# Patient Record
Sex: MALE | Race: WHITE | Employment: OTHER | URBAN - METROPOLITAN AREA
[De-identification: names, ages, dates, MRNs, and addresses within clinical notes are randomized per-mention and may not be internally consistent; named-entity substitution may affect disease eponyms.]

---

## 2017-11-06 ENCOUNTER — APPOINTMENT (OUTPATIENT)
Dept: GENERAL RADIOLOGY | Facility: CLINIC | Age: 62
DRG: 896 | End: 2017-11-06
Attending: EMERGENCY MEDICINE
Payer: COMMERCIAL

## 2017-11-06 ENCOUNTER — HOSPITAL ENCOUNTER (INPATIENT)
Facility: CLINIC | Age: 62
LOS: 5 days | Discharge: HOME HEALTH | DRG: 896 | End: 2017-11-13
Attending: EMERGENCY MEDICINE | Admitting: INTERNAL MEDICINE
Payer: COMMERCIAL

## 2017-11-06 ENCOUNTER — APPOINTMENT (OUTPATIENT)
Dept: CT IMAGING | Facility: CLINIC | Age: 62
DRG: 896 | End: 2017-11-06
Attending: EMERGENCY MEDICINE
Payer: COMMERCIAL

## 2017-11-06 DIAGNOSIS — F10.10 ALCOHOL ABUSE: ICD-10-CM

## 2017-11-06 DIAGNOSIS — F13.20: ICD-10-CM

## 2017-11-06 DIAGNOSIS — F41.9 ANXIETY DISORDER, UNSPECIFIED TYPE: ICD-10-CM

## 2017-11-06 DIAGNOSIS — F17.210 CIGARETTE SMOKER: ICD-10-CM

## 2017-11-06 DIAGNOSIS — F13.20 SEDATIVE DEPENDENCE (H): ICD-10-CM

## 2017-11-06 DIAGNOSIS — J96.01 ACUTE RESPIRATORY FAILURE WITH HYPOXIA (H): Primary | ICD-10-CM

## 2017-11-06 DIAGNOSIS — F10.10 ALCOHOL ABUSE, CONTINUOUS: ICD-10-CM

## 2017-11-06 LAB
ALBUMIN SERPL-MCNC: 3.2 G/DL (ref 3.4–5)
ALCOHOL BREATH TEST: 0.04 (ref 0–0.01)
ALP SERPL-CCNC: 87 U/L (ref 40–150)
ALT SERPL W P-5'-P-CCNC: 47 U/L (ref 0–70)
AMPHETAMINES UR QL SCN: NEGATIVE
ANION GAP SERPL CALCULATED.3IONS-SCNC: 7 MMOL/L (ref 3–14)
AST SERPL W P-5'-P-CCNC: 53 U/L (ref 0–45)
BARBITURATES UR QL: NEGATIVE
BASOPHILS # BLD AUTO: 0 10E9/L (ref 0–0.2)
BASOPHILS NFR BLD AUTO: 0.4 %
BENZODIAZ UR QL: POSITIVE
BILIRUB SERPL-MCNC: 0.4 MG/DL (ref 0.2–1.3)
BUN SERPL-MCNC: 18 MG/DL (ref 7–30)
CALCIUM SERPL-MCNC: 8.5 MG/DL (ref 8.5–10.1)
CANNABINOIDS UR QL SCN: NEGATIVE
CHLORIDE SERPL-SCNC: 107 MMOL/L (ref 94–109)
CO2 BLDCOV-SCNC: 30 MMOL/L (ref 21–28)
CO2 SERPL-SCNC: 31 MMOL/L (ref 20–32)
COCAINE UR QL: NEGATIVE
CREAT SERPL-MCNC: 0.71 MG/DL (ref 0.66–1.25)
D DIMER PPP FEU-MCNC: 2 UG/ML FEU (ref 0–0.5)
DIFFERENTIAL METHOD BLD: ABNORMAL
EOSINOPHIL # BLD AUTO: 0 10E9/L (ref 0–0.7)
EOSINOPHIL NFR BLD AUTO: 0.3 %
ERYTHROCYTE [DISTWIDTH] IN BLOOD BY AUTOMATED COUNT: 16.2 % (ref 10–15)
ETHANOL UR QL SCN: NEGATIVE
GFR SERPL CREATININE-BSD FRML MDRD: >90 ML/MIN/1.7M2
GLUCOSE SERPL-MCNC: 134 MG/DL (ref 70–99)
HCT VFR BLD AUTO: 45 % (ref 40–53)
HGB BLD-MCNC: 14.7 G/DL (ref 13.3–17.7)
IMM GRANULOCYTES # BLD: 0.1 10E9/L (ref 0–0.4)
IMM GRANULOCYTES NFR BLD: 0.5 %
LACTATE BLD-SCNC: 1.9 MMOL/L (ref 0.7–2.1)
LYMPHOCYTES # BLD AUTO: 3 10E9/L (ref 0.8–5.3)
LYMPHOCYTES NFR BLD AUTO: 31 %
MCH RBC QN AUTO: 30.5 PG (ref 26.5–33)
MCHC RBC AUTO-ENTMCNC: 32.7 G/DL (ref 31.5–36.5)
MCV RBC AUTO: 93 FL (ref 78–100)
MONOCYTES # BLD AUTO: 1.1 10E9/L (ref 0–1.3)
MONOCYTES NFR BLD AUTO: 11.2 %
NEUTROPHILS # BLD AUTO: 5.4 10E9/L (ref 1.6–8.3)
NEUTROPHILS NFR BLD AUTO: 56.6 %
NRBC # BLD AUTO: 0 10*3/UL
NRBC BLD AUTO-RTO: 0 /100
OPIATES UR QL SCN: NEGATIVE
PCO2 BLDV: 52 MM HG (ref 40–50)
PH BLDV: 7.36 PH (ref 7.32–7.43)
PLATELET # BLD AUTO: 206 10E9/L (ref 150–450)
PO2 BLDV: 62 MM HG (ref 25–47)
POTASSIUM SERPL-SCNC: 4.2 MMOL/L (ref 3.4–5.3)
PROT SERPL-MCNC: 7 G/DL (ref 6.8–8.8)
RBC # BLD AUTO: 4.82 10E12/L (ref 4.4–5.9)
SAO2 % BLDV FROM PO2: 90 %
SODIUM SERPL-SCNC: 145 MMOL/L (ref 133–144)
TROPONIN I SERPL-MCNC: <0.015 UG/L (ref 0–0.04)
WBC # BLD AUTO: 9.6 10E9/L (ref 4–11)

## 2017-11-06 PROCEDURE — 93010 ELECTROCARDIOGRAM REPORT: CPT | Mod: Z6 | Performed by: EMERGENCY MEDICINE

## 2017-11-06 PROCEDURE — 80053 COMPREHEN METABOLIC PANEL: CPT | Performed by: EMERGENCY MEDICINE

## 2017-11-06 PROCEDURE — 25000128 H RX IP 250 OP 636: Performed by: EMERGENCY MEDICINE

## 2017-11-06 PROCEDURE — 25000132 ZZH RX MED GY IP 250 OP 250 PS 637: Performed by: EMERGENCY MEDICINE

## 2017-11-06 PROCEDURE — 82803 BLOOD GASES ANY COMBINATION: CPT

## 2017-11-06 PROCEDURE — 73030 X-RAY EXAM OF SHOULDER: CPT | Mod: LT

## 2017-11-06 PROCEDURE — 25000125 ZZHC RX 250: Performed by: EMERGENCY MEDICINE

## 2017-11-06 PROCEDURE — 99285 EMERGENCY DEPT VISIT HI MDM: CPT | Mod: 25 | Performed by: EMERGENCY MEDICINE

## 2017-11-06 PROCEDURE — 83605 ASSAY OF LACTIC ACID: CPT

## 2017-11-06 PROCEDURE — 96361 HYDRATE IV INFUSION ADD-ON: CPT | Performed by: EMERGENCY MEDICINE

## 2017-11-06 PROCEDURE — 71020 XR CHEST 2 VW: CPT

## 2017-11-06 PROCEDURE — 80307 DRUG TEST PRSMV CHEM ANLYZR: CPT | Performed by: FAMILY MEDICINE

## 2017-11-06 PROCEDURE — 94640 AIRWAY INHALATION TREATMENT: CPT | Performed by: EMERGENCY MEDICINE

## 2017-11-06 PROCEDURE — 85025 COMPLETE CBC W/AUTO DIFF WBC: CPT | Performed by: EMERGENCY MEDICINE

## 2017-11-06 PROCEDURE — 84484 ASSAY OF TROPONIN QUANT: CPT | Performed by: EMERGENCY MEDICINE

## 2017-11-06 PROCEDURE — 82075 ASSAY OF BREATH ETHANOL: CPT | Performed by: EMERGENCY MEDICINE

## 2017-11-06 PROCEDURE — 80320 DRUG SCREEN QUANTALCOHOLS: CPT | Performed by: FAMILY MEDICINE

## 2017-11-06 PROCEDURE — 71260 CT THORAX DX C+: CPT

## 2017-11-06 PROCEDURE — 85379 FIBRIN DEGRADATION QUANT: CPT | Performed by: EMERGENCY MEDICINE

## 2017-11-06 PROCEDURE — 96360 HYDRATION IV INFUSION INIT: CPT | Performed by: EMERGENCY MEDICINE

## 2017-11-06 PROCEDURE — 73502 X-RAY EXAM HIP UNI 2-3 VIEWS: CPT

## 2017-11-06 PROCEDURE — 93005 ELECTROCARDIOGRAM TRACING: CPT | Performed by: EMERGENCY MEDICINE

## 2017-11-06 PROCEDURE — HZ2ZZZZ DETOXIFICATION SERVICES FOR SUBSTANCE ABUSE TREATMENT: ICD-10-PCS | Performed by: INTERNAL MEDICINE

## 2017-11-06 RX ORDER — PREDNISONE 20 MG/1
40 TABLET ORAL ONCE
Status: COMPLETED | OUTPATIENT
Start: 2017-11-06 | End: 2017-11-06

## 2017-11-06 RX ORDER — HYDROXYZINE PAMOATE 25 MG/1
25 CAPSULE ORAL ONCE
Status: DISCONTINUED | OUTPATIENT
Start: 2017-11-06 | End: 2017-11-06 | Stop reason: CLARIF

## 2017-11-06 RX ORDER — METHOCARBAMOL 750 MG/1
750 TABLET, FILM COATED ORAL
Status: ON HOLD | COMMUNITY
End: 2017-12-22

## 2017-11-06 RX ORDER — BISMUTH SUBSALICYLATE 262 MG/1
524 TABLET, CHEWABLE ORAL 2 TIMES DAILY PRN
Status: ON HOLD | COMMUNITY
End: 2018-04-09

## 2017-11-06 RX ORDER — LIDOCAINE 40 MG/G
1 CREAM TOPICAL 4 TIMES DAILY PRN
Status: ON HOLD | COMMUNITY
End: 2017-12-22

## 2017-11-06 RX ORDER — IOPAMIDOL 755 MG/ML
100 INJECTION, SOLUTION INTRAVASCULAR ONCE
Status: COMPLETED | OUTPATIENT
Start: 2017-11-06 | End: 2017-11-06

## 2017-11-06 RX ORDER — BUPRENORPHINE AND NALOXONE 8; 2 MG/1; MG/1
1 FILM, SOLUBLE BUCCAL; SUBLINGUAL 2 TIMES DAILY
COMMUNITY
End: 2018-03-17

## 2017-11-06 RX ORDER — RAMELTEON 8 MG/1
8 TABLET ORAL AT BEDTIME
Status: ON HOLD | COMMUNITY
End: 2017-12-22

## 2017-11-06 RX ORDER — ALPRAZOLAM 2 MG
2 TABLET ORAL 3 TIMES DAILY PRN
Status: ON HOLD | COMMUNITY
End: 2017-11-13

## 2017-11-06 RX ORDER — LORAZEPAM 1 MG/1
1 TABLET ORAL ONCE
Status: DISCONTINUED | OUTPATIENT
Start: 2017-11-06 | End: 2017-11-06

## 2017-11-06 RX ORDER — VENLAFAXINE HYDROCHLORIDE 150 MG/1
150 CAPSULE, EXTENDED RELEASE ORAL DAILY
Status: ON HOLD | COMMUNITY
End: 2017-11-13

## 2017-11-06 RX ORDER — SODIUM CHLORIDE 9 MG/ML
1000 INJECTION, SOLUTION INTRAVENOUS CONTINUOUS
Status: DISCONTINUED | OUTPATIENT
Start: 2017-11-06 | End: 2017-11-07

## 2017-11-06 RX ORDER — HYDROXYZINE HYDROCHLORIDE 25 MG/1
25 TABLET, FILM COATED ORAL ONCE
Status: COMPLETED | OUTPATIENT
Start: 2017-11-06 | End: 2017-11-06

## 2017-11-06 RX ORDER — LORAZEPAM 0.5 MG/1
0.5 TABLET ORAL ONCE
Status: COMPLETED | OUTPATIENT
Start: 2017-11-06 | End: 2017-11-06

## 2017-11-06 RX ORDER — GABAPENTIN 300 MG/1
900 CAPSULE ORAL 3 TIMES DAILY
Status: ON HOLD | COMMUNITY
End: 2017-11-13

## 2017-11-06 RX ORDER — DIAZEPAM 5 MG
5 TABLET ORAL ONCE
Status: COMPLETED | OUTPATIENT
Start: 2017-11-06 | End: 2017-11-06

## 2017-11-06 RX ORDER — IPRATROPIUM BROMIDE AND ALBUTEROL SULFATE 2.5; .5 MG/3ML; MG/3ML
3 SOLUTION RESPIRATORY (INHALATION) ONCE
Status: COMPLETED | OUTPATIENT
Start: 2017-11-06 | End: 2017-11-06

## 2017-11-06 RX ADMIN — NICOTINE POLACRILEX 4 MG: 4 GUM, CHEWING ORAL at 23:45

## 2017-11-06 RX ADMIN — IPRATROPIUM BROMIDE AND ALBUTEROL SULFATE 3 ML: .5; 3 SOLUTION RESPIRATORY (INHALATION) at 23:07

## 2017-11-06 RX ADMIN — LORAZEPAM 0.5 MG: 0.5 TABLET ORAL at 16:19

## 2017-11-06 RX ADMIN — PREDNISONE 40 MG: 20 TABLET ORAL at 22:43

## 2017-11-06 RX ADMIN — DIAZEPAM 5 MG: 5 TABLET ORAL at 22:43

## 2017-11-06 RX ADMIN — IOPAMIDOL 72 ML: 755 INJECTION, SOLUTION INTRAVENOUS at 20:32

## 2017-11-06 RX ADMIN — HYDROXYZINE HYDROCHLORIDE 25 MG: 25 TABLET ORAL at 15:54

## 2017-11-06 RX ADMIN — SODIUM CHLORIDE 1000 ML: 9 INJECTION, SOLUTION INTRAVENOUS at 16:19

## 2017-11-06 RX ADMIN — SODIUM CHLORIDE 1000 ML: 9 INJECTION, SOLUTION INTRAVENOUS at 14:59

## 2017-11-06 RX ADMIN — SODIUM CHLORIDE 90 ML: 9 INJECTION, SOLUTION INTRAVENOUS at 20:33

## 2017-11-06 ASSESSMENT — ENCOUNTER SYMPTOMS
TREMORS: 1
SEIZURES: 0
HALLUCINATIONS: 1
VOMITING: 0
COUGH: 1
NAUSEA: 0

## 2017-11-06 NOTE — ED NOTES
Patient has been drinking alcohol and mixing it with xanax.  Friends brought him here.    Oxygen sats are low and around 84% on room air.    Placed pt on 3LPM nc oxygen

## 2017-11-06 NOTE — ED NOTES
18g AC IV started by charge RN. Pt sent to CT. CT attempted to flush IV and IV blew and extravagated trung. 5ml saline per CT tech. Pt to return to ED. [facility] called anesthesia, they are unable to come start an IV at this time. [facility] paged Peds Vascular, waiting for call back.

## 2017-11-06 NOTE — ED NOTES
IV try x2 by float nurse while this RN on on break. Then this RN attempted IV start x2 with US. Able to obtain flash, unable to advance catheter. Charge RN to try before calling anesthesia/peds vascular.

## 2017-11-06 NOTE — IP AVS SNAPSHOT
8A: [phone]                                              INTERAGENCY TRANSFER FORM - PHYSICIAN ORDERS   2017                    Hospital Admission Date: 2017  MARYBETH HUERTAS   : 1955  Sex: Male        Attending Provider: (none)    Allergies:  No Known Allergies    Infection:  None   Service:  HOSPITALIST    Ht:  --   Wt:  89.8 kg (198 lb)   Admission Wt:  95.3 kg (210 lb 3.2 oz)    BMI:  --   BSA:  --            Patient PCP Information     Provider PCP Type    [facility]      ED Clinical Impression     Diagnosis Description Comment Added By Time Added    Acute respiratory failure with hypoxia (H) [J96.01] Acute respiratory failure with hypoxia (H) [J96.01]  Stephanie Julien MD 2017  4:01 PM    Xanax use disorder, severe, dependence (H) [F13.20] Xanax use disorder, severe, dependence (H) [F13.20]  Stephanie Julien MD 2017  4:02 PM    Alcohol abuse [F10.10] Alcohol abuse [F10.10]  Stephanie Julien MD 2017  4:02 PM      Hospital Problems as of 2017              Priority Class Noted POA    Withdrawal from benzodiazepine (H)   2017 Yes    Hypoxia   2017 Yes      Non-Hospital Problems as of 2017              Priority Class Noted    Chronic back pain   Unknown    Panic attacks   Unknown    Rotator cuff injury   Unknown    CTS (carpal tunnel syndrome)   Unknown    Obesity   Unknown    Advanced directives, counseling/discussion   2011    PTSD (post-traumatic stress disorder)   2011    CARDIOVASCULAR SCREENING; LDL GOAL LESS THAN 160   2011      Code Status History     Date Active Date Inactive Code Status Order ID Comments User Context    2017 12:29 AM 2017  2:27 PM Full Code [id number]  Tato Orozco MD ED         Medication Review      START taking        Dose / Directions Comments    clonazePAM 2 MG tablet   Commonly known as:  klonoPIN   Used for:  Anxiety disorder, unspecified type         Dose:  4 mg   Take 2 tablets (4 mg) by mouth 2 times daily   Quantity:  60 tablet   Refills:  0        venlafaxine 75 MG Tb24 24 hr tablet   Commonly known as:  EFFEXOR-ER   Indication:  Depression and Anxiety   Replaces:  venlafaxine 150 MG 24 hr capsule        Dose:  75 mg   Take 1 tablet (75 mg) by mouth daily   Quantity:  30 each   Refills:  0          CONTINUE these medications which may have CHANGED, or have new prescriptions. If we are uncertain of the size of tablets/capsules you have at home, strength may be listed as something that might have changed.        Dose / Directions Comments    gabapentin 300 MG capsule   Commonly known as:  NEURONTIN   This may have changed:  how much to take        Dose:  300 mg   Take 1 capsule (300 mg) by mouth 3 times daily   Quantity:  90 capsule   Refills:  0          CONTINUE these medications which have NOT CHANGED        Dose / Directions Comments    aspirin 81 MG tablet        Dose:  1 tablet   Take 1 tablet by mouth daily.   Refills:  3        bismuth subsalicylate 262 MG chewable tablet   Commonly known as:  PEPTO BISMOL        Dose:  524 mg   Take 524 mg by mouth 2 times daily as needed for diarrhea   Refills:  0        buprenorphine HCl-naloxone HCl 8-2 MG per film   Commonly known as:  SUBOXONE        Dose:  1 Film   Place 1 Film under the tongue 2 times daily   Refills:  0        cyanocobalamin 1000 MCG tablet   Commonly known as:  vitamin  B-12        Dose:  1000 mcg   Take 1 tablet by mouth daily.   Quantity:  100 tablet   Refills:  12        fish oil-omega-3 fatty acids 1000 MG capsule        Dose:  2 g   Take 2 capsules by mouth daily.   Quantity:  180 capsule   Refills:  12        lidocaine 4 % Crea cream   Commonly known as:  LMX4        Dose:  1 Application   Apply 1 Application topically 4 times daily as needed for mild pain (Apply liberal amount to shoulder)   Refills:  0        methocarbamol 750 MG tablet   Commonly known as:  ROBAXIN         Dose:  750 mg   Take 750 mg by mouth nightly as needed for muscle spasms   Refills:  0        multivitamin per tablet        Dose:  1 tablet   Take 1 tablet by mouth daily.   Quantity:  100 tablet   Refills:  12        ramelteon 8 MG tablet   Commonly known as:  ROZEREM        Dose:  8 mg   Take 8 mg by mouth At Bedtime   Refills:  0          STOP taking     ALPRAZolam 2 MG tablet   Commonly known as:  XANAX           venlafaxine 150 MG 24 hr capsule   Commonly known as:  EFFEXOR-XR   Replaced by:  venlafaxine 75 MG Tb24 24 hr tablet                     Further instructions from your care team       Use RelTelating PEP Device at least twice daily for 5-10 min/QID.   Use Incentive Spirometry.  Continue Home PT  Monitor blood pressure, weights 2-3 times weekly and as needed.       LABS     CMP  Recent Labs  Lab 11/10/17  0602 11/08/17 0612 11/07/17  0501 11/06/17  1444    141  --  145*   POTASSIUM 4.2 4.1  --  4.2   CHLORIDE 103 104  --  107   CO2 31 31  --  31   ANIONGAP 4 6  --  7   GLC 97 101*  --  134*   BUN 22 18  --  18   CR 0.69 0.58*  --  0.71   GFRESTIMATED >90 >90  --  >90   GFRESTBLACK >90 >90  --  >90   RUSTY 8.5 8.5  --  8.5   MAG  --   --  1.9  --    PROTTOTAL  --   --   --  7.0   ALBUMIN  --   --   --  3.2*   BILITOTAL  --   --   --  0.4   ALKPHOS  --   --   --  87   AST  --   --   --  53*   ALT  --   --   --  47     CBC  Recent Labs  Lab 11/12/17  0636 11/11/17  0624 11/10/17  0602 11/09/17  0632 11/08/17  0612   WBC  --  8.8 9.3 12.5* 13.1*   RBC  --  5.01 4.88 4.91 4.66   HGB 15.0 15.3 14.9 15.0 14.1   HCT  --  46.8 44.6 45.8 43.7   MCV  --  93 91 93 94   MCH  --  30.5 30.5 30.5 30.3   MCHC  --  32.7 33.4 32.8 32.3   RDW  --  15.9* 15.9* 16.0* 16.2*   PLT  --  202 185 183 197     Results for orders placed or performed during the hospital encounter of 11/06/17   XR Chest 2 Views    Narrative    XR CHEST 2 VW  11/6/2017 3:36 PM    HISTORY:  cough, hypoxic    COMPARISON:  None.       Impression    IMPRESSION:  Rotated frontal view. Mild opacities at the left lung  base could represent scarring, atelectasis or mild infiltrate.  Otherwise negative.     VAIBHAV GOLD MD   Shoulder XR, G/E 3 views, left    Narrative    LEFT SHOULDER THREE OR MORE VIEWS  11/6/2017 3:38 PM    HISTORY:  Left shoulder pain status post fall.    COMPARISON:  None.      Impression    IMPRESSION:  No acute process identified. Degenerative changes of the  humeral head and acromioclavicular joint. Osteopenia.     VAIBHAV GOLD MD   XR Pelvis w Hip Left 1 View    Narrative    PELVIS AND HIP LEFT ONE VIEW  11/6/2017 3:37 PM    HISTORY:  Left hip pain status post fall.    COMPARISON:  None.      Impression    IMPRESSION:  Negative.     VAIBHAV GOLD MD   CT Chest Pulmonary Embolism w Contrast    Narrative    CT CHEST PULMONARY EMBOLISM WITH CONTRAST  11/6/2017  8:35 PM     HISTORY: Hypoxia, elevated D-dimer, previous deep vein thrombosis.      TECHNIQUE: Thin section axial images are performed from the thoracic  inlet to the lung bases utilizing 50 mL of Isovue 370 IV contrast  without adverse event. Coronal reformatted images are also generated.  Radiation dose for this scan was reduced using automated exposure  control, adjustment of the mA and/or kV according to patient size, or  iterative reconstruction technique.    FINDINGS:     Chest: Emphysema is present. A few peripheral mucoid impactions are  noted in the posterior right costophrenic angle with surrounding  inflammation. No infiltrate or consolidation. Mild right lower lobe  bronchiectatic changes. Mild volume loss right hemithorax is also  noted. No pleural or pericardial fluid. Heart is normal in size.  Esophagus is unremarkable. Probable reactive mediastinal lymph nodes.  The largest measures 1.4 x 1.0 cm in the left paratracheal region on  series 4, image 53. Small right hilar lymph nodes are also noted.  These are not enlarged by CT criteria. No evidence of  pulmonary  embolism. Thoracic aorta is unremarkable with scattered calcified  atherosclerotic plaque. Limited images upper abdomen demonstrate  probable fatty liver infiltration. Bone window examination  demonstrates degenerative spine changes.      Impression    IMPRESSION:  1. No evidence of pulmonary embolism. Thoracic aorta is unremarkable  with only scattered calcified atherosclerotic plaque.   2. Emphysema with mild bronchiectatic changes posterior right lower  lobe and a few peripheral mucoid impactions in the posterior right  costophrenic angle. No evidence of pneumonia. No pleural fluid.  Reactive mediastinal and right hilar lymph nodes.  3. Fatty infiltration of the liver.    AYESHA FLORENTINO MD   XR Chest Port 1 View    Narrative    XR CHEST PORT 1 VW 11/10/2017 2:14 AM    COMPARISON: 11/6/2017    HISTORY: Respiratory distress.      Impression    IMPRESSION: Cardiac silhouette and pulmonary vasculature are within  normal limits. No focal airspace disease, pleural effusion or  pneumothorax.    ARABELLA MADSEN MD         Summary of Visit     Reason for your hospital stay       Alcohol abuse, withdrawal - resolved.   Hypoxic respiratory failure from COPD exacerbation- resolved.   Hypotension likely deconditioning, dehydration - resolved.             After Care     Activity       Your activity upon discharge: activity as tolerated       Diet       Follow this diet upon discharge:       Advance Diet as Tolerated: Regular Diet Adult             Referrals     Home care nursing referral       [facility].  Phone  [phone]  Fax  [phone]    RN skilled nursing visit. RN to assess vital signs and weight, respiratory and cardiac status, pain level and activity tolerance, hydration, nutrition and bowel status and home safety.  RN to teach medication management.  Home health aide to assist with homemaking and cares at home as in place prior to admission             Follow-Up Appointment Instructions     Future  Labs/Procedures    Adult [facility] Follow-up and recommended labs and tests     Comments:    Follow up with primary care provider, [facility], within 7 days for hospital follow- up.      Follow up with your Orthopedic provider as scheduled or instructed.     Follow up with your outpatient psychiatrist as scheduled or instructed.     Follow up with Pulmonologist in 2-3 weeks for COPD. Outpatient PFT.     [facility].    Outpatient Sleep study.     Appointments on [place] and/or [facility] (with [facility] or [facility] provider or service). Call [phone] if you haven't heard regarding these appointments within 7 days of discharge.      Follow-Up Appointment Instructions     Adult [facility] Follow-up and recommended labs and tests       Follow up with primary care provider, [facility], within 7 days for hospital follow- up.      Follow up with your Orthopedic provider as scheduled or instructed.     Follow up with your outpatient psychiatrist as scheduled or instructed.     Follow up with Pulmonologist in 2-3 weeks for COPD. Outpatient PFT.     [facility].    Outpatient Sleep study.     Appointments on [place] and/or [facility] (with [facility] or [facility] provider or service). Call [phone] if you haven't heard regarding these appointments within 7 days of discharge.             Statement of Approval     Ordered          11/13/17 1033  I have reviewed and agree with all the recommendations and orders detailed in this document.  EFFECTIVE NOW     Approved and electronically signed by:  Neftali Mcknight MD

## 2017-11-06 NOTE — ED PROVIDER NOTES
History     Chief Complaint   Patient presents with     Addiction Problem     mixing alcohol with xanax     HPI  Jorge Rosales is a 62 year old male with history of anxiety, chronic back pain, and PTSD who presents with concerns for increased alcohol use and Xanax use.  He reports that he has had chronic back pain with previous back surgery and has had worsening pain due to this.  He has been buprenorphine for his pain which was increased to 32 mg a few months ago and decreased down to 16 mg which he has been on for the last month.  He is also prescribed Xanax for anxiety.  He is prescribed 90 tablets of Xanax which should last him at least 30 days. However, he has taken the full supply over 9 day period.  He reports that he ran out of Xanax in the last 1-2 days.  He reports that as his anxiety has been increasing, he has been drinking more alcohol and is drinking approximately two 1/5 bottles of frances a day for the last few months.  He did try to decrease his use one week ago and experienced visual hallucinations and tremulousness.  He denies any seizure activity.  He reports that over the last week, he has had a increasing cough that has been productive.  He denies hemoptysis.  He denies any fevers.  He denies any recent nausea or vomiting.  He denies any associated chest pain.  He does have a history of lower extremities DVT, following surgery which was treated with Coumadin.  He reports this was discontinued a few months ago.  He is not currently on any anticoagulants.  He does smoke approximately one pack a day.  He denies any history of asthma or COPD.  He did have 2 falls over the last week that he reports here due to a trip and fall.  He reports landing onto his left hip and left shoulder.  He denies hitting his head or having any loss of consciousness.  He has been able to ambulate without difficulty. I do note in his home medication list that he was previously on methadone.  He denies any recent  use.  He denies any illicit drug use including opioids or heroin.      This part of the medical record was transcribed by Miah Beltran Medical Scribe, from a dictation done by Stephanie Julien MD.     PAST MEDICAL HISTORY  Past Medical History:   Diagnosis Date     Chronic back pain     Methadone program     CTS (carpal tunnel syndrome)      Low testosterone     managed by endocrinology     Obesity      Panic attacks      PTSD (post-traumatic stress disorder) 7/8/2011     Rotator cuff injury      PAST SURGICAL HISTORY  Past Surgical History:   Procedure Laterality Date     BACK SURGERY       FAMILY HISTORY  Family History   Problem Relation Age of Onset     DIABETES Father      SOCIAL HISTORY  Social History   Substance Use Topics     Smoking status: Current Every Day Smoker     Packs/day: 0.50     Years: 30.00     Types: Cigarettes     Smokeless tobacco: Not on file     Alcohol use No     MEDICATIONS  Current Facility-Administered Medications   Medication     0.9% sodium chloride BOLUS    Followed by     0.9% sodium chloride infusion     hydrOXYzine (ATARAX) tablet 25 mg     Current Outpatient Prescriptions   Medication     alprazolam (XANAX) 0.5 MG tablet     mirtazapine (REMERON) 30 MG tablet     DULoxetine (CYMBALTA) 20 MG capsule     quetiapine (SEROQUEL) 50 MG tablet     aspirin 81 MG tablet     cyanocolbalamin (VITAMIN B-12) 1000 MCG tablet     fish oil-omega-3 fatty acids 1000 MG capsule     Multiple Vitamin (MULTIVITAMIN) per tablet     methadone (DOLOPHINE) 10 MG tablet     ALLERGIES  No Known Allergies      I have reviewed the Medications, Allergies, Past Medical and Surgical History, and Social History in the Epic system.    Review of Systems   Respiratory: Positive for cough (productive).    Cardiovascular: Negative for chest pain.   Gastrointestinal: Negative for nausea and vomiting.   Neurological: Positive for tremors. Negative for seizures and syncope. Weakness: tremulousness.   Psychiatric/Behavioral:  Positive for hallucinations.   All other systems reviewed and are negative.      Physical Exam   BP: 112/66  Pulse: 61  Temp: 97.7  F (36.5  C)  Resp: 18  SpO2: (!) 85 %      Physical Exam  General: patient is drowsy and in no acute distress   Head: atraumatic and normocephalic   EENT: dry mucus membranes without tonsillar erythema or exudates, pupils 2 mm equal round and reactive   Neck: supple   Cardiovascular: regular rate and rhythm, extremities warm and well perfused, no lower extremity edema  Pulmonary: diminished breath sounds in the right base, no wheezing, no rhonchi  Abdomen: soft, non-tender, non-distended  Musculoskeletal: normal range of motion of extremities, TTP of the left hip and left shoulder  Neurological: alert and oriented, moving all extremities symmetrically, CN II-XII intact, strength 5/5 and symmetric in ,and ankle plantar/dorsiflexion, sensation to light touch in distal upper and lower extremities intact, normal gait  Skin: warm, dry     ED Course     ED Course     Procedures             EKG Interpretation:      Interpreted by Stephanie Julien  Time reviewed: 1500  Symptoms at time of EKG: shortness of breath   Rhythm: sinus bradycardia  Rate: Bradycardia  Axis: Normal  Ectopy: none  Conduction: normal  ST Segments/ T Waves: No acute ischemic changes  Q Waves: none  Comparison to prior: No old EKG available    Clinical Impression: sinus bradycardia                  Critical Care time:  none           Labs Ordered and Resulted from Time of ED Arrival Up to the Time of Departure from the ED   ALCOHOL BREATH TEST POCT - Abnormal; Notable for the following:        Result Value    Alcohol Breath Test 0.039 (*)     All other components within normal limits   ISTAT  GASES LACTATE JORI POCT - Abnormal; Notable for the following:     PCO2 Venous 52 (*)     PO2 Venous 62 (*)     Bicarbonate Venous 30 (*)     All other components within normal limits   DRUG ABUSE SCREEN 6 CHEM DEP URINE ([facility])    CBC WITH PLATELETS DIFFERENTIAL   COMPREHENSIVE METABOLIC PANEL   TROPONIN I   D DIMER QUANTITATIVE   ISTAT CG4 GASES LACTATE JORI NURSING POCT            Assessments & Plan (with Medical Decision Making)     Mr. Rosales is a 62 year old male with a history of anxiety, chronic back pain, and PTSD who presents with concerns of increased benzodiazepine and alcohol use.  Upon arrival to the Emergency Department, he is noted to be hypoxic to 84% on room air.  He is not typically on any supplemental oxygen.  He has no respiratory distress at this time and is not tachycardic.  He does appear mildly lethargic but is able to answer questions without additional prompting.  Differential diagnosis for his hypoxia includes drug overdose, pneumonia, aspiration pneumonitis, pleural effusion, PE.  ECG was obtained and shows normal sinus rhythm.  He does not have acute ischemic changes and troponin is negative.  Chest x-rays was obtained which shows possible infiltrate versus atelectasis.  Labs including CBC, CMP, troponin, and d-dimer show normal Hgb and troponin.  D-dimer is elevated to 2.0.  He reports that he is feeling very anxious.  Urine drug screen is ordered and pending.  It is unclear exactly the time of his last dose of Xanax. However, he has been taking significantly increased amounts compared to his prescription.  He certainly is at risk for possible seizures secondary to withdrawal from both alcohol and Xanax. Breath alcohol is 0.039.  CT was ordered to rule out PE and pending on sign out.  He does not have significant trauma on physical exam and xrays of the shoulder and hip are negative.  He will likely require admission for acute hypoxic respiratory failure, as well as, polysubstance abuse and Xanax abuse for respiratory support and detox treatment.    This part of the medical record was transcribed by Miah Beltran Medical Scribe, from a dictation done by Stephanie Julien MD.       I have reviewed the nursing  notes.    I have reviewed the findings, diagnosis, plan and need for follow up with the patient.    New Prescriptions    No medications on file       Final diagnoses:   Acute respiratory failure with hypoxia (H)   Xanax use disorder, severe, dependence (H)   Alcohol abuse       11/6/2017   [facility], [place], EMERGENCY DEPARTMENT     Stephanie Julien MD  11/06/17 5583

## 2017-11-06 NOTE — IP AVS SNAPSHOT
[facility]A    [address]    [facility] [address]    Phone:  [phone]                                       After Visit Summary   11/6/2017    Jorge Rosales    MRN: [id number]           After Visit Summary Signature Page     I have received my discharge instructions, and my questions have been answered. I have discussed any challenges I see with this plan with the nurse or doctor.    ..........................................................................................................................................  Patient/Patient Representative Signature      ..........................................................................................................................................  Patient Representative Print Name and Relationship to Patient    ..................................................               ................................................  Date                                            Time    ..........................................................................................................................................  Reviewed by Signature/Title    ...................................................              ..............................................  Date                                                            Time

## 2017-11-06 NOTE — IP AVS SNAPSHOT
MRN:[id number]                      After Visit Summary   11/6/2017    Jorge Rosales    MRN: [id number]           Thank you!     Thank you for choosing [facility] for your care. Our goal is always to provide you with excellent care. Hearing back from our patients is one way we can continue to improve our services. Please take a few minutes to complete the written survey that you may receive in the mail after you visit with us. Thank you!        Patient Information     Date Of Birth          1955        Designated Caregiver       Most Recent Value    Caregiver    Will someone help with your care after discharge? no      About your hospital stay     You were admitted on:  November 7, 2017 You last received care in the:  [facility] 8[facility]    You were discharged on:  November 13, 2017        Reason for your hospital stay       Alcohol abuse, withdrawal - resolved.   Hypoxic respiratory failure from COPD exacerbation- resolved.   Hypotension likely deconditioning, dehydration - resolved.                  Who to Call     For medical emergencies, please call 911.  For non-urgent questions about your medical care, please call your primary care provider or clinic, None          Attending Provider     Provider Specialty    Stephanie Julien MD Emergency Medicine    [facility], Angel Sullivan MD Internal Medicine    [facility], MD Neftali Internal Medicine       Primary Care Provider Fax #    [facility] [phone]       When to contact your care team       Call your primary doctor if you have any of the following:  increased shortness of breath, fever temp >101, increased cough, Light headedness or fall etc.                  After Care Instructions     Activity       Your activity upon discharge: activity as tolerated            Diet       Follow this diet upon discharge:       Advance Diet as Tolerated: Regular Diet Adult                  Follow-up Appointments     Adult [facility] Follow-up and recommended  labs and tests       Follow up with primary care provider, [facility], within 7 days for hospital follow- up.      Follow up with your Orthopedic provider as scheduled or instructed.     Follow up with your outpatient psychiatrist as scheduled or instructed.     Follow up with Pulmonologist in 2-3 weeks for COPD. Outpatient PFT.     [facility].    Outpatient Sleep study.     Appointments on [facility] and/or [facility] (with [facility] or [facility] provider or service). Call [phone] if you haven't heard regarding these appointments within 7 days of discharge.                  Additional Services     Home care nursing referral       [facility].  Phone  [phone]  Fax  [phone]    RN skilled nursing visit. RN to assess vital signs and weight, respiratory and cardiac status, pain level and activity tolerance, hydration, nutrition and bowel status and home safety.  RN to teach medication management.  Home health aide to assist with homemaking and cares at home as in place prior to admission                  Further instructions from your care team       Use [employer] Oscillating PEP Device at least twice daily for 5-10 min/QID.   Use Incentive Spirometry.  Continue Home PT  Monitor blood pressure, weights 2-3 times weekly and as needed.       LABS     CMP  Recent Labs  Lab 11/10/17  0602 11/08/17  0612 11/07/17  0501 11/06/17  1444    141  --  145*   POTASSIUM 4.2 4.1  --  4.2   CHLORIDE 103 104  --  107   CO2 31 31  --  31   ANIONGAP 4 6  --  7   GLC 97 101*  --  134*   BUN 22 18  --  18   CR 0.69 0.58*  --  0.71   GFRESTIMATED >90 >90  --  >90   GFRESTBLACK >90 >90  --  >90   RUSTY 8.5 8.5  --  8.5   MAG  --   --  1.9  --    PROTTOTAL  --   --   --  7.0   ALBUMIN  --   --   --  3.2*   BILITOTAL  --   --   --  0.4   ALKPHOS  --   --   --  87   AST  --   --   --  53*   ALT  --   --   --  47     CBC  Recent Labs  Lab 11/12/17  0636  11/11/17  0624 11/10/17  0602 11/09/17  0632 11/08/17  0612   WBC  --  8.8 9.3 12.5* 13.1*   RBC  --  5.01 4.88 4.91 4.66   HGB 15.0 15.3 14.9 15.0 14.1   HCT  --  46.8 44.6 45.8 43.7   MCV  --  93 91 93 94   MCH  --  30.5 30.5 30.5 30.3   MCHC  --  32.7 33.4 32.8 32.3   RDW  --  15.9* 15.9* 16.0* 16.2*   PLT  --  202 185 183 197     Results for orders placed or performed during the hospital encounter of 11/06/17   XR Chest 2 Views    Narrative    XR CHEST 2 VW  11/6/2017 3:36 PM    HISTORY:  cough, hypoxic    COMPARISON:  None.      Impression    IMPRESSION:  Rotated frontal view. Mild opacities at the left lung  base could represent scarring, atelectasis or mild infiltrate.  Otherwise negative.     VAIBHAV GOLD MD   Shoulder XR, G/E 3 views, left    Narrative    LEFT SHOULDER THREE OR MORE VIEWS  11/6/2017 3:38 PM    HISTORY:  Left shoulder pain status post fall.    COMPARISON:  None.      Impression    IMPRESSION:  No acute process identified. Degenerative changes of the  humeral head and acromioclavicular joint. Osteopenia.     VAIBHAV GOLD MD   XR Pelvis w Hip Left 1 View    Narrative    PELVIS AND HIP LEFT ONE VIEW  11/6/2017 3:37 PM    HISTORY:  Left hip pain status post fall.    COMPARISON:  None.      Impression    IMPRESSION:  Negative.     VAIBHAV GOLD MD   CT Chest Pulmonary Embolism w Contrast    Narrative    CT CHEST PULMONARY EMBOLISM WITH CONTRAST  11/6/2017  8:35 PM     HISTORY: Hypoxia, elevated D-dimer, previous deep vein thrombosis.      TECHNIQUE: Thin section axial images are performed from the thoracic  inlet to the lung bases utilizing 50 mL of Isovue 370 IV contrast  without adverse event. Coronal reformatted images are also generated.  Radiation dose for this scan was reduced using automated exposure  control, adjustment of the mA and/or kV according to patient size, or  iterative reconstruction technique.    FINDINGS:     Chest: Emphysema is present. A few peripheral mucoid  impactions are  noted in the posterior right costophrenic angle with surrounding  inflammation. No infiltrate or consolidation. Mild right lower lobe  bronchiectatic changes. Mild volume loss right hemithorax is also  noted. No pleural or pericardial fluid. Heart is normal in size.  Esophagus is unremarkable. Probable reactive mediastinal lymph nodes.  The largest measures 1.4 x 1.0 cm in the left paratracheal region on  series 4, image 53. Small right hilar lymph nodes are also noted.  These are not enlarged by CT criteria. No evidence of pulmonary  embolism. Thoracic aorta is unremarkable with scattered calcified  atherosclerotic plaque. Limited images upper abdomen demonstrate  probable fatty liver infiltration. Bone window examination  demonstrates degenerative spine changes.      Impression    IMPRESSION:  1. No evidence of pulmonary embolism. Thoracic aorta is unremarkable  with only scattered calcified atherosclerotic plaque.   2. Emphysema with mild bronchiectatic changes posterior right lower  lobe and a few peripheral mucoid impactions in the posterior right  costophrenic angle. No evidence of pneumonia. No pleural fluid.  Reactive mediastinal and right hilar lymph nodes.  3. Fatty infiltration of the liver.    AYESHA FLORENTINO MD   XR Chest Port 1 View    Narrative    XR CHEST PORT 1 VW 11/10/2017 2:14 AM    COMPARISON: 11/6/2017    HISTORY: Respiratory distress.      Impression    IMPRESSION: Cardiac silhouette and pulmonary vasculature are within  normal limits. No focal airspace disease, pleural effusion or  pneumothorax.    ARABELLA MADSEN MD         Pending Results     No orders found from 11/4/2017 to 11/7/2017.            Statement of Approval     Ordered          11/13/17 1033  I have reviewed and agree with all the recommendations and orders detailed in this document.  EFFECTIVE NOW     Approved and electronically signed by:  Neftali Mcknight MD             Admission Information     Date & Time Provider  "Department Dept. Phone    2017 Neftali Mcknight MD  8A [phone]      Your Vitals Were     Blood Pressure Pulse Temperature Respirations Weight Pulse Oximetry    102/72 (BP Location: Right arm) 64 96.6  F (35.9  C) (Oral) 16 89.8 kg (198 lb) 92%    BMI (Body Mass Index)                   27.62 kg/m2           [employer] Information     [employer] lets you send messages to your doctor, view your test results, renew your prescriptions, schedule appointments and more. To sign up, go to [email or web address] . Click on \"Log in\" on the left side of the screen, which will take you to the Welcome page. Then click on \"Sign up Now\" on the right side of the page.     You will be asked to enter the access code listed below, as well as some personal information. Please follow the directions to create your username and password.     Your access code is: [email or web address]  Expires: 2018 12:40 AM     Your access code will  in 90 days. If you need help or a new code, please call your [place] clinic or [phone].        Care EveryWhere ID     This is your Care EveryWhere ID. This could be used by other organizations to access your [place] medical records  [id number]        Equal Access to Services     GEOVANI VILLA AH: Tracy westfallo Soann marie, waaxda luqadaha, qaybta kaalmada adegarret, tacos munoz. So [facility] [phone].    ATENCIÓN: Si habla español, tiene a nunez disposición servicios gratuitos de asistencia lingüística. Bradley al [phone].    We comply with applicable federal civil rights laws and [place] laws. We do not discriminate on the basis of race, color, national origin, age, disability, sex, sexual orientation, or gender identity.               Review of your medicines      START taking        Dose / Directions    clonazePAM 2 MG tablet   Commonly known as:  klonoPIN   Used for:  Anxiety disorder, unspecified type        Dose:  4 mg   Take 2 tablets (4 mg) by mouth 2 " times daily   Quantity:  60 tablet   Refills:  0       venlafaxine 75 MG Tb24 24 hr tablet   Commonly known as:  EFFEXOR-ER   Indication:  Depression and Anxiety   Replaces:  venlafaxine 150 MG 24 hr capsule        Dose:  75 mg   Take 1 tablet (75 mg) by mouth daily   Quantity:  30 each   Refills:  0         CONTINUE these medicines which may have CHANGED, or have new prescriptions. If we are uncertain of the size of tablets/capsules you have at home, strength may be listed as something that might have changed.        Dose / Directions    gabapentin 300 MG capsule   Commonly known as:  NEURONTIN   This may have changed:  how much to take        Dose:  300 mg   Take 1 capsule (300 mg) by mouth 3 times daily   Quantity:  90 capsule   Refills:  0         CONTINUE these medicines which have NOT CHANGED        Dose / Directions    aspirin 81 MG tablet        Dose:  1 tablet   Take 1 tablet by mouth daily.   Refills:  3       bismuth subsalicylate 262 MG chewable tablet   Commonly known as:  PEPTO BISMOL        Dose:  524 mg   Take 524 mg by mouth 2 times daily as needed for diarrhea   Refills:  0       buprenorphine HCl-naloxone HCl 8-2 MG per film   Commonly known as:  SUBOXONE        Dose:  1 Film   Place 1 Film under the tongue 2 times daily   Refills:  0       cyanocobalamin 1000 MCG tablet   Commonly known as:  vitamin  B-12        Dose:  1000 mcg   Take 1 tablet by mouth daily.   Quantity:  100 tablet   Refills:  12       fish oil-omega-3 fatty acids 1000 MG capsule        Dose:  2 g   Take 2 capsules by mouth daily.   Quantity:  180 capsule   Refills:  12       lidocaine 4 % Crea cream   Commonly known as:  LMX4        Dose:  1 Application   Apply 1 Application topically 4 times daily as needed for mild pain (Apply liberal amount to shoulder)   Refills:  0       methocarbamol 750 MG tablet   Commonly known as:  ROBAXIN        Dose:  750 mg   Take 750 mg by mouth nightly as needed for muscle spasms   Refills:  0        multivitamin per tablet        Dose:  1 tablet   Take 1 tablet by mouth daily.   Quantity:  100 tablet   Refills:  12       ramelteon 8 MG tablet   Commonly known as:  ROZEREM        Dose:  8 mg   Take 8 mg by mouth At Bedtime   Refills:  0         STOP taking     ALPRAZolam 2 MG tablet   Commonly known as:  XANAX           venlafaxine 150 MG 24 hr capsule   Commonly known as:  EFFEXOR-XR   Replaced by:  venlafaxine 75 MG Tb24 24 hr tablet                Where to get your medicines      Some of these will need a paper prescription and others can be bought over the counter. Ask your nurse if you have questions.     Bring a paper prescription for each of these medications     clonazePAM 2 MG tablet                Protect others around you: Learn how to safely use, store and throw away your medicines at www.disposemymeds.org.             Medication List: This is a list of all your medications and when to take them. Check marks below indicate your daily home schedule. Keep this list as a reference.      Medications           Morning Afternoon Evening Bedtime As Needed    aspirin 81 MG tablet   Take 1 tablet by mouth daily.                                bismuth subsalicylate 262 MG chewable tablet   Commonly known as:  PEPTO BISMOL   Take 524 mg by mouth 2 times daily as needed for diarrhea                                buprenorphine HCl-naloxone HCl 8-2 MG per film   Commonly known as:  SUBOXONE   Place 1 Film under the tongue 2 times daily   Last time this was given:  1 Film on 11/13/2017  7:55 AM                                clonazePAM 2 MG tablet   Commonly known as:  klonoPIN   Take 2 tablets (4 mg) by mouth 2 times daily   Last time this was given:  4 mg on 11/13/2017  7:54 AM                                cyanocobalamin 1000 MCG tablet   Commonly known as:  vitamin  B-12   Take 1 tablet by mouth daily.   Last time this was given:  1,000 mcg on 11/13/2017  7:49 AM                                fish  oil-omega-3 fatty acids 1000 MG capsule   Take 2 capsules by mouth daily.                                gabapentin 300 MG capsule   Commonly known as:  NEURONTIN   Take 1 capsule (300 mg) by mouth 3 times daily   Last time this was given:  300 mg on 11/12/2017  7:55 PM                                lidocaine 4 % Crea cream   Commonly known as:  LMX4   Apply 1 Application topically 4 times daily as needed for mild pain (Apply liberal amount to shoulder)                                methocarbamol 750 MG tablet   Commonly known as:  ROBAXIN   Take 750 mg by mouth nightly as needed for muscle spasms                                multivitamin per tablet   Take 1 tablet by mouth daily.   Last time this was given:  1 tablet on 11/13/2017  7:50 AM                                ramelteon 8 MG tablet   Commonly known as:  ROZEREM   Take 8 mg by mouth At Bedtime   Last time this was given:  8 mg on 11/12/2017  9:57 PM                                venlafaxine 75 MG Tb24 24 hr tablet   Commonly known as:  EFFEXOR-ER   Take 1 tablet (75 mg) by mouth daily   Last time this was given:  75 mg on 11/13/2017  7:49 AM                                          More Information        Discharge Instructions: COPD  You have been diagnosed with chronic obstructive pulmonary disease (COPD). This is a name given to a group of diseases that limit the flow of air in and out of your lungs. This makes it harder to breathe. With COPD, you are also more likely to get lung infections. COPD includes chronic bronchitis and emphysema. COPD is most often caused by heavy, long-term cigarette smoking.  Home care  Quit smoking    If you smoke, quit. It is the best thing you can do for your COPD and your overall health.    Join a stop-smoking program. There are even telephone, text message, and Internet programs to help you quit.    Ask your healthcare provider about medicines or other methods to help you quit.    Ask family members to quit smoking  as well.    Don't allow people to smoke in your home, in your car, or when they are around you.  Protect yourself from infection    Wash your hands often. Do your best to keep your hands away from your face. Most germs are spread from your hands to your mouth.    Get a flu shot every year. Also ask your provider about pneumonia vaccines.    Avoid crowds. It's especially important to do this in the winter when more people have colds and flu.    To stay healthy, get enough sleep, exercise regularly, and eat a balanced diet. You should:    Get about 8 hours of sleep every night.    Try to exercise for at least 30 minutes on most days.    Have healthy foods including fruits and vegetables, 100% whole grains, lean meats and fish, and low-fat dairy products. Try to stay away from foods high in fats and sugar.  Take your medicines  Take your medicines exactly as directed. Don't skip doses.  Manage your stress  Stress can make COPD worse. Use this stress management technique:    Find a quiet place and sit or lie in a comfortable position.    Close your eyes and perform breathing exercises for several minutes. Ask your provider about the best way to breathe.  Pulmonary rehabilitation    Pulmonary rehab can help you feel better. These programs include exercise, breathing techniques, information about COPD, counseling, and help for smokers.    Ask your provider or your local hospital about programs in your area.  When to call your healthcare provider  Call your provider immediately if you have any of the following:    Shortness of breath, wheezing, or coughing    Increased mucus    Yellow, green, bloody, or smelly mucus    Fever or chills    Tightness in your chest that does not go away with rest or medicine    An irregular heartbeat or a feeling that your heart is beating very fast    Swollen ankles   Date Last Reviewed: 5/1/2016 2000-2017 [employer]. [address] [place] [address], [place] [address]. All rights  reserved. This information is not intended as a substitute for professional medical care. Always follow your healthcare professional's instructions.                Discharge Instructions for Low Blood Pressure (Hypotension)  You have been diagnosed with hypotension. When you have hypotension, your blood pressure is lower than normal. Low blood pressure can make you feel dizzy or faint. This condition is sometimes a side effect of taking certain medicines, including medicines for high blood pressure (hypertension). It can also result from medical conditions such as dehydration.  Home care  These home care steps can help manage your condition:    Follow your doctor s instructions.    Rest in bed and ask for help with daily activities until you feel better. You may need to slowly increase the amount of time you spend sitting or doing light activity.    Don t drive while your blood pressure is not controlled.    Be careful when you get up from sitting or lying down.    Take your time. Sudden movements can cause dizziness or fainting.    When you first sit up after lying down, be sure to sit in bed for 30 seconds or so before getting up to walk.    Tell your doctor about the medicines you are taking. Many kinds of medicines trigger low blood pressure.    Limit your alcohol intake to no more than 2 drinks a day for men and 1 drink a day for women. Alcohol can dehydrate you even further. It can also interfere with the effectiveness of medicines.    Prevent dehydration by drinking plenty of fluids, unless otherwise instructed by your doctor    Learn to take your own blood pressure. Keep a record of your results. Ask your doctor which readings mean that you need medical attention.    Tell your family members to call an ambulance if you become unconscious. Request that they learn CPR.  Follow-up care  Make a follow-up appointment as directed by our staff.     When to seek medical care  Call your doctor immediately if you have  any of the following:    Chest pain or shortness of breath (call 911)    Dizziness or fainting spells    Black, maroon, or tarry stools    Irregular heartbeat    Stiff neck    Severe upper back pain    Diarrhea or vomiting that doesn t go away    Inability to eat or drink    Burning sensation when you urinate    Urine with a strong, unpleasant odor    Fainting with exercise   Date Last Reviewed: 4/27/2016 2000-2017 [employer]. [address] [place] [address], [place] [address]. All rights reserved. This information is not intended as a substitute for professional medical care. Always follow your healthcare professional's instructions.

## 2017-11-07 PROBLEM — F13.939 WITHDRAWAL FROM BENZODIAZEPINE (H): Status: ACTIVE | Noted: 2017-11-07

## 2017-11-07 LAB
INTERPRETATION ECG - MUSE: NORMAL
MAGNESIUM SERPL-MCNC: 1.9 MG/DL (ref 1.6–2.3)

## 2017-11-07 PROCEDURE — 94640 AIRWAY INHALATION TREATMENT: CPT

## 2017-11-07 PROCEDURE — 25000128 H RX IP 250 OP 636: Performed by: STUDENT IN AN ORGANIZED HEALTH CARE EDUCATION/TRAINING PROGRAM

## 2017-11-07 PROCEDURE — 25000132 ZZH RX MED GY IP 250 OP 250 PS 637: Performed by: STUDENT IN AN ORGANIZED HEALTH CARE EDUCATION/TRAINING PROGRAM

## 2017-11-07 PROCEDURE — 99220 ZZC INITIAL OBSERVATION CARE,LEVL III: CPT | Performed by: INTERNAL MEDICINE

## 2017-11-07 PROCEDURE — 25000132 ZZH RX MED GY IP 250 OP 250 PS 637: Performed by: PSYCHIATRY & NEUROLOGY

## 2017-11-07 PROCEDURE — 94640 AIRWAY INHALATION TREATMENT: CPT | Mod: 76

## 2017-11-07 PROCEDURE — 25000132 ZZH RX MED GY IP 250 OP 250 PS 637: Performed by: INTERNAL MEDICINE

## 2017-11-07 PROCEDURE — 36415 COLL VENOUS BLD VENIPUNCTURE: CPT | Performed by: STUDENT IN AN ORGANIZED HEALTH CARE EDUCATION/TRAINING PROGRAM

## 2017-11-07 PROCEDURE — 25000125 ZZHC RX 250: Performed by: STUDENT IN AN ORGANIZED HEALTH CARE EDUCATION/TRAINING PROGRAM

## 2017-11-07 PROCEDURE — 25000125 ZZHC RX 250: Performed by: INTERNAL MEDICINE

## 2017-11-07 PROCEDURE — 25000128 H RX IP 250 OP 636: Performed by: INTERNAL MEDICINE

## 2017-11-07 PROCEDURE — S5010 5% DEXTROSE AND 0.45% SALINE: HCPCS | Performed by: STUDENT IN AN ORGANIZED HEALTH CARE EDUCATION/TRAINING PROGRAM

## 2017-11-07 PROCEDURE — G0378 HOSPITAL OBSERVATION PER HR: HCPCS

## 2017-11-07 PROCEDURE — 83735 ASSAY OF MAGNESIUM: CPT | Performed by: STUDENT IN AN ORGANIZED HEALTH CARE EDUCATION/TRAINING PROGRAM

## 2017-11-07 PROCEDURE — 25800025 ZZH RX 258: Performed by: STUDENT IN AN ORGANIZED HEALTH CARE EDUCATION/TRAINING PROGRAM

## 2017-11-07 PROCEDURE — 25000128 H RX IP 250 OP 636: Performed by: EMERGENCY MEDICINE

## 2017-11-07 PROCEDURE — 99223 1ST HOSP IP/OBS HIGH 75: CPT | Performed by: PSYCHIATRY & NEUROLOGY

## 2017-11-07 RX ORDER — IBUPROFEN 400 MG/1
400 TABLET, FILM COATED ORAL EVERY 6 HOURS PRN
Status: DISCONTINUED | OUTPATIENT
Start: 2017-11-07 | End: 2017-11-13 | Stop reason: HOSPADM

## 2017-11-07 RX ORDER — LIDOCAINE 40 MG/G
CREAM TOPICAL 4 TIMES DAILY PRN
Status: DISCONTINUED | OUTPATIENT
Start: 2017-11-07 | End: 2017-11-13 | Stop reason: HOSPADM

## 2017-11-07 RX ORDER — ACETAMINOPHEN 325 MG/1
650 TABLET ORAL EVERY 4 HOURS PRN
Status: DISCONTINUED | OUTPATIENT
Start: 2017-11-07 | End: 2017-11-13 | Stop reason: HOSPADM

## 2017-11-07 RX ORDER — LANOLIN ALCOHOL/MO/W.PET/CERES
100 CREAM (GRAM) TOPICAL DAILY
Status: DISCONTINUED | OUTPATIENT
Start: 2017-11-07 | End: 2017-11-13 | Stop reason: HOSPADM

## 2017-11-07 RX ORDER — ASPIRIN 81 MG/1
81 TABLET, CHEWABLE ORAL DAILY
Status: DISCONTINUED | OUTPATIENT
Start: 2017-11-07 | End: 2017-11-13 | Stop reason: HOSPADM

## 2017-11-07 RX ORDER — FOLIC ACID 1 MG/1
1 TABLET ORAL DAILY
Status: DISCONTINUED | OUTPATIENT
Start: 2017-11-07 | End: 2017-11-13 | Stop reason: HOSPADM

## 2017-11-07 RX ORDER — ALPRAZOLAM 0.5 MG
2 TABLET ORAL 3 TIMES DAILY
Status: DISCONTINUED | OUTPATIENT
Start: 2017-11-07 | End: 2017-11-07

## 2017-11-07 RX ORDER — IPRATROPIUM BROMIDE AND ALBUTEROL SULFATE 2.5; .5 MG/3ML; MG/3ML
3 SOLUTION RESPIRATORY (INHALATION) 3 TIMES DAILY
Status: DISCONTINUED | OUTPATIENT
Start: 2017-11-07 | End: 2017-11-13 | Stop reason: HOSPADM

## 2017-11-07 RX ORDER — METHOCARBAMOL 750 MG/1
750 TABLET, FILM COATED ORAL
Status: DISCONTINUED | OUTPATIENT
Start: 2017-11-07 | End: 2017-11-12

## 2017-11-07 RX ORDER — IPRATROPIUM BROMIDE AND ALBUTEROL SULFATE 2.5; .5 MG/3ML; MG/3ML
3 SOLUTION RESPIRATORY (INHALATION) 4 TIMES DAILY
Status: DISCONTINUED | OUTPATIENT
Start: 2017-11-07 | End: 2017-11-07

## 2017-11-07 RX ORDER — RAMELTEON 8 MG/1
8 TABLET ORAL AT BEDTIME
Status: DISCONTINUED | OUTPATIENT
Start: 2017-11-07 | End: 2017-11-11

## 2017-11-07 RX ORDER — PREDNISONE 20 MG/1
40 TABLET ORAL DAILY
Status: DISCONTINUED | OUTPATIENT
Start: 2017-11-07 | End: 2017-11-07

## 2017-11-07 RX ORDER — NALOXONE HYDROCHLORIDE 0.4 MG/ML
.1-.4 INJECTION, SOLUTION INTRAMUSCULAR; INTRAVENOUS; SUBCUTANEOUS
Status: DISCONTINUED | OUTPATIENT
Start: 2017-11-07 | End: 2017-11-13 | Stop reason: HOSPADM

## 2017-11-07 RX ORDER — CLONAZEPAM 2 MG/1
4 TABLET ORAL 2 TIMES DAILY
Status: DISCONTINUED | OUTPATIENT
Start: 2017-11-07 | End: 2017-11-13 | Stop reason: HOSPADM

## 2017-11-07 RX ORDER — ALPRAZOLAM 0.5 MG
2 TABLET ORAL EVERY 6 HOURS
Status: DISCONTINUED | OUTPATIENT
Start: 2017-11-07 | End: 2017-11-07

## 2017-11-07 RX ORDER — LANOLIN ALCOHOL/MO/W.PET/CERES
1000 CREAM (GRAM) TOPICAL DAILY
Status: DISCONTINUED | OUTPATIENT
Start: 2017-11-07 | End: 2017-11-13 | Stop reason: HOSPADM

## 2017-11-07 RX ORDER — VENLAFAXINE HYDROCHLORIDE 150 MG/1
150 TABLET, EXTENDED RELEASE ORAL DAILY
Status: DISCONTINUED | OUTPATIENT
Start: 2017-11-07 | End: 2017-11-12

## 2017-11-07 RX ORDER — ALBUTEROL SULFATE 0.83 MG/ML
2.5 SOLUTION RESPIRATORY (INHALATION)
Status: DISCONTINUED | OUTPATIENT
Start: 2017-11-07 | End: 2017-11-13 | Stop reason: HOSPADM

## 2017-11-07 RX ORDER — MULTIVITAMIN,THERAPEUTIC
1 TABLET ORAL DAILY
Status: DISCONTINUED | OUTPATIENT
Start: 2017-11-07 | End: 2017-11-13 | Stop reason: HOSPADM

## 2017-11-07 RX ORDER — LORAZEPAM 1 MG/1
1-4 TABLET ORAL EVERY 30 MIN PRN
Status: DISCONTINUED | OUTPATIENT
Start: 2017-11-07 | End: 2017-11-07

## 2017-11-07 RX ORDER — GABAPENTIN 300 MG/1
900 CAPSULE ORAL 3 TIMES DAILY
Status: DISCONTINUED | OUTPATIENT
Start: 2017-11-07 | End: 2017-11-10

## 2017-11-07 RX ORDER — BUPRENORPHINE AND NALOXONE 8; 2 MG/1; MG/1
1 FILM, SOLUBLE BUCCAL; SUBLINGUAL 2 TIMES DAILY
Status: DISCONTINUED | OUTPATIENT
Start: 2017-11-07 | End: 2017-11-13 | Stop reason: HOSPADM

## 2017-11-07 RX ORDER — BISMUTH SUBSALICYLATE 262 MG/1
524 TABLET, CHEWABLE ORAL 2 TIMES DAILY PRN
Status: DISCONTINUED | OUTPATIENT
Start: 2017-11-07 | End: 2017-11-13 | Stop reason: HOSPADM

## 2017-11-07 RX ADMIN — VENLAFAXINE HYDROCHLORIDE 150 MG: 150 TABLET, EXTENDED RELEASE ORAL at 09:19

## 2017-11-07 RX ADMIN — IPRATROPIUM BROMIDE AND ALBUTEROL SULFATE 3 ML: .5; 3 SOLUTION RESPIRATORY (INHALATION) at 13:08

## 2017-11-07 RX ADMIN — THERA TABS 1 TABLET: TAB at 19:06

## 2017-11-07 RX ADMIN — FOLIC ACID: 5 INJECTION, SOLUTION INTRAMUSCULAR; INTRAVENOUS; SUBCUTANEOUS at 02:03

## 2017-11-07 RX ADMIN — GABAPENTIN 900 MG: 300 CAPSULE ORAL at 20:34

## 2017-11-07 RX ADMIN — ALPRAZOLAM 2 MG: 0.5 TABLET ORAL at 02:03

## 2017-11-07 RX ADMIN — RAMELTEON 8 MG: 8 TABLET, FILM COATED ORAL at 20:34

## 2017-11-07 RX ADMIN — ALPRAZOLAM 2 MG: 0.5 TABLET ORAL at 08:03

## 2017-11-07 RX ADMIN — RAMELTEON 8 MG: 8 TABLET, FILM COATED ORAL at 01:17

## 2017-11-07 RX ADMIN — BUPRENORPHINE HYDROCHLORIDE, NALOXONE HYDROCHLORIDE 1 FILM: 8; 2 FILM, SOLUBLE BUCCAL; SUBLINGUAL at 20:33

## 2017-11-07 RX ADMIN — ENOXAPARIN SODIUM 40 MG: 40 INJECTION SUBCUTANEOUS at 13:03

## 2017-11-07 RX ADMIN — Medication 100 MG: at 19:07

## 2017-11-07 RX ADMIN — CYANOCOBALAMIN TAB 1000 MCG 1000 MCG: 1000 TAB at 07:54

## 2017-11-07 RX ADMIN — CLONAZEPAM 4 MG: 1 TABLET ORAL at 20:39

## 2017-11-07 RX ADMIN — GABAPENTIN 900 MG: 300 CAPSULE ORAL at 14:17

## 2017-11-07 RX ADMIN — BUPRENORPHINE HYDROCHLORIDE, NALOXONE HYDROCHLORIDE 1 FILM: 8; 2 FILM, SOLUBLE BUCCAL; SUBLINGUAL at 08:03

## 2017-11-07 RX ADMIN — SODIUM CHLORIDE 1000 ML: 9 INJECTION, SOLUTION INTRAVENOUS at 00:58

## 2017-11-07 RX ADMIN — FOLIC ACID 1 MG: 1 TABLET ORAL at 19:07

## 2017-11-07 RX ADMIN — ASPIRIN 81 MG CHEWABLE TABLET 81 MG: 81 TABLET CHEWABLE at 07:53

## 2017-11-07 RX ADMIN — IPRATROPIUM BROMIDE AND ALBUTEROL SULFATE 3 ML: .5; 3 SOLUTION RESPIRATORY (INHALATION) at 20:18

## 2017-11-07 RX ADMIN — PREDNISONE 40 MG: 20 TABLET ORAL at 07:54

## 2017-11-07 RX ADMIN — ACETAMINOPHEN 650 MG: 325 TABLET, FILM COATED ORAL at 14:19

## 2017-11-07 RX ADMIN — GABAPENTIN 900 MG: 300 CAPSULE ORAL at 07:54

## 2017-11-07 ASSESSMENT — ACTIVITIES OF DAILY LIVING (ADL)
RETIRED_COMMUNICATION: 0-->UNDERSTANDS/COMMUNICATES WITHOUT DIFFICULTY
RETIRED_EATING: 0-->INDEPENDENT
AMBULATION: 1-->ASSISTIVE EQUIPMENT
WHICH_OF_THE_ABOVE_FUNCTIONAL_RISKS_HAD_A_RECENT_ONSET_OR_CHANGE?: FALL HISTORY
DRESS: 0-->INDEPENDENT
ADLS_ACUITY_SCORE: 12
FALL_HISTORY_WITHIN_LAST_SIX_MONTHS: YES
ADLS_ACUITY_SCORE: 12
TOILETING: 0-->INDEPENDENT
ADLS_ACUITY_SCORE: 12
SWALLOWING: 0-->SWALLOWS FOODS/LIQUIDS WITHOUT DIFFICULTY
BATHING: 0-->INDEPENDENT
TRANSFERRING: 1-->ASSISTIVE EQUIPMENT
COGNITION: 0 - NO COGNITION ISSUES REPORTED

## 2017-11-07 NOTE — H&P
[facility] Admission H&P    CC: Benzo/EtOH Withdrawal     HPI: 62 yoM with Hx of chronic pain, anxiety, PTSD, smoking (30 pack yr) - presents with EtOH withdrawal. Took a full supply of xanax (intended for 90 days) and finished them 4 days ago but has been using friends' supplies. Then started drinking a quart-pint of frances daily, last drink 11/6 AM. EtOH problems seemed to start after a back surgery that caused persisting pain. Upon arrival, was found to hypoxemic to 85%. He says he hadn't noted any dsypnea but has a cough that's persisting for 1 month, dry mostly.     ROS negative for fevers, rigors, CP, palpitations, hemoptysis, orthopnea, n/v/c/d, abdominal pain, LE swelling, rashes. ROS positive for tremors and seeing vivid images when eyes are closed although no visual hallucinations when eyes are open.      In the ED, given 4L bolus, 5mg diazepam, 25mg atarax, 72 ml iopamidol, a duoneb, 0.5 ativan, 40 prednisone. CT PE negative.     Past Medical History:   Diagnosis Date     Chronic back pain     Methadone program     CTS (carpal tunnel syndrome)      Low testosterone     managed by endocrinology     Obesity      Panic attacks      PTSD (post-traumatic stress disorder) 7/8/2011     Rotator cuff injury       Past Surgical History:   Procedure Laterality Date     BACK SURGERY      No Known Allergies   Social History     Social History     Marital status:      Spouse name: N/A     Number of children: 3     Years of education: N/A     Occupational History           Social History Main Topics     Smoking status: Current Every Day Smoker     Packs/day: 0.50     Years: 30.00     Types: Cigarettes     Smokeless tobacco: Not on file     Alcohol use No     Drug use: No     Sexual activity: Yes     Partners: Female     Other Topics Concern     Not on file     Social History Narrative     No narrative on file      Current Facility-Administered Medications   Medication     naloxone  (NARCAN) injection 0.1-0.4 mg     predniSONE (DELTASONE) tablet 40 mg     ipratropium - albuterol 0.5 mg/2.5 mg/3 mL (DUONEB) neb solution 3 mL     ALPRAZolam (XANAX) tablet 2 mg     aspirin chewable tablet 81 mg     bismuth subsalicylate (PEPTO BISMOL) chewable tablet 524 mg     buprenorphine HCl-naloxone HCl (SUBOXONE) 8-2 MG per film 1 Film     cyanocobalamin (vitamin  B-12) tablet 1,000 mcg     gabapentin (NEURONTIN) capsule 900 mg     lidocaine (LMX4) kit     methocarbamol (ROBAXIN) tablet 750 mg     ramelteon (ROZEREM) tablet 8 mg     venlafaxine (EFFEXOR-XR) 24 hr capsule 150 mg     0.9% sodium chloride infusion     ===========================    Objective  /77  Pulse 61  Temp 98.1  F (36.7  C) (Oral)  Resp 16  SpO2 93%  gen- elderly, appears calm  neuro- aox3, answers appropriately, only faint tremor bilaterally  cv- rrr, not tachy, no m/r/g  pulm- diminished breath sounds throughout though no wheezing, no crackles  abdmn- soft, nt, nd, obese  extrm- warm, no edema, no calf or thigh tenderness    Lab reviewed. Notable for:  Na 145 Cr normal  Liver panel normal  Lactate 1.9  VBG 7.36/52  CBC normal  Trop negative Ddimer 2  UA with Benzo's    Imaging reviewed    XR hip, pelvis and shoulder negative     CT PE  Chest: Emphysema is present. A few peripheral mucoid impactions are  noted in the posterior right costophrenic angle with surrounding  inflammation. No infiltrate or consolidation. Mild right lower lobe  bronchiectatic changes. Mild volume loss right hemithorax is also  noted. No pleural or pericardial fluid. Heart is normal in size.  Esophagus is unremarkable. Probable reactive mediastinal lymph nodes.  The largest measures 1.4 x 1.0 cm in the left paratracheal region on  series 4, image 53. Small right hilar lymph nodes are also noted.  These are not enlarged by CT criteria. No evidence of pulmonary  embolism. Thoracic aorta is unremarkable with scattered calcified  atherosclerotic plaque.  Limited images upper abdomen demonstrate  probable fatty liver infiltration. Bone window examination  demonstrates degenerative spine changes.  IMPRESSION:  1. No evidence of pulmonary embolism. Thoracic aorta is unremarkable  with only scattered calcified atherosclerotic plaque.   2. Emphysema with mild bronchiectatic changes posterior right lower  lobe and a few peripheral mucoid impactions in the posterior right  costophrenic angle. No evidence of pneumonia. No pleural fluid.  Reactive mediastinal and right hilar lymph nodes.  3. Fatty infiltration of the liver.    ===========================    Assessment/Plan: 62 yoM with Hx of chronic pain, anxiety, PTSD, smoking (30 pack yr)- presents for EtOH/Benzo withdrawal and hypoxemic respiratory failure.     #Benzo/EtOH Abuse and Withdrawl- excessive intake recently  -home prn xanax 2 mg tid prn -> 2mg Q6 hrs for overnight ; withdrawal protocol if not sufficient ; decrease to home dose if does well overnight   -MSSA scoring  -banana bag slow 1L, got 4L NS already in ED  -psych consult    #Hypoxemic Resp Failure- CT PE with only emphysematous changes. Labs show chronic elevated CO2 with HCO3 compensation, likely due to COPD from smoking. This along with diminished breath sounds on exam argues for COPD exacerbation. No markers of infection. Trop negative. No clinical features of heart failure.  -prednisone 40 x5 days  -duonebs  -will need PFTs once improved     #Chronic Pain- cont home gabapentin, suboxone, and prn methocarbamol   #Anxiety/Insomnia- cont home ramelteon    Diet- ADAT  DVT PPx- SCDs  Code- presumed Full, will need to reassess when not under influence of substances   Dispo- inpatient, trung psych input RE appropriate dispo     Tato Orozco, Ju[place]cio  [phone]

## 2017-11-07 NOTE — PLAN OF CARE
Problem: Alcohol Withdrawal Acute, Risk/Actual (Adult)  Goal: Signs and Symptoms of Listed Potential Problems Will be Absent, Minimized or Managed (Alcohol Withdrawal Acute, Risk/Actual)  Signs and symptoms of listed potential problems will be absent, minimized or managed by discharge/transition of care (reference Alcohol Withdrawal Acute, Risk/Actual (Adult) CPG).   Outcome: No Change  Patient is A&Ox4, able to make needs known, using call light appropriately.  VSS, tele NSR, maintained O2 saturations on RA, dipped into high 80's while resting, while walking maintained saturations in low 90's, applied 1L 02 per NC, LS clear. CMS impaired per baseline, c/o left leg numbness and left arm weakness d/t prior surgery, Neuros are intact. C/O pain in left shoulder, given tylenol PRN . BS present, Patient is passing gas, no bm this shift per pt report, Voiding spontaneously in the urinal. Tolerating regular diet. Denies dizziness, SOB & CP. IV SL. Using IS independently at bedside. Continue POC.      Given what is outpatient observation goal sheet.

## 2017-11-07 NOTE — CONSULTS
[facility], [place]   Psychiatric Consult  Admission date: 11/6/2017  Jorge Rosales  [id number]  11/07/17  Angel Treviño    Time: 84 minutes on encounter, >50% of which was spent in counseling and/or coordination of care consisting of: communication and education with the patient, communication with family and or friends, lab/image/study evaluation, support staff communication, and other sources pertinent to excellent patient care.      Consulted for: Alcohol and benzodiazepine use         Assessment & Plan:     Diagnosis:  Alcohol use disorder moderate or severe  Benzodiazepine use disorder moderate to severe  History of posttraumatic stress disorder  History of panic disorder    Assessment:  Jorge is likely minimizing his use of benzodiazepines he describes that he routinely takes his medication but does have trouble with the daily medications. His previous history of highly functioning and having lots of outdoor activities make it very difficult for him to now will not be so functional. We discussed in detail the use of alprazolam as being detrimental as a daily anxiety medication. He was concerned about the potential issues related to it especially the frequent withdrawal syndrome that he is likely having every day worsening his anxiety symptoms. He reluctantly agreed to switch to clonazepam which is longer acting benzodiazepine and this should be tapered off slowly over a year process in the outpatient setting. He at this time was reluctant to change his antidepressant though we did discuss there are longer acting antidepressants that might not require such compliance on a daily basis such as fluoxetine which has a 72 hour half life. Future possibilities could also increase gabapentin which has been shown to also assist with withdrawal syndrome from barbiturates and benzodiazepines. He does not wish us to communicate with his outpatient psychiatry team for fear that he  would lose his Suboxone and his pain management. We did discuss that for the time being we will monitor him for his withdrawal and transition him over to clonazepam. The future plan would be to eventually get off of this medication and have an antidepressant to prevent anxiety. Antipsychotics could also be potentially used as p.r.n. medications for anxiety in the future.    If he repetitively comes in to the emergency department or other facilities he may be holdable if he is found to be a danger to himself with his substance use.    Plan:  Continue voluntary hospitalization  No one-to-one needed  Switching alprazolam 2 mg 4 times per day to clonazepam 4 mg b.i.d.  Follow-up with outpatient psychiatry  Not currently holdable          HPI:   Jorge A Rosales with a past medical history of benzodiazepine use, alcohol use, anxiety, push reticulocyte stress disorder, chronic back pain was admitted 2017 for respiratory failure and alcohol and benzodiazepine withdrawal.    Jorge according to reports has been excessively using has benzodiazepines and also using other people's benzodiazepines and presented to the hospital after his friends were concerned about him. [facility] and a fall off of the latter about 1-1/2 years ago that led to his chronic back pain issues. He also has some financial struggles as he has not been working and is now  wife had significant gambling problems and he lost much of his assets. Because of his drinking he lost his license and has not had it for the past 4 months he attributes this to a loss of independence and sadness. He does not have any thoughts of harming himself or others and does not feel hopeless and helpless. He does have chronic sleep issues for many years and is looking at possibly getting a sleep study in the future. He is no appetite issues and does have a generalized guilty feeling about not being there for his children. His energy  has been low lately and he has had some panic symptoms and does have a history of panic disorder symptoms. Does not have any generalized anxiety disorder symptoms or social anxiety disorder symptoms. He does have previous posttraumatic stress disorder symptoms mostly related to his time in the  when he would see dead bodies. He is not currently suffering from significant nightmares or intrusive thoughts. He denies any psychotic symptoms or paranoia or any communication from electronic devices. No previous obsessive-compulsive disorder symptoms or previous episodes of laure pornography gambling or sexual addiction. He does not have a history of eating disorder symptoms either.    Physically he is generally healthy.        Past Psychiatric History:   Psychiatric concerns started in the 1980s mostly with substance use and alcoholism he has never been psychiatrically hospitalized and is never received electroconvulsive therapy, which chronic brain injury, and new seizure disorder. He has never attempted suicide and started receiving medications at the age of 52 for panic disorder of venlafaxine and alprazolam. [facility] but he continues to see if the past 5 years and has a therapist by the name of Karla. Previous medications include sertraline, bupropion, mirtazapine, duloxetine, citalopram and clonazepam. He is never been in trouble for violence and does not have a history of going to retirement. He has had poor compliance with medications.        Substance Use and History:   Substance use started at the ear of the 1980s he has 3 previous DUIs and is currently on probation until 2019. He last drank about 2 days ago and he is also [facility] and his other medications. He apparently excessively uses his alprazolam. He started smoking cigarettes as a teenager and currently smokes 1 pack per day he is back [facility] most recently [facility] years ago  and he currently has pending charges related to driving without a license. He did have previous years of sobriety.        Past Medical History:   PAST MEDICAL HISTORY:   Past Medical History:   Diagnosis Date     Chronic back pain     Methadone program     CTS (carpal tunnel syndrome)      Low testosterone     managed by endocrinology     Obesity      Panic attacks      PTSD (post-traumatic stress disorder) 2011     Rotator cuff injury        PAST SURGICAL HISTORY:   Past Surgical History:   Procedure Laterality Date     BACK SURGERY               Family History:   FAMILY HISTORY:   Family History   Problem Relation Age of Onset     DIABETES Father            Social History:   SOCIAL HISTORY:   Social History     Social History     Marital status:      Spouse name: N/A     Number of children: 3     Years of education: N/A     Occupational History           Social History Main Topics     Smoking status: Current Every Day Smoker     Packs/day: 0.50     Years: 30.00     Types: Cigarettes     Smokeless tobacco: Not on file     Alcohol use No     Drug use: No     Sexual activity: Yes     Partners: Female     Other Topics Concern     Not on file     Social History Narrative    Born in [place] raised by mother and father emotional abuse from his father and has one brother who is . 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 He has been  3 times and has a daughter with quadriplegia and a son with Down syndrome. He has communication with them and his third wife passed away from cancer last year. He was injured approximately 1-1/2 years ago following a from a ladder sustaining a back injury. He describes himself as being bored and no more fishing or hunting and he has been losing friendships. He does have a pension plan and disability or he supports himself and he does not have  any access to guns.            Physical ROS:   The patient endorsed the above issues. The remainder of 10-point review of systems was negative except as noted in HPI.         PTA Medications:     Prescriptions Prior to Admission   Medication Sig Dispense Refill Last Dose     ALPRAZolam (XANAX) 2 MG tablet Take 2 mg by mouth 3 times daily as needed for anxiety   Past Week at Unknown time     methocarbamol (ROBAXIN) 750 MG tablet Take 750 mg by mouth nightly as needed for muscle spasms    11/5/2017 at Unknown time     ramelteon (ROZEREM) 8 MG tablet Take 8 mg by mouth At Bedtime   Past Week at Unknown time     venlafaxine (EFFEXOR-XR) 150 MG 24 hr capsule Take 150 mg by mouth daily   11/5/2017 at Unknown time     bismuth subsalicylate (PEPTO BISMOL) 262 MG chewable tablet Take 524 mg by mouth 2 times daily as needed for diarrhea   11/6/2017 at Unknown time     buprenorphine HCl-naloxone HCl (SUBOXONE) 8-2 MG per film Place 1 Film under the tongue 2 times daily   11/6/2017 at 0800     gabapentin (NEURONTIN) 300 MG capsule Take 900 mg by mouth 3 times daily   11/5/2017 at Unknown time     lidocaine (LMX4) 4 % CREA cream Apply 1 Application topically 4 times daily as needed for mild pain (Apply liberal amount to shoulder)   11/5/2017 at Unknown time     cyanocolbalamin (VITAMIN B-12) 1000 MCG tablet Take 1 tablet by mouth daily. 100 tablet 12 11/5/2017 at Unknown time     Multiple Vitamin (MULTIVITAMIN) per tablet Take 1 tablet by mouth daily. 100 tablet 12 Past Week at Unknown time     aspirin 81 MG tablet Take 1 tablet by mouth daily.  3 More than a month at Unknown time     fish oil-omega-3 fatty acids 1000 MG capsule Take 2 capsules by mouth daily. 180 capsule 12 More than a month at Unknown time          Allergies:   No Known Allergies       Labs:     Recent Results (from the past 48 hour(s))   Alcohol breath test POCT    Collection Time: 11/06/17  1:27 PM   Result Value Ref Range    Alcohol Breath Test 0.039 (A)  0.00 - 0.01   CBC with platelets differential    Collection Time: 11/06/17  2:44 PM   Result Value Ref Range    WBC 9.6 4.0 - 11.0 10e9/L    RBC Count 4.82 4.4 - 5.9 10e12/L    Hemoglobin 14.7 13.3 - 17.7 g/dL    Hematocrit 45.0 40.0 - 53.0 %    MCV 93 78 - 100 fl    MCH 30.5 26.5 - 33.0 pg    MCHC 32.7 31.5 - 36.5 g/dL    RDW 16.2 (H) 10.0 - 15.0 %    Platelet Count 206 150 - 450 10e9/L    Diff Method Automated Method     % Neutrophils 56.6 %    % Lymphocytes 31.0 %    % Monocytes 11.2 %    % Eosinophils 0.3 %    % Basophils 0.4 %    % Immature Granulocytes 0.5 %    Nucleated RBCs 0 0 /100    Absolute Neutrophil 5.4 1.6 - 8.3 10e9/L    Absolute Lymphocytes 3.0 0.8 - 5.3 10e9/L    Absolute Monocytes 1.1 0.0 - 1.3 10e9/L    Absolute Eosinophils 0.0 0.0 - 0.7 10e9/L    Absolute Basophils 0.0 0.0 - 0.2 10e9/L    Abs Immature Granulocytes 0.1 0 - 0.4 10e9/L    Absolute Nucleated RBC 0.0    Comprehensive metabolic panel    Collection Time: 11/06/17  2:44 PM   Result Value Ref Range    Sodium 145 (H) 133 - 144 mmol/L    Potassium 4.2 3.4 - 5.3 mmol/L    Chloride 107 94 - 109 mmol/L    Carbon Dioxide 31 20 - 32 mmol/L    Anion Gap 7 3 - 14 mmol/L    Glucose 134 (H) 70 - 99 mg/dL    Urea Nitrogen 18 7 - 30 mg/dL    Creatinine 0.71 0.66 - 1.25 mg/dL    GFR Estimate >90 >60 mL/min/1.7m2    GFR Estimate If Black >90 >60 mL/min/1.7m2    Calcium 8.5 8.5 - 10.1 mg/dL    Bilirubin Total 0.4 0.2 - 1.3 mg/dL    Albumin 3.2 (L) 3.4 - 5.0 g/dL    Protein Total 7.0 6.8 - 8.8 g/dL    Alkaline Phosphatase 87 40 - 150 U/L    ALT 47 0 - 70 U/L    AST 53 (H) 0 - 45 U/L   Troponin I    Collection Time: 11/06/17  2:44 PM   Result Value Ref Range    Troponin I ES <0.015 0.000 - 0.045 ug/L   D dimer quantitative    Collection Time: 11/06/17  2:44 PM   Result Value Ref Range    D Dimer 2.0 (H) 0.0 - 0.50 ug/ml FEU   ISTAT gases lactate kaylee POCT    Collection Time: 11/06/17  2:46 PM   Result Value Ref Range    Ph Venous 7.36 7.32 - 7.43 pH     PCO2 Venous 52 (H) 40 - 50 mm Hg    PO2 Venous 62 (H) 25 - 47 mm Hg    Bicarbonate Venous 30 (H) 21 - 28 mmol/L    O2 Sat Venous 90 %    Lactic Acid 1.9 0.7 - 2.1 mmol/L   EKG 12-lead, tracing only    Collection Time: 11/06/17  2:55 PM   Result Value Ref Range    Interpretation ECG Click View Image link to view waveform and result    Drug abuse screen 6 urine (chem dep)    Collection Time: 11/06/17 10:19 PM   Result Value Ref Range    Amphetamine Qual Urine Negative NEG^Negative    Barbiturates Qual Urine Negative NEG^Negative    Benzodiazepine Qual Urine Positive (A) NEG^Negative    Cannabinoids Qual Urine Negative NEG^Negative    Cocaine Qual Urine Negative NEG^Negative    Ethanol Qual Urine Negative NEG^Negative    Opiates Qualitative Urine Negative NEG^Negative   Magnesium    Collection Time: 11/07/17  5:01 AM   Result Value Ref Range    Magnesium 1.9 1.6 - 2.3 mg/dL          Physical and Psychiatric Examination:     /65 (BP Location: Right arm)  Pulse 61  Temp 96.5  F (35.8  C) (Oral)  Resp 16  Wt 95.3 kg (210 lb 3.2 oz)  SpO2 94%  BMI 29.32 kg/m2  Weight is 210 lbs 3.2 oz  Body mass index is 29.32 kg/(m^2).                                             Weight over time:  Vitals:    11/07/17 0045   Weight: 95.3 kg (210 lb 3.2 oz)       Mental Status Exam:  Jorge is a 62-year-old male wearing hospital gown. His speech is of an appropriate rate and tone in his language is intact. His behavior is appropriate and does not have any abnormal movements. His affect is neutral and slightly subdued. His mood he describes as better. His thought content consists of the above struggles with help thoughts of harming himself or others or current delusions. His thought process is logical without looseness of association. He does not have any abnormal perceptions. He is alert and aware of her current circumstance is attention concentration is adequate. His cognition and fund of knowledge appears intact. His  long-term/short-term/remote memory appears intact. His insight and judgment are both limited.    Clive Angela  [facility]      The following document has been created with voice recognition software and may contain unintentional word substitutions.

## 2017-11-07 NOTE — ED NOTES
Walked pt in [place]. Pt uses sousa at baseline, seems unsteady on his feet. Pt states this is more than normal. Walked about 30ft, then requested to get back to bed. Room air O2 85% when back in bed, came back up to 90% after resting in bed for about 3 minutes.

## 2017-11-07 NOTE — PHARMACY-ADMISSION MEDICATION HISTORY
Admission Medication History status for the 11/6/2017 admission is complete.  See EPIC admission navigator for Prior to Admission medications.    Medication history sources:  Patient, [address] [address] [facility]     Medication history source reliability: Good. [facility] sent over medication list    Medication adherence:  Moderate    Changes made to PTA medication list (reason)  Added: Bismuth, suboxone film, gabapentin, lidocaine cream, methocarbamol, ramelteon, venlafaxine ([facility], confirmed by patient  Deleted: Duloxetine (Pt reported not taking), Methadone (Pt reported not taking), Mirtazapine (Pt reported not taking), Quetiapine (Pt reported not taking)  Changed:   -Alprazolam 0.5mg tablet take 1 tablet by mouth daily as needed for anxiety-> Alprazolam 2mg tablet take 1 tablet by mouth 3 times daily as needed for anxiety    Additional medication history information (including reliability of information, actions taken by pharmacist): None    Time spent in this activity: 50min    Medication history completed by: Heri Argueta, Pharmacy Intern     Prior to Admission medications    Medication Sig Last Dose Taking? Auth Provider   ALPRAZolam (XANAX) 2 MG tablet Take 2 mg by mouth 3 times daily as needed for anxiety Past Week at Unknown time Yes Unknown, Entered By History   methocarbamol (ROBAXIN) 750 MG tablet Take 750 mg by mouth nightly as needed for muscle spasms  11/5/2017 at Unknown time Yes Unknown, Entered By History   ramelteon (ROZEREM) 8 MG tablet Take 8 mg by mouth At Bedtime Past Week at Unknown time Yes Unknown, Entered By History   venlafaxine (EFFEXOR-XR) 150 MG 24 hr capsule Take 150 mg by mouth daily 11/5/2017 at Unknown time Yes Unknown, Entered By History   bismuth subsalicylate (PEPTO BISMOL) 262 MG chewable tablet Take 524 mg by mouth 2 times daily as needed for diarrhea 11/6/2017 at Unknown time Yes Unknown, Entered By History   buprenorphine HCl-naloxone HCl  (SUBOXONE) 8-2 MG per film Place 1 Film under the tongue 2 times daily 11/6/2017 at 0800 Yes Unknown, Entered By History   gabapentin (NEURONTIN) 300 MG capsule Take 900 mg by mouth 3 times daily 11/5/2017 at Unknown time Yes Unknown, Entered By History   lidocaine (LMX4) 4 % CREA cream Apply 1 Application topically 4 times daily as needed for mild pain (Apply liberal amount to shoulder) 11/5/2017 at Unknown time Yes Unknown, Entered By History   cyanocolbalamin (VITAMIN B-12) 1000 MCG tablet Take 1 tablet by mouth daily. 11/5/2017 at Unknown time Yes Alvin Yo MD   Multiple Vitamin (MULTIVITAMIN) per tablet Take 1 tablet by mouth daily. Past Week at Unknown time Yes Alvin Yo MD   aspirin 81 MG tablet Take 1 tablet by mouth daily. More than a month at Unknown time  Alvin Yo MD   fish oil-omega-3 fatty acids 1000 MG capsule Take 2 capsules by mouth daily. More than a month at Unknown time  Alvin Yo MD

## 2017-11-07 NOTE — ED NOTES
ED to Floor Handoff      S:  Jorge Rosales is a 62 year old male who speaks English and lives unknown,  in a home  They arrived in the ED by car from home with a complaint of Addiction Problem (mixing alcohol with xanax)    Initial vitals were:   BP: 112/66  Pulse: 61  Heart Rate: 58  Temp: 97.7  F (36.5  C)  Resp: 18  SpO2: (!) 85 %  Allergies: No Known Allergies.  The meds given in the ED and their home medications are:   Current Facility-Administered Medications   Medication     0.9% sodium chloride infusion     Current Outpatient Prescriptions   Medication     ALPRAZolam (XANAX) 2 MG tablet     methocarbamol (ROBAXIN) 750 MG tablet     ramelteon (ROZEREM) 8 MG tablet     venlafaxine (EFFEXOR-XR) 150 MG 24 hr capsule     bismuth subsalicylate (PEPTO BISMOL) 262 MG chewable tablet     buprenorphine HCl-naloxone HCl (SUBOXONE) 8-2 MG per film     gabapentin (NEURONTIN) 300 MG capsule     lidocaine (LMX4) 4 % CREA cream     cyanocolbalamin (VITAMIN B-12) 1000 MCG tablet     Multiple Vitamin (MULTIVITAMIN) per tablet     aspirin 81 MG tablet     fish oil-omega-3 fatty acids 1000 MG capsule     Social demographics are   Social History     Social History     Marital status:      Spouse name: N/A     Number of children: 3     Years of education: N/A     Occupational History           Social History Main Topics     Smoking status: Current Every Day Smoker     Packs/day: 0.50     Years: 30.00     Types: Cigarettes     Smokeless tobacco: Not on file     Alcohol use No     Drug use: No     Sexual activity: Yes     Partners: Female     Other Topics Concern     Not on file     Social History Narrative     No narrative on file       B:   The patient has been ill for 1 day(s) and during this time the symptoms have increased.  In the ED was diagnosed with   Final diagnoses:   Acute respiratory failure with hypoxia (H)   Xanax use disorder, severe, dependence (H)   Alcohol abuse     Infection/sepsis suspected:No Isolation type; No active isolations   A:   In the ED these meds were given:   Medications   0.9% sodium chloride BOLUS (0 mLs Intravenous Stopped 11/6/17 1619)     Followed by   0.9% sodium chloride infusion (1,000 mLs Intravenous Rate/Dose Verify 11/6/17 2345)   hydrOXYzine (ATARAX) tablet 25 mg (25 mg Oral Given 11/6/17 1554)   iopamidol (ISOVUE-370) solution 100 mL (72 mLs Intravenous Given 11/6/17 2032)   sodium chloride 0.9 % bag 500mL for CT scan flush use (90 mLs As instructed Given 11/6/17 2033)   LORazepam (ATIVAN) tablet 0.5 mg (0.5 mg Oral Given 11/6/17 1619)   ipratropium - albuterol 0.5 mg/2.5 mg/3 mL (DUONEB) neb solution 3 mL (3 mLs Nebulization Given 11/6/17 2307)   diazepam (VALIUM) tablet 5 mg (5 mg Oral Given 11/6/17 2243)   predniSONE (DELTASONE) tablet 40 mg (40 mg Oral Given 11/6/17 2243)   nicotine polacrilex (NICORETTE) gum 4 mg (4 mg Buccal Given 11/6/17 2345)     Drips running?  Yes  Labs results   Labs Ordered and Resulted from Time of ED Arrival Up to the Time of Departure from the ED   DRUG ABUSE SCREEN 6 CHEM DEP URINE ([facility]) - Abnormal; Notable for the following:        Result Value    Benzodiazepine Qual Urine Positive (*)     All other components within normal limits   CBC WITH PLATELETS DIFFERENTIAL - Abnormal; Notable for the following:     RDW 16.2 (*)     All other components within normal limits   COMPREHENSIVE METABOLIC PANEL - Abnormal; Notable for the following:     Sodium 145 (*)     Glucose 134 (*)     Albumin 3.2 (*)     AST 53 (*)     All other components within normal limits   D DIMER QUANTITATIVE - Abnormal; Notable for the following:     D Dimer 2.0 (*)     All other components within normal limits   ALCOHOL BREATH TEST POCT - Abnormal; Notable for the following:     Alcohol Breath Test 0.039 (*)     All other components within normal limits   ISTAT  GASES LACTATE JORI POCT - Abnormal; Notable for the following:     PCO2 Venous 52 (*)      PO2 Venous 62 (*)     Bicarbonate Venous 30 (*)     All other components within normal limits   TROPONIN I   ISTAT CG4 GASES LACTATE JORI NURSING POCT     Imaging Studies:   Recent Results (from the past 24 hour(s))   XR Chest 2 Views    Narrative    XR CHEST 2 VW  11/6/2017 3:36 PM    HISTORY:  cough, hypoxic    COMPARISON:  None.      Impression    IMPRESSION:  Rotated frontal view. Mild opacities at the left lung  base could represent scarring, atelectasis or mild infiltrate.  Otherwise negative.     VAIBHAV GOLD MD   XR Pelvis w Hip Left 1 View    Narrative    PELVIS AND HIP LEFT ONE VIEW  11/6/2017 3:37 PM    HISTORY:  Left hip pain status post fall.    COMPARISON:  None.      Impression    IMPRESSION:  Negative.     VAIBHAV GOLD MD   Shoulder XR, G/E 3 views, left    Narrative    LEFT SHOULDER THREE OR MORE VIEWS  11/6/2017 3:38 PM    HISTORY:  Left shoulder pain status post fall.    COMPARISON:  None.      Impression    IMPRESSION:  No acute process identified. Degenerative changes of the  humeral head and acromioclavicular joint. Osteopenia.     VAIBHAV GOLD MD   CT Chest Pulmonary Embolism w Contrast    Narrative    CT CHEST PULMONARY EMBOLISM WITH CONTRAST  11/6/2017  8:35 PM     HISTORY: Hypoxia, elevated D-dimer, previous deep vein thrombosis.      TECHNIQUE: Thin section axial images are performed from the thoracic  inlet to the lung bases utilizing 50 mL of Isovue 370 IV contrast  without adverse event. Coronal reformatted images are also generated.  Radiation dose for this scan was reduced using automated exposure  control, adjustment of the mA and/or kV according to patient size, or  iterative reconstruction technique.    FINDINGS:     Chest: Emphysema is present. A few peripheral mucoid impactions are  noted in the posterior right costophrenic angle with surrounding  inflammation. No infiltrate or consolidation. Mild right lower lobe  bronchiectatic changes. Mild volume loss right hemithorax is  also  noted. No pleural or pericardial fluid. Heart is normal in size.  Esophagus is unremarkable. Probable reactive mediastinal lymph nodes.  The largest measures 1.4 x 1.0 cm in the left paratracheal region on  series 4, image 53. Small right hilar lymph nodes are also noted.  These are not enlarged by CT criteria. No evidence of pulmonary  embolism. Thoracic aorta is unremarkable with scattered calcified  atherosclerotic plaque. Limited images upper abdomen demonstrate  probable fatty liver infiltration. Bone window examination  demonstrates degenerative spine changes.      Impression    IMPRESSION:  1. No evidence of pulmonary embolism. Thoracic aorta is unremarkable  with only scattered calcified atherosclerotic plaque.   2. Emphysema with mild bronchiectatic changes posterior right lower  lobe and a few peripheral mucoid impactions in the posterior right  costophrenic angle. No evidence of pneumonia. No pleural fluid.  Reactive mediastinal and right hilar lymph nodes.  3. Fatty infiltration of the liver.    AYESHA FLORENTINO MD     Recent vital signs /73  Pulse 61  Temp 97.7  F (36.5  C) (Oral)  Resp 13  SpO2 96%  Cardiac Rhythm: ,      Abnormal labs/tests/findings requiring intervention:---  Pain control: pt had none  Nausea control: pt had none  R:   Transfer assistance needed: Stand with Assist  Family present during ED course? No   Family currently present? Yes  Pt needs tele? No  Code Status: Full Code  Tasks needing to be completed:---    Cristian Avila  asc-- [phone] [place] ED  [phone] [facility] ED

## 2017-11-07 NOTE — UTILIZATION REVIEW
"[facility]    Admission Status; Secondary Review Determination     Admission Date: 11/6/2017  1:42 PM      Under the authority of the Utilization Management Committee, the utilization review process indicated a secondary review on the above patient.  The review outcome is based on review of the medical records, discussions with staff, and applying clinical experience noted on the date of the review.          (x) Observation Status Appropriate - This patient does not meet hospital inpatient criteria and is placed in observation status. If this patient's primary payer is Medicare and was admitted as an inpatient, Condition Code 44 should be used and patient status changed to \"observation\".     RATIONALE FOR DETERMINATION   62 year old with COPD and heavy xanax and alcohol use presnted with cough and concern for substance withdrawal. He was admitted and found to be mildly hypoxic with suspected COPD exacerbation and started on prednisone, currently requiring 1L O2. He was seen bu psychiatry and xanax changed to klonopin. He is not requiring additional PRN benzos. At the time of this review, the patient does not meet medical necessity for inpatient hospitalization. Discharge anticipated as soon as tomorrow.    The severity of illness, intensity of service provided, expected LOS and risk for adverse outcome make the care appropriate for further observation; however, doesn't meet criteria for hospital inpatient admission. This was discussed with the attending physician (Dr Mcknight) who concurred with this determination.        The information on this document is developed by the utilization review team in order for the business office to ensure compliance.  This only denotes the appropriateness of proper admission status and does not reflect the quality of care rendered.         The definitions of Inpatient Status and Observation Status used in making the determination above are those provided in the CMS Coverage Manual, Chapter " 1 and Chapter 6, section 70.4.      Sincerely,     Owen Peñaloza DO MPH   Physician Advisor  Utilization Review  [facility]
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
## 2017-11-07 NOTE — PROGRESS NOTES
VS:     Pt A&Ox 4. VSS. Telemetry normal sinus with inverted T-wave.  MSSA scores of 8, 7, 7. Patient reporting visual hallucinations and has visible tremors with arms extended.    Output:     Voids spontaneously without difficulty in the bathroom. Pt LBM 11/5, passing gas and BS active in all quadrants.    Activity:     Pt up with SBA and uses cane and brace in left shoe.    Skin: Intact.    Pain:     Denies.    CMS:     CMS and Neuro's are intact. Patient has baseline numbness and tingling in left leg from knee to toes.     Dressing:     PIV dressings x2 are CDI.    Diet:     Is on regular diet, no meal this shift. Denies nausea.    LDA:     PIV is patent in left hand and infusing at 125 mL/hr. PIV is patent in left forearm and SL.   Sleep: Patient appears to be sleeping upon frequent rounding.    Plan: Continue to monitor pt.

## 2017-11-07 NOTE — PROGRESS NOTES
Pt. transferred from ED at 0045. VS stable on 2L NC; orientated patient to room and call light system. Patient belongings kept with patient at bedside. Continue to monitor.

## 2017-11-08 LAB
ANION GAP SERPL CALCULATED.3IONS-SCNC: 6 MMOL/L (ref 3–14)
BUN SERPL-MCNC: 18 MG/DL (ref 7–30)
CALCIUM SERPL-MCNC: 8.5 MG/DL (ref 8.5–10.1)
CHLORIDE SERPL-SCNC: 104 MMOL/L (ref 94–109)
CO2 SERPL-SCNC: 31 MMOL/L (ref 20–32)
CREAT SERPL-MCNC: 0.58 MG/DL (ref 0.66–1.25)
ERYTHROCYTE [DISTWIDTH] IN BLOOD BY AUTOMATED COUNT: 16.2 % (ref 10–15)
GFR SERPL CREATININE-BSD FRML MDRD: >90 ML/MIN/1.7M2
GLUCOSE SERPL-MCNC: 101 MG/DL (ref 70–99)
HCT VFR BLD AUTO: 43.7 % (ref 40–53)
HGB BLD-MCNC: 14.1 G/DL (ref 13.3–17.7)
MCH RBC QN AUTO: 30.3 PG (ref 26.5–33)
MCHC RBC AUTO-ENTMCNC: 32.3 G/DL (ref 31.5–36.5)
MCV RBC AUTO: 94 FL (ref 78–100)
NT-PROBNP SERPL-MCNC: 324 PG/ML (ref 0–900)
PLATELET # BLD AUTO: 197 10E9/L (ref 150–450)
POTASSIUM SERPL-SCNC: 4.1 MMOL/L (ref 3.4–5.3)
RBC # BLD AUTO: 4.66 10E12/L (ref 4.4–5.9)
SODIUM SERPL-SCNC: 141 MMOL/L (ref 133–144)
WBC # BLD AUTO: 13.1 10E9/L (ref 4–11)

## 2017-11-08 PROCEDURE — 12000001 ZZH R&B MED SURG/OB UMMC

## 2017-11-08 PROCEDURE — G0378 HOSPITAL OBSERVATION PER HR: HCPCS

## 2017-11-08 PROCEDURE — 90732 PPSV23 VACC 2 YRS+ SUBQ/IM: CPT | Performed by: INTERNAL MEDICINE

## 2017-11-08 PROCEDURE — 25000132 ZZH RX MED GY IP 250 OP 250 PS 637: Performed by: STUDENT IN AN ORGANIZED HEALTH CARE EDUCATION/TRAINING PROGRAM

## 2017-11-08 PROCEDURE — 94640 AIRWAY INHALATION TREATMENT: CPT

## 2017-11-08 PROCEDURE — 94640 AIRWAY INHALATION TREATMENT: CPT | Mod: 76

## 2017-11-08 PROCEDURE — 96372 THER/PROPH/DIAG INJ SC/IM: CPT

## 2017-11-08 PROCEDURE — 99232 SBSQ HOSP IP/OBS MODERATE 35: CPT | Performed by: INTERNAL MEDICINE

## 2017-11-08 PROCEDURE — 25000128 H RX IP 250 OP 636: Performed by: INTERNAL MEDICINE

## 2017-11-08 PROCEDURE — 40000275 ZZH STATISTIC RCP TIME EA 10 MIN

## 2017-11-08 PROCEDURE — 83880 ASSAY OF NATRIURETIC PEPTIDE: CPT | Performed by: INTERNAL MEDICINE

## 2017-11-08 PROCEDURE — 80048 BASIC METABOLIC PNL TOTAL CA: CPT | Performed by: INTERNAL MEDICINE

## 2017-11-08 PROCEDURE — 25000125 ZZHC RX 250: Performed by: INTERNAL MEDICINE

## 2017-11-08 PROCEDURE — 25000132 ZZH RX MED GY IP 250 OP 250 PS 637: Performed by: PSYCHIATRY & NEUROLOGY

## 2017-11-08 PROCEDURE — 85027 COMPLETE CBC AUTOMATED: CPT | Performed by: INTERNAL MEDICINE

## 2017-11-08 PROCEDURE — G0009 ADMIN PNEUMOCOCCAL VACCINE: HCPCS

## 2017-11-08 PROCEDURE — 25000132 ZZH RX MED GY IP 250 OP 250 PS 637: Performed by: INTERNAL MEDICINE

## 2017-11-08 PROCEDURE — 36415 COLL VENOUS BLD VENIPUNCTURE: CPT | Performed by: INTERNAL MEDICINE

## 2017-11-08 RX ADMIN — THERA TABS 1 TABLET: TAB at 07:26

## 2017-11-08 RX ADMIN — NICOTINE POLACRILEX 2 MG: 2 GUM, CHEWING ORAL at 20:12

## 2017-11-08 RX ADMIN — CLONAZEPAM 4 MG: 1 TABLET ORAL at 19:46

## 2017-11-08 RX ADMIN — IPRATROPIUM BROMIDE AND ALBUTEROL SULFATE 3 ML: .5; 3 SOLUTION RESPIRATORY (INHALATION) at 08:36

## 2017-11-08 RX ADMIN — NICOTINE POLACRILEX 2 MG: 2 GUM, CHEWING ORAL at 16:45

## 2017-11-08 RX ADMIN — CLONAZEPAM 4 MG: 1 TABLET ORAL at 07:30

## 2017-11-08 RX ADMIN — Medication 100 MG: at 07:26

## 2017-11-08 RX ADMIN — GABAPENTIN 900 MG: 300 CAPSULE ORAL at 07:26

## 2017-11-08 RX ADMIN — GABAPENTIN 900 MG: 300 CAPSULE ORAL at 19:46

## 2017-11-08 RX ADMIN — GABAPENTIN 900 MG: 300 CAPSULE ORAL at 14:51

## 2017-11-08 RX ADMIN — IPRATROPIUM BROMIDE AND ALBUTEROL SULFATE 3 ML: .5; 3 SOLUTION RESPIRATORY (INHALATION) at 13:36

## 2017-11-08 RX ADMIN — RAMELTEON 8 MG: 8 TABLET, FILM COATED ORAL at 20:15

## 2017-11-08 RX ADMIN — CYANOCOBALAMIN TAB 1000 MCG 1000 MCG: 1000 TAB at 07:26

## 2017-11-08 RX ADMIN — BUPRENORPHINE HYDROCHLORIDE, NALOXONE HYDROCHLORIDE 1 FILM: 8; 2 FILM, SOLUBLE BUCCAL; SUBLINGUAL at 07:30

## 2017-11-08 RX ADMIN — ACETAMINOPHEN 650 MG: 325 TABLET, FILM COATED ORAL at 18:19

## 2017-11-08 RX ADMIN — ASPIRIN 81 MG CHEWABLE TABLET 81 MG: 81 TABLET CHEWABLE at 07:26

## 2017-11-08 RX ADMIN — BUPRENORPHINE HYDROCHLORIDE, NALOXONE HYDROCHLORIDE 1 FILM: 8; 2 FILM, SOLUBLE BUCCAL; SUBLINGUAL at 19:46

## 2017-11-08 RX ADMIN — VENLAFAXINE HYDROCHLORIDE 150 MG: 150 TABLET, EXTENDED RELEASE ORAL at 07:26

## 2017-11-08 RX ADMIN — PNEUMOCOCCAL VACCINE POLYVALENT 0.5 ML
25; 25; 25; 25; 25; 25; 25; 25; 25; 25; 25; 25; 25; 25; 25; 25; 25; 25; 25; 25; 25; 25; 25 INJECTION, SOLUTION INTRAMUSCULAR; SUBCUTANEOUS at 09:38

## 2017-11-08 RX ADMIN — FOLIC ACID 1 MG: 1 TABLET ORAL at 07:26

## 2017-11-08 RX ADMIN — ENOXAPARIN SODIUM 40 MG: 40 INJECTION SUBCUTANEOUS at 11:43

## 2017-11-08 ASSESSMENT — ACTIVITIES OF DAILY LIVING (ADL)
ADLS_ACUITY_SCORE: 12
ADLS_ACUITY_SCORE: 12

## 2017-11-08 NOTE — PLAN OF CARE
Problem: Patient Care Overview  Goal: Plan of Care/Patient Progress Review  Attempted PT evaluation this AM, but pt declined a need for PT evaluation.  Pt has be up and ambulating with SEC and SBA x 1.  Pt uses a cane and AFO at baseline.  Pt reported that he is getting and does not need a PT evaluation, MD in room during conversation.  PT evaluation cancelled and orders completed.

## 2017-11-08 NOTE — PLAN OF CARE
Problem: Patient Care Overview  Goal: Plan of Care/Patient Progress Review  VS:     Pt A&Ox 4. VSS. Telemetry normal sinus. MSSA score of 5.    Output:     Voids spontaneously without difficulty in the bathroom. Pt LBM 11/7, passing gas and BS active in all quadrants.    Activity:     Pt up with SBA and uses cane and brace in left shoe.    Skin: Intact.    Pain:     Denies.    CMS:     CMS and Neuro's are intact. Patient has baseline numbness and tingling in left leg from knee to toes.     Dressing:     PIV dressings x2 are CDI.    Diet:     Is on regular diet, no meal this shift. Denies nausea.    LDA:     PIV is patent in left hand and SL. PIV is patent in left forearm and SL.   Sleep: Patient appears to be sleeping upon frequent rounding.    Plan: Continue to monitor pt.

## 2017-11-08 NOTE — PLAN OF CARE
Problem: Patient Care Overview  Goal: Plan of Care/Patient Progress Review  Outcome: No Change  Pt compliant and calm, no increase sign of ETOH withdrawal.

## 2017-11-08 NOTE — PROGRESS NOTES
[facility], [place]   Hospitalist Daily Progress Note                                                 Date of Admission:2017  ___________________________________  INTERVAL HISTORY (24 Hrs)/SUBJECTIVE:   Last 24 hr events, care team notes reviewed.     Says feels better  Breathing and oxygenating fine on RA  Hallucinations (visual with eyes closed) still present but better than before  Mild tremors both hands, slightly better, still present.   No cough or cp or sob  No fever or chills  yaw po well. No NV or pain abdomen  Voiding well  Walking with walker, cane  Declined physical therapy  Ongoing pain L shoulder.   Chronic L LE weakness (not new)      ROS: 4 point ROS neg other than the symptoms noted above in the interval history.    OBJECTIVE :   VITALS:    Temp:  [96  F (35.6  C)-97.1  F (36.2  C)] 96.6  F (35.9  C)  Pulse:  [58-59] 58  Heart Rate:  [58] 58  Resp:  [14-18] 14  BP: (108-139)/(63-70) 108/64  SpO2:  [90 %-98 %] 90 %  Temp (24hrs), Av.5  F (35.8  C), Min:96  F (35.6  C), Max:97.1  F (36.2  C)    Wt Readings from Last 5 Encounters:   17 95.3 kg (210 lb 3.2 oz)   11 99.9 kg (220 lb 3.2 oz)   11 100.2 kg (221 lb)   11 102.2 kg (225 lb 4.8 oz)      No intake or output data in the 24 hours ending 17 1147    PHYSICAL EXAM:  General: alert, interactive, NAD  HEENT: AT/NC, PERRLA, Moist MM  Respi/Chest: Non labored. Clear BL  CVS/Heart: S1S2 regular, no m/r/g,   Gi/Abd: Soft, non tender, non distended,   MSK/Ext: Distal pulses 2+.     Neuro: AO x 4,  CNs grossly intact. Mild tremors both hands.      Medications:   I have reviewed this patient's current medications.      Data:   All laboratory and imaging data in the past 24 hours reviewed:    LABS:  CMP  Recent Labs  Lab 17  0612 17  0501 17  1444     --  145*   POTASSIUM 4.1  --  4.2   CHLORIDE 104  --  107   CO2 31  --  31   ANIONGAP 6  --  7   *  --  134*    BUN 18  --  18   CR 0.58*  --  0.71   GFRESTIMATED >90  --  >90   GFRESTBLACK >90  --  >90   RUSTY 8.5  --  8.5   MAG  --  1.9  --    PROTTOTAL  --   --  7.0   ALBUMIN  --   --  3.2*   BILITOTAL  --   --  0.4   ALKPHOS  --   --  87   AST  --   --  53*   ALT  --   --  47     CBC  Recent Labs  Lab 17  0612 17  1444   WBC 13.1* 9.6   RBC 4.66 4.82   HGB 14.1 14.7   HCT 43.7 45.0   MCV 94 93   MCH 30.3 30.5   MCHC 32.3 32.7   RDW 16.2* 16.2*    206         ASSESSMENT & PLAN :    62 yoM with Hx of chronic pain, anxiety, PTSD, smoking (30 pack yr)- presents for EtOH/Benzo withdrawal and hypoxemic respiratory failure.      #Benzo/EtOH Abuse and Withdrawl- excessive intake recently  S/p banana bag on adm     -home prn xanax changed to Clonazepam 4 mg bid  by Psychiatry.   -MSSA scorin. Patient w hallucinations, hand tremors.   - mvi, thiamine, folic acid daily  - psychiatry consultation appreciated. : D/w psychiatry, recs reviewed. Switched alprazolam to clonazepam. Not holdable. No 1:1. Fu with outpatient psychiatry.   - sw consult.   - fall, seizure precautions      #Hypoxemic Resp Failure- CT PE with only emphysematous changes. Labs show chronic elevated CO2 with HCO3 compensation, likely due to COPD from smoking. This along with diminished breath sounds on exam argues for COPD exacerbation. No markers of infection. Trop negative. No clinical features of heart failure.  S/p Prednisone 40 mg x 2 doses. DC  as patient feels better, no wheezing or spasm appreciated.pasha given significant h/o anxiety.     - Continue duonebs tid, albuterol nebs prn.   - outpatient PFT.   - denies ALVINO    * check ambulatory pulse Ox: dropped in sats in 78% on RA, Per RN, patient remained asymptomatic. 2017    Encourage IS  acapella  Pulse ox- contd.   Oxygen prn to keep sat >90%  Encourage activity  Check Pro bnp: 324.   On Tele.      #Chronic Pain- cont home gabapentin, suboxone, and prn methocarbamol    #Anxiety/Insomnia- cont home ramelteon    # Fall, L LE weakness (chronic):   - PT consult requested.   However patient declined.   Says he is fine, he has outpatient PT. Pt uses a cane and AFO at baseline.      Diet- ADAT to regular.   DVT PPx- Lovenox sQ (h/o dvt per patient, not very ambulatory at current)  Full code.      Dispo:given ongoing hypoxia, more with activity (not on home oxygen) and tremors, hallucinations increasing the fall risk, hold discharge.   Continue to monitor.   Patient does not feel ready for discharge. Prefer tomorrow early am if feels better.   May need oxygen going home. Will reassess tomorrow.     Changed to inpatient status     Likely home 1-2 days.          Plan discussed with patient, bedside RN and [facility]BIN CC during Care Team Rounds.  D/w PT    Neftali Mcknight MD   Hospitalist (Internal Medicine)  Pager: [phone].

## 2017-11-08 NOTE — PLAN OF CARE
Problem: Patient Care Overview  Goal: Interdisciplinary Rounds/Family Conf  Outcome: No Change  Patient A/Ox4. VSS. Denies CP, SOB, dizziness/LH. LSCTA. +fl/BS. Voiding in the urinal.  CMS intact. . Tolerating a regular diet without NV. IS encouraged. Activity is up with assist and the use of his cane. Patient has demonstrated ability to call appropriately. Patient is resting with call light within reach. Will continue to monitor.

## 2017-11-09 LAB
ERYTHROCYTE [DISTWIDTH] IN BLOOD BY AUTOMATED COUNT: 16 % (ref 10–15)
HCT VFR BLD AUTO: 45.8 % (ref 40–53)
HGB BLD-MCNC: 15 G/DL (ref 13.3–17.7)
MCH RBC QN AUTO: 30.5 PG (ref 26.5–33)
MCHC RBC AUTO-ENTMCNC: 32.8 G/DL (ref 31.5–36.5)
MCV RBC AUTO: 93 FL (ref 78–100)
PLATELET # BLD AUTO: 183 10E9/L (ref 150–450)
PROCALCITONIN SERPL-MCNC: 0.09 NG/ML
RBC # BLD AUTO: 4.91 10E12/L (ref 4.4–5.9)
WBC # BLD AUTO: 12.5 10E9/L (ref 4–11)

## 2017-11-09 PROCEDURE — 25000132 ZZH RX MED GY IP 250 OP 250 PS 637: Performed by: PSYCHIATRY & NEUROLOGY

## 2017-11-09 PROCEDURE — 90686 IIV4 VACC NO PRSV 0.5 ML IM: CPT | Performed by: INTERNAL MEDICINE

## 2017-11-09 PROCEDURE — 40000962 ZZH STATISTIC CHRONIC DISEASE SPECIALIST RT CONSULT

## 2017-11-09 PROCEDURE — 85027 COMPLETE CBC AUTOMATED: CPT | Performed by: STUDENT IN AN ORGANIZED HEALTH CARE EDUCATION/TRAINING PROGRAM

## 2017-11-09 PROCEDURE — 25000125 ZZHC RX 250: Performed by: INTERNAL MEDICINE

## 2017-11-09 PROCEDURE — 12000001 ZZH R&B MED SURG/OB UMMC

## 2017-11-09 PROCEDURE — 25000132 ZZH RX MED GY IP 250 OP 250 PS 637: Performed by: STUDENT IN AN ORGANIZED HEALTH CARE EDUCATION/TRAINING PROGRAM

## 2017-11-09 PROCEDURE — 25000128 H RX IP 250 OP 636: Performed by: INTERNAL MEDICINE

## 2017-11-09 PROCEDURE — 36415 COLL VENOUS BLD VENIPUNCTURE: CPT | Performed by: STUDENT IN AN ORGANIZED HEALTH CARE EDUCATION/TRAINING PROGRAM

## 2017-11-09 PROCEDURE — 25000132 ZZH RX MED GY IP 250 OP 250 PS 637: Performed by: INTERNAL MEDICINE

## 2017-11-09 PROCEDURE — 94640 AIRWAY INHALATION TREATMENT: CPT | Mod: 76

## 2017-11-09 PROCEDURE — 84145 PROCALCITONIN (PCT): CPT | Performed by: INTERNAL MEDICINE

## 2017-11-09 PROCEDURE — 40000275 ZZH STATISTIC RCP TIME EA 10 MIN

## 2017-11-09 PROCEDURE — 94640 AIRWAY INHALATION TREATMENT: CPT

## 2017-11-09 PROCEDURE — 99232 SBSQ HOSP IP/OBS MODERATE 35: CPT | Performed by: INTERNAL MEDICINE

## 2017-11-09 PROCEDURE — 99213 OFFICE O/P EST LOW 20 MIN: CPT

## 2017-11-09 RX ADMIN — IPRATROPIUM BROMIDE AND ALBUTEROL SULFATE 3 ML: .5; 3 SOLUTION RESPIRATORY (INHALATION) at 13:23

## 2017-11-09 RX ADMIN — RAMELTEON 8 MG: 8 TABLET, FILM COATED ORAL at 22:20

## 2017-11-09 RX ADMIN — IPRATROPIUM BROMIDE AND ALBUTEROL SULFATE 3 ML: .5; 3 SOLUTION RESPIRATORY (INHALATION) at 21:12

## 2017-11-09 RX ADMIN — IPRATROPIUM BROMIDE AND ALBUTEROL SULFATE 3 ML: .5; 3 SOLUTION RESPIRATORY (INHALATION) at 08:32

## 2017-11-09 RX ADMIN — GABAPENTIN 900 MG: 300 CAPSULE ORAL at 08:52

## 2017-11-09 RX ADMIN — CLONAZEPAM 4 MG: 1 TABLET ORAL at 20:14

## 2017-11-09 RX ADMIN — FOLIC ACID 1 MG: 1 TABLET ORAL at 08:53

## 2017-11-09 RX ADMIN — Medication 100 MG: at 08:53

## 2017-11-09 RX ADMIN — INFLUENZA A VIRUS A/MICHIGAN/45/2015 X-275 (H1N1) ANTIGEN (FORMALDEHYDE INACTIVATED), INFLUENZA A VIRUS A/HONG KONG/4801/2014 X-263B (H3N2) ANTIGEN (FORMALDEHYDE INACTIVATED), INFLUENZA B VIRUS B/PHUKET/3073/2013 ANTIGEN (FORMALDEHYDE INACTIVATED), AND INFLUENZA B VIRUS B/BRISBANE/60/2008 ANTIGEN (FORMALDEHYDE INACTIVATED) 0.5 ML: 15; 15; 15; 15 INJECTION, SUSPENSION INTRAMUSCULAR at 10:34

## 2017-11-09 RX ADMIN — ASPIRIN 81 MG CHEWABLE TABLET 81 MG: 81 TABLET CHEWABLE at 08:52

## 2017-11-09 RX ADMIN — CLONAZEPAM 4 MG: 1 TABLET ORAL at 08:50

## 2017-11-09 RX ADMIN — GABAPENTIN 900 MG: 300 CAPSULE ORAL at 20:14

## 2017-11-09 RX ADMIN — THERA TABS 1 TABLET: TAB at 08:53

## 2017-11-09 RX ADMIN — VENLAFAXINE HYDROCHLORIDE 150 MG: 150 TABLET, EXTENDED RELEASE ORAL at 08:53

## 2017-11-09 RX ADMIN — CYANOCOBALAMIN TAB 1000 MCG 1000 MCG: 1000 TAB at 08:54

## 2017-11-09 RX ADMIN — GABAPENTIN 900 MG: 300 CAPSULE ORAL at 15:06

## 2017-11-09 RX ADMIN — ENOXAPARIN SODIUM 40 MG: 40 INJECTION SUBCUTANEOUS at 12:18

## 2017-11-09 RX ADMIN — BUPRENORPHINE HYDROCHLORIDE, NALOXONE HYDROCHLORIDE 1 FILM: 8; 2 FILM, SOLUBLE BUCCAL; SUBLINGUAL at 20:14

## 2017-11-09 RX ADMIN — BUPRENORPHINE HYDROCHLORIDE, NALOXONE HYDROCHLORIDE 1 FILM: 8; 2 FILM, SOLUBLE BUCCAL; SUBLINGUAL at 08:50

## 2017-11-09 NOTE — PLAN OF CARE
Problem: Patient Care Overview  Goal: Plan of Care/Patient Progress Review  Patient A/Ox4. VSS. Denies CP, SOB, dizziness/LH. MSSA score was 5. Pt up independently. Wanted to sleep most of the sleep.  Patient has demonstrated ability to call appropriately. Patient is resting with call light within reach. Will continue to monitor

## 2017-11-09 NOTE — PLAN OF CARE
Problem: Patient Care Overview  Goal: Interdisciplinary Rounds/Family Conf  Outcome: No Change  Patient A/Ox4. VSS. Denies CP, SOB, dizziness/LH. LSCTA. +fl/BS. Voiding independently in the bathroom CMS intact.  Tolerating a regular  diet without NV. IS encouraged. Activity is up at  [place]. Report was given to Kory, he was then transferred to [facility] via wheelchair in stable  Condition.  Patient has demonstrated ability to call appropriately. Patient is resting with call light within reach. Will continue to monitor.

## 2017-11-09 NOTE — PROGRESS NOTES
[facility], [place], [place]   Hospitalist Daily Progress Note                                                 Date of Admission:2017  ___________________________________  INTERVAL HISTORY (24 Hrs)/SUBJECTIVE:   Last 24 hr events, care team notes reviewed.     Overall feels better  However still feels bit shaky on ambulation.   Mild hand tremors.   Hallucinations: resolved.   No cp or sob. Intermittent cough  No fever or chills  yaw po well. No NV or pain abdomen  Voiding well  Walking with walker, cane    ROS: 4 point ROS neg other than the symptoms noted above in the interval history.    OBJECTIVE :   VITALS:    Temp:  [97.5  F (36.4  C)-97.6  F (36.4  C)] 97.5  F (36.4  C)  Pulse:  [59] 59  Heart Rate:  [1-76] 1  Resp:  [18-19] 19  BP: (117-143)/(73-86) 117/73  SpO2:  [89 %-94 %] 89 %    Temp (24hrs), Av.5  F (36.4  C), Min:97.4  F (36.3  C), Max:97.6  F (36.4  C)      Wt Readings from Last 5 Encounters:   17 95.3 kg (210 lb 3.2 oz)   11 99.9 kg (220 lb 3.2 oz)   11 100.2 kg (221 lb)   11 102.2 kg (225 lb 4.8 oz)      No intake or output data in the 24 hours ending 17 1147    PHYSICAL EXAM:  General: alert, interactive, NAD  HEENT: AT/NC, PERRLA, Moist MM  Respi/Chest: Non labored. Clear BL  CVS/Heart: S1S2 regular,  Gi/Abd: Soft, non tender, non distended,   MSK/Ext: Distal pulses 2+.     Neuro: AO x 4,  CNs grossly intact. Mild tremors both hands.      Medications:   I have reviewed this patient's current medications.      Data:   All laboratory and imaging data in the past 24 hours reviewed:    LABS:  CMP    Recent Labs  Lab 17  0612 17  0501 17  1444     --  145*   POTASSIUM 4.1  --  4.2   CHLORIDE 104  --  107   CO2 31  --  31   ANIONGAP 6  --  7   *  --  134*   BUN 18  --  18   CR 0.58*  --  0.71   GFRESTIMATED >90  --  >90   GFRESTBLACK >90  --  >90   RUSTY 8.5  --  8.5   MAG  --  1.9  --    PROTTOTAL  --   --  7.0    ALBUMIN  --   --  3.2*   BILITOTAL  --   --  0.4   ALKPHOS  --   --  87   AST  --   --  53*   ALT  --   --  47     CBC    Recent Labs  Lab 17  0632 17  0612 17  1444   WBC 12.5* 13.1* 9.6   RBC 4.91 4.66 4.82   HGB 15.0 14.1 14.7   HCT 45.8 43.7 45.0   MCV 93 94 93   MCH 30.5 30.3 30.5   MCHC 32.8 32.3 32.7   RDW 16.0* 16.2* 16.2*    197 206         ASSESSMENT & PLAN :    62 yoM with Hx of chronic pain, anxiety, PTSD, smoking (30 pack yr)- presents for EtOH/Benzo withdrawal and hypoxemic respiratory failure.      #Benzo/EtOH Abuse and Withdrawl- excessive intake recently  S/p banana bag on adm     -home prn xanax changed to Clonazepam 4 mg bid  by Psychiatry.   -MSSA scorin. Patient w hallucinations- resolved, hand tremors.   - mvi, thiamine, folic acid daily  - psychiatry consultation appreciated. : D/w psychiatry, recs reviewed. Switched alprazolam to clonazepam. Not holdable. No 1:1. Fu with outpatient psychiatry.   - sw consult.   - fall, seizure precautions      #Hypoxemic Resp Failure- CT PE with only emphysematous changes. Labs show chronic elevated CO2 with HCO3 compensation, likely due to COPD from smoking. This along with diminished breath sounds on exam argues for COPD exacerbation. No markers of infection. Trop negative. No clinical features of heart failure.  S/p Prednisone 40 mg x 2 doses. DC  as patient feels better, no wheezing or spasm appreciated.pasha given significant h/o anxiety.     - Continue duonebs tid, albuterol nebs prn.   - outpatient PFT.   - denies ALVINO    : * check ambulatory pulse Ox: dropped in sats in 78% on RA, Per RN, patient remained asymptomatic.  2017: on walk test, oxygen still dropping but better mid 80s today. Remains asymptomatic. Bit shaky      Encourage IS  acapella  Pulse ox- contd.   Oxygen prn to keep sat >90%  Encourage activity  Check Pro bnp: 324.     COPD RT consult requested       #Chronic Pain- cont home  gabapentin, suboxone, and prn methocarbamol   #Anxiety/Insomnia- cont home ramelteon    # Fall, L LE weakness (chronic):   - PT consult requested.   However patient declined. Says he is fine, he has outpatient PT. Pt uses a cane and AFO at baseline.      Diet- ADAT to regular.   DVT PPx- Lovenox sQ (h/o dvt per patient, not very ambulatory at current)  Full code.      Dispo: home 1-2 days, pending improvement of hypoxia and shakes, tremors for safe dispo.      CC consult for possible home oxygen  Outpatient PT/OT           Plan discussed with patient, bedside RN and ROBBIN CC during Care Team Rounds.      Neftali Mcknight MD   Hospitalist (Internal Medicine)  Pager: [phone].

## 2017-11-09 NOTE — PROGRESS NOTES
D: Patient has been awake and agreeable throughout the day, he reports that he is sleepy and frequently nods off to sleep between cares or during breaks in conversation. O2 stats have held steady at 89% but are reduced with activity. MSSA of 5, patient has visible tremors but no other clear signs of withdrawal. Patient has not had much of an appetite this morning, he did not wish to order breakfast after several promptings and finally ordered lunch at 12:15.     A: Ambulated down the andersen for five minutes resulting in a drop in O2 from 89% to 86%. It was decided today that the patient will not go home today as previously anticipated, he will stay overnight again to see if O2 stats improve. He has been encouraged to walk in the and[place]en several times a day and to continue using his incentive spirometer. Flu shot administered 1000.     R: Patient is disappointed by the news that he will have to stay another day as he would prefer to discharge. He accepts the decision however and shows readiness to improve his O2 Stats by participating in walks and breathing exercises.

## 2017-11-09 NOTE — PROGRESS NOTES
Pt arrived at 1805 from 10a and was settled for oncoming shift. Given Tylenol for mild pain. Pt ordered a meal and tolerated it well. Pt resting at time of handoff. Report given to RAKAN Morrison.

## 2017-11-09 NOTE — PROGRESS NOTES
COPD Initial Interview and Consult    2017    Patient: Jorge Rosales      :  1955                    MRN:[id number]      Reason for Consult:  Patient with suspected COPD being followed by COPD Readmission Reduction Program    History of Present Illness: 62 yoM with Hx of chronic pain, anxiety, PTSD, smoking (30 pack yr)- presents for EtOH/Benzo withdrawal and hypoxemic respiratory failure. Patient smokes 10 cigarette per day, and he is not interested in quitting due to family issues and lack of support. He states that he is not ready there are so much things that is going on in his life now to think about quitting. I provided patient with educational materials and gave him the quit plan. Patient is not interested in pharmacotherapy while in patient. He will like to follow up with his PCP about smoking once he is ready to quit. Patient is being followed at [facility] for all his care but came to [place] [facility].      Assessment: Patient is Room air, BS are crackles and diminished non productive cough.     No PFTs    Home respiratory medications include:  -Albuterol Inhaler PRN    Recommendations:  -Continue with current respiratory medication regimen  -Establish care with a Pulmonologist and get complete PFT's  -Patient has Nicotine gum to help reduce symptoms of withdrawal and cravings   -[facility]  -2mg/4mg Nicotine Lozenges/Gum for cravings and urges  -Use Aerobika Oscillating PEP Device at least twice daily for 5-10 min/QID with Nebs to open smaller airways, improve mucus clearance, to decrease cough frequency, and to improve exercise tolerance  -Use Aerochamber with MDI's for better drug deposition.  -Patient needs continued reinforcement and continued education on inhaler use and breath recovery techniques  -PT/OT consult to assess safety with returning home, functional abilities and limitations, home care needs and cognitive  evaluation  -[facility]   -Consider sleep consult to assess for sleep disordered breathing, ALVINO, nocturnal hypoxia, and hypoventilation  -Walk test to determine O2 needs at rest and with exertion/activity if the patient is on oxygen.   -At discharge continue with patient's home regimen of respiratory medications      Will continue to follow and support patient as needed. Will follow up with phone call 48 hours after discharge.     I spent 35 minutes with the patient.

## 2017-11-10 ENCOUNTER — APPOINTMENT (OUTPATIENT)
Dept: GENERAL RADIOLOGY | Facility: CLINIC | Age: 62
DRG: 896 | End: 2017-11-10
Attending: INTERNAL MEDICINE
Payer: COMMERCIAL

## 2017-11-10 ENCOUNTER — APPOINTMENT (OUTPATIENT)
Dept: OCCUPATIONAL THERAPY | Facility: CLINIC | Age: 62
DRG: 896 | End: 2017-11-10
Attending: INTERNAL MEDICINE
Payer: COMMERCIAL

## 2017-11-10 LAB
ANION GAP SERPL CALCULATED.3IONS-SCNC: 4 MMOL/L (ref 3–14)
BUN SERPL-MCNC: 22 MG/DL (ref 7–30)
CALCIUM SERPL-MCNC: 8.5 MG/DL (ref 8.5–10.1)
CHLORIDE SERPL-SCNC: 103 MMOL/L (ref 94–109)
CO2 SERPL-SCNC: 31 MMOL/L (ref 20–32)
CREAT SERPL-MCNC: 0.69 MG/DL (ref 0.66–1.25)
ERYTHROCYTE [DISTWIDTH] IN BLOOD BY AUTOMATED COUNT: 15.9 % (ref 10–15)
GFR SERPL CREATININE-BSD FRML MDRD: >90 ML/MIN/1.7M2
GLUCOSE SERPL-MCNC: 97 MG/DL (ref 70–99)
HCT VFR BLD AUTO: 44.6 % (ref 40–53)
HGB BLD-MCNC: 14.9 G/DL (ref 13.3–17.7)
LACTATE BLD-SCNC: 0.6 MMOL/L (ref 0.7–2)
MCH RBC QN AUTO: 30.5 PG (ref 26.5–33)
MCHC RBC AUTO-ENTMCNC: 33.4 G/DL (ref 31.5–36.5)
MCV RBC AUTO: 91 FL (ref 78–100)
PLATELET # BLD AUTO: 185 10E9/L (ref 150–450)
POTASSIUM SERPL-SCNC: 4.2 MMOL/L (ref 3.4–5.3)
RBC # BLD AUTO: 4.88 10E12/L (ref 4.4–5.9)
SODIUM SERPL-SCNC: 138 MMOL/L (ref 133–144)
WBC # BLD AUTO: 9.3 10E9/L (ref 4–11)

## 2017-11-10 PROCEDURE — 40000133 ZZH STATISTIC OT WARD VISIT: Performed by: OCCUPATIONAL THERAPIST

## 2017-11-10 PROCEDURE — 36415 COLL VENOUS BLD VENIPUNCTURE: CPT | Performed by: INTERNAL MEDICINE

## 2017-11-10 PROCEDURE — 85027 COMPLETE CBC AUTOMATED: CPT | Performed by: STUDENT IN AN ORGANIZED HEALTH CARE EDUCATION/TRAINING PROGRAM

## 2017-11-10 PROCEDURE — 25000132 ZZH RX MED GY IP 250 OP 250 PS 637: Performed by: PSYCHIATRY & NEUROLOGY

## 2017-11-10 PROCEDURE — 94640 AIRWAY INHALATION TREATMENT: CPT

## 2017-11-10 PROCEDURE — 83605 ASSAY OF LACTIC ACID: CPT | Performed by: INTERNAL MEDICINE

## 2017-11-10 PROCEDURE — 12000001 ZZH R&B MED SURG/OB UMMC

## 2017-11-10 PROCEDURE — 97530 THERAPEUTIC ACTIVITIES: CPT | Mod: GO | Performed by: OCCUPATIONAL THERAPIST

## 2017-11-10 PROCEDURE — 36415 COLL VENOUS BLD VENIPUNCTURE: CPT | Performed by: STUDENT IN AN ORGANIZED HEALTH CARE EDUCATION/TRAINING PROGRAM

## 2017-11-10 PROCEDURE — 97535 SELF CARE MNGMENT TRAINING: CPT | Mod: GO | Performed by: OCCUPATIONAL THERAPIST

## 2017-11-10 PROCEDURE — 25000132 ZZH RX MED GY IP 250 OP 250 PS 637: Performed by: INTERNAL MEDICINE

## 2017-11-10 PROCEDURE — 40000275 ZZH STATISTIC RCP TIME EA 10 MIN

## 2017-11-10 PROCEDURE — 99232 SBSQ HOSP IP/OBS MODERATE 35: CPT | Performed by: INTERNAL MEDICINE

## 2017-11-10 PROCEDURE — 25000125 ZZHC RX 250: Performed by: INTERNAL MEDICINE

## 2017-11-10 PROCEDURE — 94640 AIRWAY INHALATION TREATMENT: CPT | Mod: 76

## 2017-11-10 PROCEDURE — 25000132 ZZH RX MED GY IP 250 OP 250 PS 637: Performed by: STUDENT IN AN ORGANIZED HEALTH CARE EDUCATION/TRAINING PROGRAM

## 2017-11-10 PROCEDURE — 80048 BASIC METABOLIC PNL TOTAL CA: CPT | Performed by: STUDENT IN AN ORGANIZED HEALTH CARE EDUCATION/TRAINING PROGRAM

## 2017-11-10 PROCEDURE — 25000128 H RX IP 250 OP 636: Performed by: INTERNAL MEDICINE

## 2017-11-10 PROCEDURE — 71010 XR CHEST PORT 1 VW: CPT

## 2017-11-10 PROCEDURE — 97165 OT EVAL LOW COMPLEX 30 MIN: CPT | Mod: GO | Performed by: OCCUPATIONAL THERAPIST

## 2017-11-10 RX ORDER — SODIUM CHLORIDE 9 MG/ML
INJECTION, SOLUTION INTRAVENOUS
Status: DISCONTINUED
Start: 2017-11-10 | End: 2017-11-10 | Stop reason: HOSPADM

## 2017-11-10 RX ORDER — GABAPENTIN 300 MG/1
600 CAPSULE ORAL 3 TIMES DAILY
Status: DISCONTINUED | OUTPATIENT
Start: 2017-11-10 | End: 2017-11-11

## 2017-11-10 RX ADMIN — ASPIRIN 81 MG CHEWABLE TABLET 81 MG: 81 TABLET CHEWABLE at 08:49

## 2017-11-10 RX ADMIN — Medication 100 MG: at 08:50

## 2017-11-10 RX ADMIN — BUPRENORPHINE HYDROCHLORIDE, NALOXONE HYDROCHLORIDE 1 FILM: 8; 2 FILM, SOLUBLE BUCCAL; SUBLINGUAL at 20:04

## 2017-11-10 RX ADMIN — IPRATROPIUM BROMIDE AND ALBUTEROL SULFATE 3 ML: .5; 3 SOLUTION RESPIRATORY (INHALATION) at 20:38

## 2017-11-10 RX ADMIN — GABAPENTIN 600 MG: 300 CAPSULE ORAL at 20:04

## 2017-11-10 RX ADMIN — CLONAZEPAM 4 MG: 1 TABLET ORAL at 20:04

## 2017-11-10 RX ADMIN — SODIUM CHLORIDE: 9 INJECTION, SOLUTION INTRAVENOUS at 00:41

## 2017-11-10 RX ADMIN — GABAPENTIN 900 MG: 300 CAPSULE ORAL at 08:49

## 2017-11-10 RX ADMIN — ALBUTEROL SULFATE 2.5 MG: 2.5 SOLUTION RESPIRATORY (INHALATION) at 01:20

## 2017-11-10 RX ADMIN — CLONAZEPAM 4 MG: 1 TABLET ORAL at 08:49

## 2017-11-10 RX ADMIN — IPRATROPIUM BROMIDE AND ALBUTEROL SULFATE 3 ML: .5; 3 SOLUTION RESPIRATORY (INHALATION) at 08:28

## 2017-11-10 RX ADMIN — ENOXAPARIN SODIUM 40 MG: 40 INJECTION SUBCUTANEOUS at 13:40

## 2017-11-10 RX ADMIN — FOLIC ACID 1 MG: 1 TABLET ORAL at 08:49

## 2017-11-10 RX ADMIN — IPRATROPIUM BROMIDE AND ALBUTEROL SULFATE 3 ML: .5; 3 SOLUTION RESPIRATORY (INHALATION) at 13:58

## 2017-11-10 RX ADMIN — CYANOCOBALAMIN TAB 1000 MCG 1000 MCG: 1000 TAB at 08:49

## 2017-11-10 RX ADMIN — THERA TABS 1 TABLET: TAB at 08:50

## 2017-11-10 RX ADMIN — BUPRENORPHINE HYDROCHLORIDE, NALOXONE HYDROCHLORIDE 1 FILM: 8; 2 FILM, SOLUBLE BUCCAL; SUBLINGUAL at 08:49

## 2017-11-10 RX ADMIN — GABAPENTIN 600 MG: 300 CAPSULE ORAL at 13:40

## 2017-11-10 RX ADMIN — VENLAFAXINE HYDROCHLORIDE 150 MG: 150 TABLET, EXTENDED RELEASE ORAL at 08:49

## 2017-11-10 RX ADMIN — RAMELTEON 8 MG: 8 TABLET, FILM COATED ORAL at 21:26

## 2017-11-10 NOTE — PROGRESS NOTES
Patient has been assessed for Home Oxygen needs.  Oxygen readings:   *   RA - at rest  Pulse oximetry SPO2  88 %  *   RA - during activity/with exercise SPO2 85 %  *   O2 at  3 liters/minute (at rest) ...SPO2 90 %  *   O2 at  3 liters/minute (during activity/with exercise) ...SPO2 89 %

## 2017-11-10 NOTE — PLAN OF CARE
Problem: Patient Care Overview  Goal: Plan of Care/Patient Progress Review  Outcome: No Change  A/Ox's 4 at the start of the shift. At the night went on pt was able to answer most of the orientation questions but was slightly off. Pt thought it was October and nice outside. Bed alarm on for safety. Pt set off the bed alarm off x1. Pt appeared unsteady on his feet. Pt denied pain. New PIV placed. BP improved after bolus. Pt had to be placed on 2L via NC. Unable to wean off even after a PRN neb treatment.  Chest X ray results pending. Moonlighter was up to assess patient. Tolerated regular diet. Denied any nausea, CP, SOB, lightheadedness or dizziness. Voiding without pain or difficulty. Bilateral heels elevated off bed. Resting in bed at this time with call light in reach. Continue to monitor.

## 2017-11-10 NOTE — PROGRESS NOTES
[facility], [place]   Hospitalist Daily Progress Note                                                 Date of Admission:2017  ___________________________________  INTERVAL HISTORY (24 Hrs)/SUBJECTIVE:   Last 24 hr events, care team notes reviewed.     Issues with low bp, poor po intake, resolved now.    Feels gen weakness.   No focal weakness.   No hallucination or tremor.   Concerned about discharge as feels more weak, unsteady.      No cp or sob. Intermittent cough  No fever or chills  No NV or pain abdomen  Voiding well.     ROS: 4 point ROS neg other than the symptoms noted above in the interval history.    OBJECTIVE :   VITALS:    Temp:  [96.4  F (35.8  C)-98.5  F (36.9  C)] 96.9  F (36.1  C)  Pulse:  [62] 62  Heart Rate:  [64-70] 68  Resp:  [16-19] 16  BP: ()/(41-82) 100/59  SpO2:  [85 %-95 %] 88 %    Temp (24hrs), Av.4  F (36.3  C), Min:96.4  F (35.8  C), Max:98.5  F (36.9  C)      Wt Readings from Last 5 Encounters:   17 95.3 kg (210 lb 3.2 oz)   11 99.9 kg (220 lb 3.2 oz)   11 100.2 kg (221 lb)   11 102.2 kg (225 lb 4.8 oz)       PHYSICAL EXAM:  General: alert, interactive, NAD  HEENT: AT/NC, PERRLA, Moist MM  Respi/Chest: Non labored. Clear BL  CVS/Heart: S1S2 regular,   Gi/Abd: Soft, non tender, non distended,   MSK/Ext: Distal pulses 2+.     Neuro: AO x 4,  CNs grossly intact.      Medications:   I have reviewed this patient's current medications.      Data:   All laboratory and imaging data in the past 24 hours reviewed:    LABS:  CMP    Recent Labs  Lab 11/10/17  0602 17  0612 17  0501 17  1444    141  --  145*   POTASSIUM 4.2 4.1  --  4.2   CHLORIDE 103 104  --  107   CO2 31 31  --  31   ANIONGAP 4 6  --  7   GLC 97 101*  --  134*   BUN 22 18  --  18   CR 0.69 0.58*  --  0.71   GFRESTIMATED >90 >90  --  >90   GFRESTBLACK >90 >90  --  >90   RUSTY 8.5 8.5  --  8.5   MAG  --   --  1.9  --    PROTTOTAL  --   --    --  7.0   ALBUMIN  --   --   --  3.2*   BILITOTAL  --   --   --  0.4   ALKPHOS  --   --   --  87   AST  --   --   --  53*   ALT  --   --   --  47     CBC    Recent Labs  Lab 11/10/17  0602 17  0632 17  0612 17  1444   WBC 9.3 12.5* 13.1* 9.6   RBC 4.88 4.91 4.66 4.82   HGB 14.9 15.0 14.1 14.7   HCT 44.6 45.8 43.7 45.0   MCV 91 93 94 93   MCH 30.5 30.5 30.3 30.5   MCHC 33.4 32.8 32.3 32.7   RDW 15.9* 16.0* 16.2* 16.2*    183 197 206         ASSESSMENT & PLAN :    62 yoM with Hx of chronic pain, anxiety, PTSD, smoking (30 pack yr)- presents for EtOH/Benzo withdrawal and hypoxemic respiratory failure.      # Hemodynamics:   Low bp resolved. 500 ml nss bolus  Suspect poor po intake.   Not septic  Encourage oral fluids.     #Benzo/EtOH Abuse and Withdrawl- excessive intake recently  S/p banana bag on adm     -home prn xanax changed to Clonazepam 4 mg bid  by Psychiatry.   -MSSA scorin. Patient w hallucinations- resolved, hand tremors- better.   - mvi, thiamine, folic acid daily  - psychiatry consultation appreciated. : D/w psychiatry, recs reviewed. Switched alprazolam to clonazepam. Not holdable. No 1:1. Fu with outpatient psychiatry.   - sw consult.   - fall, seizure precautions      #Hypoxemic Resp Failure- CT PE with only emphysematous changes. Labs show chronic elevated CO2 with HCO3 compensation, likely due to COPD from smoking. This along with diminished breath sounds on exam argues for COPD exacerbation. No markers of infection. Trop negative. No clinical features of heart failure.  S/p Prednisone 40 mg x 2 doses. DC  as patient feels better, no wheezing or spasm appreciated.pasha given significant h/o anxiety.     - Continue duonebs tid, albuterol nebs prn.   - outpatient PFT.   - denies ALVINO  - daily ambulatory pulse Ox    : * check ambulatory pulse Ox: dropped in sats in 78% on RA, Per RN, patient remained asymptomatic.  2017: on walk test, oxygen still  dropping but better mid 80s today. Remains asymptomatic.       Encourage IS  acapella  Pulse ox- contd.   Oxygen prn to keep sat >90%  Encourage activity  Check Pro bnp: 324.     COPD RT consult appreciated.       #Chronic Pain- cont home gabapentin, suboxone, and prn methocarbamol   #Anxiety/Insomnia- cont home ramelteon    # Fall, L LE weakness (chronic): Pt uses a cane and AFO at baseline.    - Earlier patient declined PT consult.   - agreeable now. PT/OT consult  - OT for cognition for safe dc planning.        Diet- regular diet. Encourage oral fluids.   DVT PPx- Lovenox sQ (h/o dvt per patient, not very ambulatory at current)  Full code.      Dispo: home 1-2 days, pending improvement of hypoxia and shakes, tremors for safe dispo.      CC consult for possible home oxygen        Plan discussed with patient, bedside RN and ROBBIN CC during Care Team Rounds.    Disposition Plan   Expected discharge in 1-2 days to pending therapy eval. Medical stabilization.     Entered: Neftali Mcknight 11/10/2017, 4:11 PM       Neftali Mcknight MD   Hospitalist (Internal Medicine)  Pager: [phone].

## 2017-11-10 NOTE — PLAN OF CARE
Problem: Patient Care Overview  Goal: Plan of Care/Patient Progress Review  Discharge Planner OT   Patient plan for discharge: Did not verbalize  Current status: Patient unsteady with walker, was able to complete bed mobility with SBA.  Patient alert and oriented, dressed LB including orthotic with SBA.  Barriers to return to prior living situation: Possible cognitive deficits, weakness, balance  Recommendations for discharge: TCU vs. Home  Rationale for recommendations: Patient would benefit from daily OT for maximum independence in ADLs/mobility       Entered by: Melia Peng 11/10/2017 2:47 PM

## 2017-11-10 NOTE — PROGRESS NOTES
Care Coordinator- Discharge Planning     Admission Date/Time:  11/6/2017  Attending MD:  Neftali Mcknight MD     Data  Date of initial CC assessment:  11/8  Chart reviewed, discussed with interdisciplinary team.   Patient was admitted for:   1. Acute respiratory failure with hypoxia (H)    2. Xanax use disorder, severe, dependence (H)    3. Alcohol abuse    4. Cigarette smoker    5. Sedative dependence (H)    6. Alcohol abuse, continuous         Assessment  Full assessment completed in previous note    Coordination of Care and Referrals: Met with patient and spoke at length regarding prior home services and his goals surrounding plan of care and d/c planning. Patient currently receives home health aide visits 3x/week for 3 hours (for a total of 9 hours/wk) for cleaning, homemaking, and assist with IADLs. He has 1x/month RN visits for supervision of home health aide. Spoke with annel Rose RN at [facility] (main office: [phone]; direct ph: [phone]) and confirmed services. Patient states his goal is to return home but simultaneously states he is below baseline for mobility and would consider TCU if recommended by PT. Also of note, during our entire conversation he was noted to be on room air. Requested documentation of O2 sats by SERENA Del Cid RN. Based on current sats, patient would qualify for home oxygen but he would STRONGLY prefer to d/c without home O2.       Resumption of home care services entered. MD to please discontinue order if patient discharges to TCU. MD to page On-Call weekend RN Care Coordinator if patient becomes stable for discharge over weekend AND requires home oxygen.      Plan  Anticipated Discharge Date: TBD- Dr Mcknight has re-ordered PT eval to assess dispo as patient is below baseline for mobility, patient slow to wean on supplemental oxygen and goal is to have O2 sats stable on RA prior to d/c.  Anticipated Discharge Plan: [facility]- will re-assess after PT/OT evals scheduled on  Saturday    Nereida Carbone, RN  Care Coordinator covering 8A on 11/10

## 2017-11-10 NOTE — PROGRESS NOTES
"Social Work Services Progress Note    Hospital Day: 5  Date of Initial Social Work Evaluation:  11/7/17  Collaborated with:  Pt, pt's bedside RN, 8A Unit ROBBIN Askew    Data:  DC planning    Intervention:  Writer met w/pt and reintroduced role/reason for visit. Pt states he \"betsy of remembers\" writer's visit from 3 days ago.    Writer reviewed PT/OT recommendations for possible TCU stay and explored DC to TCU w/pt. Pt verbalizes desire to DC home w/resumption of his services. However, he would be willing to \"let you do what you gotta do. If you need to make a back up plan for me to go to a nursing home, you go ahead and do it. I want to go home and I think I will be able to do , I don't think I will need rehab.\"    Writer explored possible options w/pt. He is an active smoker and the thought of going to a non-smoking facility is \"not good\" to pt. He does know where [facility] is located and is willing to have referral there. Writer called [facility] and spoke Ollie. They are currently full. The first available opening is Monday.     Pt is not willing to provide other referrals \"without researching on my computer first\".        Assessment:  pt again shared w/writer \"I have too many things going on. Heavy stuff -you would not believe it if I told you.\" Pt declined to elaborate despite writer''s prompting and questions. Pt has expressed preference for DC home w/resumption of services.    Plan:    Anticipated Disposition:  home vs TCU    Barriers to d/c plan:  Medical stability    Follow Up:  ROBBIN con't to follow w/RNCC for DC planning    "

## 2017-11-10 NOTE — PROGRESS NOTES
Paged moonlighter about low BP's. Moonlighter updated that lactic acid already ordered STAT per protocol. Orders received to give a 500ml bolus.

## 2017-11-10 NOTE — PROGRESS NOTES
GENERAL MEDICINE CROSSCOVER NOTE    Received page from nurse at 0135 about Jorge Rosales regarding hypoxic episode and altered mental status. Patient had had low BP earlier and was received bolus of fluids. Noted to have normal mentation when BP low.  Assessed patient and patient endorses feeling off.     Vitals  /70 (BP Location: Left arm)  Pulse 58  Temp 98.5  F (36.9  C) (Oral)  Resp 16  Wt 95.3 kg (210 lb 3.2 oz)  SpO2 94%  BMI 29.32 kg/m2    Physical Exam  General: patient is sitting up in bed, awake, alert, NAD  HEENT: NCAT, anicteric sclera, PERRL, EOMI with slight horizontal nystagmus, OP clear and MMM, neck is supple and no LAD  CV: RRR, nl S1/S2, no S3/S4 appreciated, no m/r/g; intact & symmetric 2+ pulses (radial, DP)  Lungs: equal b/l air entry, rhonchi present, no wheezing/crackles, no cough  Neuro: AOx3 (states on 10A [place] at first, then corrects, struggles to say in [place], thinks it is October at first, then corrects to November), CN 2-12 intact b/l, strength is 5/5 in UE and LE b/l (limited by history of shoulder injury), normal sensation throughout.     Assessment/Plan:   62 yoM with Hx of chronic pain, anxiety, PTSD, smoking (30 pack yr)- presents for EtOH/Benzo withdrawal and hypoxemic respiratory failure. Was hypotensive earlier, but normal lactate and BP improved. Suspect false recording, but concern for fluid overload and hypoxia vs drug causing altered mental status. No focal neuro findings. Electrolytes normal on 11/8. No obvious explanation, but did get his ramelteon later than usual and known side effect is drowsiness/somnolence.   -- discontinue NS bolus  -- order BMP in am to verify no metabolic derangements or renal failure with toxic build-up of several mind altering medications.   -- limit sedating medications and pain meds  -- if remains hypoxic, check ABG    Jarred Deleon MD  PGY-3 Internal Medicine/[facility]  P: [phone]

## 2017-11-10 NOTE — PROGRESS NOTES
VS:   BP, HR, Temp WDL. De saturates on room air into mid 80s. Currently on 3L O2 and maintaining O2 SATS in upper 80/low 90s. Care coordinator aware and planning for home oxygen therapy. Alert and oriented x 4 this am. Confusion overnight, appears to be improved today.    Output:   Voiding spontaneously without difficult. No BM reported today, passing gas.    Activity:   Up to walk the halls with NST and therapy. At times pt exhibited wobbly/unsteady gait. Up with staff assist x1.    Skin: Intact-    Pain:   Denies    CMS:   Baseline numbness in left leg and foot-    Dressing:   NA    Diet:   Regular diet, good appetite.    LDA:   PIV patent and saline locked.

## 2017-11-11 ENCOUNTER — APPOINTMENT (OUTPATIENT)
Dept: PHYSICAL THERAPY | Facility: CLINIC | Age: 62
DRG: 896 | End: 2017-11-11
Attending: INTERNAL MEDICINE
Payer: COMMERCIAL

## 2017-11-11 ENCOUNTER — APPOINTMENT (OUTPATIENT)
Dept: OCCUPATIONAL THERAPY | Facility: CLINIC | Age: 62
DRG: 896 | End: 2017-11-11
Payer: COMMERCIAL

## 2017-11-11 LAB
ERYTHROCYTE [DISTWIDTH] IN BLOOD BY AUTOMATED COUNT: 15.9 % (ref 10–15)
HCT VFR BLD AUTO: 46.8 % (ref 40–53)
HGB BLD-MCNC: 15.3 G/DL (ref 13.3–17.7)
MCH RBC QN AUTO: 30.5 PG (ref 26.5–33)
MCHC RBC AUTO-ENTMCNC: 32.7 G/DL (ref 31.5–36.5)
MCV RBC AUTO: 93 FL (ref 78–100)
PLATELET # BLD AUTO: 202 10E9/L (ref 150–450)
RBC # BLD AUTO: 5.01 10E12/L (ref 4.4–5.9)
WBC # BLD AUTO: 8.8 10E9/L (ref 4–11)

## 2017-11-11 PROCEDURE — 12000001 ZZH R&B MED SURG/OB UMMC

## 2017-11-11 PROCEDURE — 40000133 ZZH STATISTIC OT WARD VISIT: Performed by: OCCUPATIONAL THERAPIST

## 2017-11-11 PROCEDURE — 25000125 ZZHC RX 250: Performed by: INTERNAL MEDICINE

## 2017-11-11 PROCEDURE — 40000275 ZZH STATISTIC RCP TIME EA 10 MIN

## 2017-11-11 PROCEDURE — 25000128 H RX IP 250 OP 636: Performed by: INTERNAL MEDICINE

## 2017-11-11 PROCEDURE — 25000132 ZZH RX MED GY IP 250 OP 250 PS 637: Performed by: STUDENT IN AN ORGANIZED HEALTH CARE EDUCATION/TRAINING PROGRAM

## 2017-11-11 PROCEDURE — 25000132 ZZH RX MED GY IP 250 OP 250 PS 637: Performed by: INTERNAL MEDICINE

## 2017-11-11 PROCEDURE — 94640 AIRWAY INHALATION TREATMENT: CPT | Mod: 76

## 2017-11-11 PROCEDURE — 25000132 ZZH RX MED GY IP 250 OP 250 PS 637: Performed by: PSYCHIATRY & NEUROLOGY

## 2017-11-11 PROCEDURE — 94640 AIRWAY INHALATION TREATMENT: CPT

## 2017-11-11 PROCEDURE — 40000193 ZZH STATISTIC PT WARD VISIT: Performed by: PHYSICAL THERAPIST

## 2017-11-11 PROCEDURE — 85027 COMPLETE CBC AUTOMATED: CPT | Performed by: STUDENT IN AN ORGANIZED HEALTH CARE EDUCATION/TRAINING PROGRAM

## 2017-11-11 PROCEDURE — 97161 PT EVAL LOW COMPLEX 20 MIN: CPT | Mod: GP | Performed by: PHYSICAL THERAPIST

## 2017-11-11 PROCEDURE — 99232 SBSQ HOSP IP/OBS MODERATE 35: CPT | Performed by: INTERNAL MEDICINE

## 2017-11-11 PROCEDURE — 97116 GAIT TRAINING THERAPY: CPT | Mod: GP | Performed by: PHYSICAL THERAPIST

## 2017-11-11 PROCEDURE — 97535 SELF CARE MNGMENT TRAINING: CPT | Mod: GO | Performed by: OCCUPATIONAL THERAPIST

## 2017-11-11 PROCEDURE — 36415 COLL VENOUS BLD VENIPUNCTURE: CPT | Performed by: STUDENT IN AN ORGANIZED HEALTH CARE EDUCATION/TRAINING PROGRAM

## 2017-11-11 PROCEDURE — 97530 THERAPEUTIC ACTIVITIES: CPT | Mod: GO | Performed by: OCCUPATIONAL THERAPIST

## 2017-11-11 RX ORDER — GABAPENTIN 300 MG/1
600 CAPSULE ORAL
Status: DISCONTINUED | OUTPATIENT
Start: 2017-11-11 | End: 2017-11-11

## 2017-11-11 RX ORDER — RAMELTEON 8 MG/1
8 TABLET ORAL AT BEDTIME
Status: DISCONTINUED | OUTPATIENT
Start: 2017-11-11 | End: 2017-11-13 | Stop reason: HOSPADM

## 2017-11-11 RX ORDER — GABAPENTIN 300 MG/1
600 CAPSULE ORAL 3 TIMES DAILY
Status: DISCONTINUED | OUTPATIENT
Start: 2017-11-11 | End: 2017-11-12

## 2017-11-11 RX ADMIN — ENOXAPARIN SODIUM 40 MG: 40 INJECTION SUBCUTANEOUS at 13:36

## 2017-11-11 RX ADMIN — FOLIC ACID 1 MG: 1 TABLET ORAL at 09:24

## 2017-11-11 RX ADMIN — IPRATROPIUM BROMIDE AND ALBUTEROL SULFATE 3 ML: .5; 3 SOLUTION RESPIRATORY (INHALATION) at 14:03

## 2017-11-11 RX ADMIN — RAMELTEON 8 MG: 8 TABLET, FILM COATED ORAL at 18:08

## 2017-11-11 RX ADMIN — BUPRENORPHINE HYDROCHLORIDE, NALOXONE HYDROCHLORIDE 1 FILM: 8; 2 FILM, SOLUBLE BUCCAL; SUBLINGUAL at 19:37

## 2017-11-11 RX ADMIN — THERA TABS 1 TABLET: TAB at 09:24

## 2017-11-11 RX ADMIN — ASPIRIN 81 MG CHEWABLE TABLET 81 MG: 81 TABLET CHEWABLE at 09:24

## 2017-11-11 RX ADMIN — BUPRENORPHINE HYDROCHLORIDE, NALOXONE HYDROCHLORIDE 1 FILM: 8; 2 FILM, SOLUBLE BUCCAL; SUBLINGUAL at 10:07

## 2017-11-11 RX ADMIN — SODIUM CHLORIDE 500 ML: 9 INJECTION, SOLUTION INTRAVENOUS at 10:08

## 2017-11-11 RX ADMIN — IPRATROPIUM BROMIDE AND ALBUTEROL SULFATE 3 ML: .5; 3 SOLUTION RESPIRATORY (INHALATION) at 07:50

## 2017-11-11 RX ADMIN — GABAPENTIN 600 MG: 300 CAPSULE ORAL at 16:40

## 2017-11-11 RX ADMIN — CLONAZEPAM 4 MG: 1 TABLET ORAL at 10:07

## 2017-11-11 RX ADMIN — SODIUM CHLORIDE 500 ML: 9 INJECTION, SOLUTION INTRAVENOUS at 13:10

## 2017-11-11 RX ADMIN — IPRATROPIUM BROMIDE AND ALBUTEROL SULFATE 3 ML: .5; 3 SOLUTION RESPIRATORY (INHALATION) at 19:46

## 2017-11-11 RX ADMIN — CLONAZEPAM 4 MG: 1 TABLET ORAL at 19:37

## 2017-11-11 RX ADMIN — CYANOCOBALAMIN TAB 1000 MCG 1000 MCG: 1000 TAB at 09:24

## 2017-11-11 RX ADMIN — VENLAFAXINE HYDROCHLORIDE 150 MG: 150 TABLET, EXTENDED RELEASE ORAL at 09:24

## 2017-11-11 RX ADMIN — Medication 100 MG: at 09:23

## 2017-11-11 RX ADMIN — RAMELTEON 8 MG: 8 TABLET, FILM COATED ORAL at 19:38

## 2017-11-11 RX ADMIN — GABAPENTIN 600 MG: 300 CAPSULE ORAL at 19:37

## 2017-11-11 NOTE — PLAN OF CARE
Problem: Patient Care Overview  Goal: Plan of Care/Patient Progress Review  Discharge Planner OT   Patient plan for discharge: Home alone  Current status: Patient scored 23/30 on MOCA (score of 26 or above indicates normal cognition).  Patient with improved balance today, ambulated 400' with walker with SBA/CGA, O2 sats 90% on RA but BP fell to 84/60 with patient reporting mild dizziness. Patient able to manage AFO and LB dressing with Mod I.  Patient demonstrated some impulsivity with activity, ambulating without walker in room, trying to bend over to  items.    Barriers to return to prior living situation: Lives alone, has not demonstrated consistent safety with mobility/ADLs  Recommendations for discharge: TCU  Rationale for recommendations: Continued daily OT for maximum independence in ADLs/mobility       Entered by: Melia Peng 11/11/2017 12:41 PM

## 2017-11-11 NOTE — PLAN OF CARE
Problem: Patient Care Overview  Goal: Plan of Care/Patient Progress Review  Outcome: Improving  Focus: Activity and Pain.  D: Patient sleepy but alert when awaken and oriented, VS with low BP; LS clear and BS+; c/o pain to left hip and left shoulder.  I: Patient denies need for pain med; repositioned for comfort; up ambulated in [place]ersen with SBA and cane; bed alarm maintained and patient encouraged to call for assist with transfer and ambulating; room changed to position patient near nurse station because patient OOB and attempted individual transfer without calling staff multiple times.  A: Unsteady gait; bed alarm maintained; patient verbalized numbness to left leg and numbness and tingling to left arm and fingers; oxygen maintained at 2 liters NC; saline lock patent and intact.  P: Continue with patient care.

## 2017-11-11 NOTE — PLAN OF CARE
Problem: Patient Care Overview  Goal: Plan of Care/Patient Progress Review  Discharge Planner PT   Patient plan for discharge: Home  Current status: PT evaluation completed.  Supine to/from sitting with HOB elevated and mod I.  Sit to/from standing from EOB with FWW and CGA x 1.  Pt ambulated 250' with FWW and CGA x 1.  Pt unsteady during ambulation, but no significant LOB.  Pt had no c/o dizziness when ambulating, /66 after activity and O2 sats 89%, but recovered quickly.   Pt educated not to be getting up in his own due impaired balance and risk of falling.  Pt lives alone and would benefit from a short rehab stay prior to discharge home, but pt declining and wanting to go home.  If pt continues to decline TCU, he would benefit from home PT.  Barriers to return to prior living situation: Lives alone, impaired balance and fall risk.   Recommendations for discharge: TCU, if pt refuses home with home PT  Rationale for recommendations: Pt would benefit from further skilled PT for functional mobility and neuromuscular re-education.         Entered by: Randa Pathak 11/11/2017 2:08 PM

## 2017-11-11 NOTE — PROGRESS NOTES
Focus: Patients low blood pressure and 02 saturations  DB: Patient blood pressure was running lower this am. Patient was requiring 02 throughout the night.   I: Weaned patient off 02 and assessed 02 saturation while walking on RA. Also gave patient a fluid bolus per Drs orders.   E: Patient 02 saturations went down to 89% on RA with walking. After patient rested in bed for period of time 02 SATs on RA above 90%. Patient blood pressure stable after bolus. Patient denies dizziness. Will continue to encourage I S, and increase in po intake. Status of patient will continue to be monitored.

## 2017-11-11 NOTE — PLAN OF CARE
Problem: Patient Care Overview  Goal: Individualization & Mutuality            VS:    With O2 at 2L reads above 92%. Initial BP is low but BP reading improve after re check.   Output:    Voids spontaneously without difficulty. BM on 11/10/17. Passing gas as claimed.   Activity:    Ambulates occasionally. Patient claimed he has unsteady gait.   Skin: Skin is dry and intact.   Pain:    Patient complained of left shoulder pain but declined pain med when offered.   CMS:    Reported slight numbness and tingling on left upper extremities and numbness on LLE as baseline.   Dressing:    N/A   Diet:    Oral fluids promoted.Patient is in regular diet.    LDA:    PIV on right hand in saline lock   Equipment:    N/A   Plan:    Continue plan of care.    Additional Info:    Patient is alert and oriented. Able to make needs known. Bed alarm maintained and was monitored closely during shift.

## 2017-11-11 NOTE — PROGRESS NOTES
[facility], [place]   Hospitalist Daily Progress Note                                                 Date of Admission:2017  ___________________________________  INTERVAL HISTORY (24 Hrs)/SUBJECTIVE:   Last 24 hr events, care team notes reviewed.     Ongoing Issues with low bp, poor po intake,   Patient does not want to make changes to his suboxone or clonazepam (new med)   Feels gen weakness.   No focal weakness.   No hallucination or tremor.   No cp or sob. Intermittent cough  No fever or chills  No NV or pain abdomen  Voiding well.     Hypoxia: improving.     ROS: 4 point ROS neg other than the symptoms noted above in the interval history.    OBJECTIVE :   VITALS:    Temp:  [97  F (36.1  C)-97.7  F (36.5  C)] 97.4  F (36.3  C)  Heart Rate:  [47-68] 68  Resp:  [16-18] 18  BP: ()/(46-77) 102/66  SpO2:  [86 %-96 %] 89 %    Temp (24hrs), Av.4  F (36.3  C), Min:97  F (36.1  C), Max:97.7  F (36.5  C)        Wt Readings from Last 5 Encounters:   17 95.3 kg (210 lb 3.2 oz)   11 99.9 kg (220 lb 3.2 oz)   11 100.2 kg (221 lb)   11 102.2 kg (225 lb 4.8 oz)       PHYSICAL EXAM:  General: alert, interactive, NAD  HEENT: AT/NC, PERRLA, Moist MM  Respi/Chest: Non labored. Clear BL  CVS/Heart: S1S2 regular,   Gi/Abd: Soft, non tender, non distended,   MSK/Ext: Distal pulses 2+.     Neuro: AO x 4,  CNs grossly intact. No new focal deficit.       Medications:   I have reviewed this patient's current medications.      Data:   All laboratory and imaging data in the past 24 hours reviewed:    LABS:  CMP    Recent Labs  Lab 11/10/17  0602 17  0612 17  0501 17  1444    141  --  145*   POTASSIUM 4.2 4.1  --  4.2   CHLORIDE 103 104  --  107   CO2 31 31  --  31   ANIONGAP 4 6  --  7   GLC 97 101*  --  134*   BUN 22 18  --  18   CR 0.69 0.58*  --  0.71   GFRESTIMATED >90 >90  --  >90   GFRESTBLACK >90 >90  --  >90   RUSTY 8.5 8.5  --  8.5   MAG  --    --  1.9  --    PROTTOTAL  --   --   --  7.0   ALBUMIN  --   --   --  3.2*   BILITOTAL  --   --   --  0.4   ALKPHOS  --   --   --  87   AST  --   --   --  53*   ALT  --   --   --  47     CBC    Recent Labs  Lab 17  0624 11/10/17  0602 17  0632 17  0612   WBC 8.8 9.3 12.5* 13.1*   RBC 5.01 4.88 4.91 4.66   HGB 15.3 14.9 15.0 14.1   HCT 46.8 44.6 45.8 43.7   MCV 93 91 93 94   MCH 30.5 30.5 30.5 30.3   MCHC 32.7 33.4 32.8 32.3   RDW 15.9* 15.9* 16.0* 16.2*    185 183 197         ASSESSMENT & PLAN :    62 yoM with Hx of chronic pain, anxiety, PTSD, smoking (30 pack yr)- presents for EtOH/Benzo withdrawal and hypoxemic respiratory failure.      # Hemodynamics:   11/10: Low bp resolved. 500 ml nss bolus  : again low bp this am, asymptomatic ext gen weakness,   Improved after fluid bolus NSS 1L.   Suspect poor po intake. Plus medications (suboxone most likely) plus deconditioning.   Afebrile. Not septic. No infectious focus.   Check UA.   ct ivf NSS until adequate po  Encourage oral fluids.       #Benzo/EtOH Abuse and Withdrawl- excessive intake recently  S/p banana bag on adm     -home prn xanax changed to Clonazepam 4 mg bid  by Psychiatry.   -MSSA scorin. Patient w hallucinations- resolved, hand tremors- better. discontinue  - mvi, thiamine, folic acid daily  - psychiatry consultation appreciated. : D/w psychiatry, recs reviewed. Switched alprazolam to clonazepam. Not holdable. No 1:1. Fu with outpatient psychiatry.   -  consult.   - fall, seizure precautions      #Hypoxemic Resp Failure- CT PE with only emphysematous changes. Labs show chronic elevated CO2 with HCO3 compensation, likely due to COPD from smoking. This along with diminished breath sounds on exam argues for COPD exacerbation. No markers of infection. Trop negative. No clinical features of heart failure.  S/p Prednisone 40 mg x 2 doses. DC  as patient feels better, no wheezing or spasm appreciated.pasha  given significant h/o anxiety.     - Continue duonebs tid, albuterol nebs prn.   - outpatient PFT.   - denies ALVINO  - daily ambulatory pulse Ox    11/08: * check ambulatory pulse Ox: dropped in sats in 78% on RA, Per RN, patient remained asymptomatic.  11/9/2017: on walk test, oxygen still dropping but better mid 80s today. Remains asymptomatic.    11/11/2017: ambulatory pulse ox: 89-91. Remains asymptomatic.     Encourage IS  acapella  Pulse ox- contd.   Oxygen prn to keep sat >90%  Encourage activity   Pro bnp: 324.     COPD RT consult appreciated.       #Chronic Pain- cont home gabapentin (dose decreased to 600 tid for sleepiness), suboxone, and prn methocarbamol   #Anxiety/Insomnia- cont home ramelteon    # Fall, physical deconditioning,  L LE weakness (chronic): Pt uses a cane and AFO at baseline.    - Earlier patient declined PT consult.   - 11/10: agreeable now. PT/OT consult  - OT for cognition for safe dc planning.   11/11/2017  Therapy recommending TCU, however patient refusing for now.        Diet- regular diet. Encourage oral fluids. Ivf.   DVT PPx- Lovenox sQ (h/o dvt per patient, not very ambulatory at current)  Full code.          Disposition Plan   Expected discharge in 1-2 days to TCU vs home, pending improvement in hypoxia, low bp     Entered: Neftali Mcknight 11/11/2017, 2:15 PM     Plan discussed with patient, bedside RN, PT/OT    Neftali Mcknight MD   Hospitalist (Internal Medicine)  Pager: [phone].

## 2017-11-12 ENCOUNTER — APPOINTMENT (OUTPATIENT)
Dept: OCCUPATIONAL THERAPY | Facility: CLINIC | Age: 62
DRG: 896 | End: 2017-11-12
Payer: COMMERCIAL

## 2017-11-12 ENCOUNTER — APPOINTMENT (OUTPATIENT)
Dept: PHYSICAL THERAPY | Facility: CLINIC | Age: 62
DRG: 896 | End: 2017-11-12
Payer: COMMERCIAL

## 2017-11-12 LAB
ALBUMIN UR-MCNC: NEGATIVE MG/DL
APPEARANCE UR: CLEAR
BILIRUB UR QL STRIP: NEGATIVE
COLOR UR AUTO: YELLOW
GLUCOSE UR STRIP-MCNC: NEGATIVE MG/DL
HGB BLD-MCNC: 15 G/DL (ref 13.3–17.7)
HGB UR QL STRIP: NEGATIVE
KETONES UR STRIP-MCNC: NEGATIVE MG/DL
LEUKOCYTE ESTERASE UR QL STRIP: NEGATIVE
MUCOUS THREADS #/AREA URNS LPF: PRESENT /LPF
NITRATE UR QL: NEGATIVE
PH UR STRIP: 5.5 PH (ref 5–7)
RBC #/AREA URNS AUTO: <1 /HPF (ref 0–2)
SOURCE: ABNORMAL
SP GR UR STRIP: 1.01 (ref 1–1.03)
UROBILINOGEN UR STRIP-MCNC: NORMAL MG/DL (ref 0–2)
WBC #/AREA URNS AUTO: 1 /HPF (ref 0–2)

## 2017-11-12 PROCEDURE — 25000132 ZZH RX MED GY IP 250 OP 250 PS 637: Performed by: INTERNAL MEDICINE

## 2017-11-12 PROCEDURE — 97110 THERAPEUTIC EXERCISES: CPT | Mod: GP | Performed by: PHYSICAL THERAPIST

## 2017-11-12 PROCEDURE — 25000128 H RX IP 250 OP 636: Performed by: INTERNAL MEDICINE

## 2017-11-12 PROCEDURE — 36415 COLL VENOUS BLD VENIPUNCTURE: CPT | Performed by: HOSPITALIST

## 2017-11-12 PROCEDURE — 97535 SELF CARE MNGMENT TRAINING: CPT | Mod: GO

## 2017-11-12 PROCEDURE — 81001 URINALYSIS AUTO W/SCOPE: CPT | Performed by: INTERNAL MEDICINE

## 2017-11-12 PROCEDURE — 25000132 ZZH RX MED GY IP 250 OP 250 PS 637: Performed by: PSYCHIATRY & NEUROLOGY

## 2017-11-12 PROCEDURE — 40000133 ZZH STATISTIC OT WARD VISIT

## 2017-11-12 PROCEDURE — 25000128 H RX IP 250 OP 636: Performed by: HOSPITALIST

## 2017-11-12 PROCEDURE — 25000132 ZZH RX MED GY IP 250 OP 250 PS 637: Performed by: STUDENT IN AN ORGANIZED HEALTH CARE EDUCATION/TRAINING PROGRAM

## 2017-11-12 PROCEDURE — 40000193 ZZH STATISTIC PT WARD VISIT: Performed by: PHYSICAL THERAPIST

## 2017-11-12 PROCEDURE — 25000125 ZZHC RX 250: Performed by: INTERNAL MEDICINE

## 2017-11-12 PROCEDURE — 40000275 ZZH STATISTIC RCP TIME EA 10 MIN

## 2017-11-12 PROCEDURE — 12000001 ZZH R&B MED SURG/OB UMMC

## 2017-11-12 PROCEDURE — 97116 GAIT TRAINING THERAPY: CPT | Mod: GP | Performed by: PHYSICAL THERAPIST

## 2017-11-12 PROCEDURE — 94640 AIRWAY INHALATION TREATMENT: CPT

## 2017-11-12 PROCEDURE — 97530 THERAPEUTIC ACTIVITIES: CPT | Mod: GO

## 2017-11-12 PROCEDURE — 94640 AIRWAY INHALATION TREATMENT: CPT | Mod: 76

## 2017-11-12 PROCEDURE — 99232 SBSQ HOSP IP/OBS MODERATE 35: CPT | Performed by: INTERNAL MEDICINE

## 2017-11-12 PROCEDURE — 85018 HEMOGLOBIN: CPT | Performed by: HOSPITALIST

## 2017-11-12 RX ORDER — GABAPENTIN 300 MG/1
300 CAPSULE ORAL 3 TIMES DAILY
Status: DISCONTINUED | OUTPATIENT
Start: 2017-11-12 | End: 2017-11-13 | Stop reason: HOSPADM

## 2017-11-12 RX ORDER — VENLAFAXINE HYDROCHLORIDE 75 MG/1
75 TABLET, EXTENDED RELEASE ORAL DAILY
Status: DISCONTINUED | OUTPATIENT
Start: 2017-11-13 | End: 2017-11-13 | Stop reason: HOSPADM

## 2017-11-12 RX ADMIN — IPRATROPIUM BROMIDE AND ALBUTEROL SULFATE 3 ML: .5; 3 SOLUTION RESPIRATORY (INHALATION) at 21:03

## 2017-11-12 RX ADMIN — BUPRENORPHINE HYDROCHLORIDE, NALOXONE HYDROCHLORIDE 1 FILM: 8; 2 FILM, SOLUBLE BUCCAL; SUBLINGUAL at 19:55

## 2017-11-12 RX ADMIN — THERA TABS 1 TABLET: TAB at 07:47

## 2017-11-12 RX ADMIN — BUPRENORPHINE HYDROCHLORIDE, NALOXONE HYDROCHLORIDE 1 FILM: 8; 2 FILM, SOLUBLE BUCCAL; SUBLINGUAL at 07:46

## 2017-11-12 RX ADMIN — IPRATROPIUM BROMIDE AND ALBUTEROL SULFATE 3 ML: .5; 3 SOLUTION RESPIRATORY (INHALATION) at 08:07

## 2017-11-12 RX ADMIN — CYANOCOBALAMIN TAB 1000 MCG 1000 MCG: 1000 TAB at 07:45

## 2017-11-12 RX ADMIN — GABAPENTIN 600 MG: 300 CAPSULE ORAL at 07:47

## 2017-11-12 RX ADMIN — FOLIC ACID 1 MG: 1 TABLET ORAL at 07:46

## 2017-11-12 RX ADMIN — VENLAFAXINE HYDROCHLORIDE 150 MG: 150 TABLET, EXTENDED RELEASE ORAL at 07:47

## 2017-11-12 RX ADMIN — CLONAZEPAM 4 MG: 1 TABLET ORAL at 19:55

## 2017-11-12 RX ADMIN — ASPIRIN 81 MG CHEWABLE TABLET 81 MG: 81 TABLET CHEWABLE at 07:45

## 2017-11-12 RX ADMIN — RAMELTEON 8 MG: 8 TABLET, FILM COATED ORAL at 21:57

## 2017-11-12 RX ADMIN — ENOXAPARIN SODIUM 40 MG: 40 INJECTION SUBCUTANEOUS at 12:59

## 2017-11-12 RX ADMIN — CLONAZEPAM 4 MG: 1 TABLET ORAL at 07:46

## 2017-11-12 RX ADMIN — SODIUM CHLORIDE 500 ML: 9 INJECTION, SOLUTION INTRAVENOUS at 05:54

## 2017-11-12 RX ADMIN — GABAPENTIN 300 MG: 300 CAPSULE ORAL at 19:55

## 2017-11-12 RX ADMIN — IPRATROPIUM BROMIDE AND ALBUTEROL SULFATE 3 ML: .5; 3 SOLUTION RESPIRATORY (INHALATION) at 13:50

## 2017-11-12 RX ADMIN — Medication 100 MG: at 07:55

## 2017-11-12 RX ADMIN — GABAPENTIN 300 MG: 300 CAPSULE ORAL at 13:28

## 2017-11-12 NOTE — PROGRESS NOTES
[facility], [place]   Hospitalist Daily Progress Note                                                 Date of Admission:2017  ___________________________________  INTERVAL HISTORY (24 Hrs)/SUBJECTIVE:   Last 24 hr events, care team notes reviewed.     Doing better  BP better this am  Asymptomatic  gen weakness  Declining going to TCU  Does not want me to change his suboxone for low bp  Claiming drinking better     No focal weakness.   No cp or sob.   No fever or chills  No NV or pain abdomen  Voiding well.         ROS: 4 point ROS neg other than the symptoms noted above in the interval history.    OBJECTIVE :   VITALS:    Temp:  [96.1  F (35.6  C)-97.4  F (36.3  C)] 96.1  F (35.6  C)  Heart Rate:  [54-66] 60  Resp:  [16-18] 18  BP: ()/(49-66) 109/66  SpO2:  [85 %-96 %] 94 %    Temp (24hrs), Av.5  F (35.8  C), Min:96.1  F (35.6  C), Max:97.4  F (36.3  C)    Wt Readings from Last 5 Encounters:   17 95.3 kg (210 lb 3.2 oz)   11 99.9 kg (220 lb 3.2 oz)   11 100.2 kg (221 lb)   11 102.2 kg (225 lb 4.8 oz)       PHYSICAL EXAM:  General: alert, interactive, NAD  HEENT: AT/NC, PERRLA, Moist MM  Respi/Chest: Non labored. Clear BL  CVS/Heart: S1S2 regular,   Gi/Abd: Soft, non tender, non distended,   MSK/Ext: Distal pulses 2+.     Neuro: AO x 4,  CNs grossly intact. No new focal deficit.       Medications:   I have reviewed this patient's current medications.      Data:   All laboratory and imaging data in the past 24 hours reviewed:    LABS:  CMP    Recent Labs  Lab 11/10/17  0602 17  0612 17  0501 17  1444    141  --  145*   POTASSIUM 4.2 4.1  --  4.2   CHLORIDE 103 104  --  107   CO2 31 31  --  31   ANIONGAP 4 6  --  7   GLC 97 101*  --  134*   BUN 22 18  --  18   CR 0.69 0.58*  --  0.71   GFRESTIMATED >90 >90  --  >90   GFRESTBLACK >90 >90  --  >90   RUSTY 8.5 8.5  --  8.5   MAG  --   --  1.9  --    PROTTOTAL  --   --   --  7.0    ALBUMIN  --   --   --  3.2*   BILITOTAL  --   --   --  0.4   ALKPHOS  --   --   --  87   AST  --   --   --  53*   ALT  --   --   --  47     CBC    Recent Labs  Lab 17  0636 17  0624 11/10/17  0602 17  0632 17  0612   WBC  --  8.8 9.3 12.5* 13.1*   RBC  --  5.01 4.88 4.91 4.66   HGB 15.0 15.3 14.9 15.0 14.1   HCT  --  46.8 44.6 45.8 43.7   MCV  --  93 91 93 94   MCH  --  30.5 30.5 30.5 30.3   MCHC  --  32.7 33.4 32.8 32.3   RDW  --  15.9* 15.9* 16.0* 16.2*   PLT  --  202 185 183 197     UA: unremarkable.     ASSESSMENT & PLAN :    62 yoM with Hx of chronic pain, anxiety, PTSD, smoking (30 pack yr)- presents for EtOH/Benzo withdrawal and hypoxemic respiratory failure.      # Hemodynamics:   11/10: Low bp resolved. 500 ml nss bolus  : again low bp this am, asymptomatic ext gen weakness,   Improved after fluid bolus NSS 1L.     2017  BP better with different cuff    Suspect poor po intake. Plus medications (suboxone most likely) plus deconditioning. Large cuff.   Afebrile. Not septic. No infectious focus.   Encourage oral fluids.       #Benzo/EtOH Abuse and Withdrawl- excessive intake recently  S/p banana bag on adm     -home prn xanax changed to Clonazepam 4 mg bid  by Psychiatry.   -MSSA scorin. Patient w hallucinations- resolved, hand tremors- better. discontinue  - mvi, thiamine, folic acid daily  - Psychiatry consultation appreciated. : D/w psychiatry, recs reviewed. Switched alprazolam to clonazepam. Not holdable. No 1:1. Fu with outpatient psychiatry.   - SW consult.   - fall, seizure precautions      #Hypoxemic Resp Failure- CT PE with only emphysematous changes. Labs show chronic elevated CO2 with HCO3 compensation, likely due to COPD from smoking. This along with diminished breath sounds on exam argues for COPD exacerbation. No markers of infection. Trop negative. No clinical features of heart failure.  S/p Prednisone 40 mg x 2 doses. DC  as patient feels  better, no wheezing or spasm appreciated.pasha given significant h/o anxiety.     - Continue duonebs tid, albuterol nebs prn.   - Outpatient PFT.   - denies ALVINO  - daily ambulatory pulse Ox    11/08: * check ambulatory pulse Ox: dropped in sats in 78% on RA, Per RN, patient remained asymptomatic.  11/9/2017: on walk test, oxygen still dropping but better mid 80s today. Remains asymptomatic.    11/11/2017: ambulatory pulse ox: 89-91. Remains asymptomatic.     Encourage IS  acapella  Pulse ox- contd.   Oxygen prn to keep sat >90%  Encourage activity   Pro bnp: 324.     COPD RT consult appreciated.       #Chronic Pain- cont home gabapentin (dose decreased to 600 tid for sleepiness), suboxone, and prn methocarbamol   #Anxiety/Insomnia- cont home ramelteon    # Fall, physical deconditioning,  L LE weakness (chronic): Pt uses a cane and AFO at baseline.    - Earlier patient declined PT consult.   - 11/10: agreeable now. PT/OT consult  - OT for cognition for safe dc planning.   11/11/2017  Therapy recommending TCU, however patient refusing for now.        Diet- regular diet. Encourage oral fluids. Ivf.   DVT PPx- Lovenox sQ (h/o dvt per patient, not very ambulatory at current)  Full code.          Disposition Plan   Expected discharge in 1-2 days to TCU vs home, pending improvement in hypoxia and low bp     Entered: Neftali Mcknight 11/12/2017, 9:22 AM     Plan discussed with patient, bedside RN,     Neftali Mcknight MD   Hospitalist (Internal Medicine)  Pager: [phone].

## 2017-11-12 NOTE — PROGRESS NOTES
11/11/17 1331   Quick Adds   Type of Visit Initial PT Evaluation       Present no   Language English   Living Environment   Lives With alone   Living Arrangements apartment   Home Accessibility no concerns   Number of Stairs to Enter Home 0   Number of Stairs Within Home 0   Stair Railings at Home none   Transportation Available other (see comments)  ([employer], friends or transportation)   Living Environment Comment   Walk-in shower, built in shower, lives in handicap building, built for Veterans    Self-Care   Dominant Hand right   Usual Activity Tolerance good   Current Activity Tolerance moderate   Equipment Currently Used at Home cane, straight  (AFO)   Activity/Exercise/Self-Care Comment Patient independent in self-cares/mobility with use of SEC   Functional Level Prior   Ambulation 1-->assistive equipment   Transferring 0-->independent   Toileting 0-->independent   Bathing 0-->independent   Dressing 0-->independent   Eating 0-->independent   Communication 0-->understands/communicates without difficulty   Swallowing 0-->swallows foods/liquids without difficulty   Cognition 0 - no cognition issues reported   Fall history within last six months yes   Number of times patient has fallen within last six months (>10 per chart)   Which of the above functional risks had a recent onset or change? fall history;ambulation;transferring   Prior Functional Level Comment Pt reported being independent with all functional mobility at baseline.   Pt reported using SEC for shorter distances and a scooter for longer distances.    General Information   Onset of Illness/Injury or Date of Surgery - Date 11/06/17   Referring Physician Juan Luis Lindsay MD   Patient/Family Goals Statement Pt would like to work on his balance and get his shoulder ROM back.    Pertinent History of Current Problem (include personal factors and/or comorbidities that impact the POC) 61 y/o male admitted to ED with Hx of chronic pain,  anxiety, PTSD, smoking (30 pack yr)- presents for EtOH/Benzo withdrawal and hypoxemic respiratory failure.  Had spine surgery 18 months ago and L shoulder surgery 10 weeks ago.   Precautions/Limitations fall precautions   Weight-Bearing Status - LUE weight-bearing as tolerated  (per pt he is WBAT on left UE)   Weight-Bearing Status - RUE full weight-bearing   Weight-Bearing Status - LLE full weight-bearing   Weight-Bearing Status - RLE full weight-bearing   Heart Disease Risk Factors Smoking;Lack of physical activity;Overweight   General Observations Pt supine in bed at start of PT evaluation.  Pt currently getting IV fluids and has AFO on,    General Info Comments Pt wanting to go outside to smoke but agreeable to PT evaluation    Cognitive Status Examination   Orientation other (see comments)  (Not tested)   Level of Consciousness alert   Follows Commands and Answers Questions 100% of the time;25% of the time   Personal Safety and Judgment impaired   Memory impaired  (Not formally tested)   Pain Assessment   Patient Currently in Pain Yes, see Vital Sign flowsheet   Integumentary/Edema   Integumentary/Edema no deficits were identifed   Posture    Posture Forward head position;Protracted shoulders   Range of Motion (ROM)   ROM Comment LE ROM within functional limits bilateally except left DF.    Strength   Strength Comments LE strength not formally tested.  Pt demonstrated functional LE strength during bed mobility, transfers, and gait.    Bed Mobility   Bed Mobility Comments Supine to/from sitting with mod I.     Transfer Skills   Transfer Comments Sit to/from standing with FWW and CGA x 1.    Gait   Gait Comments Pt ambulated 350' with FWW and CGA x 1   Balance   Balance Comments Pt demonstrated good sitting balance at EOB.  Pt demonsrated fair standing balance at EOB with FWW.  Pt gait unsteady especially when pt turning around quickly.     Sensory Examination   Sensory Perception Comments Pt has decreased  "sensation along left LE at baseline.    General Therapy Interventions   Planned Therapy Interventions bed mobility training;balance training;gait training;strengthening;transfer training   Intervention Comments Bed mobility, transfers, and gait initiated.    Clinical Impression   Criteria for Skilled Therapeutic Intervention yes, treatment indicated   PT Diagnosis Decreased activity tolerance, impaired balance, and impaired functional mobility.    Influenced by the following impairments Weakness, decreased activity tolerance, foot drop    Functional limitations due to impairments Impaired bed mobility, transfers, and gait.    Clinical Presentation Stable/Uncomplicated   Clinical Presentation Rationale Pt is a 63 y/o male admitted for ETOH/benzo withdrawal.  Pt PMH does not significantly impact PT POC.  Personal factors include: apartment with no stairs, but lives alone.    Clinical Decision Making (Complexity) Low complexity   Therapy Frequency` daily   Predicted Duration of Therapy Intervention (days/wks) 7 days   Anticipated Equipment Needs at Discharge front wheeled walker   Anticipated Discharge Disposition Home with Home Therapy;Transitional Care Facility  (Recommend TCU but pt not in agreement)   Risk & Benefits of therapy have been explained Yes   Patient, Family & other staff in agreement with plan of care Yes   [facility] [employer] TM \"6 Clicks\"   2016, Trustees of [facility], under license to [employer].  All rights reserved.   6 Clicks Short Forms Basic Mobility Inpatient Short Form   [facility] AM-PAC  \"6 Clicks\" V.2 Basic Mobility Inpatient Short Form   1. Turning from your back to your side while in a flat bed without using bedrails? 4 - None   2. Moving from lying on your back to sitting on the side of a flat bed without using bedrails? 4 - None   3. Moving to and from a bed to a chair (including a wheelchair)? 3 - A Little   4. Standing up from a chair using your arms (e.g., " wheelchair, or bedside chair)? 3 - A Little   5. To walk in hospital room? 3 - A Little   6. Climbing 3-5 steps with a railing? 2 - A Lot   Basic Mobility Raw Score (Score out of 24.Lower scores equate to lower levels of function) 19   Total Evaluation Time   Total Evaluation Time (Minutes) 10

## 2017-11-12 NOTE — PLAN OF CARE
Problem: Patient Care Overview  Goal: Plan of Care/Patient Progress Review  Attempted PT session this AM, but pt was not in his room, will check back on pt as schedule allows.

## 2017-11-12 NOTE — PROGRESS NOTES
BP is soft. He is asymptomatic. Plan to get IV line and give him one 500 ml fluid (NS) bolus. Encourage to drink. Check Hb. Does not look like bleeding. No sings of infection. No chest pain. Either poor oral intake. He was on Suboxone PTA, but BP was good. Monitor I&O and Daily Weight. Do IS. DD Autonomic dysfunction.

## 2017-11-12 NOTE — PROGRESS NOTES
IV line removed cause it came off. Patient unable to answer why it came since he was sleeping. Sent urine sample to lab. BP: is low. Moonlighter aware with new orders.New IV line in placed. IV bolus infusing.

## 2017-11-12 NOTE — PLAN OF CARE
Problem: Patient Care Overview  Goal: Plan of Care/Patient Progress Review  Discharge Planner PT   Patient plan for discharge: Home  Current status: Pt demonstrated sit to/from standing with SBA x 1 and SEC. Pt ambulated 350' with SEC and CGA to SBA x 1, pt had a couple LOB when ambulating, but did not need assist from PT.  Pt balance improved towards the end of ambulation.  Pt tolerated standing exercises well, no LOB observed.   Barriers to return to prior living situation: Impaired balance  Recommendations for discharge: Pt declining TCU, would benefit from home PT   Rationale for recommendations: Pt would benefit from home PT for a home safety evaluation and balance training.        Entered by: Randa Pathak 11/12/2017 5:05 PM

## 2017-11-12 NOTE — PLAN OF CARE
Problem: Alcohol Withdrawal Acute, Risk/Actual (Adult)  Goal: Signs and Symptoms of Listed Potential Problems Will be Absent, Minimized or Managed (Alcohol Withdrawal Acute, Risk/Actual)  Signs and symptoms of listed potential problems will be absent, minimized or managed by discharge/transition of care (reference Alcohol Withdrawal Acute, Risk/Actual (Adult) CPG).   Outcome: Improving            VS:    Stable. BP has been soft. Pt de-sating to 82-87% on room air when fall a sleep. On 2 lpm and stats maintained at 95-98%.   Output:    Voiding adequate amount to the bathroom. Pt forgot to save urine sample. Will collect when voids next time.   Activity:    Up SBA to the bathroom. Pt still unsteady on his feet but was getting up with out using call light for help. Educated the risk of fall and pt agreed.   Skin: Intact.   Pain:    Denies pain.   CMS:    Has baseline numbness in left leg and foot per pt report   Dressing:    None    Diet:    On regular diet and tolerating well.   LDA:    PIV SL into right hand.   Equipment:    FWW.   Plan:    Anticipated d/c TBD. Will continue to monitor patient per order of care.   Additional Info:

## 2017-11-12 NOTE — PLAN OF CARE
Problem: Patient Care Overview  Goal: Individualization & Mutuality            VS:    BP was low during shift. Wean O2 to 1.5lpm during shift.De sat O2 below 85%. Increase O2 to 2lpm and O2 sat reads above 93%.   Output:    Patient urinate sponatenously without difficulty at urinal with output of 400ml.   Activity:     Up with standby assist   Skin: Skin is dry and intact.   Pain:    Denies pain when asked.    CMS:    Patient complained of slight numbness on left UE and LE.    Dressing:    n/a   Diet:    On regular diet   LDA:    New PIV santhosh eat Left top hand.   Equipment:    IV pole   Plan:     Continue plan of care.   Additional Info:    BP was running low. Moonlighter ordered IV bulos. Monitor I and O and. Weigh daily.

## 2017-11-12 NOTE — PLAN OF CARE
Problem: Alcohol Withdrawal Acute, Risk/Actual (Adult)  Goal: Signs and Symptoms of Listed Potential Problems Will be Absent, Minimized or Managed (Alcohol Withdrawal Acute, Risk/Actual)  Signs and symptoms of listed potential problems will be absent, minimized or managed by discharge/transition of care (reference Alcohol Withdrawal Acute, Risk/Actual (Adult) CPG).   Outcome: Improving            VS:    Soft, bolus given overnight stabilized. Orthostatic BP taken. Weaned off O2   Output    Voids without issue in bathroom, adequate amounts. Last bowel movement 11/11/17 per pt   Activity:    Largely independent - leaves unit, uses chair over walker. Working with therapies   Skin: Intact    Pain:    Denies pain   CMS:    Endorses numbness in shoulder. Some numbness and absent sensation in LLE   Dressing:    No dressing cares   Diet:    Regular, appetite good tolerates well   LDA:    Peripheral IV - saline locked   Equipment:    Walker, wheel chair, Neb treatments   Plan:    Continue plan of care. Monitor and assess   Additional Info:    Leaves unit frequently to smoke, this activity is discouraged by RN. Encourage walking halls with staff for strength. Compression stocking. Please use appropriate sized BP cuff per MD

## 2017-11-12 NOTE — PLAN OF CARE
Problem: Patient Care Overview  Goal: Plan of Care/Patient Progress Review  Discharge Planner OT   Patient plan for discharge: home with home care  Current status: Patient mod I for LE dressing/AFO. Patient ambulated in room and in hallway using FWW with CGA-SBA. Patient stood at sink to complete oral cares, closes eyes at times and had slight LOB, educated patient on keeping eyes open and using UE for support.    Barriers to return to prior living situation: lives alone, below baseline   Recommendations for discharge: patient would benefit from short term rehab,, however he is refusing rehab, plans to go home alone with home health care.   Rationale for recommendations: Would benefit from TCU to maximize safety and independence with functional mobility and ADLs, however patient is refusing, so recommend home OT/PT evals for safety.        Entered by: NORI RAMIREZ HELD 11/12/2017 8:41 AM

## 2017-11-13 ENCOUNTER — APPOINTMENT (OUTPATIENT)
Dept: PHYSICAL THERAPY | Facility: CLINIC | Age: 62
DRG: 896 | End: 2017-11-13
Payer: COMMERCIAL

## 2017-11-13 VITALS
TEMPERATURE: 96.6 F | DIASTOLIC BLOOD PRESSURE: 72 MMHG | RESPIRATION RATE: 16 BRPM | WEIGHT: 198 LBS | BODY MASS INDEX: 27.62 KG/M2 | OXYGEN SATURATION: 92 % | HEART RATE: 64 BPM | SYSTOLIC BLOOD PRESSURE: 102 MMHG

## 2017-11-13 PROCEDURE — 99239 HOSP IP/OBS DSCHRG MGMT >30: CPT | Performed by: INTERNAL MEDICINE

## 2017-11-13 PROCEDURE — 40000193 ZZH STATISTIC PT WARD VISIT

## 2017-11-13 PROCEDURE — 94640 AIRWAY INHALATION TREATMENT: CPT

## 2017-11-13 PROCEDURE — 25000125 ZZHC RX 250: Performed by: INTERNAL MEDICINE

## 2017-11-13 PROCEDURE — 40000275 ZZH STATISTIC RCP TIME EA 10 MIN

## 2017-11-13 PROCEDURE — 25000132 ZZH RX MED GY IP 250 OP 250 PS 637: Performed by: INTERNAL MEDICINE

## 2017-11-13 PROCEDURE — 97116 GAIT TRAINING THERAPY: CPT | Mod: GP

## 2017-11-13 PROCEDURE — 25000132 ZZH RX MED GY IP 250 OP 250 PS 637: Performed by: STUDENT IN AN ORGANIZED HEALTH CARE EDUCATION/TRAINING PROGRAM

## 2017-11-13 PROCEDURE — 97110 THERAPEUTIC EXERCISES: CPT | Mod: GP

## 2017-11-13 PROCEDURE — 25000132 ZZH RX MED GY IP 250 OP 250 PS 637: Performed by: PSYCHIATRY & NEUROLOGY

## 2017-11-13 RX ORDER — GABAPENTIN 300 MG/1
300 CAPSULE ORAL 3 TIMES DAILY
Qty: 90 CAPSULE | Status: ON HOLD | COMMUNITY
Start: 2017-11-13 | End: 2017-12-14

## 2017-11-13 RX ORDER — CLONAZEPAM 2 MG/1
4 TABLET ORAL 2 TIMES DAILY
Qty: 60 TABLET | Refills: 0 | Status: ON HOLD | OUTPATIENT
Start: 2017-11-13 | End: 2017-12-14 | Stop reason: DRUGHIGH

## 2017-11-13 RX ORDER — VENLAFAXINE HYDROCHLORIDE 75 MG/1
75 TABLET, EXTENDED RELEASE ORAL DAILY
Qty: 30 EACH | Status: ON HOLD | COMMUNITY
Start: 2017-11-13 | End: 2017-12-22

## 2017-11-13 RX ADMIN — CYANOCOBALAMIN TAB 1000 MCG 1000 MCG: 1000 TAB at 07:49

## 2017-11-13 RX ADMIN — VENLAFAXINE HYDROCHLORIDE 75 MG: 75 TABLET, EXTENDED RELEASE ORAL at 07:49

## 2017-11-13 RX ADMIN — IPRATROPIUM BROMIDE AND ALBUTEROL SULFATE 3 ML: .5; 3 SOLUTION RESPIRATORY (INHALATION) at 08:47

## 2017-11-13 RX ADMIN — THERA TABS 1 TABLET: TAB at 07:50

## 2017-11-13 RX ADMIN — Medication 100 MG: at 07:50

## 2017-11-13 RX ADMIN — ASPIRIN 81 MG CHEWABLE TABLET 81 MG: 81 TABLET CHEWABLE at 07:49

## 2017-11-13 RX ADMIN — CLONAZEPAM 4 MG: 1 TABLET ORAL at 07:54

## 2017-11-13 RX ADMIN — FOLIC ACID 1 MG: 1 TABLET ORAL at 07:50

## 2017-11-13 RX ADMIN — BUPRENORPHINE HYDROCHLORIDE, NALOXONE HYDROCHLORIDE 1 FILM: 8; 2 FILM, SOLUBLE BUCCAL; SUBLINGUAL at 07:55

## 2017-11-13 NOTE — PROGRESS NOTES
Focus: patients discharge to home  DB: patient to be discharged to home.   I: Patients 02 saturations were 92 % after activity. Patient use I S and tolerating activity well. Patient was to discharge to home per  Orders. Gave patient written and oral instructions about diet, activity, follow up cares, and med's, and numbers to call for questions or concerns.   E: Patient seemed to have a full understanding of plan of cares for discharge. Patient received hard copy for medications to be filled on way home at patient own pharmacy. Patient also received all own personal belongings prior to discharge. patient was escorted to front door in wheelchair to get ride home with friend to his own home.

## 2017-11-13 NOTE — DISCHARGE SUMMARY
Discharge Summary    Jorge Rosales MRN# [id number]   YOB: 1955 Age: 62 year old     Date of Admission:  2017  Date of Discharge:  2017 12:22 PM  Admitting Physician:  Neftali Mcknight MD  Discharge Physician:  Neftali Mcknight MD   Discharging Service:  Internal Medicine     Primary Provider: [facility], 's           Discharge Diagnosis:      Acute respiratory failure with hypoxia (H)  Xanax use disorder, severe, dependence   Alcohol abuse, withdrawal.   Cigarette smoker  Anxiety disorder, unspecified type  Physical deconditioning.   Fluid responsive hypotensive.                Procedures:   No procedures performed during this admission             Consultations:   Consultation during this admission received from Psychiatry. PT/OT               Brief History of Illness:   For details please refer to H and P dated 2017  Briefly,     62 yoM with Hx of chronic pain, anxiety, PTSD, smoking (30 pack yr)- presents for EtOH/Benzo withdrawal and hypoxemic respiratory failure.             Hospital Course:   Issues:     # Hemodynamics:   11/10: Low bp resolved. 500 ml nss bolus  : again low bp, asymptomatic ext gen weakness,   Improved after fluid bolus NSS 1L.      2017  BP better with different cuff  Increased oral hydration.      Suspect poor po intake. Plus medications (suboxone most likely) plus deconditioning. Large cuff.   Afebrile. Not septic. No infectious focus.   Encourage oral fluids.  Stable at the time of discharge.      #Benzo/EtOH Abuse and Withdrawl- excessive intake recently  S/p banana bag on adm      -Home prn xanax changed to Clonazepam 4 mg bid  by Psychiatry.   -MSSA scorin. Patient w hallucinations- resolved, hand tremors- better. discontinue  - mvi, thiamine, folic acid daily  - Psychiatry consultation appreciated. : D/w psychiatry, recs reviewed. Switched alprazolam to clonazepam. Not holdable. No 1:1. Fu with outpatient psychiatry.    - SW consult.   - fall, seizure precautions      Declined help for alcohol dependency treatment.     #Hypoxemic Resp Failure- CT PE with only emphysematous changes. Labs show chronic elevated CO2 with HCO3 compensation, likely due to COPD from smoking. This along with diminished breath sounds on exam argues for COPD exacerbation. No markers of infection. Trop negative. No clinical features of heart failure.  S/p Prednisone 40 mg x 2 doses. DC 11/07 as patient feels better, no wheezing or spasm appreciated.pasha given significant h/o anxiety.      - Continue duonebs tid, albuterol nebs prn.   - Outpatient PFT.   - denies ALVINO  - daily ambulatory pulse Ox     11/08/17 :  Check ambulatory pulse Ox: dropped in sats in 78% on RA, Per RN, patient remained asymptomatic.  11/9/2017: on walk test, oxygen still dropping but better mid 80s today. Remains asymptomatic.   11/11/2017: ambulatory pulse ox: 89-91. Remains asymptomatic.   On the day of discharge, ambulating and sating fine on RA.      Encourage IS  acapella  Encourage activity  Pro bnp: 324.      COPD RT consult appreciated.         #Chronic Pain- cont home gabapentin (dose decreased to 600 tid for sleepiness), suboxone, and prn methocarbamol   #Anxiety/Insomnia- cont home ramelteon  # Fall, physical deconditioning,  L LE weakness (chronic): Pt uses a cane and AFO at baseline.    - Earlier patient declined PT consult.   - 11/10: agreeable now. PT/OT consult  - OT for cognition for safe dc planning.   11/11/2017  Therapy recommending TCU, however patient refusing for now.         Diet- regular diet. Encourage oral fluids. Ivf.   DVT PPx- Lovenox sQ (h/o dvt per patient, not very ambulatory at current)  Full code.            Discharge Day Exam:    Interval/Subjective:   Doing well  No new concern.   Feels stronger  Tolerating oral fine  Ambulating fine.       Blood pressure 102/72, pulse 64, temperature 96.6  F (35.9  C), temperature source Oral, resp. rate 16, weight  89.8 kg (198 lb), SpO2 92 %.    Wt Readings from Last 5 Encounters:   17 89.8 kg (198 lb)   11 99.9 kg (220 lb 3.2 oz)   11 100.2 kg (221 lb)   11 102.2 kg (225 lb 4.8 oz)       Temp (24hrs), Av.1  F (35.6  C), Min:95.9  F (35.5  C), Max:96.6  F (35.9  C)    General: Alert and interactive, NAD  HEENT: AT/NC, Moist MM  Neck: Supple, no JVD or Lymphadenopathy  Chest: Clear BL, non labored.   CVS: S1S2 regular, no m/r/g,   Abd: Soft, non tender, non distended,   MSK: Distal pulses 2+.     Neuro: AO x 4,   Skin: no new rash on exposed areas.               Significant Results Obtained During Hospitalization:   All relevant data  Reviewed.    Lab Results   Component Value Date    WBC 8.8 2017    HGB 15.0 2017    HCT 46.8 2017     2017     11/10/2017    POTASSIUM 4.2 11/10/2017    CHLORIDE 103 11/10/2017    CO2 31 11/10/2017    BUN 22 11/10/2017    CR 0.69 11/10/2017    GLC 97 11/10/2017    DD 2.0 (H) 2017    NTBNPI 324 2017    TROPI <0.015 2017    AST 53 (H) 2017    ALT 47 2017    ALKPHOS 87 2017    BILITOTAL 0.4 2017      No results found for this or any previous visit (from the past 48 hour(s)).                  Pending Results:     Unresulted Labs Ordered in the Past 30 Days of this Admission     No orders found from 2017 to 2017.               Condition on Discharge:   Discharge condition: Stable   Discharge vitals: Blood pressure 102/72, pulse 64, temperature 96.6  F (35.9  C), temperature source Oral, resp. rate 16, weight 89.8 kg (198 lb), SpO2 92 %.     Code status on discharge: Full Code            Discharge Medications:     Current Discharge Medication List      START taking these medications    Details   clonazePAM (KLONOPIN) 2 MG tablet Take 2 tablets (4 mg) by mouth 2 times daily  Qty: 60 tablet, Refills: 0    Associated Diagnoses: Anxiety disorder, unspecified type      venlafaxine (EFFEXOR-ER)  75 MG TB24 24 hr tablet Take 1 tablet (75 mg) by mouth daily  Qty: 30 each         CONTINUE these medications which have CHANGED    Details   gabapentin (NEURONTIN) 300 MG capsule Take 1 capsule (300 mg) by mouth 3 times daily  Qty: 90 capsule         CONTINUE these medications which have NOT CHANGED    Details   methocarbamol (ROBAXIN) 750 MG tablet Take 750 mg by mouth nightly as needed for muscle spasms       ramelteon (ROZEREM) 8 MG tablet Take 8 mg by mouth At Bedtime      bismuth subsalicylate (PEPTO BISMOL) 262 MG chewable tablet Take 524 mg by mouth 2 times daily as needed for diarrhea      buprenorphine HCl-naloxone HCl (SUBOXONE) 8-2 MG per film Place 1 Film under the tongue 2 times daily      lidocaine (LMX4) 4 % CREA cream Apply 1 Application topically 4 times daily as needed for mild pain (Apply liberal amount to shoulder)      cyanocolbalamin (VITAMIN B-12) 1000 MCG tablet Take 1 tablet by mouth daily.  Qty: 100 tablet, Refills: 12      Multiple Vitamin (MULTIVITAMIN) per tablet Take 1 tablet by mouth daily.  Qty: 100 tablet, Refills: 12      aspirin 81 MG tablet Take 1 tablet by mouth daily.  Refills: 3      fish oil-omega-3 fatty acids 1000 MG capsule Take 2 capsules by mouth daily.  Qty: 180 capsule, Refills: 12         STOP taking these medications       ALPRAZolam (XANAX) 2 MG tablet Comments:   Reason for Stopping:         venlafaxine (EFFEXOR-XR) 150 MG 24 hr capsule Comments:   Reason for Stopping:                      Discharge Disposition:   Discharged to home             Discharge Instructions:     Discharge Procedure Orders  Home care nursing referral   Referral Type: Home Health Therapies & Aides     Reason for your hospital stay   Order Comments: Alcohol abuse, withdrawal - resolved.   Hypoxic respiratory failure from COPD exacerbation- resolved.   Hypotension likely deconditioning, dehydration - resolved.     Adult [facility] Follow-up and recommended labs and tests   Order Comments:  Follow up with primary care provider, [place], within 7 days for hospital follow- up.      Follow up with your Orthopedic provider as scheduled or instructed.     Follow up with your outpatient psychiatrist as scheduled or instructed.     Follow up with Pulmonologist in 2-3 weeks for COPD. Outpatient PFT.     [facility].    Outpatient Sleep study.     Appointments on [place] and/or [facility] (with [facility] or [facility] provider or service). Call [phone] if you haven't heard regarding these appointments within 7 days of discharge.     Activity   Order Comments: Your activity upon discharge: activity as tolerated   Order Specific Question Answer Comments   Is discharge order? Yes      When to contact your care team   Order Comments: Call your primary doctor if you have any of the following:  increased shortness of breath, fever temp >101, increased cough, Light headedness or fall etc.     Diet   Order Comments: Follow this diet upon discharge:      Advance Diet as Tolerated: Regular Diet Adult   Order Specific Question Answer Comments   Is discharge order? Yes             Thank you for the opportunity to participate in the care of your patient.  Please do not hesitate to contact our service with questions or concerns.    Total time spent was greater than 30 minutes; Greater than 50% of the time was in counseling and care coordination. Care coordination included discussion of medication changes, lifestyle changes and reviewing instructions to the patient .     D/W RNROBBIN MD   Hospitalist (Internal Medicine)  Pager: [phone].     DOS : 11/13/2017

## 2017-11-13 NOTE — DISCHARGE INSTRUCTIONS
Use Aerobika Oscillating PEP Device at least twice daily for 5-10 min/QID.   Use Incentive Spirometry.  Continue Home PT  Monitor blood pressure, weights 2-3 times weekly and as needed.       LABS     CMP  Recent Labs  Lab 11/10/17  0602 11/08/17  0612 11/07/17  0501 11/06/17  1444    141  --  145*   POTASSIUM 4.2 4.1  --  4.2   CHLORIDE 103 104  --  107   CO2 31 31  --  31   ANIONGAP 4 6  --  7   GLC 97 101*  --  134*   BUN 22 18  --  18   CR 0.69 0.58*  --  0.71   GFRESTIMATED >90 >90  --  >90   GFRESTBLACK >90 >90  --  >90   RUSTY 8.5 8.5  --  8.5   MAG  --   --  1.9  --    PROTTOTAL  --   --   --  7.0   ALBUMIN  --   --   --  3.2*   BILITOTAL  --   --   --  0.4   ALKPHOS  --   --   --  87   AST  --   --   --  53*   ALT  --   --   --  47     CBC  Recent Labs  Lab 11/12/17  0636 11/11/17  0624 11/10/17  0602 11/09/17  0632 11/08/17  0612   WBC  --  8.8 9.3 12.5* 13.1*   RBC  --  5.01 4.88 4.91 4.66   HGB 15.0 15.3 14.9 15.0 14.1   HCT  --  46.8 44.6 45.8 43.7   MCV  --  93 91 93 94   MCH  --  30.5 30.5 30.5 30.3   MCHC  --  32.7 33.4 32.8 32.3   RDW  --  15.9* 15.9* 16.0* 16.2*   PLT  --  202 185 183 197     Results for orders placed or performed during the hospital encounter of 11/06/17   XR Chest 2 Views    Narrative    XR CHEST 2 VW  11/6/2017 3:36 PM    HISTORY:  cough, hypoxic    COMPARISON:  None.      Impression    IMPRESSION:  Rotated frontal view. Mild opacities at the left lung  base could represent scarring, atelectasis or mild infiltrate.  Otherwise negative.     VAIBHAV GOLD MD   Shoulder XR, G/E 3 views, left    Narrative    LEFT SHOULDER THREE OR MORE VIEWS  11/6/2017 3:38 PM    HISTORY:  Left shoulder pain status post fall.    COMPARISON:  None.      Impression    IMPRESSION:  No acute process identified. Degenerative changes of the  humeral head and acromioclavicular joint. Osteopenia.     VAIBHAV GOLD MD   XR Pelvis w Hip Left 1 View    Narrative    PELVIS AND HIP LEFT ONE VIEW  11/6/2017  3:37 PM    HISTORY:  Left hip pain status post fall.    COMPARISON:  None.      Impression    IMPRESSION:  Negative.     VAIBHAV GOLD MD   CT Chest Pulmonary Embolism w Contrast    Narrative    CT CHEST PULMONARY EMBOLISM WITH CONTRAST  11/6/2017  8:35 PM     HISTORY: Hypoxia, elevated D-dimer, previous deep vein thrombosis.      TECHNIQUE: Thin section axial images are performed from the thoracic  inlet to the lung bases utilizing 50 mL of Isovue 370 IV contrast  without adverse event. Coronal reformatted images are also generated.  Radiation dose for this scan was reduced using automated exposure  control, adjustment of the mA and/or kV according to patient size, or  iterative reconstruction technique.    FINDINGS:     Chest: Emphysema is present. A few peripheral mucoid impactions are  noted in the posterior right costophrenic angle with surrounding  inflammation. No infiltrate or consolidation. Mild right lower lobe  bronchiectatic changes. Mild volume loss right hemithorax is also  noted. No pleural or pericardial fluid. Heart is normal in size.  Esophagus is unremarkable. Probable reactive mediastinal lymph nodes.  The largest measures 1.4 x 1.0 cm in the left paratracheal region on  series 4, image 53. Small right hilar lymph nodes are also noted.  These are not enlarged by CT criteria. No evidence of pulmonary  embolism. Thoracic aorta is unremarkable with scattered calcified  atherosclerotic plaque. Limited images upper abdomen demonstrate  probable fatty liver infiltration. Bone window examination  demonstrates degenerative spine changes.      Impression    IMPRESSION:  1. No evidence of pulmonary embolism. Thoracic aorta is unremarkable  with only scattered calcified atherosclerotic plaque.   2. Emphysema with mild bronchiectatic changes posterior right lower  lobe and a few peripheral mucoid impactions in the posterior right  costophrenic angle. No evidence of pneumonia. No pleural fluid.  Reactive  mediastinal and right hilar lymph nodes.  3. Fatty infiltration of the liver.    AYESHA FLORENTINO MD   XR Chest Port 1 View    Narrative    XR CHEST PORT 1 VW 11/10/2017 2:14 AM    COMPARISON: 11/6/2017    HISTORY: Respiratory distress.      Impression    IMPRESSION: Cardiac silhouette and pulmonary vasculature are within  normal limits. No focal airspace disease, pleural effusion or  pneumothorax.    ARABELLA MADSEN MD

## 2017-11-13 NOTE — PLAN OF CARE
Problem: Patient Care Overview  Goal: Plan of Care/Patient Progress Review  Discharge Planner PT   Patient plan for discharge: Home  Current status: Overall doing well. Independent with bed mobility and Juliana for transfers and ambulation with use of SEC. Reviewed standing HEP, good understanding noted. Did not endorse any further PT questions at this time.  Barriers to return to prior living situation: No PT barriers identified  Recommendations for discharge: Home with home PT  Rationale for recommendations: For safety evaluation and progression.     Physical Therapy Discharge Summary     Reason for therapy discharge:    All goals and outcomes met, no further needs identified.     Progress towards therapy goal(s). See goals on Care Plan in [facility] electronic health record for goal details.  Goals met     Therapy recommendation(s):    Continued therapy is recommended.  Rationale/Recommendations:  home PT for safety evaluation and progression.             Entered by: Dede Carpio 11/13/2017 11:54 AM

## 2017-11-13 NOTE — PLAN OF CARE
Problem: Patient Care Overview  Goal: Plan of Care/Patient Progress Review  Outcome: Improving  A/O x 4. VSS, afebrile. BPs remaining stable. Denies CP or SOB. LS diminished, sched neb administered by RT. Up with walker with SBA. Out to smoke outside independently in w/c. Good appetite- ate late dinner. Has slept comfortably through night. Calls appropriately and makes needs known.

## 2017-11-13 NOTE — PROGRESS NOTES
Care Coordinator- Discharge Planning     Admission Date/Time:  11/6/2017  Attending MD:  Neftali Mcknight MD     Data  Date of initial CC assessment:  11/8/2017  Chart reviewed, discussed with interdisciplinary team.   Patient was admitted for:   1. Acute respiratory failure with hypoxia (H)    2. Xanax use disorder, severe, dependence (H)    3. Alcohol abuse    4. Cigarette smoker    5. Sedative dependence (H)    6. Alcohol abuse, continuous         Assessment  Full assessment completed in previous note    Coordination of Care and Referrals: Per chart review patient's home care has been resumed. Patient to dc home today with resumption of home care with [employer]. Phone [phone], fax [phone]. Intake RN, Rose, phone [phone]. AVS faxed. Transportation home arranged through [facility], phone [phone]. No further discharge concerns at this time. CC to follow as needed.    Plan  Anticipated Discharge Date:  11/13/2017  Anticipated Discharge Plan:  Home with Home Care    CTS Handoff completed:  YES    Josi Matute RN, BSN  Care Coordinator, 8A  Phone [phone]  Pager [phone]

## 2017-11-13 NOTE — PLAN OF CARE
Problem: Patient Care Overview  Goal: Plan of Care/Patient Progress Review  Occupational Therapy Discharge Summary     Reason for therapy discharge:    Discharged to home with home therapy.     Progress towards therapy goal(s). See goals on Care Plan in [facility] electronic health record for goal details.  Goals partially met.  Barriers to achieving goals:   discharge from facility.     Therapy recommendation(s):    Continued therapy is recommended.  Rationale/Recommendations:  home OT/PT to increase pt's (I) and safety with ADL/IADLs.

## 2017-12-03 ENCOUNTER — APPOINTMENT (OUTPATIENT)
Dept: GENERAL RADIOLOGY | Facility: CLINIC | Age: 62
DRG: 177 | End: 2017-12-03
Attending: EMERGENCY MEDICINE
Payer: COMMERCIAL

## 2017-12-03 ENCOUNTER — HOSPITAL ENCOUNTER (INPATIENT)
Facility: CLINIC | Age: 62
LOS: 2 days | Discharge: HOME HEALTH | DRG: 177 | End: 2017-12-05
Attending: EMERGENCY MEDICINE | Admitting: INTERNAL MEDICINE
Payer: COMMERCIAL

## 2017-12-03 DIAGNOSIS — F10.920 ALCOHOLIC INTOXICATION WITHOUT COMPLICATION (H): ICD-10-CM

## 2017-12-03 DIAGNOSIS — J69.0 ASPIRATION PNEUMONITIS (H): ICD-10-CM

## 2017-12-03 DIAGNOSIS — R09.02 HYPOXEMIA: ICD-10-CM

## 2017-12-03 DIAGNOSIS — F10.120 ALCOHOL ABUSE WITH UNCOMPLICATED INTOXICATION (H): ICD-10-CM

## 2017-12-03 DIAGNOSIS — J96.01 ACUTE HYPOXEMIC RESPIRATORY FAILURE (H): ICD-10-CM

## 2017-12-03 DIAGNOSIS — R41.82 ALTERED MENTAL STATUS, UNSPECIFIED ALTERED MENTAL STATUS TYPE: ICD-10-CM

## 2017-12-03 LAB
ALBUMIN SERPL-MCNC: 3.2 G/DL (ref 3.4–5)
ALCOHOL BREATH TEST: ABNORMAL (ref 0–0.01)
ALP SERPL-CCNC: 95 U/L (ref 40–150)
ALT SERPL W P-5'-P-CCNC: 45 U/L (ref 0–70)
ANION GAP SERPL CALCULATED.3IONS-SCNC: 18 MMOL/L (ref 3–14)
AST SERPL W P-5'-P-CCNC: 75 U/L (ref 0–45)
BASOPHILS # BLD AUTO: 0 10E9/L (ref 0–0.2)
BASOPHILS NFR BLD AUTO: 0.2 %
BILIRUB SERPL-MCNC: 0.7 MG/DL (ref 0.2–1.3)
BUN SERPL-MCNC: 29 MG/DL (ref 7–30)
CALCIUM SERPL-MCNC: 7.8 MG/DL (ref 8.5–10.1)
CHLORIDE SERPL-SCNC: 96 MMOL/L (ref 94–109)
CO2 BLDCOV-SCNC: 26 MMOL/L (ref 21–28)
CO2 SERPL-SCNC: 24 MMOL/L (ref 20–32)
CREAT SERPL-MCNC: 0.65 MG/DL (ref 0.66–1.25)
DIFFERENTIAL METHOD BLD: ABNORMAL
EOSINOPHIL # BLD AUTO: 0 10E9/L (ref 0–0.7)
EOSINOPHIL NFR BLD AUTO: 0 %
ERYTHROCYTE [DISTWIDTH] IN BLOOD BY AUTOMATED COUNT: 16 % (ref 10–15)
ETHANOL SERPL-MCNC: 0.41 G/DL
GFR SERPL CREATININE-BSD FRML MDRD: >90 ML/MIN/1.7M2
GLUCOSE BLDC GLUCOMTR-MCNC: 126 MG/DL (ref 70–99)
GLUCOSE SERPL-MCNC: 118 MG/DL (ref 70–99)
HCT VFR BLD AUTO: 50.4 % (ref 40–53)
HGB BLD-MCNC: 16.9 G/DL (ref 13.3–17.7)
IMM GRANULOCYTES # BLD: 0 10E9/L (ref 0–0.4)
IMM GRANULOCYTES NFR BLD: 0.2 %
INTERPRETATION ECG - MUSE: NORMAL
LACTATE BLD-SCNC: 2.9 MMOL/L (ref 0.7–2.1)
LYMPHOCYTES # BLD AUTO: 1.5 10E9/L (ref 0.8–5.3)
LYMPHOCYTES NFR BLD AUTO: 9.9 %
MCH RBC QN AUTO: 31.2 PG (ref 26.5–33)
MCHC RBC AUTO-ENTMCNC: 33.5 G/DL (ref 31.5–36.5)
MCV RBC AUTO: 93 FL (ref 78–100)
MONOCYTES # BLD AUTO: 1 10E9/L (ref 0–1.3)
MONOCYTES NFR BLD AUTO: 6.3 %
NEUTROPHILS # BLD AUTO: 12.8 10E9/L (ref 1.6–8.3)
NEUTROPHILS NFR BLD AUTO: 83.4 %
NRBC # BLD AUTO: 0 10*3/UL
NRBC BLD AUTO-RTO: 0 /100
PCO2 BLDV: 50 MM HG (ref 40–50)
PH BLDV: 7.33 PH (ref 7.32–7.43)
PLATELET # BLD AUTO: 106 10E9/L (ref 150–450)
PO2 BLDV: 58 MM HG (ref 25–47)
POTASSIUM SERPL-SCNC: 3.4 MMOL/L (ref 3.4–5.3)
PROCALCITONIN SERPL-MCNC: 0.11 NG/ML
PROT SERPL-MCNC: 7.5 G/DL (ref 6.8–8.8)
RBC # BLD AUTO: 5.41 10E12/L (ref 4.4–5.9)
SAO2 % BLDV FROM PO2: 88 %
SODIUM SERPL-SCNC: 137 MMOL/L (ref 133–144)
WBC # BLD AUTO: 15.4 10E9/L (ref 4–11)

## 2017-12-03 PROCEDURE — 82803 BLOOD GASES ANY COMBINATION: CPT

## 2017-12-03 PROCEDURE — 00000146 ZZHCL STATISTIC GLUCOSE BY METER IP

## 2017-12-03 PROCEDURE — 36415 COLL VENOUS BLD VENIPUNCTURE: CPT | Performed by: PHYSICIAN ASSISTANT

## 2017-12-03 PROCEDURE — 83605 ASSAY OF LACTIC ACID: CPT

## 2017-12-03 PROCEDURE — 80053 COMPREHEN METABOLIC PANEL: CPT | Performed by: EMERGENCY MEDICINE

## 2017-12-03 PROCEDURE — 80320 DRUG SCREEN QUANTALCOHOLS: CPT | Performed by: EMERGENCY MEDICINE

## 2017-12-03 PROCEDURE — 99285 EMERGENCY DEPT VISIT HI MDM: CPT | Mod: 25 | Performed by: EMERGENCY MEDICINE

## 2017-12-03 PROCEDURE — 96366 THER/PROPH/DIAG IV INF ADDON: CPT | Performed by: EMERGENCY MEDICINE

## 2017-12-03 PROCEDURE — S5010 5% DEXTROSE AND 0.45% SALINE: HCPCS | Performed by: EMERGENCY MEDICINE

## 2017-12-03 PROCEDURE — 12000006 ZZH R&B IMCU INTERMEDIATE UMMC

## 2017-12-03 PROCEDURE — 99222 1ST HOSP IP/OBS MODERATE 55: CPT | Mod: AI | Performed by: INTERNAL MEDICINE

## 2017-12-03 PROCEDURE — 96367 TX/PROPH/DG ADDL SEQ IV INF: CPT | Performed by: EMERGENCY MEDICINE

## 2017-12-03 PROCEDURE — 85025 COMPLETE CBC W/AUTO DIFF WBC: CPT | Performed by: EMERGENCY MEDICINE

## 2017-12-03 PROCEDURE — 96375 TX/PRO/DX INJ NEW DRUG ADDON: CPT | Performed by: EMERGENCY MEDICINE

## 2017-12-03 PROCEDURE — 25000132 ZZH RX MED GY IP 250 OP 250 PS 637: Performed by: EMERGENCY MEDICINE

## 2017-12-03 PROCEDURE — 84145 PROCALCITONIN (PCT): CPT | Performed by: EMERGENCY MEDICINE

## 2017-12-03 PROCEDURE — 25000125 ZZHC RX 250: Performed by: EMERGENCY MEDICINE

## 2017-12-03 PROCEDURE — 25000128 H RX IP 250 OP 636: Performed by: EMERGENCY MEDICINE

## 2017-12-03 PROCEDURE — 83605 ASSAY OF LACTIC ACID: CPT | Performed by: PHYSICIAN ASSISTANT

## 2017-12-03 PROCEDURE — 93010 ELECTROCARDIOGRAM REPORT: CPT | Mod: Z6 | Performed by: EMERGENCY MEDICINE

## 2017-12-03 PROCEDURE — HZ2ZZZZ DETOXIFICATION SERVICES FOR SUBSTANCE ABUSE TREATMENT: ICD-10-PCS | Performed by: EMERGENCY MEDICINE

## 2017-12-03 PROCEDURE — 99207 ZZC APP CREDIT; MD BILLING SHARED VISIT: CPT | Mod: AI | Performed by: PHYSICIAN ASSISTANT

## 2017-12-03 PROCEDURE — 96365 THER/PROPH/DIAG IV INF INIT: CPT | Performed by: EMERGENCY MEDICINE

## 2017-12-03 PROCEDURE — 71010 XR CHEST PORT 1 VW: CPT

## 2017-12-03 PROCEDURE — 25800025 ZZH RX 258: Performed by: EMERGENCY MEDICINE

## 2017-12-03 RX ORDER — ASPIRIN 325 MG
TABLET ORAL
Status: DISCONTINUED
Start: 2017-12-03 | End: 2017-12-03 | Stop reason: WASHOUT

## 2017-12-03 RX ORDER — SODIUM CHLORIDE, SODIUM LACTATE, POTASSIUM CHLORIDE, CALCIUM CHLORIDE 600; 310; 30; 20 MG/100ML; MG/100ML; MG/100ML; MG/100ML
INJECTION, SOLUTION INTRAVENOUS CONTINUOUS
Status: ACTIVE | OUTPATIENT
Start: 2017-12-03 | End: 2017-12-04

## 2017-12-03 RX ORDER — MULTIPLE VITAMINS W/ MINERALS TAB 9MG-400MCG
1 TAB ORAL DAILY
Status: DISCONTINUED | OUTPATIENT
Start: 2017-12-04 | End: 2017-12-05 | Stop reason: HOSPADM

## 2017-12-03 RX ORDER — GABAPENTIN 300 MG/1
300 CAPSULE ORAL 3 TIMES DAILY
Status: DISCONTINUED | OUTPATIENT
Start: 2017-12-04 | End: 2017-12-05 | Stop reason: HOSPADM

## 2017-12-03 RX ORDER — VENLAFAXINE HYDROCHLORIDE 75 MG/1
75 TABLET, EXTENDED RELEASE ORAL DAILY
Status: DISCONTINUED | OUTPATIENT
Start: 2017-12-04 | End: 2017-12-04 | Stop reason: CLARIF

## 2017-12-03 RX ORDER — LORAZEPAM 1 MG/1
1-4 TABLET ORAL EVERY 30 MIN PRN
Status: DISCONTINUED | OUTPATIENT
Start: 2017-12-03 | End: 2017-12-05 | Stop reason: HOSPADM

## 2017-12-03 RX ORDER — CLONAZEPAM 2 MG/1
4 TABLET ORAL 2 TIMES DAILY
Status: DISCONTINUED | OUTPATIENT
Start: 2017-12-03 | End: 2017-12-05 | Stop reason: HOSPADM

## 2017-12-03 RX ORDER — ALBUTEROL SULFATE 0.83 MG/ML
2.5 SOLUTION RESPIRATORY (INHALATION)
Status: DISCONTINUED | OUTPATIENT
Start: 2017-12-03 | End: 2017-12-05 | Stop reason: HOSPADM

## 2017-12-03 RX ORDER — BUPRENORPHINE AND NALOXONE 8; 2 MG/1; MG/1
1 FILM, SOLUBLE BUCCAL; SUBLINGUAL 2 TIMES DAILY
Status: DISCONTINUED | OUTPATIENT
Start: 2017-12-03 | End: 2017-12-05 | Stop reason: HOSPADM

## 2017-12-03 RX ORDER — ASPIRIN 81 MG/1
81 TABLET ORAL DAILY
Status: DISCONTINUED | OUTPATIENT
Start: 2017-12-04 | End: 2017-12-05 | Stop reason: HOSPADM

## 2017-12-03 RX ORDER — ONDANSETRON 4 MG/1
4 TABLET, ORALLY DISINTEGRATING ORAL EVERY 6 HOURS PRN
Status: DISCONTINUED | OUTPATIENT
Start: 2017-12-03 | End: 2017-12-05 | Stop reason: HOSPADM

## 2017-12-03 RX ORDER — ONDANSETRON 2 MG/ML
4 INJECTION INTRAMUSCULAR; INTRAVENOUS EVERY 6 HOURS PRN
Status: DISCONTINUED | OUTPATIENT
Start: 2017-12-03 | End: 2017-12-05 | Stop reason: HOSPADM

## 2017-12-03 RX ORDER — AMPICILLIN AND SULBACTAM 2; 1 G/1; G/1
3 INJECTION, POWDER, FOR SOLUTION INTRAMUSCULAR; INTRAVENOUS ONCE
Status: COMPLETED | OUTPATIENT
Start: 2017-12-03 | End: 2017-12-03

## 2017-12-03 RX ORDER — IPRATROPIUM BROMIDE AND ALBUTEROL SULFATE 2.5; .5 MG/3ML; MG/3ML
3 SOLUTION RESPIRATORY (INHALATION)
Status: DISCONTINUED | OUTPATIENT
Start: 2017-12-04 | End: 2017-12-05 | Stop reason: HOSPADM

## 2017-12-03 RX ORDER — LANOLIN ALCOHOL/MO/W.PET/CERES
1000 CREAM (GRAM) TOPICAL DAILY
Status: DISCONTINUED | OUTPATIENT
Start: 2017-12-04 | End: 2017-12-05 | Stop reason: HOSPADM

## 2017-12-03 RX ORDER — LORAZEPAM 0.5 MG/1
0.5 TABLET ORAL ONCE
Status: COMPLETED | OUTPATIENT
Start: 2017-12-03 | End: 2017-12-03

## 2017-12-03 RX ORDER — DEXAMETHASONE SODIUM PHOSPHATE 10 MG/ML
10 INJECTION, SOLUTION INTRAMUSCULAR; INTRAVENOUS ONCE
Status: COMPLETED | OUTPATIENT
Start: 2017-12-03 | End: 2017-12-03

## 2017-12-03 RX ORDER — LANOLIN ALCOHOL/MO/W.PET/CERES
100 CREAM (GRAM) TOPICAL DAILY
Status: DISCONTINUED | OUTPATIENT
Start: 2017-12-04 | End: 2017-12-05 | Stop reason: HOSPADM

## 2017-12-03 RX ORDER — AMPICILLIN AND SULBACTAM 2; 1 G/1; G/1
3 INJECTION, POWDER, FOR SOLUTION INTRAMUSCULAR; INTRAVENOUS EVERY 6 HOURS
Status: DISCONTINUED | OUTPATIENT
Start: 2017-12-04 | End: 2017-12-04

## 2017-12-03 RX ORDER — LORAZEPAM 2 MG/ML
1 INJECTION INTRAMUSCULAR ONCE
Status: COMPLETED | OUTPATIENT
Start: 2017-12-03 | End: 2017-12-03

## 2017-12-03 RX ORDER — METOPROLOL TARTRATE 1 MG/ML
INJECTION, SOLUTION INTRAVENOUS
Status: DISCONTINUED
Start: 2017-12-03 | End: 2017-12-03 | Stop reason: WASHOUT

## 2017-12-03 RX ORDER — RAMELTEON 8 MG/1
8 TABLET ORAL AT BEDTIME
Status: DISCONTINUED | OUTPATIENT
Start: 2017-12-03 | End: 2017-12-05 | Stop reason: HOSPADM

## 2017-12-03 RX ORDER — METHOCARBAMOL 750 MG/1
750 TABLET, FILM COATED ORAL
Status: DISCONTINUED | OUTPATIENT
Start: 2017-12-03 | End: 2017-12-05 | Stop reason: HOSPADM

## 2017-12-03 RX ORDER — ASPIRIN 81 MG/1
TABLET, CHEWABLE ORAL
Status: DISCONTINUED
Start: 2017-12-03 | End: 2017-12-03 | Stop reason: HOSPADM

## 2017-12-03 RX ORDER — ACETAMINOPHEN 325 MG/1
650 TABLET ORAL EVERY 4 HOURS PRN
Status: DISCONTINUED | OUTPATIENT
Start: 2017-12-03 | End: 2017-12-05 | Stop reason: HOSPADM

## 2017-12-03 RX ORDER — NALOXONE HYDROCHLORIDE 0.4 MG/ML
.1-.4 INJECTION, SOLUTION INTRAMUSCULAR; INTRAVENOUS; SUBCUTANEOUS
Status: DISCONTINUED | OUTPATIENT
Start: 2017-12-03 | End: 2017-12-05 | Stop reason: HOSPADM

## 2017-12-03 RX ORDER — LIDOCAINE 40 MG/G
1 CREAM TOPICAL 4 TIMES DAILY PRN
Status: DISCONTINUED | OUTPATIENT
Start: 2017-12-03 | End: 2017-12-05 | Stop reason: HOSPADM

## 2017-12-03 RX ORDER — FOLIC ACID 1 MG/1
1 TABLET ORAL DAILY
Status: DISCONTINUED | OUTPATIENT
Start: 2017-12-04 | End: 2017-12-05 | Stop reason: HOSPADM

## 2017-12-03 RX ADMIN — DEXAMETHASONE SODIUM PHOSPHATE 10 MG: 10 INJECTION, SOLUTION INTRAMUSCULAR; INTRAVENOUS at 19:54

## 2017-12-03 RX ADMIN — AMPICILLIN SODIUM AND SULBACTAM SODIUM 3 G: 2; 1 INJECTION, POWDER, FOR SOLUTION INTRAMUSCULAR; INTRAVENOUS at 19:54

## 2017-12-03 RX ADMIN — FOLIC ACID: 5 INJECTION, SOLUTION INTRAMUSCULAR; INTRAVENOUS; SUBCUTANEOUS at 13:54

## 2017-12-03 RX ADMIN — LORAZEPAM 1 MG: 2 INJECTION INTRAMUSCULAR at 21:37

## 2017-12-03 RX ADMIN — LORAZEPAM 0.5 MG: 0.5 TABLET ORAL at 19:54

## 2017-12-03 ASSESSMENT — PAIN DESCRIPTION - DESCRIPTORS
DESCRIPTORS: ACHING
DESCRIPTORS: ACHING

## 2017-12-03 NOTE — ED PROVIDER NOTES
History     Chief Complaint   Patient presents with     Alcohol Intoxication     HPI  Jorge Rosales is a 62 year old male with a history of chronic back pain (methadone program) and PTSD who presents to the Emergency Department via EMS for evaluation of alcohol intoxication. Patient unable to provide history due to altered mental status. History obtained from EMS. Patient lives in a patient was in an apartment and when a cleaning lady came in this morning she saw him intoxicated in bed covered in feces. She called EMS who came and picked him up and brought him here. No further history available.     This part of the document was transcribed by Eneida Lima Medical Scribe.      Past Medical History:   Diagnosis Date     Chronic back pain     Methadone program     CTS (carpal tunnel syndrome)      Low testosterone     managed by endocrinology     Obesity      Panic attacks      PTSD (post-traumatic stress disorder) 7/8/2011     Rotator cuff injury        Past Surgical History:   Procedure Laterality Date     BACK SURGERY         Family History   Problem Relation Age of Onset     DIABETES Father        Social History   Substance Use Topics     Smoking status: Current Every Day Smoker     Packs/day: 0.50     Years: 30.00     Types: Cigarettes     Smokeless tobacco: Not on file     Alcohol use No       No current facility-administered medications for this encounter.      Current Outpatient Prescriptions   Medication     clonazePAM (KLONOPIN) 2 MG tablet     gabapentin (NEURONTIN) 300 MG capsule     venlafaxine (EFFEXOR-ER) 75 MG TB24 24 hr tablet     methocarbamol (ROBAXIN) 750 MG tablet     ramelteon (ROZEREM) 8 MG tablet     bismuth subsalicylate (PEPTO BISMOL) 262 MG chewable tablet     buprenorphine HCl-naloxone HCl (SUBOXONE) 8-2 MG per film     lidocaine (LMX4) 4 % CREA cream     aspirin 81 MG tablet     cyanocolbalamin (VITAMIN B-12) 1000 MCG tablet     fish oil-omega-3 fatty acids 1000 MG capsule      Multiple Vitamin (MULTIVITAMIN) per tablet      No Known Allergies      I have reviewed the Medications, Allergies, Past Medical and Surgical History, and Social History in the Epic system.    Review of Systems   Unable to perform ROS: Mental status change       Physical Exam   BP: 112/65  Pulse: 88  Heart Rate: 93  Temp: 97.2  F (36.2  C)  Resp: 20  SpO2: 98 %      Physical Exam   Constitutional: He appears well-developed and well-nourished. No distress.   Sleeping in bed, smells heavily of alcohol.   HENT:   Head: Normocephalic and atraumatic.   Right Ear: External ear normal.   Left Ear: External ear normal.   Nose: Nose normal.   Mouth/Throat: Oropharynx is clear and moist. No oropharyngeal exudate.   Eyes: Conjunctivae and EOM are normal. Pupils are equal, round, and reactive to light. No scleral icterus.   Neck: Normal range of motion. Neck supple.   Cardiovascular: Normal rate, normal heart sounds and intact distal pulses.    Pulmonary/Chest: Effort normal and breath sounds normal. No respiratory distress. He has no wheezes. He has no rales.   Abdominal: Soft. Bowel sounds are normal. He exhibits no distension. There is no tenderness. There is no rebound and no guarding.   Musculoskeletal: Normal range of motion. He exhibits no edema or tenderness.   Neurological: GCS eye subscore is 3. GCS verbal subscore is 4. GCS motor subscore is 6.   Skin: Skin is warm. No rash noted. He is not diaphoretic.   Psychiatric:   Unable to obtain psychiatric exam due to altered mental status.   Nursing note and vitals reviewed.      ED Course     ED Course     Procedures             EKG Interpretation:      Interpreted by Meño Barr  Time reviewed: 12:33 PM  Symptoms at time of EKG: altered mental status   Rhythm: normal sinus   Rate: normal  Axis: normal  Ectopy: none  Conduction: QT prolongation  ST Segments/ T Waves: No ST-T wave changes  Q Waves: none  Comparison to prior: Slightly different than old EKG done on  November 6, 2017    Clinical Impression: NSR with QT prolongation without acute ischemia.            Critical Care time:  none           Labs Ordered and Resulted from Time of ED Arrival Up to the Time of Departure from the ED   CBC WITH PLATELETS DIFFERENTIAL - Abnormal; Notable for the following:        Result Value    WBC 15.4 (*)     RDW 16.0 (*)     Platelet Count 106 (*)     Absolute Neutrophil 12.8 (*)     All other components within normal limits   COMPREHENSIVE METABOLIC PANEL - Abnormal; Notable for the following:     Anion Gap 18 (*)     Glucose 118 (*)     Creatinine 0.65 (*)     Calcium 7.8 (*)     Albumin 3.2 (*)     AST 75 (*)     All other components within normal limits   ALCOHOL ETHYL - Abnormal; Notable for the following:     Ethanol g/dL 0.41 (*)     All other components within normal limits   GLUCOSE BY METER - Abnormal; Notable for the following:     Glucose 126 (*)     All other components within normal limits   ALCOHOL BREATH TEST POCT - Abnormal; Notable for the following:    GLUCOSE MONITOR NURSING POCT   PERIPHERAL IV CATHETER   CARDIAC CONTINUOUS MONITORING   PULSE OXIMETRY NURSING            Assessments & Plan (with Medical Decision Making)   62-year-old man presenting with altered mental status. Differential diagnosis alcohol intoxication, pneumonia, ACS unlikely, electrolyte abnormalities.    After thorough history and physical exam patient appear to be in no acute distress. He smells heavily of alcohol and he arouses to loud noise. He is unable to hold conversation due to altered mental status likely from intoxication. He was hypoxemic on arrival he was placed on nonrebreather mask supplemental oxygen. I will obtain EKG and laboratory studies for further diagnostic evaluation along with a portable chest x-ray.    Patient's laboratory studies returned with leukocytosis of 15,400. There is no evidence of anemia, hemoglobin is 16.9.  Electrolytes show evidence of dehydration with anion  gap of 18. He received a bolus of lactated ringers. Ethanol is elevated at 0.41. Patient will need further observation until sobriety. He will be signed out to my partner.      This part of the document was transcribed by Ramirez Suarez Scriblamonte.        I have reviewed the nursing notes.    I have reviewed the findings, diagnosis, plan and need for follow up with the patient.    New Prescriptions    No medications on file       Final diagnoses:   None       12/3/2017   [facility], [place], EMERGENCY DEPARTMENT     Meño Barr MD  12/03/17 9193

## 2017-12-03 NOTE — IP AVS SNAPSHOT
[address] [address]    Phone:  [phone]                                       After Visit Summary   12/3/2017    Jorge Rosales    MRN: [id number]           After Visit Summary Signature Page     I have received my discharge instructions, and my questions have been answered. I have discussed any challenges I see with this plan with the nurse or doctor.    ..........................................................................................................................................  Patient/Patient Representative Signature      ..........................................................................................................................................  Patient Representative Print Name and Relationship to Patient    ..................................................               ................................................  Date                                            Time    ..........................................................................................................................................  Reviewed by Signature/Title    ...................................................              ..............................................  Date                                                            Time

## 2017-12-03 NOTE — ED NOTES
Patients sats remained 87-88% on 10L oxymask. Moved to room 2. Sats now 94% with ear probe pulse ox in place, remains on 10L via oxymask.

## 2017-12-03 NOTE — ED NOTES
Jorge is BIBA for saturations in the 70's on RA. He does have good color and VSS. Jorge speaks only when he wants to. He will speak when he wants to refuse care or refute information.  He is full of stool on his lower body and EMS reports stool in the bathroom and living area of the apartment. EMS believes he is intoxicated and obtained information on-site that he was being evicted from the Stacy's home due to the ETOH and the condition of his home.

## 2017-12-03 NOTE — IP AVS SNAPSHOT
MRN:[id number]                      After Visit Summary   12/3/2017    Jorge Rosales    MRN: [id number]           Thank you!     Thank you for choosing [facility] for your care. Our goal is always to provide you with excellent care. Hearing back from our patients is one way we can continue to improve our services. Please take a few minutes to complete the written survey that you may receive in the mail after you visit with us. Thank you!        Patient Information     Date Of Birth          1955        About your hospital stay     You were admitted on:  December 3, 2017 You last received care in the:  [address] [facility]    You were discharged on:  December 5, 2017        Reason for your hospital stay       You were admitted with aspiration in setting of alcohol use; you were getting treatment for alcohol withdrawal, COPD and aspiration and since you will be leaving against medical advice I will strongly recommend you to follow with primary care in few days                  Who to Call     For medical emergencies, please call 911.  For non-urgent questions about your medical care, please call your primary care provider or clinic, None          Attending Provider     Provider Specialty    Meño Barr MD Emergency Medicine    Pablo Bowman MD Emergency Medicine    [place], Gee FREEMAN MD Internal Medicine    Carrie Green MD Pediatrics    LoganJamaal hector MD Internal Medicine       Primary Care Provider Fax #    [facility] [phone]      After Care Instructions     Activity       Your activity upon discharge: activity as tolerated            Diet       Follow this diet upon discharge: Orders Placed This Encounter      Calorie Counts      Snacks/Supplements Adult: Ensure Plus (Adult); Between Meals      Calorie Counts      Snacks/Supplements Adult: Ensure Plus (Adult); Between Meals      Regular Diet Adult                  Follow-up Appointments     Adult  "[facility] Follow-up and recommended labs and tests       Follow up with primary care provider, [facility], within 7 days for hospital follow- up.  The following labs/tests are recommended: SaO2.      Appointments on [place] and/or [place] campus (with [facility] or [facility] provider or service). Call [phone] if you haven't heard regarding these appointments within 7 days of discharge.                  Additional Services     Home care nursing referral       RN skilled nursing visit. RN to assess vital signs and weight, respiratory and cardiac status, pain level and activity tolerance, hydration, nutrition and bowel status, home safety and O2 status.  RN to teach medication management.  Home health aide to assist with ADLs and house keeping    [facility]  Phone  [phone]  Fax  [phone]     Your provider has ordered home care nursing services. If you have not been contacted within 2 days of your discharge please call the inpatient department phone number at [phone] .                  Further instructions from your care team       Please call [facility] ([phone]) if you want help and treatment for your alcohol consumption.    Pending Results     No orders found from 12/1/2017 to 12/4/2017.            Statement of Approval     Ordered          12/05/17 0824  I have reviewed and agree with all the recommendations and orders detailed in this document.  EFFECTIVE NOW     Approved and electronically signed by:  Jamaal Ford MD             Admission Information     Date & Time Provider Department Dept. Phone    12/3/2017 Jamaal Ford MD [address] [facility] [place] [phone]      Your Vitals Were     Blood Pressure Pulse Temperature Respirations Height Weight    112/52 (BP Location: Left arm) 89 97.3  F (36.3  C) (Oral) 18 1.803 m (5' 11\") 95.8 kg (211 lb 3.2 oz)    Pulse Oximetry BMI (Body Mass Index)                72% 29.46 kg/m2          MyChart Information     MyChart lets you " "send messages to your doctor, view your test results, renew your prescriptions, schedule appointments and more. To sign up, go to [email or web address] . Click on \"Log in\" on the left side of the screen, which will take you to the Welcome page. Then click on \"Sign up Now\" on the right side of the page.     You will be asked to enter the access code listed below, as well as some personal information. Please follow the directions to create your username and password.     Your access code is: [email or web address]  Expires: 2018 12:40 AM     Your access code will  in 90 days. If you need help or a new code, please call your [place] clinic or [phone].        Care EveryWhere ID     This is your Care EveryWhere ID. This could be used by other organizations to access your [facility] medical records  [id number]        Equal Access to Services     GEOVANI VILLA : Tracy Llanos, waclaus oliver, qajenaro kaalmaanup arroyo, tacos gomez . So [facility] [phone].    ATENCIÓN: Si habla español, tiene a nunez disposición servicios gratuitos de asistencia lingüística. Llame al [phone].    We comply with applicable federal civil rights laws and [place] laws. We do not discriminate on the basis of race, color, national origin, age, disability, sex, sexual orientation, or gender identity.               Review of your medicines      CONTINUE these medicines which have NOT CHANGED        Dose / Directions    aspirin 81 MG tablet        Dose:  1 tablet   Take 1 tablet by mouth daily.   Refills:  3       bismuth subsalicylate 262 MG chewable tablet   Commonly known as:  PEPTO BISMOL        Dose:  524 mg   Take 524 mg by mouth 2 times daily as needed for diarrhea   Refills:  0       buprenorphine HCl-naloxone HCl 8-2 MG per film   Commonly known as:  SUBOXONE        Dose:  1 Film   Place 1 Film under the tongue 2 times daily   Refills:  0       clonazePAM 2 MG tablet   Commonly known " as:  klonoPIN   Used for:  Anxiety disorder, unspecified type        Dose:  4 mg   Take 2 tablets (4 mg) by mouth 2 times daily   Quantity:  60 tablet   Refills:  0       cyanocobalamin 1000 MCG tablet   Commonly known as:  vitamin  B-12        Dose:  1000 mcg   Take 1 tablet by mouth daily.   Quantity:  100 tablet   Refills:  12       fish oil-omega-3 fatty acids 1000 MG capsule        Dose:  2 g   Take 2 capsules by mouth daily.   Quantity:  180 capsule   Refills:  12       gabapentin 300 MG capsule   Commonly known as:  NEURONTIN        Dose:  300 mg   Take 1 capsule (300 mg) by mouth 3 times daily   Quantity:  90 capsule   Refills:  0       lidocaine 4 % Crea cream   Commonly known as:  LMX4        Dose:  1 Application   Apply 1 Application topically 4 times daily as needed for mild pain (Apply liberal amount to shoulder)   Refills:  0       methocarbamol 750 MG tablet   Commonly known as:  ROBAXIN        Dose:  750 mg   Take 750 mg by mouth nightly as needed for muscle spasms   Refills:  0       multivitamin per tablet        Dose:  1 tablet   Take 1 tablet by mouth daily.   Quantity:  100 tablet   Refills:  12       ramelteon 8 MG tablet   Commonly known as:  ROZEREM        Dose:  8 mg   Take 8 mg by mouth At Bedtime   Refills:  0       venlafaxine 75 MG Tb24 24 hr tablet   Commonly known as:  EFFEXOR-ER   Indication:  Depression and Anxiety        Dose:  75 mg   Take 1 tablet (75 mg) by mouth daily   Quantity:  30 each   Refills:  0                Protect others around you: Learn how to safely use, store and throw away your medicines at www.disposemymeds.org.             Medication List: This is a list of all your medications and when to take them. Check marks below indicate your daily home schedule. Keep this list as a reference.      Medications           Morning Afternoon Evening Bedtime As Needed    aspirin 81 MG tablet   Take 1 tablet by mouth daily.                                bismuth subsalicylate  262 MG chewable tablet   Commonly known as:  PEPTO BISMOL   Take 524 mg by mouth 2 times daily as needed for diarrhea                                buprenorphine HCl-naloxone HCl 8-2 MG per film   Commonly known as:  SUBOXONE   Place 1 Film under the tongue 2 times daily   Last time this was given:  1 Film on 12/4/2017  9:12 PM                                clonazePAM 2 MG tablet   Commonly known as:  klonoPIN   Take 2 tablets (4 mg) by mouth 2 times daily   Last time this was given:  4 mg on 12/4/2017  9:16 AM                                cyanocobalamin 1000 MCG tablet   Commonly known as:  vitamin  B-12   Take 1 tablet by mouth daily.   Last time this was given:  1,000 mcg on 12/4/2017  9:17 AM                                fish oil-omega-3 fatty acids 1000 MG capsule   Take 2 capsules by mouth daily.                                gabapentin 300 MG capsule   Commonly known as:  NEURONTIN   Take 1 capsule (300 mg) by mouth 3 times daily   Last time this was given:  300 mg on 12/4/2017  7:07 PM                                lidocaine 4 % Crea cream   Commonly known as:  LMX4   Apply 1 Application topically 4 times daily as needed for mild pain (Apply liberal amount to shoulder)                                methocarbamol 750 MG tablet   Commonly known as:  ROBAXIN   Take 750 mg by mouth nightly as needed for muscle spasms                                multivitamin per tablet   Take 1 tablet by mouth daily.                                ramelteon 8 MG tablet   Commonly known as:  ROZEREM   Take 8 mg by mouth At Bedtime   Last time this was given:  8 mg on 12/4/2017  9:12 PM                                venlafaxine 75 MG Tb24 24 hr tablet   Commonly known as:  EFFEXOR-ER   Take 1 tablet (75 mg) by mouth daily

## 2017-12-04 ENCOUNTER — APPOINTMENT (OUTPATIENT)
Dept: GENERAL RADIOLOGY | Facility: CLINIC | Age: 62
DRG: 177 | End: 2017-12-04
Attending: STUDENT IN AN ORGANIZED HEALTH CARE EDUCATION/TRAINING PROGRAM
Payer: COMMERCIAL

## 2017-12-04 LAB
ANION GAP SERPL CALCULATED.3IONS-SCNC: 11 MMOL/L (ref 3–14)
BASE EXCESS BLDA CALC-SCNC: 4.9 MMOL/L
BASE EXCESS BLDA CALC-SCNC: 8.7 MMOL/L
BUN SERPL-MCNC: 17 MG/DL (ref 7–30)
CALCIUM SERPL-MCNC: 7.5 MG/DL (ref 8.5–10.1)
CHLORIDE SERPL-SCNC: 92 MMOL/L (ref 94–109)
CO2 SERPL-SCNC: 30 MMOL/L (ref 20–32)
CREAT SERPL-MCNC: 0.42 MG/DL (ref 0.66–1.25)
ERYTHROCYTE [DISTWIDTH] IN BLOOD BY AUTOMATED COUNT: 15.8 % (ref 10–15)
GFR SERPL CREATININE-BSD FRML MDRD: >90 ML/MIN/1.7M2
GLUCOSE SERPL-MCNC: 234 MG/DL (ref 70–99)
HCO3 BLD-SCNC: 31 MMOL/L (ref 21–28)
HCO3 BLD-SCNC: 35 MMOL/L (ref 21–28)
HCT VFR BLD AUTO: 43.2 % (ref 40–53)
HGB BLD-MCNC: 14.3 G/DL (ref 13.3–17.7)
LACTATE BLD-SCNC: 1.3 MMOL/L (ref 0.7–2)
LACTATE BLD-SCNC: 2.6 MMOL/L (ref 0.7–2)
MCH RBC QN AUTO: 30.8 PG (ref 26.5–33)
MCHC RBC AUTO-ENTMCNC: 33.1 G/DL (ref 31.5–36.5)
MCV RBC AUTO: 93 FL (ref 78–100)
O2/TOTAL GAS SETTING VFR VENT: 12 %
O2/TOTAL GAS SETTING VFR VENT: 50 %
OXYHGB MFR BLD: 94 % (ref 92–100)
PCO2 BLD: 50 MM HG (ref 35–45)
PCO2 BLD: 51 MM HG (ref 35–45)
PH BLD: 7.4 PH (ref 7.35–7.45)
PH BLD: 7.44 PH (ref 7.35–7.45)
PLATELET # BLD AUTO: 79 10E9/L (ref 150–450)
PO2 BLD: 72 MM HG (ref 80–105)
PO2 BLD: 82 MM HG (ref 80–105)
POTASSIUM SERPL-SCNC: 3.8 MMOL/L (ref 3.4–5.3)
RBC # BLD AUTO: 4.65 10E12/L (ref 4.4–5.9)
SODIUM SERPL-SCNC: 132 MMOL/L (ref 133–144)
WBC # BLD AUTO: 16.6 10E9/L (ref 4–11)

## 2017-12-04 PROCEDURE — 12000001 ZZH R&B MED SURG/OB UMMC

## 2017-12-04 PROCEDURE — 25000128 H RX IP 250 OP 636: Performed by: PHYSICIAN ASSISTANT

## 2017-12-04 PROCEDURE — 40000809 ZZH STATISTIC NO DOCUMENTATION TO SUPPORT CHARGE

## 2017-12-04 PROCEDURE — 83605 ASSAY OF LACTIC ACID: CPT | Performed by: PEDIATRICS

## 2017-12-04 PROCEDURE — 36415 COLL VENOUS BLD VENIPUNCTURE: CPT | Performed by: PHYSICIAN ASSISTANT

## 2017-12-04 PROCEDURE — 40000275 ZZH STATISTIC RCP TIME EA 10 MIN

## 2017-12-04 PROCEDURE — 25000125 ZZHC RX 250: Performed by: PHYSICIAN ASSISTANT

## 2017-12-04 PROCEDURE — 94640 AIRWAY INHALATION TREATMENT: CPT

## 2017-12-04 PROCEDURE — 94660 CPAP INITIATION&MGMT: CPT

## 2017-12-04 PROCEDURE — 25000128 H RX IP 250 OP 636: Performed by: STUDENT IN AN ORGANIZED HEALTH CARE EDUCATION/TRAINING PROGRAM

## 2017-12-04 PROCEDURE — 99211 OFF/OP EST MAY X REQ PHY/QHP: CPT

## 2017-12-04 PROCEDURE — 80048 BASIC METABOLIC PNL TOTAL CA: CPT | Performed by: PHYSICIAN ASSISTANT

## 2017-12-04 PROCEDURE — 36600 WITHDRAWAL OF ARTERIAL BLOOD: CPT

## 2017-12-04 PROCEDURE — 82805 BLOOD GASES W/O2 SATURATION: CPT | Performed by: STUDENT IN AN ORGANIZED HEALTH CARE EDUCATION/TRAINING PROGRAM

## 2017-12-04 PROCEDURE — 25000132 ZZH RX MED GY IP 250 OP 250 PS 637: Performed by: PHYSICIAN ASSISTANT

## 2017-12-04 PROCEDURE — 71010 XR CHEST PORT 1 VW: CPT

## 2017-12-04 PROCEDURE — 94640 AIRWAY INHALATION TREATMENT: CPT | Mod: 76

## 2017-12-04 PROCEDURE — 36415 COLL VENOUS BLD VENIPUNCTURE: CPT | Performed by: PEDIATRICS

## 2017-12-04 PROCEDURE — 85027 COMPLETE CBC AUTOMATED: CPT | Performed by: PHYSICIAN ASSISTANT

## 2017-12-04 PROCEDURE — 82803 BLOOD GASES ANY COMBINATION: CPT | Performed by: STUDENT IN AN ORGANIZED HEALTH CARE EDUCATION/TRAINING PROGRAM

## 2017-12-04 PROCEDURE — 99233 SBSQ HOSP IP/OBS HIGH 50: CPT | Performed by: PEDIATRICS

## 2017-12-04 RX ORDER — VENLAFAXINE HYDROCHLORIDE 75 MG/1
75 CAPSULE, EXTENDED RELEASE ORAL DAILY
Status: DISCONTINUED | OUTPATIENT
Start: 2017-12-04 | End: 2017-12-05 | Stop reason: HOSPADM

## 2017-12-04 RX ADMIN — LORAZEPAM 2 MG: 1 TABLET ORAL at 05:44

## 2017-12-04 RX ADMIN — Medication 100 MG: at 09:17

## 2017-12-04 RX ADMIN — ONDANSETRON 4 MG: 2 INJECTION INTRAMUSCULAR; INTRAVENOUS at 00:32

## 2017-12-04 RX ADMIN — IPRATROPIUM BROMIDE AND ALBUTEROL SULFATE 3 ML: .5; 3 SOLUTION RESPIRATORY (INHALATION) at 19:29

## 2017-12-04 RX ADMIN — MULTIPLE VITAMINS W/ MINERALS TAB 1 TABLET: TAB at 09:17

## 2017-12-04 RX ADMIN — LORAZEPAM 2 MG: 1 TABLET ORAL at 12:44

## 2017-12-04 RX ADMIN — ASPIRIN 81 MG: 81 TABLET, COATED ORAL at 09:30

## 2017-12-04 RX ADMIN — BUPRENORPHINE HYDROCHLORIDE, NALOXONE HYDROCHLORIDE 1 FILM: 8; 2 FILM, SOLUBLE BUCCAL; SUBLINGUAL at 21:12

## 2017-12-04 RX ADMIN — LORAZEPAM 2 MG: 1 TABLET ORAL at 00:29

## 2017-12-04 RX ADMIN — LORAZEPAM 1 MG: 1 TABLET ORAL at 06:50

## 2017-12-04 RX ADMIN — IPRATROPIUM BROMIDE AND ALBUTEROL SULFATE 3 ML: .5; 3 SOLUTION RESPIRATORY (INHALATION) at 12:56

## 2017-12-04 RX ADMIN — FOLIC ACID 1 MG: 1 TABLET ORAL at 09:17

## 2017-12-04 RX ADMIN — LORAZEPAM 2 MG: 1 TABLET ORAL at 21:12

## 2017-12-04 RX ADMIN — SODIUM CHLORIDE 1000 ML: 9 INJECTION, SOLUTION INTRAVENOUS at 00:38

## 2017-12-04 RX ADMIN — AMPICILLIN SODIUM AND SULBACTAM SODIUM 3 G: 2; 1 INJECTION, POWDER, FOR SOLUTION INTRAMUSCULAR; INTRAVENOUS at 09:17

## 2017-12-04 RX ADMIN — AMPICILLIN SODIUM AND SULBACTAM SODIUM 3 G: 2; 1 INJECTION, POWDER, FOR SOLUTION INTRAMUSCULAR; INTRAVENOUS at 14:16

## 2017-12-04 RX ADMIN — RAMELTEON 8 MG: 8 TABLET, FILM COATED ORAL at 21:12

## 2017-12-04 RX ADMIN — BUPRENORPHINE HYDROCHLORIDE, NALOXONE HYDROCHLORIDE 1 FILM: 8; 2 FILM, SOLUBLE BUCCAL; SUBLINGUAL at 09:16

## 2017-12-04 RX ADMIN — VENLAFAXINE HYDROCHLORIDE 75 MG: 75 CAPSULE, EXTENDED RELEASE ORAL at 09:17

## 2017-12-04 RX ADMIN — LORAZEPAM 2 MG: 1 TABLET ORAL at 17:06

## 2017-12-04 RX ADMIN — IPRATROPIUM BROMIDE AND ALBUTEROL SULFATE 3 ML: .5; 3 SOLUTION RESPIRATORY (INHALATION) at 08:58

## 2017-12-04 RX ADMIN — BUPRENORPHINE HYDROCHLORIDE, NALOXONE HYDROCHLORIDE 1 FILM: 8; 2 FILM, SOLUBLE BUCCAL; SUBLINGUAL at 00:32

## 2017-12-04 RX ADMIN — SODIUM CHLORIDE, POTASSIUM CHLORIDE, SODIUM LACTATE AND CALCIUM CHLORIDE: 600; 310; 30; 20 INJECTION, SOLUTION INTRAVENOUS at 00:21

## 2017-12-04 RX ADMIN — GABAPENTIN 300 MG: 300 CAPSULE ORAL at 09:17

## 2017-12-04 RX ADMIN — AMPICILLIN SODIUM AND SULBACTAM SODIUM 3 G: 2; 1 INJECTION, POWDER, FOR SOLUTION INTRAMUSCULAR; INTRAVENOUS at 02:12

## 2017-12-04 RX ADMIN — CLONAZEPAM 4 MG: 2 TABLET ORAL at 09:16

## 2017-12-04 RX ADMIN — LORAZEPAM 1 MG: 1 TABLET ORAL at 04:00

## 2017-12-04 RX ADMIN — Medication 1000 MCG: at 09:17

## 2017-12-04 RX ADMIN — GABAPENTIN 300 MG: 300 CAPSULE ORAL at 19:07

## 2017-12-04 RX ADMIN — GABAPENTIN 300 MG: 300 CAPSULE ORAL at 14:16

## 2017-12-04 RX ADMIN — LORAZEPAM 2 MG: 1 TABLET ORAL at 15:02

## 2017-12-04 RX ADMIN — ONDANSETRON 4 MG: 2 INJECTION INTRAMUSCULAR; INTRAVENOUS at 10:32

## 2017-12-04 RX ADMIN — LORAZEPAM 2 MG: 1 TABLET ORAL at 19:07

## 2017-12-04 RX ADMIN — LORAZEPAM 1 MG: 1 TABLET ORAL at 10:33

## 2017-12-04 RX ADMIN — ALBUTEROL SULFATE 2.5 MG: 2.5 SOLUTION RESPIRATORY (INHALATION) at 01:54

## 2017-12-04 ASSESSMENT — ACTIVITIES OF DAILY LIVING (ADL)
WHICH_OF_THE_ABOVE_FUNCTIONAL_RISKS_HAD_A_RECENT_ONSET_OR_CHANGE?: FALL HISTORY
ADLS_ACUITY_SCORE: 12
TRANSFERRING: 1-->ASSISTIVE EQUIPMENT
COGNITION: 0 - NO COGNITION ISSUES REPORTED
ADLS_ACUITY_SCORE: 18
RETIRED_EATING: 0-->INDEPENDENT
BATHING: 0-->INDEPENDENT
DRESS: 0-->INDEPENDENT
ADLS_ACUITY_SCORE: 18
TOILETING: 0-->INDEPENDENT
RETIRED_COMMUNICATION: 0-->UNDERSTANDS/COMMUNICATES WITHOUT DIFFICULTY
SWALLOWING: 0-->SWALLOWS FOODS/LIQUIDS WITHOUT DIFFICULTY
AMBULATION: 1-->ASSISTIVE EQUIPMENT
FALL_HISTORY_WITHIN_LAST_SIX_MONTHS: YES
ADLS_ACUITY_SCORE: 20
ADLS_ACUITY_SCORE: 20

## 2017-12-04 ASSESSMENT — PAIN DESCRIPTION - DESCRIPTORS
DESCRIPTORS: ACHING
DESCRIPTORS: ACHING

## 2017-12-04 NOTE — PLAN OF CARE
Problem: Patient Care Overview  Goal: Plan of Care/Patient Progress Review  Outcome: Declining  Temp: 99.1  F (37.3  C) Temp src: Axillary BP: 153/89 Pulse: 88 Heart Rate: 91 Resp: 16 SpO2: 95 % O2 Device: Oxi Plus Oxygen Delivery: 15 LPM     Neuro: A&Ox4. Lethargic. Reporting hallucinations at times. MSSA protocol in place. Tremulous.  Cardiac: Sinus rhythm- sinus tach. Rates 90s-100s. Denies chest pain. BP elevated.  Respiratory: No increase in work of breathing. On 15L Oxiplus now, ABGs, repeat CXR completed. BiPap order placed. Sats on 15L 94-96%. Lungs coarse.  GI/: Adequate urine output. LBM 12/3  Diet/appetite: Regular diet overnight, now NPO for BiPap.  Activity:  Independently positions in bed. Reports using a walker or cane at home d/t left leg weakness.  Pain: Generalized.  Skin: Suspected DTI to Left ischial tuberosity- WOC consult ordered. Foam dressing in place.  LDA's: Right PIV w/ LR @ 100/hr.     Plan: Continue with POC. Notify primary team with changes.       no

## 2017-12-04 NOTE — DISCHARGE INSTRUCTIONS
Please call [facility] ([phone]) if you want help and treatment for your alcohol consumption.

## 2017-12-04 NOTE — PLAN OF CARE
Problem: Patient Care Overview  Goal: Plan of Care/Patient Progress Review  Outcome: Improving  End Of Shift Progress Note:    Temp: 98.3  F (36.8  C) Oral  BP: (!) 166/94   Heart Rate: 89  Resp: 18  SpO2: 92 % 5LPM via NC    Neuro: alert, oriented x3 with confusion, forgetful, follows commands, does not use call light, numbness to LLE per patient report  Cardiac: remains on tele, sinus tach throughout shift, BP elevated at noon, will monitor  Respiratory: able to wean off BiPAP and Oxymask, sating well on 5LPM via NC, lung sounds coarse throughout, RLL has some crackles  GI/: bowel sounds active throughout, no BM this shift, voiding adequately  Diet/appetite: tolerating regular diet, PRN zofran given for nausea  Activity: A2 for transfers and bed mobility  Pain: denied  Skin: L IT has SDTI, covered with mepilex, WOCN to come evaluate  LDA's: R PIV-saline locked, no concerns    Plan: Continue with plan of care. Notify primary team with changes.  To do: continue to monitor respiratory status, attempt to wean off O2      Eleonora Morales, RN  12/04/17  2:03 PM

## 2017-12-04 NOTE — ED NOTES
Pt had home medication in his wallet, suboxone SL, that pt was trying to take but dropped and called staff to help find it. MD was notified. Pt was educated that home medication will be discussed by the admitting MD and will be ordered as per admitting MD. Pt verbalized understanding the protocol and willing to talk to the admitting MD when available.

## 2017-12-04 NOTE — PROVIDER NOTIFICATION
Notified [employer] MD of pt's increased O2 needs since arriving to unit. Pt had been on 5L oxiplus until around 0030 it was noted his sats were between 89-91%, pt increased to 10L with improvement of O2 sats up to 93%. Duoneb PRN given with minimal improvement.    RN will continue to monitor, MD stated he would come see pt soon.

## 2017-12-04 NOTE — H&P
[facility], [place]    Internal Medicine History and Physical - Gold Service       Date of Admission:  12/3/2017    Assessment & Plan   Jorge Rosales is a 62 year old male admitted on 12/3/2017. He has a history of chronic pain syndrome, alcohol abuse, PTSD, and panic attacks and is admitted for acute hypoxic respiratory failure with concern for aspiration pneumonia as well as alcohol intoxication.    Acute Hypoxic Respiratory Failure, Suspect Aspiration Pneumonia. Presented to ED acutely intoxicated with SpO2 down to 85% on RA, initially requiring up to 15 L of supplemental O2 to maintain sats. WBC elevated at 15.4, VBG with elevated lactic acid, no CO2 retention. CXR with mild bibasilar atelectasis, possible RLL consolidation. Recently hospitalized 11/6-11/13 with hypoxemic respiratory failure felt 2/2 COPD exacerbation (no formal diagnosis of COPD although CT chest 11/6 with emphysematous changes), improved with 2 days of prednisone. Recommended patient obtain OP PFT's although has not yet done so. Started on IV unasyn in ED due to concern for aspiration pneumonia with alcohol intoxication; also received 10 mg decadron for possible COPD exacerbation. Currently O2 needs improved at 4 L via oxymask, SpO2 96%, patient afebrile.  - Continue IV unasyn  - Hold off on further steroids for now  - Added procal, pending  - Duonebs QID  - Aggressive IS  - COPD RT consulted, appreciate assistance  - Continuous pulse ox  - Supplemental O2 PRN for SpO2 <88%  - Trend CBC, BMP  - Will need PFT's - IP vs OP    Acute Alcohol Intoxication. Long-standing history of alcohol abuse, has been to CD treatment three times in the past. Reports social stressor of his wife passing away in the past year which has lead to increased alcohol use. Endorses history of withdrawal seizures and hallucinations. Recently hospitalized 11/6-11/13 with alcohol withdrawal. EtOH elevated at 0.41 in ED. Mild AST elevation  of 75, ALT 45. Received 1 banana bag, currently much more alert. Unsure about pursuing CD treatment at this time.  - MSSA protocol  - Seizure precautions  - Folate, thiamine, MVI supplement  -  consult to assist in CD discussion  - Consider psychiatry consult in AM    Lactic Acid Elevation. LA elevated at 2.9 in ED. Received 1 L IVF and started on IV unasyn for possible aspiration pneumonia. Likely elevation in setting of alcohol use, hypoxia, and ?infection.  - Repeat LA ordered    PTSD, Panic Attacks. Psychiatry evaluated during last admission, discontinued PTA alprazolam and started on clonazepam with goal of tapering off over next 12 months. Also maintained on venlafaxine and ramelteon PTA.  - Continue venlafaxine 75 mg QD, clonazepam 4 mg BID, ramelteon 8 mg QHS    Chronic Pain Syndrome. 2/2 joint pain. History of back surgery ~1 year ago, shoulder surgery several months ago. Maintained on suboxone PTA, [facility]. Also managed with gabapentin and robaxin. No acute concerns.  - Continue gabapentin 300 mg TID  - Continue suboxone 2mg BID  - Continue methocarbamol 750 mg QHS PRN    Diet:  Regular diet  Fluids: IV LR @ 100 cc/hour x 1 liter  DVT Prophylaxis: Pneumatic Compression Devices  Code Status: Prior    Disposition Plan   Expected discharge: 2 - 3 days; recommended to prior living arrangement once antibiotic plan established and alcohol withdrawal complete.     Entered: Allyssa Schmidt 12/03/2017, 10:04 PM   Information in the above section will display in the discharge planner report.    The patient's care was discussed with the Attending Physician, Dr. Quinn.    Allyssa Schmidt  Internal Medicine Staff Hospitalist Service  [facility]  Pager: [phone]  Please see sticky note for cross cover information  ______________________________________________________________________    Chief Complaint   Acute Alcohol Intoxication,  "Hypoxia    History is obtained from the patient and chart review.    History of Present Illness   Jorge Rosales is a 62 year old male admitted on 12/3/2017. He has a history of chronic pain syndrome, alcohol abuse, PTSD, and panic attacks and is admitted for acute hypoxic respiratory failure with concern for aspiration pneumonia as well as alcohol intoxication.    Per ED notes, patient was found down and very altered in his apartment covered in vomit and feces. He was subsequently brought in to the ED for evaluation, found to have EtOH intoxication and SpO2 down to 85% on room air. Initially required 15 liters of supplemental oxygen via oxymask, although was eventually titrated down to 4 liters. Chest XR was obtained which revealed a possible subtle RLL consolidation concerning for pneumonia. Given acute alcohol intoxication and episodes of emesis, concern for aspiration pneumonia. Patient was started on IV unasyn and received a dose of steroids for possible COPD exacerbation.     Currently, patient has sobered up significantly. Reports drinking \"heavily\" over the past several weeks, up to a quart of hard liquor daily. Does not recall the events leading up to this admission. Has undergone CD treatment 3 times in the past, most recently >5 years ago. Is unsure if he would be interested in pursuing treatment at this time, wanting to re-discuss in the AM. Endorses a history of withdrawal seizures and hallucinations, although is uncertain when his last seizure was. Reports history of chronic back pain for which he had a discectomy ~1 year ago. Subsequently developed \"paralysis\" of his left leg, which makes it difficult to ambulate. Notes he lives alone on the 2nd floor of an apartment complex and often has difficulty climbing the stairs due to limitations from his leg. Endorses diarrhea over the past several days, averaging ~2 loose stools daily. Has had very minimal PO intake other than alcohol in this time. " Complains of persistent dry cough over the past several weeks. Otherwise denies fevers, chills, chest pain, palpitations, SOB, abdominal pain, nausea, vomiting, dysuria, or peripheral edema.    Review of Systems   The 10 point Review of Systems is negative other than noted in the HPI.    Past Medical History    I have reviewed this patient's medical history and updated it with pertinent information if needed.   Past Medical History:   Diagnosis Date     Chronic back pain     Methadone program     CTS (carpal tunnel syndrome)      Low testosterone     managed by endocrinology     Obesity      Panic attacks      PTSD (post-traumatic stress disorder) 7/8/2011     Rotator cuff injury       Past Surgical History   I have reviewed this patient's surgical history and updated it with pertinent information if needed.  Past Surgical History:   Procedure Laterality Date     BACK SURGERY       SHOULDER SURGERY        Social History   Social History   Substance Use Topics     Smoking status: Current Every Day Smoker     Packs/day: 0.50     Years: 30.00     Types: Cigarettes     Smokeless tobacco: Not on file     Alcohol use No     Family History   I have reviewed this patient's family history and updated it with pertinent information if needed.   Family History   Problem Relation Age of Onset     DIABETES Father      Prior to Admission Medications   Prior to Admission Medications   Prescriptions Last Dose Informant Patient Reported? Taking?   Multiple Vitamin (MULTIVITAMIN) per tablet   Yes No   Sig: Take 1 tablet by mouth daily.   aspirin 81 MG tablet   Yes No   Sig: Take 1 tablet by mouth daily.   bismuth subsalicylate (PEPTO BISMOL) 262 MG chewable tablet   Yes No   Sig: Take 524 mg by mouth 2 times daily as needed for diarrhea   buprenorphine HCl-naloxone HCl (SUBOXONE) 8-2 MG per film   Yes No   Sig: Place 1 Film under the tongue 2 times daily   clonazePAM (KLONOPIN) 2 MG tablet   No No   Sig: Take 2 tablets (4 mg) by mouth  2 times daily   cyanocolbalamin (VITAMIN B-12) 1000 MCG tablet   Yes No   Sig: Take 1 tablet by mouth daily.   fish oil-omega-3 fatty acids 1000 MG capsule   Yes No   Sig: Take 2 capsules by mouth daily.   gabapentin (NEURONTIN) 300 MG capsule   Yes No   Sig: Take 1 capsule (300 mg) by mouth 3 times daily   lidocaine (LMX4) 4 % CREA cream   Yes No   Sig: Apply 1 Application topically 4 times daily as needed for mild pain (Apply liberal amount to shoulder)   methocarbamol (ROBAXIN) 750 MG tablet   Yes No   Sig: Take 750 mg by mouth nightly as needed for muscle spasms    ramelteon (ROZEREM) 8 MG tablet   Yes No   Sig: Take 8 mg by mouth At Bedtime   venlafaxine (EFFEXOR-ER) 75 MG TB24 24 hr tablet   Yes No   Sig: Take 1 tablet (75 mg) by mouth daily      Facility-Administered Medications: None     Allergies   No Known Allergies    Physical Exam   Vital Signs: Temp: 97.2  F (36.2  C) Temp src: Axillary BP: (!) 168/102 Pulse: 88 Heart Rate: 95 Resp: 21 SpO2: 92 % O2 Device: None (Room air)    Weight: 0 lbs 0 oz    General Appearance: Diaphoretic  male laying in bed, appears uncomfortable.  Eyes: Anicteric sclera.  HEENT: NC/AT. Somewhat dry mucous membranes.  Respiratory: Respiratory effort normal on 4 liters via oxymask. Intermittent rhonchi throughout lung fields, no crackles or wheezing.  Cardiovascular: RRR, S1/S2. No murmurs, rubs, or gallops.  GI: Abdomen soft, mildly distended, non-tender. Bowel sounds normoactive.  Skin: No jaundice, rashes, or lesions evident on exposed skin.  Musculoskeletal/Extremities: No peripheral edema.  Neurologic: A&O x 3. Sensation intact in bilateral LE. Moderate tremor of upper extremities. Otherwise no focal deficits.  Psychiatric: Flat affect. Responds appropriately to all questions.    Data   Data reviewed today: I reviewed all medications, new labs and imaging results over the last 24 hours. I personally reviewed the chest x-ray image(s) showing mild bibasilar  atelectasis.    Data     Recent Labs  Lab 12/03/17  1250   WBC 15.4*   HGB 16.9   MCV 93   *      POTASSIUM 3.4   CHLORIDE 96   CO2 24   BUN 29   CR 0.65*   ANIONGAP 18*   RUSTY 7.8*   *   ALBUMIN 3.2*   PROTTOTAL 7.5   BILITOTAL 0.7   ALKPHOS 95   ALT 45   AST 75*     Physician Attestation    I, Gee Quinn, saw patient  as part of a shared visit.  I have reviewed and discussed with the advanced practice provider their history, physical and plan.    I personally reviewed the vital signs, medications, labs and imaging .    Above mentioned PMH now admitted was hypoxic respiratory failure likely due to Aspiration Pneumonitis/PNA and alcohol withdrawal.   -will treated for aspiration pneumonia,   -Will monitor for alcohol withdrawal.  patient is currently intoxicated. Advised  on CD treatment.  Seizure precaution continue folate and thiamine treatment.    VS: B/P: 166/103, T: 97.2, P: 88, R: 20  Exam: CTAB but decreased airflow cathie, minimally increased work of breathing, abd soft/NT/ND, RRR, no m/r/g,     Gee Quinn  Date of Service (when I saw the patient): 12/03/17

## 2017-12-04 NOTE — PROGRESS NOTES
D. Per MD note: 62 year old male admitted on 12/3/2017. He has a history of chronic pain syndrome, alcohol abuse, PTSD, and panic attacks and is admitted for acute hypoxic respiratory failure with concern for aspiration pneumonia as well as alcohol intoxication.  WOC consult to assess left IT for possible pressure injury. Patient reports falling at home and that left hip is tender. There is a Mepilex dressing at left IT, there is a faint tan 2 x 3 cm pigmented area, no erythema or induration.  I. 10 minutes face to face with patient assessing left IT.   A. No evidence of pressure injury at left IT at this time. Suspect site may have been bruised and this is resolving  P. Nursing to continue pressure ulcer prevention interventions. No further WOC f/u planned.

## 2017-12-04 NOTE — PROGRESS NOTES
Admission          12/3/2017 12:03 PM  -----------------------------------------------------------  Reason for admission: Increased O2 needs d/t suspected aspiration, ETOH intoxication  Primary team notified of pt arrival.  Admitted from: ED  Via: stretcher  Accompanied by: alone  Belongings: Wallet, placed in bedside table.   Admission Profile: complete  Teaching: orientation to unit and call light- call light within reach, call don't fall, use of console, meal times, when to call for the RN, and enforced importance of safety   Access: 2 PIV's  Telemetry:Placed on pt  Ht./Wt.: complete  2 RN Skin Assessment Completed By: Mark Fitch and Rosario Yepez. Quarter-sized nonblanchable area noted to left ischial tuberosity. WOC consult placed.   Pt status: Resting in bed comfortably. Call light in reach. Will continue to monitor per POC.     Temp:  [97.2  F (36.2  C)-99.3  F (37.4  C)] 99.3  F (37.4  C)  Pulse:  [88] 88  Heart Rate:  [] 90  Resp:  [14-40] 16  BP: (112-170)/() 161/89  SpO2:  [85 %-99 %] 93 %

## 2017-12-04 NOTE — PROGRESS NOTES
CLINICAL NUTRITION SERVICES - ASSESSMENT NOTE     Nutrition Prescription    RECOMMENDATIONS FOR MDs/PROVIDERS TO ORDER:  Encourage PO intakes    Malnutrition Status:    Severe malnutrition in the context of chronic illness    Recommendations already ordered by Registered Dietitian (RD):  Supplements BID (any flavor Ensure)  Order calorie counts 12/5-12/7    Future/Additional Recommendations:  If pt is unable to meet at least 75% of lower end estimated needs (~1500 kcals, 75 g pro), recommend EN as follows: Isosource 1.5 @ goal 65 ml/hr (1560 ml/day) to provide 2340 kcals (28 kcal/kg/day), 106 g PRO (1.3 g/kg/day), 1186 ml free H2O, 275 g CHO and 23 g Fiber daily.       REASON FOR ASSESSMENT  Jorge Rosales is a/an 62 year old male assessed by the dietitian for Admission Nutrition Risk Screen for reduced oral intake over the last month    NUTRITION HISTORY  Per Chart: He has a history of chronic pain syndrome, alcohol abuse, PTSD, and panic attacks and is admitted for acute hypoxic respiratory failure with concern for aspiration pneumonia as well as alcohol intoxication.     Per pt: Reports not eating anything in the past 3 days PTA, and before this, he was only eating about 1 meal every other day for the past month or more. Pt reports having back and shoulder surgery which has affected his ability to cook and clean dishes, therefore he does not do it often. He reports that him and another tenant will go to the grocery store together. On RD visit, pt had consumed about 1/3 of a cheese burger and half of a bowl of soup. Pt reports that is all he could do that this time. Agreed to supplements.    CURRENT NUTRITION ORDERS  Diet: Regular  Intake/Tolerance: Endorses diarrhea over the past several days, averaging ~2 loose stools daily. Has had very minimal PO intake other than alcohol in this time.     LABS  Na: 132 (L), Cr: .42 (L), Glucose: 234 (H), EtOH elevated at 0.41 in ED, Labs reviewed    MEDICATIONS  Folate,  "thiamine, MVI supplement, Medications reviewed    ANTHROPOMETRICS  Height: 180.3 cm (5' 11\")  Most Recent Weight: 95.8 kg (211 lb 3.2 oz)    IBW: 78.2 kg  BMI: Overweight BMI 25-29.9  Weight History: pt reports a 70# wt loss in one year d/t his back surgery that \"ruined his life\". Per pt report, this would be ~25% wt loss in the past 1 year.   Wt Readings from Last 10 Encounters:   12/04/17 95.8 kg (211 lb 3.2 oz)   11/13/17 89.8 kg (198 lb)   09/27/11 99.9 kg (220 lb 3.2 oz)   07/08/11 100.2 kg (221 lb)   03/07/11 102.2 kg (225 lb 4.8 oz)     Dosing Weight: 83 kg (adjusted)    ASSESSED NUTRITION NEEDS  Estimated Energy Needs: 6844-2410 kcals/day (25 - 30 kcals/kg)  Justification: Maintenance  Estimated Protein Needs: 100-125 grams protein/day (1.2 - 1.5 grams of pro/kg)  Justification: Maintenance  Estimated Fluid Needs: 1 mL/kcal/day  Justification: Maintenance    PHYSICAL FINDINGS  See malnutrition section below.  Suspected DTI to Left ischial tuberosity- WOC consult ordered.    MALNUTRITION  % Intake: </= 50% for >/= 5 days (severe)  % Weight Loss: Unable to assess, per pt ~25% wt loss in one year (severe)  Subcutaneous Fat Loss: None observed  Muscle Loss: Temporal, Facial & jaw region, Upper arm (bicep, tricep) and Upper leg (quadricep, hamstring):  Mild-moderate  Fluid Accumulation/Edema: Mild  Malnutrition Diagnosis: Severe malnutrition in the context of chronic illness    NUTRITION DIAGNOSIS  Inadequate protein-energy intake related to ETOH abuse, limited mobility and decreased appetite as evidenced by <50% of meals over the past 1 month+ and pt report.      INTERVENTIONS  Implementation  Nutrition Education: Provided education on role of RD and nutrition POC. Educated pt on higher protein foods and supplements to help regain muscle mass.   Medical food supplement therapy   Calorie Counts    Goals  Patient to consume % of nutritionally adequate meal trays TID, or the equivalent with " supplements/snacks.     Monitoring/Evaluation  Progress toward goals will be monitored and evaluated per protocol.      Georgie Mcnamara RD, MS, LD  6B- Pager: [phone]

## 2017-12-04 NOTE — ED NOTES
62 yr old male with history of COPD, tobacco dependence, and alcohol misuse signed over to my by my colleague at 1830.  The patient arrived to the ED via EMS after being found at home with altered mental status, decreased responsiveness; covered in vomit and feces.  EMS noted the patient to be hypoxic.  On my evaluation the patient has clinically sobered after a 8 hr ED stay but continues to require 15 L via oxyplus mask.  He has course breath sounds and CXR which appears to reveal a subtle RLL consolidation.   With hospitalization last month for hypoxemic respiratory failure I would be concerned about recurrence with possible addition of aspiration pneumonitis.  He has a new leukocytosis and O2 requirements.  Plan will be for coverage with Unasyn and addition of Dexamethasone based on COPD history.  He has received a banana bag and is tolerating PO but is not medically cleared for detox.  He will require hospitalization and would likely benefit from CD and Psychiatric consultation.       Pablo Bowman MD  12/03/17 1959

## 2017-12-04 NOTE — PROGRESS NOTES
Care Coordinator Progress Note     Admission Date/Time:  12/3/2017  Attending MD:  Carrie Green MD     Data  Chart reviewed, discussed with interdisciplinary team.   Patient was admitted for:    Alcoholic intoxication without complication (H)  Aspiration pneumonitis (H)  Acute hypoxemic respiratory failure (H)  Altered mental status, unspecified altered mental status type  Alcohol abuse with uncomplicated intoxication (H)  Hypoxemia.    Concerns with insurance coverage for discharge needs: None.  Current Living Situation: Patient lives alone.  Services Involved: Home Care RN and HHA  Transportation: Family or Friend will provide  Barriers to Discharge: pending medical clearance, respiratory improvement    Coordination of Care and Referrals: Provided patient/family with options for resumption of Home Care.        Assessment  Per discussion in interdisciplinary rounds, the primary team states that the patient continues to require hospitalization for the following reasons: continues to be under ETOH withdrawal protocol, possible PNA vs pneumonitis, continue to follow for medical improvement.  Team states that the patient will require hospitalization for 1-2 days then dc home to previous living arrangement and services.  No O2 at home per chart review-on 5L O2 at this time via NC.  Pt weaned off Bipap since admission.  RNCC received a call from [facility] stating they are aware of admission, pt has RN and HHA that sees him at home, resumption orders placed in chart..   [employer]  Phone  [phone]  Fax  [phone]      RNCC will continue to follow for additional dc needs.  Will need a walk test if not off O2 prior to DC.     Plan  Pending medical work up   ? Home O2 at time of dc?      Luisa Jansen RN

## 2017-12-04 NOTE — PROGRESS NOTES
[facility], [place]    Hospitalist Progress Note      Assessment & Plan   Jorge Rosales is a 62 year old male admitted on 12/3/2017. He has a history of chronic pain syndrome, alcohol abuse, PTSD, and panic attacks and is admitted for acute hypoxic respiratory failure with concern for aspiration pneumonia as well as alcohol intoxication. With inability to maintain good sats despite increasing O2 overnight. Now on BIPAP.      Acute Hypoxic Respiratory Failure, Suspect Aspiration Pneumonia. Presented to ED acutely intoxicated with SpO2 down to 85% on RA, initially requiring up to 15 L of supplemental O2 to maintain sats. WBC elevated at 15.4, VBG with elevated lactic acid, no CO2 retention. CXR with mild bibasilar atelectasis, possible RLL consolidation. Recently hospitalized 11/6-11/13 with hypoxemic respiratory failure felt 2/2 COPD exacerbation (no formal diagnosis of COPD although CT chest 11/6 with emphysematous changes), improved with 2 days of prednisone. Recommended patient obtain OP PFT's although has not yet done so. Started on IV unasyn in ED due to concern for aspiration pneumonia with alcohol intoxication; also received 10 mg decadron for possible COPD exacerbation. Currently O2 needs improved at 4 L via oxymask, SpO2 96%, patient afebrile. Procal without evidence of systemic infection. Overnight with difficulty maintaining O2 despite increasing O2 delivery.  But appeared comfortable and in no acute distress throughout. ?Chemical pneumonitis? ?disconnected oxygen tubing?  Shunting? Currently appears to be aspiration pneumonitis and not pna, but still high wbc, and consistent RLL crackles on exam  - Continue IV unasyn for now for possible pna if improves clinically as waking, will d/c  - Hold off on further steroids for now  - Stan QID  - Aggressive IS  - COPD RT consulted, appreciate assistance  - Continuous pulse ox  - Supplemental O2 PRN for SpO2 <88%  - Trend CBC,  BMP  - Will need PFT's - IP vs OP  - unclear if O2 was connected overnight. Once more awake  - if doesn't clear with this, will consider Echo or eval for shunting.     Acute Alcohol Intoxication. Long-standing history of alcohol abuse, has been to CD treatment three times in the past. Reports social stressor of his wife passing away in the past year which has lead to increased alcohol use. Endorses history of withdrawal seizures and hallucinations. Recently hospitalized 11/6-11/13 with alcohol withdrawal. EtOH elevated at 0.41 in ED. Mild AST elevation of 75, ALT 45. Received 1 banana bag, currently much more alert. Unsure about pursuing CD treatment at this time.  - [facility] protocol  - Seizure precautions  - Folate, thiamine, MVI supplement  - SW consult to assist in CD discussion  - Consider psychiatry consult in AM     Lactic Acid Elevation. LA elevated at 2.9 in ED. Received 1 L IVF and started on IV unasyn for possible aspiration pneumonia. Likely elevation in setting of alcohol use, hypoxia, and ?infection.  - Repeat LA ordered     PTSD, Panic Attacks. Psychiatry evaluated during last admission, discontinued PTA alprazolam and started on clonazepam with goal of tapering off over next 12 months. Also maintained on venlafaxine and ramelteon PTA.  - Continue venlafaxine 75 mg QD, clonazepam 4 mg BID, ramelteon 8 mg QHS     Chronic Pain Syndrome. 2/2 joint pain. History of back surgery ~1 year ago, shoulder surgery several months ago. Maintained on suboxone PTA, [facility]. Also managed with gabapentin and robaxin. No acute concerns.  - Continue gabapentin 300 mg TID  - Continue suboxone 2mg BID  - Continue methocarbamol 750 mg QHS PRN     Diet:  Regular diet  Fluids: IV LR @ 100 cc/hour x 1 liter  DVT Prophylaxis: Pneumatic Compression Devices  Code Status: Prior        Disposition Plan     Expected discharge: 2 - 3 days; recommended to prior living arrangement once antibiotic plan  "established and alcohol withdrawal complete.  Carrie Green    Interval History   Overnight had sats 89-90% despite increasing O2 delivery. Switched to BiPAP. Still nauseated this AM, but feels like his vomiting and diarrhea are \"all out\".  Feels \"horrible\" and achy all over.      -Data reviewed today: I reviewed all new labs and imaging results over the last 24 hours. I personally reviewed no images or EKG's today.    Physical Exam   Temp: 99  F (37.2  C) Temp src: Oral BP: 153/89 Pulse: 88 Heart Rate: 86 Resp: 17 SpO2: 96 % O2 Device: BiPAP/CPAP Oxygen Delivery: 15 LPM  Vitals:    12/03/17 2258 12/04/17 0400   Weight: 95.7 kg (211 lb) 95.8 kg (211 lb 3.2 oz)     Vital Signs with Ranges  Temp:  [97.2  F (36.2  C)-99.3  F (37.4  C)] 99  F (37.2  C)  Pulse:  [88] 88  Heart Rate:  [] 86  Resp:  [14-40] 17  BP: (112-170)/() 153/89  FiO2 (%):  [50 %-60 %] 60 %  SpO2:  [85 %-99 %] 96 %  I/O last 3 completed shifts:  In: 480 [P.O.:480]  Out: 950 [Urine:950]    Constitutional: Wakes easily, on Bipap  Respiratory: Coughs quickly with deep breathing, in no distress on BiPAP, RLL crackles  Cardiovascular: rrr s1, s2, no m/r/g  GI: soft, nontender, BS normoactive  Skin/Integumen: no rashes  Other:      Medications     - MEDICATION INSTRUCTIONS -       - MEDICATION INSTRUCTIONS -       lactated ringers 100 mL/hr at 12/04/17 0400       venlafaxine  75 mg Oral Daily     thiamine  100 mg Oral Daily     folic acid  1 mg Oral Daily     multivitamin, therapeutic with minerals  1 tablet Oral Daily     aspirin EC  81 mg Oral Daily     buprenorphine HCl-naloxone HCl  1 Film Sublingual BID     clonazePAM  4 mg Oral BID     cyanocobalamin  1,000 mcg Oral Daily     gabapentin  300 mg Oral TID     ramelteon  8 mg Oral At Bedtime     ipratropium - albuterol 0.5 mg/2.5 mg/3 mL  3 mL Nebulization 4x daily     ampicillin-sulbactam (UNASYN) IV  3 g Intravenous Q6H       Data     Recent Labs  Lab 12/04/17  0614 12/03/17  1250 "   WBC 16.6* 15.4*   HGB 14.3 16.9   MCV 93 93   PLT 79* 106*   * 137   POTASSIUM 3.8 3.4   CHLORIDE 92* 96   CO2 30 24   BUN 17 29   CR 0.42* 0.65*   ANIONGAP 11 18*   RUSTY 7.5* 7.8*   * 118*   ALBUMIN  --  3.2*   PROTTOTAL  --  7.5   BILITOTAL  --  0.7   ALKPHOS  --  95   ALT  --  45   AST  --  75*       Recent Results (from the past 24 hour(s))   XR Chest Port 1 View    Narrative    XR CHEST PORT 1 VW 12/3/2017 12:57 PM    History: hypoxemia;     Comparison: 11/10/2017    Findings: Cardiac silhouette is not enlarged. Mild bibasilar  atelectasis. The lungs are otherwise clear. Bones, subcutaneous soft  tissues, visualized upper abdomen are unremarkable. No pneumothorax.      Impression    Impression: Mild bibasilar atelectasis. Lungs are otherwise clear.    PIPE ELOISA   XR Chest Port 1 View    Narrative    XR CHEST PORT 1 VW, 12/4/2017 3:51 AM.    Comparison: 12/3/2017.    History: hypoxia, worsening since admission; .    Findings:   Cardiomediastinal silhouette and pulmonary vasculature within normal  limits. Right basilar opacities. No pleural effusion or pneumothorax.  No soft tissue abnormalities. No concerning bony lesions.       Impression    Impression:   Increased right basilar consolidation.    I have personally reviewed the examination and initial interpretation  and I agree with the findings.    CESAR CASTILLO MD

## 2017-12-05 ENCOUNTER — CARE COORDINATION (OUTPATIENT)
Dept: CARE COORDINATION | Facility: CLINIC | Age: 62
End: 2017-12-05

## 2017-12-05 VITALS
TEMPERATURE: 97.3 F | DIASTOLIC BLOOD PRESSURE: 52 MMHG | HEART RATE: 89 BPM | HEIGHT: 71 IN | RESPIRATION RATE: 18 BRPM | BODY MASS INDEX: 29.57 KG/M2 | SYSTOLIC BLOOD PRESSURE: 112 MMHG | WEIGHT: 211.2 LBS | OXYGEN SATURATION: 72 %

## 2017-12-05 LAB
ANION GAP SERPL CALCULATED.3IONS-SCNC: 6 MMOL/L (ref 3–14)
BUN SERPL-MCNC: 14 MG/DL (ref 7–30)
CALCIUM SERPL-MCNC: 8.4 MG/DL (ref 8.5–10.1)
CHLORIDE SERPL-SCNC: 97 MMOL/L (ref 94–109)
CO2 SERPL-SCNC: 32 MMOL/L (ref 20–32)
CREAT SERPL-MCNC: 0.56 MG/DL (ref 0.66–1.25)
ERYTHROCYTE [DISTWIDTH] IN BLOOD BY AUTOMATED COUNT: 15.5 % (ref 10–15)
GFR SERPL CREATININE-BSD FRML MDRD: >90 ML/MIN/1.7M2
GLUCOSE SERPL-MCNC: 120 MG/DL (ref 70–99)
HCT VFR BLD AUTO: 43.5 % (ref 40–53)
HGB BLD-MCNC: 14.1 G/DL (ref 13.3–17.7)
MCH RBC QN AUTO: 30.7 PG (ref 26.5–33)
MCHC RBC AUTO-ENTMCNC: 32.4 G/DL (ref 31.5–36.5)
MCV RBC AUTO: 95 FL (ref 78–100)
PLATELET # BLD AUTO: 74 10E9/L (ref 150–450)
POTASSIUM SERPL-SCNC: 3.4 MMOL/L (ref 3.4–5.3)
RBC # BLD AUTO: 4.6 10E12/L (ref 4.4–5.9)
SODIUM SERPL-SCNC: 135 MMOL/L (ref 133–144)
WBC # BLD AUTO: 13.4 10E9/L (ref 4–11)

## 2017-12-05 PROCEDURE — 36415 COLL VENOUS BLD VENIPUNCTURE: CPT | Performed by: PEDIATRICS

## 2017-12-05 PROCEDURE — 80048 BASIC METABOLIC PNL TOTAL CA: CPT | Performed by: PEDIATRICS

## 2017-12-05 PROCEDURE — 25000125 ZZHC RX 250: Performed by: PHYSICIAN ASSISTANT

## 2017-12-05 PROCEDURE — 25000132 ZZH RX MED GY IP 250 OP 250 PS 637: Performed by: PHYSICIAN ASSISTANT

## 2017-12-05 PROCEDURE — 99239 HOSP IP/OBS DSCHRG MGMT >30: CPT | Performed by: INTERNAL MEDICINE

## 2017-12-05 PROCEDURE — 85027 COMPLETE CBC AUTOMATED: CPT | Performed by: PEDIATRICS

## 2017-12-05 RX ADMIN — LORAZEPAM 2 MG: 1 TABLET ORAL at 03:08

## 2017-12-05 RX ADMIN — IPRATROPIUM BROMIDE AND ALBUTEROL SULFATE 3 ML: .5; 3 SOLUTION RESPIRATORY (INHALATION) at 08:03

## 2017-12-05 NOTE — PROGRESS NOTES
Patient decided to leave AMA after consultation with MD. Pt left with cigarettes that he came in with, the few personal possessions he had, and a pair of [place] shorts that were given to him by staff. PIV removed. AMA form signed and placed in chart.

## 2017-12-05 NOTE — PLAN OF CARE
Problem: Patient Care Overview  Goal: Plan of Care/Patient Progress Review  Outcome: No Change  Pt transferred to  from  this evening at 1800. Pt had his glasses, wallet, and cell phone. Placed items in  the bedside table. A&Ox4, but forgetful. Pt on the MSSA protocol and scored 10 and 12 for hallucinations, heart rate, and tremors. Ativan given per MSSA protocol. Pt on 4-5L NC. Pt was incontinent of urine and stool. Pt ambulated to the bathroom with the assist of one. Reg diet, and amairani counts start tomorrow. Slight bruise on his left ischial tuberosity. Pt complained of chronic pain in his right shoulder. Pt has been using call light appropriately, but bed alarm on for safety. Side rails padded for seizures precautions. Continue with plan of care.

## 2017-12-05 NOTE — PROGRESS NOTES
Transfer  Transferred to: 5A  Via: bed  Reason for transfer:Pt no longer appropriate for 6B- improved patient condition  Family: Pt declined  Belongings: Packed and sent with pt  Chart: Delivered with pt to next unit  Medications: Delivered with pt to next unit  Report given to: Magdalena BLEDSOE  Pt status: VSS on 5L NC. Verbalizes understanding of reason for transfer.

## 2017-12-05 NOTE — PROGRESS NOTES
Was called by RN at 8 am patient adamant to leave AMA    Went to talk further with the patient and mentioned that he is really worried bout his car and was able to verbalized why he was in the hospital and what was the risks involved with leaving currently.   Did mention to him that he is currently needing medications for alcohol withdrawal and also treatment for his COPD and hypoxia and not safe to go home  Patient still wanted to leave AMA; also offered if we could do anything to help him stay in the hospital and was still willing to leave    Patient is able to understand the circumstance why he is in the hospital and understand the risk involved with leaving and despite that wants to leave AMA    -will be discharging patient under AMA; also recommended [facility]  -agreeable to sign paperwork

## 2017-12-05 NOTE — PLAN OF CARE
Problem: Patient Care Overview  Goal: Plan of Care/Patient Progress Review  VSS, 4L NC, A/Ox4 but forgetful. Has chronic shoulder pain but denied interventions. Scored 7 and 9 on MSSA protocol. Ativan given. No hallucinations this shift. Incont of urine and stool x1 overnight. Urinal @ bedside. Regular diet with amairani counts. Slight bruise on L ischial tuberosity. PIV SL.  RN smelled cigarette smoke when caring for pt next door and was found to have smoked a cigarette in his room, security was called and found more cigarettes in pt's bed. Lighter and cigarettes confiscated and put in pt med bin. Pt was educated about not smoking in his room and was offered nicotine patch, but refused. Will continue to monitor and follow POC.

## 2017-12-06 NOTE — PROGRESS NOTES
The Patient left AMA so there is no post Discharge Follow Up required at this time              Before we could  monitor him and further evaluate patient left AMA and did not wait for further oxygen to be arranged despite knowing the risk

## 2017-12-06 NOTE — DISCHARGE SUMMARY
[facility], [place]    Internal Medicine Discharge Summary- Gold Service    Date of Admission:  12/3/2017  Date of Discharge:  12/5/2017 10:00 AM  Discharging Provider: Jamaal Ford  Discharge Team: Gold 1    Discharge Diagnoses   Aspiration pneumonitis  Acute hypoxic respiratory failure   Acute alcohol intoxication  Lactic acidosis   PTSD, panic attack  Chronic pain syndrome    Follow-ups Needed After Discharge   Please follow up with your primary care provider [facility] in few days  Hospital Course   Jorge Rosales is a 62 year old male admitted on 12/3/2017. He has a history of chronic pain syndrome, alcohol abuse, PTSD, and panic attacks and is admitted for acute hypoxic respiratory failure with concern for aspiration pneumonitis as well as alcohol intoxication.   Initially requiring up to 15 l oxygen downtrending  Needing up to 4l of oxygen     Suspect this is related to aspiration pneumonitis. Before we could  monitor him and further evaluate patient left AMA and did not wait for further oxygen to be arranged despite knowing the risk      Acute Hypoxic Respiratory Failure, Suspect Aspiration Pneumonia. Presented to ED acutely intoxicated with SpO2 down to 85% on RA, initially requiring up to 15 L of supplemental O2 to maintain sats. WBC elevated at 15.4, VBG with elevated lactic acid, no CO2 retention. CXR with mild bibasilar atelectasis, possible RLL consolidation. Recently hospitalized 11/6-11/13 with hypoxemic respiratory failure felt 2/2 COPD exacerbation (no formal diagnosis of COPD although CT chest 11/6 with emphysematous changes), improved with 2 days of prednisone. Recommended patient obtain OP PFT's although has not yet done so. Started on IV unasyn in ED due to concern for aspiration pneumonia with alcohol intoxication; also received 10 mg decadron for possible COPD exacerbation.   Needed bipap on the night of admission but quickly recovered needing 4l oxygen on the  day of discharge    - this was likely related to COPD exacerbation vs aspiration pneumonitis  -unfortunately patient left AMA despite knowing the risk and further evaluation could not be pursued and oxygen could not be prescribed as well  -patient left AMA despite understanding risk         Acute Alcohol Intoxication. Long-standing history of alcohol abuse, has been to CD treatment three times in the past. Reports social stressor of his wife passing away in the past year which has lead to increased alcohol use. Endorses history of withdrawal seizures and hallucinations. Recently hospitalized 11/6-11/13 with alcohol withdrawal. EtOH elevated at 0.41 in ED. Mild AST elevation of 75, ALT 45.   Had refused chem dep in past  Had been requiring lorazepam  For MSSA score of  7 to 9  Again this was another concern given he had h/o seizures in the past; this was verbalized to the patient  Patient able to understand the risk but despite that wanted to leave AMA and so left under AMA discharge      PTSD, Panic Attacks. Psychiatry evaluated during last admission, discontinued PTA alprazolam and started on clonazepam with goal of tapering off over next 12 months. Also maintained on venlafaxine and ramelteon PTA.  - Continue venlafaxine 75 mg QD, clonazepam 4 mg BID, ramelteon 8 mg QHS  -during my conversation with the patient he was oriented to time, place and person and capable of understanding circumstances  Of his admission and my concerns and reasoning behind recommendations of needing continued hospitalization  -despite this decided to leave against medical advice      Chronic Pain Syndrome. 2/2 joint pain. History of back surgery ~1 year ago, shoulder surgery several months ago. Maintained on suboxone PTA, [facility]. Also managed with gabapentin and robaxin. No acute concerns.  - Continue gabapentin 300 mg TID  - Continue suboxone 2mg BID  - Continue methocarbamol 750 mg QHS  PRN        Consultations This Hospital Stay   SOCIAL WORK IP CONSULT  IP RESPIRATORY CARE CHRONIC PULMONARY DISEASE SPECIALIST  WOUND OSTOMY CONTINENCE NURSE  IP CONSULT  PHYSICAL THERAPY ADULT IP CONSULT  OCCUPATIONAL THERAPY ADULT IP CONSULT  IP RESPIRATORY CARE CHRONIC PULMONARY DISEASE SPECIALIST     Code Status   Full Code    Time Spent on this Encounter   I, Jamaal Ford, personally saw the patient today and spent greater than 30 minutes discharging this patient.       Jaamal Ford  Internal Medicine Staff Hospitalist Service  [facility]  Pager: [phone]  ______________________________________________________________________    Physical Exam   Vital Signs: Temp: 97.3  F (36.3  C) Temp src: Oral BP: 112/52   Heart Rate: 86 Resp: 18 SpO2: (!) 72 % O2 Device: None (Room air) (pt had removed O2) Oxygen Delivery: 4 LPM  Weight: 211 lbs 3.21 oz    General Appearance: Patient was anxious and tremulous   Respiratory: B/l decreased breath sounds   Cardiovascular: s1s2 with no murmur   GI: soft, non tender, bowel sounds   Skin: no rash  Neuro: AAOx3, b/l UE and LE-5/5    Significant Results and Procedures   Most Recent 3 CBC's:  Recent Labs   Lab Test  12/05/17   0846  12/04/17   0614  12/03/17   1250   WBC  13.4*  16.6*  15.4*   HGB  14.1  14.3  16.9   MCV  95  93  93   PLT  74*  79*  106*       Pending Results     Unresulted Labs Ordered in the Past 30 Days of this Admission     No orders found from 10/4/2017 to 12/4/2017.             Primary Care Physician   [facility]    Discharge Disposition   Discharged to home; AMA discharge  Condition at discharge: Stable    Discharge Orders     Home care nursing referral     Reason for your hospital stay   You were admitted with aspiration in setting of alcohol use; you were getting treatment for alcohol withdrawal, COPD and aspiration and since you will be leaving against medical advice I will strongly recommend you to follow with primary care in  few days     Adult [facility] Follow-up and recommended labs and tests   Follow up with primary care provider, [facility], within 7 days for hospital follow- up.  The following labs/tests are recommended: SaO2.      Appointments on [place] and/or [facility] (with [facility] or [facility] provider or service). Call [phone] if you haven't heard regarding these appointments within 7 days of discharge.     Activity   Your activity upon discharge: activity as tolerated     Full Code     Diet   Follow this diet upon discharge: Orders Placed This Encounter     Calorie Counts     Snacks/Supplements Adult: Ensure Plus (Adult); Between Meals     Calorie Counts     Snacks/Supplements Adult: Ensure Plus (Adult); Between Meals     Regular Diet Adult       Discharge Medications   Discharge Medication List as of 12/5/2017  8:46 AM      CONTINUE these medications which have NOT CHANGED    Details   clonazePAM (KLONOPIN) 2 MG tablet Take 2 tablets (4 mg) by mouth 2 times daily, Disp-60 tablet, R-0, Local Print      gabapentin (NEURONTIN) 300 MG capsule Take 1 capsule (300 mg) by mouth 3 times daily, Disp-90 capsule, Historical      venlafaxine (EFFEXOR-ER) 75 MG TB24 24 hr tablet Take 1 tablet (75 mg) by mouth daily, Disp-30 each, Historical      methocarbamol (ROBAXIN) 750 MG tablet Take 750 mg by mouth nightly as needed for muscle spasms , Historical      ramelteon (ROZEREM) 8 MG tablet Take 8 mg by mouth At Bedtime, Historical      bismuth subsalicylate (PEPTO BISMOL) 262 MG chewable tablet Take 524 mg by mouth 2 times daily as needed for diarrhea, Historical      buprenorphine HCl-naloxone HCl (SUBOXONE) 8-2 MG per film Place 1 Film under the tongue 2 times daily, Historical      lidocaine (LMX4) 4 % CREA cream Apply 1 Application topically 4 times daily as needed for mild pain (Apply liberal amount to shoulder)Historical      aspirin 81 MG tablet Take 1 tablet by mouth daily., R-3, Historical      cyanocolbalamin  (VITAMIN B-12) 1000 MCG tablet Take 1 tablet by mouth daily., Disp-100 tablet, R-12, Historical      fish oil-omega-3 fatty acids 1000 MG capsule Take 2 capsules by mouth daily., Disp-180 capsule, R-12, Historical      Multiple Vitamin (MULTIVITAMIN) per tablet Take 1 tablet by mouth daily., Disp-100 tablet, R-12, Historical           Allergies   No Known Allergies

## 2017-12-06 NOTE — PROGRESS NOTES
Care Coordinator Progress Note     Admission Date/Time:  12/3/2017  Attending MD:  Carrie Green MD     Data  Chart reviewed, discussed with interdisciplinary team.   Patient was admitted for:    Alcoholic intoxication without complication (H)  Aspiration pneumonitis (H)  Acute hypoxemic respiratory failure (H)  Altered mental status, unspecified altered mental status type  Alcohol abuse with uncomplicated intoxication (H)  Hypoxemia.     Pt left ama yesterday, No O2 ordered for pt, no walk test completed.  [facility] lvm asking if pt has been dc'd.  RNCC called and left VM for [facility] and explained pt left ama and work up was not complete.  O2 for home was not ordered or set up.    [facility]  Phone  [phone]  Fax  [phone]      ABDIEL Diane, BSN    [facility]    Medicine Group  [address] [place] [id number]    [email or web address]  [email or web address]    Office: [phone] Pager: [phone]

## 2017-12-14 ENCOUNTER — APPOINTMENT (OUTPATIENT)
Dept: GENERAL RADIOLOGY | Facility: CLINIC | Age: 62
DRG: 897 | End: 2017-12-14
Attending: EMERGENCY MEDICINE
Payer: COMMERCIAL

## 2017-12-14 ENCOUNTER — TELEPHONE (OUTPATIENT)
Dept: BEHAVIORAL HEALTH | Facility: CLINIC | Age: 62
End: 2017-12-14

## 2017-12-14 ENCOUNTER — HOSPITAL ENCOUNTER (INPATIENT)
Facility: CLINIC | Age: 62
LOS: 8 days | Discharge: HOME HEALTH | DRG: 897 | End: 2017-12-22
Attending: EMERGENCY MEDICINE | Admitting: INTERNAL MEDICINE
Payer: COMMERCIAL

## 2017-12-14 DIAGNOSIS — F33.9 RECURRENT MAJOR DEPRESSIVE DISORDER, REMISSION STATUS UNSPECIFIED (H): ICD-10-CM

## 2017-12-14 DIAGNOSIS — J45.41 MODERATE PERSISTENT REACTIVE AIRWAY DISEASE WITH ACUTE EXACERBATION: ICD-10-CM

## 2017-12-14 DIAGNOSIS — R09.02 HYPOXEMIA: ICD-10-CM

## 2017-12-14 DIAGNOSIS — F10.229 ACUTE ALCOHOLIC INTOXICATION IN ALCOHOLISM WITH COMPLICATION (H): ICD-10-CM

## 2017-12-14 DIAGNOSIS — F10.230 ALCOHOL DEPENDENCE WITH UNCOMPLICATED WITHDRAWAL (H): ICD-10-CM

## 2017-12-14 DIAGNOSIS — M54.5 CHRONIC LOW BACK PAIN, UNSPECIFIED BACK PAIN LATERALITY, WITH SCIATICA PRESENCE UNSPECIFIED: ICD-10-CM

## 2017-12-14 DIAGNOSIS — F13.930 BENZODIAZEPINE WITHDRAWAL WITHOUT COMPLICATION (H): ICD-10-CM

## 2017-12-14 DIAGNOSIS — J44.1 OBSTRUCTIVE CHRONIC BRONCHITIS WITH EXACERBATION (H): ICD-10-CM

## 2017-12-14 DIAGNOSIS — R09.02 HYPOXIA: ICD-10-CM

## 2017-12-14 DIAGNOSIS — J44.1 COPD EXACERBATION (H): ICD-10-CM

## 2017-12-14 DIAGNOSIS — Z13.6 CARDIOVASCULAR SCREENING; LDL GOAL LESS THAN 160: Primary | ICD-10-CM

## 2017-12-14 DIAGNOSIS — K21.9 GASTROESOPHAGEAL REFLUX DISEASE, ESOPHAGITIS PRESENCE NOT SPECIFIED: ICD-10-CM

## 2017-12-14 DIAGNOSIS — G89.29 CHRONIC LOW BACK PAIN, UNSPECIFIED BACK PAIN LATERALITY, WITH SCIATICA PRESENCE UNSPECIFIED: ICD-10-CM

## 2017-12-14 DIAGNOSIS — F17.210 CIGARETTE SMOKER: ICD-10-CM

## 2017-12-14 PROBLEM — F10.239 ALCOHOL DEPENDENCE WITH WITHDRAWAL (H): Status: ACTIVE | Noted: 2017-12-14

## 2017-12-14 LAB
ALBUMIN SERPL-MCNC: 2.9 G/DL (ref 3.4–5)
ALCOHOL BREATH TEST: 0.17 (ref 0–0.01)
ALP SERPL-CCNC: 97 U/L (ref 40–150)
ALT SERPL W P-5'-P-CCNC: 85 U/L (ref 0–70)
AMPHETAMINES UR QL SCN: NEGATIVE
ANION GAP SERPL CALCULATED.3IONS-SCNC: 16 MMOL/L (ref 3–14)
AST SERPL W P-5'-P-CCNC: 93 U/L (ref 0–45)
BARBITURATES UR QL: NEGATIVE
BASOPHILS # BLD AUTO: 0 10E9/L (ref 0–0.2)
BASOPHILS NFR BLD AUTO: 0.1 %
BENZODIAZ UR QL: POSITIVE
BILIRUB SERPL-MCNC: 0.9 MG/DL (ref 0.2–1.3)
BUN SERPL-MCNC: 18 MG/DL (ref 7–30)
CALCIUM SERPL-MCNC: 7.7 MG/DL (ref 8.5–10.1)
CANNABINOIDS UR QL SCN: NEGATIVE
CHLORIDE SERPL-SCNC: 96 MMOL/L (ref 94–109)
CO2 SERPL-SCNC: 26 MMOL/L (ref 20–32)
COCAINE UR QL: NEGATIVE
CREAT SERPL-MCNC: 0.61 MG/DL (ref 0.66–1.25)
DIFFERENTIAL METHOD BLD: ABNORMAL
EOSINOPHIL # BLD AUTO: 0 10E9/L (ref 0–0.7)
EOSINOPHIL NFR BLD AUTO: 0 %
ERYTHROCYTE [DISTWIDTH] IN BLOOD BY AUTOMATED COUNT: 15.6 % (ref 10–15)
ETHANOL UR QL SCN: POSITIVE
GFR SERPL CREATININE-BSD FRML MDRD: >90 ML/MIN/1.7M2
GLUCOSE SERPL-MCNC: 155 MG/DL (ref 70–99)
HCT VFR BLD AUTO: 43.1 % (ref 40–53)
HGB BLD-MCNC: 14.5 G/DL (ref 13.3–17.7)
IMM GRANULOCYTES # BLD: 0.1 10E9/L (ref 0–0.4)
IMM GRANULOCYTES NFR BLD: 0.3 %
LACTATE BLD-SCNC: 2.5 MMOL/L (ref 0.7–2)
LIPASE SERPL-CCNC: 93 U/L (ref 73–393)
LYMPHOCYTES # BLD AUTO: 0.9 10E9/L (ref 0.8–5.3)
LYMPHOCYTES NFR BLD AUTO: 5.3 %
MAGNESIUM SERPL-MCNC: 1.4 MG/DL (ref 1.6–2.3)
MCH RBC QN AUTO: 30.5 PG (ref 26.5–33)
MCHC RBC AUTO-ENTMCNC: 33.6 G/DL (ref 31.5–36.5)
MCV RBC AUTO: 91 FL (ref 78–100)
MONOCYTES # BLD AUTO: 1.2 10E9/L (ref 0–1.3)
MONOCYTES NFR BLD AUTO: 7 %
NEUTROPHILS # BLD AUTO: 15 10E9/L (ref 1.6–8.3)
NEUTROPHILS NFR BLD AUTO: 87.3 %
NRBC # BLD AUTO: 0 10*3/UL
NRBC BLD AUTO-RTO: 0 /100
OPIATES UR QL SCN: NEGATIVE
PHOSPHATE SERPL-MCNC: 1.9 MG/DL (ref 2.5–4.5)
PLATELET # BLD AUTO: 126 10E9/L (ref 150–450)
POTASSIUM SERPL-SCNC: 3.5 MMOL/L (ref 3.4–5.3)
PROCALCITONIN SERPL-MCNC: 0.17 NG/ML
PROT SERPL-MCNC: 6.8 G/DL (ref 6.8–8.8)
RBC # BLD AUTO: 4.75 10E12/L (ref 4.4–5.9)
SODIUM SERPL-SCNC: 138 MMOL/L (ref 133–144)
WBC # BLD AUTO: 17.1 10E9/L (ref 4–11)

## 2017-12-14 PROCEDURE — 25000125 ZZHC RX 250: Performed by: INTERNAL MEDICINE

## 2017-12-14 PROCEDURE — 25000128 H RX IP 250 OP 636: Performed by: INTERNAL MEDICINE

## 2017-12-14 PROCEDURE — 25000125 ZZHC RX 250: Performed by: EMERGENCY MEDICINE

## 2017-12-14 PROCEDURE — 80053 COMPREHEN METABOLIC PANEL: CPT | Performed by: EMERGENCY MEDICINE

## 2017-12-14 PROCEDURE — HZ2ZZZZ DETOXIFICATION SERVICES FOR SUBSTANCE ABUSE TREATMENT: ICD-10-PCS | Performed by: INTERNAL MEDICINE

## 2017-12-14 PROCEDURE — 25000128 H RX IP 250 OP 636

## 2017-12-14 PROCEDURE — 83735 ASSAY OF MAGNESIUM: CPT | Performed by: NURSE PRACTITIONER

## 2017-12-14 PROCEDURE — 94640 AIRWAY INHALATION TREATMENT: CPT | Performed by: EMERGENCY MEDICINE

## 2017-12-14 PROCEDURE — 25000132 ZZH RX MED GY IP 250 OP 250 PS 637: Performed by: NURSE PRACTITIONER

## 2017-12-14 PROCEDURE — 25000132 ZZH RX MED GY IP 250 OP 250 PS 637: Performed by: EMERGENCY MEDICINE

## 2017-12-14 PROCEDURE — 25000125 ZZHC RX 250

## 2017-12-14 PROCEDURE — 93005 ELECTROCARDIOGRAM TRACING: CPT | Performed by: INTERNAL MEDICINE

## 2017-12-14 PROCEDURE — 36415 COLL VENOUS BLD VENIPUNCTURE: CPT | Performed by: OBSTETRICS & GYNECOLOGY

## 2017-12-14 PROCEDURE — 99223 1ST HOSP IP/OBS HIGH 75: CPT | Mod: AI | Performed by: INTERNAL MEDICINE

## 2017-12-14 PROCEDURE — 25800025 ZZH RX 258: Performed by: EMERGENCY MEDICINE

## 2017-12-14 PROCEDURE — 36415 COLL VENOUS BLD VENIPUNCTURE: CPT | Performed by: NURSE PRACTITIONER

## 2017-12-14 PROCEDURE — 93010 ELECTROCARDIOGRAM REPORT: CPT | Performed by: INTERNAL MEDICINE

## 2017-12-14 PROCEDURE — 25800025 ZZH RX 258: Performed by: INTERNAL MEDICINE

## 2017-12-14 PROCEDURE — 25000125 ZZHC RX 250: Performed by: NURSE PRACTITIONER

## 2017-12-14 PROCEDURE — 85025 COMPLETE CBC W/AUTO DIFF WBC: CPT | Performed by: EMERGENCY MEDICINE

## 2017-12-14 PROCEDURE — 99285 EMERGENCY DEPT VISIT HI MDM: CPT | Mod: Z6 | Performed by: EMERGENCY MEDICINE

## 2017-12-14 PROCEDURE — 25000132 ZZH RX MED GY IP 250 OP 250 PS 637: Performed by: INTERNAL MEDICINE

## 2017-12-14 PROCEDURE — 84145 PROCALCITONIN (PCT): CPT | Performed by: NURSE PRACTITIONER

## 2017-12-14 PROCEDURE — 96375 TX/PRO/DX INJ NEW DRUG ADDON: CPT | Performed by: EMERGENCY MEDICINE

## 2017-12-14 PROCEDURE — 12000001 ZZH R&B MED SURG/OB UMMC

## 2017-12-14 PROCEDURE — 82075 ASSAY OF BREATH ETHANOL: CPT | Performed by: EMERGENCY MEDICINE

## 2017-12-14 PROCEDURE — 96361 HYDRATE IV INFUSION ADD-ON: CPT | Performed by: EMERGENCY MEDICINE

## 2017-12-14 PROCEDURE — 83605 ASSAY OF LACTIC ACID: CPT | Performed by: OBSTETRICS & GYNECOLOGY

## 2017-12-14 PROCEDURE — 96365 THER/PROPH/DIAG IV INF INIT: CPT | Performed by: EMERGENCY MEDICINE

## 2017-12-14 PROCEDURE — 25000128 H RX IP 250 OP 636: Performed by: EMERGENCY MEDICINE

## 2017-12-14 PROCEDURE — 99285 EMERGENCY DEPT VISIT HI MDM: CPT | Mod: 25 | Performed by: EMERGENCY MEDICINE

## 2017-12-14 PROCEDURE — 84100 ASSAY OF PHOSPHORUS: CPT | Performed by: NURSE PRACTITIONER

## 2017-12-14 PROCEDURE — 80307 DRUG TEST PRSMV CHEM ANLYZR: CPT | Performed by: EMERGENCY MEDICINE

## 2017-12-14 PROCEDURE — 99207 ZZC APP CREDIT; MD BILLING SHARED VISIT: CPT | Mod: AI | Performed by: NURSE PRACTITIONER

## 2017-12-14 PROCEDURE — 83690 ASSAY OF LIPASE: CPT | Performed by: EMERGENCY MEDICINE

## 2017-12-14 PROCEDURE — 71020 XR CHEST 2 VW: CPT

## 2017-12-14 PROCEDURE — 80320 DRUG SCREEN QUANTALCOHOLS: CPT | Performed by: EMERGENCY MEDICINE

## 2017-12-14 RX ORDER — DOCUSATE SODIUM 100 MG/1
100 CAPSULE, LIQUID FILLED ORAL 2 TIMES DAILY PRN
Status: DISCONTINUED | OUTPATIENT
Start: 2017-12-14 | End: 2017-12-22 | Stop reason: HOSPADM

## 2017-12-14 RX ORDER — POTASSIUM CHLORIDE 29.8 MG/ML
20 INJECTION INTRAVENOUS
Status: DISCONTINUED | OUTPATIENT
Start: 2017-12-14 | End: 2017-12-14 | Stop reason: RX

## 2017-12-14 RX ORDER — METHYLPREDNISOLONE SODIUM SUCCINATE 125 MG/2ML
125 INJECTION, POWDER, LYOPHILIZED, FOR SOLUTION INTRAMUSCULAR; INTRAVENOUS ONCE
Status: COMPLETED | OUTPATIENT
Start: 2017-12-14 | End: 2017-12-14

## 2017-12-14 RX ORDER — ONDANSETRON 4 MG/1
4 TABLET, ORALLY DISINTEGRATING ORAL EVERY 6 HOURS PRN
Status: DISCONTINUED | OUTPATIENT
Start: 2017-12-14 | End: 2017-12-22 | Stop reason: HOSPADM

## 2017-12-14 RX ORDER — IPRATROPIUM BROMIDE AND ALBUTEROL SULFATE 2.5; .5 MG/3ML; MG/3ML
3 SOLUTION RESPIRATORY (INHALATION) ONCE
Status: COMPLETED | OUTPATIENT
Start: 2017-12-14 | End: 2017-12-14

## 2017-12-14 RX ORDER — POTASSIUM CHLORIDE 1.5 G/1.58G
20-40 POWDER, FOR SOLUTION ORAL
Status: DISCONTINUED | OUTPATIENT
Start: 2017-12-14 | End: 2017-12-14

## 2017-12-14 RX ORDER — BISACODYL 5 MG
10 TABLET, DELAYED RELEASE (ENTERIC COATED) ORAL DAILY PRN
Status: DISCONTINUED | OUTPATIENT
Start: 2017-12-14 | End: 2017-12-14

## 2017-12-14 RX ORDER — PANTOPRAZOLE SODIUM 40 MG/1
40 TABLET, DELAYED RELEASE ORAL
Status: DISCONTINUED | OUTPATIENT
Start: 2017-12-15 | End: 2017-12-22 | Stop reason: HOSPADM

## 2017-12-14 RX ORDER — NICOTINE 21 MG/24HR
1 PATCH, TRANSDERMAL 24 HOURS TRANSDERMAL DAILY
Status: DISCONTINUED | OUTPATIENT
Start: 2017-12-14 | End: 2017-12-22 | Stop reason: HOSPADM

## 2017-12-14 RX ORDER — VENLAFAXINE HYDROCHLORIDE 75 MG/1
75 TABLET, EXTENDED RELEASE ORAL DAILY
Status: DISCONTINUED | OUTPATIENT
Start: 2017-12-15 | End: 2017-12-22 | Stop reason: HOSPADM

## 2017-12-14 RX ORDER — LEVALBUTEROL INHALATION SOLUTION 0.63 MG/3ML
0.63 SOLUTION RESPIRATORY (INHALATION) 4 TIMES DAILY
Status: DISCONTINUED | OUTPATIENT
Start: 2017-12-15 | End: 2017-12-16

## 2017-12-14 RX ORDER — LEVALBUTEROL INHALATION SOLUTION 0.63 MG/3ML
0.63 SOLUTION RESPIRATORY (INHALATION) EVERY 4 HOURS PRN
Status: DISCONTINUED | OUTPATIENT
Start: 2017-12-14 | End: 2017-12-22 | Stop reason: HOSPADM

## 2017-12-14 RX ORDER — BISACODYL 5 MG
5 TABLET, DELAYED RELEASE (ENTERIC COATED) ORAL DAILY PRN
Status: DISCONTINUED | OUTPATIENT
Start: 2017-12-14 | End: 2017-12-22 | Stop reason: HOSPADM

## 2017-12-14 RX ORDER — ATENOLOL 50 MG/1
50 TABLET ORAL DAILY PRN
Status: DISCONTINUED | OUTPATIENT
Start: 2017-12-14 | End: 2017-12-14

## 2017-12-14 RX ORDER — POTASSIUM CHLORIDE 750 MG/1
20-40 TABLET, EXTENDED RELEASE ORAL
Status: DISCONTINUED | OUTPATIENT
Start: 2017-12-14 | End: 2017-12-22 | Stop reason: HOSPADM

## 2017-12-14 RX ORDER — PROCHLORPERAZINE 25 MG
25 SUPPOSITORY, RECTAL RECTAL EVERY 12 HOURS PRN
Status: DISCONTINUED | OUTPATIENT
Start: 2017-12-14 | End: 2017-12-22 | Stop reason: HOSPADM

## 2017-12-14 RX ORDER — DOCUSATE SODIUM 100 MG/1
100 CAPSULE, LIQUID FILLED ORAL 2 TIMES DAILY
Status: DISCONTINUED | OUTPATIENT
Start: 2017-12-14 | End: 2017-12-14

## 2017-12-14 RX ORDER — POTASSIUM CHLORIDE 1.5 G/1.58G
20-40 POWDER, FOR SOLUTION ORAL
Status: DISCONTINUED | OUTPATIENT
Start: 2017-12-14 | End: 2017-12-22 | Stop reason: HOSPADM

## 2017-12-14 RX ORDER — ONDANSETRON 2 MG/ML
4 INJECTION INTRAMUSCULAR; INTRAVENOUS EVERY 6 HOURS PRN
Status: DISCONTINUED | OUTPATIENT
Start: 2017-12-14 | End: 2017-12-22 | Stop reason: HOSPADM

## 2017-12-14 RX ORDER — BISACODYL 5 MG
15 TABLET, DELAYED RELEASE (ENTERIC COATED) ORAL DAILY PRN
Status: DISCONTINUED | OUTPATIENT
Start: 2017-12-14 | End: 2017-12-14

## 2017-12-14 RX ORDER — ACETAMINOPHEN 325 MG/1
650 TABLET ORAL EVERY 4 HOURS PRN
Status: DISCONTINUED | OUTPATIENT
Start: 2017-12-14 | End: 2017-12-22 | Stop reason: HOSPADM

## 2017-12-14 RX ORDER — ASPIRIN 81 MG/1
81 TABLET ORAL DAILY
Status: DISCONTINUED | OUTPATIENT
Start: 2017-12-15 | End: 2017-12-22 | Stop reason: HOSPADM

## 2017-12-14 RX ORDER — MULTIPLE VITAMINS W/ MINERALS TAB 9MG-400MCG
1 TAB ORAL DAILY
Status: DISCONTINUED | OUTPATIENT
Start: 2017-12-15 | End: 2017-12-22 | Stop reason: HOSPADM

## 2017-12-14 RX ORDER — DEXTROSE MONOHYDRATE, SODIUM CHLORIDE, AND POTASSIUM CHLORIDE 50; 1.49; 9 G/1000ML; G/1000ML; G/1000ML
INJECTION, SOLUTION INTRAVENOUS CONTINUOUS
Status: DISCONTINUED | OUTPATIENT
Start: 2017-12-14 | End: 2017-12-17

## 2017-12-14 RX ORDER — LANOLIN ALCOHOL/MO/W.PET/CERES
100 CREAM (GRAM) TOPICAL DAILY
Status: COMPLETED | OUTPATIENT
Start: 2017-12-15 | End: 2017-12-17

## 2017-12-14 RX ORDER — SODIUM CHLORIDE 9 MG/ML
INJECTION, SOLUTION INTRAVENOUS
Status: COMPLETED
Start: 2017-12-14 | End: 2017-12-14

## 2017-12-14 RX ORDER — GUAIFENESIN 600 MG/1
600 TABLET, EXTENDED RELEASE ORAL 2 TIMES DAILY
Status: DISCONTINUED | OUTPATIENT
Start: 2017-12-14 | End: 2017-12-22 | Stop reason: HOSPADM

## 2017-12-14 RX ORDER — POTASSIUM CHLORIDE 750 MG/1
20-40 TABLET, EXTENDED RELEASE ORAL
Status: DISCONTINUED | OUTPATIENT
Start: 2017-12-14 | End: 2017-12-14

## 2017-12-14 RX ORDER — LIDOCAINE 50 MG/G
OINTMENT TOPICAL 4 TIMES DAILY PRN
Status: DISCONTINUED | OUTPATIENT
Start: 2017-12-14 | End: 2017-12-22 | Stop reason: HOSPADM

## 2017-12-14 RX ORDER — PROCHLORPERAZINE MALEATE 5 MG
10 TABLET ORAL EVERY 6 HOURS PRN
Status: DISCONTINUED | OUTPATIENT
Start: 2017-12-14 | End: 2017-12-22 | Stop reason: HOSPADM

## 2017-12-14 RX ORDER — DIAZEPAM 5 MG
5 TABLET ORAL ONCE
Status: DISCONTINUED | OUTPATIENT
Start: 2017-12-14 | End: 2017-12-14

## 2017-12-14 RX ORDER — POTASSIUM CHLORIDE 7.45 MG/ML
10 INJECTION INTRAVENOUS
Status: DISCONTINUED | OUTPATIENT
Start: 2017-12-14 | End: 2017-12-14

## 2017-12-14 RX ORDER — DIAZEPAM 5 MG
5-20 TABLET ORAL EVERY 30 MIN PRN
Status: DISCONTINUED | OUTPATIENT
Start: 2017-12-14 | End: 2017-12-19

## 2017-12-14 RX ORDER — IPRATROPIUM BROMIDE AND ALBUTEROL SULFATE 2.5; .5 MG/3ML; MG/3ML
SOLUTION RESPIRATORY (INHALATION)
Status: COMPLETED
Start: 2017-12-14 | End: 2017-12-14

## 2017-12-14 RX ORDER — POTASSIUM CHLORIDE 7.45 MG/ML
10 INJECTION INTRAVENOUS
Status: DISCONTINUED | OUTPATIENT
Start: 2017-12-14 | End: 2017-12-22 | Stop reason: HOSPADM

## 2017-12-14 RX ORDER — MAGNESIUM SULFATE HEPTAHYDRATE 40 MG/ML
4 INJECTION, SOLUTION INTRAVENOUS EVERY 4 HOURS PRN
Status: DISCONTINUED | OUTPATIENT
Start: 2017-12-14 | End: 2017-12-14

## 2017-12-14 RX ORDER — POTASSIUM CL/LIDO/0.9 % NACL 10MEQ/0.1L
10 INTRAVENOUS SOLUTION, PIGGYBACK (ML) INTRAVENOUS
Status: DISCONTINUED | OUTPATIENT
Start: 2017-12-14 | End: 2017-12-22 | Stop reason: HOSPADM

## 2017-12-14 RX ORDER — IPRATROPIUM BROMIDE AND ALBUTEROL SULFATE 2.5; .5 MG/3ML; MG/3ML
3 SOLUTION RESPIRATORY (INHALATION)
Status: DISCONTINUED | OUTPATIENT
Start: 2017-12-14 | End: 2017-12-14

## 2017-12-14 RX ORDER — POLYETHYLENE GLYCOL 3350 17 G/17G
17 POWDER, FOR SOLUTION ORAL DAILY PRN
Status: DISCONTINUED | OUTPATIENT
Start: 2017-12-14 | End: 2017-12-14

## 2017-12-14 RX ORDER — DILTIAZEM HYDROCHLORIDE 5 MG/ML
20 INJECTION INTRAVENOUS ONCE
Status: COMPLETED | OUTPATIENT
Start: 2017-12-14 | End: 2017-12-14

## 2017-12-14 RX ORDER — CLONAZEPAM 2 MG/1
4 TABLET ORAL 2 TIMES DAILY
Status: ON HOLD | COMMUNITY
End: 2017-12-21

## 2017-12-14 RX ORDER — LANOLIN ALCOHOL/MO/W.PET/CERES
1000 CREAM (GRAM) TOPICAL DAILY
Status: DISCONTINUED | OUTPATIENT
Start: 2017-12-15 | End: 2017-12-22 | Stop reason: HOSPADM

## 2017-12-14 RX ORDER — ALBUTEROL SULFATE 0.83 MG/ML
2.5 SOLUTION RESPIRATORY (INHALATION) ONCE
Status: COMPLETED | OUTPATIENT
Start: 2017-12-14 | End: 2017-12-14

## 2017-12-14 RX ORDER — NALOXONE HYDROCHLORIDE 0.4 MG/ML
.1-.4 INJECTION, SOLUTION INTRAMUSCULAR; INTRAVENOUS; SUBCUTANEOUS
Status: DISCONTINUED | OUTPATIENT
Start: 2017-12-14 | End: 2017-12-22 | Stop reason: HOSPADM

## 2017-12-14 RX ORDER — DIAZEPAM 10 MG
10 TABLET ORAL ONCE
Status: COMPLETED | OUTPATIENT
Start: 2017-12-14 | End: 2017-12-14

## 2017-12-14 RX ORDER — GABAPENTIN 300 MG/1
300 CAPSULE ORAL 3 TIMES DAILY
Status: DISCONTINUED | OUTPATIENT
Start: 2017-12-14 | End: 2017-12-22 | Stop reason: HOSPADM

## 2017-12-14 RX ORDER — FOLIC ACID 1 MG/1
1 TABLET ORAL DAILY
Status: DISCONTINUED | OUTPATIENT
Start: 2017-12-15 | End: 2017-12-22 | Stop reason: HOSPADM

## 2017-12-14 RX ORDER — GABAPENTIN 300 MG/1
300 CAPSULE ORAL 3 TIMES DAILY
Status: ON HOLD | COMMUNITY
End: 2017-12-22

## 2017-12-14 RX ORDER — POTASSIUM CL/LIDO/0.9 % NACL 10MEQ/0.1L
10 INTRAVENOUS SOLUTION, PIGGYBACK (ML) INTRAVENOUS
Status: DISCONTINUED | OUTPATIENT
Start: 2017-12-14 | End: 2017-12-14

## 2017-12-14 RX ORDER — DIAZEPAM 5 MG
5 TABLET ORAL ONCE
Status: COMPLETED | OUTPATIENT
Start: 2017-12-14 | End: 2017-12-14

## 2017-12-14 RX ORDER — SUCRALFATE 1 G/1
1 TABLET ORAL
Status: DISCONTINUED | OUTPATIENT
Start: 2017-12-14 | End: 2017-12-18

## 2017-12-14 RX ORDER — PREDNISONE 10 MG/1
10 TABLET ORAL DAILY
Status: CANCELLED | OUTPATIENT
Start: 2017-12-21 | End: 2017-12-24

## 2017-12-14 RX ORDER — MAGNESIUM SULFATE HEPTAHYDRATE 40 MG/ML
4 INJECTION, SOLUTION INTRAVENOUS EVERY 4 HOURS PRN
Status: DISCONTINUED | OUTPATIENT
Start: 2017-12-14 | End: 2017-12-22 | Stop reason: HOSPADM

## 2017-12-14 RX ORDER — MAGNESIUM CARB/ALUMINUM HYDROX 105-160MG
148 TABLET,CHEWABLE ORAL
Status: DISCONTINUED | OUTPATIENT
Start: 2017-12-14 | End: 2017-12-14

## 2017-12-14 RX ORDER — PREDNISONE 20 MG/1
40 TABLET ORAL DAILY
Status: CANCELLED | OUTPATIENT
Start: 2017-12-14 | End: 2017-12-18

## 2017-12-14 RX ORDER — PREDNISONE 20 MG/1
40 TABLET ORAL DAILY
Status: COMPLETED | OUTPATIENT
Start: 2017-12-14 | End: 2017-12-18

## 2017-12-14 RX ORDER — METOPROLOL TARTRATE 1 MG/ML
5 INJECTION, SOLUTION INTRAVENOUS EVERY 6 HOURS
Status: DISCONTINUED | OUTPATIENT
Start: 2017-12-14 | End: 2017-12-14

## 2017-12-14 RX ORDER — SODIUM CHLORIDE, SODIUM LACTATE, POTASSIUM CHLORIDE, CALCIUM CHLORIDE 600; 310; 30; 20 MG/100ML; MG/100ML; MG/100ML; MG/100ML
INJECTION, SOLUTION INTRAVENOUS CONTINUOUS
Status: DISCONTINUED | OUTPATIENT
Start: 2017-12-14 | End: 2017-12-14

## 2017-12-14 RX ORDER — PREDNISONE 20 MG/1
20 TABLET ORAL DAILY
Status: CANCELLED | OUTPATIENT
Start: 2017-12-18 | End: 2017-12-21

## 2017-12-14 RX ADMIN — METHYLPREDNISOLONE SODIUM SUCCINATE 125 MG: 125 INJECTION, POWDER, FOR SOLUTION INTRAMUSCULAR; INTRAVENOUS at 15:57

## 2017-12-14 RX ADMIN — POTASSIUM CHLORIDE, DEXTROSE MONOHYDRATE AND SODIUM CHLORIDE: 150; 5; 900 INJECTION, SOLUTION INTRAVENOUS at 20:54

## 2017-12-14 RX ADMIN — ALBUTEROL SULFATE 2.5 MG: 2.5 SOLUTION RESPIRATORY (INHALATION) at 13:45

## 2017-12-14 RX ADMIN — IPRATROPIUM BROMIDE AND ALBUTEROL SULFATE 3 ML: .5; 3 SOLUTION RESPIRATORY (INHALATION) at 11:37

## 2017-12-14 RX ADMIN — POTASSIUM CHLORIDE 20 MEQ: 750 TABLET, FILM COATED, EXTENDED RELEASE ORAL at 22:22

## 2017-12-14 RX ADMIN — SODIUM CHLORIDE 1000 ML: 9 INJECTION, SOLUTION INTRAVENOUS at 15:57

## 2017-12-14 RX ADMIN — DIAZEPAM 10 MG: 5 TABLET ORAL at 19:51

## 2017-12-14 RX ADMIN — SUCRALFATE 1 G: 1 TABLET ORAL at 21:41

## 2017-12-14 RX ADMIN — FOLIC ACID: 5 INJECTION, SOLUTION INTRAMUSCULAR; INTRAVENOUS; SUBCUTANEOUS at 12:02

## 2017-12-14 RX ADMIN — DIAZEPAM 10 MG: 10 TABLET ORAL at 18:07

## 2017-12-14 RX ADMIN — DILTIAZEM HYDROCHLORIDE 20 MG: 5 INJECTION INTRAVENOUS at 23:51

## 2017-12-14 RX ADMIN — MAGNESIUM SULFATE IN WATER 4 G: 40 INJECTION, SOLUTION INTRAVENOUS at 23:32

## 2017-12-14 RX ADMIN — IPRATROPIUM BROMIDE AND ALBUTEROL SULFATE 3 ML: .5; 3 SOLUTION RESPIRATORY (INHALATION) at 12:05

## 2017-12-14 RX ADMIN — PREDNISONE 40 MG: 20 TABLET ORAL at 19:51

## 2017-12-14 RX ADMIN — SODIUM CHLORIDE 500 ML: 9 INJECTION, SOLUTION INTRAVENOUS at 22:41

## 2017-12-14 RX ADMIN — GUAIFENESIN 600 MG: 600 TABLET, EXTENDED RELEASE ORAL at 19:51

## 2017-12-14 RX ADMIN — NICOTINE 1 PATCH: 14 PATCH, EXTENDED RELEASE TRANSDERMAL at 21:38

## 2017-12-14 RX ADMIN — GABAPENTIN 300 MG: 300 CAPSULE ORAL at 20:54

## 2017-12-14 RX ADMIN — DIAZEPAM 10 MG: 5 TABLET ORAL at 22:51

## 2017-12-14 RX ADMIN — DIAZEPAM 10 MG: 5 TABLET ORAL at 23:43

## 2017-12-14 RX ADMIN — DIAZEPAM 5 MG: 5 TABLET ORAL at 16:17

## 2017-12-14 RX ADMIN — DIAZEPAM 10 MG: 5 TABLET ORAL at 21:41

## 2017-12-14 RX ADMIN — DOCUSATE SODIUM 100 MG: 100 CAPSULE, LIQUID FILLED ORAL at 19:51

## 2017-12-14 RX ADMIN — IPRATROPIUM BROMIDE AND ALBUTEROL SULFATE 3 ML: 2.5; .5 SOLUTION RESPIRATORY (INHALATION) at 12:05

## 2017-12-14 RX ADMIN — DILTIAZEM HYDROCHLORIDE 20 MG: 5 INJECTION INTRAVENOUS at 22:51

## 2017-12-14 ASSESSMENT — ENCOUNTER SYMPTOMS
VOMITING: 1
APPETITE CHANGE: 1

## 2017-12-14 ASSESSMENT — ACTIVITIES OF DAILY LIVING (ADL): ADLS_ACUITY_SCORE: 19

## 2017-12-14 NOTE — TELEPHONE ENCOUNTER
S - Dr. Telles called with clinical for 63 yo male seeking detox from [facility].     B - pt seeking detox from [facility].  Pt has been drinking quart whiskey daily for 3 months, GRISEL a few hours PTA.  Pt has hx of anxiety, PTSD and depression.  Pt has been on Neurontin, Effexor. Has hx of being on suboxone, but was stopped a week ago when benzos were in his urine that were prescribed by PCP.  Pt reports no hx of seizures or DT's.  Pt has no acute mental health concerns.  No acute medical concerns, medically cleared. Pt has chronic back pain - [facility].     Further review of chart shows patient was admitted to medical unit in Nov and early Dec for acute respiratory failure with hypoxia and subsequent aspiration pneumonia.  Pt did leave AMA after 2 days from last medical admission, where he was also detoxing from [facility].  Pt currently having low O2 sats despite being on 2L.  Pt also has some lab abnormalities - elevated WBC 17.1, low platelets 126, low creatinine 0.61, low calcium 7.7, elevated liver functions.     A - vol     R - Presented to Ary CARMONA  who had concerns about medical stability of patient.  Presented clinical to Dr. Varma who is declining for admission to , recommending patient be admitted to medical for monitoring at this time. Notified Dr. Telles at 2:48pm of declination.

## 2017-12-14 NOTE — IP AVS SNAPSHOT
[facility]    [address]    [place] [address]    Phone:  [phone]                                       After Visit Summary   12/14/2017    Jorge Rosales    MRN: [id number]           After Visit Summary Signature Page     I have received my discharge instructions, and my questions have been answered. I have discussed any challenges I see with this plan with the nurse or doctor.    ..........................................................................................................................................  Patient/Patient Representative Signature      ..........................................................................................................................................  Patient Representative Print Name and Relationship to Patient    ..................................................               ................................................  Date                                            Time    ..........................................................................................................................................  Reviewed by Signature/Title    ...................................................              ..............................................  Date                                                            Time

## 2017-12-14 NOTE — ED NOTES
Pt is having intermittent tremors, sometimes the tremors are much more noticeable. Pt states he is experiencing withdrawal symptoms.  MD aware.

## 2017-12-14 NOTE — H&P
Internal Medicine History and Physical    Patient Name: Jorge Rosales MRN# [id number]   Age: 62 year old YOB: 1955     Date of Admission: 12/14/2017    Primary care provider: No Ref-Primary, Physician         Assessment and Plan:   Jorge Rosales is a 62 year old man with a history of COPD, chronic pain syndrome, left leg paralysis, PTSD, and alcohol abuse admitted for acute alcohol intoxication seeking detox and worsening dyspnea.     # Acute alcohol intoxication, alcohol withdrawal - ABT 0.166 on admission.  Currently scoring MSSA 11.  Hx significant for withdrawal seizures and DTs. Quite tremulous.     - Continue MSSA w/ Valium  - Bedside sitter   - Fall precautions   - Seizure precautions   - Withdrawal precautions   - Daily MVI, thiamine, folvite   - Chemical dependency, SW consults placed      # Suspected COPD exacerbation - Stable.  CXR this admission with R platelike atelectasis, otherwise no acute cardiopulmonary findings.  Reports using albuterol inhaler PTA, though unable to confirm in charting.  No PFTs on file. Currently afebrile.     - No indication for antibiotics at this time   - DuoNebs QID then QID PRN   - Mucinex 600mg BID   - Prednisone 40mg daily for 5 days then stop   - Supplemental O2 for sats < 88%   - Continuous pulse ox    - Procal added   - COPD RT consult placed     # Nausea, vomiting, diarrhea - Ongoing, likely 2/2 alcohol intake, withdrawals, possible gastritis.    - CMP in am   - Zofran PRN   - Protonix BID   - Carafate QID     # Leukocytosis - WBC to 17.1. Question if 2/2 acute alcohol intake vs recent steroid therapy during admission 12/3-12/5.    - Follow CBC in am       # Mild AG acidosis - AG 16 this admission.  Likely 2/2 acute alcohol intake.    - Repeat CMP in am   - Med tele     # Opioid dependence   # Hx of chronic pain syndrome -   - Opioid withdrawal scale   - Withdrawal precautions     # Left leg paralysis - Reports that leg has been paralyzed  "following surgery ~ 2 years ago.  Walks with cane and electric power chair.  - PT/OT consult        # PTSD w/ hx of panic attacks - Stable.  Pt reports having been recently prescribed Klonopin but states that he \"never got it filled\". Utox positive for benzos.     - Continue PTA Venlafaxine   - Valium as above for withdrawal symptoms   - Psych consult in am     # Tobacco dependence   - Nicotine patch    # Malnutrition - 2/2 alcohol use -  Reports little to no oral intake last ~ 4 days.  Albumin 2.9, protein 6.8.    - Nutrition consult   - Added on Mag, Phos      CODE: Full   FEN: LR at 125cc/hr (overnight)   DVT: Mechanical   Admission Status: Admit to inpatient medicine     Plan of care was discussed with Dr. Loomis.     Estefania Jarvis CNP  Hospitalist Service   Pager: [phone]           Chief Complaint:   \"I thought I could do it on my own.  I came in for detox.\"         HPI:   History is obtained from the patient and medical record.     Jorge Rosales is a 62 year old male with past medical history significant for COPD, chronic pain syndrome, left leg paralysis, PTSD, and alcohol abuse admitted for acute alcohol intoxication seeking detox and worsening dyspnea.  He reports coming into the hospital for detoxification from alcohol. Last drink was hard liquor this morning around 0930.  He has been drinking about 1 quart of hard liquor daily for the last 3 months.  He reports having a history of withdrawal seizures and DTs, but does not recall when these last occurred.  Since 2014 he has been prescribed Suboxone, but tells me that \"they took [him] off of it\" and that his last dose was yesterday 12/13.  He reports having nausea, vomiting, and diarrhea over the last couple of days.      He also reports having increased dyspnea.  Jorge tells me that he was diagnosed with COPD \"a few months ago\" and that he is supposed to use supplemental oxygen but he \"hasn't gotten around to getting any yet\".  He reports " "using an albuterol inhaler daily.  He endorses a dry cough, but no sputum production or hemoptysis.  Has smoked \"off and on\", less than 1 pack per day for more than 10 years.  I asked about his recent hospitalization for aspiration pneumonia and inquired about why he left AMA on .  He stated that \"[he] didn't feel like he was getting the care [he] needed\".  However, he does note that he feels \"much worse\" than when he was admitted on 12/3, feels that his work of breathing has increased.  Otherwise, he denies headaches, dizziness, chest pain, abdominal pain, hemoptysis, fevers, and chills.             Review of Systems:   A 10 point ROS was performed and negative unless otherwise noted in HPI.          Past Medical History:   Reviewed and updated in Epic.  Past Medical History:   Diagnosis Date     Chronic back pain     Methadone program     CTS (carpal tunnel syndrome)      Low testosterone     managed by endocrinology     Obesity      Panic attacks      PTSD (post-traumatic stress disorder) 2011     Rotator cuff injury      Substance abuse     alcohol abuse            Past Surgical History:   Reviewed and updated in Epic.  Past Surgical History:   Procedure Laterality Date     BACK SURGERY       SHOULDER SURGERY              Social History:   Reviewed and updated in [employer].  Social History     Social History     Marital status:      Spouse name: N/A     Number of children: 3     Years of education: N/A     Occupational History           Social History Main Topics     Smoking status: Current Every Day Smoker     Packs/day: 0.50     Years: 30.00     Types: Cigarettes     Smokeless tobacco: Not on file     Alcohol use No     Drug use: No     Sexual activity: Yes     Partners: Female     Other Topics Concern     Not on file     Social History Narrative    Born in [place] raised by mother and father emotional abuse from his father and has one brother who is . 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 He has been  3 times and has a daughter with quadriplegia and a son with Down syndrome. He has communication with them and his third wife passed away from cancer last year. He was injured approximately 1-1/2 years ago following a from a ladder sustaining a back injury. He describes himself as being bored and no more fishing or hunting and he has been losing friendships. He does have a pension plan and disability or he supports himself and he does not have any access to guns.            Family History:   Reviewed and updated in Epic.  Family History   Problem Relation Age of Onset     DIABETES Father             Allergies:    No Known Allergies         Medications:     (Not in a hospital admission)          Physical Exam:   Blood pressure 116/57, pulse 100, temperature 98.1  F (36.7  C), temperature source Oral, resp. rate 16, SpO2 (!) 88 %.    GENERAL: Alert and oriented x 3. Well nourished, well developed.  No acute distress.    HEENT: Normocephalic, atraumatic. Anicteric sclera. Mucous membranes dry.   CV: RRR. S1, S2. No murmurs appreciated.   RESPIRATORY: Effort normal on 2L. Lungs with bilateral rhonchi and expiratory wheezes.    GI: Abdomen soft and non distended, bowel sounds present x all 4 quadrants. No tenderness, rebound, or guarding.   NEUROLOGICAL: L leg paralysis. Follows commands.  Strength 3/5 in upper and lower extremities. Pronounced resting tremor.     MUSCULOSKELETAL: No joint swelling or tenderness.   EXTREMITIES: No gross deformities. No peripheral edema. Intact bilateral pedal pulses.   SKIN: Grossly warm, dry, and intact. No jaundice. No rashes.        Lines/Tubes/Drains:   Peripheral IV 12/14/17 Right Hand (Active)   Number of days:0            Data:   I personally reviewed the following studies:    ROUTINE IP LABS (Last four results)  CMP   Recent Labs  Lab  12/14/17  1154      POTASSIUM 3.5   CHLORIDE 96   CO2 26   ANIONGAP 16*   *   BUN 18   CR 0.61*   RUSTY 7.7*   PROTTOTAL 6.8   ALBUMIN 2.9*   BILITOTAL 0.9   ALKPHOS 97   AST 93*   ALT 85*     CBC   Recent Labs  Lab 12/14/17  1154   WBC 17.1*   RBC 4.75   HGB 14.5   HCT 43.1   MCV 91   MCH 30.5   MCHC 33.6   RDW 15.6*   *     INR No lab results found in last 7 days.          Unresulted Labs Ordered in the Past 30 Days of this Admission     No orders found from 10/15/2017 to 12/15/2017.

## 2017-12-14 NOTE — ED PROVIDER NOTES
"  History     Chief Complaint   Patient presents with     Alcohol Problem     Normally not a drinker. Has been drinking for 3 mos, since having left shoulder surgery. Drinking a quart of whiskey a day. Last drink about 0930. History of COPD. Takes normal valium for depression and anxiety. Also takes suboxone after opiate abuse 2 years ago from back pain after surgery. Has brace on left leg since that surgery. Left arm also weaker.      AKANKSHA Rosales is a 62 year old male with a history of chronic pain syndrome, alcohol abuse, PTSD, and panic attacks who presents to the Emergency Department for evaluation of an alcohol problem. Patient reports that he is having alcohol and Suboxone withdrawal symptoms. Patient has been drinking about 1 quart of whiskey per day from the past 3 months. His last drink was a couple hours ago, just prior to leaving his home for the ED. He has not been eating for the past 2-3 days and has been vomiting. [facility] but was taken off [facility] this for taking Lorazepam prescribed by another clinic. He has not taken Suboxone in about 10 days. [facility] for withdrawal symptoms, but \"they told him to leave\". Patient is requesting detox from alcohol and Suboxone. Patient is a smoker and has albuterol inhalers at home. Per Chart Review, he has a history of withdrawal seizures and hallucinations.  Of note, patient was recently admitted here from 12/3/17-12/5/17 for concern of aspiration pneumonitis as well as alcohol intoxication; the patient left AMA. Prior to that hospitalization, he was hospitalized 11/6/17-11/13/17 with alcohol withdrawal.     Past Medical History:   Diagnosis Date     Chronic back pain     Methadone program     CTS (carpal tunnel syndrome)      Low testosterone     managed by endocrinology     Obesity      Panic attacks      PTSD (post-traumatic stress disorder) 7/8/2011     Rotator " cuff injury      Substance abuse     alcohol abuse       Past Surgical History:   Procedure Laterality Date     BACK SURGERY       SHOULDER SURGERY         Family History   Problem Relation Age of Onset     DIABETES Father        Social History   Substance Use Topics     Smoking status: Current Every Day Smoker     Packs/day: 0.50     Years: 30.00     Types: Cigarettes     Smokeless tobacco: Not on file     Alcohol use No       Current Facility-Administered Medications   Medication     diazepam (VALIUM) tablet 5 mg     Current Outpatient Prescriptions   Medication     ALPRAZolam (XANAX PO)     clonazePAM (KLONOPIN) 2 MG tablet     gabapentin (NEURONTIN) 300 MG capsule     venlafaxine (EFFEXOR-ER) 75 MG TB24 24 hr tablet     methocarbamol (ROBAXIN) 750 MG tablet     ramelteon (ROZEREM) 8 MG tablet     buprenorphine HCl-naloxone HCl (SUBOXONE) 8-2 MG per film     aspirin 81 MG tablet     cyanocolbalamin (VITAMIN B-12) 1000 MCG tablet     fish oil-omega-3 fatty acids 1000 MG capsule     bismuth subsalicylate (PEPTO BISMOL) 262 MG chewable tablet     lidocaine (LMX4) 4 % CREA cream     Multiple Vitamin (MULTIVITAMIN) per tablet      No Known Allergies      I have reviewed the Medications, Allergies, Past Medical and Surgical History, and Social History in the Epic system.    Review of Systems   Constitutional: Positive for appetite change (decrease).   Gastrointestinal: Positive for vomiting.   All other systems reviewed and are negative.      Physical Exam   BP: 136/76  Pulse: 100  Heart Rate: 97  Temp: 98.5  F (36.9  C)  Resp: 20  SpO2: (!) 84 %      Physical Exam   Constitutional: He has a sickly appearance.   HENT:   Mouth/Throat: Oropharynx is clear and moist.   Eyes: Right conjunctiva is injected. Left conjunctiva is injected.   Neck: Neck supple.   Cardiovascular: Normal rate, regular rhythm and normal heart sounds.    Pulmonary/Chest: No respiratory distress. He has wheezes.   Abdominal: There is no tenderness.    Musculoskeletal:   Post op pain left shoulder   Neurological: No cranial nerve deficit.   Intoxicated   Skin: Skin is warm.   Nursing note and vitals reviewed.      ED Course   11:37 AM  The patient was seen and examined by Chilo Telles MD in Room 9.     ED Course     Procedures        Results for orders placed or performed during the hospital encounter of 12/14/17   XR Chest 2 Views    Narrative    XR CHEST 2 VW 12/14/2017 3:20 PM    COMPARISON: 12/4/2017.    HISTORY: Cough.      Impression    IMPRESSION: Platelike atelectasis at the right base. Lungs otherwise  clear. No pleural effusion or pneumothorax. Cardiac silhouette within  normal limits.    ARABELLA MADSEN MD       Medications   diazepam (VALIUM) tablet 5 mg (not administered)   dextrose 5% and 0.45% NaCl 1,000 mL with INFUVITE 10 mL, thiamine 100 mg, folic acid 1 mg infusion ( Intravenous Stopped 12/14/17 1320)   ipratropium - albuterol 0.5 mg/2.5 mg/3 mL (DUONEB) neb solution 3 mL (3 mLs Nebulization Given 12/14/17 1205)   ipratropium - albuterol 0.5 mg/2.5 mg/3 mL (DUONEB) neb solution 3 mL (3 mLs Nebulization Given 12/14/17 1137)   albuterol neb solution 2.5 mg (2.5 mg Nebulization Given 12/14/17 1345)   methylPREDNISolone sodium succinate (solu-MEDROL) injection 125 mg (125 mg Intravenous Given 12/14/17 1557)   NaCl 0.9 % infusion (1,000 mLs  New Bag 12/14/17 1557)            Labs Ordered and Resulted from Time of ED Arrival Up to the Time of Departure from the ED   CBC WITH PLATELETS DIFFERENTIAL - Abnormal; Notable for the following:        Result Value    WBC 17.1 (*)     RDW 15.6 (*)     Platelet Count 126 (*)     Absolute Neutrophil 15.0 (*)     All other components within normal limits   COMPREHENSIVE METABOLIC PANEL - Abnormal; Notable for the following:     Anion Gap 16 (*)     Glucose 155 (*)     Creatinine 0.61 (*)     Calcium 7.7 (*)     Albumin 2.9 (*)     ALT 85 (*)     AST 93 (*)     All other components within normal limits   DRUG  ABUSE SCREEN 6 CHEM DEP URINE ([facility]) - Abnormal; Notable for the following:     Benzodiazepine Qual Urine Positive (*)     Ethanol Qual Urine Positive (*)     All other components within normal limits   ALCOHOL BREATH TEST POCT - Abnormal; Notable for the following:     Alcohol Breath Test 0.166 (*)     All other components within normal limits   LIPASE            Assessments & Plan (with Medical Decision Making)   62-year-old male is a chronic alcoholic also with COPD, hypoxia at baseline, [facility] likely also opiate and perhaps benzodiazepine dependence presents acutely intoxicated seeking detox from alcohol. He is a heavy drinker and undoubtedly will go through withdrawal. His room air saturations here are 88%, this is likely chronic. His chest x-ray doesn t show an acute process, no evidence for infection. He was given nebs and a dose of Solu-Medrol for COPD exacerbation as he complained of some shortness of breath. He is agreeing to follow through and commit to the alcohol detox. Currently, he [facility] because he had benzos in his [facility]. I informed him it was unlikely that this hospitalization would result in restarting Suboxone, so he understands that. Because of his hypoxia he was declined to the psychiatric service and will go to the medicine service.     This part of the document was transcribed by Eneida Lima Medical Scribe.      I have reviewed the nursing notes.    I have reviewed the findings, diagnosis, plan and need for follow up with the patient.    New Prescriptions    No medications on file       Final diagnoses:   Acute alcoholic intoxication in alcoholism with complication (H)   Hypoxia   COPD exacerbation (H)   IEneida, am serving as a trained medical scribe to document services personally performed by Chilo Telles MD, based on the provider's statements to me.      IChilo MD, was physically present  and have reviewed and verified the accuracy of this note documented by Eneida Lima.       12/14/2017   [facility], [place], EMERGENCY DEPARTMENT     Juwan Telles MD  12/14/17 1600

## 2017-12-14 NOTE — IP AVS SNAPSHOT
MRN:[id number]                      After Visit Summary   12/14/2017    Jorge Rosales    MRN: [id number]           Thank you!     Thank you for choosing [facility] for your care. Our goal is always to provide you with excellent care. Hearing back from our patients is one way we can continue to improve our services. Please take a few minutes to complete the written survey that you may receive in the mail after you visit with us. Thank you!        Patient Information     Date Of Birth          1955        About your hospital stay     You were admitted on:  December 14, 2017 You last received care in the:   8A    You were discharged on:  December 22, 2017        Reason for your hospital stay       You were admitted for alcohol abuse, detoxification, benzo abuse and detoxification, opiate use. You were detoxed from these substances. You will continue suboxone on discharge. You also likely have COPD (Chronic obstructive pulmonary disease) and needed nebs and will need formal testing.                  Who to Call     For medical emergencies, please call 911.  For non-urgent questions about your medical care, please call your primary care provider or clinic, [phone]          Attending Provider     Provider Specialty    Juwan Telles MD Emergency Medicine    RochesterShane MD Internal Medicine       Primary Care Provider Office Phone # Fax #    Carlos Padron [phone]       When to contact your care team       Call your primary doctor if you have any of the following: temperature greater than 100.4 or less than 96,  increased shortness of breath, increased drainage, increased swelling or increased pain.                  After Care Instructions     Activity       Your activity upon discharge: activity as tolerated, no driving with opiates or benzos.            Diet       Follow this diet upon discharge: Orders Placed This Encounter      Snacks/Supplements  Adult: Ensure Plus (Adult); Between Meals      Snacks/Supplements Adult: Between Meals      Calorie Counts      Advance Diet as Tolerated: Regular Diet Adult            Oxygen Adult       [facility] Order 2 liter(s) by nasal cannula while sleeping. Expected treatment length is indefinite. Test on conserving device as applicable.    Patients who qualify for home O2 coverage under the CMS guidelines require ABG tests or O2 sat readings obtained closest to, but no earlier than 2 days prior to the discharge, as evidence of the need for home oxygen therapy. Testing must be performed while patient is in the chronic stable state. See notes for O2 sats.    I certify that this patient, Jorge Rosales has been under my care and that I, or a nurse practitioner or physician's assistant working with me, had a face-to-face encounter that meets the face-to-face encounter requirements with this patient on December 21, 2017. The patient, Jorge Rosales was evaluated or treated in whole, or in part, for the following medical condition, which necessitates the use of the ordered oxygen. Treatment Diagnosis: COPD, obstructive chronic bronchitis.    Attending Provider: Angel Treviño MD  Physician signature: See electronic signature associated with these discharge orders  Date of Order: December 21, 2017                  Follow-up Appointments     Follow Up and recommended labs and tests       Scheduled Follow up  Clinic: [facility] ([facility])  Provider: Dr. Carlos Padron  Reason for Follow up: suboxone therapy    Appointment: Friday December 22, 2017 at 11:00am     Address:   [address] [place] [address]    Phone: [phone]  Please call clinic directly if you need to reschedule appointment.  ______________________________________________            Follow Up and recommended labs and tests       Follow up with primary care provider, [facility] primary care clinic, within 7  days to evaluate medication change, to evaluate treatment change and for hospital follow- up. You need formal PFTs.   Follow up with Dr. Padron, at (location with clinic name or city) [facility], as scheduled at 11 AM for suboxone management.  Follow up with Arnoldo Richardson of [facility] for treatment.  Follow up with home physical therapy for continued strengthening.                  Additional Services     Home Care PT Referral for Hospital Discharge       [facility]  Phone  [phone]  Fax  [phone]    Physical Therapy- Evaluate and Treat.            Home care nursing referral       [facility]  Phone  [phone]  Fax  [phone]    RN skilled nursing visit. RN to assess vital signs and weight, respiratory and cardiac status, pain level and activity tolerance, hydration, nutrition and bowel status and home safety.  RN to teach medication management.            INTEGRATED PRIMARY CARE REFERRAL       Your provider has referred you to: Integrated [facility]  [facility] - Integrated Primary Care serves adults 18 years and older that have complex medical issues with a mental health overlay that are difficult to manage well in a traditional primary care setting. Patients referred to IPC meet at least one of the following criteria:    - Complex medical issues with a mental health overlay  - Patients who are difficult to manage in the traditional primary care setting due to mental health issues    Referrals to Integrated [facility] (IPC) assume that the referring provider has discussed with the patient that the initial visit will be an establish care visit and Integrated [facility] will take over all primary care services at that visit. The current primary care provider will need to provide care for the patient until the patient has had their first visit at [facility].      Please complete the following questions to help us determine if your patient qualifies for our program:    Is your patient  dealing with chronic pain? Yes. If yes have they been involved with a pain clinic?  No    Is your patient on chronic narcotics? No    Does your patient have any chemical health issues? Yes.  If yes, have they been in a treatment program UNSURE    Is your patient currently receiving psychiatric care? No    I have discussed this referral with my patient and they are agreeable to transfer their primary care to Integrated [facility]. YES.    Please be aware that coverage of these services is subject to the terms and limitations of your health insurance plan.  Call member services at your health plan with any benefit or coverage questions.      Please bring the following to your appointment:  >>   Any x-rays, CTs or MRIs which have been performed.  Contact the facility where they were done to arrange for  prior to your scheduled appointment.  Any new CT, MRI or other procedures ordered by your specialist must be performed at a [facility] facility or coordinated by your clinic's referral office.    >>   List of current medications   >>   This referral request   >>   Any documents/labs given to you for this referral            PULMONARY MEDICINE REFERRAL       Your provider has referred you to: [facility] [place] [facility] [phone]   [email or web address]    Suspected COPD, full PFTs needed, active smoker    Please be aware that coverage of these services is subject to the terms and limitations of your health insurance plan.  Call member services at your health plan with any benefit or coverage questions.      Please bring the following with you to your appointment:    (1) Any X-Rays, CTs or MRIs which have been performed.  Contact the facility where they were done to arrange for  prior to your scheduled appointment.    (2) List of current medications   (3) This referral request   (4) Any documents/labs given to you for this  referral            PULMONARY REHAB REFERRAL                 Future tests that were ordered for you     OP OXIMETRY (CONTINUOUS)       [facility] to go to patients home to perform overnight oximetry to assess home nocturnal hypoxia and O2 needs, on RA, concern for COPD                  Further instructions from your care team       __________________________________________________________  D/C'ing nurse: Please fax to following agencies at time of patient d/c.    [facility].   Fax  [phone]  ___________________________________________________________      Ride arrangements for discharge:    Taxi rides have been coordinated through [facility] ([phone]) to go from [facility] to [facility] for clinic appointment, and then after appointment from [facility] to home.  Patient (or hospital/clinic staff) must call following [employer] company for BOTH rides when he is ready to be picked up and they will dispatch the cab at that time.    [facility] [employer]  Phone: [phone]  Reference number: [id number]  ___________________________________________________________      Pending Results     No orders found from 12/12/2017 to 12/15/2017.            Statement of Approval     Ordered          12/22/17 1019  I have reviewed and agree with all the recommendations and orders detailed in this document.  EFFECTIVE NOW     Approved and electronically signed by:  Lorelei Reddy PA-C             Admission Information     Date & Time Provider Department Dept. Phone    12/14/2017 Shane Loomis MD UR 8A [phone]      Your Vitals Were     Blood Pressure Pulse Temperature Respirations Weight Pulse Oximetry    126/79 (BP Location: Left arm) 62 97.9  F (36.6  C) (Oral) 18 94 kg (207 lb 4.8 oz) 92%    BMI (Body Mass Index)                   28.91 kg/m2           [facility] Information     [employer] lets you send messages to your doctor, view your test results, renew your prescriptions, schedule appointments and more. To  "sign up, go to [email or web address] . Click on \"Log in\" on the left side of the screen, which will take you to the Welcome page. Then click on \"Sign up Now\" on the right side of the page.     You will be asked to enter the access code listed below, as well as some personal information. Please follow the directions to create your username and password.     Your access code is: [email or web address]  Expires: 2018 12:40 AM     Your access code will  in 90 days. If you need help or a new code, please call your [facility] clinic or [phone].        Care EveryWhere ID     This is your Care EveryWhere ID. This could be used by other organizations to access your [facility] medical records  [id number]        Equal Access to Services     GEOVANI VILLA : Tracy Llanos, anitha oliver, marii arroyo, tacos gomez . So [facility] [phone].    ATENCIÓN: Si habla español, tiene a nunez disposición servicios gratuitos de asistencia lingüística. Bradley al [phone].    We comply with applicable federal civil rights laws and [place] laws. We do not discriminate on the basis of race, color, national origin, age, disability, sex, sexual orientation, or gender identity.               Review of your medicines      START taking        Dose / Directions    albuterol 108 (90 BASE) MCG/ACT Inhaler   Commonly known as:  PROAIR HFA/PROVENTIL HFA/VENTOLIN HFA   Used for:  Moderate persistent reactive airway disease with acute exacerbation        Dose:  2 puff   Inhale 2 puffs into the lungs every 6 hours as needed for shortness of breath / dyspnea or wheezing   Quantity:  1 Inhaler   Refills:  1       pantoprazole 40 MG EC tablet   Commonly known as:  PROTONIX   Used for:  Gastroesophageal reflux disease, esophagitis presence not specified        Dose:  40 mg   Take 1 tablet (40 mg) by mouth daily   Quantity:  30 tablet   Refills:  0       PHENobarbital 32.4 MG Tabs tablet   Commonly " known as:  LUMINAL   Used for:  Benzodiazepine withdrawal without complication (H)        Dose:  32.4 mg   Take 1 tablet (32.4 mg) by mouth At Bedtime for 1 dose   Quantity:  1 tablet   Refills:  0         CONTINUE these medicines which have NOT CHANGED        Dose / Directions    aspirin 81 MG tablet   Used for:  CARDIOVASCULAR SCREENING; LDL GOAL LESS THAN 160        Dose:  81 mg   Take 1 tablet (81 mg) by mouth daily   Quantity:  30 tablet   Refills:  0       bismuth subsalicylate 262 MG chewable tablet   Commonly known as:  PEPTO BISMOL        Dose:  524 mg   Take 524 mg by mouth 2 times daily as needed for diarrhea   Refills:  0       buprenorphine HCl-naloxone HCl 8-2 MG per film   Commonly known as:  SUBOXONE        Dose:  1 Film   Place 1 Film under the tongue 2 times daily   Refills:  0       cyanocobalamin 1000 MCG tablet   Commonly known as:  vitamin  B-12        Dose:  1000 mcg   Take 1 tablet (1,000 mcg) by mouth daily   Quantity:  100 tablet   Refills:  0       fish oil-omega-3 fatty acids 1000 MG capsule        Dose:  2 g   Take 2 capsules by mouth daily.   Quantity:  180 capsule   Refills:  12       gabapentin 300 MG capsule   Commonly known as:  NEURONTIN   Used for:  Chronic low back pain, unspecified back pain laterality, with sciatica presence unspecified        Dose:  300 mg   Take 1 capsule (300 mg) by mouth 3 times daily   Quantity:  90 capsule   Refills:  0       lidocaine 4 % Crea cream   Commonly known as:  LMX4   Used for:  Chronic low back pain, unspecified back pain laterality, with sciatica presence unspecified        Dose:  1 Application   Apply 1 Application topically 4 times daily as needed for mild pain (Apply liberal amount to shoulder)   Quantity:  45 g   Refills:  0       methocarbamol 750 MG tablet   Commonly known as:  ROBAXIN   Used for:  Chronic low back pain, unspecified back pain laterality, with sciatica presence unspecified        Dose:  750 mg   Take 1 tablet (750 mg) by  mouth nightly as needed for muscle spasms   Quantity:  30 tablet   Refills:  0       multivitamin per tablet        Dose:  1 tablet   Take 1 tablet by mouth daily.   Quantity:  100 tablet   Refills:  12       venlafaxine 75 MG Tb24 24 hr tablet   Commonly known as:  EFFEXOR-ER   Indication:  Depression and Anxiety   Used for:  Recurrent major depressive disorder, remission status unspecified (H)        Dose:  75 mg   Take 1 tablet (75 mg) by mouth daily   Quantity:  30 each   Refills:  1         STOP taking     clonazePAM 2 MG tablet   Commonly known as:  klonoPIN           ramelteon 8 MG tablet   Commonly known as:  ROZEREM                Where to get your medicines      These medications were sent to [facility] [address] [place] [address] AT [address] [address]     Phone:  [phone]     albuterol 108 (90 BASE) MCG/ACT Inhaler    aspirin 81 MG tablet    cyanocobalamin 1000 MCG tablet    gabapentin 300 MG capsule    lidocaine 4 % Crea cream    methocarbamol 750 MG tablet    pantoprazole 40 MG EC tablet    venlafaxine 75 MG Tb24 24 hr tablet         Some of these will need a paper prescription and others can be bought over the counter. Ask your nurse if you have questions.     Bring a paper prescription for each of these medications     PHENobarbital 32.4 MG Tabs tablet                Protect others around you: Learn how to safely use, store and throw away your medicines at www.disposemymeds.org.             Medication List: This is a list of all your medications and when to take them. Check marks below indicate your daily home schedule. Keep this list as a reference.      Medications           Morning Afternoon Evening Bedtime As Needed    albuterol 108 (90 BASE) MCG/ACT Inhaler   Commonly known as:  PROAIR HFA/PROVENTIL HFA/VENTOLIN HFA   Inhale 2 puffs into the lungs every 6 hours as needed for shortness of breath / dyspnea or  wheezing                                aspirin 81 MG tablet   Take 1 tablet (81 mg) by mouth daily                                bismuth subsalicylate 262 MG chewable tablet   Commonly known as:  PEPTO BISMOL   Take 524 mg by mouth 2 times daily as needed for diarrhea                                buprenorphine HCl-naloxone HCl 8-2 MG per film   Commonly known as:  SUBOXONE   Place 1 Film under the tongue 2 times daily                                cyanocobalamin 1000 MCG tablet   Commonly known as:  vitamin  B-12   Take 1 tablet (1,000 mcg) by mouth daily   Last time this was given:  1,000 mcg on 12/22/2017  8:18 AM                                fish oil-omega-3 fatty acids 1000 MG capsule   Take 2 capsules by mouth daily.                                gabapentin 300 MG capsule   Commonly known as:  NEURONTIN   Take 1 capsule (300 mg) by mouth 3 times daily   Last time this was given:  300 mg on 12/22/2017  8:18 AM                                lidocaine 4 % Crea cream   Commonly known as:  LMX4   Apply 1 Application topically 4 times daily as needed for mild pain (Apply liberal amount to shoulder)                                methocarbamol 750 MG tablet   Commonly known as:  ROBAXIN   Take 1 tablet (750 mg) by mouth nightly as needed for muscle spasms                                multivitamin per tablet   Take 1 tablet by mouth daily.                                pantoprazole 40 MG EC tablet   Commonly known as:  PROTONIX   Take 1 tablet (40 mg) by mouth daily   Last time this was given:  40 mg on 12/22/2017  8:18 AM                                PHENobarbital 32.4 MG Tabs tablet   Commonly known as:  LUMINAL   Take 1 tablet (32.4 mg) by mouth At Bedtime for 1 dose   Last time this was given:  32.4 mg on 12/21/2017  8:23 PM                                venlafaxine 75 MG Tb24 24 hr tablet   Commonly known as:  EFFEXOR-ER   Take 1 tablet (75 mg) by mouth daily   Last time this was given:  75 mg on  12/22/2017  8:25 AM

## 2017-12-15 ENCOUNTER — APPOINTMENT (OUTPATIENT)
Dept: CARDIOLOGY | Facility: CLINIC | Age: 62
DRG: 897 | End: 2017-12-15
Attending: NURSE PRACTITIONER
Payer: COMMERCIAL

## 2017-12-15 ENCOUNTER — TELEPHONE (OUTPATIENT)
Dept: BEHAVIORAL HEALTH | Facility: CLINIC | Age: 62
End: 2017-12-15

## 2017-12-15 LAB
ALBUMIN SERPL-MCNC: 2.8 G/DL (ref 3.4–5)
ALP SERPL-CCNC: 87 U/L (ref 40–150)
ALT SERPL W P-5'-P-CCNC: 68 U/L (ref 0–70)
ANION GAP SERPL CALCULATED.3IONS-SCNC: 8 MMOL/L (ref 3–14)
AST SERPL W P-5'-P-CCNC: 62 U/L (ref 0–45)
BILIRUB SERPL-MCNC: 1 MG/DL (ref 0.2–1.3)
BUN SERPL-MCNC: 13 MG/DL (ref 7–30)
CALCIUM SERPL-MCNC: 7.9 MG/DL (ref 8.5–10.1)
CHLORIDE SERPL-SCNC: 98 MMOL/L (ref 94–109)
CO2 SERPL-SCNC: 30 MMOL/L (ref 20–32)
CREAT SERPL-MCNC: 0.47 MG/DL (ref 0.66–1.25)
ERYTHROCYTE [DISTWIDTH] IN BLOOD BY AUTOMATED COUNT: 15.5 % (ref 10–15)
GFR SERPL CREATININE-BSD FRML MDRD: >90 ML/MIN/1.7M2
GLUCOSE SERPL-MCNC: 163 MG/DL (ref 70–99)
HCT VFR BLD AUTO: 40.7 % (ref 40–53)
HGB BLD-MCNC: 13.8 G/DL (ref 13.3–17.7)
LACTATE BLD-SCNC: 0.8 MMOL/L (ref 0.7–2)
MAGNESIUM SERPL-MCNC: 2.5 MG/DL (ref 1.6–2.3)
MCH RBC QN AUTO: 30.5 PG (ref 26.5–33)
MCHC RBC AUTO-ENTMCNC: 33.9 G/DL (ref 31.5–36.5)
MCV RBC AUTO: 90 FL (ref 78–100)
PHOSPHATE SERPL-MCNC: 3.1 MG/DL (ref 2.5–4.5)
PLATELET # BLD AUTO: 94 10E9/L (ref 150–450)
POTASSIUM SERPL-SCNC: 4.7 MMOL/L (ref 3.4–5.3)
PROT SERPL-MCNC: 6.7 G/DL (ref 6.8–8.8)
RBC # BLD AUTO: 4.52 10E12/L (ref 4.4–5.9)
SODIUM SERPL-SCNC: 136 MMOL/L (ref 133–144)
WBC # BLD AUTO: 9.4 10E9/L (ref 4–11)

## 2017-12-15 PROCEDURE — 25000132 ZZH RX MED GY IP 250 OP 250 PS 637: Performed by: NURSE PRACTITIONER

## 2017-12-15 PROCEDURE — 84132 ASSAY OF SERUM POTASSIUM: CPT | Performed by: NURSE PRACTITIONER

## 2017-12-15 PROCEDURE — 99221 1ST HOSP IP/OBS SF/LOW 40: CPT | Performed by: PSYCHIATRY & NEUROLOGY

## 2017-12-15 PROCEDURE — 85027 COMPLETE CBC AUTOMATED: CPT | Performed by: NURSE PRACTITIONER

## 2017-12-15 PROCEDURE — 25000125 ZZHC RX 250: Performed by: INTERNAL MEDICINE

## 2017-12-15 PROCEDURE — 93306 TTE W/DOPPLER COMPLETE: CPT | Mod: 26 | Performed by: INTERNAL MEDICINE

## 2017-12-15 PROCEDURE — 84100 ASSAY OF PHOSPHORUS: CPT | Performed by: NURSE PRACTITIONER

## 2017-12-15 PROCEDURE — 40000275 ZZH STATISTIC RCP TIME EA 10 MIN

## 2017-12-15 PROCEDURE — 25000125 ZZHC RX 250: Performed by: NURSE PRACTITIONER

## 2017-12-15 PROCEDURE — 94640 AIRWAY INHALATION TREATMENT: CPT | Mod: 76

## 2017-12-15 PROCEDURE — 25000132 ZZH RX MED GY IP 250 OP 250 PS 637: Performed by: INTERNAL MEDICINE

## 2017-12-15 PROCEDURE — 25000128 H RX IP 250 OP 636: Performed by: NURSE PRACTITIONER

## 2017-12-15 PROCEDURE — 99207 ZZC CDG-MDM COMPONENT: MEETS MODERATE - UP CODED: CPT | Performed by: NURSE PRACTITIONER

## 2017-12-15 PROCEDURE — 80053 COMPREHEN METABOLIC PANEL: CPT | Performed by: NURSE PRACTITIONER

## 2017-12-15 PROCEDURE — 12000001 ZZH R&B MED SURG/OB UMMC

## 2017-12-15 PROCEDURE — 94640 AIRWAY INHALATION TREATMENT: CPT

## 2017-12-15 PROCEDURE — 25500064 ZZH RX 255 OP 636: Performed by: INTERNAL MEDICINE

## 2017-12-15 PROCEDURE — 25000128 H RX IP 250 OP 636: Performed by: INTERNAL MEDICINE

## 2017-12-15 PROCEDURE — 40000893 ZZH STATISTIC PT IP EVAL DEFER: Performed by: PHYSICAL THERAPIST

## 2017-12-15 PROCEDURE — 83605 ASSAY OF LACTIC ACID: CPT | Performed by: INTERNAL MEDICINE

## 2017-12-15 PROCEDURE — 40000264 ECHO COMPLETE WITH OPTISON

## 2017-12-15 PROCEDURE — 84100 ASSAY OF PHOSPHORUS: CPT | Performed by: INTERNAL MEDICINE

## 2017-12-15 PROCEDURE — 36415 COLL VENOUS BLD VENIPUNCTURE: CPT | Performed by: INTERNAL MEDICINE

## 2017-12-15 PROCEDURE — 83735 ASSAY OF MAGNESIUM: CPT | Performed by: INTERNAL MEDICINE

## 2017-12-15 PROCEDURE — 25800025 ZZH RX 258: Performed by: INTERNAL MEDICINE

## 2017-12-15 PROCEDURE — 25000132 ZZH RX MED GY IP 250 OP 250 PS 637: Performed by: PSYCHIATRY & NEUROLOGY

## 2017-12-15 PROCEDURE — 99233 SBSQ HOSP IP/OBS HIGH 50: CPT | Performed by: NURSE PRACTITIONER

## 2017-12-15 PROCEDURE — 36415 COLL VENOUS BLD VENIPUNCTURE: CPT | Performed by: NURSE PRACTITIONER

## 2017-12-15 RX ORDER — PHENOBARBITAL 32.4 MG/1
32.4 TABLET ORAL AT BEDTIME
Status: DISCONTINUED | OUTPATIENT
Start: 2017-12-22 | End: 2017-12-22 | Stop reason: HOSPADM

## 2017-12-15 RX ORDER — PHENOBARBITAL 32.4 MG/1
32.4 TABLET ORAL 2 TIMES DAILY
Status: COMPLETED | OUTPATIENT
Start: 2017-12-21 | End: 2017-12-21

## 2017-12-15 RX ORDER — PHENOBARBITAL 32.4 MG/1
32.4 TABLET ORAL 3 TIMES DAILY
Status: DISCONTINUED | OUTPATIENT
Start: 2017-12-20 | End: 2017-12-15

## 2017-12-15 RX ORDER — PHENOBARBITAL 32.4 MG/1
32.4 TABLET ORAL 3 TIMES DAILY
Status: COMPLETED | OUTPATIENT
Start: 2017-12-20 | End: 2017-12-20

## 2017-12-15 RX ORDER — PHENOBARBITAL 30 MG/1
30 TABLET ORAL 4 TIMES DAILY
Status: DISCONTINUED | OUTPATIENT
Start: 2017-12-15 | End: 2017-12-15

## 2017-12-15 RX ORDER — PHENOBARBITAL 32.4 MG/1
32.4 TABLET ORAL 4 TIMES DAILY
Status: DISCONTINUED | OUTPATIENT
Start: 2017-12-15 | End: 2017-12-20

## 2017-12-15 RX ORDER — BUPRENORPHINE AND NALOXONE 2; .5 MG/1; MG/1
1 FILM, SOLUBLE BUCCAL; SUBLINGUAL 4 TIMES DAILY
Status: DISCONTINUED | OUTPATIENT
Start: 2017-12-15 | End: 2017-12-22 | Stop reason: HOSPADM

## 2017-12-15 RX ADMIN — BUPRENORPHINE HYDROCHLORIDE, NALOXONE HYDROCHLORIDE 1 FILM: 2; .5 FILM, SOLUBLE BUCCAL; SUBLINGUAL at 16:37

## 2017-12-15 RX ADMIN — CYANOCOBALAMIN TAB 1000 MCG 1000 MCG: 1000 TAB at 08:25

## 2017-12-15 RX ADMIN — LEVALBUTEROL HYDROCHLORIDE 0.63 MG: 0.63 SOLUTION RESPIRATORY (INHALATION) at 20:57

## 2017-12-15 RX ADMIN — PREDNISONE 40 MG: 20 TABLET ORAL at 08:24

## 2017-12-15 RX ADMIN — DIAZEPAM 10 MG: 5 TABLET ORAL at 00:54

## 2017-12-15 RX ADMIN — GABAPENTIN 300 MG: 300 CAPSULE ORAL at 20:50

## 2017-12-15 RX ADMIN — DIAZEPAM 10 MG: 5 TABLET ORAL at 14:16

## 2017-12-15 RX ADMIN — POTASSIUM PHOSPHATE, MONOBASIC AND POTASSIUM PHOSPHATE, DIBASIC 20 MMOL: 224; 236 INJECTION, SOLUTION INTRAVENOUS at 01:20

## 2017-12-15 RX ADMIN — Medication 100 MG: at 08:25

## 2017-12-15 RX ADMIN — PHENOBARBITAL 32.4 MG: 32.4 TABLET ORAL at 14:16

## 2017-12-15 RX ADMIN — GUAIFENESIN 600 MG: 600 TABLET, EXTENDED RELEASE ORAL at 08:26

## 2017-12-15 RX ADMIN — SUCRALFATE 1 G: 1 TABLET ORAL at 08:25

## 2017-12-15 RX ADMIN — GABAPENTIN 300 MG: 300 CAPSULE ORAL at 08:24

## 2017-12-15 RX ADMIN — SUCRALFATE 1 G: 1 TABLET ORAL at 10:46

## 2017-12-15 RX ADMIN — VENLAFAXINE HYDROCHLORIDE 75 MG: 75 TABLET, EXTENDED RELEASE ORAL at 08:26

## 2017-12-15 RX ADMIN — SUCRALFATE 1 G: 1 TABLET ORAL at 20:50

## 2017-12-15 RX ADMIN — BUPRENORPHINE HYDROCHLORIDE, NALOXONE HYDROCHLORIDE 1 FILM: 2; .5 FILM, SOLUBLE BUCCAL; SUBLINGUAL at 20:49

## 2017-12-15 RX ADMIN — ONDANSETRON 4 MG: 4 TABLET, ORALLY DISINTEGRATING ORAL at 01:52

## 2017-12-15 RX ADMIN — IPRATROPIUM BROMIDE 0.5 MG: 0.5 SOLUTION RESPIRATORY (INHALATION) at 20:57

## 2017-12-15 RX ADMIN — POTASSIUM CHLORIDE, DEXTROSE MONOHYDRATE AND SODIUM CHLORIDE: 150; 5; 900 INJECTION, SOLUTION INTRAVENOUS at 21:18

## 2017-12-15 RX ADMIN — IPRATROPIUM BROMIDE 0.5 MG: 0.5 SOLUTION RESPIRATORY (INHALATION) at 16:41

## 2017-12-15 RX ADMIN — PHENOBARBITAL 32.4 MG: 32.4 TABLET ORAL at 20:50

## 2017-12-15 RX ADMIN — IPRATROPIUM BROMIDE 0.5 MG: 0.5 SOLUTION RESPIRATORY (INHALATION) at 08:18

## 2017-12-15 RX ADMIN — DIAZEPAM 10 MG: 5 TABLET ORAL at 08:38

## 2017-12-15 RX ADMIN — DIAZEPAM 10 MG: 5 TABLET ORAL at 15:32

## 2017-12-15 RX ADMIN — SUCRALFATE 1 G: 1 TABLET ORAL at 16:37

## 2017-12-15 RX ADMIN — GUAIFENESIN 600 MG: 600 TABLET, EXTENDED RELEASE ORAL at 20:50

## 2017-12-15 RX ADMIN — PANTOPRAZOLE SODIUM 40 MG: 40 TABLET, DELAYED RELEASE ORAL at 08:25

## 2017-12-15 RX ADMIN — MULTIPLE VITAMINS W/ MINERALS TAB 1 TABLET: TAB at 08:25

## 2017-12-15 RX ADMIN — DIAZEPAM 10 MG: 5 TABLET ORAL at 21:10

## 2017-12-15 RX ADMIN — LEVALBUTEROL HYDROCHLORIDE 0.63 MG: 0.63 SOLUTION RESPIRATORY (INHALATION) at 08:18

## 2017-12-15 RX ADMIN — LEVALBUTEROL HYDROCHLORIDE 0.63 MG: 0.63 SOLUTION RESPIRATORY (INHALATION) at 16:41

## 2017-12-15 RX ADMIN — DIAZEPAM 10 MG: 5 TABLET ORAL at 01:52

## 2017-12-15 RX ADMIN — NICOTINE 1 PATCH: 14 PATCH, EXTENDED RELEASE TRANSDERMAL at 08:38

## 2017-12-15 RX ADMIN — ENOXAPARIN SODIUM 40 MG: 40 INJECTION SUBCUTANEOUS at 10:46

## 2017-12-15 RX ADMIN — ASPIRIN 81 MG: 81 TABLET, COATED ORAL at 08:25

## 2017-12-15 RX ADMIN — DIAZEPAM 10 MG: 5 TABLET ORAL at 17:52

## 2017-12-15 RX ADMIN — FOLIC ACID 1 MG: 1 TABLET ORAL at 08:25

## 2017-12-15 RX ADMIN — DIAZEPAM 10 MG: 5 TABLET ORAL at 11:54

## 2017-12-15 RX ADMIN — PANTOPRAZOLE SODIUM 40 MG: 40 TABLET, DELAYED RELEASE ORAL at 16:37

## 2017-12-15 RX ADMIN — DIAZEPAM 10 MG: 5 TABLET ORAL at 10:46

## 2017-12-15 RX ADMIN — PHENOBARBITAL 32.4 MG: 32.4 TABLET ORAL at 16:37

## 2017-12-15 RX ADMIN — POTASSIUM CHLORIDE, DEXTROSE MONOHYDRATE AND SODIUM CHLORIDE: 150; 5; 900 INJECTION, SOLUTION INTRAVENOUS at 11:44

## 2017-12-15 RX ADMIN — HUMAN ALBUMIN MICROSPHERES AND PERFLUTREN 6 ML: 10; .22 INJECTION, SOLUTION INTRAVENOUS at 14:00

## 2017-12-15 RX ADMIN — GABAPENTIN 300 MG: 300 CAPSULE ORAL at 14:16

## 2017-12-15 RX ADMIN — DIAZEPAM 10 MG: 5 TABLET ORAL at 05:50

## 2017-12-15 ASSESSMENT — ACTIVITIES OF DAILY LIVING (ADL)
DRESS: 0-->INDEPENDENT
ADLS_ACUITY_SCORE: 18
AMBULATION: 1-->ASSISTIVE EQUIPMENT
BATHING: 0-->INDEPENDENT
ADLS_ACUITY_SCORE: 19
FALL_HISTORY_WITHIN_LAST_SIX_MONTHS: YES
COGNITION: 0 - NO COGNITION ISSUES REPORTED
ADLS_ACUITY_SCORE: 18
TRANSFERRING: 1-->ASSISTIVE EQUIPMENT
ADLS_ACUITY_SCORE: 18
SWALLOWING: 0-->SWALLOWS FOODS/LIQUIDS WITHOUT DIFFICULTY
RETIRED_COMMUNICATION: 0-->UNDERSTANDS/COMMUNICATES WITHOUT DIFFICULTY
ADLS_ACUITY_SCORE: 18
RETIRED_EATING: 0-->INDEPENDENT
ADLS_ACUITY_SCORE: 18
TOILETING: 0-->INDEPENDENT

## 2017-12-15 NOTE — PROGRESS NOTES
Cross Cover:     Patient with afib with RvR. HR In 140-150s.   Seen briefly. Tremulous. Actively withdrawing from alcohol  Denies any cp. Breathing, oxygen need stable.   No new symptom.  Denies prior afib  BP stable.     ekg done , reviewed.     Given concern for copd exacerbation, given iv diltiazem 20 mg iv x 2. Patient HR improved , later converted to sinus.   K Mg Phos protocol   Switched albuterol to levalbuterol nebs  Continue MSSA Diazepam.   [place]. [facility]     Neftali Mcknight MD  House Physician  Pager: [phone]    12/14/2017    Addendum:   Mild lactic acidosis   Suspect hypovolemia. Poor po intake. Alcohol use.   Non septic.     Fu after 1L NSS bolus.   markell BLEDSOE

## 2017-12-15 NOTE — PROGRESS NOTES
CLINICAL NUTRITION SERVICES - ASSESSMENT NOTE     Nutrition Prescription    RECOMMENDATIONS FOR MDs/PROVIDERS TO ORDER:  1. Advance diet to regular as tolerated per MD discretion   2. Consider vitamin D supplementation if long term steroid use is anticipated  3. At risk for refeeding syndrome, recommend monitor Mg++, K+ and Phos frequently as PO intakes improve and replace per protocol.     Malnutrition Status:    Patient does not meet two of the above criteria necessary for diagnosing malnutrition    Recommendations already ordered by Registered Dietitian (RD):  Ensure Plus BID (AM and PM snacks - any flavor) + PRN      Future/Additional Recommendations:  If PO intakes do not improve/diet does not advance, consider increasing frequency of Ensure Plus     REASON FOR ASSESSMENT  Jorge Rosales is a/an 62 year old male assessed by the dietitian for Provider Order - Nutrition Education - Poor nutrition in setting of chornic alcohol abuse    NUTRITION HISTORY  - Pt reports he has not had any solid food for the last 1 week and had minimal PO intakes prior to this. Over the last week, his only PO intakes were frances, no other fluids.  - Pt denies any food allergies or intolerances. He reports taking a vitamin B12 supplement PTA.   - Pt has N/V and diarrhea, likely 2/2 alcohol intake, w/d and possible gastritis per H&P. Pt reports he has had worsening diarrhea and is resistant to eating solid foods in fear he will have diarrhea. He feels this is in part d/t med changes.     CURRENT NUTRITION ORDERS  Diet: Full Liquid   Intake/Tolerance: pt reports only drinking fluids since admission (gingerale noted on bedside table)    LABS  Labs reviewed  - Mg++ 2.5 (H - was 1.4 (L) at admission yesterday)  - Phos 3.1 (wnl - was 1.9 (L) at admission yesterday)  - Na+ and K+ wnl    MEDICATIONS  Medications reviewed  - Vitamin B12  - Folic Acid  - Thiamine   - Multivitamin with minerals  - Prednisone  - Zofran  "PRN    ANTHROPOMETRICS  Height: 0 cm (Data Unavailable)  Ht Readings from Last 2 Encounters:   12/03/17 1.803 m (5' 11\")   09/27/11 1.803 m (5' 11\")   Most Recent Weight: 94 kg (207 lb 4.8 oz)   IBW: 78.2 kg (120 % IBW)  BMI: Overweight BMI 25-29.9  Weight History: pt has gained 9 lbs (4.3%) over the last month, however pt feels he has been losing wt over this timeframe and reports UBW at 220 lbs. Pt pt report, he has lost 13 lbs (6%) over an unknown timeframe.   Wt Readings from Last 4 Encounters:   12/15/17 94 kg (207 lb 4.8 oz)   12/04/17 95.8 kg (211 lb 3.2 oz)   11/13/17 89.8 kg (198 lb)   09/27/11 99.9 kg (220 lb 3.2 oz)     Dosing Weight: 94 kg - most recent wt    ASSESSED NUTRITION NEEDS  Estimated Energy Needs: 9189-5261 kcals/day (20 - 25 kcals/kg)  Justification: Maintenance, Overweight  Estimated Protein Needs:  grams protein/day (1 - 1.2 grams of pro/kg)  Justification: Repletion  Estimated Fluid Needs: 1 mL/kcal   Justification: Per provider pending fluid status    PHYSICAL FINDINGS  See malnutrition section below.    MALNUTRITION  % Intake: </= 50% for >/= 5 days (severe)  % Weight Loss: Weight loss does not meet criteria  Subcutaneous Fat Loss: None observed  Muscle Loss: Unable to assess  Fluid Accumulation/Edema: None noted  Malnutrition Diagnosis: Patient does not meet two of the above criteria necessary for diagnosing malnutrition    NUTRITION DIAGNOSIS  Inadequate oral intake related to restrictive diet and w/d symptoms as evidenced by full liquid diet restriction, pt reports constant nausea, worsening diarrhea and extremely minimal PO intakes over the last 1 week.      INTERVENTIONS  Implementation  Discussed nutrition history and PO since admission. Discussed menu ordering and snacks available on the unit. Encouraged pt to ask unit staff for these items or to call and request additional Ensure Plus as able. Discussed oral nutrition supplements and pt agreeable to drink BID. Encouraged " adequate fluid intake.    Goals  1. Advance diet >full liquids within 48 hours.  2. Patient to consume % of nutritionally adequate meal trays TID, or the equivalent with supplements/snacks.     Monitoring/Evaluation  Progress toward goals will be monitored and evaluated per protocol.      Eugenie Contreras RD, LD  Unit Pager: [phone]

## 2017-12-15 NOTE — PLAN OF CARE
Problem: Patient Care Overview  Goal: Plan of Care/Patient Progress Review  PT per chart review and discussion with SW and PA, will defer pt until POC determined and pt withdrawal completed.  Questionable if skilled PT appropriate. Note pt at baseline reports paralysis in in 1 LE with use of SEC and power chair at baseline.  Rec nursing amb with pt when stable. Will check on status 12/18.

## 2017-12-15 NOTE — PROVIDER NOTIFICATION
Pt was NSR when arrived to unit. HR increased to 160's and possible rapid atrial fibulation? Moonlighter notified via telephone. EKG stat ordered. Pt asymptomatic.     Vital signs:  Temp: 98.3  F (36.8  C) Temp src: Oral BP: 121/72  Heart Rate: 153 Resp: 21 SpO2: 95 % O2 Device: Nasal cannula Oxygen Delivery: 2.5 LPM

## 2017-12-15 NOTE — PHARMACY-ADMISSION MEDICATION HISTORY
"Admission medication history for the December 14, 2017 admission is complete.     Interview sources:    - Patient  - [facility] ([phone] -evening pharmacy line)  - [facility] ([place]; [phone])    Reliability of source: Moderate - patient knew the names of some medications, but not all and he did not know doses. He was not feeling well so this may have contributed to his limited responses to questions. Verified all medications and doses with the pharmacies listed above.     Medication compliance: moderate/poor - patient reports it has been \"at least a couple days\" since he took his medications    Changes made to PTA medication list (reason)  Added: none  Deleted: alprazolam 2 mg three times daily (discontinued)  Changed: none    Additional medication history information:   - Medication history was completed by PharmD on 11/6/17   - Suboxone - pt reports I was taking it \"until they took it away from me,\" he did not elaborate on what he meant by this. Suboxone was [facility] for [facility] of 13 SL films.  - clonazepam - last dispensed on 11/13/17 by [facility] (60 tablets).   - lorazepam - pt received a prescription for lorazepam 1 mg (quantity 2 tablets) on 12/11/17.   - alprazolam - [facility] ([facility]), deleted from medication list because this has been discontinued.    - gabapentin - [facility]17 (90 capsules). Unknown if he picked this up because it does not appear on the patient's .   - Rozerem - [facility] on 8[facility]14/17 (30 tablets)  - doxepin - pt received a 10 day prescription for 10 mg at bedtime on [facility]. Did not add to med list because one time prescription.  - clonidine - pt received a prescription for 0.1 mg four times daily on 12/2/17 for one week supply. Did not add to med list because one time prescription.   - Pepto bismol - pt reports he takes this daily for diarrhea, " "he says if he does not take it daily he will have diarrhea.   - multivit, fish oil, B12 - pt reports he \"tries\" to take these, but does not know how long it has been since he took them   - Influenza vaccine status: completed 11/9/17 (per [facility])     Prior to Admission medications    Medication Sig Last Dose Taking? Auth Provider   gabapentin (NEURONTIN) 300 MG capsule Take 300 mg by mouth 3 times daily Past Week Yes Unknown, Entered By History   clonazePAM (KLONOPIN) 2 MG tablet Take 4 mg by mouth 2 times daily Past Week Yes Unknown, Entered By History   venlafaxine (EFFEXOR-ER) 75 MG TB24 24 hr tablet Take 1 tablet (75 mg) by mouth daily Past Week Yes Neftali Mcknight MD   methocarbamol (ROBAXIN) 750 MG tablet Take 750 mg by mouth nightly as needed for muscle spasms  Past Week Yes Unknown, Entered By History   ramelteon (ROZEREM) 8 MG tablet Take 8 mg by mouth At Bedtime Past Week Yes Unknown, Entered By History   bismuth subsalicylate (PEPTO BISMOL) 262 MG chewable tablet Take 524 mg by mouth 2 times daily as needed for diarrhea Past Week Yes Unknown, Entered By History   buprenorphine HCl-naloxone HCl (SUBOXONE) 8-2 MG per film Place 1 Film under the tongue 2 times daily not taking >1 week Yes Unknown, Entered By History   aspirin 81 MG tablet Take 1 tablet by mouth daily. Past Week Yes Alvin Yo MD   lidocaine (LMX4) 4 % CREA cream Apply 1 Application topically 4 times daily as needed for mild pain (Apply liberal amount to shoulder) Unknown  Unknown, Entered By History   cyanocolbalamin (VITAMIN B-12) 1000 MCG tablet Take 1 tablet by mouth daily. Unknown  Alvin Yo MD   fish oil-omega-3 fatty acids 1000 MG capsule Take 2 capsules by mouth daily. Unknown  Alvin Yo MD   Multiple Vitamin (MULTIVITAMIN) per tablet Take 1 tablet by mouth daily. Unknown  Alvin Yo MD         Time spent: 30 minutes    Medication history completed by:   Katie Sheikh, PharmD      "

## 2017-12-15 NOTE — PLAN OF CARE
Problem: Patient Care Overview  Goal: Plan of Care/Patient Progress Review  Outcome: No Change  VSS, telemetry- NSR, on 2 L nasal cannula O2 sats 93-96% LS clear, pt has infrequent productive cough. BS active- passing flatus, pt denies N/V or abdominal discomfort/pain, had 2 loose black stools today, tolerating a full liquid diet- advance diet to regular. Pt has baseline numbness in L leg from a previous back surgery 2 years ago, also reports having numbness in L arm from a rotator cuff surgery. Pt is very tremulous and unsteady on his feet, A of 2 to transfer to bedside commode. MSSA scored today:14, 16, 13, 14, a total of 40 mg Valium administered this shift. (see eMAR) COWs score 15. IV in r hand infiltrated and removed. PIV in R arm infusing at 100 mL/hr. Pt has nicotine patch on L arm, bruising noticed on BLE- pt reports from a previous fall. 1:1 sitter D/Brian this morning, pt has been using call light appropriately and bed alarm is audible. Call light within reach, pt is able to make needs known, continue with POC.

## 2017-12-15 NOTE — CONSULTS
REASON FOR CONSULTATION:  Alcohol withdrawal.      IDENTIFYING INFORMATION:  Jorge Rosales is a 62-year-old  who has a complex psychiatric history.  He presented to the emergency room wanting help for drinking.      HISTORY OF PRESENT ILLNESS:  The patient presented to the emergency room saying that he has been drinking for 3 months and he is taking Suboxone and he also has been prescribed benzodiazepines.  ER notes indicate that the patient was admitted on 12/03 to 12/15 for aspiration pneumonia.  He was discharged AMA and he was also hospitalized on 11/13 for alcohol withdrawal.      The patient says he had a previous history of alcohol addiction.  This relapse occurred in September.  [facility] and he was [facility] off of it.  Records indicate that when he was on Suboxone he was abusing Xanax and getting it from other sources and he was taken off of it.  However, his last hospitalization, he was given Suboxone and he would like to be back on it.  Started drinking in September.  He has tolerance, he has blackouts, he has bringing a quart, he has progressive use, loss of control.  He has used despite having negative consequences impacting his health.      He takes Xanax for panic attacks for 10 years.  Records indicate that he has been abusing them.  However, patient minimizes.  He was tapered to Clonazepam, but he has also been taking lorazepam.  Most recently has been taking lorazepam 60 one week ago and has been taking it.      He is on Suboxone for 3 years up to 60 mg.  He was also on methadone program.  Patient minimizes abusing opiates, but records indicate that patient has a history of opiate addiction.  His diagnosis from old records is opiate use disorder.  The patient minimizes this.      The patient has panic attacks.  When he gets panic attacks, shortness of breath, feels dread, feels like he is going to die.  He has tightness in chest, nausea.  Patient also has  depression with lack of energy, lack of motivation, hopeless, helpless.  He denied any suicidal ideation, denied auditory or visual hallucinations.  He presently is complaining of hallucinations.  He denied any laure.  He complains of PTSD with hyper attacks and jumpy.      PAST PSYCHIATRIC HISTORY:  He was never psychiatrically hospitalized.  He has been in 3 chemical dependency treatments, most of them [facility].      PAST MEDICAL HISTORY:  The patient has a COPD, hepatitis C.        Current Facility-Administered Medications   Medication     ipratropium (ATROVENT) 0.02 % neb solution 0.5 mg     levalbuterol (XOPENEX) neb solution 0.63 mg     amLODIPine (NORVASC) tablet 2.5 mg     potassium phosphate 10 mmol in D5W 250 mL intermittent infusion     potassium phosphate 25 mmol in NaCl 0.9 % 500 mL intermittent infusion     hydrALAZINE (APRESOLINE) injection 10 mg     PHENobarbital (LUMINAL) tablet 32.4 mg     buprenorphine HCl-naloxone HCl (SUBOXONE) 2-0.5 MG per film 1 Film     [START ON 12/21/2017] PHENobarbital (LUMINAL) tablet 32.4 mg     [START ON 12/22/2017] PHENobarbital (LUMINAL) tablet 32.4 mg     [START ON 12/20/2017] PHENobarbital (LUMINAL) tablet 32.4 mg     aspirin EC EC tablet 81 mg     cyanocobalamin (vitamin  B-12) tablet 1,000 mcg     gabapentin (NEURONTIN) capsule 300 mg     lidocaine (XYLOCAINE) 5 % ointment     venlafaxine (EFFEXOR-ER) 24 hr tablet 75 mg     naloxone (NARCAN) injection 0.1-0.4 mg     acetaminophen (TYLENOL) tablet 650 mg     bisacodyl (DULCOLAX) EC tablet 5 mg     ondansetron (ZOFRAN-ODT) ODT tab 4 mg    Or     ondansetron (ZOFRAN) injection 4 mg     prochlorperazine (COMPAZINE) injection 10 mg    Or     prochlorperazine (COMPAZINE) tablet 10 mg    Or     prochlorperazine (COMPAZINE) Suppository 25 mg     diazepam (VALIUM) tablet 5-20 mg     folic acid (FOLVITE) tablet 1 mg     multivitamin, therapeutic with minerals (THERA-VIT-M) tablet 1 tablet      guaiFENesin (MUCINEX) 12 hr tablet 600 mg     nicotine Patch in Place     nicotine patch REMOVAL     nicotine (NICODERM CQ) 14 MG/24HR 24 hr patch 1 patch     pantoprazole (PROTONIX) EC tablet 40 mg     docusate sodium (COLACE) capsule 100 mg     potassium phosphate 15 mmol in D5W 250 mL intermittent infusion     potassium chloride SA (K-DUR/KLOR-CON M) CR tablet 20-40 mEq     potassium chloride (KLOR-CON) Packet 20-40 mEq     potassium chloride 10 mEq in 100 mL sterile water intermittent infusion (premix)     potassium chloride 10 mEq in 100 mL intermittent infusion with 10 mg lidocaine     magnesium sulfate 2 g in NS intermittent infusion ([facility]ar[facility] or [facility] Cmpd)     magnesium sulfate 4 g in 100 mL sterile water (premade)     potassium phosphate 20 mmol in NaCl 0.9 % 500 mL intermittent infusion     levalbuterol (XOPENEX) neb solution 0.63 mg     The patient's vitals are as below.   VITAL SIGNS:  Temperature of 97.8, pulse of 92, respiratory rate of 18, blood pressure 145/71.      FAMILY HISTORY:  Denied any family psychiatric or chemical dependency issues.      SOCIAL HISTORY:  He describes a good childhood, no abuse.  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         MENTAL STATUS EXAMINATION:  The patient is a 52-year-old  male who is lying in bed.  He has oxygen cannula.  He is not able to ambulate.  Gait not tested.  Mood is anxious.  Affect is congruent.  Speech is spontaneous, normal in rate, less logical in thinking, no loose association.  Insight and judgment are limited.  Alert and oriented x3.  Recent and remote memory, language, fund of knowledge are all adequate.  No suicidal ideation, plan or intent.      DIAGNOSES:   Axis I:     1.  Major depressive disorder, recurrent, without psychotic features.    2.  Post-traumatic stress disorder.    3.  Alcohol use disorder.     4.  Opiate use disorder.   5.  Sedative hypnotic use disorder.      PLAN:  The  patient will be detoxed off alcohol using MSSA protocol on Valium.  The patient wants to be on Suboxone maintenance.  He will be on 2 mg q.i.d. and he will be given a referral to Dr. Toledo to continue Suboxone maintenance.  The patient will also be detoxed off Xanax using phenobarbital.  He will be on 30 mg 4 times a day and then taper off on day 6 to 90 mg and 60 mg and then 30 mg.  The patient will continue Effexor for his mood.  The patient will be seen by case management.  At this time, patient is willing to do CD treatment.  The patient's case discussed with .         FARHEEN MATHIS MD             D: 12/15/2017 13:41   T: 12/15/2017 17:18   MT: SHERYL      Name:     MARYBETH HUERTAS   MRN:      [id number]        Account:       [id number]   :      1955           Consult Date:  12/15/2017      Document: [id number]

## 2017-12-15 NOTE — PLAN OF CARE
Problem: Patient Care Overview  Goal: Plan of Care/Patient Progress Review  OT: pt's chart reviewed.  After consult with PT and SW, determined to hold OT for now as pt is actively in withdrawal and d/c plan is uncertain.  Will check in with pt in the next few days to see if/when appropriate for evaluation.

## 2017-12-15 NOTE — PROGRESS NOTES
Internal Medicine Daily Note   Date of Service: 12/15/2017      Patient: Jorge Rosales  MRN: [id number]  Admission Date: 12/14/2017  Hospital Day # 1    Assessment & Plan: Jorge Rosales is a 62 year old man with a history of COPD, chronic pain syndrome, left leg paralysis, PTSD, and alcohol abuse admitted for acute alcohol intoxication seeking detox and worsening dyspnea.        # Acute alcohol intoxication, alcohol withdrawal - ABT 0.166 on admission.  Currently scoring MSSA 16-13 this shift.  Required 100mg of Valium last 24 hr.  Seen by Psychiatry today, willing to go to CD treatment.  Hx significant for withdrawal seizures and DTs. Continues to be quite tremulous.     - Continue MSSA w/ Valium  - Bed alarm  - Fall precautions   - Seizure precautions   - Withdrawal precautions   - Continue daily MVI, thiamine, folvite   - Chemical dependency, SW consults placed      # Atrial fib with RVR - Occurred overnight, likely 2/2 alcohol withdrawal.  No previous hx of AF.  Seen by moonlighter, converted to NSR w/ Diltiazem IV x 2 doses.  BP stable.  No c/o chest pain. No further episodes during day shift.    - Continue med tele  - Echocardiogram ordered       # COPD exacerbation, mild - Stable. Lung sounds improved overnight with steroids and nebs. Procal unremarkable.  CXR this admission with R platelike atelectasis, otherwise no acute cardiopulmonary findings.  Reports using albuterol inhaler PTA, though unable to confirm in charting.  No PFTs on file. Currently afebrile.     - No indication for antibiotics at this time   - Xopenex nebs QID x 1 day then QID PRN   - Mucinex 600mg BID   - Prednisone 40mg daily for 5 days then stop   - Supplemental O2 for sats < 88%   - Continuous pulse ox    - COPD RT consult placed      # Nausea, vomiting, diarrhea - Ongoing, likely 2/2 alcohol intake, withdrawals, possible gastritis.    - CMP in am   - Zofran PRN   - Protonix BID   - Carafate QID     # Opioid  "dependence   # Hx of chronic pain syndrome  Previously on maintenance Suboxone for ~ 3 yrs, [facility].  Pt reported that his last dose was on 12/13, however review of records suggests that his last Rx was filled on 11/17 for 13 SL.  Psychiatry following, recommend restarting Suboxone daily, with plans to coordinate follow up with Dr. Tre HADLEY.    - Starting Suboxone today 12/15  - Opioid withdrawal scale   - Withdrawal precautions     # Thrombocytopenia - Plt 94.  Likely BM suppression in setting of alcohol intake.  No s/s of acute bleed.    - Trend CBC in am      # Left leg paralysis - Reports that leg has been paralyzed following surgery ~ 2 years ago.  Walks with cane and electric power chair.  - PT/OT consult         # PTSD w/ hx of panic attacks - Stable.  Pt reports having been recently prescribed Klonopin but states that he \"never got it filled\". Utox positive for benzos.  Per Psychiatry, will taper off benzos w/ phenobarbital taper.   - Phenobarbital taper as follows:     - Phenobarbital 30 mg PO QID x 5 days    - Then day 6 12/20: 32.4mg PO TID x 1 day    - Then day 7 12/21: 32.4mg PO BID x 1 day    - Then day 8 12/22: 32.4mg PO QHS x1 day then stop  - Continue PTA Venlafaxine   - Valium as above for withdrawal symptoms   - Psych consult in am      # Tobacco dependence   - Nicotine patch     # Malnutrition - 2/2 alcohol use -  Reports little to no oral intake last ~ 4 days.  Albumin 2.9, protein 6.8.    - Nutrition consult   - Continue IVFs  - Snacks and supplements between meals       Consulting Services: Psychiatry, Chem Dep     CODE: Full   DVT: Lovenox   FEN: D5 in NS w/ KCl at 100cc/hr   Disposition: Pending resolution of withdrawal symptoms, CD evaluation (inpt vs outpt)     Resolved hospital problems:   # Mild AG acidosis - Resolved.  # Leukocytosis        Patient's care was discussed with bedside RN, patient, and care coordinator.    Today's plan of care was " reviewed with attending physician, Dr. Vladislav Jarvis, CNP  Hospitalist Service   Pager: [phone]    ___________________________________________________________________    Subjective & Interval History:      Jorge is resting in bed.  He is minimally interactive, but states that he feels slightly better today than yesterday on admission.  He reports that he has very little to no appetite.  He denies any further vomiting and diarrhea.  He denies chest pain and dyspnea.  No fevers or chills.  Feels that breathing is somewhat better.      Last 24 hour care team notes reviewed.   ROS: 4 point ROS (including Respiratory, CV, GI and ) was performed and negative unless otherwise noted in HPI.     Medications: Reviewed in EPIC.    Physical Exam:    Blood pressure (!) 146/93, pulse 92, temperature 98.3  F (36.8  C), temperature source Oral, resp. rate 17, SpO2 92 %.    GENERAL: Alert and oriented x 3. Well nourished, well developed.  No acute distress.    HEENT: Normocephalic, atraumatic. Anicteric sclera. Mucous membranes moist.   CV: RRR. S1, S2. No murmurs appreciated.   RESPIRATORY: Effort normal on 2 L. Lungs diminished at bases.    GI: Abdomen soft and non distended, bowel sounds present x all 4 quadrants. No tenderness, rebound, or guarding.   NEUROLOGICAL: L leg paralysis. Follows commands.  Pronounced tremor.    MUSCULOSKELETAL: No joint swelling or tenderness.  EXTREMITIES: No gross deformities. No peripheral edema.   SKIN: Grossly warm, dry, and intact. No jaundice. No rashes.     Lines/Tubes/Drains:   Peripheral IV 12/14/17 Right Hand (Active)   Site Assessment [facility] 12/14/2017 11:20 PM   Line Status Infusing 12/14/2017 11:20 PM   Phlebitis Scale 0-->no symptoms 12/14/2017 11:20 PM   Infiltration Scale 0 12/14/2017 11:20 PM   Number of days:1       Peripheral IV 12/14/17 Right Lower forearm (Active)   Site Assessment [facility] 12/14/2017 11:20 PM   Line Status Infusing 12/14/2017 11:20 PM   Phlebitis  Scale 0-->no symptoms 12/14/2017 11:20 PM   Infiltration Scale 0 12/14/2017 11:20 PM   Dressing Intervention New dressing  12/14/2017 11:20 PM   Number of days:1       Labs & Studies of Note: I personally reviewed the following studies:    ROUTINE IP LABS (Last four results)  CMP   Recent Labs  Lab 12/15/17  0552 12/15/17  0327 12/14/17  2033 12/14/17  1154     --   --  138   POTASSIUM 4.7  --   --  3.5   CHLORIDE 98  --   --  96   CO2 30  --   --  26   ANIONGAP 8  --   --  16*   *  --   --  155*   BUN 13  --   --  18   CR 0.47*  --   --  0.61*   RUSTY 7.9*  --   --  7.7*   MAG  --  2.5* 1.4*  --    PHOS 3.1  --  1.9*  --    PROTTOTAL 6.7*  --   --  6.8   ALBUMIN 2.8*  --   --  2.9*   BILITOTAL 1.0  --   --  0.9   ALKPHOS 87  --   --  97   AST 62*  --   --  93*   ALT 68  --   --  85*     CBC   Recent Labs  Lab 12/15/17  0552 12/14/17  1154   WBC 9.4 17.1*   RBC 4.52 4.75   HGB 13.8 14.5   HCT 40.7 43.1   MCV 90 91   MCH 30.5 30.5   MCHC 33.9 33.6   RDW 15.5* 15.6*   PLT 94* 126*     INR No lab results found in last 7 days.      Unresulted Labs Ordered in the Past 30 Days of this Admission     No orders found for last 61 day(s).

## 2017-12-15 NOTE — CONSULTS
PT SEEN FOR INITIAL PSYCH CONSULT FOR 60 MIN  DX MDD RECURRENT WITHOUT PSYCOSIS  PTSD   ALCOHOL USE DX  SEDATIVE HYPNOTIC USE DX SEVERE   PLAN  Will detox off alcohol on mssa on valium  will detox off opiates and put him on suboxone 2mg qid -pt to see DR PARKER  WILL detox off benzo  Using phenobarbital   pheobarbital 30 mg po qid x5 day   On 6 th day 12/20 take phenobarbital 32.4 po tidx1 day   On 7 th day 12/21 take phenobarbital 32.4 po bidx1 day   On 8 th day 12/22 take phenobarbital 32.4 po qhs x1 day  Then stop   PT IS WILLING TO DO CD TRT  Will not pursue commitment  Will continue effexor

## 2017-12-15 NOTE — CONSULTS
"Social Work: Assessment with Discharge Plan    Patient Name:  Jorge Rosales  :  1955  Age:  62 year old  MRN:  [id number]  Risk/Complexity Score:  Filed Complexity Screen Score: 8  Completed assessment with:  Pt, Dr Loomis, Estefania CARVAJAL NP, 10A IDT    Presenting Information   Reason for Referral:  Discharge plan  Date of Intake:  December 15, 2017  Referral Source:  Physician  Decision Maker:  pt  Alternate Decision Maker:  Pt does not identify-has disabled daughter, has friend  Health Care Directive:  Declined completing  Living Situation:  House  Previous Functional Status:  Independent  Patient and family understanding of hospitalization:  Seeking Detox (pt's 3rd Admission since Nov). ? If pt is a candidate for Civil Commitment-await Psych recommendations)  Cultural/Language/Spiritual Considerations:  ,   Adjustment to Illness:  Pt is apathetic \"I had a rough night.\" pt is reluctant to engage in conversation about getting into CD treatment.     Physical Health  Reason for Admission:    1. Acute alcoholic intoxication in alcoholism with complication (H)    2. Hypoxia    3. COPD exacerbation (H)    4. Hypoxemia    5. Obstructive chronic bronchitis with exacerbation (H)    6. Cigarette smoker      Services Needed/Recommended:  Other:  CD treatment, pt may benefit from MI/CD treatment  . Pt Has history of leaving AMA    Mental Health/Chemical Dependency  Diagnosis:  PTSD with Panic Attacks, poly substance abuse  Support/Services in Place:  [facility]. [facility] that [facility] due to him obtaining medication through outside provider  Services Needed/Recommended:  MI/CD treatment    Support System  Significant relationship at present time:  None evident  Family of origin is available for support:  Pt denies having social supports  Other support available:  Not identified  Gaps in support system:  Sober/mental health " "support  Patient is caregiver to:  None     Provider Information   Primary Care Physician:  No Ref-Primary, Physician   None   Clinic:        :  Not identified    Financial   Income Source:  Pension, social security  Financial Concerns:  None noted  Insurance:    Payor/Plan Subscriber Name Rel Member # Group #   [employer] - [employer]*  [id number]      [address]       Discharge Plan   Patient and family discharge goal:  Not well defined or expressed. Pt states \"I am thinking. I don't feel well\"  Provided education on discharge plan:  YES  Patient agreeable to discharge plan:  Pt has history of Leaving AMA when he feels better, and he immediately relapses on alcohol or other medications  A list of Medicare Certified Facilities was provided to the patient and/or family to encourage patient choice. Patient's choices for facility are:  None at this time  Will NH provide Skilled rehabilitation or complex medical:  NA  General information regarding anticipated insurance coverage and possible out of pocket cost was discussed. Patient and patient's family are aware patient may incur the cost of transportation to the facility, pending insurance payment: NA  Barriers to discharge:  Medical stability, Assessment for possible commitment    Discharge Recommendations   Anticipated Disposition:  to be determined  Transportation Needs:  Other:  to be determined  Name of Transportation Company and Phone:  To be determined    Additional comments   Pt is known to writer from previous hospitalizations. At that time, pt was more receptive to idea of CD treatment and identified having providers [facility]. Since that time, pt has had repeated hospitalizations for detox, has obtained benzo prescription from provider not associated with [facility] (and subsequently they have stopped his subaxone). Pt voices frustration with [facility] and providers, has not reconnected with his therapists or psychiatrist " "\"Because I have been drinking and have not made appointments\".     When asked about pursuing CD treatment, pt says \"I will think about it but I had a rough night and am not feeling well.\"  His motivation for change at this time is low and may not be contemplating change despite this verbalization as evidence by his behavior of leaving AMA and not following through with identified resources.    SW cont' to follow. Pt informed writer will review his options and continue discussion about CD treatment. He perseverates on not feeling up to holding discussion and repeats statements that he \"wants to think about it\".   "

## 2017-12-15 NOTE — PLAN OF CARE
Problem: Alcohol Withdrawal Acute, Risk/Actual (Adult)  Goal: Signs and Symptoms of Listed Potential Problems Will be Absent, Minimized or Managed (Alcohol Withdrawal Acute, Risk/Actual)  Signs and symptoms of listed potential problems will be absent, minimized or managed by discharge/transition of care (reference Alcohol Withdrawal Acute, Risk/Actual (Adult) CPG).  Outcome: Change based on patient need/priority Date Met: 12/15/17  Pt is alert and oriented X4. Pt became tachycardic around 9:30pm with HR in the 130-160 range. Moonlighter notified- ECG completed- rhythm was Afib with rapid ventricular response. Pt received 20mg Diltiazem X2 and converted back to Normal sinus rhythm. HR in the 80's overnight. Vitals have since been stable and within normal limits. Lung sounds are diminished in the bases- Nasal cannula at 2.5 LPM to keep sat's above 90. MSSA scores have been 14-18- Valium given per protocol- pt is very tremulous. Mg+, K+, and P+ were replaced per protocol overnight. Complained of nausea overnight -administered oral Zofran which was partially effective. Full liquid diet well tolerated. Voiding spontaneously using urinal at bedside. Pt is unsteady on his feet- assist of one needed. Sitter at bedside for safety. Will continue with plan of care.

## 2017-12-15 NOTE — PROGRESS NOTES
Care Coordinator Progress Note     Admission Date/Time:  12/14/2017  Attending MD:  Shane Loomis MD     Data  Chart reviewed, discussed with interdisciplinary team.   Patient was admitted for:    Acute alcoholic intoxication in alcoholism with complication (H)  Hypoxia  COPD exacerbation (H)  Hypoxemia  Obstructive chronic bronchitis with exacerbation (H)  Cigarette smoker.    Concerns with insurance coverage for discharge needs: None.  Current Living Situation: Patient lives alone.  Support System: Unclear, Has identified [facility] as [facility] system in past hospitalizations, patient unable to answer this morning.  Services Involved:  Fredis Garcia at [facility]., was discharged from home care services on 12/6/17 after he left hospital AMA. If home care services appropriate again at discharge, they would need new referral and would assess at that time.  [facility].  Phone  [phone]   Jose (Intake RN) direct ph: [phone] (familiar with patient)  Fax  [phone]    Transportation: Public transportation/TBD  Barriers to Discharge: chronically ill, history of non-alliance and substance abuse    Coordination of Care and Referrals: No referrals initiated at this time as disposition is TBD     Assessment  Patient with two recent hospitalizations, complex medical history and significant substance abuse, admitted for detox and hypoxia. Patient has home care services through [facility]. Per discussion with interdisciplinary team, psychiatry consult ordered with consideration of seeking commitment for EtOH abuse.    RNCC will continue to follow plan of care and assist with d/c planning as appropriate.     Plan  Anticipated Discharge Date:  In active EtOH withdrawal, No d/c expected over weekend per MD team.   Anticipated Discharge Plan:  TBD    Nereida Carbone RN  Care Coordinator  Pager [phone]

## 2017-12-16 ENCOUNTER — APPOINTMENT (OUTPATIENT)
Dept: OCCUPATIONAL THERAPY | Facility: CLINIC | Age: 62
DRG: 897 | End: 2017-12-16
Payer: COMMERCIAL

## 2017-12-16 LAB
ALBUMIN SERPL-MCNC: 2.2 G/DL (ref 3.4–5)
ALP SERPL-CCNC: 78 U/L (ref 40–150)
ALT SERPL W P-5'-P-CCNC: 56 U/L (ref 0–70)
ANION GAP SERPL CALCULATED.3IONS-SCNC: 4 MMOL/L (ref 3–14)
AST SERPL W P-5'-P-CCNC: 53 U/L (ref 0–45)
BILIRUB SERPL-MCNC: 0.6 MG/DL (ref 0.2–1.3)
BUN SERPL-MCNC: 12 MG/DL (ref 7–30)
CALCIUM SERPL-MCNC: 7.6 MG/DL (ref 8.5–10.1)
CHLORIDE SERPL-SCNC: 105 MMOL/L (ref 94–109)
CO2 SERPL-SCNC: 30 MMOL/L (ref 20–32)
CREAT SERPL-MCNC: 0.51 MG/DL (ref 0.66–1.25)
ERYTHROCYTE [DISTWIDTH] IN BLOOD BY AUTOMATED COUNT: 15.4 % (ref 10–15)
GFR SERPL CREATININE-BSD FRML MDRD: >90 ML/MIN/1.7M2
GLUCOSE SERPL-MCNC: 128 MG/DL (ref 70–99)
HCT VFR BLD AUTO: 40.7 % (ref 40–53)
HGB BLD-MCNC: 13.4 G/DL (ref 13.3–17.7)
INTERPRETATION ECG - MUSE: NORMAL
MCH RBC QN AUTO: 30.4 PG (ref 26.5–33)
MCHC RBC AUTO-ENTMCNC: 32.9 G/DL (ref 31.5–36.5)
MCV RBC AUTO: 92 FL (ref 78–100)
PLATELET # BLD AUTO: 72 10E9/L (ref 150–450)
POTASSIUM SERPL-SCNC: 4 MMOL/L (ref 3.4–5.3)
PROT SERPL-MCNC: 5.6 G/DL (ref 6.8–8.8)
RBC # BLD AUTO: 4.41 10E12/L (ref 4.4–5.9)
SODIUM SERPL-SCNC: 139 MMOL/L (ref 133–144)
WBC # BLD AUTO: 8.9 10E9/L (ref 4–11)

## 2017-12-16 PROCEDURE — 25800025 ZZH RX 258: Performed by: INTERNAL MEDICINE

## 2017-12-16 PROCEDURE — 97535 SELF CARE MNGMENT TRAINING: CPT | Mod: GO | Performed by: OCCUPATIONAL THERAPIST

## 2017-12-16 PROCEDURE — 25000132 ZZH RX MED GY IP 250 OP 250 PS 637: Performed by: INTERNAL MEDICINE

## 2017-12-16 PROCEDURE — 94640 AIRWAY INHALATION TREATMENT: CPT | Mod: 76

## 2017-12-16 PROCEDURE — 99207 ZZC CDG-CUT & PASTE-POTENTIAL IMPACT ON LEVEL: CPT | Performed by: NURSE PRACTITIONER

## 2017-12-16 PROCEDURE — 80053 COMPREHEN METABOLIC PANEL: CPT | Performed by: NURSE PRACTITIONER

## 2017-12-16 PROCEDURE — 97165 OT EVAL LOW COMPLEX 30 MIN: CPT | Mod: GO | Performed by: OCCUPATIONAL THERAPIST

## 2017-12-16 PROCEDURE — 85027 COMPLETE CBC AUTOMATED: CPT | Performed by: NURSE PRACTITIONER

## 2017-12-16 PROCEDURE — 25000132 ZZH RX MED GY IP 250 OP 250 PS 637: Performed by: PSYCHIATRY & NEUROLOGY

## 2017-12-16 PROCEDURE — 25000125 ZZHC RX 250: Performed by: INTERNAL MEDICINE

## 2017-12-16 PROCEDURE — 40000133 ZZH STATISTIC OT WARD VISIT: Performed by: OCCUPATIONAL THERAPIST

## 2017-12-16 PROCEDURE — 99233 SBSQ HOSP IP/OBS HIGH 50: CPT | Performed by: NURSE PRACTITIONER

## 2017-12-16 PROCEDURE — 25000128 H RX IP 250 OP 636: Performed by: NURSE PRACTITIONER

## 2017-12-16 PROCEDURE — 94640 AIRWAY INHALATION TREATMENT: CPT

## 2017-12-16 PROCEDURE — 12000001 ZZH R&B MED SURG/OB UMMC

## 2017-12-16 PROCEDURE — 36415 COLL VENOUS BLD VENIPUNCTURE: CPT | Performed by: NURSE PRACTITIONER

## 2017-12-16 PROCEDURE — 25000125 ZZHC RX 250: Performed by: NURSE PRACTITIONER

## 2017-12-16 PROCEDURE — 25000132 ZZH RX MED GY IP 250 OP 250 PS 637: Performed by: NURSE PRACTITIONER

## 2017-12-16 RX ORDER — HYDRALAZINE HYDROCHLORIDE 20 MG/ML
10 INJECTION INTRAMUSCULAR; INTRAVENOUS EVERY 6 HOURS PRN
Status: DISCONTINUED | OUTPATIENT
Start: 2017-12-16 | End: 2017-12-22 | Stop reason: HOSPADM

## 2017-12-16 RX ORDER — LEVALBUTEROL INHALATION SOLUTION 0.63 MG/3ML
0.63 SOLUTION RESPIRATORY (INHALATION) 3 TIMES DAILY
Status: DISCONTINUED | OUTPATIENT
Start: 2017-12-16 | End: 2017-12-18

## 2017-12-16 RX ADMIN — SUCRALFATE 1 G: 1 TABLET ORAL at 10:42

## 2017-12-16 RX ADMIN — GABAPENTIN 300 MG: 300 CAPSULE ORAL at 08:37

## 2017-12-16 RX ADMIN — FOLIC ACID 1 MG: 1 TABLET ORAL at 08:38

## 2017-12-16 RX ADMIN — DIAZEPAM 10 MG: 5 TABLET ORAL at 20:30

## 2017-12-16 RX ADMIN — IPRATROPIUM BROMIDE 0.5 MG: 0.5 SOLUTION RESPIRATORY (INHALATION) at 14:54

## 2017-12-16 RX ADMIN — IPRATROPIUM BROMIDE 0.5 MG: 0.5 SOLUTION RESPIRATORY (INHALATION) at 20:14

## 2017-12-16 RX ADMIN — DIAZEPAM 10 MG: 5 TABLET ORAL at 03:36

## 2017-12-16 RX ADMIN — DIAZEPAM 10 MG: 5 TABLET ORAL at 08:35

## 2017-12-16 RX ADMIN — DIAZEPAM 10 MG: 5 TABLET ORAL at 00:03

## 2017-12-16 RX ADMIN — BUPRENORPHINE HYDROCHLORIDE, NALOXONE HYDROCHLORIDE 1 FILM: 2; .5 FILM, SOLUBLE BUCCAL; SUBLINGUAL at 08:39

## 2017-12-16 RX ADMIN — LEVALBUTEROL HYDROCHLORIDE 0.63 MG: 0.63 SOLUTION RESPIRATORY (INHALATION) at 20:14

## 2017-12-16 RX ADMIN — GABAPENTIN 300 MG: 300 CAPSULE ORAL at 14:37

## 2017-12-16 RX ADMIN — PHENOBARBITAL 32.4 MG: 32.4 TABLET ORAL at 20:25

## 2017-12-16 RX ADMIN — VENLAFAXINE HYDROCHLORIDE 75 MG: 75 TABLET, EXTENDED RELEASE ORAL at 08:35

## 2017-12-16 RX ADMIN — SUCRALFATE 1 G: 1 TABLET ORAL at 16:13

## 2017-12-16 RX ADMIN — ASPIRIN 81 MG: 81 TABLET, COATED ORAL at 08:37

## 2017-12-16 RX ADMIN — SUCRALFATE 1 G: 1 TABLET ORAL at 07:13

## 2017-12-16 RX ADMIN — PHENOBARBITAL 32.4 MG: 32.4 TABLET ORAL at 12:37

## 2017-12-16 RX ADMIN — DIAZEPAM 10 MG: 5 TABLET ORAL at 16:19

## 2017-12-16 RX ADMIN — GABAPENTIN 300 MG: 300 CAPSULE ORAL at 20:25

## 2017-12-16 RX ADMIN — NICOTINE 1 PATCH: 14 PATCH, EXTENDED RELEASE TRANSDERMAL at 08:41

## 2017-12-16 RX ADMIN — DIAZEPAM 10 MG: 5 TABLET ORAL at 10:42

## 2017-12-16 RX ADMIN — BUPRENORPHINE HYDROCHLORIDE, NALOXONE HYDROCHLORIDE 1 FILM: 2; .5 FILM, SOLUBLE BUCCAL; SUBLINGUAL at 16:13

## 2017-12-16 RX ADMIN — SUCRALFATE 1 G: 1 TABLET ORAL at 21:35

## 2017-12-16 RX ADMIN — PREDNISONE 40 MG: 20 TABLET ORAL at 08:36

## 2017-12-16 RX ADMIN — LEVALBUTEROL HYDROCHLORIDE 0.63 MG: 0.63 SOLUTION RESPIRATORY (INHALATION) at 14:55

## 2017-12-16 RX ADMIN — Medication 100 MG: at 08:38

## 2017-12-16 RX ADMIN — PHENOBARBITAL 32.4 MG: 32.4 TABLET ORAL at 08:36

## 2017-12-16 RX ADMIN — GUAIFENESIN 600 MG: 600 TABLET, EXTENDED RELEASE ORAL at 20:24

## 2017-12-16 RX ADMIN — POTASSIUM CHLORIDE, DEXTROSE MONOHYDRATE AND SODIUM CHLORIDE: 150; 5; 900 INJECTION, SOLUTION INTRAVENOUS at 09:35

## 2017-12-16 RX ADMIN — PANTOPRAZOLE SODIUM 40 MG: 40 TABLET, DELAYED RELEASE ORAL at 16:13

## 2017-12-16 RX ADMIN — PHENOBARBITAL 32.4 MG: 32.4 TABLET ORAL at 16:13

## 2017-12-16 RX ADMIN — POTASSIUM CHLORIDE, DEXTROSE MONOHYDRATE AND SODIUM CHLORIDE: 150; 5; 900 INJECTION, SOLUTION INTRAVENOUS at 18:40

## 2017-12-16 RX ADMIN — MULTIPLE VITAMINS W/ MINERALS TAB 1 TABLET: TAB at 08:38

## 2017-12-16 RX ADMIN — BUPRENORPHINE HYDROCHLORIDE, NALOXONE HYDROCHLORIDE 1 FILM: 2; .5 FILM, SOLUBLE BUCCAL; SUBLINGUAL at 12:37

## 2017-12-16 RX ADMIN — DIAZEPAM 10 MG: 5 TABLET ORAL at 14:37

## 2017-12-16 RX ADMIN — GUAIFENESIN 600 MG: 600 TABLET, EXTENDED RELEASE ORAL at 08:38

## 2017-12-16 RX ADMIN — CYANOCOBALAMIN TAB 1000 MCG 1000 MCG: 1000 TAB at 08:38

## 2017-12-16 RX ADMIN — IPRATROPIUM BROMIDE 0.5 MG: 0.5 SOLUTION RESPIRATORY (INHALATION) at 08:20

## 2017-12-16 RX ADMIN — ENOXAPARIN SODIUM 40 MG: 40 INJECTION SUBCUTANEOUS at 08:43

## 2017-12-16 RX ADMIN — LEVALBUTEROL HYDROCHLORIDE 0.63 MG: 0.63 SOLUTION RESPIRATORY (INHALATION) at 08:20

## 2017-12-16 RX ADMIN — PANTOPRAZOLE SODIUM 40 MG: 40 TABLET, DELAYED RELEASE ORAL at 07:13

## 2017-12-16 RX ADMIN — BUPRENORPHINE HYDROCHLORIDE, NALOXONE HYDROCHLORIDE 1 FILM: 2; .5 FILM, SOLUBLE BUCCAL; SUBLINGUAL at 20:25

## 2017-12-16 ASSESSMENT — ACTIVITIES OF DAILY LIVING (ADL)
ADLS_ACUITY_SCORE: 23
ADLS_ACUITY_SCORE: 18
ADLS_ACUITY_SCORE: 18
ADLS_ACUITY_SCORE: 22
ADLS_ACUITY_SCORE: 21
ADLS_ACUITY_SCORE: 18

## 2017-12-16 NOTE — PLAN OF CARE
Problem: Patient Care Overview  Goal: Plan of Care/Patient Progress Review  Outcome: No Change  Pt up with assist of 2. Sitter removed earlier today as pt is oriented and using call light appropriately. Voiding using urinal. Tele NSR. LLE numb at baseline. Tolerating regular diet but eating only about 50% of meals. MSSA 15 & 18 - valium given. Pt tremulous at rest. Reporting visual hallucinations. C/o back and shoulder pain -baseline. PIV infusing. Able to make needs known.

## 2017-12-16 NOTE — PROGRESS NOTES
Internal Medicine Daily Note   Date of Service: 12/16/2017      Patient: Jorge Rosales  MRN: [id number]  Admission Date: 12/14/2017  Hospital Day # 2    Assessment & Plan: Jorge Rosales is a 62 year old man with a history of COPD, chronic pain syndrome, left leg paralysis, PTSD, and alcohol abuse admitted for acute alcohol intoxication seeking detox and worsening dyspnea.        # Acute alcohol intoxication, alcohol withdrawal - ABT 0.166 on admission.  Currently scoring MSSA 13 this shift. Continues to require Valium. Tremors notably improved this am.  Hx significant for withdrawal seizures and DTs.    - Continue MSSA w/ Valium  - Bed alarm  - Fall precautions   - Seizure precautions   - Withdrawal precautions   - Continue daily MVI, thiamine, folvite   - Chemical dependency, SW consults placed      # Atrial fib with RVR - Stable.  No previous hx of AF.  Echocardiogram on 12/15 with hyperkinetic global and regional LV function, EF 65-70%.  No regional WMAs.  No significant valvular regurgitation or stenosis. Lytes wnl.    - Discontinue tele   - CBC, BMP in am      # COPD exacerbation, mild - Stable. Lung w/ bilateral wheezes. Afebrile.  CXR this admission with R platelike atelectasis, otherwise no acute cardiopulmonary findings.  Reports using albuterol inhaler PTA, though unable to confirm in charting.  No PFTs on file. Currently afebrile.   - IS, aggressive pulmonary toliet  - Up OOB 3-4x daily (discussed w/ RN)   - Xopenex Q6H PRN   - Mucinex 600mg BID   - Prednisone 40mg daily for 5 days then stop   - Supplemental O2 for sats < 88%   - Continuous pulse ox    - COPD RT consult placed      # Nausea, vomiting, diarrhea - Ongoing, likely 2/2 alcohol intake, withdrawals, possible gastritis.    - CMP in am   - Zofran PRN   - Protonix BID   - Carafate QID     # Opioid dependence   # Hx of chronic pain syndrome  Previously on maintenance Suboxone for ~ 3 yrs, [facility]  Pt reported that his last dose was on 12/13, however review of records suggests that his last Rx was filled on 11/17 for 13 SL.  Psychiatry following, recommend restarting Suboxone daily, with plans to coordinate follow up with Dr. Tre HADLEY.    - Continue Suboxone 1mg SL QID   - Continue opioid withdrawal scale   - Withdrawal precautions     # Thrombocytopenia - Plt 72 (94).  Likely BM suppression in setting of alcohol intake.  No s/s of acute bleed.    - Trend CBC in am      # Left leg paralysis - Reports that leg has been paralyzed following surgery ~ 2 years ago.  Walks with cane and electric power chair.  - PT/OT consult         # PTSD w/ hx of panic attacks - Stable.  Pt reports having been recently prescribed Klonopin but states that he \"never got it filled\". Utox positive for benzos.  Per Psychiatry, will taper off benzos w/ phenobarbital taper.   - Phenobarbital taper as follows (orders placed):     - Phenobarbital 30 mg PO QID x 5 days    - Then day 6 12/20: 32.4mg PO TID x 1 day    - Then day 7 12/21: 32.4mg PO BID x 1 day    - Then day 8 12/22: 32.4mg PO QHS x1 day then stop  - Continue PTA Venlafaxine   - Valium as above for withdrawal symptoms   - Psych consult in am      # Tobacco dependence   - Nicotine patch     # Malnutrition - 2/2 alcohol use -  Reports little to no oral intake last ~ 4 days.  Albumin 2.9, protein 6.8.    - Nutrition consult   - Continue IVFs  - Snacks and supplements between meals   - Calorie counts       Consulting Services: Psychiatry, Chem Dep     CODE: Full   DVT: Lovenox   FEN: D5 in NS w/ KCl at 100cc/hr   Disposition: Pending resolution of withdrawal symptoms, CD evaluation (inpt vs outpt)     Resolved hospital problems:   # Mild AG acidosis - Resolved.  # Leukocytosis        Patient's care was discussed with bedside RN, patient, and care coordinator.    Today's plan of care was reviewed with attending physician, Dr. Vladislav Jarvis, CNP  Hospitalist " Service   Pager: [phone]    ___________________________________________________________________    Subjective & Interval History:      Jorge is resting in bed.  He is minimally interactive, but states that he feels slightly better today than yesterday on admission.  He reports that he has very little to no appetite.  He denies any further vomiting and diarrhea.  He denies chest pain and dyspnea.  No fevers or chills.  Feels that breathing is somewhat better.      Last 24 hour care team notes reviewed.   ROS: 4 point ROS (including Respiratory, CV, GI and ) was performed and negative unless otherwise noted in HPI.     Medications: Reviewed in EPIC.    Physical Exam:    Blood pressure (!) 148/103, pulse 79, temperature 97.9  F (36.6  C), temperature source Oral, resp. rate 18, weight 94 kg (207 lb 4.8 oz), SpO2 100 %.    GENERAL: Alert and oriented x 3. Well nourished, well developed.  No acute distress.    HEENT: Normocephalic, atraumatic. Anicteric sclera. Mucous membranes moist.   CV: RRR. S1, S2. No murmurs appreciated.   RESPIRATORY: Effort normal on 2 L. Lungs diminished at bases.    GI: Abdomen soft and non distended, bowel sounds present x all 4 quadrants. No tenderness, rebound, or guarding.   NEUROLOGICAL: L leg paralysis. Follows commands.  Pronounced tremor.    MUSCULOSKELETAL: No joint swelling or tenderness.  EXTREMITIES: No gross deformities. No peripheral edema.   SKIN: Grossly warm, dry, and intact. No jaundice. No rashes.     Lines/Tubes/Drains:   Peripheral IV 12/14/17 Right Hand (Active)   Site Assessment [facility] 12/14/2017 11:20 PM   Line Status Infusing 12/14/2017 11:20 PM   Phlebitis Scale 0-->no symptoms 12/14/2017 11:20 PM   Infiltration Scale 0 12/14/2017 11:20 PM   Number of days:1       Peripheral IV 12/14/17 Right Lower forearm (Active)   Site Assessment [facility] 12/14/2017 11:20 PM   Line Status Infusing 12/14/2017 11:20 PM   Phlebitis Scale 0-->no symptoms 12/14/2017 11:20 PM    Infiltration Scale 0 12/14/2017 11:20 PM   Dressing Intervention New dressing  12/14/2017 11:20 PM   Number of days:1       Labs & Studies of Note: I personally reviewed the following studies:    ROUTINE IP LABS (Last four results)  CMP     Recent Labs  Lab 12/16/17  0652 12/15/17  0552 12/15/17  0327 12/14/17  2033 12/14/17  1154    136  --   --  138   POTASSIUM 4.0 4.7  --   --  3.5   CHLORIDE 105 98  --   --  96   CO2 30 30  --   --  26   ANIONGAP 4 8  --   --  16*   * 163*  --   --  155*   BUN 12 13  --   --  18   CR 0.51* 0.47*  --   --  0.61*   RUSTY 7.6* 7.9*  --   --  7.7*   MAG  --   --  2.5* 1.4*  --    PHOS  --  3.1  --  1.9*  --    PROTTOTAL 5.6* 6.7*  --   --  6.8   ALBUMIN 2.2* 2.8*  --   --  2.9*   BILITOTAL 0.6 1.0  --   --  0.9   ALKPHOS 78 87  --   --  97   AST 53* 62*  --   --  93*   ALT 56 68  --   --  85*     CBC     Recent Labs  Lab 12/16/17  1245 12/15/17  0552 12/14/17  1154   WBC 8.9 9.4 17.1*   RBC 4.41 4.52 4.75   HGB 13.4 13.8 14.5   HCT 40.7 40.7 43.1   MCV 92 90 91   MCH 30.4 30.5 30.5   MCHC 32.9 33.9 33.6   RDW 15.4* 15.5* 15.6*   PLT 72* 94* 126*     INR No lab results found in last 7 days.      Unresulted Labs Ordered in the Past 30 Days of this Admission     No orders found from 10/15/2017 to 12/15/2017.

## 2017-12-16 NOTE — PLAN OF CARE
"Problem: Alcohol Withdrawal Acute, Risk/Actual (Adult)  Intervention: Minimize/Manage Withdrawal Symptoms    Pt alert and oriented. VSS. Afebrile. CMS/neuros at baseline; numbness on left leg.  Ocassional inspiratory & expiratory wheezing auscultated. BS present and audible in all quadrants. Abd soft, nontender. Passing flatus. Last BM = 12/15.  Pt still remarkably tremulous even when arms are not extended. Pt states \"I feel like I want to jump of my skin.\" Also endorses visual hallucination. MSSA scores = 13 & 14. Valium administered per protocol. Not convinced the Valium is working at all. Believes he should be getting a higher dose of his Suboxone.  Voids spontaneously without difficulty, urinal by bedside. Encouraged pt to urinate because he hasn't voided since shift change. States he doesn't feel like. PIV infusing D5 + NS + 20 mEq @ 100mL/hr.  TELE = Normal Sinus Rhythm. Bed alarm maintained for safety. Continue POC.        "

## 2017-12-16 NOTE — PLAN OF CARE
Problem: Patient Care Overview  Goal: Plan of Care/Patient Progress Review  Discharge Planner OT   Patient plan for discharge: Patient is unsure of DC plan  Current status: Patient requires CGA x 2 with walker to ambulate 20' with unsteadiness.  Patient was able to dress LB with SBA.  Presents with bilateral tremors in hands, assisted to set up meal.  Patient alert and oriented.  Should have L LE AFO for ambulation.  Should be ambulating at least 3x/day.  Barriers to return to prior living situation: Patient has not been caring for self at home, reports has not been dressing or bathing often for last 3 months.  Reports only eats 1x every 3 days.  Patient reports using [employer] to get out in community when needed and does not drive.  Patient reports using [employer] in handicap accessible apartment and then motorized scooter in community.  Reports having 3-4 falls over last 3 months.  Recommendations for discharge: DC to SW, will plan for DC home with independence  Rationale for recommendations: Continued daily OT for strengthening to complete ADLs independently, will assess cognition once withdrawal complete.         Entered by: Melia Peng 12/16/2017 12:03 PM

## 2017-12-16 NOTE — PLAN OF CARE
Problem: Patient Care Overview  Goal: Plan of Care/Patient Progress Review  Outcome: No Change    VS:   BP elevated 148/103 in morning, last checked 135/72.  Requiring 2 LPM O2 NC (dropping to 87% on room air).  Other VSS.     Output:   Voiding spontaneously without difficulty using urinal.  Last BM 10/15     Activity:   Ambulated x1 in room with assist x2, walker, back and foot brace.  Up in chair for meal.     Skin: Abrasions, bruises       Pain:   Denies       Neuro/CMS:   Numbness in LLE (baseline).  Tremors BUE.     Dressing(s):   N/A       Diet:   Tolerating regular diet with no N/V       LDA:   PIV infusing   Equipment:   Uses back brace, L foot device, and walker for ambulation       Plan:   Continue with POC. Continue to encourage incentive spriometer use and get pt OOB for meals.      Additional Info:   MSSA scored at 13, 11, and 10; given a total of 30 mg of valium this shift per MSSA protocol.    COWs scored at 8.

## 2017-12-16 NOTE — PROGRESS NOTES
12/16/17 1124   Quick Adds   Type of Visit Initial Occupational Therapy Evaluation   Living Environment   Lives With alone   Living Arrangements apartment   Home Accessibility no concerns   Number of Stairs to Enter Home 0   Number of Stairs Within Home 0   Transportation Available other (see comments)  ([facility])   Living Environment Comment Walk-in shower, shower chair, grab bars, handicap accessible   Self-Care   Usual Activity Tolerance moderate   Current Activity Tolerance fair   Regular Exercise no   Equipment Currently Used at Home cane, straight;shower chair   Activity/Exercise/Self-Care Comment Patient independent in self-cares/mobility with a cane in apartment, power scooter in community.  Reports eats once every 3 days.  Lives in handicap accessible apartment.   Functional Level Prior   Ambulation 1-->assistive equipment   Transferring 1-->assistive equipment   Toileting 1-->assistive equipment   Bathing 1-->assistive equipment   Dressing 1-->assistive equipment   Eating 0-->independent   Communication 0-->understands/communicates without difficulty   Swallowing 0-->swallows foods/liquids without difficulty   Cognition 0 - no cognition issues reported   Fall history within last six months yes   Number of times patient has fallen within last six months 4   Prior Functional Level Comment Patient reports not getting dressed often for last 3 months, has been drinking heavily.     General Information   Onset of Illness/Injury or Date of Surgery - Date 12/14/17   Referring Physician Dr. Loomis   Patient/Family Goals Statement is unsure of plan   Additional Occupational Profile Info/Pertinent History of Current Problem Jorge Rosales is a 62 year old male with a history of chronic pain syndrome, alcohol abuse, PTSD, and panic attacks who presents to the Emergency Department for evaluation of an alcohol problem. Patient reports that he is having alcohol and Suboxone withdrawal symptoms. Patient has been  "drinking about 1 quart of whiskey per day from the past 3 months. His last drink was a couple hours ago, just prior to leaving his home for the ED. He has not been eating for the past 2-3 days and has been vomiting. [facility] but was taken off of this for taking Lorazepam prescribed by another clinic. He has not taken Suboxone in about 10 days. [facility] a few [facility] ago for withdrawal symptoms, but \"they told him to leave\". Patient is requesting detox from alcohol and Suboxone. Patient is a smoker and has albuterol inhalers at home. Per Chart Review, he has a history of withdrawal seizures and hallucinations.  Patient reports L LE weakness since back sugery 1.5 years ago, wears AFO.  Patient reports having L shoulder surgery 2 months ago.   Precautions/Limitations fall precautions   General Observations On tele, IV, O2, nursing present and disconnecting   Cognitive Status Examination   Orientation orientation to person, place and time   Level of Consciousness alert   Able to Follow Commands success, 1-step commands   Sensory Examination   Sensory Comments No N/T noted   Pain Assessment   Patient Currently in Pain No   Range of Motion (ROM)   ROM Comment B UE AROM WFL; patient reports aches in L shoulder with movement   Mobility   Bed Mobility Bed mobility skill: Supine to sit   Bed Mobility Skill: Supine to Sit   Level of [place]: Supine/Sit contact guard   Transfer Skill: Sit to Stand   Level of [place]: Sit/Stand contact guard   Assistive Device for Transfer: Sit/Stand standard walker   Transfer Skill: Toilet Transfer   Level of [place]: Toilet contact guard   Assistive Device standard walker   Toilet Transfer Skill Comments bedside commode   Bathing   Level of [place] other (see comments)  (not assessed )   Upper Body Dressing   Level of [place]: Dress Upper Body independent   Lower Body Dressing   Level of " "[place]: Dress Lower Body stand-by assist   Toileting   Level of [place]: Toilet other (see comments)  (not assessed )   Grooming   Level of [place]: Grooming independent   Eating/Self Feeding   Level of [place]: Eating independent   Activities of Daily Living Analysis   Impairments Contributing to Impaired Activities of Daily Living balance impaired;cognition impaired;strength decreased   General Therapy Interventions   Planned Therapy Interventions ADL retraining;strengthening   Clinical Impression   Criteria for Skilled Therapeutic Interventions Met yes, treatment indicated   OT Diagnosis impaired self-cares/mobility   Influenced by the following impairments weakness   Assessment of Occupational Performance 1-3 Performance Deficits   Identified Performance Deficits dressing, bathing, toileting   Clinical Decision Making (Complexity) Low complexity   Therapy Frequency daily   Predicted Duration of Therapy Intervention (days/wks) 3-4 days   Anticipated Discharge Disposition Home;Other (see comments)  (possible inpatient CD treatment)   Risks and Benefits of Treatment have been explained. Yes   Patient, Family & other staff in agreement with plan of care Yes   [facility] TM \"6 Clicks\"   2016, Trustees of [facility], under license to [employer].  All rights reserved.   6 Clicks Short Forms Daily Activity Inpatient Short Form   [facility]  \"6 Clicks\" Daily Activity Inpatient Short Form   1. Putting on and taking off regular lower body clothing? 3 - A Little   2. Bathing (including washing, rinsing, drying)? 2 - A Lot   3. Toileting, which includes using toilet, bedpan or urinal? 3 - A Little   4. Putting on and taking off regular upper body clothing? 4 - None   5. Taking care of personal grooming such as brushing teeth? 4 - None   6. Eating meals? 4 - None   Daily Activity Raw Score (Score out of 24.Lower scores equate to lower levels of function) 20   Total " Evaluation Time   Total Evaluation Time (Minutes) 8

## 2017-12-17 ENCOUNTER — APPOINTMENT (OUTPATIENT)
Dept: ULTRASOUND IMAGING | Facility: CLINIC | Age: 62
DRG: 897 | End: 2017-12-17
Attending: NURSE PRACTITIONER
Payer: COMMERCIAL

## 2017-12-17 LAB
ALBUMIN SERPL-MCNC: 2.5 G/DL (ref 3.4–5)
ALP SERPL-CCNC: 86 U/L (ref 40–150)
ALT SERPL W P-5'-P-CCNC: 79 U/L (ref 0–70)
ANION GAP SERPL CALCULATED.3IONS-SCNC: 5 MMOL/L (ref 3–14)
AST SERPL W P-5'-P-CCNC: 78 U/L (ref 0–45)
BILIRUB SERPL-MCNC: 0.6 MG/DL (ref 0.2–1.3)
BUN SERPL-MCNC: 9 MG/DL (ref 7–30)
CALCIUM SERPL-MCNC: 8.2 MG/DL (ref 8.5–10.1)
CHLORIDE SERPL-SCNC: 103 MMOL/L (ref 94–109)
CO2 SERPL-SCNC: 28 MMOL/L (ref 20–32)
CREAT SERPL-MCNC: 0.47 MG/DL (ref 0.66–1.25)
ERYTHROCYTE [DISTWIDTH] IN BLOOD BY AUTOMATED COUNT: 15.2 % (ref 10–15)
GFR SERPL CREATININE-BSD FRML MDRD: >90 ML/MIN/1.7M2
GLUCOSE SERPL-MCNC: 134 MG/DL (ref 70–99)
HCT VFR BLD AUTO: 41.7 % (ref 40–53)
HGB BLD-MCNC: 13.9 G/DL (ref 13.3–17.7)
MAGNESIUM SERPL-MCNC: 1.8 MG/DL (ref 1.6–2.3)
MCH RBC QN AUTO: 30.8 PG (ref 26.5–33)
MCHC RBC AUTO-ENTMCNC: 33.3 G/DL (ref 31.5–36.5)
MCV RBC AUTO: 92 FL (ref 78–100)
PHOSPHATE SERPL-MCNC: 1.1 MG/DL (ref 2.5–4.5)
PHOSPHATE SERPL-MCNC: 2.3 MG/DL (ref 2.5–4.5)
PLATELET # BLD AUTO: 77 10E9/L (ref 150–450)
POTASSIUM SERPL-SCNC: 4 MMOL/L (ref 3.4–5.3)
PROT SERPL-MCNC: 6.1 G/DL (ref 6.8–8.8)
RBC # BLD AUTO: 4.52 10E12/L (ref 4.4–5.9)
SODIUM SERPL-SCNC: 136 MMOL/L (ref 133–144)
WBC # BLD AUTO: 8.8 10E9/L (ref 4–11)

## 2017-12-17 PROCEDURE — 80053 COMPREHEN METABOLIC PANEL: CPT | Performed by: NURSE PRACTITIONER

## 2017-12-17 PROCEDURE — 85027 COMPLETE CBC AUTOMATED: CPT | Performed by: NURSE PRACTITIONER

## 2017-12-17 PROCEDURE — 25000132 ZZH RX MED GY IP 250 OP 250 PS 637: Performed by: INTERNAL MEDICINE

## 2017-12-17 PROCEDURE — 94762 N-INVAS EAR/PLS OXIMTRY CONT: CPT

## 2017-12-17 PROCEDURE — 40000275 ZZH STATISTIC RCP TIME EA 10 MIN

## 2017-12-17 PROCEDURE — 25800025 ZZH RX 258: Performed by: INTERNAL MEDICINE

## 2017-12-17 PROCEDURE — 12000001 ZZH R&B MED SURG/OB UMMC

## 2017-12-17 PROCEDURE — 94640 AIRWAY INHALATION TREATMENT: CPT | Mod: 76

## 2017-12-17 PROCEDURE — 25000125 ZZHC RX 250: Performed by: INTERNAL MEDICINE

## 2017-12-17 PROCEDURE — 25000125 ZZHC RX 250: Performed by: NURSE PRACTITIONER

## 2017-12-17 PROCEDURE — 99207 ZZC CDG-CUT & PASTE-POTENTIAL IMPACT ON LEVEL: CPT | Performed by: NURSE PRACTITIONER

## 2017-12-17 PROCEDURE — 99233 SBSQ HOSP IP/OBS HIGH 50: CPT | Performed by: NURSE PRACTITIONER

## 2017-12-17 PROCEDURE — 83735 ASSAY OF MAGNESIUM: CPT | Performed by: NURSE PRACTITIONER

## 2017-12-17 PROCEDURE — 94640 AIRWAY INHALATION TREATMENT: CPT

## 2017-12-17 PROCEDURE — 25000128 H RX IP 250 OP 636: Performed by: NURSE PRACTITIONER

## 2017-12-17 PROCEDURE — 40000809 ZZH STATISTIC NO DOCUMENTATION TO SUPPORT CHARGE

## 2017-12-17 PROCEDURE — 25000132 ZZH RX MED GY IP 250 OP 250 PS 637: Performed by: NURSE PRACTITIONER

## 2017-12-17 PROCEDURE — 25000132 ZZH RX MED GY IP 250 OP 250 PS 637: Performed by: PSYCHIATRY & NEUROLOGY

## 2017-12-17 PROCEDURE — 25000128 H RX IP 250 OP 636: Performed by: INTERNAL MEDICINE

## 2017-12-17 PROCEDURE — 84100 ASSAY OF PHOSPHORUS: CPT | Performed by: NURSE PRACTITIONER

## 2017-12-17 PROCEDURE — 36415 COLL VENOUS BLD VENIPUNCTURE: CPT | Performed by: NURSE PRACTITIONER

## 2017-12-17 PROCEDURE — 93971 EXTREMITY STUDY: CPT | Mod: LT

## 2017-12-17 RX ORDER — AMLODIPINE BESYLATE 2.5 MG/1
2.5 TABLET ORAL 2 TIMES DAILY
Status: DISCONTINUED | OUTPATIENT
Start: 2017-12-17 | End: 2017-12-19

## 2017-12-17 RX ORDER — SODIUM CHLORIDE 9 MG/ML
INJECTION, SOLUTION INTRAVENOUS
Status: DISCONTINUED
Start: 2017-12-17 | End: 2017-12-21 | Stop reason: HOSPADM

## 2017-12-17 RX ADMIN — GUAIFENESIN 600 MG: 600 TABLET, EXTENDED RELEASE ORAL at 08:09

## 2017-12-17 RX ADMIN — DIAZEPAM 5 MG: 5 TABLET ORAL at 16:40

## 2017-12-17 RX ADMIN — SUCRALFATE 1 G: 1 TABLET ORAL at 21:08

## 2017-12-17 RX ADMIN — BUPRENORPHINE HYDROCHLORIDE, NALOXONE HYDROCHLORIDE 1 FILM: 2; .5 FILM, SOLUBLE BUCCAL; SUBLINGUAL at 08:09

## 2017-12-17 RX ADMIN — SUCRALFATE 1 G: 1 TABLET ORAL at 13:16

## 2017-12-17 RX ADMIN — POTASSIUM PHOSPHATE, MONOBASIC AND POTASSIUM PHOSPHATE, DIBASIC 20 MMOL: 224; 236 INJECTION, SOLUTION INTRAVENOUS at 10:01

## 2017-12-17 RX ADMIN — DIAZEPAM 5 MG: 5 TABLET ORAL at 21:08

## 2017-12-17 RX ADMIN — IPRATROPIUM BROMIDE 0.5 MG: 0.5 SOLUTION RESPIRATORY (INHALATION) at 08:00

## 2017-12-17 RX ADMIN — PHENOBARBITAL 32.4 MG: 32.4 TABLET ORAL at 13:16

## 2017-12-17 RX ADMIN — SUCRALFATE 1 G: 1 TABLET ORAL at 16:17

## 2017-12-17 RX ADMIN — HYDRALAZINE HYDROCHLORIDE 10 MG: 20 INJECTION INTRAMUSCULAR; INTRAVENOUS at 00:12

## 2017-12-17 RX ADMIN — AMLODIPINE BESYLATE 2.5 MG: 2.5 TABLET ORAL at 21:08

## 2017-12-17 RX ADMIN — GABAPENTIN 300 MG: 300 CAPSULE ORAL at 21:08

## 2017-12-17 RX ADMIN — FOLIC ACID 1 MG: 1 TABLET ORAL at 08:09

## 2017-12-17 RX ADMIN — PANTOPRAZOLE SODIUM 40 MG: 40 TABLET, DELAYED RELEASE ORAL at 16:17

## 2017-12-17 RX ADMIN — DIAZEPAM 5 MG: 5 TABLET ORAL at 00:38

## 2017-12-17 RX ADMIN — IPRATROPIUM BROMIDE 0.5 MG: 0.5 SOLUTION RESPIRATORY (INHALATION) at 13:32

## 2017-12-17 RX ADMIN — POTASSIUM CHLORIDE, DEXTROSE MONOHYDRATE AND SODIUM CHLORIDE: 150; 5; 900 INJECTION, SOLUTION INTRAVENOUS at 04:51

## 2017-12-17 RX ADMIN — DIAZEPAM 5 MG: 5 TABLET ORAL at 08:10

## 2017-12-17 RX ADMIN — LEVALBUTEROL HYDROCHLORIDE 0.63 MG: 0.63 SOLUTION RESPIRATORY (INHALATION) at 13:32

## 2017-12-17 RX ADMIN — PREDNISONE 40 MG: 20 TABLET ORAL at 08:09

## 2017-12-17 RX ADMIN — ASPIRIN 81 MG: 81 TABLET, COATED ORAL at 08:09

## 2017-12-17 RX ADMIN — CYANOCOBALAMIN TAB 1000 MCG 1000 MCG: 1000 TAB at 08:09

## 2017-12-17 RX ADMIN — MULTIPLE VITAMINS W/ MINERALS TAB 1 TABLET: TAB at 08:09

## 2017-12-17 RX ADMIN — LEVALBUTEROL HYDROCHLORIDE 0.63 MG: 0.63 SOLUTION RESPIRATORY (INHALATION) at 08:00

## 2017-12-17 RX ADMIN — BUPRENORPHINE HYDROCHLORIDE, NALOXONE HYDROCHLORIDE 1 FILM: 2; .5 FILM, SOLUBLE BUCCAL; SUBLINGUAL at 16:17

## 2017-12-17 RX ADMIN — DIAZEPAM 5 MG: 5 TABLET ORAL at 13:17

## 2017-12-17 RX ADMIN — GUAIFENESIN 600 MG: 600 TABLET, EXTENDED RELEASE ORAL at 21:08

## 2017-12-17 RX ADMIN — BUPRENORPHINE HYDROCHLORIDE, NALOXONE HYDROCHLORIDE 1 FILM: 2; .5 FILM, SOLUBLE BUCCAL; SUBLINGUAL at 13:17

## 2017-12-17 RX ADMIN — PHENOBARBITAL 32.4 MG: 32.4 TABLET ORAL at 08:09

## 2017-12-17 RX ADMIN — BUPRENORPHINE HYDROCHLORIDE, NALOXONE HYDROCHLORIDE 1 FILM: 2; .5 FILM, SOLUBLE BUCCAL; SUBLINGUAL at 21:08

## 2017-12-17 RX ADMIN — AMLODIPINE BESYLATE 2.5 MG: 2.5 TABLET ORAL at 08:32

## 2017-12-17 RX ADMIN — PHENOBARBITAL 32.4 MG: 32.4 TABLET ORAL at 21:08

## 2017-12-17 RX ADMIN — POTASSIUM PHOSPHATE, MONOBASIC AND POTASSIUM PHOSPHATE, DIBASIC 15 MMOL: 224; 236 INJECTION, SOLUTION INTRAVENOUS at 21:07

## 2017-12-17 RX ADMIN — NICOTINE 1 PATCH: 14 PATCH, EXTENDED RELEASE TRANSDERMAL at 08:10

## 2017-12-17 RX ADMIN — SUCRALFATE 1 G: 1 TABLET ORAL at 08:09

## 2017-12-17 RX ADMIN — GABAPENTIN 300 MG: 300 CAPSULE ORAL at 08:09

## 2017-12-17 RX ADMIN — PHENOBARBITAL 32.4 MG: 32.4 TABLET ORAL at 16:17

## 2017-12-17 RX ADMIN — PANTOPRAZOLE SODIUM 40 MG: 40 TABLET, DELAYED RELEASE ORAL at 08:09

## 2017-12-17 RX ADMIN — VENLAFAXINE HYDROCHLORIDE 75 MG: 75 TABLET, EXTENDED RELEASE ORAL at 08:09

## 2017-12-17 RX ADMIN — Medication 100 MG: at 08:09

## 2017-12-17 RX ADMIN — GABAPENTIN 300 MG: 300 CAPSULE ORAL at 13:20

## 2017-12-17 RX ADMIN — ACETAMINOPHEN 650 MG: 325 TABLET, FILM COATED ORAL at 00:45

## 2017-12-17 ASSESSMENT — ACTIVITIES OF DAILY LIVING (ADL)
ADLS_ACUITY_SCORE: 23

## 2017-12-17 NOTE — TELEPHONE ENCOUNTER
December 17, 2017    Referral Medical Screening:  Per RN to RN review    1) Medications?  Folic acid, multivitamin, ASA, norvasc, tylenol, gabapentin, mucinex, B12, nebs (2) types, prednisone, carafate, Effexor, potassium replacement (IV), protonix (po), hydralazine (IV - prn), lovenox sq injection q24 hours, Suboxone maintenance, valium (for ETOH withdrawal prn), Phenobarbital taper    2) Medical conditions?  COPD exacerbation (improving), left leg paralysis (since surgery 2 years ago), rotator cuff injury, chronic pain (take suboxone), obesity, carpal tunnel syndrome, A fib RVR (now stable), possible gastritis (2/2 alcohol use), acute alcohol and benzo withdrawal, thrombocytopenia,   *had a clot in L left after back surgery - repeat ultrasound done 12/17/2017 results pending    3) Independent with ADL'S?  No, still needs assistance with toileting and meal tray setup but minimal - RN believes patient will improve;     4) Assistive devices (ambulatory, orthotics, hearing, c-pap etc.)?  Cane and power chair at home per patient - per RN using cane or walker on 10A, up with assist of 1, using oxygen at night via nasal cannula currently but none at baseline    5) Fall risk?  Still SBA - no falls on the unit but falls at home under the influence of ETOH; RN states that patient has improved significantly in the last day and believes he will be up ad hugo with cane after done withdrawing. On bed alarm and fall precautions at this time for safety    6) Pain management concerns?  None - managed with Tylenol, suboxone, and gabapentin    7) Dental health concerns?  None    8) Skin integrity concerns (wounds, rash, etc)?  None    9) Infectious disease concerns (MRSA, VRE, C-Diff, TB, etc.)  None    10) Mental health concerns /suicidal ideation?  PTSD with hx panic attacks (reported as stable by patient) - was prescribed Klonopin now tapering off      Based on above assessment: Client does not meet medical criteria for admission  to [facility] due to his impaired mobility, high falls risk, and use of supplemental oxygen without a home concentrator as he is not on oxygen at baseline. LP RN to go to the unit tomorrow (12/18) to re-assess Jorge as these barriers to admission are likely temporary. He is still withdrawing from ETOH and receiving Valium, so he will have more time with PT/OT to work on strength prior to discharge from [facility] [place].

## 2017-12-17 NOTE — PLAN OF CARE
Problem: Patient Care Overview  Goal: Plan of Care/Patient Progress Review  PT: Attempted to see patient for evaluation but declined. Asked if therapist could come back later in the afternoon but patient fatigued and requesting to initiate tomorrow.

## 2017-12-17 NOTE — PHARMACY-CONSULT NOTE
Prescriber Notification Note    The pharmacist has communicated with this patient's provider regarding a concern or therapy recommendation.    Notified Person: VIVIANE Knox  Date/Time of Notification: 12/17/2017 @ 1030  Interaction: phone  Concern/Recommendation: concerns about low platelets and receiving DVT ppx with Lovenox.  Discussion with NP:  - low plts could be a dilutional component as patient has been receiving fluids d/t his poor nutritional intake (normal plt back in 11/2017)  - history of DVTs is obtained from the patient (moderate historian) so unknown if he truly has had DVTs in the past.  Per patient, this was a provoked DVT post-operatively.  - patient is ambulating well and may not necessarily need ppx  - Hgb has remained stable during hospitalization, no episodes of bleeding.     Comments/Additional Details: NP is planning to order an ultrasound to assess clot burden in lower extremities.  She will discuss with attending on his thoughts to continue vs. D/c DVT prophylaxis.  I did not feel strongly one way or the other as we've used a plt cutoff of < 50k in the past.  Ultrasound will help guide whether DVT ppx is appropriate in this patient.    Ramirez Vásquez, PharmD

## 2017-12-17 NOTE — PROGRESS NOTES
"        Internal Medicine Daily Note   Date of Service: 12/17/2017      Patient: Jorge Rosales  MRN: [id number]  Admission Date: 12/14/2017  Hospital Day # 3    Assessment & Plan: Jorge Rosales is a 62 year old man with a history of COPD, chronic pain syndrome, left leg paralysis, PTSD, and alcohol abuse admitted for acute alcohol intoxication seeking detox and worsening dyspnea.     # Acute alcohol intoxication, alcohol withdrawal - ABT 0.166 on admission.  Currently scoring MSSA 9 this shift. Much less tremulous today. Hx significant for withdrawal seizures and DTs.    - Continue MSSA w/ Valium  - Fall precautions   - Seizure precautions   - Withdrawal precautions   - Continue daily MVI, thiamine, folvite   - Chemical dependency, SW consults placed       # COPD exacerbation, mild - Stable. Lung w/ bilateral wheezes. Afebrile.  CXR this admission with R platelike atelectasis, otherwise no acute cardiopulmonary findings.  Reports using albuterol inhaler PTA, though unable to confirm in charting.  No PFTs on file. Reportedly requires supplemental O2 at home, but patient states that he never \"followed through with it\".  S/p overnight oximetry study yesterday 12/16, results not yet viewable.  Currently afebrile.   - IS, aggressive pulmonary toliet  - Up OOB 3-4x daily (discussed w/ RN)   - Xopenex Q6H PRN   - Mucinex 600mg BID   - Prednisone 40mg daily for 5 days then stop   - Supplemental O2 for sats < 88%   - Continuous pulse ox  - Consider 6 min walk test prior to d/c     - COPD RT consult placed      # Opioid dependence   # Hx of chronic pain syndrome  Previously on maintenance Suboxone for ~ 3 yrs, [facility].  Pt reported that his last dose was on 12/13, however review of records suggests that his last Rx was filled on 11/17 for 13 SL.  Psychiatry following, recommend restarting Suboxone daily, with plans to coordinate follow up with Dr. Tre HADLEY.    - Continue " "Suboxone 1mg SL QID   - Continue opioid withdrawal scale   - Withdrawal precautions     # Thrombocytopenia - Plt 77 (72).  Likely BM suppression in setting of alcohol intake vs dilutional d/t IVFs.  No s/s of acute bleed. D/w Pharmacy regarding continued use of Lovenox in setting of thrombocytopenia, as pt has h/o DVTs.  Ordered US LLE to evaluate.    - Trend CBC in am     # Hypophosphatemia - Phos 1.1 this am.  Likely 2/2 poor PO intake, chronic alcohol use.    - Replacement protocol      # Left leg paralysis - Reports that leg has been paralyzed following surgery ~ 2 years ago.  Walks with cane and electric power chair.  - PT/OT consult    # Hx of DVT - Pt reports having a LLE DVT following surgery ~ 2 years ago.  Reports having previously been on Coumadin, though unclear if he stopped taking it or if it was discontinued by his PCP (pt does not know).        - US LLE pending       # PTSD w/ hx of panic attacks - Stable.  Pt reports having been recently prescribed Klonopin but states that he \"never got it filled\". Utox positive for benzos.  Per Psychiatry, will taper off benzos w/ phenobarbital taper.   - Phenobarbital taper as follows (orders placed):     - Phenobarbital 30 mg PO QID x 5 days    - Then day 6 12/20: 32.4mg PO TID x 1 day    - Then day 7 12/21: 32.4mg PO BID x 1 day    - Then day 8 12/22: 32.4mg PO QHS x1 day then stop  - Continue PTA Venlafaxine   - Valium as above for withdrawal symptoms   - Psych consult in am      # Tobacco dependence   - Nicotine patch     # Malnutrition - 2/2 alcohol use -  Reports little to no oral intake in the four days prior to admission, and very minimal intake through 12/16.   - Regular diet   - Snacks and supplements between meals   - Calorie counts     Consulting Services: Psychiatry, Chem Dep     CODE: Full   DVT: Lovenox   FEN: Regular diet with snacks and supplements   Disposition: Pending resolution of withdrawal symptoms, CD evaluation (inpt vs outpt)     Resolved " hospital problems:   # Mild AG acidosis - Resolved.  # Leukocytosis    # AF w/ RVR   # Nausea, vomiting, diarrhea      Patient's care was discussed with bedside RN, patient, and care coordinator.    Today's plan of care was reviewed with attending physician, Dr. Vladislav Javris, CNP  Hospitalist Service   Pager: [phone]    ___________________________________________________________________    Subjective & Interval History:      Jorge is resting in bed.  Feels better today, wants to try and order breakfast.  No acute complaints. Denies fevers, chest pain, dyspnea, cough, congestion, abdominal pain, nausea, and vomiting.      Last 24 hour care team notes reviewed.   ROS: 4 point ROS (including Respiratory, CV, GI and ) was performed and negative unless otherwise noted in HPI.     Medications: Reviewed in EPIC.    Physical Exam:    Blood pressure (!) 166/113, pulse 68, temperature 98.1  F (36.7  C), temperature source Oral, resp. rate 16, weight 94 kg (207 lb 4.8 oz), SpO2 94 %.    GENERAL: Alert and oriented x 3. Well nourished, well developed.  No acute distress.    HEENT: Normocephalic, atraumatic. Anicteric sclera. Mucous membranes moist.   CV: RRR. S1, S2. No murmurs appreciated.   RESPIRATORY: Effort normal on 2 L. Lungs with scattered rhonchi and wheezes.    GI: Abdomen soft and non distended, bowel sounds present x all 4 quadrants. No tenderness, rebound, or guarding.   NEUROLOGICAL: L leg paralysis. Follows commands.  Nontremulous.    MUSCULOSKELETAL: No joint swelling or tenderness.  EXTREMITIES: No gross deformities. No peripheral edema.   SKIN: Grossly warm, dry, and intact. No jaundice. No rashes.     Lines/Tubes/Drains:   Peripheral IV 12/14/17 Right Hand (Active)   Site Assessment WDL 12/14/2017 11:20 PM   Line Status Infusing 12/14/2017 11:20 PM   Phlebitis Scale 0-->no symptoms 12/14/2017 11:20 PM   Infiltration Scale 0 12/14/2017 11:20 PM   Number of days:1       Peripheral IV  12/14/17 Right Lower forearm (Active)   Site Assessment WDL 12/14/2017 11:20 PM   Line Status Infusing 12/14/2017 11:20 PM   Phlebitis Scale 0-->no symptoms 12/14/2017 11:20 PM   Infiltration Scale 0 12/14/2017 11:20 PM   Dressing Intervention New dressing  12/14/2017 11:20 PM   Number of days:1       Labs & Studies of Note: I personally reviewed the following studies:    ROUTINE IP LABS (Last four results)  CMP     Recent Labs  Lab 12/17/17  0551 12/16/17  0652 12/15/17  0552 12/15/17  0327 12/14/17 2033 12/14/17  1154    139 136  --   --  138   POTASSIUM 4.0 4.0 4.7  --   --  3.5   CHLORIDE 103 105 98  --   --  96   CO2 28 30 30  --   --  26   ANIONGAP 5 4 8  --   --  16*   * 128* 163*  --   --  155*   BUN 9 12 13  --   --  18   CR 0.47* 0.51* 0.47*  --   --  0.61*   RUSTY 8.2* 7.6* 7.9*  --   --  7.7*   MAG 1.8  --   --  2.5* 1.4*  --    PHOS 1.1*  --  3.1  --  1.9*  --    PROTTOTAL 6.1* 5.6* 6.7*  --   --  6.8   ALBUMIN 2.5* 2.2* 2.8*  --   --  2.9*   BILITOTAL 0.6 0.6 1.0  --   --  0.9   ALKPHOS 86 78 87  --   --  97   AST 78* 53* 62*  --   --  93*   ALT 79* 56 68  --   --  85*     CBC     Recent Labs  Lab 12/17/17  0551 12/16/17  1245 12/15/17  0552 12/14/17  1154   WBC 8.8 8.9 9.4 17.1*   RBC 4.52 4.41 4.52 4.75   HGB 13.9 13.4 13.8 14.5   HCT 41.7 40.7 40.7 43.1   MCV 92 92 90 91   MCH 30.8 30.4 30.5 30.5   MCHC 33.3 32.9 33.9 33.6   RDW 15.2* 15.4* 15.5* 15.6*   PLT 77* 72* 94* 126*     INR No lab results found in last 7 days.      Unresulted Labs Ordered in the Past 30 Days of this Admission     No orders found from 10/15/2017 to 12/15/2017.

## 2017-12-17 NOTE — PLAN OF CARE
"Problem: Patient Care Overview  Goal: Plan of Care/Patient Progress Review  Outcome: Improving    Pt is alert and oriented. Afebrile. VSS ex high BP at 173/103. Administered 10mg of PRN IV hydralazine. BP recheck = 134/77. CMS/neuros at baseline; numbness present in right leg.  Inspiratory and expiratory wheezing still present anteriorly, receives scheduled nebs. Tolerates incentive spirometer, did 7 repetitions. No SOB. BS present and audible in all quadrants. Passing flatus.  MSSA score = 9. Administered 10mg of Valium.Tremors have improved although pt still reports visual hallucinations. Denies auditory hallucinations.  Voids spontaneously without difficulty. Tolerating oral intake. PIV infusing D5+ 0.9%NS + 20 mEq @100mL/hr.  Seizure precautions and bed alarm monitoring maintained. Sleep study initiated. Continue POC.    @00:30. Pt reports feeling frantic and seeing \"a head in a straw basket, stars in the trina, and some floaties.\" Tremors moderate with arms extended. MSSA = 8. Administered 5mg of PRN valium. Provided distraction, reassurance, and a warm blanket. C/o headache and 650mg of PRN tylenol administered.  "

## 2017-12-17 NOTE — PROVIDER NOTIFICATION
Paged moonlighter regarding pt's total urine output of 4,570mL since 8pm 12/16. Recommendation = Continue to monitor and notify treatment team during the day.

## 2017-12-17 NOTE — PLAN OF CARE
Problem: Patient Care Overview  Goal: Plan of Care/Patient Progress Review  Outcome: No Change  Pt up in chair for dinner. Transferred with assist of 1. Valium given for MSSA of 11. Continues to be tremulous. Tolerating regular diet. Voiding using urinal. No BM today. Pt able to make needs known.

## 2017-12-17 NOTE — PLAN OF CARE
Problem: Patient Care Overview  Goal: Plan of Care/Patient Progress Review  Outcome: Improving  VS: BPs elevated, last checked 153/96, began scheduled amlodipine today. Other VSS.  O2 sats remaining aboe 92% on room air today.   Output: Voiding spontaneously without difficulty using urinal.  Last BM 10/15    Activity: Ambulated x1 in room with assist x1.  Up in chair for meal.    Skin: Abrasions, bruises      Pain: Denies      Neuro/CMS: Numbness in LLE (baseline).  Tremors BUE.    Dressing(s): N/A      Diet: Tolerating regular diet with no N/V      LDA: PIV SL   Equipment: Uses back brace, L foot device, and walker for ambulation      Plan: Continue with POC. Continue to encourage incentive spriometer use and get pt OOB for meals.     Additional Info: MSSA scored at 9 and 10; given a total of 10 mg of valium this shift per MSSA protocol.    Phosphorous replaced per protocol, lab recheck scheduled for 1630 today.    Lovenox held today per provider and pharmacist recommencation (platelets 77).

## 2017-12-18 ENCOUNTER — APPOINTMENT (OUTPATIENT)
Dept: PHYSICAL THERAPY | Facility: CLINIC | Age: 62
DRG: 897 | End: 2017-12-18
Payer: COMMERCIAL

## 2017-12-18 LAB
ALBUMIN SERPL-MCNC: 2.5 G/DL (ref 3.4–5)
ALP SERPL-CCNC: 103 U/L (ref 40–150)
ALT SERPL W P-5'-P-CCNC: 101 U/L (ref 0–70)
ANION GAP SERPL CALCULATED.3IONS-SCNC: 7 MMOL/L (ref 3–14)
AST SERPL W P-5'-P-CCNC: 78 U/L (ref 0–45)
BILIRUB SERPL-MCNC: 0.5 MG/DL (ref 0.2–1.3)
BUN SERPL-MCNC: 17 MG/DL (ref 7–30)
CALCIUM SERPL-MCNC: 8.4 MG/DL (ref 8.5–10.1)
CHLORIDE SERPL-SCNC: 101 MMOL/L (ref 94–109)
CO2 SERPL-SCNC: 28 MMOL/L (ref 20–32)
CREAT SERPL-MCNC: 0.58 MG/DL (ref 0.66–1.25)
ERYTHROCYTE [DISTWIDTH] IN BLOOD BY AUTOMATED COUNT: 15.6 % (ref 10–15)
GFR SERPL CREATININE-BSD FRML MDRD: >90 ML/MIN/1.7M2
GLUCOSE SERPL-MCNC: 110 MG/DL (ref 70–99)
HCT VFR BLD AUTO: 42.3 % (ref 40–53)
HGB BLD-MCNC: 14.2 G/DL (ref 13.3–17.7)
MAGNESIUM SERPL-MCNC: 1.7 MG/DL (ref 1.6–2.3)
MCH RBC QN AUTO: 30.9 PG (ref 26.5–33)
MCHC RBC AUTO-ENTMCNC: 33.6 G/DL (ref 31.5–36.5)
MCV RBC AUTO: 92 FL (ref 78–100)
PHOSPHATE SERPL-MCNC: 3.6 MG/DL (ref 2.5–4.5)
PLATELET # BLD AUTO: 89 10E9/L (ref 150–450)
POTASSIUM SERPL-SCNC: 4.2 MMOL/L (ref 3.4–5.3)
PROT SERPL-MCNC: 6 G/DL (ref 6.8–8.8)
RBC # BLD AUTO: 4.6 10E12/L (ref 4.4–5.9)
SODIUM SERPL-SCNC: 136 MMOL/L (ref 133–144)
WBC # BLD AUTO: 7.6 10E9/L (ref 4–11)

## 2017-12-18 PROCEDURE — 25000125 ZZHC RX 250: Performed by: PHYSICIAN ASSISTANT

## 2017-12-18 PROCEDURE — 97112 NEUROMUSCULAR REEDUCATION: CPT | Mod: GP | Performed by: PHYSICAL THERAPIST

## 2017-12-18 PROCEDURE — 25000125 ZZHC RX 250: Performed by: INTERNAL MEDICINE

## 2017-12-18 PROCEDURE — 84100 ASSAY OF PHOSPHORUS: CPT | Performed by: NURSE PRACTITIONER

## 2017-12-18 PROCEDURE — 12000001 ZZH R&B MED SURG/OB UMMC

## 2017-12-18 PROCEDURE — 99207 ZZC APP CREDIT; MD BILLING SHARED VISIT: CPT | Performed by: PHYSICIAN ASSISTANT

## 2017-12-18 PROCEDURE — 85027 COMPLETE CBC AUTOMATED: CPT | Performed by: NURSE PRACTITIONER

## 2017-12-18 PROCEDURE — 94640 AIRWAY INHALATION TREATMENT: CPT | Mod: 76

## 2017-12-18 PROCEDURE — 25000125 ZZHC RX 250: Performed by: NURSE PRACTITIONER

## 2017-12-18 PROCEDURE — 40000275 ZZH STATISTIC RCP TIME EA 10 MIN

## 2017-12-18 PROCEDURE — 40000193 ZZH STATISTIC PT WARD VISIT: Performed by: PHYSICAL THERAPIST

## 2017-12-18 PROCEDURE — 97162 PT EVAL MOD COMPLEX 30 MIN: CPT | Mod: GP | Performed by: PHYSICAL THERAPIST

## 2017-12-18 PROCEDURE — 25000132 ZZH RX MED GY IP 250 OP 250 PS 637: Performed by: NURSE PRACTITIONER

## 2017-12-18 PROCEDURE — 25000132 ZZH RX MED GY IP 250 OP 250 PS 637: Performed by: INTERNAL MEDICINE

## 2017-12-18 PROCEDURE — 80053 COMPREHEN METABOLIC PANEL: CPT | Performed by: NURSE PRACTITIONER

## 2017-12-18 PROCEDURE — 83735 ASSAY OF MAGNESIUM: CPT | Performed by: NURSE PRACTITIONER

## 2017-12-18 PROCEDURE — 97116 GAIT TRAINING THERAPY: CPT | Mod: GP | Performed by: PHYSICAL THERAPIST

## 2017-12-18 PROCEDURE — 99232 SBSQ HOSP IP/OBS MODERATE 35: CPT | Performed by: INTERNAL MEDICINE

## 2017-12-18 PROCEDURE — 36415 COLL VENOUS BLD VENIPUNCTURE: CPT | Performed by: NURSE PRACTITIONER

## 2017-12-18 PROCEDURE — 94640 AIRWAY INHALATION TREATMENT: CPT

## 2017-12-18 PROCEDURE — 25000132 ZZH RX MED GY IP 250 OP 250 PS 637: Performed by: PSYCHIATRY & NEUROLOGY

## 2017-12-18 RX ORDER — LEVALBUTEROL INHALATION SOLUTION 0.63 MG/3ML
0.63 SOLUTION RESPIRATORY (INHALATION) 2 TIMES DAILY
Status: DISCONTINUED | OUTPATIENT
Start: 2017-12-18 | End: 2017-12-19

## 2017-12-18 RX ADMIN — Medication 2 G: at 14:06

## 2017-12-18 RX ADMIN — PHENOBARBITAL 32.4 MG: 32.4 TABLET ORAL at 08:41

## 2017-12-18 RX ADMIN — AMLODIPINE BESYLATE 2.5 MG: 2.5 TABLET ORAL at 21:00

## 2017-12-18 RX ADMIN — CYANOCOBALAMIN TAB 1000 MCG 1000 MCG: 1000 TAB at 08:20

## 2017-12-18 RX ADMIN — PHENOBARBITAL 32.4 MG: 32.4 TABLET ORAL at 11:57

## 2017-12-18 RX ADMIN — VENLAFAXINE HYDROCHLORIDE 75 MG: 75 TABLET, EXTENDED RELEASE ORAL at 08:21

## 2017-12-18 RX ADMIN — PREDNISONE 40 MG: 20 TABLET ORAL at 08:20

## 2017-12-18 RX ADMIN — AMLODIPINE BESYLATE 2.5 MG: 2.5 TABLET ORAL at 08:20

## 2017-12-18 RX ADMIN — PANTOPRAZOLE SODIUM 40 MG: 40 TABLET, DELAYED RELEASE ORAL at 16:05

## 2017-12-18 RX ADMIN — BUPRENORPHINE HYDROCHLORIDE, NALOXONE HYDROCHLORIDE 1 FILM: 2; .5 FILM, SOLUBLE BUCCAL; SUBLINGUAL at 11:57

## 2017-12-18 RX ADMIN — BUPRENORPHINE HYDROCHLORIDE, NALOXONE HYDROCHLORIDE 1 FILM: 2; .5 FILM, SOLUBLE BUCCAL; SUBLINGUAL at 08:40

## 2017-12-18 RX ADMIN — NICOTINE 1 PATCH: 14 PATCH, EXTENDED RELEASE TRANSDERMAL at 08:18

## 2017-12-18 RX ADMIN — IPRATROPIUM BROMIDE 0.5 MG: 0.5 SOLUTION RESPIRATORY (INHALATION) at 20:06

## 2017-12-18 RX ADMIN — PHENOBARBITAL 32.4 MG: 32.4 TABLET ORAL at 16:05

## 2017-12-18 RX ADMIN — PHENOBARBITAL 32.4 MG: 32.4 TABLET ORAL at 20:59

## 2017-12-18 RX ADMIN — GABAPENTIN 300 MG: 300 CAPSULE ORAL at 08:20

## 2017-12-18 RX ADMIN — ASPIRIN 81 MG: 81 TABLET, COATED ORAL at 08:20

## 2017-12-18 RX ADMIN — GUAIFENESIN 600 MG: 600 TABLET, EXTENDED RELEASE ORAL at 08:20

## 2017-12-18 RX ADMIN — SUCRALFATE 1 G: 1 TABLET ORAL at 11:57

## 2017-12-18 RX ADMIN — MULTIPLE VITAMINS W/ MINERALS TAB 1 TABLET: TAB at 08:20

## 2017-12-18 RX ADMIN — BUPRENORPHINE HYDROCHLORIDE, NALOXONE HYDROCHLORIDE 1 FILM: 2; .5 FILM, SOLUBLE BUCCAL; SUBLINGUAL at 21:00

## 2017-12-18 RX ADMIN — GABAPENTIN 300 MG: 300 CAPSULE ORAL at 21:00

## 2017-12-18 RX ADMIN — PANTOPRAZOLE SODIUM 40 MG: 40 TABLET, DELAYED RELEASE ORAL at 08:20

## 2017-12-18 RX ADMIN — IPRATROPIUM BROMIDE 0.5 MG: 0.5 SOLUTION RESPIRATORY (INHALATION) at 07:46

## 2017-12-18 RX ADMIN — SUCRALFATE 1 G: 1 TABLET ORAL at 08:20

## 2017-12-18 RX ADMIN — LEVALBUTEROL HYDROCHLORIDE 0.63 MG: 0.63 SOLUTION RESPIRATORY (INHALATION) at 20:07

## 2017-12-18 RX ADMIN — FOLIC ACID 1 MG: 1 TABLET ORAL at 08:20

## 2017-12-18 RX ADMIN — GUAIFENESIN 600 MG: 600 TABLET, EXTENDED RELEASE ORAL at 21:00

## 2017-12-18 RX ADMIN — BUPRENORPHINE HYDROCHLORIDE, NALOXONE HYDROCHLORIDE 1 FILM: 2; .5 FILM, SOLUBLE BUCCAL; SUBLINGUAL at 16:05

## 2017-12-18 RX ADMIN — LEVALBUTEROL HYDROCHLORIDE 0.63 MG: 0.63 SOLUTION RESPIRATORY (INHALATION) at 07:46

## 2017-12-18 RX ADMIN — GABAPENTIN 300 MG: 300 CAPSULE ORAL at 14:10

## 2017-12-18 ASSESSMENT — ACTIVITIES OF DAILY LIVING (ADL)
ADLS_ACUITY_SCORE: 20
ADLS_ACUITY_SCORE: 21
ADLS_ACUITY_SCORE: 22
ADLS_ACUITY_SCORE: 21

## 2017-12-18 NOTE — PLAN OF CARE
Problem: Patient Care Overview  Goal: Plan of Care/Patient Progress Review  Outcome: Improving  VSS. AOx4. Verbalizes some visual hallucinations. Slight tremor. MSSA score 8,8. LS clear, no cough. Was able to eat all of dinner. Voiding in urinal. IV Phosphorus currently running. PCDs in place. Continue POC.

## 2017-12-18 NOTE — TELEPHONE ENCOUNTER
"Repeat US results negative for a clot in L leg. Patient still requiring assistance with ambulation and still detoxing off alcohol and benzos. LP RN met with him around 1020AM and he stated he is not sure if he wants to come to LP at this time as \"I have a few legal issues pending that I need to take care of first\". Also requested that LP RN touch base with his nurse when he is feeling better (improved strength, pain, and withdrawal). He states his pain is not currently well managed with the Suboxone 8-2mg once daily as he was taking 12-4mg total daily in the past with more success.   LP RN told patient to call our office if/when he would like us to complete a medical screening for admission into our program. Also encouraged him to call with any questions.     Referral routed back to [facility] at this time. Patient still not yet medically cleared for LP.   "

## 2017-12-18 NOTE — PROGRESS NOTES
12/18/17 0800   Quick Adds   Type of Visit Initial PT Evaluation       Present no   Language English   Living Environment   Lives With alone   Living Arrangements apartment   Home Accessibility no concerns   Number of Stairs to Enter Home 0   Number of Stairs Within Home 0   Stair Railings at Home none   Transportation Available other (see comments)  (uber)   Self-Care   Dominant Hand right   Usual Activity Tolerance moderate   Current Activity Tolerance poor   Regular Exercise no   Equipment Currently Used at Home cane, straight;shower chair;wheelchair, power   Activity/Exercise/Self-Care Comment pt has a 4WW but loaned it to a woman in his building whom he thought needed it more, once he had transitioned to his cane.   Functional Level Prior   Ambulation 1-->assistive equipment   Transferring 1-->assistive equipment   Toileting 1-->assistive equipment   Bathing 1-->assistive equipment   Dressing 1-->assistive equipment   Eating 0-->independent   Communication 0-->understands/communicates without difficulty   Swallowing 0-->swallows foods/liquids without difficulty   Cognition 0 - no cognition issues reported   Fall history within last six months yes   Number of times patient has fallen within last six months 4   Which of the above functional risks had a recent onset or change? ambulation;transferring;toileting;bathing;dressing   Prior Functional Level Comment Patient reports not getting dressed often for last 3 months, has been drinking heavily, no mobility outside of the home.  He feels that his falls are related to drinking.   General Information   Onset of Illness/Injury or Date of Surgery - Date 12/14/17   Referring Physician Dr. Shane Loomis   Patient/Family Goals Statement Get better, feel steadier, return to baseline mobility    Pertinent History of Current Problem (include personal factors and/or comorbidities that impact the POC) Pt with history of chronic pain admitted for  alcohol abuse needing detox as he has been drinking heavily for 3mo since a L shoulder surgery. He has history of L LE paraparesis following back surgery.   Precautions/Limitations fall precautions  (seizure pads in place)   General Observations Pt in bed, reports that he just got back to bed from chair but agreeable to PT   General Info Comments Pt very pleasant, very appreciative of help   Cognitive Status Examination   Orientation orientation to person, place and time   Level of Consciousness alert   Follows Commands and Answers Questions 100% of the time   Personal Safety and Judgment intact   Memory intact   Pain Assessment   Patient Currently in Pain Yes, see Vital Sign flowsheet   Integumentary/Edema   Integumentary/Edema Comments R shin bruise   Posture    Posture Forward head position   Posture Comments L hip retraction and knee hyperextension in stance   Range of Motion (ROM)   ROM Comment generally WFL   Strength   Strength Comments L UE weakness from shoulder pain and surgery, L LE weakness from neuromuscular impairment.  L foot drop   Bed Mobility   Bed Mobility Comments min A from the L side to sitting.   Transfer Skills   Transfer Comments Sit to stand at SBA, stand to sitting at min A.   Gait   Gait Comments with AFO and walker, CGA. Pt deliberate in the L foot placement   Balance   Balance Comments Pt stands statically for 6s before reaching from rail in hallway, not confident   Sensory Examination   Sensory Perception Comments diminished in the L LE   Coordination   Coordination Comments L LE dysmetria with step length and placement   General Therapy Interventions   Planned Therapy Interventions balance training;bed mobility training;gait training;neuromuscular re-education;strengthening;stretching;transfer training;home program guidelines;progressive activity/exercise   Clinical Impression   Criteria for Skilled Therapeutic Intervention yes, treatment indicated   PT Diagnosis impaired mobility,  "gait instability   Influenced by the following impairments decreased strength, decreased sensation, impaired balance, decreased aerobic capacity, pain,    Functional limitations due to impairments fall risk, gait, transfers, bed mobility, activity tolerance   Clinical Presentation Evolving/Changing   Clinical Presentation Rationale Pt going through detox, 2-3 months of deconditioning with drinking, limited support at home   Clinical Decision Making (Complexity) Moderate complexity   Therapy Frequency` daily   Predicted Duration of Therapy Intervention (days/wks) 5 days   Anticipated Equipment Needs at Discharge front wheeled walker   Anticipated Discharge Disposition Transitional Care Facility;Home with Home Therapy   Risk & Benefits of therapy have been explained Yes   Patient, Family & other staff in agreement with plan of care Yes   Clinical Impression Comments At this time, pt not safe for home or independent for residential treatment.  Recommend rehab.   [facility] [employer] TM \"6 Clicks\"   2016, Trustees of [facility], under license to [employer].  All rights reserved.   6 Clicks Short Forms Basic Mobility Inpatient Short Form   [facility] AM-PAC  \"6 Clicks\" V.2 Basic Mobility Inpatient Short Form   1. Turning from your back to your side while in a flat bed without using bedrails? 4 - None   2. Moving from lying on your back to sitting on the side of a flat bed without using bedrails? 3 - A Little   3. Moving to and from a bed to a chair (including a wheelchair)? 3 - A Little   4. Standing up from a chair using your arms (e.g., wheelchair, or bedside chair)? 4 - None   5. To walk in hospital room? 3 - A Little   6. Climbing 3-5 steps with a railing? 2 - A Lot   Basic Mobility Raw Score (Score out of 24.Lower scores equate to lower levels of function) 19   Total Evaluation Time   Total Evaluation Time (Minutes) 16     "

## 2017-12-18 NOTE — PROGRESS NOTES
[facility], [place]    Internal Medicine Progress Note       Assessment & Plan   Jorge Rosales is a 62 year old man with a history of COPD, chronic pain syndrome, left leg paralysis s/p lumbar spine surgery, PTSD, and alcohol abuse, recent aspiration pneumonitis admitted 12/14 for dyspnea and acute alcohol intoxication seeking detox.     #Deconditioning: Due to alcohol abuse, chronic illness. Minimal ambulation PTA, usually uses power scooter outside of home.  -Continue PT/OT  -RN to ambulate 3-4 times/day    #Acute alcohol intoxication, alcohol withdrawal: ABT 0.166 on admission 12/14. relapsed in 09/2017. Hx significant for withdrawal seizures and DTs. Now scoring <4 in last 12 hours.  -Continue MSSA w/ Valium  -Fall precautions   -Seizure precautions   -Continue daily MVI, thiamine, folvite   -Chemical dependency, SW consults placed        #COPD exacerbation, mild: No PFTs on file. Chest CT from 11/2017 w/ emphysema, mucoid impactions. CXR this admission with R platelike atelectasis, otherwise no acute cardiopulmonary findings. Recently left AMA w/o O2, now sating well on RA. Completed prednisone course. Afebrile, WBC count normal. Breathing feels stable.   -IS, aggressive pulmonary toliet  -Up OOB 3-4x daily, ambulate TID (discussed w/ RN)   -Taper scheduled nebs to BID w/ PRN available  -Xopenex Q6H PRN    -Mucinex 600mg BID   -Supplemental O2 for sats < 88%   -Continuous pulse ox  -Walk test ordered  -COPD RT consult placed     #Abnormal LFTs:  (79), AST 78 (78), t bili normal, alk phos normal. CT chest from 11/2017 w/ fatty infiltration of liver. Likely due to alcohol abuse, fatty liver.   -Trend in AM      #Opioid dependence   #Hx of chronic pain syndrome  Previously on maintenance Suboxone for ~ 3 yrs, [facility] last script 11/17 for 13 SL films.  Psychiatry following, recommend restarting Suboxone daily, with plans [facility] and coordinate follow up with Dr. Toledo OP.   -Continue Suboxone 2-0.5 mg QID  -Continue opioid withdrawal scale   -Continue gabapentin TID     #Thrombocytopenia: Plt 89 (77). Likely BM suppression in setting of alcohol intake vs dilutional d/t IVFs. No s/s of acute bleed.     -Trend CBC in am     #PTSD w/ hx of panic attacks: Stable. Pt reports having been recently prescribed Klonopin but states that he \"never got it filled\". Utox positive for benzos.  Per Psychiatry, will taper off benzos w/ phenobarbital taper.   -Phenobarbital taper as follows (orders placed):                          -Phenobarbital 30 mg PO QID x 5 days                         -Then day 6 12/20: 32.4mg PO TID x 1 day                         -Then day 7 12/21: 32.4mg PO BID x 1 day                         -Then day 8 12/22: 32.4mg PO QHS x1 day then stop  -Continue PTA Venlafaxine   -Psych consult PRN    #Elevated BP: In setting of WD. Initiated on amlodipine 2.5 mg BID on 12/17. Normotensive-mildly HTN. Monitor for now, consider increasing pending BP trends.    Chronic medical issues:  #Left leg paralysis: Reports that leg has been paralyzed following surgery ~ 2 years ago. Walks with cane and electric power chair. PT/OT consulted, appreciate recs.   #Hx of DVT: LLE DVT s/p lumbar spine surgery (provoked) 2 years ago. Not on AC. US negative for DVT. Ambulate, SCDs.  #Tobacco dependence: Nicotine patch.  #GERD, concern for gastritis on admission: DC carafate. Continue Protonix.      Consulting Services: Psychiatry, Chem Dep      CODE: Full   DVT: Lovenox   FEN: Regular diet with snacks and supplements   Disposition: Pending resolution of withdrawal symptoms, CD evaluation (inpt vs outpt)      Resolved hospital problems:   # Mild AG acidosis - Resolved.  # Leukocytosis    # AF w/ RVR   # Nausea, vomiting, diarrhea    Diet: Snacks/Supplements Adult: Ensure Plus (Adult); Between Meals  Snacks/Supplements Adult: Between Meals  Advance " Diet as Tolerated: Regular Diet Adult  Calorie Counts  Fluids: PO  DVT Prophylaxis: Pneumatic Compression Devices and Ambulate every shift  Code Status: Full Code    Disposition Plan   Expected discharge: 2 - 3 days, recommended to treatment facility once detoxed off pain meds.     Entered: Lorelei Vazquez 12/18/2017, 8:31 AM   Information in the above section will display in the discharge planner report.      The patient's care was discussed with the Attending Physician, Dr. Treviño, Bedside Nurse, Care Coordinator/ and Patient.    VIVIANE Dumont  Internal Medicine Staff Hospitalist Service  [facility]  Pager: [phone]  Please see sticky note for cross cover information    Interval History   The patient notes that he is tired, only got 1 hour of sleep last night, none the night before. He notes he feels overall fatigued. He denies any dyspnea, cough. He notes his breathing feels okay. Denies any wheezing. Overall feels weak.       Data reviewed today: I reviewed all medications, new labs and imaging results over the last 24 hours. I personally reviewed no images or EKG's today.    Physical Exam   Vital Signs: Temp: 97.4  F (36.3  C) Temp src: Oral BP: 147/90 Pulse: 63 Heart Rate: 67 Resp: 16 SpO2: 94 % O2 Device: None (Room air)    Weight: 207 lbs 4.8 oz  GENERAL: Alert and oriented x 3.   HEENT: Anicteric sclera. Mucous membranes moist and without lesions.   CV: RRR. S1, S2. No murmurs appreciated.   RESPIRATORY: Effort normal on RA. Lungs with wheezing on expiration in bilateral bases, no rales, rhonchi.   GI: Abdomen soft and non distended, bowel sounds present. No tenderness, rebound, guarding.   MUSCULOSKELETAL: No joint swelling or tenderness. Moves all extremities.   NEUROLOGICAL: No focal deficits.   EXTREMITIES: No peripheral edema. Intact bilateral pedal pulses.   SKIN: No jaundice. No rashes.          Data   Reviewed CMP, CBC, glucose    EXAMINATION: DOPPLER  VENOUS ULTRASOUND OF THE LEFT LOWER EXTREMITY,  12/17/2017 12:58 PM   IMPRESSION:  No evidence left lower extremity deep venous thrombosis.

## 2017-12-18 NOTE — CONSULTS
12/18/2017 D) I discussed his case with his CM Brisa and our LP nurse Katerina, who met with him today. She said as of today he was not medically appropriate for LP. He told her he was ambivalent about going to LP and wanted to go home and address some legal issues before coming to tx anyway. A) not medically ok for LP today, sounds ambivalent about LP and wants to go home first. P) we will continue to monitor  His case. Arnoldo WHITAKER, BONILLA

## 2017-12-18 NOTE — PLAN OF CARE
Problem: Patient Care Overview  Goal: Plan of Care/Patient Progress Review  Pt evaluated by PT.  Prior to admission, pt using cane in the home, power scooter or grocery cart in community.  Generally sounds like pt was a very limited community ambulator. He tolerated activity including gait with walker, gait with cane, and exercise. Pt at [facility] for gait, no acute LOB but unsteady.      **Pt should use walker with staff 3-4 times daily.**  PT to work on transition back to single point cane.    Discharge Planner PT   Patient plan for discharge: unknown  Current status: min A for bed mobility, sit to stand is SBA, stand to sitting when fatigued min A, gait CGA with cane or walker  Barriers to return to prior living situation: fall risk, safety with mobility  Recommendations for discharge: at this time TCU for additional mobility training due to 2-3months of deconditioning  Rationale for recommendations: Pt is not steady, not able to care for self, fall risk       Entered by: Bev Vasquez 12/18/2017 9:36 AM

## 2017-12-18 NOTE — PLAN OF CARE
Problem: Patient Care Overview  Goal: Plan of Care/Patient Progress Review  OT: attempted to see pt this afternoon, but pt declined citing fatigue and not getting much rest.  Offered to initiate ADL at bedside, however pt continued to decline and requests to resume tomorrow.  OT appointment cancelled 12/18.

## 2017-12-18 NOTE — PLAN OF CARE
Problem: Patient Care Overview  Goal: Plan of Care/Patient Progress Review  Outcome: Improving  Nursing  S- In am pt stated feeling some anxioety and phenobarb and valium help him with that.  Explained plan to use valium only for etoh withdrawls at this time . Gave am meds.  Pt stated he felt he ws still having some withdrawls- explressed spot s and sparkles w his vision.  Pt calmly resting in bed,  Minimal to no tremor,  HR 60's   BP elevated am norvasc given.  Sat 98 during neb, 90-95% on room air until noon- sat 88-91%,  Would come up to 95% briefly w IBD and cough   Loose cough.  IBD to 2000  Enc chair for meals.  Did walk into [facility] w PT.  Will do spot chck of sat w activity this afternoon.    Pt reluctatnt to be in chair much- sat only 15 min in am, need enc to walk.    A- minimal withdrawal sypmt- no valium given.  Monitoring if am meds helping anxioety.  sats low in bed.  R- enc chair for meals  Walk tid w nursing check sat w activity.  Cont mssa assissment

## 2017-12-18 NOTE — PLAN OF CARE
"Problem: Patient Care Overview  Goal: Plan of Care/Patient Progress Review  Outcome: No Change    Pt asks if he could get some medication. Informed him that I'd have to score him first. He still endorses visual hallucinations, reports \"seeing things\" and feeling like he's about to jump out of his skin. Scored him twice, MSSA = 4 & 4. No tremors observed or felt. Informed him that he didn't meet the valium protocol but would be scored later. Pt not happy, believes he won't last through the night. Pt seemed comfortable during several safety rounds.  Writer suggested several alternatives like minimizing audio and visual stimulation, offering a warm blanket, taking a break from his PCDs for like an hour. Pt agreeable.    Wheezes still auscultated bilaterally. Voiding spontaneously without difficulties. Continue POC.      "

## 2017-12-18 NOTE — PROGRESS NOTES
Care Coordinator- Discharge Planning     Admission Date/Time:  12/14/2017  Attending MD:  Shane Loomis MD     Data  Date of initial CC assessment:  12/18/2017  Chart reviewed, discussed with interdisciplinary team.   Patient was admitted for:   1. Acute alcoholic intoxication in alcoholism with complication (H)    2. Hypoxia    3. COPD exacerbation (H)    4. Hypoxemia    5. Obstructive chronic bronchitis with exacerbation (H)    6. Cigarette smoker         Assessment  Concerns with insurance coverage for discharge needs: None.  Current Living Situation: Patient.  Support System: Unknown  Services Involved: TBD  Transportation: Family or Friend will provide  Barriers to Discharge: lack of support system/caregiver and substance abuse        Coordination of Care and Referrals: Appointment made with Dr. Carlos Padron at the [facility], phone [phone] for suboxone treatment. Dr. Varma will prescribe until patient is able to get an appointment with Dr. Padron. Appointment with Dr. Pdaron is scheduled for December 22, 2017 at 11:00am. CC to follow as needed.      Plan  Anticipated Discharge Date:  TBD  Anticipated Discharge Plan:  TBD    Josi Matute RN, BSN  Care Coordinator, 8A  Phone [phone]  Pager [phone]

## 2017-12-18 NOTE — TELEPHONE ENCOUNTER
Called to check in on patient - spoke with his nurse who states patient did not require oxygen overnight and states PT was in with him this morning and OT is scheduled for this afternoon. His nurse believes he needs another day or so of strengthening before discharge and is up ad hugo with cane. Currently transfers with SBA. Received Valium last noc around 2108 but none this AM. LP RN to go see patient around 10AM today for further assessment of strength, mobility and to educate patient on using nebs while in LP.

## 2017-12-19 ENCOUNTER — APPOINTMENT (OUTPATIENT)
Dept: OCCUPATIONAL THERAPY | Facility: CLINIC | Age: 62
DRG: 897 | End: 2017-12-19
Payer: COMMERCIAL

## 2017-12-19 ENCOUNTER — APPOINTMENT (OUTPATIENT)
Dept: PHYSICAL THERAPY | Facility: CLINIC | Age: 62
DRG: 897 | End: 2017-12-19
Payer: COMMERCIAL

## 2017-12-19 LAB
ALBUMIN SERPL-MCNC: 2.9 G/DL (ref 3.4–5)
ALP SERPL-CCNC: 108 U/L (ref 40–150)
ALT SERPL W P-5'-P-CCNC: 132 U/L (ref 0–70)
ANION GAP SERPL CALCULATED.3IONS-SCNC: 7 MMOL/L (ref 3–14)
AST SERPL W P-5'-P-CCNC: 87 U/L (ref 0–45)
BILIRUB SERPL-MCNC: 0.4 MG/DL (ref 0.2–1.3)
BUN SERPL-MCNC: 23 MG/DL (ref 7–30)
CALCIUM SERPL-MCNC: 8.5 MG/DL (ref 8.5–10.1)
CHLORIDE SERPL-SCNC: 100 MMOL/L (ref 94–109)
CO2 SERPL-SCNC: 28 MMOL/L (ref 20–32)
CREAT SERPL-MCNC: 0.58 MG/DL (ref 0.66–1.25)
ERYTHROCYTE [DISTWIDTH] IN BLOOD BY AUTOMATED COUNT: 15.6 % (ref 10–15)
GFR SERPL CREATININE-BSD FRML MDRD: >90 ML/MIN/1.7M2
GLUCOSE SERPL-MCNC: 150 MG/DL (ref 70–99)
HCT VFR BLD AUTO: 43.2 % (ref 40–53)
HGB BLD-MCNC: 14.6 G/DL (ref 13.3–17.7)
INTERPRETATION ECG - MUSE: NORMAL
MAGNESIUM SERPL-MCNC: 1.9 MG/DL (ref 1.6–2.3)
MCH RBC QN AUTO: 31.1 PG (ref 26.5–33)
MCHC RBC AUTO-ENTMCNC: 33.8 G/DL (ref 31.5–36.5)
MCV RBC AUTO: 92 FL (ref 78–100)
PHOSPHATE SERPL-MCNC: 4 MG/DL (ref 2.5–4.5)
PLATELET # BLD AUTO: 98 10E9/L (ref 150–450)
POTASSIUM SERPL-SCNC: 3.9 MMOL/L (ref 3.4–5.3)
PROT SERPL-MCNC: 6.3 G/DL (ref 6.8–8.8)
RBC # BLD AUTO: 4.7 10E12/L (ref 4.4–5.9)
SODIUM SERPL-SCNC: 135 MMOL/L (ref 133–144)
WBC # BLD AUTO: 8.2 10E9/L (ref 4–11)

## 2017-12-19 PROCEDURE — 25000132 ZZH RX MED GY IP 250 OP 250 PS 637: Performed by: INTERNAL MEDICINE

## 2017-12-19 PROCEDURE — 97535 SELF CARE MNGMENT TRAINING: CPT | Mod: GO

## 2017-12-19 PROCEDURE — 99207 ZZC CDG-MDM COMPONENT: MEETS MODERATE - UP CODED: CPT | Performed by: PHYSICIAN ASSISTANT

## 2017-12-19 PROCEDURE — 12000001 ZZH R&B MED SURG/OB UMMC

## 2017-12-19 PROCEDURE — 80053 COMPREHEN METABOLIC PANEL: CPT | Performed by: NURSE PRACTITIONER

## 2017-12-19 PROCEDURE — 40000193 ZZH STATISTIC PT WARD VISIT

## 2017-12-19 PROCEDURE — 25000125 ZZHC RX 250: Performed by: PHYSICIAN ASSISTANT

## 2017-12-19 PROCEDURE — 97116 GAIT TRAINING THERAPY: CPT | Mod: GP

## 2017-12-19 PROCEDURE — 83735 ASSAY OF MAGNESIUM: CPT | Performed by: NURSE PRACTITIONER

## 2017-12-19 PROCEDURE — 84100 ASSAY OF PHOSPHORUS: CPT | Performed by: NURSE PRACTITIONER

## 2017-12-19 PROCEDURE — 99233 SBSQ HOSP IP/OBS HIGH 50: CPT | Performed by: PHYSICIAN ASSISTANT

## 2017-12-19 PROCEDURE — 25000132 ZZH RX MED GY IP 250 OP 250 PS 637: Performed by: PSYCHIATRY & NEUROLOGY

## 2017-12-19 PROCEDURE — 93010 ELECTROCARDIOGRAM REPORT: CPT | Performed by: INTERNAL MEDICINE

## 2017-12-19 PROCEDURE — 40000133 ZZH STATISTIC OT WARD VISIT

## 2017-12-19 PROCEDURE — 36415 COLL VENOUS BLD VENIPUNCTURE: CPT | Performed by: NURSE PRACTITIONER

## 2017-12-19 PROCEDURE — 25000132 ZZH RX MED GY IP 250 OP 250 PS 637: Performed by: NURSE PRACTITIONER

## 2017-12-19 PROCEDURE — 94640 AIRWAY INHALATION TREATMENT: CPT

## 2017-12-19 PROCEDURE — 85027 COMPLETE CBC AUTOMATED: CPT | Performed by: NURSE PRACTITIONER

## 2017-12-19 PROCEDURE — 93005 ELECTROCARDIOGRAM TRACING: CPT

## 2017-12-19 PROCEDURE — 97530 THERAPEUTIC ACTIVITIES: CPT | Mod: GP

## 2017-12-19 RX ORDER — HYDROXYZINE HYDROCHLORIDE 10 MG/1
10 TABLET, FILM COATED ORAL 3 TIMES DAILY PRN
Status: DISCONTINUED | OUTPATIENT
Start: 2017-12-19 | End: 2017-12-22 | Stop reason: HOSPADM

## 2017-12-19 RX ORDER — LEVALBUTEROL INHALATION SOLUTION 0.63 MG/3ML
0.63 SOLUTION RESPIRATORY (INHALATION) DAILY
Status: COMPLETED | OUTPATIENT
Start: 2017-12-20 | End: 2017-12-20

## 2017-12-19 RX ADMIN — PANTOPRAZOLE SODIUM 40 MG: 40 TABLET, DELAYED RELEASE ORAL at 16:23

## 2017-12-19 RX ADMIN — FOLIC ACID 1 MG: 1 TABLET ORAL at 08:31

## 2017-12-19 RX ADMIN — PHENOBARBITAL 32.4 MG: 32.4 TABLET ORAL at 08:31

## 2017-12-19 RX ADMIN — GUAIFENESIN 600 MG: 600 TABLET, EXTENDED RELEASE ORAL at 08:31

## 2017-12-19 RX ADMIN — BUPRENORPHINE HYDROCHLORIDE, NALOXONE HYDROCHLORIDE 1 FILM: 2; .5 FILM, SOLUBLE BUCCAL; SUBLINGUAL at 08:31

## 2017-12-19 RX ADMIN — AMLODIPINE BESYLATE 2.5 MG: 2.5 TABLET ORAL at 08:31

## 2017-12-19 RX ADMIN — MULTIPLE VITAMINS W/ MINERALS TAB 1 TABLET: TAB at 08:31

## 2017-12-19 RX ADMIN — NICOTINE 1 PATCH: 14 PATCH, EXTENDED RELEASE TRANSDERMAL at 08:31

## 2017-12-19 RX ADMIN — BUPRENORPHINE HYDROCHLORIDE, NALOXONE HYDROCHLORIDE 1 FILM: 2; .5 FILM, SOLUBLE BUCCAL; SUBLINGUAL at 20:58

## 2017-12-19 RX ADMIN — IPRATROPIUM BROMIDE 0.5 MG: 0.5 SOLUTION RESPIRATORY (INHALATION) at 07:36

## 2017-12-19 RX ADMIN — GABAPENTIN 300 MG: 300 CAPSULE ORAL at 08:31

## 2017-12-19 RX ADMIN — PANTOPRAZOLE SODIUM 40 MG: 40 TABLET, DELAYED RELEASE ORAL at 08:31

## 2017-12-19 RX ADMIN — PHENOBARBITAL 32.4 MG: 32.4 TABLET ORAL at 13:35

## 2017-12-19 RX ADMIN — GABAPENTIN 300 MG: 300 CAPSULE ORAL at 20:58

## 2017-12-19 RX ADMIN — GUAIFENESIN 600 MG: 600 TABLET, EXTENDED RELEASE ORAL at 20:58

## 2017-12-19 RX ADMIN — HYDROXYZINE HYDROCHLORIDE 10 MG: 10 TABLET ORAL at 06:29

## 2017-12-19 RX ADMIN — VENLAFAXINE HYDROCHLORIDE 75 MG: 75 TABLET, EXTENDED RELEASE ORAL at 08:31

## 2017-12-19 RX ADMIN — BUPRENORPHINE HYDROCHLORIDE, NALOXONE HYDROCHLORIDE 1 FILM: 2; .5 FILM, SOLUBLE BUCCAL; SUBLINGUAL at 16:23

## 2017-12-19 RX ADMIN — CYANOCOBALAMIN TAB 1000 MCG 1000 MCG: 1000 TAB at 08:31

## 2017-12-19 RX ADMIN — PHENOBARBITAL 32.4 MG: 32.4 TABLET ORAL at 20:58

## 2017-12-19 RX ADMIN — ASPIRIN 81 MG: 81 TABLET, COATED ORAL at 08:31

## 2017-12-19 RX ADMIN — BUPRENORPHINE HYDROCHLORIDE, NALOXONE HYDROCHLORIDE 1 FILM: 2; .5 FILM, SOLUBLE BUCCAL; SUBLINGUAL at 13:35

## 2017-12-19 RX ADMIN — GABAPENTIN 300 MG: 300 CAPSULE ORAL at 13:35

## 2017-12-19 RX ADMIN — LEVALBUTEROL HYDROCHLORIDE 0.63 MG: 0.63 SOLUTION RESPIRATORY (INHALATION) at 07:37

## 2017-12-19 RX ADMIN — PHENOBARBITAL 32.4 MG: 32.4 TABLET ORAL at 16:23

## 2017-12-19 ASSESSMENT — ACTIVITIES OF DAILY LIVING (ADL)
ADLS_ACUITY_SCORE: 21
ADLS_ACUITY_SCORE: 20
ADLS_ACUITY_SCORE: 21

## 2017-12-19 NOTE — PROGRESS NOTES
[facility], [place]    Internal Medicine Progress Note       Assessment & Plan   Jorge Rosales is a 62 year old man with a history of COPD, chronic pain syndrome, left leg paralysis s/p lumbar spine surgery, PTSD, and alcohol abuse, recent aspiration pneumonitis admitted 12/14 for dyspnea and acute alcohol intoxication seeking detox.      #Deconditioning: Due to alcohol abuse, chronic illness. Minimal ambulation PTA, usually uses power scooter outside of home.  -Continue PT/OT  -RN to ambulate 3-4 times/day     #Acute alcohol intoxication, alcohol withdrawal: ABT 0.166 on admission 12/14. relapsed in 09/2017. Hx significant for withdrawal seizures and DTs. Now scoring <4 in last 36 hours.  -DC valium and MSSA  -Fall precautions   -Seizure precautions   -Continue daily MVI, thiamine, folvite   -Chemical dependency, SW consults placed        #COPD exacerbation, mild: No PFTs on file. Chest CT from 11/2017 w/ emphysema, mucoid impactions. CXR this admission with R platelike atelectasis, otherwise no acute cardiopulmonary findings. Recently left AMA w/o O2, now sating well on RA. Completed prednisone course. Afebrile, WBC count normal. Breathing feels stable, currently off oxygen.  -IS, aggressive pulmonary toliet  -Up OOB 3-4x daily, ambulate TID (discussed w/ RN)   -Taper scheduled nebs to qday w/ PRN available  -Xopenex Q6H PRN    -Mucinex 600mg BID   -Supplemental O2 for sats < 88%   -Continuous pulse ox  -COPD RT consult placed      #Abnormal LFTs:  (101), AST 87 (78), t bili normal, alk phos normal. CT chest from 11/2017 w/ fatty infiltration of liver. Likely due to alcohol abuse, fatty liver.   -Trend in AM      #Opioid dependence   #Hx of chronic pain syndrome  Previously on maintenance Suboxone for ~ 3 yrs, [facility], last script 11/17 for 13 SL films.  Psychiatry following, recommend restarting Suboxone daily, with plans to [facility] and coordinate follow up with Dr. Padron as OP.  -Continue Suboxone 2-0.5 mg QID  -Continue opioid withdrawal scale   -Continue gabapentin TID      #Thrombocytopenia: Plt 98 (89). Likely BM suppression in setting of alcohol intake vs dilutional d/t IVFs. No s/s of acute bleed.     -Trend CBC in am      #PTSD w/ hx of panic attacks: Stable. Pt reports having been recently prescribed Klonopin but states that he \"never got it filled\". Utox positive for benzos.  Per Psychiatry, will taper off benzos w/ phenobarbital taper.   -Phenobarbital taper as follows (orders placed):                          -Phenobarbital 30 mg PO QID x 5 days                         -Then day 6 12/20: 32.4mg PO TID x 1 day                         -Then day 7 12/21: 32.4mg PO BID x 1 day                         -Then day 8 12/22: 32.4mg PO QHS x1 day then stop  -Continue PTA Venlafaxine   -Psych consult PRN     #Elevated BP: In setting of WD. Initiated on amlodipine 2.5 mg BID on 12/17. Normotensive-mildly HTN.   -Will DC amlodipine, Monitor for now, consider restarting pending BP trends     Chronic medical issues:  #Left leg paralysis: Reports that leg has been paralyzed following surgery ~ 2 years ago. Walks with cane and electric power chair. PT/OT consulted, appreciate recs.   #Hx of DVT: LLE DVT s/p lumbar spine surgery (provoked) 2 years ago. Not on AC. US negative for DVT. Ambulate, SCDs.  #Tobacco dependence: Nicotine patch.  #GERD, concern for gastritis on admission: DC carafate. Continue Protonix.       Consulting Services: Psychiatry, Chem Dep     Diet: Snacks/Supplements Adult: Ensure Plus (Adult); Between Meals  Snacks/Supplements Adult: Between Meals  Advance Diet as Tolerated: Regular Diet Adult  Calorie Counts  Fluids: PO  DVT Prophylaxis: Pneumatic Compression Devices and Ambulate every shift  Code Status: Full Code    Disposition Plan   Expected discharge: 2 - 3 days, recommended to inpatient rehab once weakness " "improved, plan for .     Entered: Lorelei A. Eleni Vazquez 12/19/2017, 8:40 AM   Information in the above section will display in the discharge planner report.      The patient's care was discussed with the Attending Physician, Dr. Treviño, Bedside Nurse, Care Coordinator/ and Patient.    VIVIANE Dumont  Internal Medicine [facility] Service  [facility]  Pager: [phone]  Please see sticky note for cross cover information    Interval History   The patient notes that overnight he felt like he might be in a fib which caused him some anxiety. He was reassured this wasn't a fib on his EKG. He notes he overall feels crappy from \"ruining my life\" but notes he is starting to feel more like himself. He notes it is taxing to go to the bathroom and wash up, but he was able to shower and shave on his own which makes him feel better.       Data reviewed today: I reviewed all medications, new labs and imaging results over the last 24 hours. I personally reviewed the EKG tracing showing NSR, t wave abnormality in I, not present in other lateral leads, non specific.    Physical Exam   Vital Signs: Temp: 98.3  F (36.8  C) Temp src: Oral BP: 124/80 Pulse: 77 Heart Rate: 66 Resp: 18 SpO2: 93 % O2 Device: None (Room air)    Weight: 207 lbs 4.8 oz  GENERAL: Alert and oriented x 3. Sitting up in bed.   HEENT: Anicteric sclera. Mucous membranes moist and without lesions.   CV: RRR. S1, S2. No murmurs appreciated.   RESPIRATORY: Effort normal on RA. Lungs CTAB with no wheezing, rales, rhonchi.   GI: Abdomen soft and non distended, bowel sounds present. No tenderness, rebound, guarding.   MUSCULOSKELETAL: No joint swelling or tenderness. Moves all extremities.   NEUROLOGICAL: No focal deficits.   EXTREMITIES: No peripheral edema. Intact bilateral pedal pulses.   SKIN: No jaundice. No rashes.       Data   Reviewed CMP, glucose, CBC      "

## 2017-12-19 NOTE — PLAN OF CARE
Problem: Patient Care Overview  Goal: Plan of Care/Patient Progress Review  Outcome: No Change  Pt A&O, VSS, afebrile. O2 remains sats above 89%. Ambulates with walker + SBA. Bed alarm on, reminded Pt to call for assistance. Uses bedside urinal. Voids without diffculty using bedside urinal as needed. Regular diet tolerated well. IV removed. Denies any pain/ SOB/CP. Pt is able to make needs known and will continue POC.

## 2017-12-19 NOTE — PLAN OF CARE
Problem: Patient Care Overview  Goal: Plan of Care/Patient Progress Review  Outcome: Improving  VSS, A&O in all spheres.  O2 sats have maintained from high 80s to mid 90s.  MSSA=2.  Patient had no complaints this shift.  Patient walked in andersen to shower and O2 sats remained at 92%.  Patient ate about 50% of his dinner tray.  Will continue to monitor.

## 2017-12-19 NOTE — PLAN OF CARE
Problem: Patient Care Overview  Goal: Plan of Care/Patient Progress Review  Discharge Planner OT   Patient plan for discharge: CD  Current status: Pt seems to be feeling better, more willing to participate.  Ambulates with CGA to SBA for household distances.  Pt declines to complete any ADL.  Initiated SLUMS, however pt has difficulty participating and results likely not valid at present.  Will attempt again when more appropriate.  Barriers to return to prior living situation: chemical dependency, mobility issues, ADL deficits, fatigue, pain  Recommendations for discharge: CD then home  Rationale for recommendations: To maximize safety and independence with ADL/IADL

## 2017-12-19 NOTE — PROVIDER NOTIFICATION
"Pt reported feeling palpations. Stated, \"it feels like I have the afib again\". Pt also reported feeling very anxious. Assessed pt and reported findings to the moonlighter via telephone. ECG stat ordered and completed. ECG is normal sinus rhythm. Also obtained an order for atarax which was given for anxiety around 630am.  "

## 2017-12-19 NOTE — PLAN OF CARE
Problem: PT General Care Plan  Goal: PT target date for goal attainment  PT: patient progressing with gait training and transfers today. Advised patient to use walker with nursing and PT will continue to progress with cane.     Discharge Planner PT   Patient plan for discharge: unknown   Current status: patient ambulated 250 feet with minimal assist using cane. Minimal assist with transfers and modified independent with bed mobility.    Barriers to return to prior living situation: patient continues to have difficulty with transfers and gait for d/c to home as not independent.   Recommendations for discharge: TCU pending progression in therapy. It able to meet PT goals may be able to d/c home.   Rationale for recommendations: improve endurance, balance and strength to progress independence with functional mobility for safe return home.        Entered by: Marzena Pacheco 12/19/2017 1:35 PM

## 2017-12-19 NOTE — PLAN OF CARE
Problem: Alcohol Withdrawal Acute, Risk/Actual (Adult)  Goal: Signs and Symptoms of Listed Potential Problems Will be Absent, Minimized or Managed (Alcohol Withdrawal Acute, Risk/Actual)  Signs and symptoms of listed potential problems will be absent, minimized or managed by discharge/transition of care (reference Alcohol Withdrawal Acute, Risk/Actual (Adult) CPG).    Outcome: No Change  Pt is alert and oriented X4. Vital signs are stable and within normal limits. Pt appears to be resting comfortably between cares. Lung sounds are clear. Bowel sounds are normoactive- passing flatus. MSSA 4- no intervention required. Some baseline numbness in left lower extremity- CMS/neuros are otherwise intact. PIV in right arm is saline locked. Stand-by assist and walker for ambulation. Denies pain. Able to make needs known. Will continue with plan of care.

## 2017-12-20 ENCOUNTER — APPOINTMENT (OUTPATIENT)
Dept: PHYSICAL THERAPY | Facility: CLINIC | Age: 62
DRG: 897 | End: 2017-12-20
Payer: COMMERCIAL

## 2017-12-20 LAB
ALBUMIN SERPL-MCNC: 2.8 G/DL (ref 3.4–5)
ALP SERPL-CCNC: 93 U/L (ref 40–150)
ALT SERPL W P-5'-P-CCNC: 155 U/L (ref 0–70)
ANION GAP SERPL CALCULATED.3IONS-SCNC: 8 MMOL/L (ref 3–14)
AST SERPL W P-5'-P-CCNC: 88 U/L (ref 0–45)
BILIRUB SERPL-MCNC: 0.4 MG/DL (ref 0.2–1.3)
BUN SERPL-MCNC: 24 MG/DL (ref 7–30)
CALCIUM SERPL-MCNC: 8.6 MG/DL (ref 8.5–10.1)
CHLORIDE SERPL-SCNC: 100 MMOL/L (ref 94–109)
CO2 SERPL-SCNC: 29 MMOL/L (ref 20–32)
CREAT SERPL-MCNC: 0.65 MG/DL (ref 0.66–1.25)
ERYTHROCYTE [DISTWIDTH] IN BLOOD BY AUTOMATED COUNT: 15.9 % (ref 10–15)
GFR SERPL CREATININE-BSD FRML MDRD: >90 ML/MIN/1.7M2
GLUCOSE SERPL-MCNC: 102 MG/DL (ref 70–99)
HCT VFR BLD AUTO: 43.1 % (ref 40–53)
HGB BLD-MCNC: 14.3 G/DL (ref 13.3–17.7)
MCH RBC QN AUTO: 30.6 PG (ref 26.5–33)
MCHC RBC AUTO-ENTMCNC: 33.2 G/DL (ref 31.5–36.5)
MCV RBC AUTO: 92 FL (ref 78–100)
PHOSPHATE SERPL-MCNC: 4.5 MG/DL (ref 2.5–4.5)
PLATELET # BLD AUTO: 107 10E9/L (ref 150–450)
POTASSIUM SERPL-SCNC: 4.3 MMOL/L (ref 3.4–5.3)
PROT SERPL-MCNC: 6.3 G/DL (ref 6.8–8.8)
RBC # BLD AUTO: 4.67 10E12/L (ref 4.4–5.9)
SODIUM SERPL-SCNC: 137 MMOL/L (ref 133–144)
WBC # BLD AUTO: 7.8 10E9/L (ref 4–11)

## 2017-12-20 PROCEDURE — 97161 PT EVAL LOW COMPLEX 20 MIN: CPT | Mod: GP | Performed by: PHYSICAL THERAPIST

## 2017-12-20 PROCEDURE — 97530 THERAPEUTIC ACTIVITIES: CPT | Mod: GP | Performed by: PHYSICAL THERAPIST

## 2017-12-20 PROCEDURE — 85027 COMPLETE CBC AUTOMATED: CPT | Performed by: NURSE PRACTITIONER

## 2017-12-20 PROCEDURE — 40000193 ZZH STATISTIC PT WARD VISIT: Performed by: PHYSICAL THERAPIST

## 2017-12-20 PROCEDURE — 25000125 ZZHC RX 250: Performed by: PHYSICIAN ASSISTANT

## 2017-12-20 PROCEDURE — 36415 COLL VENOUS BLD VENIPUNCTURE: CPT | Performed by: NURSE PRACTITIONER

## 2017-12-20 PROCEDURE — 25000132 ZZH RX MED GY IP 250 OP 250 PS 637: Performed by: NURSE PRACTITIONER

## 2017-12-20 PROCEDURE — 97116 GAIT TRAINING THERAPY: CPT | Mod: GP | Performed by: PHYSICAL THERAPIST

## 2017-12-20 PROCEDURE — 12000001 ZZH R&B MED SURG/OB UMMC

## 2017-12-20 PROCEDURE — 25000132 ZZH RX MED GY IP 250 OP 250 PS 637: Performed by: PSYCHIATRY & NEUROLOGY

## 2017-12-20 PROCEDURE — 99233 SBSQ HOSP IP/OBS HIGH 50: CPT | Performed by: PHYSICIAN ASSISTANT

## 2017-12-20 PROCEDURE — 94640 AIRWAY INHALATION TREATMENT: CPT

## 2017-12-20 PROCEDURE — 84100 ASSAY OF PHOSPHORUS: CPT | Performed by: NURSE PRACTITIONER

## 2017-12-20 PROCEDURE — 99207 ZZC CDG-MDM COMPONENT: MEETS MODERATE - UP CODED: CPT | Performed by: PHYSICIAN ASSISTANT

## 2017-12-20 PROCEDURE — 80053 COMPREHEN METABOLIC PANEL: CPT | Performed by: NURSE PRACTITIONER

## 2017-12-20 RX ADMIN — BUPRENORPHINE HYDROCHLORIDE, NALOXONE HYDROCHLORIDE 1 FILM: 2; .5 FILM, SOLUBLE BUCCAL; SUBLINGUAL at 08:34

## 2017-12-20 RX ADMIN — FOLIC ACID 1 MG: 1 TABLET ORAL at 08:34

## 2017-12-20 RX ADMIN — GUAIFENESIN 600 MG: 600 TABLET, EXTENDED RELEASE ORAL at 20:15

## 2017-12-20 RX ADMIN — PHENOBARBITAL 32.4 MG: 32.4 TABLET ORAL at 20:15

## 2017-12-20 RX ADMIN — IPRATROPIUM BROMIDE 0.5 MG: 0.5 SOLUTION RESPIRATORY (INHALATION) at 07:58

## 2017-12-20 RX ADMIN — BUPRENORPHINE HYDROCHLORIDE, NALOXONE HYDROCHLORIDE 1 FILM: 2; .5 FILM, SOLUBLE BUCCAL; SUBLINGUAL at 20:15

## 2017-12-20 RX ADMIN — CYANOCOBALAMIN TAB 1000 MCG 1000 MCG: 1000 TAB at 08:35

## 2017-12-20 RX ADMIN — GUAIFENESIN 600 MG: 600 TABLET, EXTENDED RELEASE ORAL at 08:34

## 2017-12-20 RX ADMIN — VENLAFAXINE HYDROCHLORIDE 75 MG: 75 TABLET, EXTENDED RELEASE ORAL at 08:35

## 2017-12-20 RX ADMIN — LEVALBUTEROL HYDROCHLORIDE 0.63 MG: 0.63 SOLUTION RESPIRATORY (INHALATION) at 07:58

## 2017-12-20 RX ADMIN — NICOTINE 1 PATCH: 14 PATCH, EXTENDED RELEASE TRANSDERMAL at 08:34

## 2017-12-20 RX ADMIN — PANTOPRAZOLE SODIUM 40 MG: 40 TABLET, DELAYED RELEASE ORAL at 06:34

## 2017-12-20 RX ADMIN — BUPRENORPHINE HYDROCHLORIDE, NALOXONE HYDROCHLORIDE 1 FILM: 2; .5 FILM, SOLUBLE BUCCAL; SUBLINGUAL at 18:23

## 2017-12-20 RX ADMIN — PHENOBARBITAL 32.4 MG: 32.4 TABLET ORAL at 14:06

## 2017-12-20 RX ADMIN — GABAPENTIN 300 MG: 300 CAPSULE ORAL at 14:06

## 2017-12-20 RX ADMIN — BUPRENORPHINE HYDROCHLORIDE, NALOXONE HYDROCHLORIDE 1 FILM: 2; .5 FILM, SOLUBLE BUCCAL; SUBLINGUAL at 14:06

## 2017-12-20 RX ADMIN — GABAPENTIN 300 MG: 300 CAPSULE ORAL at 08:34

## 2017-12-20 RX ADMIN — MULTIPLE VITAMINS W/ MINERALS TAB 1 TABLET: TAB at 08:34

## 2017-12-20 RX ADMIN — GABAPENTIN 300 MG: 300 CAPSULE ORAL at 20:15

## 2017-12-20 RX ADMIN — PANTOPRAZOLE SODIUM 40 MG: 40 TABLET, DELAYED RELEASE ORAL at 16:59

## 2017-12-20 RX ADMIN — ASPIRIN 81 MG: 81 TABLET, COATED ORAL at 08:35

## 2017-12-20 RX ADMIN — PHENOBARBITAL 32.4 MG: 32.4 TABLET ORAL at 08:35

## 2017-12-20 ASSESSMENT — ACTIVITIES OF DAILY LIVING (ADL)
ADLS_ACUITY_SCORE: 20
ADLS_ACUITY_SCORE: 21
ADLS_ACUITY_SCORE: 20
ADLS_ACUITY_SCORE: 21

## 2017-12-20 NOTE — PLAN OF CARE
Problem: Patient Care Overview  Goal: Plan of Care/Patient Progress Review  Outcome: Improving  VSS with O2 sats in high 80s to mid 90s.  Patient has not required supplemental O2.  Patient was able to ambulate with walker to bathroom to void.  Patient Patient denied pain.  Patient had no complaints.  Continue to monitor.

## 2017-12-20 NOTE — PROGRESS NOTES
[facility], [place]    Internal Medicine Progress Note       Assessment & Plan   Jorge Rosales is a 62 year old man with a history of COPD, chronic pain syndrome, left leg paralysis s/p lumbar spine surgery, PTSD, and alcohol abuse, recent aspiration pneumonitis admitted 12/14 for dyspnea and acute alcohol intoxication seeking detox.      #Deconditioning: Due to alcohol abuse, chronic illness. Minimal ambulation PTA, usually uses power scooter outside of home.  -Continue PT/OT  -RN to ambulate 3-4 times/day      #Acute alcohol intoxication, alcohol withdrawal: ABT 0.166 on admission 12/14. relapsed in 09/2017. Hx significant for withdrawal seizures and DTs. Now scoring <4 in last 36 hours, valium DCed 12/19.   -Fall precautions   -Seizure precautions   -Continue daily MVI, thiamine, folvite   -Chemical dependency, SW consults placed        #COPD exacerbation, mild: No PFTs on file. Chest CT from 11/2017 w/ emphysema, mucoid impactions. CXR this admission with R platelike atelectasis, otherwise no acute cardiopulmonary findings. Recently left AMA w/o O2, now sating well on RA. Completed prednisone course. Afebrile, WBC count normal. Breathing feels stable, currently on O2 at 2LPM due to desats to low 80s while sleeping.  -IS, aggressive pulmonary toliet  -Up OOB 3-4x daily, ambulate TID (discussed w/ RN)   -Taper scheduled nebs to qday w/ PRN available  -Xopenex Q6H PRN    -Mucinex 600mg BID   -Supplemental O2 for sats < 88%   -Continuous pulse ox  -COPD RT consult placed       #Abnormal LFTs:  (132), AST 88 (87), t bili normal, alk phos normal. CT chest from 11/2017 w/ fatty infiltration of liver. Denies abdominal pain. Likely due to alcohol abuse, fatty liver.   -Trend in AM      #Opioid dependence   #Hx of chronic pain syndrome  Previously on maintenance Suboxone for ~ 3 yrs, [facility], last script 11/17 for 13 SL films. Psychiatry  "following, recommend restarting Suboxone daily, with plans to go to [facility] and coordinate follow up with Dr. Padron as OP.  -Continue Suboxone 2-0.5 mg QID  -Continue opioid withdrawal scale   -Continue gabapentin TID      #Thrombocytopenia: Plt 107 (98). Likely BM suppression in setting of alcohol intake vs dilutional d/t IVFs. No s/s of acute bleed.     -Trend CBC in am       #PTSD w/ hx of panic attacks: Stable. Pt reports having been recently prescribed Klonopin but states that he \"never got it filled\". Utox positive for benzos.  Per Psychiatry, will taper off benzos w/ phenobarbital taper.   -Phenobarbital taper as follows (orders placed):                          -Phenobarbital 30 mg PO QID x 5 days                         -Then day 6 12/20: 32.4mg PO TID x 1 day                         -Then day 7 12/21: 32.4mg PO BID x 1 day                         -Then day 8 12/22: 32.4mg PO QHS x1 day then stop  -Continue PTA Venlafaxine   -Psych consulted to evaluate placement needs      #Elevated BP: In setting of WD. Initiated on amlodipine 2.5 mg BID on 12/17, DCed 12/19. Normotensive-mildly HTN.   -Monitor for now, consider restarting pending BP trends      Chronic medical issues:  #Left leg paralysis: Reports that leg has been paralyzed following surgery ~ 2 years ago. Walks with cane and electric power chair. PT/OT consulted, appreciate recs.   #Hx of DVT: LLE DVT s/p lumbar spine surgery (provoked) 2 years ago. Not on AC. US negative for DVT. Ambulate, SCDs.  #Tobacco dependence: Nicotine patch.  #GERD, concern for gastritis on admission: DC carafate. Continue Protonix.       Consulting Services: Psychiatry, Chem Dep     Diet: Snacks/Supplements Adult: Ensure Plus (Adult); Between Meals  Snacks/Supplements Adult: Between Meals  Advance Diet as Tolerated: Regular Diet Adult  Fluids: PO  DVT Prophylaxis: Pneumatic Compression Devices and Ambulate every shift  Code Status: Full Code    Disposition Plan "   Expected discharge: 2 - 3 days, recommended to inpatient treatment once benzo taper is complete.     Entered: Lorelei GISELA Vazquez 12/20/2017, 8:36 AM   Information in the above section will display in the discharge planner report.      The patient's care was discussed with the Attending Physician, Dr. Treviño, Bedside Nurse, Care Coordinator/ and Patient.    VIVIANE Dumont  Internal Medicine Staff Hospitalist Service  [facility]  Pager: [phone]  Please see sticky note for cross cover information    Interval History   The patient notes he is feeling fine, unchanged from yesterday. Continues to feel weak. Notes he is unsure about discharging to treatment. He notes he has many things he needs to do prior to going to inpatient treatment.       Data reviewed today: I reviewed all medications, new labs and imaging results over the last 24 hours. I personally reviewed no images or EKG's today.    Physical Exam   Vital Signs: Temp: 98.8  F (37.1  C) Temp src: Oral BP: 108/76 Pulse: 79   Resp: 18 SpO2: 92 % O2 Device: None (Room air)    Weight: 207 lbs 4.8 oz  GENERAL: Alert and oriented x 3. Lying comfortably in bed.   HEENT: Anicteric sclera. PERRL. Mucous membranes moist and without lesions.   CV: RRR. S1, S2. No murmurs appreciated.   RESPIRATORY: Effort normal on 2LPM via NC. Lungs with crackles in right lung base, with no wheezing.   GI: Abdomen soft and non distended, bowel sounds present. No tenderness, rebound, guarding.   MUSCULOSKELETAL: No joint swelling or tenderness. Moves all extremities.   NEUROLOGICAL: No focal deficits.   EXTREMITIES: No peripheral edema. Intact bilateral pedal pulses.   SKIN: No jaundice. No rashes.         Data   Reviewed CMP, glucose, CBC

## 2017-12-20 NOTE — PLAN OF CARE
Problem: Patient Care Overview  Goal: Plan of Care/Patient Progress Review  Pt A/O X 4. VSS. MSSA score of 2. COWS score of 0. Patient slept through the night. Voiding in urinal and is able to ambulate to bathroom with walker. Continue to monitor.     Temp: 98.4  F (36.9  C) Temp src: Oral BP: 98/69 Pulse: 78 Heart Rate: 66 Resp: 18 SpO2: 91 % O2 Device: None (Room air)

## 2017-12-20 NOTE — PLAN OF CARE
Problem: Patient Care Overview  Goal: Plan of Care/Patient Progress Review  Outcome: Improving  Pt A&Ox4, VSS, afebrile, LS clear w/ expiratory wheezing. O2 sats decreased (low 80's while sleeping) NC 2L placed. O2 sats above 88% while awake. Denies CP/SOB/nausea. Nicotine patch place @0800 on R arm. No IV access. Regular diet tolerated well. Voids without difficulty using bedside urinal or ambulates to BR. MSSA 1. Pt had visitor today @1300, requested for wheelchair to go down to [facility]. Pt is able to make needs known and will continue POC.

## 2017-12-20 NOTE — PROGRESS NOTES
Social Work Services Progress Note    Hospital Day: 7    Collaborated with:  Pt, VIVIANE Thayer, [facility] IDT    Data:  CD treatment, DC plans    Intervention:  Writer re-introduced role and reason for visit. Writer explored pt's willingness to consider CD Treatment as recommended by Dr Varma and medical team.    Pt focuses on issues in his life, such as having limited support due to having a son with Down's Syndrome, a daughter who is paraplegic, and his wife is .  He states he is supposed to have court date 18 over him driving w/o a license but denies he was intoxicated. He did not provide an explanation for why he was pulled over.    He declined writer's offer of contacting his PO to discuss pt's inability to meet on  due to being hospitalized.    When asked about resources and plan for DC from hospital if he does not go into CD Treatment, [facility] When asked about [facility], pt states it was in May, 2017. He spent very little time talking about plans to enter into CD Treatment and spent much of the discussion talking about things that prevent him from going into CD treatment.     Assessment:  pt not displaying willingness to go into CD treatment and is rationalizng why he cannot go into CD treatment. He is dicussing need/wanting to DC home w/o concrete plan for CD treatment or engaging in resources to support sobriety    Plan:    Anticipated Disposition:  to be determined by medical team    Barriers to d/c plan:  Medical stability, psych assessment    Follow Up:  ROBBIN con't to follow

## 2017-12-21 ENCOUNTER — APPOINTMENT (OUTPATIENT)
Dept: PHYSICAL THERAPY | Facility: CLINIC | Age: 62
DRG: 897 | End: 2017-12-21
Payer: COMMERCIAL

## 2017-12-21 LAB
ALBUMIN SERPL-MCNC: 2.8 G/DL (ref 3.4–5)
ALP SERPL-CCNC: 89 U/L (ref 40–150)
ALT SERPL W P-5'-P-CCNC: 130 U/L (ref 0–70)
ANION GAP SERPL CALCULATED.3IONS-SCNC: 9 MMOL/L (ref 3–14)
AST SERPL W P-5'-P-CCNC: 52 U/L (ref 0–45)
BILIRUB SERPL-MCNC: 0.5 MG/DL (ref 0.2–1.3)
BUN SERPL-MCNC: 23 MG/DL (ref 7–30)
CALCIUM SERPL-MCNC: 8.4 MG/DL (ref 8.5–10.1)
CHLORIDE SERPL-SCNC: 99 MMOL/L (ref 94–109)
CO2 SERPL-SCNC: 28 MMOL/L (ref 20–32)
CREAT SERPL-MCNC: 0.62 MG/DL (ref 0.66–1.25)
ERYTHROCYTE [DISTWIDTH] IN BLOOD BY AUTOMATED COUNT: 15.7 % (ref 10–15)
GFR SERPL CREATININE-BSD FRML MDRD: >90 ML/MIN/1.7M2
GLUCOSE SERPL-MCNC: 110 MG/DL (ref 70–99)
HCT VFR BLD AUTO: 42.6 % (ref 40–53)
HGB BLD-MCNC: 14 G/DL (ref 13.3–17.7)
MCH RBC QN AUTO: 30.8 PG (ref 26.5–33)
MCHC RBC AUTO-ENTMCNC: 32.9 G/DL (ref 31.5–36.5)
MCV RBC AUTO: 94 FL (ref 78–100)
PLATELET # BLD AUTO: 129 10E9/L (ref 150–450)
POTASSIUM SERPL-SCNC: 4 MMOL/L (ref 3.4–5.3)
PROT SERPL-MCNC: 6.3 G/DL (ref 6.8–8.8)
RBC # BLD AUTO: 4.55 10E12/L (ref 4.4–5.9)
SODIUM SERPL-SCNC: 136 MMOL/L (ref 133–144)
WBC # BLD AUTO: 9.1 10E9/L (ref 4–11)

## 2017-12-21 PROCEDURE — 12000001 ZZH R&B MED SURG/OB UMMC

## 2017-12-21 PROCEDURE — 99232 SBSQ HOSP IP/OBS MODERATE 35: CPT | Performed by: PSYCHIATRY & NEUROLOGY

## 2017-12-21 PROCEDURE — 80053 COMPREHEN METABOLIC PANEL: CPT | Performed by: PHYSICIAN ASSISTANT

## 2017-12-21 PROCEDURE — 99232 SBSQ HOSP IP/OBS MODERATE 35: CPT | Performed by: PHYSICIAN ASSISTANT

## 2017-12-21 PROCEDURE — 85027 COMPLETE CBC AUTOMATED: CPT | Performed by: PHYSICIAN ASSISTANT

## 2017-12-21 PROCEDURE — 25000132 ZZH RX MED GY IP 250 OP 250 PS 637: Performed by: NURSE PRACTITIONER

## 2017-12-21 PROCEDURE — 36415 COLL VENOUS BLD VENIPUNCTURE: CPT | Performed by: PHYSICIAN ASSISTANT

## 2017-12-21 PROCEDURE — 40000193 ZZH STATISTIC PT WARD VISIT

## 2017-12-21 PROCEDURE — 97530 THERAPEUTIC ACTIVITIES: CPT | Mod: GP

## 2017-12-21 PROCEDURE — 97116 GAIT TRAINING THERAPY: CPT | Mod: GP

## 2017-12-21 PROCEDURE — 25000132 ZZH RX MED GY IP 250 OP 250 PS 637: Performed by: PSYCHIATRY & NEUROLOGY

## 2017-12-21 RX ADMIN — BUPRENORPHINE HYDROCHLORIDE, NALOXONE HYDROCHLORIDE 1 FILM: 2; .5 FILM, SOLUBLE BUCCAL; SUBLINGUAL at 09:10

## 2017-12-21 RX ADMIN — PHENOBARBITAL 32.4 MG: 32.4 TABLET ORAL at 09:06

## 2017-12-21 RX ADMIN — PANTOPRAZOLE SODIUM 40 MG: 40 TABLET, DELAYED RELEASE ORAL at 09:05

## 2017-12-21 RX ADMIN — GUAIFENESIN 600 MG: 600 TABLET, EXTENDED RELEASE ORAL at 20:21

## 2017-12-21 RX ADMIN — MULTIPLE VITAMINS W/ MINERALS TAB 1 TABLET: TAB at 09:05

## 2017-12-21 RX ADMIN — ASPIRIN 81 MG: 81 TABLET, COATED ORAL at 09:05

## 2017-12-21 RX ADMIN — FOLIC ACID 1 MG: 1 TABLET ORAL at 09:06

## 2017-12-21 RX ADMIN — BUPRENORPHINE HYDROCHLORIDE, NALOXONE HYDROCHLORIDE 1 FILM: 2; .5 FILM, SOLUBLE BUCCAL; SUBLINGUAL at 18:03

## 2017-12-21 RX ADMIN — NICOTINE 1 PATCH: 14 PATCH, EXTENDED RELEASE TRANSDERMAL at 09:08

## 2017-12-21 RX ADMIN — PHENOBARBITAL 32.4 MG: 32.4 TABLET ORAL at 20:23

## 2017-12-21 RX ADMIN — GABAPENTIN 300 MG: 300 CAPSULE ORAL at 09:05

## 2017-12-21 RX ADMIN — VENLAFAXINE HYDROCHLORIDE 75 MG: 75 TABLET, EXTENDED RELEASE ORAL at 09:06

## 2017-12-21 RX ADMIN — BUPRENORPHINE HYDROCHLORIDE, NALOXONE HYDROCHLORIDE 1 FILM: 2; .5 FILM, SOLUBLE BUCCAL; SUBLINGUAL at 20:23

## 2017-12-21 RX ADMIN — GABAPENTIN 300 MG: 300 CAPSULE ORAL at 20:21

## 2017-12-21 RX ADMIN — BUPRENORPHINE HYDROCHLORIDE, NALOXONE HYDROCHLORIDE 1 FILM: 2; .5 FILM, SOLUBLE BUCCAL; SUBLINGUAL at 13:03

## 2017-12-21 RX ADMIN — PANTOPRAZOLE SODIUM 40 MG: 40 TABLET, DELAYED RELEASE ORAL at 17:58

## 2017-12-21 RX ADMIN — GABAPENTIN 300 MG: 300 CAPSULE ORAL at 13:03

## 2017-12-21 RX ADMIN — CYANOCOBALAMIN TAB 1000 MCG 1000 MCG: 1000 TAB at 09:06

## 2017-12-21 RX ADMIN — GUAIFENESIN 600 MG: 600 TABLET, EXTENDED RELEASE ORAL at 09:06

## 2017-12-21 ASSESSMENT — ACTIVITIES OF DAILY LIVING (ADL)
ADLS_ACUITY_SCORE: 21
ADLS_ACUITY_SCORE: 22
ADLS_ACUITY_SCORE: 21
ADLS_ACUITY_SCORE: 21
ADLS_ACUITY_SCORE: 22

## 2017-12-21 NOTE — PLAN OF CARE
Problem: Patient Care Overview  Goal: Plan of Care/Patient Progress Review  Outcome: No Change  MSSA scored low at 2.Nicotine patch R deltoid area,intact.LS with expiratory wheezing,IS using well.On cont pulse oximetry,satting well at 2lpm via NC.Denies problems voiding.Passing gas,last BM yesterday.Sleeping without problem.Calls appropriately.

## 2017-12-21 NOTE — PLAN OF CARE
Problem: Alcohol Withdrawal Acute, Risk/Actual (Adult)  Goal: Signs and Symptoms of Listed Potential Problems Will be Absent, Minimized or Managed (Alcohol Withdrawal Acute, Risk/Actual)  Signs and symptoms of listed potential problems will be absent, minimized or managed by discharge/transition of care (reference Alcohol Withdrawal Acute, Risk/Actual (Adult) CPG).    Outcome: No Change  Patient is A&Ox4, able to make needs known, using call light appropriately.  VSS, LS diminished throughout bases. CMS intact denies any numbness or tingling, Neuros are intact. No c/o pain. BS present, Patient is passing gas, no bm this shift, Voiding spontaneously in the toilet. Tolerating regular diet. Denies dizziness, SOB & CP. Using IS independently at bedside. Continue POC.

## 2017-12-21 NOTE — PROGRESS NOTES
Care Coordinator- Discharge Planning     Admission Date/Time:  12/14/2017  Attending MD:  Shane Loomis MD     Data  Date of initial CC assessment:  12/15/17    Chart reviewed, discussed with interdisciplinary team.   Patient was admitted for:   1. Acute alcoholic intoxication in alcoholism with complication (H)    2. Hypoxia    3. COPD exacerbation (H)    4. Hypoxemia    5. Obstructive chronic bronchitis with exacerbation (H)    6. Cigarette smoker         Assessment  Full assessment completed in previous note.  Met with patient at bedside with Dr. Treviño present. We reviewed plan of care and discharge planning. Patient is remains agreeable to appointment with Dr. Carlos Padron at [facility] for suboxone therapy but he states he was told that he wouldn't discharge until after the weekend. Dr Treviño reviewed that the plan throughout the week has been to arrange CD follow up and also discussed his progression with PT (currently PT is reporting that patient is mobilizing well enough to safely d/c home).     Coordination of Care and Referrals: Provided patient/family with options for DME- home oxygen. Patient agreeable to referral and is aware of his need for supplemental oxygen when sleeping. Referral called to [facility] (Phone: [phone]; Fax: [phone]) with request for home delivery tomorrow since patient does not require O2 while awake or with ambulation. Per Joel at [facility], patient will need overnight oximetry to qualify for home oxygen since he's only requiring it while sleeping. VIVIANE Delgado updated with request to order overnight oximetry.  Of note, patient did have overnight oximetry on 12/16/17 however he did not desat below 88% for greater than 5 minutes (in total duration) therefore it has to be conducted again. Patient will need to have documented O2 sats of 88% for 5 minutes or greater in total duration to qualify for home oxygen.    Provided patient with options for home  care- RN and PT. Patient would like referral to [facility] as he was on service in recent past. Call placed to Tan Garcia RN at [facility] (direct ph: [phone] & familiar with patient) and referral faxed. Received call back stating they can accept referral and anticipate seeing him on Saturday 12/23.    Taxi ride was arranged through [employer] ([phone]) to bring patient directly from [facility] to [facility] for his appointment with Dr. Padron on Friday 12/22. It is a WILL CALL ride meaning [facility] staff should call the following taxi company to dispatch the cab once patient is discharged and ready to be escorted to lobby.    [employer]  Phone: [phone]  Ride reference number: [id number]    Patient will have will call [email or web address] from clinic as well to get home after appointment.    Plan  Anticipated Discharge Date:  Friday 12/22 directly to [facility] appt  Anticipated Discharge Plan:  Home with home care and outpatient CD     Nereida Carbone RN  Care Coordinator  Pager [phone]

## 2017-12-21 NOTE — PLAN OF CARE
"Problem: Patient Care Overview  Goal: Plan of Care/Patient Progress Review  OT: Pt expresses some hesitation about going home; he recently was told he was going home tomorrow.  Pt then declines any OOB activity, citing his neighbor was going to stop by and \"try to get some money out of me\".  Pt went on to state that if he got out of bed, this person would try to get money from him.  Therapist assured pt that if there was someone he didn't want to visit him, he should let staff know.  Pt states \"that would make the situation worse\".  Encouraged pt again to get OOB in light of upcoming d/c, but pt again declined. OT session cancelled 12/21.      "

## 2017-12-21 NOTE — PROGRESS NOTES
12/21/2017 D) I began to do a rule 25 eval with patient. He stopped me after about 5 minutes and said he didn't want to continue with the interview. He said he didn't feel well. He said he felt very scattered right now, he thought he could stay the week end but now is being moved to 8 A and told he will be dc'ed tomorrow. He said he is not ready to commit to anything right now and felt vulnerable getting all this CD info in his record. He did say he would be interested in LP but did not want to commit to it now. I explained that the purpose of me doing the CD eval now was so that we could get him on the waiting list and expedite his admission. He said he'd rather wait. I gave him my card and the tel number for intake and explained he would need to call them to set up and appointment for an eval after which time he could be put on the LP waiting list if he was appropriate. A) pt unwilling to do the CD eval at this time. Perhaps patient is ambivalent about enter LP tx. P) pt to follow [facility] staff recommendations. If patient wants to pursue LP he will need to contact intake at [phone] and set up and appointment for an evaluation. Arnoldo WHITAKER, [facility]

## 2017-12-21 NOTE — PROGRESS NOTES
Social Work Services Progress Note    Hospital Day: 8    Collaborated with:  Lorelei PAN, Dr Varma, pt, [facility] IDT    Data:  DC plan, CD treatment    Intervention:  Writer met w/pt and Dr. Varma. Pt has appointment on Friday, 12/22 to establish care with Dr Padron at [facility] for subaxone.     Pt is now verbalizing desire to start treatment at [facility] but not until after his court date.      Pt, Dr Glass and writer discussed changes to his responsibilities for caregiving for his daughter (she is Quad not Para) as his ex-wife is terminally ill with cancer. He has also been deconditioned and verbalizes importance of abstaining from alcohol to keep his mobility and balance improved. There was discussion that after pt demonstrates ability to comply and manage his mental and behavioral health issues, [facility] providers will be more receptive and willing to have pt return to their programs (pt has recently had some services through [facility] stop due to his having obtained benzos from another provider).  Pt verbalized understanding and acceptance of this plan.    Writer left VM with BONILLA Granado/SHERMAN with request for pt to be seen and have CD Eval prior to DC from hospital.    Assessment:  pt has mulitple MI/CD issues that are exacerbated by additional psycho social stressors with caregiving role changes and legal issues    Plan:    Anticipated Disposition:  home w/outpt resources. Ultimately to enter into CD Treatment  ([facility])  in January, 2018    Barriers to d/c plan:  Anticipate DC Friday, 12/22    Follow Up:  SW to remain available per request/referral

## 2017-12-21 NOTE — CONSULTS
"REASON FOR CONSULT:  Significant chemical dependency issues with plans for treatment.      IDENTIFYING INFORMATION:  Jorge Rosales is a 62-year-old  male who is a .  He was initially seen by me on 12/15.      CHIEF COMPLAINT:  \"I have a court date on 01/04.\"      HISTORY OF PRESENT ILLNESS:  The patient has a history of alcohol dependence.  He is abusing sedative hypnotics and he is on Suboxone maintenance.  He was seen on 12/15/2017BY ME FOR n initial psych consult .  At that time the plan was made for him to taper off of the sedative hypnotics, detox off alcohol using [facility] protocol.  He wants to be on Suboxone maintenance.He was started on suboxone 2mg ls qid   He was continued on Effexor.        A followup consultation was requested for follow up plans .  The patient initially said that he wanted to go to [facility] and then was supposed to meet with Arnoldo Richardson     .  At this time, the patient is doing better.  His energy, motivation is better.  Sleep is improving.  He does not have any suicidal or homicidal ideation, denied auditory or visual hallucinations.  He has future plans.  He wants to take care of his children.  He states that he has to go home because he has to take care of his daughter.  He has some legal problems.  He is willing to talk with the [facility] staff and do treatment.  He is getting more medically stable.  He has a Suboxone appointment with Dr. Padron to see for Suboxone maintenance.      He has panic attacks.  His panic attacks are better now.  He does not have any suicidal or homicidal ideation, denied auditory or visual hallucinations.  With his eyes closed, he sees visions.      PAST PSYCHIATRIC HISTORY:  Never psychiatrically hospitalized.  He was in 3 chemical dependency treatments.  Please review the detailed note on 12/15/2017 for family history and social history.      PAST MEDICAL HISTORY:  The patient's vitals are as below.   VITAL SIGNS:  Temperature " of 98.3, pulse of 71, respiratory rate of 19, blood pressure of 127/86.      Current Facility-Administered Medications   Medication     hydrOXYzine (ATARAX) tablet 10 mg     potassium phosphate 10 mmol in D5W 250 mL intermittent infusion     potassium phosphate 25 mmol in NaCl 0.9 % 500 mL intermittent infusion     hydrALAZINE (APRESOLINE) injection 10 mg     buprenorphine HCl-naloxone HCl (SUBOXONE) 2-0.5 MG per film 1 Film     PHENobarbital (LUMINAL) tablet 32.4 mg     [START ON 12/22/2017] PHENobarbital (LUMINAL) tablet 32.4 mg     aspirin EC EC tablet 81 mg     cyanocobalamin (vitamin  B-12) tablet 1,000 mcg     gabapentin (NEURONTIN) capsule 300 mg     lidocaine (XYLOCAINE) 5 % ointment     venlafaxine (EFFEXOR-ER) 24 hr tablet 75 mg     naloxone (NARCAN) injection 0.1-0.4 mg     acetaminophen (TYLENOL) tablet 650 mg     bisacodyl (DULCOLAX) EC tablet 5 mg     ondansetron (ZOFRAN-ODT) ODT tab 4 mg    Or     ondansetron (ZOFRAN) injection 4 mg     prochlorperazine (COMPAZINE) injection 10 mg    Or     prochlorperazine (COMPAZINE) tablet 10 mg    Or     prochlorperazine (COMPAZINE) Suppository 25 mg     folic acid (FOLVITE) tablet 1 mg     multivitamin, therapeutic with minerals (THERA-VIT-M) tablet 1 tablet     guaiFENesin (MUCINEX) 12 hr tablet 600 mg     nicotine Patch in Place     nicotine patch REMOVAL     nicotine (NICODERM CQ) 14 MG/24HR 24 hr patch 1 patch     pantoprazole (PROTONIX) EC tablet 40 mg     docusate sodium (COLACE) capsule 100 mg     potassium phosphate 15 mmol in D5W 250 mL intermittent infusion     potassium chloride SA (K-DUR/KLOR-CON M) CR tablet 20-40 mEq     potassium chloride (KLOR-CON) Packet 20-40 mEq     potassium chloride 10 mEq in 100 mL sterile water intermittent infusion (premix)     potassium chloride 10 mEq in 100 mL intermittent infusion with 10 mg lidocaine     magnesium sulfate 2 g in NS intermittent infusion ([facility]ar[facility] or [facility] Cmpd)     magnesium sulfate 4 g in 100 mL  sterile water (premade)     potassium phosphate 20 mmol in NaCl 0.9 % 500 mL intermittent infusion     levalbuterol (XOPENEX) neb solution 0.63 mg     MENTAL STATUS EXAMINATION:  The patient is a 62-year-old  male who is lying in bed.  He has adequate grooming, adequate hygiene, maintains good eye contact, cooperative, no psychomotor abnormality; gait not tested.  Mood is euthymic.  Affect is congruent.  Linear tp no  Loose asso Speech is spontaneous, normal rate.  Does not have any suicidal ideation.  Alert and oriented x3.  Recent and remote memory, language, fund of knowledge are all adequate.      DIAGNOSES:   Axis I:  Major depressive disorder, recurrent with psychotic features and PTSD.   Alcohol use disorder.   Opiate disorder.   Sedative use disorder.      RECOMMENDATIONS:   The patient is out of detox from alcohol.    He is at the tail end of the taper from sedative hypnotics.    He will continue Suboxone.8mg sl qd  He has an appointment tomorrow with Dr. Padron for Suboxone maintenance.    He will continue Effexor.      The patient at this time is still willing to go to CD treatment.  [facility].  He also wants to do treatment.  He is willing to meet with Arnoldo Richardson.  This case was discussed with  and he was seen with  and case discussed with Internal Medicine.      Thank you for this interesting consult.  For his anxiety and depression, he will continue Effexor.         FARHEEN MATHIS MD             D: 2017 10:06   T: 2017 10:30   MT:       Name:     MARYBETH HUERTAS   MRN:      [id number]        Account:       [id number]   :      1955           Consult Date:  2017      Document: [id number]

## 2017-12-21 NOTE — PROGRESS NOTES
Patient has been assessed for Home Oxygen needs.  Oxygen readings:   *   RA- at rest when sleeping Pulse oximetry SPO2 82 %  *   RA - at rest when awake Pulse oximetry SPO2 94 %  *   RA - during activity/with exercise SPO2 93 %  *   O2 at  2  liters/minute (at rest when sleeping) ...SPO2 95 %  *   O2 at 0  liters/minute (at rest when awake)... N/A as patient is maintaining O2 sats on RA  *   O2 at 0  liters/minute (during activity/with exercise)... N/A as patient is maintaining O2 sats on RA

## 2017-12-21 NOTE — DISCHARGE SUMMARY
[facility], [place]    Internal Medicine Discharge Summary    Date of Admission:  12/14/2017  Date of Discharge:  12/22/2017  Discharging Provider: VIVIANE Dumont/Dr. Treviño  Discharge Team: [facility]    Discharge Diagnoses   #Deconditioning  #COPD exacerbation  #Acute alcohol intoxication, alcohol WD   #Abnormal LFTs  #Opioid dependence, h/o chronic pain syndrome  #Thrombocytopenia  #PTSD w/ h/o panic attacks  #Left leg paralysis  #H/O DVT  #Tobacco dependence  #GERD    Follow-ups Needed After Discharge   Follow up with Dr. Padron for suboxone today  Follow up with PCP at [facility] primary care clinic for repeat labs (CMP & platelet count) w/ in 1 week  Follow up with [facility] (Arnoldo Richardson) for intake for inpatient treatment  Follow up with home physical therapy for continued therapy     Hospital Course   Jorge Rosales is a 62 year old man with a history of COPD, chronic pain syndrome, left leg paralysis s/p lumbar spine surgery, PTSD, and alcohol abuse, recent aspiration pneumonitis admitted 12/14 for dyspnea and acute alcohol intoxication seeking detox.      #Deconditioning: Due to alcohol abuse, chronic illness. Minimal ambulation PTA, usually uses power scooter outside of home. On discharge improved strength, ambulated 200 feet using cane. Will continue home PT on discharge.     #COPD exacerbation, mild: No PFTs on file. Chest CT from 11/2017 w/ emphysema, mucoid impactions. CXR this admission with R platelike atelectasis, otherwise no acute cardiopulmonary findings. Completed prednisone course, required PRN nebs. Overnight oximetry noted multiple desaturation episodes, required 2 LPM via NC overnight. On discharge will continue home O2, referral for home oximetry study placed. Will use albuterol PRN. Pulmonary and pulmonary rehab referrals placed for formal PFTs, smoking cessation and continued management of likely COPD.      #Acute  alcohol intoxication, alcohol withdrawal: ABT 0.166 on admission 12/14. relapsed in 09/2017. Hx significant for withdrawal seizures and DTs. Valium DCed 12/19. Will f/u with [facility] on discharge.        #Abnormal LFTs, improving:  (155), AST 52 (88), t bili normal, alk phos normal. CT chest from 11/2017 w/ fatty infiltration of liver. Denies abdominal pain. Likely due to alcohol abuse, fatty liver. Should continue OP f/u for further w/u.       #Opioid dependence   #Hx of chronic pain syndrome  Previously on maintenance Suboxone for ~ 3 yrs, [facility], last script 11/17 for 13 SL films. Psychiatry followed, recommend restarting Suboxone daily, with plans to [facility]. Will f/u with Dr. Padron today at 11 AM for continued suboxone therapy.       #Thrombocytopenia, improving: Plt 129 (107). Likely BM suppression in setting of alcohol intake vs dilutional d/t IVFs. No s/s of acute bleed. Continue OP f/u.       #PTSD w/ hx of panic attacks: Concern for benzo abuse as OP. Utox positive for benzos. Psychiatry followed, 1 dose of phenobarb tonight to complete taper. Continue PTA Venlafaxine and outpatient psychiatry f/u.       Chronic medical issues:  #Left leg paralysis: Reports that leg has been paralyzed following surgery ~ 2 years ago. Walks with cane and electric power chair. PT/OT consulted, will continue OP PT.   #Hx of DVT: LLE DVT s/p lumbar spine surgery (provoked) 2 years ago. Not on AC. US negative for DVT. Ambulate, SCDs.  #Tobacco dependence: Nicotine patch.  #GERD, concern for gastritis on admission: Continue Protonix.     Consultations This Hospital Stay   NUTRITION SERVICES ADULT IP CONSULT  PHYSICAL THERAPY ADULT IP CONSULT  OCCUPATIONAL THERAPY ADULT IP CONSULT  CHEMICAL DEPENDENCY IP CONSULT  IP RESPIRATORY CARE CHRONIC PULMONARY DISEASE SPECIALIST  PSYCHIATRY IP CONSULT  MEDICATION HISTORY IP PHARMACY CONSULT  PSYCHIATRY IP CONSULT  IP RESPIRATORY  CARE CHRONIC PULMONARY DISEASE SPECIALIST     Code Status   Full Code    Time Spent on this Encounter   I, Lorelei Vazquez, personally saw the patient today and spent greater than 30 minutes discharging this patient.       VIVIANE Dumont  Internal Medicine Staff Hospitalist Service  [facility]  Pager: [phone]  ______________________________________________________________________    Physical Exam   Vital Signs: Temp: 97.9  F (36.6  C) Temp src: Oral BP: 126/79 Pulse: 62 Heart Rate: 79 Resp: 18 SpO2: 92 % O2 Device: None (Room air)    Weight: 207 lbs 4.8 oz  Blood pressure 126/79, pulse 62, temperature 97.9  F (36.6  C), temperature source Oral, resp. rate 18, weight 94 kg (207 lb 4.8 oz), SpO2 92 %.  GENERAL: Alert and oriented x 3. Sitting comfortably in bed.   HEENT: Anicteric sclera. Mucous membranes moist and without lesions.   CV: RRR. S1, S2. No murmurs appreciated.   RESPIRATORY: Effort normal on RA. Lungs CTAB with no wheezing, rales, rhonchi.   GI: Abdomen soft and non distended, bowel sounds present. No tenderness, rebound, guarding.   MUSCULOSKELETAL: No joint swelling or tenderness. Moves all extremities.   NEUROLOGICAL: No focal deficits.  EXTREMITIES: No peripheral edema. Intact bilateral pedal pulses.   SKIN: No jaundice. No rashes.       Significant Results and Procedures   Most Recent 3 CBC's:  Recent Labs   Lab Test  12/21/17   0629  12/20/17   0535  12/19/17   0642   WBC  9.1  7.8  8.2   HGB  14.0  14.3  14.6   MCV  94  92  92   PLT  129*  107*  98*   Most Recent 3 BMP's:  Recent Labs   Lab Test  12/21/17   0629  12/20/17   0535  12/19/17   0642   NA  136  137  135   POTASSIUM  4.0  4.3  3.9   CHLORIDE  99  100  100   CO2  28  29  28   BUN  23  24  23   CR  0.62*  0.65*  0.58*   ANIONGAP  9  8  7   RUSTY  8.4*  8.6  8.5   GLC  110*  102*  150*     Most Recent 2 LFT's:  Recent Labs   Lab Test  12/21/17   0629  12/20/17   0535   AST  52*  88*   ALT  130*  155*    ALKPHOS  89  93   BILITOTAL  0.5  0.4     Most Recent 3 INR's:No lab results found.    ,   Results for orders placed or performed during the hospital encounter of 12/14/17   XR Chest 2 Views    Narrative    XR CHEST 2 VW 12/14/2017 3:20 PM    COMPARISON: 12/4/2017.    HISTORY: Cough.      Impression    IMPRESSION: Platelike atelectasis at the right base. Lungs otherwise  clear. No pleural effusion or pneumothorax. Cardiac silhouette within  normal limits.    ARABELLA MADSEN MD   US Lower Extremity Venous Duplex Left    Narrative    EXAMINATION: DOPPLER VENOUS ULTRASOUND OF THE LEFT LOWER EXTREMITY,  12/17/2017 12:58 PM     COMPARISON: None available    HISTORY: Concern for DVT    TECHNIQUE:  Gray-scale evaluation with compression, spectral flow, and  color Doppler assessment of the deep venous system of the left leg  from groin to knee, and then at the ankle.    FINDINGS:  In the left lower extremity, the common femoral, femoral, popliteal  and posterior tibial veins demonstrate normal compressibility and  blood flow. The right common femoral vein is fully compressible with  normal color flow.        Impression    IMPRESSION:  No evidence left lower extremity deep venous thrombosis.    I have personally reviewed the examination and initial interpretation  and I agree with the findings.    TAYLOR MILLER MD       Pending Results   These results will be followed up by NA  Unresulted Labs Ordered in the Past 30 Days of this Admission     No orders found from 10/15/2017 to 12/15/2017.             Primary Care Physician   Carlos Padron    Discharge Disposition   Discharged to home w/ close OP f/u, Dr. Padron [facility] for suboxone, CD assessment prior to discharge  Condition at discharge: Stable    Discharge Orders     OP OXIMETRY (CONTINUOUS)   [facility] to go to patients home to perform overnight oximetry to assess home nocturnal hypoxia and O2 needs, on RA, concern for COPD     Home Care PT Referral for Hospital  Discharge     Home care nursing referral     PULMONARY MEDICINE REFERRAL     [facility] PRIMARY CARE REFERRAL     PULMONARY REHAB REFERRAL     Follow Up and recommended labs and tests   Scheduled Follow up  Clinic: [facility]  Provider: Dr. Carlos Padron  Reason for Follow up: suboxone therapy    Appointment: Friday December 22, 2017 at 11:00am    Address:   [address] [place] [address] [place] [address]    Phone: [phone]  Please call clinic directly if you need to reschedule appointment.  ______________________________________________     MD face to face encounter   Documentation of Face to Face and Certification for Home Health Services    I certify that patient: Jorge Rosales is under my care and that I, or a nurse practitioner or physician's assistant working with me, had a face-to-face encounter that meets the physician face-to-face encounter requirements with this patient on: December 21, 2017.    This encounter with the patient was in whole, or in part, for the following medical condition, which is the primary reason for home health care: COPD exacerbation, .    I certify that, based on my findings, the following services are medically necessary home health services: Nursing and Physical Therapy.    My clinical findings support the need for the above services because: Nurse is needed: To provide assessment and oversight required in the home to assure adherence to the medical plan due to: chronic illnesses including COPD, home oxygen therapy, medication review  and Physical Therapy Services are needed to assess and treat the following functional impairments: gait assessment, weakness.    Further, I certify that my clinical findings support that this patient is homebound (i.e. absences from home require considerable and taxing effort and are for medical reasons or [employer] services or infrequently or of short duration when for other reasons) because: Requires  assistance of another person or specialized equipment to access medical services because patient: Range of motion limitations prevents ability to exit home safely. and Requires supervision of another for safe transfer...    Based on the above findings. I certify that this patient is confined to the home and needs intermittent skilled nursing care, physical therapy and/or speech therapy.  The patient is under my care, and I have initiated the establishment of the plan of care.  This patient will be followed by a physician who will periodically review the plan of care.  Physician/Provider to provide follow up care: Nereida Gonzalez MD    Attending hospital physician (the Medicare certified [place] provider): Angel Treviño MD  Physician Signature: See electronic signature associated with these discharge orders.  Date: 12/21/2017     Activity   Your activity upon discharge: activity as tolerated, no driving with opiates or benzos.     When to contact your care team   Call your primary doctor if you have any of the following: temperature greater than 100.4 or less than 96,  increased shortness of breath, increased drainage, increased swelling or increased pain.     Follow Up and recommended labs and tests   Follow up with primary care provider, [facility], within 7 days to evaluate medication change, to evaluate treatment change and for hospital follow- up. You need formal PFTs.   Follow up with Dr. Padron, at (location with clinic name or city) [facility], as scheduled at 11 AM for suboxone management.  Follow up with Arnoldo Richardson of [facility] for treatment.  Follow up with home physical therapy for continued strengthening.     Reason for your hospital stay   You were admitted for alcohol abuse, detoxification, benzo abuse and detoxification, opiate use. You were detoxed from these substances. You will continue suboxone on discharge. You also likely have COPD (Chronic obstructive pulmonary disease)  and needed nebs and will need formal testing.     Oxygen Adult   [facility] Order 2 liter(s) by nasal cannula while sleeping. Expected treatment length is indefinite. Test on conserving device as applicable.    Patients who qualify for home O2 coverage under the CMS guidelines require ABG tests or O2 sat readings obtained closest to, but no earlier than 2 days prior to the discharge, as evidence of the need for home oxygen therapy. Testing must be performed while patient is in the chronic stable state. See notes for O2 sats.    I certify that this patient, Jorge Rosales has been under my care and that I, or a nurse practitioner or physician's assistant working with me, had a face-to-face encounter that meets the face-to-face encounter requirements with this patient on December 21, 2017. The patient, Jorge Rosales was evaluated or treated in whole, or in part, for the following medical condition, which necessitates the use of the ordered oxygen. Treatment Diagnosis: COPD, obstructive chronic bronchitis.    Attending Provider: Angel Treviño MD  Physician signature: See electronic signature associated with these discharge orders  Date of Order: December 21, 2017     Diet   Follow this diet upon discharge: Orders Placed This Encounter     Snacks/Supplements Adult: Ensure Plus (Adult); Between Meals     Snacks/Supplements Adult: Between Meals     Calorie Counts     Advance Diet as Tolerated: Regular Diet Adult       Discharge Medications   Current Discharge Medication List      START taking these medications    Details   PHENobarbital (LUMINAL) 32.4 MG TABS tablet Take 1 tablet (32.4 mg) by mouth At Bedtime for 1 dose  Qty: 1 tablet, Refills: 0    Associated Diagnoses: Benzodiazepine withdrawal without complication (H)      pantoprazole (PROTONIX) 40 MG EC tablet Take 1 tablet (40 mg) by mouth daily  Qty: 30 tablet, Refills: 0    Associated Diagnoses: Gastroesophageal reflux disease, esophagitis  presence not specified      albuterol (PROAIR HFA/PROVENTIL HFA/VENTOLIN HFA) 108 (90 BASE) MCG/ACT Inhaler Inhale 2 puffs into the lungs every 6 hours as needed for shortness of breath / dyspnea or wheezing  Qty: 1 Inhaler, Refills: 1    Associated Diagnoses: Moderate persistent reactive airway disease with acute exacerbation         CONTINUE these medications which have CHANGED    Details   aspirin 81 MG tablet Take 1 tablet (81 mg) by mouth daily  Qty: 30 tablet, Refills: 0    Associated Diagnoses: CARDIOVASCULAR SCREENING; LDL GOAL LESS THAN 160      cyanocobalamin (VITAMIN  B-12) 1000 MCG tablet Take 1 tablet (1,000 mcg) by mouth daily  Qty: 100 tablet, Refills: 0    Associated Diagnoses: Alcohol dependence with uncomplicated withdrawal (H)      gabapentin (NEURONTIN) 300 MG capsule Take 1 capsule (300 mg) by mouth 3 times daily  Qty: 90 capsule, Refills: 0    Associated Diagnoses: Chronic low back pain, unspecified back pain laterality, with sciatica presence unspecified      lidocaine (LMX4) 4 % CREA cream Apply 1 Application topically 4 times daily as needed for mild pain (Apply liberal amount to shoulder)  Qty: 45 g, Refills: 0    Associated Diagnoses: Chronic low back pain, unspecified back pain laterality, with sciatica presence unspecified      methocarbamol (ROBAXIN) 750 MG tablet Take 1 tablet (750 mg) by mouth nightly as needed for muscle spasms  Qty: 30 tablet, Refills: 0    Associated Diagnoses: Chronic low back pain, unspecified back pain laterality, with sciatica presence unspecified      venlafaxine (EFFEXOR-ER) 75 MG TB24 24 hr tablet Take 1 tablet (75 mg) by mouth daily  Qty: 30 each, Refills: 1    Associated Diagnoses: Recurrent major depressive disorder, remission status unspecified (H)         CONTINUE these medications which have NOT CHANGED    Details   bismuth subsalicylate (PEPTO BISMOL) 262 MG chewable tablet Take 524 mg by mouth 2 times daily as needed for diarrhea      buprenorphine  HCl-naloxone HCl (SUBOXONE) 2-0.5 MG per film Place 1 Film under the tongue 4 times daily      fish oil-omega-3 fatty acids 1000 MG capsule Take 2 capsules by mouth daily.  Qty: 180 capsule, Refills: 12      Multiple Vitamin (MULTIVITAMIN) per tablet Take 1 tablet by mouth daily.  Qty: 100 tablet, Refills: 12         STOP taking these medications       clonazePAM (KLONOPIN) 2 MG tablet Comments:   Reason for Stopping:         ramelteon (ROZEREM) 8 MG tablet Comments:   Reason for Stopping:             Allergies   No Known Allergies

## 2017-12-21 NOTE — PLAN OF CARE
Problem: Patient Care Overview  Goal: Plan of Care/Patient Progress Review  Outcome: Improving    VS:   A&O.   VSS on room air during the day.   Output:   Voiding spontaneously without difficulty   Activity:   Sitting up for meals.   Up with assist x1 and walker.   Skin: Intact   Pain:   C/o discomfort in shoulders (baseline pt states), declined interventions   Neuro/CMS:   Baseline numbness L leg   Dressing(s):   N/A   Diet:   Tolerating regular diet with no N/V   LDA:   N/A   Equipment:   Walker   Plan:   Pt needs overnight oximetry study tonight. Possible d/c tomorrow.    Additional Info:   Pt transferring to , report given to receiving nurse.

## 2017-12-21 NOTE — PLAN OF CARE
Problem: Patient Care Overview  Goal: Plan of Care/Patient Progress Review  Discharge Planner PT   Patient plan for discharge:CD program  Current status: walking longer distances in hallway with cane.  One loss of balance.  02 sats 92-94% on RA with activity  Barriers to return to prior living situation: functionally doing well,  CD concerns   Recommendations for discharge: none  Rationale for recommendations: functionally making good gains.          Entered by: Ciera Oliva 12/20/2017 6:06 PM

## 2017-12-21 NOTE — PROGRESS NOTES
[facility], [place]    Internal Medicine Progress Note       Assessment & Plan   Jorge Rosales is a 62 year old man with a history of COPD, chronic pain syndrome, left leg paralysis s/p lumbar spine surgery, PTSD, and alcohol abuse, recent aspiration pneumonitis admitted 12/14 for dyspnea and acute alcohol intoxication seeking detox.      #Deconditioning: Due to alcohol abuse, chronic illness. Minimal ambulation PTA, usually uses power scooter outside of home.  -Continue PT/OT  -RN to ambulate 3-4 times/day    #COPD exacerbation, mild: No PFTs on file. Chest CT from 11/2017 w/ emphysema, mucoid impactions. CXR this admission with R platelike atelectasis, otherwise no acute cardiopulmonary findings. Recently left AMA w/o O2, now sating well on RA. Completed prednisone course. Afebrile, WBC count normal. Breathing feels stable, currently on O2 at 2LPM due to desats to low 80s while sleeping.  -IS, aggressive pulmonary toliet  -Up OOB 3-4x daily, ambulate TID (discussed w/ RN)   -PRN nebs  -Xopenex Q6H PRN    -Mucinex 600mg BID   -Supplemental O2 for sats < 88%, requiring 2 L of O2 overnight, patient amenable to continue on discharge  -Continuous pulse ox  -COPD RT consult placed       #Acute alcohol intoxication, alcohol withdrawal: ABT 0.166 on admission 12/14. relapsed in 09/2017. Hx significant for withdrawal seizures and DTs. Valium DCed 12/19.   -Fall precautions   -Seizure precautions   -Continue daily MVI, thiamine, folvite   -Chemical dependency, SW consults placed        #Abnormal LFTs:  (155), AST 52 (88), t bili normal, alk phos normal. CT chest from 11/2017 w/ fatty infiltration of liver. Denies abdominal pain. Likely due to alcohol abuse, fatty liver.   -Trend in AM      #Opioid dependence   #Hx of chronic pain syndrome  Previously on maintenance Suboxone for ~ 3 yrs, [facility], last script 11/17 for 13 SL films. Psychiatry  "following, recommend restarting Suboxone daily, with plans to go to [facility] and coordinate follow up with Dr. Padron as OP.  -Continue Suboxone 2-0.5 mg QID  -Continue opioid withdrawal scale   -Continue gabapentin TID      #Thrombocytopenia: Plt 129 (107). Likely BM suppression in setting of alcohol intake vs dilutional d/t IVFs. No s/s of acute bleed.     -Trend CBC in am       #PTSD w/ hx of panic attacks: Stable. Pt reports having been recently prescribed Klonopin but states that he \"never got it filled\". Utox positive for benzos.  Per Psychiatry, will taper off benzos w/ phenobarbital taper.   -Phenobarbital taper as follows (orders placed):                          -Phenobarbital 30 mg PO QID x 5 days                         -Then day 6 12/20: 32.4mg PO TID x 1 day                         -Then day 7 12/21: 32.4mg PO BID x 1 day                         -Then day 8 12/22: 32.4mg PO QHS x1 day then stop  -Continue PTA Venlafaxine   -Psych consulted to evaluate placement needs      #Elevated BP: In setting of WD. Initiated on amlodipine 2.5 mg BID on 12/17, DCed 12/19. Normotensive-mildly HTN.   -Monitor for now, consider restarting pending BP trends      Chronic medical issues:  #Left leg paralysis: Reports that leg has been paralyzed following surgery ~ 2 years ago. Walks with cane and electric power chair. PT/OT consulted, appreciate recs.   #Hx of DVT: LLE DVT s/p lumbar spine surgery (provoked) 2 years ago. Not on AC. US negative for DVT. Ambulate, SCDs.  #Tobacco dependence: Nicotine patch.  #GERD, concern for gastritis on admission: DC carafate. Continue Protonix.       Diet: Snacks/Supplements Adult: Ensure Plus (Adult); Between Meals  Snacks/Supplements Adult: Between Meals  Advance Diet as Tolerated: Regular Diet Adult  Fluids: PO  DVT Prophylaxis: Pneumatic Compression Devices and Ambulate every shift  Code Status: Full Code    Disposition Plan   Expected discharge: tomorrow, recommended to home w/ " OP treatment resources, home O2, suboxone f/u with Dr. Padron once phenobarbitol taper completed, respiratory status stable.     Entered: Lorelei Vazquez 12/21/2017, 9:18 AM   Information in the above section will display in the discharge planner report.      The patient's care was discussed with the Attending Physician, Dr. Treviño, Bedside Nurse, Care Coordinator/ and Patient.    VIVIANE Dumont  Internal Medicine Staff Hospitalist Service  [facility]  Pager: [phone]  Please see sticky note for cross cover information    Interval History   The patient notes he wore O2 overnight. He feels a bit peppier today. Denies any worsening dyspnea. Has chronic wet cough, not worse.       Data reviewed today: I reviewed all medications, new labs and imaging results over the last 24 hours. I personally reviewed no images or EKG's today.    Physical Exam   Vital Signs: Temp: 98.3  F (36.8  C) Temp src: Oral BP: 127/86 Pulse: 71 Heart Rate: 84 Resp: 19 SpO2: 95 % O2 Device: None (Room air) Oxygen Delivery: 2 LPM  Weight: 207 lbs 4.8 oz  GENERAL: Alert and oriented x 3.   HEENT: Anicteric sclera. Mucous membranes moist and without lesions.   CV: RRR. S1, S2. No murmurs appreciated.   RESPIRATORY: Effort normal on RA. Lungs with crackles in right lung base, with no wheezing, rales, rhonchi.   GI: Abdomen soft and non distended, bowel sounds present. No tenderness, rebound, guarding.   MUSCULOSKELETAL: No joint swelling or tenderness. Moves all extremities.   NEUROLOGICAL: No focal deficits.   EXTREMITIES: No peripheral edema. Intact bilateral pedal pulses.   SKIN: No jaundice. No rashes.         Data   Reviewed CMP, CBC, glucose

## 2017-12-21 NOTE — DISCHARGE INSTRUCTIONS
__________________________________________________________  D/C'ing nurse: Please fax to following agencies at time of patient d/c.    [facility]   Fax  [phone]  ___________________________________________________________      Ride arrangements for discharge:    Taxi rides have been coordinated through [facility] ([phone]) to go from [facility] to [facility] for clinic appointment, and then after appointment from [facility] to home.  Patient (or hospital/clinic staff) must call following [employer] company for BOTH rides when he is ready to be picked up and they will dispatch the cab at that time.    [employer]  Phone: [phone]  Reference number: [id number]  ___________________________________________________________

## 2017-12-21 NOTE — PLAN OF CARE
Problem: PT General Care Plan  Goal: PT target date for goal attainment  PT: patient displayed improvement with gait training, transfers and bed mobility.     Discharge Planner PT   Patient plan for discharge: CD program  Current status: ambulated 450 feet using SEC with SBA as patient declined wearing AFO. Patient modified independent with transfers and bed mobility.   Barriers to return to prior living situation: none from a PT standpoint.   Recommendations for discharge: discharge to CD program or home.   Rationale for recommendations: anticipate patient will progress with functional mobility to independent within 1-2 days and discharge home or to CD program.        Entered by: Marzena Pacheco 12/21/2017 5:04 PM

## 2017-12-21 NOTE — PLAN OF CARE
Problem: Patient Care Overview  Goal: Plan of Care/Patient Progress Review  Outcome: No Change  Pt slept through the night. No report of pain or anxiety. No acute issues. On 2L O2 and sats have been above 92%. Cont to assess.

## 2017-12-22 ENCOUNTER — APPOINTMENT (OUTPATIENT)
Dept: PHYSICAL THERAPY | Facility: CLINIC | Age: 62
DRG: 897 | End: 2017-12-22
Payer: COMMERCIAL

## 2017-12-22 ENCOUNTER — APPOINTMENT (OUTPATIENT)
Dept: OCCUPATIONAL THERAPY | Facility: CLINIC | Age: 62
DRG: 897 | End: 2017-12-22
Payer: COMMERCIAL

## 2017-12-22 VITALS
OXYGEN SATURATION: 92 % | BODY MASS INDEX: 28.91 KG/M2 | WEIGHT: 207.3 LBS | RESPIRATION RATE: 18 BRPM | TEMPERATURE: 97.9 F | SYSTOLIC BLOOD PRESSURE: 126 MMHG | HEART RATE: 62 BPM | DIASTOLIC BLOOD PRESSURE: 79 MMHG

## 2017-12-22 PROCEDURE — 40000275 ZZH STATISTIC RCP TIME EA 10 MIN

## 2017-12-22 PROCEDURE — 25000132 ZZH RX MED GY IP 250 OP 250 PS 637: Performed by: PSYCHIATRY & NEUROLOGY

## 2017-12-22 PROCEDURE — 25000132 ZZH RX MED GY IP 250 OP 250 PS 637: Performed by: NURSE PRACTITIONER

## 2017-12-22 PROCEDURE — 40000962 ZZH STATISTIC CHRONIC DISEASE SPECIALIST RT CONSULT

## 2017-12-22 PROCEDURE — 94762 N-INVAS EAR/PLS OXIMTRY CONT: CPT

## 2017-12-22 PROCEDURE — 97535 SELF CARE MNGMENT TRAINING: CPT | Mod: GO

## 2017-12-22 PROCEDURE — 40000193 ZZH STATISTIC PT WARD VISIT

## 2017-12-22 PROCEDURE — 99213 OFFICE O/P EST LOW 20 MIN: CPT

## 2017-12-22 PROCEDURE — 40000133 ZZH STATISTIC OT WARD VISIT

## 2017-12-22 PROCEDURE — 99239 HOSP IP/OBS DSCHRG MGMT >30: CPT | Performed by: PHYSICIAN ASSISTANT

## 2017-12-22 PROCEDURE — 97116 GAIT TRAINING THERAPY: CPT | Mod: GP

## 2017-12-22 PROCEDURE — 40000989 ZZH STATISTIC CHRONIC PULMONARY DISEASE SPECIALIST

## 2017-12-22 RX ORDER — LIDOCAINE 40 MG/G
1 CREAM TOPICAL 4 TIMES DAILY PRN
Qty: 45 G | Refills: 0 | Status: SHIPPED | OUTPATIENT
Start: 2017-12-22 | End: 2018-03-17

## 2017-12-22 RX ORDER — GABAPENTIN 300 MG/1
300 CAPSULE ORAL 3 TIMES DAILY
Qty: 90 CAPSULE | Refills: 0 | Status: SHIPPED | OUTPATIENT
Start: 2017-12-22 | End: 2018-03-17

## 2017-12-22 RX ORDER — VENLAFAXINE HYDROCHLORIDE 75 MG/1
75 TABLET, EXTENDED RELEASE ORAL DAILY
Qty: 30 EACH | Refills: 1 | Status: SHIPPED | OUTPATIENT
Start: 2017-12-22 | End: 2018-11-06

## 2017-12-22 RX ORDER — PANTOPRAZOLE SODIUM 40 MG/1
40 TABLET, DELAYED RELEASE ORAL DAILY
Qty: 30 TABLET | Refills: 0 | Status: SHIPPED | OUTPATIENT
Start: 2017-12-22 | End: 2018-03-17

## 2017-12-22 RX ORDER — PHENOBARBITAL 32.4 MG/1
32.4 TABLET ORAL AT BEDTIME
Qty: 1 TABLET | Refills: 0 | Status: SHIPPED | OUTPATIENT
Start: 2017-12-22 | End: 2017-12-23

## 2017-12-22 RX ORDER — METHOCARBAMOL 750 MG/1
750 TABLET, FILM COATED ORAL
Qty: 30 TABLET | Refills: 0 | Status: SHIPPED | OUTPATIENT
Start: 2017-12-22 | End: 2017-12-22

## 2017-12-22 RX ORDER — ALBUTEROL SULFATE 90 UG/1
2 AEROSOL, METERED RESPIRATORY (INHALATION) EVERY 6 HOURS PRN
Qty: 1 INHALER | Refills: 1 | Status: SHIPPED | OUTPATIENT
Start: 2017-12-22

## 2017-12-22 RX ORDER — LANOLIN ALCOHOL/MO/W.PET/CERES
1000 CREAM (GRAM) TOPICAL DAILY
Qty: 100 TABLET | Refills: 0 | Status: SHIPPED | OUTPATIENT
Start: 2017-12-22

## 2017-12-22 RX ADMIN — FOLIC ACID 1 MG: 1 TABLET ORAL at 08:18

## 2017-12-22 RX ADMIN — PANTOPRAZOLE SODIUM 40 MG: 40 TABLET, DELAYED RELEASE ORAL at 08:18

## 2017-12-22 RX ADMIN — NICOTINE 1 PATCH: 14 PATCH, EXTENDED RELEASE TRANSDERMAL at 08:17

## 2017-12-22 RX ADMIN — GABAPENTIN 300 MG: 300 CAPSULE ORAL at 08:18

## 2017-12-22 RX ADMIN — MULTIPLE VITAMINS W/ MINERALS TAB 1 TABLET: TAB at 08:18

## 2017-12-22 RX ADMIN — GUAIFENESIN 600 MG: 600 TABLET, EXTENDED RELEASE ORAL at 08:18

## 2017-12-22 RX ADMIN — CYANOCOBALAMIN TAB 1000 MCG 1000 MCG: 1000 TAB at 08:18

## 2017-12-22 RX ADMIN — BUPRENORPHINE HYDROCHLORIDE, NALOXONE HYDROCHLORIDE 1 FILM: 2; .5 FILM, SOLUBLE BUCCAL; SUBLINGUAL at 08:15

## 2017-12-22 RX ADMIN — VENLAFAXINE HYDROCHLORIDE 75 MG: 75 TABLET, EXTENDED RELEASE ORAL at 08:25

## 2017-12-22 RX ADMIN — ASPIRIN 81 MG: 81 TABLET, COATED ORAL at 08:18

## 2017-12-22 NOTE — PLAN OF CARE
Problem: Patient Care Overview  Goal: Plan of Care/Patient Progress Review  Pt A/O X 4. Afebrile. VSS. Anterior Lungs- clear bilateraally. Bowels: active in all four quadrants.Reported numbness on LLE.Denies nausea, shortness of breath, and chest pain. Voids spontaneously without difficulty in the urinal.Last BM was 12/21/17 and passing gas per patient report. Patient is in sleep study all throughout shift.Given 1L of O2 at around 0500 due to O2 sats decreased to 88. O2 level flactuates above not higher than 94% and low 87% with 1L of O2. Patient refused to continue the monitor due to its continous alarm and asked writer to removed the monitor. Respiratory nurse came and is aware. the Pt is able to make needs known and the call light is within the pt's reach. Continue to monitor.

## 2017-12-22 NOTE — PROGRESS NOTES
Care Coordinator- Discharge Planning     Admission Date/Time:  12/14/2017  Attending MD:  Shane Loomis MD     Data  Date of initial CC assessment:  12/18/2017  Chart reviewed, discussed with interdisciplinary team.   Patient was admitted for:   1. CARDIOVASCULAR SCREENING; LDL GOAL LESS THAN 160    2. Acute alcoholic intoxication in alcoholism with complication (H)    3. Hypoxia    4. COPD exacerbation (H)    5. Hypoxemia    6. Obstructive chronic bronchitis with exacerbation (H)    7. Cigarette smoker    8. Alcohol dependence with uncomplicated withdrawal (H)    9. Chronic low back pain, unspecified back pain laterality, with sciatica presence unspecified    10. Recurrent major depressive disorder, remission status unspecified (H)    11. Benzodiazepine withdrawal without complication (H)    12. Gastroesophageal reflux disease, esophagitis presence not specified    13. Moderate persistent reactive airway disease with acute exacerbation         Assessment  Full assessment completed in previous note    Coordination of Care and Referrals: Patient transportation to [facility] at 10:45am with Dr. Padron at 11:00am. Patient will need PCP that will be set up with the [facility] Care Coordinator. Home O2 through [facility] to be delivered to patient's home. No further discharge concerns at this time. CC to follow as needed.      Plan  Anticipated Discharge Date:  12/22/2017  Anticipated Discharge Plan:  Home     CTS Handoff completed:  YES    Josi Matute RN, BSN  Care Coordinator, 8A  Phone [phone]  Pager [phone]

## 2017-12-22 NOTE — PROGRESS NOTES
COPD Initial Interview and Consult    2017    Patient: Jorge Rosales      :  1955                    MRN:[id number]      Reason for Consult:  Patient with Suspected COPD being followed by COPD Readmission Reduction Program    History of Present Illness: Jorge Rosales is a 62 year old man with a history of chronic pain syndrome, suspected COPD no PFTs, Patient smokes 10 cigarette per day and not interested in quitting. Patient is not interested in Pharmacotherapy. [facility] and [facility] all his care to [facility]. From last admission 17 patient did not follow-up with all the recommendations for transitional care. Patient has left leg paralysis s/p lumbar spine surgery, PTSD, and alcohol abuse, recent aspiration pneumonitis admitted  for dyspnea and acute alcohol intoxication seeking detox.      Assessment:    No PFT in record    Home respiratory medications include:  -Albuterol inhaler Q4 PRN      Recommendations:  -Establish care with a Pulmonologist and get complete PFT's  -Will need follow up appointment with Pulmonologist and complete PFT's.   -Smoking Cessation Counseling and Relapse Prevention  -Use Aerobika Oscillating PEP Device at least twice daily for 5-10 min/QID with Nebs to open smaller airways, improve mucus clearance, to decrease cough frequency, and to improve exercise tolerance  -Use Aerochamber with MDI's for better drug deposition.  -Patient needs continued reinforcement and continued education on inhaler use and breath recovery techniques  -Medication Therapy Management Referral   -Referral for outpatient pulmonary Rehab  -Consider sleep consult to assess for sleep disordered breathing, ALVINO, nocturnal hypoxia, and hypoventilation  -Walk test to determine O2 needs at rest and with exertion/activity  -At discharge continue with patient's home regimen of respiratory  medications      Will continue to follow and support patient as needed. Will follow up with phone call 48 hours after discharge.     I spent 45 minutes with the patient.    Imani Morrison,RRT  [facility]  Office [phone]  Pager [phone]

## 2017-12-22 NOTE — PROGRESS NOTES
RT arrived to  overnight sleep study.  RN stated she turned off the machine early due to continuous alarm, despite being on oxygen.  Study results pending.      Kaci Boyd, RRT-NPS

## 2017-12-22 NOTE — PLAN OF CARE
Problem: Patient Care Overview  Goal: Individualization & Mutuality  Outcome: Improving  Patient A&O x4, lungs sound clear,bowel sound active- had a bowel movement today, denied CP, lightheadedness, dizziness, tinlging and SOB, no iv access, numbness in left leg as a baseline line, drinking well and voiding spontaneously without difficulties, pain tolerable an, ambulating in andersen using cane, , demonstrates the ability to use call light appropriately, discharge instruction read and explained to patient by charge nurse (Jenifer) discharged patient home as patient had appointment with MD for  Suboxone.

## 2017-12-22 NOTE — PLAN OF CARE
Problem: Patient Care Overview  Goal: Individualization & Mutuality  Patient admited from 10A around 4:30 pm, A&O x4, lungs sound , Bowel sound active, Denied CP, lightheadedness, dizziness, numbness, tingling and SOB,  drinking well and voiding spontaneously without difficulties, CMS intact, incentive spirometer encouraged and done several times, repositioned and turned in bed independently, heels elevated off bed, demonstrates the ability to use call light appropriately, will continue to monitor patient.

## 2017-12-22 NOTE — PLAN OF CARE
Problem: Patient Care Overview  Goal: Plan of Care/Patient Progress Review  Discharge Planner OT   Patient plan for discharge: home  Current status: Pt has shown limited participation in OT. Pt is IND with dressing, bathing, toileting, shower transfers, and grooming/hygiene.  Pt denies any anticipated difficulty upon returning home.  Initiated cognitive screen however unable to complete as pt not appropriate for testing.  Recommend neuropsych eval for more accurate and appropriate assessment of cognition.  At this time, no further OT needs identified.  Barriers to return to prior living situation: chemical dependency, mental health, mobility, deconditioning  Recommendations for discharge: CD, home  Rationale for recommendations: to maximize safety and IND with activities           Problem: OT General Care Plan  Goal: Hygiene/Grooming (OT)  Hygiene/Grooming (OT)   Outcome: Completed Date Met: 12/22/17  Per pt report  Goal: Lower Body Dressing (OT)  Lower Body Dressing (OT)   Outcome: Completed Date Met: 12/22/17  Per pt report, no AE  Goal: Cognitive (OT)  Cognitive (OT)   Outcome: Adequate for Discharge Date Met: 12/22/17  Initiated but unable to complete as pt not appropriate at this time.  Recommend outpatient neuropsych eval for more appropriate picture of cognition.

## 2017-12-22 NOTE — PLAN OF CARE
Problem: PT General Care Plan  Goal: PT target date for goal attainment  PT: patient on track for d/c to home from a PT standpoint. Patient has met 3 out of 3 goals. He states very anxious about his discharge today and his appointment. He is requesting to speak with the care coordinator for instructions on when his ride will arrive and what the process is for discharge.     Discharge Planner PT   Patient plan for discharge: home  Current status: ambulated 200 feet using cane with modified independence. Modified independent with bed mobility and transfers.SpO2 monitor: SpO2 with activity today 92% to 96% on room air  Barriers to return to prior living situation: none. Possible CD program  Recommendations for discharge: home   Rationale for recommendations: patient independent with functional mobility and met all PT goals for d/c to home from a mobility standpoint.        Entered by: Marzena Pacheco 12/22/2017 8:58 AM   Patient has been assessed for Home Oxygen needs.  Oxygen readings:   *   RA - at rest  Pulse oximetry SPO2 96 % on room air  *   RA - during activity/with exercise SPO2 92 % on room air  *   O2 at  0 liters/minute (at rest)   *   O2 at  0 liters/minute (during activity/with exercise)

## 2017-12-29 DIAGNOSIS — J44.9 COPD (CHRONIC OBSTRUCTIVE PULMONARY DISEASE) (H): Primary | ICD-10-CM

## 2018-01-08 NOTE — TELEPHONE ENCOUNTER
APPT INFO    Date /Time: 1/17/18, 12:55pm   Reason for Appt: COPD   Ref Provider/Clinic: Self    Are there internal records? Yes/No?  IF YES, list clinic names: [facility]   Are there outside records? Yes/No? no   Patient Contact (Y/N) & Call Details: No, referred    Action:

## 2018-01-14 ENCOUNTER — HOSPITAL ENCOUNTER (INPATIENT)
Facility: CLINIC | Age: 63
LOS: 5 days | Discharge: HOME HEALTH | DRG: 189 | End: 2018-01-19
Attending: EMERGENCY MEDICINE | Admitting: INTERNAL MEDICINE
Payer: COMMERCIAL

## 2018-01-14 ENCOUNTER — APPOINTMENT (OUTPATIENT)
Dept: GENERAL RADIOLOGY | Facility: CLINIC | Age: 63
DRG: 189 | End: 2018-01-14
Attending: EMERGENCY MEDICINE
Payer: COMMERCIAL

## 2018-01-14 DIAGNOSIS — J96.21 ACUTE ON CHRONIC RESPIRATORY FAILURE WITH HYPOXIA (H): ICD-10-CM

## 2018-01-14 DIAGNOSIS — F17.210 CIGARETTE SMOKER: ICD-10-CM

## 2018-01-14 DIAGNOSIS — J96.21 ACUTE AND CHRONIC RESPIRATORY FAILURE WITH HYPOXIA (H): ICD-10-CM

## 2018-01-14 DIAGNOSIS — Z51.89 ENCOUNTER FOR WOUND CARE: Primary | ICD-10-CM

## 2018-01-14 DIAGNOSIS — F10.220 ALCOHOL DEPENDENCE WITH UNCOMPLICATED INTOXICATION (H): ICD-10-CM

## 2018-01-14 DIAGNOSIS — F10.220 ACUTE ALCOHOLIC INTOXICATION IN ALCOHOLISM WITHOUT COMPLICATION (H): ICD-10-CM

## 2018-01-14 DIAGNOSIS — J44.1 COPD EXACERBATION (H): ICD-10-CM

## 2018-01-14 PROBLEM — R09.02 HYPOXIA: Status: ACTIVE | Noted: 2017-11-07

## 2018-01-14 LAB
ALBUMIN SERPL-MCNC: 3.1 G/DL (ref 3.4–5)
ALCOHOL BREATH TEST: 0.28 (ref 0–0.01)
ALP SERPL-CCNC: 99 U/L (ref 40–150)
ALT SERPL W P-5'-P-CCNC: 31 U/L (ref 0–70)
ANION GAP SERPL CALCULATED.3IONS-SCNC: 6 MMOL/L (ref 3–14)
AST SERPL W P-5'-P-CCNC: 32 U/L (ref 0–45)
BASOPHILS # BLD AUTO: 0 10E9/L (ref 0–0.2)
BASOPHILS NFR BLD AUTO: 0.3 %
BILIRUB SERPL-MCNC: 0.3 MG/DL (ref 0.2–1.3)
BUN SERPL-MCNC: 12 MG/DL (ref 7–30)
CALCIUM SERPL-MCNC: 7.9 MG/DL (ref 8.5–10.1)
CHLORIDE SERPL-SCNC: 109 MMOL/L (ref 94–109)
CO2 SERPL-SCNC: 32 MMOL/L (ref 20–32)
CREAT SERPL-MCNC: 0.52 MG/DL (ref 0.66–1.25)
DIFFERENTIAL METHOD BLD: ABNORMAL
EOSINOPHIL # BLD AUTO: 0.1 10E9/L (ref 0–0.7)
EOSINOPHIL NFR BLD AUTO: 0.9 %
ERYTHROCYTE [DISTWIDTH] IN BLOOD BY AUTOMATED COUNT: 15.7 % (ref 10–15)
GFR SERPL CREATININE-BSD FRML MDRD: >90 ML/MIN/1.7M2
GLUCOSE SERPL-MCNC: 97 MG/DL (ref 70–99)
HCT VFR BLD AUTO: 42.3 % (ref 40–53)
HGB BLD-MCNC: 14 G/DL (ref 13.3–17.7)
IMM GRANULOCYTES # BLD: 0 10E9/L (ref 0–0.4)
IMM GRANULOCYTES NFR BLD: 0.4 %
LYMPHOCYTES # BLD AUTO: 3.4 10E9/L (ref 0.8–5.3)
LYMPHOCYTES NFR BLD AUTO: 44.1 %
MCH RBC QN AUTO: 30.8 PG (ref 26.5–33)
MCHC RBC AUTO-ENTMCNC: 33.1 G/DL (ref 31.5–36.5)
MCV RBC AUTO: 93 FL (ref 78–100)
MONOCYTES # BLD AUTO: 0.6 10E9/L (ref 0–1.3)
MONOCYTES NFR BLD AUTO: 8 %
NEUTROPHILS # BLD AUTO: 3.6 10E9/L (ref 1.6–8.3)
NEUTROPHILS NFR BLD AUTO: 46.3 %
NRBC # BLD AUTO: 0 10*3/UL
NRBC BLD AUTO-RTO: 0 /100
NT-PROBNP SERPL-MCNC: 143 PG/ML (ref 0–900)
PLATELET # BLD AUTO: 146 10E9/L (ref 150–450)
POTASSIUM SERPL-SCNC: 3.9 MMOL/L (ref 3.4–5.3)
PROT SERPL-MCNC: 6.8 G/DL (ref 6.8–8.8)
RBC # BLD AUTO: 4.55 10E12/L (ref 4.4–5.9)
SODIUM SERPL-SCNC: 147 MMOL/L (ref 133–144)
WBC # BLD AUTO: 7.7 10E9/L (ref 4–11)

## 2018-01-14 PROCEDURE — 71045 X-RAY EXAM CHEST 1 VIEW: CPT

## 2018-01-14 PROCEDURE — 25000125 ZZHC RX 250: Performed by: EMERGENCY MEDICINE

## 2018-01-14 PROCEDURE — 99285 EMERGENCY DEPT VISIT HI MDM: CPT | Mod: Z6 | Performed by: EMERGENCY MEDICINE

## 2018-01-14 PROCEDURE — 83880 ASSAY OF NATRIURETIC PEPTIDE: CPT | Performed by: EMERGENCY MEDICINE

## 2018-01-14 PROCEDURE — 80053 COMPREHEN METABOLIC PANEL: CPT | Performed by: EMERGENCY MEDICINE

## 2018-01-14 PROCEDURE — 94640 AIRWAY INHALATION TREATMENT: CPT | Mod: 76 | Performed by: EMERGENCY MEDICINE

## 2018-01-14 PROCEDURE — 94640 AIRWAY INHALATION TREATMENT: CPT

## 2018-01-14 PROCEDURE — 25000132 ZZH RX MED GY IP 250 OP 250 PS 637: Performed by: INTERNAL MEDICINE

## 2018-01-14 PROCEDURE — 25000128 H RX IP 250 OP 636: Performed by: EMERGENCY MEDICINE

## 2018-01-14 PROCEDURE — 96374 THER/PROPH/DIAG INJ IV PUSH: CPT | Performed by: EMERGENCY MEDICINE

## 2018-01-14 PROCEDURE — 40000275 ZZH STATISTIC RCP TIME EA 10 MIN

## 2018-01-14 PROCEDURE — 85025 COMPLETE CBC W/AUTO DIFF WBC: CPT | Performed by: EMERGENCY MEDICINE

## 2018-01-14 PROCEDURE — 99285 EMERGENCY DEPT VISIT HI MDM: CPT | Mod: 25 | Performed by: EMERGENCY MEDICINE

## 2018-01-14 PROCEDURE — 25000125 ZZHC RX 250: Performed by: INTERNAL MEDICINE

## 2018-01-14 PROCEDURE — 12000008 ZZH R&B INTERMEDIATE UMMC

## 2018-01-14 PROCEDURE — 94640 AIRWAY INHALATION TREATMENT: CPT | Mod: 76

## 2018-01-14 PROCEDURE — 25000128 H RX IP 250 OP 636: Performed by: INTERNAL MEDICINE

## 2018-01-14 PROCEDURE — 25800025 ZZH RX 258: Performed by: INTERNAL MEDICINE

## 2018-01-14 PROCEDURE — 99207 ZZC MOONLIGHTING INDICATOR: CPT | Performed by: INTERNAL MEDICINE

## 2018-01-14 PROCEDURE — HZ2ZZZZ DETOXIFICATION SERVICES FOR SUBSTANCE ABUSE TREATMENT: ICD-10-PCS | Performed by: INTERNAL MEDICINE

## 2018-01-14 PROCEDURE — 82075 ASSAY OF BREATH ETHANOL: CPT | Performed by: EMERGENCY MEDICINE

## 2018-01-14 RX ORDER — NALOXONE HYDROCHLORIDE 0.4 MG/ML
.1-.4 INJECTION, SOLUTION INTRAMUSCULAR; INTRAVENOUS; SUBCUTANEOUS
Status: DISCONTINUED | OUTPATIENT
Start: 2018-01-14 | End: 2018-01-19 | Stop reason: HOSPADM

## 2018-01-14 RX ORDER — METOPROLOL TARTRATE 100 MG
100 TABLET ORAL DAILY
COMMUNITY
End: 2018-03-17

## 2018-01-14 RX ORDER — ONDANSETRON 4 MG/1
4 TABLET, ORALLY DISINTEGRATING ORAL EVERY 6 HOURS PRN
Status: DISCONTINUED | OUTPATIENT
Start: 2018-01-14 | End: 2018-01-19 | Stop reason: HOSPADM

## 2018-01-14 RX ORDER — ASPIRIN 81 MG/1
81 TABLET ORAL DAILY
Status: DISCONTINUED | OUTPATIENT
Start: 2018-01-14 | End: 2018-01-19 | Stop reason: HOSPADM

## 2018-01-14 RX ORDER — GABAPENTIN 300 MG/1
300 CAPSULE ORAL 3 TIMES DAILY
Status: DISCONTINUED | OUTPATIENT
Start: 2018-01-14 | End: 2018-01-19 | Stop reason: HOSPADM

## 2018-01-14 RX ORDER — ALBUTEROL SULFATE 90 UG/1
2 AEROSOL, METERED RESPIRATORY (INHALATION) EVERY 6 HOURS PRN
Status: DISCONTINUED | OUTPATIENT
Start: 2018-01-14 | End: 2018-01-14

## 2018-01-14 RX ORDER — LANOLIN ALCOHOL/MO/W.PET/CERES
100 CREAM (GRAM) TOPICAL DAILY
Status: COMPLETED | OUTPATIENT
Start: 2018-01-15 | End: 2018-01-17

## 2018-01-14 RX ORDER — DIAZEPAM 5 MG
5-20 TABLET ORAL EVERY 30 MIN PRN
Status: DISCONTINUED | OUTPATIENT
Start: 2018-01-14 | End: 2018-01-19

## 2018-01-14 RX ORDER — PANTOPRAZOLE SODIUM 40 MG/1
40 TABLET, DELAYED RELEASE ORAL DAILY
Status: DISCONTINUED | OUTPATIENT
Start: 2018-01-14 | End: 2018-01-19 | Stop reason: HOSPADM

## 2018-01-14 RX ORDER — BISMUTH SUBSALICYLATE 262 MG/1
524 TABLET, CHEWABLE ORAL 2 TIMES DAILY PRN
Status: DISCONTINUED | OUTPATIENT
Start: 2018-01-14 | End: 2018-01-19 | Stop reason: HOSPADM

## 2018-01-14 RX ORDER — ONDANSETRON 2 MG/ML
8 INJECTION INTRAMUSCULAR; INTRAVENOUS ONCE
Status: COMPLETED | OUTPATIENT
Start: 2018-01-14 | End: 2018-01-14

## 2018-01-14 RX ORDER — VENLAFAXINE HYDROCHLORIDE 75 MG/1
75 TABLET, EXTENDED RELEASE ORAL DAILY
Status: DISCONTINUED | OUTPATIENT
Start: 2018-01-14 | End: 2018-01-19 | Stop reason: HOSPADM

## 2018-01-14 RX ORDER — LEVALBUTEROL INHALATION SOLUTION 0.63 MG/3ML
0.63 SOLUTION RESPIRATORY (INHALATION) EVERY 6 HOURS PRN
Status: DISCONTINUED | OUTPATIENT
Start: 2018-01-14 | End: 2018-01-14

## 2018-01-14 RX ORDER — ACETAMINOPHEN 325 MG/1
325 TABLET ORAL EVERY 4 HOURS PRN
Status: DISCONTINUED | OUTPATIENT
Start: 2018-01-14 | End: 2018-01-19 | Stop reason: HOSPADM

## 2018-01-14 RX ORDER — LEVALBUTEROL INHALATION SOLUTION 0.63 MG/3ML
0.63 SOLUTION RESPIRATORY (INHALATION)
Status: DISCONTINUED | OUTPATIENT
Start: 2018-01-14 | End: 2018-01-17

## 2018-01-14 RX ORDER — CALCIUM CARBONATE 500 MG/1
1000 TABLET, CHEWABLE ORAL 4 TIMES DAILY PRN
Status: DISCONTINUED | OUTPATIENT
Start: 2018-01-14 | End: 2018-01-19 | Stop reason: HOSPADM

## 2018-01-14 RX ORDER — LEVALBUTEROL TARTRATE 45 UG/1
2 AEROSOL, METERED ORAL EVERY 6 HOURS PRN
Status: DISCONTINUED | OUTPATIENT
Start: 2018-01-14 | End: 2018-01-19 | Stop reason: HOSPADM

## 2018-01-14 RX ORDER — ALBUTEROL SULFATE 5 MG/ML
2.5 SOLUTION RESPIRATORY (INHALATION)
Status: DISCONTINUED | OUTPATIENT
Start: 2018-01-14 | End: 2018-01-14

## 2018-01-14 RX ORDER — MULTIPLE VITAMINS W/ MINERALS TAB 9MG-400MCG
1 TAB ORAL DAILY
Status: DISCONTINUED | OUTPATIENT
Start: 2018-01-15 | End: 2018-01-19 | Stop reason: HOSPADM

## 2018-01-14 RX ORDER — NICOTINE 21 MG/24HR
1 PATCH, TRANSDERMAL 24 HOURS TRANSDERMAL DAILY
Status: DISCONTINUED | OUTPATIENT
Start: 2018-01-15 | End: 2018-01-15

## 2018-01-14 RX ORDER — METHOCARBAMOL 750 MG/1
750 TABLET, FILM COATED ORAL
Status: ON HOLD | COMMUNITY
End: 2018-04-09

## 2018-01-14 RX ORDER — ONDANSETRON 2 MG/ML
4 INJECTION INTRAMUSCULAR; INTRAVENOUS EVERY 6 HOURS PRN
Status: DISCONTINUED | OUTPATIENT
Start: 2018-01-14 | End: 2018-01-19 | Stop reason: HOSPADM

## 2018-01-14 RX ORDER — FOLIC ACID 1 MG/1
1 TABLET ORAL DAILY
Status: DISCONTINUED | OUTPATIENT
Start: 2018-01-15 | End: 2018-01-19 | Stop reason: HOSPADM

## 2018-01-14 RX ORDER — IPRATROPIUM BROMIDE AND ALBUTEROL SULFATE 2.5; .5 MG/3ML; MG/3ML
3 SOLUTION RESPIRATORY (INHALATION) ONCE
Status: COMPLETED | OUTPATIENT
Start: 2018-01-14 | End: 2018-01-14

## 2018-01-14 RX ORDER — DEXTROSE MONOHYDRATE, SODIUM CHLORIDE, AND POTASSIUM CHLORIDE 50; 1.49; 4.5 G/1000ML; G/1000ML; G/1000ML
INJECTION, SOLUTION INTRAVENOUS CONTINUOUS
Status: DISCONTINUED | OUTPATIENT
Start: 2018-01-14 | End: 2018-01-16

## 2018-01-14 RX ORDER — LANOLIN ALCOHOL/MO/W.PET/CERES
1000 CREAM (GRAM) TOPICAL DAILY
Status: DISCONTINUED | OUTPATIENT
Start: 2018-01-14 | End: 2018-01-19 | Stop reason: HOSPADM

## 2018-01-14 RX ORDER — BUPRENORPHINE AND NALOXONE 8; 2 MG/1; MG/1
1 FILM, SOLUBLE BUCCAL; SUBLINGUAL 2 TIMES DAILY
Status: DISCONTINUED | OUTPATIENT
Start: 2018-01-14 | End: 2018-01-19 | Stop reason: HOSPADM

## 2018-01-14 RX ADMIN — DIAZEPAM 10 MG: 5 TABLET ORAL at 22:40

## 2018-01-14 RX ADMIN — VENLAFAXINE HYDROCHLORIDE 75 MG: 75 TABLET, EXTENDED RELEASE ORAL at 20:23

## 2018-01-14 RX ADMIN — POTASSIUM CHLORIDE, DEXTROSE MONOHYDRATE AND SODIUM CHLORIDE: 150; 5; 450 INJECTION, SOLUTION INTRAVENOUS at 21:50

## 2018-01-14 RX ADMIN — LEVALBUTEROL HYDROCHLORIDE 0.63 MG: 0.63 SOLUTION RESPIRATORY (INHALATION) at 22:41

## 2018-01-14 RX ADMIN — CYANOCOBALAMIN TAB 1000 MCG 1000 MCG: 1000 TAB at 20:23

## 2018-01-14 RX ADMIN — PANTOPRAZOLE SODIUM 40 MG: 40 TABLET, DELAYED RELEASE ORAL at 20:23

## 2018-01-14 RX ADMIN — ONDANSETRON 8 MG: 2 INJECTION INTRAMUSCULAR; INTRAVENOUS at 15:33

## 2018-01-14 RX ADMIN — ACETAMINOPHEN 325 MG: 325 TABLET, FILM COATED ORAL at 20:32

## 2018-01-14 RX ADMIN — GABAPENTIN 300 MG: 300 CAPSULE ORAL at 20:23

## 2018-01-14 RX ADMIN — ASPIRIN 81 MG: 81 TABLET, COATED ORAL at 20:23

## 2018-01-14 RX ADMIN — BISMUTH SUBSALICYLATE 524 MG: 262 TABLET, CHEWABLE ORAL at 22:40

## 2018-01-14 RX ADMIN — DIAZEPAM 10 MG: 5 TABLET ORAL at 20:32

## 2018-01-14 RX ADMIN — ENOXAPARIN SODIUM 40 MG: 40 INJECTION SUBCUTANEOUS at 20:32

## 2018-01-14 RX ADMIN — BUPRENORPHINE HYDROCHLORIDE, NALOXONE HYDROCHLORIDE 1 FILM: 8; 2 FILM, SOLUBLE BUCCAL; SUBLINGUAL at 21:50

## 2018-01-14 RX ADMIN — IPRATROPIUM BROMIDE AND ALBUTEROL SULFATE 3 ML: .5; 3 SOLUTION RESPIRATORY (INHALATION) at 15:22

## 2018-01-14 ASSESSMENT — ENCOUNTER SYMPTOMS
ABDOMINAL PAIN: 0
SHORTNESS OF BREATH: 1
VOMITING: 0
FEVER: 0
WOUND: 1
NAUSEA: 1
COUGH: 0

## 2018-01-14 ASSESSMENT — ACTIVITIES OF DAILY LIVING (ADL): ADLS_ACUITY_SCORE: 21

## 2018-01-14 NOTE — ED NOTES
"Pt found to have a suboxone strip in labelled package in his hand under his pillow, pt reluctant to give to writer (had to pull it out of his hand). Pt became upset, states, \"that's my prescribed med, I knew I should have taken it before I got here.\" Pt reassured that he would get the medicine he needs while he is here. Suboxone locked in boarder bin in [facility].  "

## 2018-01-14 NOTE — IP AVS SNAPSHOT
[facility] [address]    [address]    [place] [address]    Phone:  [phone]                                       After Visit Summary   1/14/2018    Jorge Rosales    MRN: [id number]           After Visit Summary Signature Page     I have received my discharge instructions, and my questions have been answered. I have discussed any challenges I see with this plan with the nurse or doctor.    ..........................................................................................................................................  Patient/Patient Representative Signature      ..........................................................................................................................................  Patient Representative Print Name and Relationship to Patient    ..................................................               ................................................  Date                                            Time    ..........................................................................................................................................  Reviewed by Signature/Title    ...................................................              ..............................................  Date                                                            Time

## 2018-01-14 NOTE — IP AVS SNAPSHOT
MRN:[id number]                      After Visit Summary   1/14/2018    Jorge Rosales    MRN: [id number]           Thank you!     Thank you for choosing [facility] for your care. Our goal is always to provide you with excellent care. Hearing back from our patients is one way we can continue to improve our services. Please take a few minutes to complete the written survey that you may receive in the mail after you visit with us. Thank you!        Patient Information     Date Of Birth          1955        About your hospital stay     You were admitted on:  January 14, 2018 You last received care in the:  [facility] [address]    You were discharged on:  January 19, 2018        Reason for your hospital stay       Alcohol intoxication/ Detox                  Who to Call     For medical emergencies, please call 911.  For non-urgent questions about your medical care, please call your primary care provider or clinic, [phone]          Attending Provider     Provider Specialty    Carole Chua MD Emergency Medicine    MyrnaJosh MD Internal Medicine       Primary Care Provider Office Phone # Fax #    Carlos Padron [phone]      After Care Instructions     Activity       Your activity upon discharge: activity as tolerated            Diet       Follow this diet upon discharge: Orders Placed This Encounter      Advance Diet as Tolerated: Regular Diet Adult                  Follow-up Appointments     Adult [facility] Follow-up and recommended labs and tests       Please follow up with your PCP, Dr Padron as organized.  Please follow up with the information for resources for treatment for alcohol addiction  Appointments on [place] and/or [facility] (with [facility] or [facility] provider or service). Call [phone] if you haven't heard regarding these appointments within 7 days of discharge.                  Additional Services     Home Care PT Referral for Hospital Discharge   "     [facility].    Physical Therapy- Evaluate and Treat.            Home care nursing referral       [facility].  Phone  [phone]  Fax  [phone]    Resumption of RN skilled nursing visits. RN to assess vital signs and weight, respiratory and cardiac status, hydration, nutrition and bowel status and home safety.  RN to teach medication management.    Home health aide to assist with bathing and basic ADLs                  Further instructions from your care team       Please call [facility] (Phone: [phone])  to schedule  of home overnight oximetry study.      Pending Results     No orders found from 2018 to 1/15/2018.            Statement of Approval     Ordered          18 1321  I have reviewed and agree with all the recommendations and orders detailed in this document.  EFFECTIVE NOW     Approved and electronically signed by:  Florence Lyon MD             Admission Information     Date & Time Provider Department Dept. Phone    2018 Josh Norris MD [facility] [phone]      Your Vitals Were     Blood Pressure Pulse Temperature Respirations Weight Pulse Oximetry    112/72 (BP Location: Right arm) 106 98.4  F (36.9  C) (Oral) 18 96.9 kg (213 lb 11.2 oz) 94%    BMI (Body Mass Index)                   29.81 kg/m2           [employer] Information     [employer] lets you send messages to your doctor, view your test results, renew your prescriptions, schedule appointments and more. To sign up, go to [email or web address] . Click on \"Log in\" on the left side of the screen, which will take you to the Welcome page. Then click on \"Sign up Now\" on the right side of the page.     You will be asked to enter the access code listed below, as well as some personal information. Please follow the directions to create your username and password.     Your access code is: [email or web address]  Expires: 2018 12:40 AM     Your access code will  in 90 days. If " you need help or a new code, please call your [facility] clinic or [phone].        Care EveryWhere ID     This is your Care EveryWhere ID. This could be used by other organizations to access your [facility] medical records  [id number]        Equal Access to Services     CRISSYCHA DAISY : Hadii aad ku hadrenettadavid Soholaali, waaxda luqadaha, qaybta kaalmada aderomelda, waxdenny hayden maksimluis miguel menjivar stevenherb munoz. So [facility] [phone].    ATENCIÓN: Si habla español, tiene a nunez disposición servicios gratuitos de asistencia lingüística. Llame al [phone].    We comply with applicable federal civil rights laws and [place] laws. We do not discriminate on the basis of race, color, national origin, age, disability, sex, sexual orientation, or gender identity.               Review of your medicines      START taking        Dose / Directions    mupirocin 2 % ointment   Commonly known as:  BACTROBAN   Used for:  Encounter for wound care        Apply topically 2 times daily   Quantity:  22 g   Refills:  0         CONTINUE these medicines which have NOT CHANGED        Dose / Directions    albuterol 108 (90 BASE) MCG/ACT Inhaler   Commonly known as:  PROAIR HFA/PROVENTIL HFA/VENTOLIN HFA   Used for:  Moderate persistent reactive airway disease with acute exacerbation        Dose:  2 puff   Inhale 2 puffs into the lungs every 6 hours as needed for shortness of breath / dyspnea or wheezing   Quantity:  1 Inhaler   Refills:  1       aspirin 81 MG tablet   Used for:  CARDIOVASCULAR SCREENING; LDL GOAL LESS THAN 160        Dose:  81 mg   Take 1 tablet (81 mg) by mouth daily   Quantity:  30 tablet   Refills:  0       bismuth subsalicylate 262 MG chewable tablet   Commonly known as:  PEPTO BISMOL        Dose:  524 mg   Take 524 mg by mouth 2 times daily as needed for diarrhea   Refills:  0       buprenorphine HCl-naloxone HCl 8-2 MG per film   Commonly known as:  SUBOXONE        Dose:  1 Film   Place 1 Film under the tongue 2 times daily    Refills:  0       cyanocobalamin 1000 MCG tablet   Commonly known as:  vitamin  B-12   Used for:  Alcohol dependence with uncomplicated withdrawal (H)        Dose:  1000 mcg   Take 1 tablet (1,000 mcg) by mouth daily   Quantity:  100 tablet   Refills:  0       fish oil-omega-3 fatty acids 1000 MG capsule        Dose:  2 g   Take 2 capsules by mouth daily.   Quantity:  180 capsule   Refills:  12       gabapentin 300 MG capsule   Commonly known as:  NEURONTIN   Used for:  Chronic low back pain, unspecified back pain laterality, with sciatica presence unspecified        Dose:  300 mg   Take 1 capsule (300 mg) by mouth 3 times daily   Quantity:  90 capsule   Refills:  0       lidocaine 4 % Crea cream   Commonly known as:  LMX4   Used for:  Chronic low back pain, unspecified back pain laterality, with sciatica presence unspecified        Dose:  1 Application   Apply 1 Application topically 4 times daily as needed for mild pain (Apply liberal amount to shoulder)   Quantity:  45 g   Refills:  0       LOPRESSOR 100 MG tablet   Generic drug:  metoprolol tartrate        Dose:  100 mg   Take 100 mg by mouth daily   Refills:  0       methocarbamol 750 MG tablet   Commonly known as:  ROBAXIN        Dose:  750 mg   Take 750 mg by mouth nightly as needed for muscle spasms   Refills:  0       multivitamin per tablet        Dose:  1 tablet   Take 1 tablet by mouth daily.   Quantity:  100 tablet   Refills:  12       pantoprazole 40 MG EC tablet   Commonly known as:  PROTONIX   Used for:  Gastroesophageal reflux disease, esophagitis presence not specified        Dose:  40 mg   Take 1 tablet (40 mg) by mouth daily   Quantity:  30 tablet   Refills:  0       venlafaxine 75 MG Tb24 24 hr tablet   Commonly known as:  EFFEXOR-ER   Indication:  Depression and Anxiety   Used for:  Recurrent major depressive disorder, remission status unspecified (H)        Dose:  75 mg   Take 1 tablet (75 mg) by mouth daily   Quantity:  30 each   Refills:   1            Where to get your medicines      These medications were sent to [facility] [place], [place] - [address]  [address] [facility] [[email or web address] [address], [facility] [address]     Phone:  [phone]     mupirocin 2 % ointment                Protect others around you: Learn how to safely use, store and throw away your medicines at www.disposemymeds.org.             Medication List: This is a list of all your medications and when to take them. Check marks below indicate your daily home schedule. Keep this list as a reference.      Medications           Morning Afternoon Evening Bedtime As Needed    albuterol 108 (90 BASE) MCG/ACT Inhaler   Commonly known as:  PROAIR HFA/PROVENTIL HFA/VENTOLIN HFA   Inhale 2 puffs into the lungs every 6 hours as needed for shortness of breath / dyspnea or wheezing                                aspirin 81 MG tablet   Take 1 tablet (81 mg) by mouth daily                                bismuth subsalicylate 262 MG chewable tablet   Commonly known as:  PEPTO BISMOL   Take 524 mg by mouth 2 times daily as needed for diarrhea   Last time this was given:  524 mg on 1/14/2018 10:40 PM                                buprenorphine HCl-naloxone HCl 8-2 MG per film   Commonly known as:  SUBOXONE   Place 1 Film under the tongue 2 times daily   Last time this was given:  1 Film on 1/19/2018  8:15 AM                                cyanocobalamin 1000 MCG tablet   Commonly known as:  vitamin  B-12   Take 1 tablet (1,000 mcg) by mouth daily   Last time this was given:  1,000 mcg on 1/19/2018  8:04 AM                                fish oil-omega-3 fatty acids 1000 MG capsule   Take 2 capsules by mouth daily.                                gabapentin 300 MG capsule   Commonly known as:  NEURONTIN   Take 1 capsule (300 mg) by mouth 3 times daily   Last time this was given:  300 mg on 1/19/2018  8:04 AM                                lidocaine 4 % Crea cream   Commonly known as:  LMX4   Apply  1 Application topically 4 times daily as needed for mild pain (Apply liberal amount to shoulder)                                LOPRESSOR 100 MG tablet   Take 100 mg by mouth daily   Last time this was given:  25 mg on 1/18/2018  8:40 PM   Generic drug:  metoprolol tartrate                                methocarbamol 750 MG tablet   Commonly known as:  ROBAXIN   Take 750 mg by mouth nightly as needed for muscle spasms                                multivitamin per tablet   Take 1 tablet by mouth daily.                                mupirocin 2 % ointment   Commonly known as:  BACTROBAN   Apply topically 2 times daily   Last time this was given:  1/19/2018  8:16 AM                                pantoprazole 40 MG EC tablet   Commonly known as:  PROTONIX   Take 1 tablet (40 mg) by mouth daily   Last time this was given:  40 mg on 1/19/2018  8:04 AM                                venlafaxine 75 MG Tb24 24 hr tablet   Commonly known as:  EFFEXOR-ER   Take 1 tablet (75 mg) by mouth daily   Last time this was given:  75 mg on 1/19/2018  8:04 AM

## 2018-01-14 NOTE — ED PROVIDER NOTES
History     Chief Complaint   Patient presents with     Shortness of Breath     COPD hx: 87% RA. Is supposed to be on home 02 but has not been delivered yet.  :  12-Lead NSR: out of meds     Alcohol Problem     drinks 1 quart of frances/day, last drink PTA. Has been on binge for past week.     HPI  Jorge Rosales is a 62 year old male with a history of COPD, alcohol abuse, chronic pain, and left leg paralysis status post lumbar spine surgery who presents with shortness of breath and alcohol intoxication.  Patient reports he has been short of breath for the past few days, worsening on exertion.  He was admitted to the hospital in December for dyspnea and acute alcohol intoxication.  He was supposed to start home O2 at night upon discharge from the hospital, but he states today that the oxygen was never delivered. He states that he has not been able to use his inhaler or nebulizer for the past few days as he lost his inhaler.  In addition, the patient states he has been drinking daily to help with his chronic pain in his left shoulder and left leg.  He states he has been drinking a quart of frances per day for the past 6-7 days.  His current symptoms include nausea.  He typically has diaphoresis and shaking when he does not drink alcohol and states that he also has a history of withdrawal seizures.  He also has right dorsal middle and index finger blistering that he says occurred from falling asleep with a cigarette in his hand.  He denies any cough, chest pain, fever, abdominal pain, or vomiting.  He denies any drug abuse.    Past Medical History:   Diagnosis Date     Chronic back pain     Methadone program     COPD (chronic obstructive pulmonary disease) (H)      CTS (carpal tunnel syndrome)      Low testosterone     managed by endocrinology     Obesity      Panic attacks      PTSD (post-traumatic stress disorder) 7/8/2011     Rotator cuff injury      Substance abuse     alcohol abuse       Past Surgical  History:   Procedure Laterality Date     BACK SURGERY       SHOULDER SURGERY         Family History   Problem Relation Age of Onset     DIABETES Father      HEART DISEASE No family hx of        Social History   Substance Use Topics     Smoking status: Current Every Day Smoker     Packs/day: 0.50     Years: 30.00     Types: Cigarettes     Smokeless tobacco: Never Used     Alcohol use No       No current facility-administered medications for this encounter.      Current Outpatient Prescriptions   Medication     metoprolol tartrate (LOPRESSOR) 100 MG tablet     methocarbamol (ROBAXIN) 750 MG tablet     aspirin 81 MG tablet     cyanocobalamin (VITAMIN  B-12) 1000 MCG tablet     gabapentin (NEURONTIN) 300 MG capsule     lidocaine (LMX4) 4 % CREA cream     venlafaxine (EFFEXOR-ER) 75 MG TB24 24 hr tablet     pantoprazole (PROTONIX) 40 MG EC tablet     albuterol (PROAIR HFA/PROVENTIL HFA/VENTOLIN HFA) 108 (90 BASE) MCG/ACT Inhaler     bismuth subsalicylate (PEPTO BISMOL) 262 MG chewable tablet     buprenorphine HCl-naloxone HCl (SUBOXONE) 8-2 MG per film     fish oil-omega-3 fatty acids 1000 MG capsule     Multiple Vitamin (MULTIVITAMIN) per tablet      No Known Allergies      I have reviewed the Medications, Allergies, Past Medical and Surgical History, and Social History in the Epic system.    Review of Systems   Constitutional: Negative for fever.   Respiratory: Positive for shortness of breath. Negative for cough.    Cardiovascular: Negative for chest pain.   Gastrointestinal: Positive for nausea. Negative for abdominal pain and vomiting.   Skin: Positive for wound (right middle and index finger burns).   All other systems reviewed and are negative.      Physical Exam   BP: 103/66  Pulse: 95  Heart Rate: 87  Temp: 97.1  F (36.2  C)  Resp: 16  SpO2: 93 %      Physical Exam    GEN:  Alert, well developed, no acute distress, is clinically intoxicated, minimally cooperative with exam.   HEENT:  PERRL, EOMI, Mucous  membranes are moist.   Cardio:  RRR, no murmur, radial pulses equal bilaterally  PULM:  Lungs have good air movement, no wheezes, rales, rhonchi are present on exhalation   Abd:  Soft, normal bowel sounds, no focal tenderness  Back exam:  No CVA tenderness  Musculoskeletal:  normal range of motion, no lower extremity swelling or calf tenderness  Neuro:  Alert and oriented X3, Follows commands, moving all extremities spontaneously   Skin:  Warm, dry   ED Course     ED Course     Procedures         On arrival to the emergency department, patient's oxygen saturation was 87% on room air.  He was placed on nasal cannula oxygen and he remained at only 88% with nasal cannula oxygen.  He was then placed on an Oxy plus mask with 6 L and his oxygen increased to the 90s.       Critical Care time:  none  Chest x-ray was reviewed by me and results are shown here:  Results for orders placed or performed during the hospital encounter of 01/14/18   XR Chest Port 1 View    Narrative    CHEST PORTABLE ONE VIEW   1/14/2018 3:23 PM     HISTORY: Shortness of breath.     COMPARISON: 12/14/2017      Impression    IMPRESSION: No acute cardiopulmonary disease.    ZENON SOUZA MD     Labs are normal except as shown.   He was given a DuoNeb for his COPD.    Labs Ordered and Resulted from Time of ED Arrival Up to the Time of Departure from the ED   CBC WITH PLATELETS DIFFERENTIAL - Abnormal; Notable for the following:        Result Value    RDW 15.7 (*)     Platelet Count 146 (*)     All other components within normal limits   COMPREHENSIVE METABOLIC PANEL - Abnormal; Notable for the following:     Sodium 147 (*)     Creatinine 0.52 (*)     Calcium 7.9 (*)     Albumin 3.1 (*)     All other components within normal limits   ALCOHOL BREATH TEST POCT - Abnormal; Notable for the following:     Alcohol Breath Test 0.281 (*)     All other components within normal limits   NT PROBNP INPATIENT            Assessments & Plan (with Medical Decision  Making)   Patient presents with increased shortness of breath for the last couple of days and hypoxia.  He is requiring oxygen plus mask to keep his oxygen saturations above 90%.  He has not been compliant with is his nebulizer/inhaler at home.  Chest x-ray is negative for signs of infection.  I suspect this is an acute exacerbation of his COPD in part due to noncompliance.  Given his hypoxia, he will be admitted to the hospital for this.  Patient is also intoxicated and has a long history of alcohol dependence.  He is likely to have alcohol withdrawal while in the hospital and will need treatment for this as well.    I have reviewed the nursing notes.    I have reviewed the findings, diagnosis, plan and need for follow up with the patient.    New Prescriptions    No medications on file       Final diagnoses:   COPD exacerbation (H)   Acute on chronic respiratory failure with hypoxia (H)   Acute alcoholic intoxication in alcoholism without complication (H)   I, Emiliano Velazquez, am serving as a trained medical scribe to document services personally performed by Suma Chua MD, based on the provider's statements to me.      I, Suma Chua MD, was physically present and have reviewed and verified the accuracy of this note documented by Emiliano Velazquez.       1/14/2018   [facility], [place], EMERGENCY DEPARTMENT     Carole Chua MD  01/14/18 3336

## 2018-01-14 NOTE — ED NOTES
[facility], [place]   ED Nurse to Floor Handoff     Jorge Rosales is a 62 year old male who speaks English and lives alone,  in a home  They arrived in the ED by ambulance from emergency room    ED Chief Complaint: Shortness of Breath (COPD hx: 87% RA. Is supposed to be on home 02 but has not been delivered yet.  :  12-Lead NSR: out of meds) and Alcohol Problem (drinks 1 quart of frances/day, last drink PTA. Has been on binge for past week.)    ED Dx;   Final diagnoses:   COPD exacerbation (H)   Acute on chronic respiratory failure with hypoxia (H)   Acute alcoholic intoxication in alcoholism without complication (H)         Needed?: No    Allergies: No Known Allergies.  Past Medical Hx:   Past Medical History:   Diagnosis Date     Chronic back pain     Methadone program     COPD (chronic obstructive pulmonary disease) (H)      CTS (carpal tunnel syndrome)      Low testosterone     managed by endocrinology     Obesity      Panic attacks      PTSD (post-traumatic stress disorder) 7/8/2011     Rotator cuff injury      Substance abuse     alcohol abuse      Baseline Mental status: WDL  Current Mental Status changes: other intoxicated, cooperative    Infection: No  Sepsis suspected: No  Isolation type: No active isolations     Activity level - Baseline/Home:  Independent with cane abd leg brace   Activity Level - Current:   Stand with Assist    Bariatric equipment needed?: No    In the ED these meds were given:   Medications   ondansetron (ZOFRAN) injection 8 mg (8 mg Intravenous Given 1/14/18 1533)   ipratropium - albuterol 0.5 mg/2.5 mg/3 mL (DUONEB) neb solution 3 mL (3 mLs Nebulization Given 1/14/18 1522)       Drips running?  No    Home pump or pre-existing LDA's present? No    Labs results:   Labs Ordered and Resulted from Time of ED Arrival Up to the Time of Departure from the ED   CBC WITH PLATELETS DIFFERENTIAL - Abnormal; Notable for the following:         Result Value    RDW 15.7 (*)     Platelet Count 146 (*)     All other components within normal limits   COMPREHENSIVE METABOLIC PANEL - Abnormal; Notable for the following:     Sodium 147 (*)     Creatinine 0.52 (*)     Calcium 7.9 (*)     Albumin 3.1 (*)     All other components within normal limits   ALCOHOL BREATH TEST POCT - Abnormal; Notable for the following:     Alcohol Breath Test 0.281 (*)     All other components within normal limits   NT PROBNP INPATIENT       Imaging Studies:   Recent Results (from the past 24 hour(s))   XR Chest Port 1 View    Narrative    CHEST PORTABLE ONE VIEW   1/14/2018 3:23 PM     HISTORY: Shortness of breath.     COMPARISON: 12/14/2017      Impression    IMPRESSION: No acute cardiopulmonary disease.    ZENON SOUZA MD       Recent vital signs:   BP (!) 133/98  Pulse 95  Temp 97.1  F (36.2  C) (Oral)  Resp 16  SpO2 93%    Cardiac Rhythm: Normal Sinus  Pt needs tele? Yes  Skin/wound Issues: wound right hand, finger    Code Status: Full Code    Pain control: good    Nausea control: good    Abnormal labs/tests/findings requiring intervention: refer to results review    Family present during ED course? No   Family Comments/Social Situation comments: recently lost spouse, binge drinking since. Grieving stage, unable to cope. Cooperative and pleasant. JODIE .281 at 1430. Denies hx of seizures. Currently on 4lpm oximask to keep O2 sats > 91%, RA sats at approx. 83%, is on home O2 as baseline.    Tasks needing completion: None    Ritika Tanner RN  ascom-- [phone] West ED  [phone] East ED

## 2018-01-14 NOTE — PHARMACY-ADMISSION MEDICATION HISTORY
Admission Medication History status for the 1/14/2018 admission is complete.  See EPIC admission navigator for Prior to Admission medications.    Medication history sources:  Patient, Care Everywhere Deep, Chart Review, [facility] ([phone])    Medication history source reliability: Good. Patient recognized all medications but had some confusion on doses. Doses were confirmed with Chart Review from past discharge (12/22/2017) and Care Everywhere.     Medication adherence:  Good. Patient reports taking all medications yesterday and reports no problem taking medications daily.     Changes made to PTA medication list (reason)  Added:   -methocarbamol per patient & discharge summary from 12/22/17 and   -metoprolol per Care Everywhere.  Deleted:   -none  Changed:   -Pepto bismol 262mg chew tab: take 524mg by mouth 2 times daily as needed for diarrhea --> pepto bismol 262mg chew tab: take 262mg by mouth 2 times daily as needed for diarrhea per patient.    Additional medication history information (including reliability of information, actions taken by pharmacist):   -Patient did not report this but appears to have restarted metoprolol on [facility]/9/[facility]. Per [employer] triage nurse, patient previously on metoprolol for hypertension and stopped refilling it so metoprolol was re-prescribed.   -ramelteon and clonazepam were discontinued on 12/22/2017 per discharge summary.  -Patient could not remember if he took 75mg or 150mg of venlafaxine. Per Chart Review, patient was maintained on 75mg at discharge.   -Patient last picked up Suboxone on 1/13/18 for #18 films and [facility]  -Patient unsure of gabapentin capsule dose but could confirm he was taking 1 capsule three times daily. Per Juanita, last picked up on 12/22/2017 for 1 month supply.    Time spent in this activity: 30 minutes    Medication history completed by: Terrie Penaloza, PD3 Pharmacy Intern    Prior to  Admission medications    Medication Sig Last Dose Taking? Auth Provider   metoprolol tartrate (LOPRESSOR) 100 MG tablet Take 100 mg by mouth daily Past Week at Unknown time Yes Unknown, Entered By History   methocarbamol (ROBAXIN) 750 MG tablet Take 750 mg by mouth nightly as needed for muscle spasms 1/13/2018 at Bedtime Yes Unknown, Entered By History   aspirin 81 MG tablet Take 1 tablet (81 mg) by mouth daily 1/13/2018 at Unknown time Yes Lorelei Reddy PA-C   cyanocobalamin (VITAMIN  B-12) 1000 MCG tablet Take 1 tablet (1,000 mcg) by mouth daily 1/13/2018 at Unknown time Yes Lorelei Reddy PA-C   gabapentin (NEURONTIN) 300 MG capsule Take 1 capsule (300 mg) by mouth 3 times daily 1/13/2018 at Unknown time Yes Lorelei Reddy PA-C   lidocaine (LMX4) 4 % CREA cream Apply 1 Application topically 4 times daily as needed for mild pain (Apply liberal amount to shoulder) Past Week at Unknown time Yes Lorelei Reddy PA-C   venlafaxine (EFFEXOR-ER) 75 MG TB24 24 hr tablet Take 1 tablet (75 mg) by mouth daily 1/13/2018 at Unknown time Yes Lorelei Reddy PA-C   pantoprazole (PROTONIX) 40 MG EC tablet Take 1 tablet (40 mg) by mouth daily 1/13/2018 at Unknown time Yes Lorelei Reddy PA-C   albuterol (PROAIR HFA/PROVENTIL HFA/VENTOLIN HFA) 108 (90 BASE) MCG/ACT Inhaler Inhale 2 puffs into the lungs every 6 hours as needed for shortness of breath / dyspnea or wheezing 1/13/2018 at Unknown time Yes Lorelei Reddy PA-C   bismuth subsalicylate (PEPTO BISMOL) 262 MG chewable tablet Take 524 mg by mouth 2 times daily as needed for diarrhea 1/13/2018 at Unknown time Yes Unknown, Entered By History   buprenorphine HCl-naloxone HCl (SUBOXONE) 8-2 MG per film Place 1 Film under the tongue 2 times daily 1/13/2018 at PM Yes Unknown, Entered By History   fish oil-omega-3 fatty acids 1000 MG capsule Take 2 capsules by mouth daily. 1/12/2018 at Unknown time Yes Srinath  Alvin Pastrana MD   Multiple Vitamin (MULTIVITAMIN) per tablet Take 1 tablet by mouth daily. Past Month at Unknown time Yes Alvin Yo MD

## 2018-01-14 NOTE — LETTER
Transition Communication Hand-off for Care Transitions to Next Level of Care Provider    Name: Jorge Rosales  MRN #: [id number]  Primary Care Provider: Carlos Padron     Primary Clinic: [facility] [address] [address]     Reason for Hospitalization:  COPD exacerbation (H) [J44.1]  Acute alcoholic intoxication in alcoholism without complication (H) [F10.220]  Acute on chronic respiratory failure with hypoxia (H) [J96.21]  Admit Date/Time: 1/14/2018  2:21 PM  Expected Discharge Date: 1/19/18  Payor Source: Payor: [facility] / Plan: RYLIEARE MA / Product Type: HMO /          Reason for Communication Hand-off Referral: Post-hospital communication update    Discharge Plan: home    Any outstanding tests or procedures:        Referrals     Future Labs/Procedures    Home care nursing referral     Comments:    [facility]  Phone  [phone]  Fax  [phone]    Resumption of RN skilled nursing visits. RN to assess vital signs and weight, respiratory and cardiac status, hydration, nutrition and bowel status and home safety.  RN to teach medication management.    Home health aide to assist with bathing and basic ADLs    Home Care PT Referral for Hospital Discharge     Comments:    [employer]    Physical Therapy- Evaluate and Treat.          AVS/Discharge Summary included

## 2018-01-14 NOTE — ED NOTES
Bed: ED10  Expected date: 1/14/18  Expected time: 2:08 PM  Means of arrival:   Comments:  Hen 432  14 male  Intoxicated  SOB

## 2018-01-15 ENCOUNTER — APPOINTMENT (OUTPATIENT)
Dept: CARDIOLOGY | Facility: CLINIC | Age: 63
DRG: 189 | End: 2018-01-15
Attending: INTERNAL MEDICINE
Payer: COMMERCIAL

## 2018-01-15 LAB
ALBUMIN SERPL-MCNC: 3 G/DL (ref 3.4–5)
ALBUMIN UR-MCNC: 10 MG/DL
ALP SERPL-CCNC: 88 U/L (ref 40–150)
ALT SERPL W P-5'-P-CCNC: 30 U/L (ref 0–70)
AMPHETAMINES UR QL SCN: NEGATIVE
ANION GAP SERPL CALCULATED.3IONS-SCNC: 7 MMOL/L (ref 3–14)
APPEARANCE UR: CLEAR
AST SERPL W P-5'-P-CCNC: 29 U/L (ref 0–45)
BACTERIA #/AREA URNS HPF: ABNORMAL /HPF
BARBITURATES UR QL: NEGATIVE
BENZODIAZ UR QL: POSITIVE
BILIRUB SERPL-MCNC: 0.5 MG/DL (ref 0.2–1.3)
BILIRUB UR QL STRIP: NEGATIVE
BUN SERPL-MCNC: 11 MG/DL (ref 7–30)
CALCIUM SERPL-MCNC: 8 MG/DL (ref 8.5–10.1)
CANNABINOIDS UR QL SCN: NEGATIVE
CHLORIDE SERPL-SCNC: 103 MMOL/L (ref 94–109)
CO2 SERPL-SCNC: 32 MMOL/L (ref 20–32)
COCAINE UR QL: NEGATIVE
COLOR UR AUTO: YELLOW
CREAT SERPL-MCNC: 0.46 MG/DL (ref 0.66–1.25)
ERYTHROCYTE [DISTWIDTH] IN BLOOD BY AUTOMATED COUNT: 15.4 % (ref 10–15)
ETHANOL UR QL SCN: POSITIVE
GFR SERPL CREATININE-BSD FRML MDRD: >90 ML/MIN/1.7M2
GLUCOSE SERPL-MCNC: 119 MG/DL (ref 70–99)
GLUCOSE UR STRIP-MCNC: NEGATIVE MG/DL
HCT VFR BLD AUTO: 41.9 % (ref 40–53)
HGB BLD-MCNC: 14.1 G/DL (ref 13.3–17.7)
HGB UR QL STRIP: NEGATIVE
INTERPRETATION ECG - MUSE: NORMAL
KETONES UR STRIP-MCNC: NEGATIVE MG/DL
LACTATE BLD-SCNC: 1.5 MMOL/L (ref 0.7–2)
LEUKOCYTE ESTERASE UR QL STRIP: NEGATIVE
MAGNESIUM SERPL-MCNC: 1.7 MG/DL (ref 1.6–2.3)
MCH RBC QN AUTO: 31.1 PG (ref 26.5–33)
MCHC RBC AUTO-ENTMCNC: 33.7 G/DL (ref 31.5–36.5)
MCV RBC AUTO: 93 FL (ref 78–100)
MUCOUS THREADS #/AREA URNS LPF: PRESENT /LPF
NITRATE UR QL: NEGATIVE
OPIATES UR QL SCN: NEGATIVE
PCP UR QL SCN: NEGATIVE
PH UR STRIP: 5.5 PH (ref 5–7)
PLATELET # BLD AUTO: 128 10E9/L (ref 150–450)
POTASSIUM SERPL-SCNC: 3.8 MMOL/L (ref 3.4–5.3)
PROT SERPL-MCNC: 6.8 G/DL (ref 6.8–8.8)
RBC # BLD AUTO: 4.53 10E12/L (ref 4.4–5.9)
RBC #/AREA URNS AUTO: 1 /HPF (ref 0–2)
SODIUM SERPL-SCNC: 142 MMOL/L (ref 133–144)
SOURCE: ABNORMAL
SP GR UR STRIP: 1.01 (ref 1–1.03)
TROPONIN I SERPL-MCNC: <0.015 UG/L (ref 0–0.04)
UROBILINOGEN UR STRIP-MCNC: NORMAL MG/DL (ref 0–2)
WBC # BLD AUTO: 8.3 10E9/L (ref 4–11)
WBC #/AREA URNS AUTO: 1 /HPF (ref 0–2)

## 2018-01-15 PROCEDURE — 80053 COMPREHEN METABOLIC PANEL: CPT | Performed by: INTERNAL MEDICINE

## 2018-01-15 PROCEDURE — 93325 DOPPLER ECHO COLOR FLOW MAPG: CPT | Mod: 26 | Performed by: INTERNAL MEDICINE

## 2018-01-15 PROCEDURE — 25000128 H RX IP 250 OP 636: Performed by: INTERNAL MEDICINE

## 2018-01-15 PROCEDURE — 84484 ASSAY OF TROPONIN QUANT: CPT | Performed by: INTERNAL MEDICINE

## 2018-01-15 PROCEDURE — 25000125 ZZHC RX 250: Performed by: INTERNAL MEDICINE

## 2018-01-15 PROCEDURE — 87633 RESP VIRUS 12-25 TARGETS: CPT | Performed by: INTERNAL MEDICINE

## 2018-01-15 PROCEDURE — 25800025 ZZH RX 258: Performed by: INTERNAL MEDICINE

## 2018-01-15 PROCEDURE — 12000001 ZZH R&B MED SURG/OB UMMC

## 2018-01-15 PROCEDURE — 81001 URINALYSIS AUTO W/SCOPE: CPT | Performed by: INTERNAL MEDICINE

## 2018-01-15 PROCEDURE — 99233 SBSQ HOSP IP/OBS HIGH 50: CPT | Performed by: INTERNAL MEDICINE

## 2018-01-15 PROCEDURE — 80307 DRUG TEST PRSMV CHEM ANLYZR: CPT | Performed by: INTERNAL MEDICINE

## 2018-01-15 PROCEDURE — 93005 ELECTROCARDIOGRAM TRACING: CPT

## 2018-01-15 PROCEDURE — 83605 ASSAY OF LACTIC ACID: CPT | Performed by: INTERNAL MEDICINE

## 2018-01-15 PROCEDURE — 94640 AIRWAY INHALATION TREATMENT: CPT

## 2018-01-15 PROCEDURE — 93308 TTE F-UP OR LMTD: CPT | Mod: 26 | Performed by: INTERNAL MEDICINE

## 2018-01-15 PROCEDURE — 25000132 ZZH RX MED GY IP 250 OP 250 PS 637: Performed by: INTERNAL MEDICINE

## 2018-01-15 PROCEDURE — 25500064 ZZH RX 255 OP 636: Performed by: INTERNAL MEDICINE

## 2018-01-15 PROCEDURE — 40000275 ZZH STATISTIC RCP TIME EA 10 MIN

## 2018-01-15 PROCEDURE — 99222 1ST HOSP IP/OBS MODERATE 55: CPT | Performed by: PSYCHIATRY & NEUROLOGY

## 2018-01-15 PROCEDURE — 93321 DOPPLER ECHO F-UP/LMTD STD: CPT | Mod: 26 | Performed by: INTERNAL MEDICINE

## 2018-01-15 PROCEDURE — 85027 COMPLETE CBC AUTOMATED: CPT | Performed by: INTERNAL MEDICINE

## 2018-01-15 PROCEDURE — 83735 ASSAY OF MAGNESIUM: CPT | Performed by: INTERNAL MEDICINE

## 2018-01-15 PROCEDURE — 80320 DRUG SCREEN QUANTALCOHOLS: CPT | Performed by: INTERNAL MEDICINE

## 2018-01-15 PROCEDURE — 93325 DOPPLER ECHO COLOR FLOW MAPG: CPT

## 2018-01-15 PROCEDURE — 36415 COLL VENOUS BLD VENIPUNCTURE: CPT | Performed by: INTERNAL MEDICINE

## 2018-01-15 PROCEDURE — 94640 AIRWAY INHALATION TREATMENT: CPT | Mod: 76

## 2018-01-15 RX ORDER — NICOTINE 21 MG/24HR
1 PATCH, TRANSDERMAL 24 HOURS TRANSDERMAL DAILY
Status: DISCONTINUED | OUTPATIENT
Start: 2018-01-15 | End: 2018-01-19 | Stop reason: HOSPADM

## 2018-01-15 RX ORDER — POTASSIUM CHLORIDE 7.45 MG/ML
10 INJECTION INTRAVENOUS
Status: DISCONTINUED | OUTPATIENT
Start: 2018-01-15 | End: 2018-01-19 | Stop reason: HOSPADM

## 2018-01-15 RX ORDER — DILTIAZEM HYDROCHLORIDE 5 MG/ML
20 INJECTION INTRAVENOUS ONCE
Status: DISCONTINUED | OUTPATIENT
Start: 2018-01-15 | End: 2018-01-18

## 2018-01-15 RX ORDER — POTASSIUM CHLORIDE 750 MG/1
20-40 TABLET, EXTENDED RELEASE ORAL
Status: DISCONTINUED | OUTPATIENT
Start: 2018-01-15 | End: 2018-01-19 | Stop reason: HOSPADM

## 2018-01-15 RX ORDER — POTASSIUM CHLORIDE 29.8 MG/ML
20 INJECTION INTRAVENOUS
Status: DISCONTINUED | OUTPATIENT
Start: 2018-01-15 | End: 2018-01-19 | Stop reason: HOSPADM

## 2018-01-15 RX ORDER — POTASSIUM CHLORIDE 1.5 G/1.58G
20-40 POWDER, FOR SOLUTION ORAL
Status: DISCONTINUED | OUTPATIENT
Start: 2018-01-15 | End: 2018-01-19 | Stop reason: HOSPADM

## 2018-01-15 RX ORDER — MAGNESIUM SULFATE HEPTAHYDRATE 40 MG/ML
4 INJECTION, SOLUTION INTRAVENOUS EVERY 4 HOURS PRN
Status: DISCONTINUED | OUTPATIENT
Start: 2018-01-15 | End: 2018-01-19 | Stop reason: HOSPADM

## 2018-01-15 RX ORDER — METOPROLOL TARTRATE 25 MG/1
25 TABLET, FILM COATED ORAL 2 TIMES DAILY
Status: DISCONTINUED | OUTPATIENT
Start: 2018-01-15 | End: 2018-01-19 | Stop reason: HOSPADM

## 2018-01-15 RX ORDER — METOPROLOL TARTRATE 1 MG/ML
5 INJECTION, SOLUTION INTRAVENOUS EVERY 6 HOURS PRN
Status: DISCONTINUED | OUTPATIENT
Start: 2018-01-15 | End: 2018-01-19 | Stop reason: HOSPADM

## 2018-01-15 RX ORDER — POTASSIUM CL/LIDO/0.9 % NACL 10MEQ/0.1L
10 INTRAVENOUS SOLUTION, PIGGYBACK (ML) INTRAVENOUS
Status: DISCONTINUED | OUTPATIENT
Start: 2018-01-15 | End: 2018-01-19 | Stop reason: HOSPADM

## 2018-01-15 RX ADMIN — MULTIPLE VITAMINS W/ MINERALS TAB 1 TABLET: TAB at 07:54

## 2018-01-15 RX ADMIN — DIAZEPAM 10 MG: 5 TABLET ORAL at 10:37

## 2018-01-15 RX ADMIN — BUPRENORPHINE HYDROCHLORIDE, NALOXONE HYDROCHLORIDE 1 FILM: 8; 2 FILM, SOLUBLE BUCCAL; SUBLINGUAL at 08:04

## 2018-01-15 RX ADMIN — Medication 2 G: at 07:50

## 2018-01-15 RX ADMIN — DIAZEPAM 10 MG: 5 TABLET ORAL at 00:42

## 2018-01-15 RX ADMIN — DIAZEPAM 20 MG: 5 TABLET ORAL at 03:51

## 2018-01-15 RX ADMIN — METOPROLOL TARTRATE 25 MG: 25 TABLET, FILM COATED ORAL at 05:08

## 2018-01-15 RX ADMIN — PANTOPRAZOLE SODIUM 40 MG: 40 TABLET, DELAYED RELEASE ORAL at 07:54

## 2018-01-15 RX ADMIN — ACETAMINOPHEN 325 MG: 325 TABLET, FILM COATED ORAL at 01:49

## 2018-01-15 RX ADMIN — FOLIC ACID 1 MG: 1 TABLET ORAL at 07:53

## 2018-01-15 RX ADMIN — DIAZEPAM 10 MG: 5 TABLET ORAL at 11:44

## 2018-01-15 RX ADMIN — NICOTINE 1 PATCH: 21 PATCH, EXTENDED RELEASE TRANSDERMAL at 07:54

## 2018-01-15 RX ADMIN — DIAZEPAM 10 MG: 5 TABLET ORAL at 08:03

## 2018-01-15 RX ADMIN — POTASSIUM CHLORIDE, DEXTROSE MONOHYDRATE AND SODIUM CHLORIDE: 150; 5; 450 INJECTION, SOLUTION INTRAVENOUS at 09:56

## 2018-01-15 RX ADMIN — DIAZEPAM 10 MG: 5 TABLET ORAL at 15:54

## 2018-01-15 RX ADMIN — LEVALBUTEROL HYDROCHLORIDE 0.63 MG: 0.63 SOLUTION RESPIRATORY (INHALATION) at 20:05

## 2018-01-15 RX ADMIN — CYANOCOBALAMIN TAB 1000 MCG 1000 MCG: 1000 TAB at 07:54

## 2018-01-15 RX ADMIN — ASPIRIN 81 MG: 81 TABLET, COATED ORAL at 07:53

## 2018-01-15 RX ADMIN — GABAPENTIN 300 MG: 300 CAPSULE ORAL at 20:22

## 2018-01-15 RX ADMIN — DIAZEPAM 10 MG: 5 TABLET ORAL at 12:54

## 2018-01-15 RX ADMIN — DIAZEPAM 10 MG: 5 TABLET ORAL at 01:52

## 2018-01-15 RX ADMIN — POTASSIUM CHLORIDE 20 MEQ: 750 TABLET, FILM COATED, EXTENDED RELEASE ORAL at 09:06

## 2018-01-15 RX ADMIN — NICOTINE 1 PATCH: 21 PATCH, EXTENDED RELEASE TRANSDERMAL at 02:52

## 2018-01-15 RX ADMIN — DIAZEPAM 10 MG: 5 TABLET ORAL at 14:55

## 2018-01-15 RX ADMIN — DIAZEPAM 20 MG: 5 TABLET ORAL at 05:25

## 2018-01-15 RX ADMIN — POTASSIUM CHLORIDE, DEXTROSE MONOHYDRATE AND SODIUM CHLORIDE: 150; 5; 450 INJECTION, SOLUTION INTRAVENOUS at 20:20

## 2018-01-15 RX ADMIN — METOPROLOL TARTRATE 25 MG: 25 TABLET, FILM COATED ORAL at 20:22

## 2018-01-15 RX ADMIN — METOPROLOL TARTRATE 5 MG: 1 INJECTION, SOLUTION INTRAVENOUS at 04:19

## 2018-01-15 RX ADMIN — ACETAMINOPHEN 325 MG: 325 TABLET, FILM COATED ORAL at 12:54

## 2018-01-15 RX ADMIN — HUMAN ALBUMIN MICROSPHERES AND PERFLUTREN 3 ML: 10; .22 INJECTION, SOLUTION INTRAVENOUS at 10:00

## 2018-01-15 RX ADMIN — DIAZEPAM 10 MG: 5 TABLET ORAL at 23:02

## 2018-01-15 RX ADMIN — BUPRENORPHINE HYDROCHLORIDE, NALOXONE HYDROCHLORIDE 1 FILM: 8; 2 FILM, SOLUBLE BUCCAL; SUBLINGUAL at 20:21

## 2018-01-15 RX ADMIN — ENOXAPARIN SODIUM 40 MG: 40 INJECTION SUBCUTANEOUS at 20:22

## 2018-01-15 RX ADMIN — GABAPENTIN 300 MG: 300 CAPSULE ORAL at 07:54

## 2018-01-15 RX ADMIN — LEVALBUTEROL HYDROCHLORIDE 0.63 MG: 0.63 SOLUTION RESPIRATORY (INHALATION) at 13:29

## 2018-01-15 RX ADMIN — VENLAFAXINE HYDROCHLORIDE 75 MG: 75 TABLET, EXTENDED RELEASE ORAL at 07:54

## 2018-01-15 RX ADMIN — GABAPENTIN 300 MG: 300 CAPSULE ORAL at 13:53

## 2018-01-15 RX ADMIN — DIAZEPAM 20 MG: 5 TABLET ORAL at 09:09

## 2018-01-15 RX ADMIN — LEVALBUTEROL HYDROCHLORIDE 0.63 MG: 0.63 SOLUTION RESPIRATORY (INHALATION) at 08:49

## 2018-01-15 RX ADMIN — DIAZEPAM 10 MG: 5 TABLET ORAL at 13:53

## 2018-01-15 RX ADMIN — Medication 100 MG: at 07:54

## 2018-01-15 RX ADMIN — DIAZEPAM 5 MG: 5 TABLET ORAL at 20:22

## 2018-01-15 ASSESSMENT — ACTIVITIES OF DAILY LIVING (ADL)
ADLS_ACUITY_SCORE: 21

## 2018-01-15 NOTE — PLAN OF CARE
Problem: Alcohol Withdrawal Acute, Risk/Actual (Adult)  Goal: Signs and Symptoms of Listed Potential Problems Will be Absent, Minimized or Managed (Alcohol Withdrawal Acute, Risk/Actual)  Signs and symptoms of listed potential problems will be absent, minimized or managed by discharge/transition of care (reference Alcohol Withdrawal Acute, Risk/Actual (Adult) CPG).   Outcome: Declining  Pt is alert and oriented X4. Lung sounds are diminished, O2 sat's maintained with 4LPM nasal cannula. Pt is actively withdrawing- tremulous and heavy perspiration; MSSA scores range from 15-22 overnight- valium administered per protocol (see eMar). Around 3:30am pt became tachycardic with HR in the 140-160 range. Notified moonlighter via telephone. EKG ordered- rhythm is rapid Afib with premature or aberrantly conducted complexes. IV and oral metoprolol administered per orders. Pt continues to be tachycardic. MD notified. Awaiting further direction at this time, will pass on to oncoming nurse. Pt is voiding spontaneously without difficulty using a urinal at bedside. Passing flatus- last BM 1/14. Pt is independently positioning in bed. UA/UC + drug screen obtained. Able to make needs known. Will continue to monitor.

## 2018-01-15 NOTE — PLAN OF CARE
Problem: Patient Care Overview  Goal: Plan of Care/Patient Progress Review  Outcome: No Change        VS:   VSS. O2 low-mid 90s on 4L via NC. LS diminished, infrequently coughing.    Output:   Voiding spontaneously without difficulty. Urine sample needs to be collected, collection container in bathroom for next time pt is up to bathroom. LBM today 1/14.    Activity:   Up SBA to bathroom. Pt verbalized understanding of using call light before getting up.   Skin: Intact.   Pain:   Endorses headache - gave PRN tylenol.   Neuro/CMS:   At baseline. Pt has L leg numbness and tingling since back surgery. All other CMS/neuros intact. Pt is A/O x4 and anxious.    Dressing(s):   N/A   Diet:   Regular - pt just taking sips of water at this time.   LDA:   PIV in L forearm infusing D5NS w/KCl @ 100ml/hr.    Equipment:   Nasal cannula on, continuous pulse ox.    Plan:   Continue to monitor. Psych consult pending.   Additional Info:   MSSA score 13 and 15 - valium given.   UA/UC and drug screen urine sample needed.  Pepto bismol given for upset stomach with relief.   Seizure pads placed on bed.  Call light in reach, able to make needs known.

## 2018-01-15 NOTE — PROGRESS NOTES
Addendum:     Patient with a fib with RVR. EKG done, reviewed.   Metoprolol 25 x 1. Metop 5 mg iv x1 . No response.   Try diltiazem 20 mg iv x1.  If not controlled, start diltiazem gtt 5 mg /hr.   Check Trop. Echo.   Defer AC decision to day team.   H/o paroxysmal afib in setting of alcohol withdrawal.   TF to [facility]  Ct tele.   K, Mg protocol for 4.1, 2.0.     Neftali Mcknight MD  House Physician  Pager: [phone]    1/15/2018

## 2018-01-15 NOTE — PROGRESS NOTES
Progress Note        Jorge Rosales [[id number]] (M) - 62 year old          Assessment and Plan:   Jorge Rosales is a 62 year old male with past medical history significant for alcohol dependency, smoker, COPD, chronic pain syndrome, opioid use disorder now no suboxone maintenance, depression, PTSD, L leg weakness s/p Lumbar spine surgery admitted for alcohol intoxication, early withdrawal and acute on chronic hypoxic respi failure 2/2 mild COPD exacerbation.        #Alcohol dependency, intoxication and withdrawal: ABT 0.281 on admission 1/14/2018. Reportedly hx significant for withdrawal seizures and DTs. Last drink morning of admission. Relapsed for ~ a week ago.   - Keep on MSSA Alcohol withdrawal protocol with Diazepam.  MSSA score of  22- will keep close watch - keep on IMC status     His scheduled metoprolol and episodes of A.fib may affect the scoring   - MVI, thiamine, folic acid daily.   - Seizure and fall precautions.  - Sw and Psychiatry consult.     - Encourage alcohol abstinence, CD treatment preferably inpatient given recurrent admissions, failed outpatient trial.     # Afib with RVR: brief episode on the morning of 1/15/18, no prior history      Converted back to NSR shortly after 5 mg Metoprolol and oral 25 mg scheduled dose was given early       ECHO ordered       Continue to monitor      Will not start on any anticoagulation now in view of active withdrawal and risk for seizures      # Hypoxic respiratory failure, suspect acute on Chronic. ? Mild COPD exacerbation. No PFTs on file. Chest CT from 11/2017 w/ emphysema, mucoid impactions. CXR 1/14/2018: no acute infiltrate. Pro bnp: wnl.   Last time dc on home oxygen, claims never delivered.   Tobacco abuse - current smoker.   - r/o influenza, check respi virus panel of NP swap. Droplet isolation, pending results (low suspicion)  - Levalbuterol inhalers prn. Scheduled Nebs Q 6 hr while awake (h/o afib w rvr on albuterol in the past)  -  supplemental oxygen to keep sat >90%  - hold on steroid for now.   - Encourage IS, activity as able.   - Encourage Smoking cessation.   - Outpatient Pulmonary FU. PFT.   - Likely need home oxygen, reassess prior to dc.   - Pulse Ox. Tele.          # Hx of Opioid abuse   #Hx of chronic pain syndrome  Previously on maintenance Suboxone for ~ 3 yrs, [facility], last script 11/17 for 13 SL films.  Seen by psychiatry last admission, restarted on Suboxone, claims followed @ Suboxone clinic and says taking 8mg bid for now.   - Will continue PTA Suboxone for now. Need to verify in am.   - Psychiatry consult requested.   - check Urine tox screen.   - PTA gabapentin.          # Depression, PTSD w/ hx of panic attacks: Concern for benzo abuse as OP. Utox positive for benzos last adm. Treated with Phenobarb taper.   - Continue PTA venlafaxine.  - on Diazepam per MSSA  - FU Psychiatry recs.     # ?L Leg weakness (chronic), L LE DVT s/p Spinal Surgery ~ 2 yrs ago. No s/s of dvt at current.   - keep on Lovenox SQ dvt ppx  - PT.OT consult as needed  - fall precautions.       #Tobacco dependence: Nicotine patch panel.      # GERD, PUD:  PTA Protonix.      CODE: full.       Disposition Plan   Expected discharge in 2-3 days to detox unit  once medically stable .     Entered: Dejan Hue 01/15/2018, 7:49 AM         Subjective:   Appears anxious , tremulous   Had an episode of A.fib with RVR but converted back to NSR with 5 mg of metoprolol  No fever or chills   Denies nausea or vomiting     Ros:  4-point ROS negative except in subjective/interval history.     Consults:     Objective         Physical Exam:  /62  Pulse 95  Temp 98.2  F (36.8  C) (Oral)  Resp 20  SpO2 94%  General: No distress, cooperative, pleasant  HEENT: NC/AT.  PERRL. EOMI: MM moist  Neck: Supple. No JVD or cervical lymphadenopathy.   Pulmonary: CTAB, normal respiratory effort. No wheezes, rales or rhonchi   Cardiovascular:  RRR. S1 and S2 preset. No m/g/r.converted back to NSR from A.fib   Abdomen:BS+, soft, non-tender, non-distended. No guarding or rebound tenderness.   Extremities: Warm and well perfused. No lower extremity edema.  Neuro: Alert and oriented x 3. Moves all extremities symmetrically.      LABS (Last four results)  CMP  Recent Labs  Lab 01/15/18  0520 01/14/18  1529    147*   POTASSIUM 3.8 3.9   CHLORIDE 103 109   CO2 32 32   ANIONGAP 7 6   * 97   BUN 11 12   CR 0.46* 0.52*   GFRESTIMATED >90 >90   GFRESTBLACK >90 >90   RUSTY 8.0* 7.9*   MAG 1.7  --    PROTTOTAL 6.8 6.8   ALBUMIN 3.0* 3.1*   BILITOTAL 0.5 0.3   ALKPHOS 88 99   AST 29 32   ALT 30 31     CBC  Recent Labs  Lab 01/15/18  0520 01/14/18  1529   WBC 8.3 7.7   RBC 4.53 4.55   HGB 14.1 14.0   HCT 41.9 42.3   MCV 93 93   MCH 31.1 30.8   MCHC 33.7 33.1   RDW 15.4* 15.7*   * 146*            Dejan Swann  Hospitalist   [facility]     1/15/2018

## 2018-01-15 NOTE — PROGRESS NOTES
Pt. admitted from [facility] ED at 1935 via transport and transferred to bed 1030-2. Pt.  arrived with personal belongings. Report was taken from Ritika in ED.  Pt. is A&Ox3 and VSS on 4L. CMS intact at baseline. Pt has numbness in L leg.  Pt. denied  nausea, CP, SOB, lightheadedness, or dizziness. LS clear and BS active. PIV patent. Pt. oriented to the room and call light system.  Pt. educated on use and purpose of IS.

## 2018-01-15 NOTE — CONSULTS
"[facility]  Psychiatry consult note      Patient name: Jorge Rosales   MRN: [id number]    Age: 62 year old    YOB: 1955    Identifying information:   The patient is a 62 year old  male who resides independently.    Reason for consultation: Evaluate mood symptoms and recommend a treatment plan to address chemical addiction    Chief complaint:  \"I think this time I've scared myself enough that I am going to stay away from alcohol.\"    History of present illness: The patient has a history of major depressive disorder and alcohol dependence who presented to the emergency room due to shortness of breath in the setting of alcohol intoxication.  His initial alcohol breath test was 0.281.  He was initially concern for low oxygen saturation and a possible COPD exacerbation prompting his admission to the medical unit.  Noting a history of depression and a readmission to the hospital after not following through with his outpatient chemical addiction treatment plan, psychiatric consultation was requested.  The patient tells me that he relapsed on alcohol approximately 2 weeks ago.  He has been drinking approximately 1 quart of liquor daily for at least the past week.  After he was discharged from the hospital last month, he was able to maintain a couple weeks of sobriety however encountered significant psychosocial stressors of which he reports that the passing of his ex-wife has been the most significant.  He revisited a recent phone call he had received from his daughter whom he states is paraplegic.  She was tearful as she mentioned that her mother recently passed away.  This was unexpected news for the patient who was also saddened by the events.  Feeling alone without social supports, he resorted to alcohol to alleviate the intensity of his emotions.  His alcohol usage quickly progressed to a pattern of daily use eventually leading to dependence and " withdrawal.  He recalls that approximately 2 weeks ago, [facility] and was started on Effexor which she has been taking regularly.  He has noticed slight improvement in his mood and is hopeful for continued improvements.  He has also been seeing a therapist [facility] however would like to see that person more often.  He had no other specific mental health related concerns.  He tells me that he has found new motivation to abstain from alcohol noting his current medical condition, specifically recent atrial fibrillation.  He also reflected on needing to be physically and mentally well so that he may provide emotional support for his daughter who is going through a difficult time with the passing of her mother.    Psychiatric Review of Systems:    -- Depressive episode: Reports depressed mood, characterizes mild, accompanied with low energy.  He denied anhedonia.  He denied suicidal and homicidal thoughts.  He reports that his appetite is fair.  Concentration is normal.  -- Haleigh:  denies symptoms  -- Psychosis:  denies symptoms  -- Anxiety: denies symptoms corresponding to CARYL or panic disorder  -- PTSD: denies symptoms  -- OCD: denies  symptoms  -- Eating disorder: denies symptoms    Psychiatric History:    No prior inpatient hospitalizations.  No prior suicide attempts.  No history of self-injurious behavior.  He reports a history of major depressive disorder over the past many years with intermittent antidepressant trials, typically with favorable results.  He recently [facility] to [facility] who is [facility] him Effexor.  He is also seeing a therapist as well.    Substance Use History:    He recalls a long history of alcohol usage dependence with prior admissions to treatment noted.  No history of withdrawal seizures or DTs reported.  He tells me he has been able to maintain years of sobriety.  He denied illicit  substance usage.  He is also prescribed Suboxone for maintenance treatment of opiate addiction.  He had reportedly sustained a fall sometime ago resulting in chronic pain for which she had received prescriptions of opiates eventually giving rise to dependency.  Dr. Padron maintains his Suboxone prescription.    Medical History: Recent atrial fibrillation, COPD      No current facility-administered medications on file prior to encounter.   Current Outpatient Prescriptions on File Prior to Encounter:  aspirin 81 MG tablet Take 1 tablet (81 mg) by mouth daily   cyanocobalamin (VITAMIN  B-12) 1000 MCG tablet Take 1 tablet (1,000 mcg) by mouth daily   gabapentin (NEURONTIN) 300 MG capsule Take 1 capsule (300 mg) by mouth 3 times daily   lidocaine (LMX4) 4 % CREA cream Apply 1 Application topically 4 times daily as needed for mild pain (Apply liberal amount to shoulder)   venlafaxine (EFFEXOR-ER) 75 MG TB24 24 hr tablet Take 1 tablet (75 mg) by mouth daily   pantoprazole (PROTONIX) 40 MG EC tablet Take 1 tablet (40 mg) by mouth daily   albuterol (PROAIR HFA/PROVENTIL HFA/VENTOLIN HFA) 108 (90 BASE) MCG/ACT Inhaler Inhale 2 puffs into the lungs every 6 hours as needed for shortness of breath / dyspnea or wheezing   bismuth subsalicylate (PEPTO BISMOL) 262 MG chewable tablet Take 524 mg by mouth 2 times daily as needed for diarrhea   buprenorphine HCl-naloxone HCl (SUBOXONE) 8-2 MG per film Place 1 Film under the tongue 2 times daily   fish oil-omega-3 fatty acids 1000 MG capsule Take 2 capsules by mouth daily.   Multiple Vitamin (MULTIVITAMIN) per tablet Take 1 tablet by mouth daily.        Social History:  Refer to the psychosocial assessment completed by our .  He tells me that he resides independently.  He previously worked as an  until he sustained a fall and has not been able to return to work since then.  No legal issues reported.       Family History:    He denied family history of bipolar  "disorder or suicides.    Medical review of systems: 10 systems were reviewed and positive for psychiatric symptoms as noted above otherwise negative    Vital signs:    B/P: 144/75, T: 97.5, P: 88, R: 16  Estimated body mass index is 29.81 kg/(m^2) as calculated from the following:    Height as of 12/3/17: 1.803 m (5' 11\").    Weight as of this encounter: 96.9 kg (213 lb 11.2 oz).       Mental status examination:  Appearance:  Alert, fair hygiene, no acute distress  Attitude:  Attempts to be cooperative  Eye Contact: Fair  Mood:  \"sad\"  Affect: Mood congruent and blunted  Speech:  Normal rates, tone, latency, volume. Not pushed or pressured.  Psychomotor Behavior:  No psychomotor abnormalities noted  Thought Process: Linear and logical; not tangential or circumstantial or disorganized  Associations:  Logical; no loose associations Noted  Thought Content:  No obvious paranoia, delusions, ideas of reference, or grandiosity noted. Denies auditory or visual hallucinations. Denies suicidal Ideations. Denies homicidal ideations.  Insight:  Fair  Judgment:  Fair  Oriented to:  time, person, and place  Attention Span and Concentration:  Intact  Recent and Remote Memory: Intact based on interviewing and details provided  Language: Appropriate based on interviewing  Fund of Knowledge: Appropriate based on interviewing  Muscle Strength and Tone: Normal upon visual inspection  Gait and Station: Normal upon visual inspection            Diagnoses:    Major depressive disorder-recurrent, mild  Bereavement, uncomplicated  Alcohol use disorder, severe  Opiate use disorder, on Suboxone maintenance treatment    Recommendations:  1. Continue MSSA with valium for management of alcohol withdrawal.    2. Continue suboxone for maintenance treatment of opiate addiction.     3. In 2-3 weeks, he may consider increasing effexor to 150mg to better address mood symptoms with guidance of his outpatient prescriber if residual mood symptoms persist. " Tolerating the current dose well with partial response noted so far.     4. The patient is declining residential CD treatment to address ongoing alcohol usage. Motivational interviewing was utilized. He reports mobility is a barrier to comfortably committing to an intensive outpatient program. He is willing to engage in indiv therapy and pursue a referral to an individual substance abuse counselor. He is aware of community supports to help promote sobriety.  He has maintained years of sobriety in the past and tells me that this recent episode of afib has prompted him to take his sobriety more seriously.  A petition for commitment was considered however given minimal evidence of immediate dangerousness and the patient's willingness to cooperate with outpatient programming, I feel that a petition would not be indicated at this time.    Reconsult psychiatry as needed.  I can be contacted at [phone] with additional questions or concerns.
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
## 2018-01-15 NOTE — ED NOTES
"Pt stated that he needed help and was \"withdrawing\", when writer walked in door, pt was violently shaking. Writer asked pt if tremers were carolyn, as they were not apparent minutes before. Pt stopped violently shaking and responded to writer, \"they are real!\" After pasuing for 15 seconds, pt began to violently shake again and stated that he \"had surgery\" on his left arm.   "

## 2018-01-16 LAB
ALBUMIN SERPL-MCNC: 2.7 G/DL (ref 3.4–5)
ALP SERPL-CCNC: 80 U/L (ref 40–150)
ALT SERPL W P-5'-P-CCNC: 25 U/L (ref 0–70)
ANION GAP SERPL CALCULATED.3IONS-SCNC: 3 MMOL/L (ref 3–14)
AST SERPL W P-5'-P-CCNC: 24 U/L (ref 0–45)
BILIRUB SERPL-MCNC: 0.7 MG/DL (ref 0.2–1.3)
BUN SERPL-MCNC: 10 MG/DL (ref 7–30)
CALCIUM SERPL-MCNC: 7.8 MG/DL (ref 8.5–10.1)
CHLORIDE SERPL-SCNC: 105 MMOL/L (ref 94–109)
CO2 SERPL-SCNC: 33 MMOL/L (ref 20–32)
CREAT SERPL-MCNC: 0.53 MG/DL (ref 0.66–1.25)
ERYTHROCYTE [DISTWIDTH] IN BLOOD BY AUTOMATED COUNT: 15.3 % (ref 10–15)
FLUAV H1 2009 PAND RNA SPEC QL NAA+PROBE: NEGATIVE
FLUAV H1 RNA SPEC QL NAA+PROBE: NEGATIVE
FLUAV H3 RNA SPEC QL NAA+PROBE: NEGATIVE
FLUAV RNA SPEC QL NAA+PROBE: NEGATIVE
FLUBV RNA SPEC QL NAA+PROBE: NEGATIVE
GFR SERPL CREATININE-BSD FRML MDRD: >90 ML/MIN/1.7M2
GLUCOSE SERPL-MCNC: 109 MG/DL (ref 70–99)
HADV DNA SPEC QL NAA+PROBE: NEGATIVE
HADV DNA SPEC QL NAA+PROBE: NEGATIVE
HCT VFR BLD AUTO: 41.6 % (ref 40–53)
HGB BLD-MCNC: 13.1 G/DL (ref 13.3–17.7)
HMPV RNA SPEC QL NAA+PROBE: NEGATIVE
HPIV1 RNA SPEC QL NAA+PROBE: NEGATIVE
HPIV2 RNA SPEC QL NAA+PROBE: NEGATIVE
HPIV3 RNA SPEC QL NAA+PROBE: NEGATIVE
MAGNESIUM SERPL-MCNC: 2.2 MG/DL (ref 1.6–2.3)
MCH RBC QN AUTO: 29.7 PG (ref 26.5–33)
MCHC RBC AUTO-ENTMCNC: 31.5 G/DL (ref 31.5–36.5)
MCV RBC AUTO: 94 FL (ref 78–100)
MICROBIOLOGIST REVIEW: NORMAL
PLATELET # BLD AUTO: 99 10E9/L (ref 150–450)
POTASSIUM SERPL-SCNC: 4.5 MMOL/L (ref 3.4–5.3)
PROT SERPL-MCNC: 6.2 G/DL (ref 6.8–8.8)
RBC # BLD AUTO: 4.41 10E12/L (ref 4.4–5.9)
RHINOVIRUS RNA SPEC QL NAA+PROBE: NEGATIVE
RSV RNA SPEC QL NAA+PROBE: NEGATIVE
RSV RNA SPEC QL NAA+PROBE: NEGATIVE
SODIUM SERPL-SCNC: 141 MMOL/L (ref 133–144)
SPECIMEN SOURCE: NORMAL
WBC # BLD AUTO: 7.2 10E9/L (ref 4–11)

## 2018-01-16 PROCEDURE — 94640 AIRWAY INHALATION TREATMENT: CPT

## 2018-01-16 PROCEDURE — 99232 SBSQ HOSP IP/OBS MODERATE 35: CPT | Performed by: INTERNAL MEDICINE

## 2018-01-16 PROCEDURE — 25000125 ZZHC RX 250: Performed by: INTERNAL MEDICINE

## 2018-01-16 PROCEDURE — 25000132 ZZH RX MED GY IP 250 OP 250 PS 637: Performed by: INTERNAL MEDICINE

## 2018-01-16 PROCEDURE — 40000275 ZZH STATISTIC RCP TIME EA 10 MIN

## 2018-01-16 PROCEDURE — 25000128 H RX IP 250 OP 636: Performed by: INTERNAL MEDICINE

## 2018-01-16 PROCEDURE — 12000001 ZZH R&B MED SURG/OB UMMC

## 2018-01-16 PROCEDURE — 36415 COLL VENOUS BLD VENIPUNCTURE: CPT | Performed by: INTERNAL MEDICINE

## 2018-01-16 PROCEDURE — 80053 COMPREHEN METABOLIC PANEL: CPT | Performed by: INTERNAL MEDICINE

## 2018-01-16 PROCEDURE — 94640 AIRWAY INHALATION TREATMENT: CPT | Mod: 76

## 2018-01-16 PROCEDURE — 85027 COMPLETE CBC AUTOMATED: CPT | Performed by: INTERNAL MEDICINE

## 2018-01-16 PROCEDURE — 83735 ASSAY OF MAGNESIUM: CPT | Performed by: INTERNAL MEDICINE

## 2018-01-16 RX ADMIN — METOPROLOL TARTRATE 25 MG: 25 TABLET, FILM COATED ORAL at 19:56

## 2018-01-16 RX ADMIN — DIAZEPAM 5 MG: 5 TABLET ORAL at 06:29

## 2018-01-16 RX ADMIN — ACETAMINOPHEN 325 MG: 325 TABLET, FILM COATED ORAL at 16:19

## 2018-01-16 RX ADMIN — ASPIRIN 81 MG: 81 TABLET, COATED ORAL at 07:52

## 2018-01-16 RX ADMIN — ENOXAPARIN SODIUM 40 MG: 40 INJECTION SUBCUTANEOUS at 19:56

## 2018-01-16 RX ADMIN — CYANOCOBALAMIN TAB 1000 MCG 1000 MCG: 1000 TAB at 07:52

## 2018-01-16 RX ADMIN — BUPRENORPHINE HYDROCHLORIDE, NALOXONE HYDROCHLORIDE 1 FILM: 8; 2 FILM, SOLUBLE BUCCAL; SUBLINGUAL at 19:56

## 2018-01-16 RX ADMIN — PANTOPRAZOLE SODIUM 40 MG: 40 TABLET, DELAYED RELEASE ORAL at 07:53

## 2018-01-16 RX ADMIN — Medication 100 MG: at 07:52

## 2018-01-16 RX ADMIN — METOPROLOL TARTRATE 25 MG: 25 TABLET, FILM COATED ORAL at 07:52

## 2018-01-16 RX ADMIN — FOLIC ACID 1 MG: 1 TABLET ORAL at 07:52

## 2018-01-16 RX ADMIN — MULTIPLE VITAMINS W/ MINERALS TAB 1 TABLET: TAB at 07:53

## 2018-01-16 RX ADMIN — LEVALBUTEROL HYDROCHLORIDE 0.63 MG: 0.63 SOLUTION RESPIRATORY (INHALATION) at 20:03

## 2018-01-16 RX ADMIN — ACETAMINOPHEN 325 MG: 325 TABLET, FILM COATED ORAL at 03:05

## 2018-01-16 RX ADMIN — GABAPENTIN 300 MG: 300 CAPSULE ORAL at 19:56

## 2018-01-16 RX ADMIN — DIAZEPAM 5 MG: 5 TABLET ORAL at 22:11

## 2018-01-16 RX ADMIN — GABAPENTIN 300 MG: 300 CAPSULE ORAL at 07:52

## 2018-01-16 RX ADMIN — LEVALBUTEROL HYDROCHLORIDE 0.63 MG: 0.63 SOLUTION RESPIRATORY (INHALATION) at 13:50

## 2018-01-16 RX ADMIN — LEVALBUTEROL HYDROCHLORIDE 0.63 MG: 0.63 SOLUTION RESPIRATORY (INHALATION) at 09:07

## 2018-01-16 RX ADMIN — GABAPENTIN 300 MG: 300 CAPSULE ORAL at 13:12

## 2018-01-16 RX ADMIN — VENLAFAXINE HYDROCHLORIDE 75 MG: 75 TABLET, EXTENDED RELEASE ORAL at 07:53

## 2018-01-16 RX ADMIN — NICOTINE 1 PATCH: 21 PATCH, EXTENDED RELEASE TRANSDERMAL at 07:52

## 2018-01-16 RX ADMIN — BUPRENORPHINE HYDROCHLORIDE, NALOXONE HYDROCHLORIDE 1 FILM: 8; 2 FILM, SOLUBLE BUCCAL; SUBLINGUAL at 07:52

## 2018-01-16 RX ADMIN — ACETAMINOPHEN 325 MG: 325 TABLET, FILM COATED ORAL at 21:41

## 2018-01-16 ASSESSMENT — ACTIVITIES OF DAILY LIVING (ADL)
ADLS_ACUITY_SCORE: 21

## 2018-01-16 NOTE — CONSULTS
"Social Work Services Progress Note    Hospital Day: 3    Collaborated with:  Attempted to meet w/pt, he was off unit w/friend and not available.    Data:  CD resources, treatment    Intervention:  Pt is known to writer from previous hospitalizations, including most recent when medical team was assessing for MI/CD Commitment. Pt was agreeable to follow up with Dr Carlos Padron for Subaxone, but had [facility] \"[facility]\" [facility] due to med non compliance by having obtained scripts from additional providers.     Writer reviewed Dr Miranda note dated 1/15/18. He believes pt will follow up w/outpt resources, pt again states willingness to work with outpt therapist and CD counselor around multiple issues, including CD use/treatment.     Assessment:  pt may need to be reassessed for resources, follow up plan post hospital as this is pt's 4th hospitalization in past 3-4 months     Plan:    Anticipated Disposition:  anticipate pt will DC home w/recommendation to follow up with outpt resources    Barriers to d/c plan:  Medical stablity    Follow Up:  SW cont' to follow    "

## 2018-01-16 NOTE — PROGRESS NOTES
[facility], [place]   Internal Medicine Daily Note          Assessment and Plan:    Jorge Rosales is a 62 year old male with past medical history significant for alcohol dependency, smoker, COPD, chronic pain syndrome, opioid use disorder now no suboxone maintenance, depression, PTSD, L leg weakness s/p Lumbar spine surgery admitted for alcohol intoxication, early withdrawal and acute on chronic hypoxic respi failure 2/2 mild COPD exacerbation.         #Alcohol dependency, intoxication and withdrawal:   ABT 0.281 on admission 1/14/2018.   Reportedly hx significant for withdrawal seizures and DTs. Last drink morning of admission. Relapsed for ~ a week ago.   - Keep on [facility] Alcohol withdrawal protocol with Diazepam. Has needed significant amount of valium yesterday  [facility]   - MVI, thiamine, folic acid daily.   - Seizure and fall precautions.    - Encourage alcohol abstinence  -Was evaluated by psychiatry. For now, patient to discharge back home when safe and continue with outpatient alcohol treatment program      # Afib with RVR:  Brief episode on the morning of 1/15/18, no prior history   Converted back to NSR shortly after 5 mg Metoprolol and oral 25 mg scheduled dose was given early   ECHO was done on 1/15 and within normal limits   Continue to monitor  Given that the episode lasted for < 24 hrs and was likely associated with withdrawals, we will not consider anticoagulation for now         # Hypoxic respiratory failure, suspect acute on Chronic. ? Mild COPD exacerbation. No PFTs on file. Chest CT from 11/2017 w/ emphysema, mucoid impactions. CXR 1/14/2018: no acute infiltrate. Pro bnp: wnl.   Last time dc on home oxygen, claims never delivered.   Tobacco abuse - current smoker.   - r/o influenza, RV panel pending. Droplet isolation, pending results (low suspicion)  - Levalbuterol inhalers prn. Scheduled Nebs Q 6 hr while awake (h/o afib w rvr on  albuterol in the past)  - supplemental oxygen to keep sat >90%  - hold on steroid for now.   - Encourage IS, activity as able.   - Encourage Smoking cessation.   - Outpatient Pulmonary FU. PFT.   - Likely need home oxygen, reassess prior to dc.   - Pulse Ox. Tele.           # Hx of Opioid abuse   #Hx of chronic pain syndrome  Previously on maintenance Suboxone for ~ 3 yrs, [facility], last script 11/17 for 13 SL films.  Seen by psychiatry last admission, restarted on Suboxone, claims followed @ Suboxone clinic and says taking 8mg bid for now.   - Will continue PTA Suboxone for now.   - Psychiatry consult requested.   - check Urine tox screen.   - PTA gabapentin.           # Depression, PTSD w/ hx of panic attacks:   Concern for benzo abuse as OP. Utox positive for benzos last adm. Treated with Phenobarb taper.   - Continue PTA venlafaxine.  - on Diazepam per MSSA  - FU Psychiatry recs.      # ?L Leg weakness (chronic), L LE DVT s/p Spinal Surgery ~ 2 yrs ago. No s/s of dvt at current.   Low platelets ( 99)  - PT.OT consult as needed  - fall precautions.       #Tobacco dependence: Nicotine patch panel.       # GERD, PUD:  PTA Protonix.        Consulting teams: Psychiatry   Code status: Full   DVT Prophylaxis: PCD ( Low platelet count)   Gastric prophylaxis: PPI  Diet: Regular adult   Disposition: To be determined       Patient seen and examined with RN         Interval History:   Progress notes in the last 24 hrs by MDT team has been reviewed.   This is the first time I am meeting with patient. His H&P and progress of events has been reviewed.  Sign out received from Dr Swann  Patient feels that he is a little better today, but still has withdrawal symptoms  Denies fevers or chills  Has had a cough going on for almost 2 weeks, but he attributes this to smoking          Review of Systems:   A comprehensive review of systems was performed and found to be negative except: Those that  "are outlined in interval history              Medications:   I have reviewed this patient's current medications which are outlined in the \"current medication\" section of EPIC             Physical Exam:   Vitals were reviewed  Temp: 97.4  F (36.3  C) Temp src: Oral BP: 138/84 Pulse: 72 Heart Rate: 73 Resp: 16 SpO2: 95 % O2 Device: Nasal cannula Oxygen Delivery: 4 LPM    Constitutional:   fatigued, alert, cooperative, mild distress and appears stated age     Lungs:   Coarse breath sounds bilaterally with lot of transmitted upper airway sounds      Cardiovascular:   Normal apical impulse, regular rate and rhythm, normal S1 and S2, no S3 or S4, and no murmur noted     Abdomen:   No scars, normal bowel sounds, soft, non-distended, non-tender, no masses palpated, no hepatosplenomegally     Musculoskeletal:   no lower extremity pitting edema present  there is no redness, warmth, or swelling of the joints     Neurologic:   Awake, alert, oriented to name, place and time.  Cranial nerves II-XII are grossly intact.                           Data:     Most recent labs have been evaluated and relevant labs are outlined below :BMP    Recent Labs  Lab 01/16/18  0544 01/15/18  0520 01/14/18  1529    142 147*   POTASSIUM 4.5 3.8 3.9   CHLORIDE 105 103 109   RUSTY 7.8* 8.0* 7.9*   CO2 33* 32 32   BUN 10 11 12   CR 0.53* 0.46* 0.52*   * 119* 97     CBC    Recent Labs  Lab 01/16/18  0544 01/15/18  0520 01/14/18  1529   WBC 7.2 8.3 7.7   RBC 4.41 4.53 4.55   HGB 13.1* 14.1 14.0   HCT 41.6 41.9 42.3   MCV 94 93 93   MCH 29.7 31.1 30.8   MCHC 31.5 33.7 33.1   RDW 15.3* 15.4* 15.7*   PLT 99* 128* 146*     INRNo lab results found in last 7 days.  LFTs    Recent Labs  Lab 01/16/18  0544 01/15/18  0520 01/14/18  1529   ALKPHOS 80 88 99   AST 24 29 32   ALT 25 30 31   BILITOTAL 0.7 0.5 0.3   PROTTOTAL 6.2* 6.8 6.8   ALBUMIN 2.7* 3.0* 3.1*      PANCNo lab results found in last 7 days.              Dr PAT Duran MD  Hospitalist ( " Internal medicine)  Pager: [phone]

## 2018-01-16 NOTE — PLAN OF CARE
Problem: Patient Care Overview  Goal: Plan of Care/Patient Progress Review  Outcome: No Change  Pt is alert,orientated.Weaknessess and numbness on L leg,his baseline.States is using leg boot and cane with ambulation.Using urinal and is voiding spontaneously of clear,mainor colored urine.MSSA scored 9 and 10.Does have tremors.Tele sinus rhythm with HR on the 70-80 range.LS with some rhonchi,diminished on R base.O2 saturation has been on the 95-98 range at 2lpm via NC.Seizure pads in place.States he doesn't remember having seizures but is aware pads are in place for precaution.Makes his needs known using call light.Aware he's on bed alarm for safety.

## 2018-01-16 NOTE — PLAN OF CARE
Problem: Patient Care Overview  Goal: Plan of Care/Patient Progress Review  Outcome: Improving  Patient is alert and oriented.  Has been scoring high on MSSA all day requiring 10-20mg of Valium each hour.  At 6pm he was able to score low on the MSSA and did not receive Valium that hour.  Patient was able to sleep at this time as well.  VSS.  Is voiding using the urinal.  Is tolerating regular diet and is eating all food on his tray.  Does continue to have tremors, but these have lessened over the day.  PIV in right arm is infusing.  Denies complaints of pain.  Call light is within reach.  Was able to convert out of A-fib this AM around 0750.  Is able to make needs known.  Will continue with plan of care.

## 2018-01-16 NOTE — PLAN OF CARE
"Problem: Patient Care Overview  Goal: Plan of Care/Patient Progress Review  Outcome: Improving  Pt resting in bed. A/O x 4. MSSA score 10, 10 mg valium given. Tylenol given for mild discomfort to Left lower extremity. Pt has a deep congested cough. No sputum noted. No report of SOB or CP. Lungs have coarse crackles and a few rhonchi. Pt reports not feeling \"sick\" and can't understand why we have to wear a mask. Explained to him that because the respiratory tests aren't back yet he has to remain on droplet precautions. Pt tolerated a box lunch and feels that his appetite is improving. TELE-NSR through the [employer]. O2 sats have been stable on 2 L O2 via NC at around 92-93%. Pt able to make needs known. Cont to assess.                         "

## 2018-01-17 ENCOUNTER — TELEPHONE (OUTPATIENT)
Dept: BEHAVIORAL HEALTH | Facility: CLINIC | Age: 63
End: 2018-01-17

## 2018-01-17 ENCOUNTER — PRE VISIT (OUTPATIENT)
Dept: PULMONOLOGY | Facility: CLINIC | Age: 63
End: 2018-01-17

## 2018-01-17 LAB — PLATELET # BLD AUTO: 95 10E9/L (ref 150–450)

## 2018-01-17 PROCEDURE — 85049 AUTOMATED PLATELET COUNT: CPT | Performed by: INTERNAL MEDICINE

## 2018-01-17 PROCEDURE — 94640 AIRWAY INHALATION TREATMENT: CPT | Mod: 76

## 2018-01-17 PROCEDURE — 25000125 ZZHC RX 250: Performed by: INTERNAL MEDICINE

## 2018-01-17 PROCEDURE — 36415 COLL VENOUS BLD VENIPUNCTURE: CPT | Performed by: INTERNAL MEDICINE

## 2018-01-17 PROCEDURE — 25000128 H RX IP 250 OP 636: Performed by: INTERNAL MEDICINE

## 2018-01-17 PROCEDURE — 40000275 ZZH STATISTIC RCP TIME EA 10 MIN

## 2018-01-17 PROCEDURE — 99232 SBSQ HOSP IP/OBS MODERATE 35: CPT | Performed by: INTERNAL MEDICINE

## 2018-01-17 PROCEDURE — 25000132 ZZH RX MED GY IP 250 OP 250 PS 637: Performed by: INTERNAL MEDICINE

## 2018-01-17 PROCEDURE — 94640 AIRWAY INHALATION TREATMENT: CPT

## 2018-01-17 PROCEDURE — 99232 SBSQ HOSP IP/OBS MODERATE 35: CPT | Performed by: PSYCHIATRY & NEUROLOGY

## 2018-01-17 PROCEDURE — 12000008 ZZH R&B INTERMEDIATE UMMC

## 2018-01-17 RX ORDER — LEVALBUTEROL INHALATION SOLUTION 0.63 MG/3ML
0.63 SOLUTION RESPIRATORY (INHALATION) EVERY 6 HOURS PRN
Status: DISCONTINUED | OUTPATIENT
Start: 2018-01-17 | End: 2018-01-19 | Stop reason: HOSPADM

## 2018-01-17 RX ADMIN — CYANOCOBALAMIN TAB 1000 MCG 1000 MCG: 1000 TAB at 09:06

## 2018-01-17 RX ADMIN — PANTOPRAZOLE SODIUM 40 MG: 40 TABLET, DELAYED RELEASE ORAL at 09:05

## 2018-01-17 RX ADMIN — ENOXAPARIN SODIUM 40 MG: 40 INJECTION SUBCUTANEOUS at 19:46

## 2018-01-17 RX ADMIN — GABAPENTIN 300 MG: 300 CAPSULE ORAL at 09:05

## 2018-01-17 RX ADMIN — LEVALBUTEROL HYDROCHLORIDE 0.63 MG: 0.63 SOLUTION RESPIRATORY (INHALATION) at 07:54

## 2018-01-17 RX ADMIN — NICOTINE 1 PATCH: 21 PATCH, EXTENDED RELEASE TRANSDERMAL at 09:06

## 2018-01-17 RX ADMIN — ONDANSETRON 4 MG: 4 TABLET, ORALLY DISINTEGRATING ORAL at 16:56

## 2018-01-17 RX ADMIN — DIAZEPAM 10 MG: 5 TABLET ORAL at 22:07

## 2018-01-17 RX ADMIN — DIAZEPAM 5 MG: 5 TABLET ORAL at 10:39

## 2018-01-17 RX ADMIN — FOLIC ACID 1 MG: 1 TABLET ORAL at 09:05

## 2018-01-17 RX ADMIN — VENLAFAXINE HYDROCHLORIDE 75 MG: 75 TABLET, EXTENDED RELEASE ORAL at 09:06

## 2018-01-17 RX ADMIN — MULTIPLE VITAMINS W/ MINERALS TAB 1 TABLET: TAB at 09:06

## 2018-01-17 RX ADMIN — METOPROLOL TARTRATE 25 MG: 25 TABLET, FILM COATED ORAL at 19:46

## 2018-01-17 RX ADMIN — ACETAMINOPHEN 325 MG: 325 TABLET, FILM COATED ORAL at 09:14

## 2018-01-17 RX ADMIN — LEVALBUTEROL HYDROCHLORIDE 0.63 MG: 0.63 SOLUTION RESPIRATORY (INHALATION) at 19:50

## 2018-01-17 RX ADMIN — Medication 100 MG: at 09:05

## 2018-01-17 RX ADMIN — ASPIRIN 81 MG: 81 TABLET, COATED ORAL at 09:05

## 2018-01-17 RX ADMIN — BUPRENORPHINE HYDROCHLORIDE, NALOXONE HYDROCHLORIDE 1 FILM: 8; 2 FILM, SOLUBLE BUCCAL; SUBLINGUAL at 19:46

## 2018-01-17 RX ADMIN — BUPRENORPHINE HYDROCHLORIDE, NALOXONE HYDROCHLORIDE 1 FILM: 8; 2 FILM, SOLUBLE BUCCAL; SUBLINGUAL at 09:06

## 2018-01-17 RX ADMIN — GABAPENTIN 300 MG: 300 CAPSULE ORAL at 19:46

## 2018-01-17 RX ADMIN — DIAZEPAM 5 MG: 5 TABLET ORAL at 16:56

## 2018-01-17 RX ADMIN — GABAPENTIN 300 MG: 300 CAPSULE ORAL at 15:28

## 2018-01-17 RX ADMIN — METOPROLOL TARTRATE 25 MG: 25 TABLET, FILM COATED ORAL at 09:05

## 2018-01-17 RX ADMIN — LEVALBUTEROL HYDROCHLORIDE 0.63 MG: 0.63 SOLUTION RESPIRATORY (INHALATION) at 14:30

## 2018-01-17 ASSESSMENT — ACTIVITIES OF DAILY LIVING (ADL)
RETIRED_COMMUNICATION: 0-->UNDERSTANDS/COMMUNICATES WITHOUT DIFFICULTY
BATHING: 0-->INDEPENDENT
AMBULATION: 0-->INDEPENDENT
SWALLOWING: 0-->SWALLOWS FOODS/LIQUIDS WITHOUT DIFFICULTY
FALL_HISTORY_WITHIN_LAST_SIX_MONTHS: YES
AMBULATION: 0-->INDEPENDENT
ADLS_ACUITY_SCORE: 16
ADLS_ACUITY_SCORE: 21
ADLS_ACUITY_SCORE: 16
BATHING: 0-->INDEPENDENT
ADLS_ACUITY_SCORE: 21
RETIRED_EATING: 0-->INDEPENDENT
ADLS_ACUITY_SCORE: 16
DRESS: 0-->INDEPENDENT
RETIRED_EATING: 0-->INDEPENDENT
ADLS_ACUITY_SCORE: 21
TRANSFERRING: 0-->INDEPENDENT
NUMBER_OF_TIMES_PATIENT_HAS_FALLEN_WITHIN_LAST_SIX_MONTHS: 2
TRANSFERRING: 0-->INDEPENDENT
DRESS: 0-->INDEPENDENT
RETIRED_COMMUNICATION: 0-->UNDERSTANDS/COMMUNICATES WITHOUT DIFFICULTY
SWALLOWING: 0-->SWALLOWS FOODS/LIQUIDS WITHOUT DIFFICULTY
COGNITION: 0 - NO COGNITION ISSUES REPORTED
COGNITION: 0 - NO COGNITION ISSUES REPORTED
TOILETING: 0-->INDEPENDENT
WHICH_OF_THE_ABOVE_FUNCTIONAL_RISKS_HAD_A_RECENT_ONSET_OR_CHANGE?: AMBULATION
TOILETING: 0-->INDEPENDENT

## 2018-01-17 NOTE — PROGRESS NOTES
Social Work: Assessment with Discharge Plan     Patient Name:  Jorge Rosales  :  1955  Age:  62 year old  MRN:  [id number]  Risk/Complexity Score:  Filed Complexity Screen Score: 8  Completed assessment with:  Pt, Dr Loomis, Estefania CARVAJAL, NP, 10A IDT     Presenting Information   Reason for Referral:  Discharge plan  Date of Intake:  2018  Referral Source:  Physician  Decision Maker:  pt  Alternate Decision Maker:  Pt does not identify-has disabled daughter, has friend. Has son w/Down Syndrome. Ex wife recently passed away since December Admission.  Health Care Directive:  Declined completing  Living Situation:  House  Previous Functional Status:  Independent  Patient and family understanding of hospitalization:  Seeking Detox (pt's 4th Admission since Nov). ? If pt is a candidate for Civil Commitment-await Psych recommendations)  Cultural/Language/Spiritual Considerations:  [place], -significant MI/CD issues  Adjustment to Illness:  Pt consistently states he has MI/CD resources (Established with [facility] in December for PCP and Subaxone). Pt not willing to consider Residential CD treatment, but after prolonged discussion with Dr Miranda is willing to go to  [facility] as way to avoid his PO pursuing detention     Physical Health  Reason for Admission:    1. Acute alcoholic intoxication in alcoholism with complication (H)    2. Hypoxia    3. COPD exacerbation (H)    4. Hypoxemia    5. Obstructive chronic bronchitis with exacerbation (H)    6. Cigarette smoker       Services Needed/Recommended:  Other:  CD treatment, pt may benefit from MI/CD treatment  . Pt Has history of leaving AMA     Mental Health/Chemical Dependency  Diagnosis:  PTSD with Panic Attacks, poly substance abuse  Support/Services in Place:  [facility]. [facility] that [facility] due to him obtaining medication through outside provider (last [facility] was [facility], 2017).  "Pt was referred to [facility] clinic Dr Carlos Padron for subaxone and to establish primary care for Effexor  Services Needed/Recommended:  MI/CD treatment     Support System  Significant relationship at present time:  female friend visited pt on 1/16/18  Family of origin is available for support:  Pt denies having social supports  Other support available:  Not identified  Gaps in support system:  Sober/mental health support  Patient is caregiver to:  None      Provider Information   Primary Care Physician:  Dr Carlos Padron is listed on facesheet, but Dr Padron is subaxone provider not PCP   Clinic:   [facility]     :  Not identified     Financial   Income Source:  Pension, social security  Financial Concerns:  None noted  Insurance:    Payor/Plan Subscriber Name Rel Member # Group #   UCARE - UCARE CARMINE GRAY*   [id number] [id number]         Discharge Plan   Patient and family discharge goal:  Not well defined or expressed. Pt states \"I am thinking. I don't feel well\"  Provided education on discharge plan:  YES  Patient agreeable to discharge plan:  Pt has history of Leaving AMA when he feels better, and he immediately relapses on alcohol or other medications  A list of Medicare Certified Facilities was provided to the patient and/or family to encourage patient choice. Patient's choices for facility are:  None at this time  Will [facility] provide Skilled rehabilitation or complex medical:  NA  General information regarding anticipated insurance coverage and possible out of pocket cost was discussed. Patient and patient's family are aware patient may incur the cost of transportation to the facility, pending insurance payment: NA  Barriers to discharge:  Medical stability, Assessment for possible commitment     Discharge Recommendations   Anticipated Disposition:  to be determined  Transportation Needs:  Other:  to be determined  Name of Transportation Company and Phone:  To be determined     Additional " comments   Pt is known to writer from previous hospitalizations.  He con't to relapse on Alcohol and not follow through with medical team recommendations to pursue CD treatment despite verbalizing agreement and having been provided with resources for CD Treatment. In December, 2017, pt agreed to have CD Eval for entering [facility] w/kwadwor and Dr Varma. Later that day, BONILLA Granado arrived to perform CD Eval for pt. Within 5 minutes, pt stopped assessment/eval and refused to participate.  Pt has not followed through with CD Eval.     Writer, Dr Duran and Dr Miranda discussed pt's recent history with frequent hospitalizations, medical issues directly related to pt's drinking and pt's demonstrated inability to follow through with treatment recommendations. Dr Miranda did get pt to agree to residential CD treatment and pt did reluctantly agree to having CD Assessment. Writer spoke w/BONILLA Granado who will attempt to have an  meet w/pt and conduct assessment. As of this time, it is anticipated pt may be able to admit to [facility] within 5-7 days. SW con't to follow

## 2018-01-17 NOTE — CONSULTS
[facility], [place]   Psychiatry consult - follow-up note         Interim History and Subjective Reports:     His primary treatment team expressed concern that outpatient treatment was pursued last time without success.  There is concerned that another relapse on alcohol may further prompt worsening cardiac abnormalities and increase his risk of lethal complications.  I was asked to meet with the patient again to encourage cooperation for residential treatment versus considering a petition for commitment.    The patient reports that his mood is overall improved.  He continues to deny suicidal and homicidal thoughts.  Withdrawal symptoms are subsiding.  Anxiety is low and tolerable.  The patient spends some time reviewing stressors at home, which now include a water leak in his apartment.  He has contacted friends who are attending to this issue and his landlord is repairing the issue.    We reviewed the importance of considering residential treatment.  He attempted to assure me that he would follow up with an outpatient care plan however I expressed to him various concerns of myself and his treatment team have regarding this.           Scheduled Medications:       nicotine  1 patch Transdermal Daily     metoprolol tartrate  25 mg Oral BID     diltiazem  20 mg Intravenous Once     aspirin EC  81 mg Oral Daily     cyanocobalamin  1,000 mcg Oral Daily     gabapentin  300 mg Oral TID     pantoprazole  40 mg Oral Daily     venlafaxine  75 mg Oral Daily     buprenorphine HCl-naloxone HCl  1 Film Sublingual BID     folic acid  1 mg Oral Daily     multivitamin, therapeutic with minerals  1 tablet Oral Daily     enoxaparin  40 mg Subcutaneous Q24H     levalbuterol  0.63 mg Nebulization Q6H WA     nicotine   Transdermal Q8H     nicotine   Transdermal Daily          Allergies:    No Known Allergies       Labs:     Recent Results (from the past 24 hour(s))   Platelet count    Collection Time:  01/17/18  6:19 AM   Result Value Ref Range    Platelet Count 95 (L) 150 - 450 10e9/L          Vitals:   /86 (BP Location: Right arm)  Pulse 80  Temp 96.3  F (35.7  C) (Oral)  Resp 17  Wt 96.9 kg (213 lb 11.2 oz)  SpO2 93%  BMI 29.81 kg/m2  Weight: 213 lbs 11.2 oz          Height: Data Unavailable           Body mass index is 29.81 kg/(m^2).        Mental Status Examination:   Appearance: awake, alert  Attitude:  cooperative  Eye Contact:  fair  Mood:  sad   Affect:  intensity is blunted  Speech:  clear, coherent  Psychomotor Behavior:  no evidence of tardive dyskinesia, dystonia, or tics  Throught Process:  linear  Associations:  no loose associations  Thought Content:  no evidence of suicidal ideation or homicidal ideation and no evidence of psychotic thought  Insight:  partial  Judgement:  fair  Oriented to:  time, person, and place  Attention Span and Concentration:  intact  Recent and Remote Memory:  intact         DIagnoses:     Major depressive disorder-recurrent, mild  Bereavement, uncomplicated  Alcohol use disorder, severe  Opiate use disorder, on Suboxone maintenance treatment         Recommendations:     1. We discussed the importance of pursuing residential chemical dependency treatment and the concern regarding another medical compromise if another relapse were to occur. There is reasonable concern that he will not yet be successful at another attempt for outpatient treatment.  With much encouragement, he was willing to cooperate with a chemical health assessment to explore appropriate treatment options. He is aware that residential treatment will likely be recommended. If his willing to cooperate were to change, I can be contacted again to help coordinate a petition for committment.     2. Continue effexor at the current dose.    3. Consult psychiatry as needed. I can be reached at [phone]

## 2018-01-17 NOTE — PLAN OF CARE
Problem: Patient Care Overview  Goal: Plan of Care/Patient Progress Review  Outcome: Improving  Pt is alert and oriented X4. Vitals are stable and within normal limits. Tele-NSR. Bowel sounds normoactive-passing flatus. MSSA score was 8 in the late evening- 5mg valium administered. PIV is saline locked. Voiding spontaneously without difficulty using urinal at bedside. Complained of shoulder pain that was partially relieved with tylenol and ice packs. Tolerating regular diet. Up with an assist of 1. Able to make needs known. Will continue with plan of care.

## 2018-01-17 NOTE — PLAN OF CARE
Problem: Patient Care Overview  Goal: Plan of Care/Patient Progress Review  Outcome: Improving  Pt is alert and oriented X4. Vitals are stable and within normal limits. Tele-NSR. Bowel sounds hypoactive-is passing gas. MSSA score was 8 at 1030- 5mg valium administered. PIV is saline locked. Voiding spontaneously without difficulty using urinal at bedside. Complained of shoulder pain that was partially relieved with ice packs. Tolerating regular diet. Up with an assist of 1. Able to make needs known. Will continue with plan of care.

## 2018-01-17 NOTE — PROGRESS NOTES
[facility], [place]   Internal Medicine Daily Note          Assessment and Plan:    Jorge Rosales is a 62 year old male with past medical history significant for alcohol dependency, smoker, COPD, chronic pain syndrome, opioid use disorder now no suboxone maintenance, depression, PTSD, L leg weakness s/p Lumbar spine surgery admitted for alcohol intoxication, early withdrawal and acute on chronic hypoxic respi failure 2/2 mild COPD exacerbation.         #Alcohol dependency, intoxication and withdrawal:   ABT 0.281 on admission 1/14/2018.   Reportedly hx significant for withdrawal seizures and DTs. Last drink morning of admission. Relapsed for ~ a week ago.   - Keep on MSSA Alcohol withdrawal protocol with Diazepam. Has needed significant amount of valium yesterday  MSSA  At 8 this morning   - MVI, thiamine, folic acid daily.   - Seizure and fall precautions.    - Encourage alcohol abstinence  -Was evaluated by psychiatry. For now, patient to discharge back home when safe and continue with outpatient alcohol treatment program      # Afib with RVR:  Brief episode on the morning of 1/15/18, no prior history   Converted back to NSR shortly after 5 mg Metoprolol and oral 25 mg scheduled dose was given early   ECHO was done on 1/15 and within normal limits   Continue to monitor.   Given that patient has not had any further episodes, will discontinue telemetry today   Given that the episode lasted for < 24 hrs and was likely associated with withdrawals, we will not consider anticoagulation for now         # Hypoxic respiratory failure, suspect acute on Chronic. ? Mild COPD exacerbation. No PFTs on file. Chest CT from 11/2017 w/ emphysema, mucoid impactions. CXR 1/14/2018: no acute infiltrate. Pro bnp: wnl.   Last time dc on home oxygen, claims never delivered.   Tobacco abuse - current smoker.   - Respiratory Virus panel is negative   - Levalbuterol inhalers prn. Scheduled Nebs Q 6 hr while  "awake (h/o afib w rvr on albuterol in the past)  - supplemental oxygen to keep sat >90%  - hold on steroid for now.   - Encourage IS, activity as able.   - Encourage Smoking cessation.   - Outpatient Pulmonary FU. PFT.   - Likely need home oxygen, reassess prior to dc.   - Pulse Ox. Tele.           # Hx of Opioid abuse   #Hx of chronic pain syndrome  Previously on maintenance Suboxone for ~ 3 yrs, [facility], last script 11/17 for 13 SL films.  Seen by psychiatry last admission, restarted on Suboxone, claims followed @ Suboxone clinic and says taking 8mg bid for now.   - Will continue PTA Suboxone for now.   - check Urine tox screen.   - PTA gabapentin.           # Depression, PTSD w/ hx of panic attacks:   Concern for benzo abuse as OP. Utox positive for benzos last adm. Treated with Phenobarb taper.   - Continue PTA venlafaxine.  - on Diazepam per MSSA  - FU Psychiatry recs.      # ?L Leg weakness (chronic), L LE DVT s/p Spinal Surgery ~ 2 yrs ago. No s/s of dvt at current.   Low platelets ( 99)  - PT.OT consult as needed  - fall precautions.       #Tobacco dependence: Nicotine patch panel.       # GERD, PUD:  PTA Protonix.        Consulting teams: Psychiatry   Code status: Full   DVT Prophylaxis: PCD ( Low platelet count)   Gastric prophylaxis: PPI  Diet: Regular adult   Disposition: To be determined       Patient seen and examined with RN         Interval History:   Progress notes in the last 24 hrs by MDT team has been reviewed.   andre feels that he is still going through withdrawals  Visible shakiness  Denies any seizures  No fevers or chills   Has slowly begun to eat ok            Review of Systems:   A comprehensive review of systems was performed and found to be negative except: Those that are outlined in interval history              Medications:   I have reviewed this patient's current medications which are outlined in the \"current medication\" section of EPIC           "   Physical Exam:   Vitals were reviewed  Temp: 96.9  F (36.1  C) Temp src: Oral BP: 138/86 Pulse: 88 Heart Rate: 63 Resp: 16 SpO2: 94 % O2 Device: None (Room air) Oxygen Delivery: 4 LPM    Constitutional:   fatigued, alert, cooperative, mild distress and appears stated age     Lungs:   Coarse breath sounds bilaterally with lot of transmitted upper airway sounds      Cardiovascular:   Normal apical impulse, regular rate and rhythm, normal S1 and S2, no S3 or S4, and no murmur noted     Abdomen:   No scars, normal bowel sounds, soft, non-distended, non-tender, no masses palpated, no hepatosplenomegally     Musculoskeletal:   no lower extremity pitting edema present  there is no redness, warmth, or swelling of the joints     Neurologic:   Awake, alert, oriented to name, place and time.  Cranial nerves II-XII are grossly intact.                           Data:     Most recent labs have been evaluated and relevant labs are outlined below :BMP    Recent Labs  Lab 01/16/18  0544 01/15/18  0520 01/14/18  1529    142 147*   POTASSIUM 4.5 3.8 3.9   CHLORIDE 105 103 109   RUSTY 7.8* 8.0* 7.9*   CO2 33* 32 32   BUN 10 11 12   CR 0.53* 0.46* 0.52*   * 119* 97     CBC    Recent Labs  Lab 01/17/18  0619 01/16/18  0544 01/15/18  0520 01/14/18  1529   WBC  --  7.2 8.3 7.7   RBC  --  4.41 4.53 4.55   HGB  --  13.1* 14.1 14.0   HCT  --  41.6 41.9 42.3   MCV  --  94 93 93   MCH  --  29.7 31.1 30.8   MCHC  --  31.5 33.7 33.1   RDW  --  15.3* 15.4* 15.7*   PLT 95* 99* 128* 146*     INRNo lab results found in last 7 days.  LFTs    Recent Labs  Lab 01/16/18  0544 01/15/18  0520 01/14/18  1529   ALKPHOS 80 88 99   AST 24 29 32   ALT 25 30 31   BILITOTAL 0.7 0.5 0.3   PROTTOTAL 6.2* 6.8 6.8   ALBUMIN 2.7* 3.0* 3.1*      PANCNo lab results found in last 7 days.              Dr PAT Duran MD  Hospitalist ( Internal medicine)  Pager: [phone]

## 2018-01-17 NOTE — PLAN OF CARE
Pt Alert and Oriented X 4. On TELE - NSR.  C/o nausea, given Zofran and reported relief of symptoms. Afebrile. MSSA score was 10 at 1653 and given 5mg of valium, At 2150 was 12. SOB reported on exertion, SpO2 > 92% on 4L of supplemental O2 via NC. Bowels- active in all four quadrants, had BM this shift. PP+ DP+. CMS and Neuro's are intact to baseline. Denies numbness and tingling in all extremities. Denies shortness of breath and chest pain. Has pain in the left shoulder and ICE applied, tolerating well. Voids spontaneously without difficulty using urinal and BR. Is on a regular diet and appetite was poor this shift. PIV is patent and SL. Bilateral heels are elevated off the bed. Pt is able to make needs known and the call light is within the pt's reach. Continue to monitor.

## 2018-01-18 LAB
ALBUMIN SERPL-MCNC: 3.2 G/DL (ref 3.4–5)
ALP SERPL-CCNC: 106 U/L (ref 40–150)
ALT SERPL W P-5'-P-CCNC: 30 U/L (ref 0–70)
ANION GAP SERPL CALCULATED.3IONS-SCNC: 7 MMOL/L (ref 3–14)
AST SERPL W P-5'-P-CCNC: 27 U/L (ref 0–45)
BILIRUB SERPL-MCNC: 0.6 MG/DL (ref 0.2–1.3)
BUN SERPL-MCNC: 20 MG/DL (ref 7–30)
CALCIUM SERPL-MCNC: 8.8 MG/DL (ref 8.5–10.1)
CHLORIDE SERPL-SCNC: 104 MMOL/L (ref 94–109)
CO2 SERPL-SCNC: 26 MMOL/L (ref 20–32)
CREAT SERPL-MCNC: 0.63 MG/DL (ref 0.66–1.25)
GFR SERPL CREATININE-BSD FRML MDRD: >90 ML/MIN/1.7M2
GLUCOSE SERPL-MCNC: 110 MG/DL (ref 70–99)
POTASSIUM SERPL-SCNC: 4.4 MMOL/L (ref 3.4–5.3)
PROT SERPL-MCNC: 7.7 G/DL (ref 6.8–8.8)
SODIUM SERPL-SCNC: 137 MMOL/L (ref 133–144)

## 2018-01-18 PROCEDURE — 12000008 ZZH R&B INTERMEDIATE UMMC

## 2018-01-18 PROCEDURE — 99232 SBSQ HOSP IP/OBS MODERATE 35: CPT | Performed by: INTERNAL MEDICINE

## 2018-01-18 PROCEDURE — 80053 COMPREHEN METABOLIC PANEL: CPT | Performed by: INTERNAL MEDICINE

## 2018-01-18 PROCEDURE — 25000128 H RX IP 250 OP 636: Performed by: INTERNAL MEDICINE

## 2018-01-18 PROCEDURE — 36415 COLL VENOUS BLD VENIPUNCTURE: CPT | Performed by: INTERNAL MEDICINE

## 2018-01-18 PROCEDURE — 25000132 ZZH RX MED GY IP 250 OP 250 PS 637: Performed by: INTERNAL MEDICINE

## 2018-01-18 PROCEDURE — 25000125 ZZHC RX 250: Performed by: INTERNAL MEDICINE

## 2018-01-18 RX ORDER — MUPIROCIN 20 MG/G
OINTMENT TOPICAL 2 TIMES DAILY
Status: DISCONTINUED | OUTPATIENT
Start: 2018-01-18 | End: 2018-01-19 | Stop reason: HOSPADM

## 2018-01-18 RX ADMIN — DIAZEPAM 10 MG: 5 TABLET ORAL at 17:06

## 2018-01-18 RX ADMIN — GABAPENTIN 300 MG: 300 CAPSULE ORAL at 09:12

## 2018-01-18 RX ADMIN — GABAPENTIN 300 MG: 300 CAPSULE ORAL at 15:46

## 2018-01-18 RX ADMIN — CYANOCOBALAMIN TAB 1000 MCG 1000 MCG: 1000 TAB at 09:11

## 2018-01-18 RX ADMIN — NICOTINE 1 PATCH: 21 PATCH, EXTENDED RELEASE TRANSDERMAL at 09:12

## 2018-01-18 RX ADMIN — GABAPENTIN 300 MG: 300 CAPSULE ORAL at 20:39

## 2018-01-18 RX ADMIN — ACETAMINOPHEN 325 MG: 325 TABLET, FILM COATED ORAL at 15:46

## 2018-01-18 RX ADMIN — DIAZEPAM 10 MG: 5 TABLET ORAL at 06:14

## 2018-01-18 RX ADMIN — BUPRENORPHINE HYDROCHLORIDE, NALOXONE HYDROCHLORIDE 1 FILM: 8; 2 FILM, SOLUBLE BUCCAL; SUBLINGUAL at 20:40

## 2018-01-18 RX ADMIN — METOPROLOL TARTRATE 25 MG: 25 TABLET, FILM COATED ORAL at 08:22

## 2018-01-18 RX ADMIN — PANTOPRAZOLE SODIUM 40 MG: 40 TABLET, DELAYED RELEASE ORAL at 09:11

## 2018-01-18 RX ADMIN — ACETAMINOPHEN 325 MG: 325 TABLET, FILM COATED ORAL at 06:14

## 2018-01-18 RX ADMIN — BUPRENORPHINE HYDROCHLORIDE, NALOXONE HYDROCHLORIDE 1 FILM: 8; 2 FILM, SOLUBLE BUCCAL; SUBLINGUAL at 09:10

## 2018-01-18 RX ADMIN — MUPIROCIN: 20 OINTMENT TOPICAL at 16:52

## 2018-01-18 RX ADMIN — DIAZEPAM 10 MG: 5 TABLET ORAL at 20:50

## 2018-01-18 RX ADMIN — ENOXAPARIN SODIUM 40 MG: 40 INJECTION SUBCUTANEOUS at 20:40

## 2018-01-18 RX ADMIN — ASPIRIN 81 MG: 81 TABLET, COATED ORAL at 09:11

## 2018-01-18 RX ADMIN — MULTIPLE VITAMINS W/ MINERALS TAB 1 TABLET: TAB at 09:11

## 2018-01-18 RX ADMIN — DIAZEPAM 10 MG: 5 TABLET ORAL at 11:47

## 2018-01-18 RX ADMIN — FOLIC ACID 1 MG: 1 TABLET ORAL at 09:11

## 2018-01-18 RX ADMIN — METOPROLOL TARTRATE 25 MG: 25 TABLET, FILM COATED ORAL at 20:40

## 2018-01-18 RX ADMIN — VENLAFAXINE HYDROCHLORIDE 75 MG: 75 TABLET, EXTENDED RELEASE ORAL at 09:11

## 2018-01-18 ASSESSMENT — ACTIVITIES OF DAILY LIVING (ADL)
ADLS_ACUITY_SCORE: 17
ADLS_ACUITY_SCORE: 17
ADLS_ACUITY_SCORE: 16
ADLS_ACUITY_SCORE: 17
ADLS_ACUITY_SCORE: 16
ADLS_ACUITY_SCORE: 16

## 2018-01-18 ASSESSMENT — PAIN DESCRIPTION - DESCRIPTORS
DESCRIPTORS: ACHING
DESCRIPTORS: ACHING

## 2018-01-18 NOTE — PROGRESS NOTES
Social Work Services Progress Note    Hospital Day: 5    Collaborated with:  Pt, 10A IDT    Data:   DC plan, CD treatment    Intervention:  Writer met w/pt and reviewed plan for pt to meet w/CD Eval, discussion of petition for Commitment.  Pt informed writer he has legal issues he needs to resolve prior to entering CD treatment. He blames relapse on Alcohol with his perception he received inappropriate care and had his antianxiety meds stop cold turkey- was no placed on Benzo/phenobarb taper.  Writer reminded him of having phenobarb taper as way of treating benzo withdrawals. Pt was beginning to escalate with statements of disagreement of importance and number of hospitalizations he has had since November, 2018.    Assessment:  pt not willing to consider CD treatment at this time.     Plan:    Anticipated Disposition:  home, possible CD treatment    Barriers to d/c plan:  Medical stability    Follow Up:  ROBBIN con't to follow w/RNCC for DC planning and  CD resources

## 2018-01-18 NOTE — PLAN OF CARE
"Problem: Patient Care Overview  Goal: Plan of Care/Patient Progress Review  Outcome: No Change    Shift report from [id number]    VS:   VSS, afebrile, on 4 LPM, denies SOB or CP   Output:   Voiding via urinal- good amounts, dark mainor, no BM this shift   Activity:   Bedrest, pt not stable on feet, left sided weakness   Skin: Intact except some scars, scabs on hands,    Pain:   Pain in left shoulder- tylenol given and ice applied   Neuro/CMS:   Tremulous, anxious, A&Ox3, MSSA score 12, 15, cows score 5, some numbness in left foot, feels \"terrible\"    Dressing(s):   PIV   Diet:   Regular but does not feel hungry, tolerating oral fluids, denies nausea    LDA:   PIV,    Equipment:   O2 tubing, seizure pads in place,    Plan:   Continue to monitor    Additional Info:   Slept all night and woke up @ 0600, able to make needs known, call light in reach length, lots of stressors in pt's life, pt would like to talk to social work          "

## 2018-01-18 NOTE — PROGRESS NOTES
[facility], [place]   Internal Medicine Daily Note          Assessment and Plan:    Jorge Rosales is a 62 year old male with past medical history significant for alcohol dependency, smoker, COPD, chronic pain syndrome, opioid use disorder now no suboxone maintenance, depression, PTSD, L leg weakness s/p Lumbar spine surgery admitted for alcohol intoxication, early withdrawal and acute on chronic hypoxic respi failure 2/2 mild COPD exacerbation.         #Alcohol dependency, intoxication and withdrawal:   ABT 0.281 on admission 1/14/2018.   Reportedly hx significant for withdrawal seizures and DTs. Last drink morning of admission. Relapsed for ~ a week ago.   - Keep on MSSA Alcohol withdrawal protocol with Diazepam.   MSSA scores are variable ( patient does have a baseline tremor)  - MVI, thiamine, folic acid daily.   - Seizure and fall precautions.    - Encourage alcohol abstinence  -Was evaluated by psychiatry. Patient is not in agreement for CD treatment and continues to voice         # Afib with RVR:  Brief episode on the morning of 1/15/18, no prior history   Converted back to NSR shortly after 5 mg Metoprolol and oral 25 mg scheduled dose was given early   ECHO was done on 1/15 and within normal limits   Continue to monitor.   Given that patient has not had any further episodes, will discontinue telemetry today   Given that the episode lasted for < 24 hrs and was likely associated with withdrawals, we will not consider anticoagulation for now         # Hypoxic respiratory failure, suspect acute on Chronic. ? Mild COPD exacerbation. No PFTs on file. Chest CT from 11/2017 w/ emphysema, mucoid impactions. CXR 1/14/2018: no acute infiltrate. Pro bnp: wnl.   Last time dc on home oxygen, claims never delivered.   Tobacco abuse - current smoker.   - Respiratory Virus panel is negative   - Levalbuterol inhalers prn.   - supplemental oxygen to keep sat >90%  - hold on steroid for now.   -  "Encourage IS, activity as able.   - Encourage Smoking cessation.   - Outpatient Pulmonary FU. PFT.   - Likely need home oxygen, reassess prior to dc.   -      # Hx of Opioid abuse   #Hx of chronic pain syndrome  Previously on maintenance Suboxone for ~ 3 yrs, [facility], last script 11/17 for 13 SL films.  Seen by psychiatry last admission, restarted on Suboxone, claims followed @ Suboxone clinic and says taking 8mg bid for now.   - Will continue PTA Suboxone for now.   - check Urine tox screen.   - PTA gabapentin.           # Depression, PTSD w/ hx of panic attacks:   Concern for benzo abuse as OP. Utox positive for benzos last adm. Treated with Phenobarb taper.   - Continue PTA venlafaxine.  - on Diazepam per MSSA  - FU Psychiatry recs.      # ?L Leg weakness (chronic), L LE DVT s/p Spinal Surgery ~ 2 yrs ago. No s/s of dvt at current.   Low platelets ( 99)  - PT.OT consult as needed  - fall precautions.       #Tobacco dependence: Nicotine patch panel.       # GERD, PUD:  PTA Protonix.        Consulting teams: Psychiatry   Code status: Full   DVT Prophylaxis: PCD ( Low platelet count)   Gastric prophylaxis: PPI  Diet: Regular adult   Disposition: To be determined       Patient seen and examined with RN         Interval History:   Progress notes in the last 24 hrs by MDT team has been reviewed.  Jorge appears much better today  Able to wheel his own wheelchair   Denies chest pain/SOB  Cough largely better  Eating and drinking well            Review of Systems:   A comprehensive review of systems was performed and found to be negative except: Those that are outlined in interval history              Medications:   I have reviewed this patient's current medications which are outlined in the \"current medication\" section of EPIC             Physical Exam:   Vitals were reviewed  Temp: 98.5  F (36.9  C) Temp src: Axillary BP: 95/70 Pulse: 83 Heart Rate: 78 Resp: 16 SpO2: 90 % O2 " Device: None (Room air) Oxygen Delivery: 4 LPM    Constitutional:   fatigued, alert, cooperative, mild distress and appears stated age     Lungs:   Coarse breath sounds bilaterally with lot of transmitted upper airway sounds      Cardiovascular:   Normal apical impulse, regular rate and rhythm, normal S1 and S2, no S3 or S4, and no murmur noted     Abdomen:   No scars, normal bowel sounds, soft, non-distended, non-tender, no masses palpated, no hepatosplenomegally     Musculoskeletal:   no lower extremity pitting edema present  there is no redness, warmth, or swelling of the joints     Neurologic:   Awake, alert, oriented to name, place and time.  Cranial nerves II-XII are grossly intact.                           Data:     Most recent labs have been evaluated and relevant labs are outlined below :BMP    Recent Labs  Lab 01/16/18  0544 01/15/18  0520 01/14/18  1529    142 147*   POTASSIUM 4.5 3.8 3.9   CHLORIDE 105 103 109   RUSTY 7.8* 8.0* 7.9*   CO2 33* 32 32   BUN 10 11 12   CR 0.53* 0.46* 0.52*   * 119* 97     CBC    Recent Labs  Lab 01/17/18  0619 01/16/18  0544 01/15/18  0520 01/14/18  1529   WBC  --  7.2 8.3 7.7   RBC  --  4.41 4.53 4.55   HGB  --  13.1* 14.1 14.0   HCT  --  41.6 41.9 42.3   MCV  --  94 93 93   MCH  --  29.7 31.1 30.8   MCHC  --  31.5 33.7 33.1   RDW  --  15.3* 15.4* 15.7*   PLT 95* 99* 128* 146*     INRNo lab results found in last 7 days.  LFTs    Recent Labs  Lab 01/16/18  0544 01/15/18  0520 01/14/18  1529   ALKPHOS 80 88 99   AST 24 29 32   ALT 25 30 31   BILITOTAL 0.7 0.5 0.3   PROTTOTAL 6.2* 6.8 6.8   ALBUMIN 2.7* 3.0* 3.1*      PANCNo lab results found in last 7 days.              Dr PAT Duran MD  Hospitalist ( Internal medicine)  Pager: [phone]

## 2018-01-18 NOTE — TELEPHONE ENCOUNTER
1/18/2018 D) Per Brisa's request on 10A I reviewed pt's case for LP. Yesterday Brisa said that pt was not interested in LP. I did attempt to interview him the last time he was on 10 A, but after about 5 minutes he stopped the eval stating he was not interested in LP tx. I see by Brisa's notes this AM, that he changed his mind from yesterday and is no longer interested in LP, A) given that he is not interested in LP we will not be seeing him. P) Pt to contact our intake number if he wants to set up an OP CD eval, at [phone]. Arnoldo WHITAKER, EMPERATRIZ

## 2018-01-18 NOTE — PROGRESS NOTES
Care Coordinator Progress Note     Admission Date/Time:  1/14/2018  Attending MD:  Josh Norris MD     Data  Chart reviewed, discussed with interdisciplinary team.   Patient was admitted for:    COPD exacerbation (H)  Acute on chronic respiratory failure with hypoxia (H)  Acute alcoholic intoxication in alcoholism without complication (H)  Cigarette smoker  Acute and chronic respiratory failure with hypoxia (H)  Alcohol dependence with uncomplicated intoxication (H).    Concerns with insurance coverage for discharge needs: None.  Current Living Situation: Patient lives alone.  Support System: Supportive and Involved  Services Involved: Home Care - open for RN and PT home visits  [facility].  Phone  [phone]   Jose (Intake RN) direct ph: [phone] (familiar with patient)  Fax  [phone]    [facility]  (Phone: [phone])  Had initiated home overnight oximetry to determine if patient qualified for home oxygen     Transportation: Family or Friend will provide  Barriers to Discharge: chronically ill and substance abuse    Coordination of Care and Referrals: Provided patient/family with options for Home Care.  Patient desires to resume home care services through [facility]. upon d/c home (although MD reports patient may be discharging directly to [facility]?). Voicemail left with [facility] intake team providing update on hospitalization.   Chart review and discussion with patient and [facility] to determine status of home oxygen. Upon last hospital discharge in Dec 2017, patient had been set up to have home overnight oximetry to determine if he qualifies for home O2 at night. Patient reports he picked up DANYELLE, completed the study and  Home Medical has never followed up to  study and determine if he qualifies. Per initial phone call with [facility] they state patient never picked up DANYELLE study. Second voicemail left with [facility] respiratory team  suggesting they call directly to patient's bedside to clarify plan moving forward regarding home oxygen after discharge since initial study was done as outpatient.      Update 1200: Received call from Marie at  [facility]. She will follow up with patient after he has discharged home. Received call back from Jose at [facility] who confirmed patient is open for services and will await d/c orders to be faxed upon d/c.     Assessment  Patient well known to writer from recent hospitalizations. Per interdisciplinary rounds, petition for civil commitment will not be pursued this admission. Met with patient to review plan of care and home resources. Patient did follow up with Dr. Carlos Padron at [facility] and he is very satisfied with the care he received. He already has been in contact with [facility] and has plan to call them to arrange f/u appt once d/c date is confirmed.  Home services were discussed in detailed as indicated above. RNCC will continue to follow plan of care.     Plan  Anticipated Discharge Date:  1-2 days per MD  Anticipated Discharge Plan:  TBD- [facility] vs home?    Nereida Carbone, RN  Care Coordinator

## 2018-01-18 NOTE — PLAN OF CARE
Problem: Patient Care Overview  Goal: Plan of Care/Patient Progress Review  Outcome: Improving  Pt sitting in wheelchair for most of AM. Pt very talkative about his home life (pipes bursting in apartment) and caring for his handicapped children. Pt refusing in patient chemical dependency program because of his home situation. MSSA  11. Valium given. Pt has large burn blister on third finger/ right hand. Pt also has a Dime size healing blister on knuckle of right hand.Pt states that he burned himself while taking a roast out of oven. Pt broke open blister and pulled top of skin off of the finger. New dressing placed on finger. Dr. Duran notified and order for bactroban ointment obtained.     1830 Pt upset over pipes bursting in appt. Making many phone calls. Trying to get legal advice over phone. Pt diaphoretic with baseline tremor. MSSA score 14. Valium given. Pt went off floor and came back smelling of smoke. Pt reminded that he had nicotine patch on and could not smoke with it on. Pt stated he took it off. This writer asked to see patch and then patient admitted he did not take it off while smoking. Pt again reminded to not smoke with it on. Pt agreed he would let us know if going off unit. Bactrim applied to Third finger burn and redressed with 4x4.

## 2018-01-19 VITALS
WEIGHT: 213.7 LBS | SYSTOLIC BLOOD PRESSURE: 112 MMHG | TEMPERATURE: 98.4 F | RESPIRATION RATE: 18 BRPM | OXYGEN SATURATION: 94 % | BODY MASS INDEX: 29.81 KG/M2 | DIASTOLIC BLOOD PRESSURE: 72 MMHG | HEART RATE: 106 BPM

## 2018-01-19 LAB
ERYTHROCYTE [DISTWIDTH] IN BLOOD BY AUTOMATED COUNT: 15 % (ref 10–15)
HCT VFR BLD AUTO: 47.2 % (ref 40–53)
HGB BLD-MCNC: 15.5 G/DL (ref 13.3–17.7)
LACTATE BLD-SCNC: 0.9 MMOL/L (ref 0.7–2)
MCH RBC QN AUTO: 30.9 PG (ref 26.5–33)
MCHC RBC AUTO-ENTMCNC: 32.8 G/DL (ref 31.5–36.5)
MCV RBC AUTO: 94 FL (ref 78–100)
PLATELET # BLD AUTO: 112 10E9/L (ref 150–450)
RBC # BLD AUTO: 5.01 10E12/L (ref 4.4–5.9)
WBC # BLD AUTO: 14.9 10E9/L (ref 4–11)

## 2018-01-19 PROCEDURE — 83605 ASSAY OF LACTIC ACID: CPT | Performed by: INTERNAL MEDICINE

## 2018-01-19 PROCEDURE — 85027 COMPLETE CBC AUTOMATED: CPT | Performed by: INTERNAL MEDICINE

## 2018-01-19 PROCEDURE — 99239 HOSP IP/OBS DSCHRG MGMT >30: CPT | Performed by: INTERNAL MEDICINE

## 2018-01-19 PROCEDURE — 36415 COLL VENOUS BLD VENIPUNCTURE: CPT | Performed by: INTERNAL MEDICINE

## 2018-01-19 PROCEDURE — 25000132 ZZH RX MED GY IP 250 OP 250 PS 637: Performed by: INTERNAL MEDICINE

## 2018-01-19 RX ORDER — MUPIROCIN 20 MG/G
OINTMENT TOPICAL 2 TIMES DAILY
Qty: 22 G | Refills: 0 | Status: SHIPPED | OUTPATIENT
Start: 2018-01-19 | End: 2018-03-17

## 2018-01-19 RX ADMIN — VENLAFAXINE HYDROCHLORIDE 75 MG: 75 TABLET, EXTENDED RELEASE ORAL at 08:04

## 2018-01-19 RX ADMIN — GABAPENTIN 300 MG: 300 CAPSULE ORAL at 13:58

## 2018-01-19 RX ADMIN — GABAPENTIN 300 MG: 300 CAPSULE ORAL at 08:04

## 2018-01-19 RX ADMIN — MULTIPLE VITAMINS W/ MINERALS TAB 1 TABLET: TAB at 08:05

## 2018-01-19 RX ADMIN — PANTOPRAZOLE SODIUM 40 MG: 40 TABLET, DELAYED RELEASE ORAL at 08:04

## 2018-01-19 RX ADMIN — MUPIROCIN: 20 OINTMENT TOPICAL at 08:16

## 2018-01-19 RX ADMIN — FOLIC ACID 1 MG: 1 TABLET ORAL at 08:04

## 2018-01-19 RX ADMIN — CYANOCOBALAMIN TAB 1000 MCG 1000 MCG: 1000 TAB at 08:04

## 2018-01-19 RX ADMIN — ASPIRIN 81 MG: 81 TABLET, COATED ORAL at 08:04

## 2018-01-19 RX ADMIN — BUPRENORPHINE HYDROCHLORIDE, NALOXONE HYDROCHLORIDE 1 FILM: 8; 2 FILM, SOLUBLE BUCCAL; SUBLINGUAL at 08:15

## 2018-01-19 ASSESSMENT — ACTIVITIES OF DAILY LIVING (ADL)
ADLS_ACUITY_SCORE: 16

## 2018-01-19 NOTE — DISCHARGE SUMMARY
[facility] Discharge Summary    Jorge Rosales MRN# [id number]   Age: 62 year old YOB: 1955     Date of Admission:  1/14/2018  Date of Discharge::  1/19/2018  Admitting Physician:  Neftali Mcknight MD  Discharge Physician:         Dr Zakiya Duran MD   Primary Physician: Carlos Padron  Transferring Facility: [facility]            Discharge Diagnosis:   Principle diagnosis:   #Alcohol dependency, intoxication and withdrawal:   Secondary diagnoses:  # Afib with RVR:   #Possible COPD  #Chronic pain  #Depression   Left leg weakness        Procedures:   No significant procedures performed during this admission         Allergies:    No Known Allergies            Discharge Medications:     Current Discharge Medication List      START taking these medications    Details   mupirocin (BACTROBAN) 2 % ointment Apply topically 2 times daily  Qty: 22 g, Refills: 0    Associated Diagnoses: Encounter for wound care         CONTINUE these medications which have NOT CHANGED    Details   metoprolol tartrate (LOPRESSOR) 100 MG tablet Take 100 mg by mouth daily      methocarbamol (ROBAXIN) 750 MG tablet Take 750 mg by mouth nightly as needed for muscle spasms      aspirin 81 MG tablet Take 1 tablet (81 mg) by mouth daily  Qty: 30 tablet, Refills: 0    Associated Diagnoses: CARDIOVASCULAR SCREENING; LDL GOAL LESS THAN 160      cyanocobalamin (VITAMIN  B-12) 1000 MCG tablet Take 1 tablet (1,000 mcg) by mouth daily  Qty: 100 tablet, Refills: 0    Associated Diagnoses: Alcohol dependence with uncomplicated withdrawal (H)      gabapentin (NEURONTIN) 300 MG capsule Take 1 capsule (300 mg) by mouth 3 times daily  Qty: 90 capsule, Refills: 0    Associated Diagnoses: Chronic low back pain, unspecified back pain laterality, with sciatica presence unspecified      lidocaine (LMX4) 4 % CREA cream Apply 1 Application topically 4 times daily as needed for mild pain (Apply liberal  amount to shoulder)  Qty: 45 g, Refills: 0    Associated Diagnoses: Chronic low back pain, unspecified back pain laterality, with sciatica presence unspecified      venlafaxine (EFFEXOR-ER) 75 MG TB24 24 hr tablet Take 1 tablet (75 mg) by mouth daily  Qty: 30 each, Refills: 1    Associated Diagnoses: Recurrent major depressive disorder, remission status unspecified (H)      pantoprazole (PROTONIX) 40 MG EC tablet Take 1 tablet (40 mg) by mouth daily  Qty: 30 tablet, Refills: 0    Associated Diagnoses: Gastroesophageal reflux disease, esophagitis presence not specified      albuterol (PROAIR HFA/PROVENTIL HFA/VENTOLIN HFA) 108 (90 BASE) MCG/ACT Inhaler Inhale 2 puffs into the lungs every 6 hours as needed for shortness of breath / dyspnea or wheezing  Qty: 1 Inhaler, Refills: 1    Associated Diagnoses: Moderate persistent reactive airway disease with acute exacerbation      bismuth subsalicylate (PEPTO BISMOL) 262 MG chewable tablet Take 524 mg by mouth 2 times daily as needed for diarrhea      buprenorphine HCl-naloxone HCl (SUBOXONE) 8-2 MG per film Place 1 Film under the tongue 2 times daily      fish oil-omega-3 fatty acids 1000 MG capsule Take 2 capsules by mouth daily.  Qty: 180 capsule, Refills: 12      Multiple Vitamin (MULTIVITAMIN) per tablet Take 1 tablet by mouth daily.  Qty: 100 tablet, Refills: 12                   Consultations:   Consultation during this admission received from Psychiatry           Brief History of Presenting Illness:   Jorge Rosales is a 62 year old male with past medical history significant for alcohol dependency, smoker, COPD, chronic pain syndrome, opioid use disorder now no suboxone maintenance, depression, PTSD, L leg weakness s/p Lumbar spine surgery presents to ED on 1/14 mainly for c/o alcohol withdrawal and sob, intermittent cough.   Patient has recurrent admissions for similar reasons in the recent past. Last admission: 12/14-12/22/17.   Please see detailed H&P by   Dhital on 1/14           Hospital Course:    Jorge Rosales is a 62 year old male with past medical history significant for alcohol dependency, smoker, COPD, chronic pain syndrome, opioid use disorder now no suboxone maintenance, depression, PTSD, L leg weakness s/p Lumbar spine surgery admitted for alcohol intoxication, early withdrawal and acute on chronic hypoxic respi failure 2/2 mild COPD exacerbation.         #Alcohol dependency, intoxication and withdrawal:   ABT 0.281 on admission 1/14/2018.   Reportedly hx significant for withdrawal seizures and DTs. Last drink morning of admission. Relapsed for ~ a week ago.   - Keep on MSSA Alcohol withdrawal protocol with Diazepam.   MSSA scores are variable ( patient does have a baseline tremor). MSSA discontinued on the day of discharge as patient was relatively independent   - MVI, thiamine, folic acid daily.   - Seizure and fall precautions.    - Encourage alcohol abstinence  -Was evaluated by psychiatry. Patient is not in agreement for CD treatment and continues to voice   -There were questions of possible commitment, but this was deferred as there were significant stressors in life this time triggering the relapse            # Afib with RVR:  Brief episode on the morning of 1/15/18, no prior history   Converted back to NSR shortly after 5 mg Metoprolol and oral 25 mg scheduled dose was given early   ECHO was done on 1/15 and within normal limits   Continue to monitor.   Given that patient has not had any further episodes, will discontinue telemetry today   Given that the episode lasted for < 24 hrs and was likely associated with withdrawals, we will not consider anticoagulation for now          # Hypoxic respiratory failure, suspect acute on Chronic. ? Mild COPD exacerbation. No PFTs on file. Chest CT from 11/2017 w/ emphysema, mucoid impactions. CXR 1/14/2018: no acute infiltrate. Pro bnp: wnl.   Last time dc on home oxygen, claims never delivered.   Tobacco  abuse - current smoker.   - Respiratory Virus panel is negative   - Levalbuterol inhalers prn.   - supplemental oxygen to keep sat >90%  - hold on steroid for now.   - Encourage IS, activity as able.   - Encourage Smoking cessation.   - Outpatient Pulmonary FU. PFT.   -Scott does not need home Oxygen this time   -      # Hx of Opioid abuse   #Hx of chronic pain syndrome  Previously on maintenance Suboxone for ~ 3 yrs, [facility], last script 11/17 for 13 SL films.  Seen by psychiatry last admission, restarted on Suboxone, claims followed @ Suboxone clinic and says taking 8mg bid for now.   - Will continue PTA Suboxone for now.   - check Urine tox screen.   - PTA gabapentin.           # Depression, PTSD w/ hx of panic attacks:   Concern for benzo abuse as OP. Utox positive for benzos last adm. Treated with Phenobarb taper.   - Continue PTA venlafaxine.  - on Diazepam per MSSA  - FU Psychiatry recs.      # ?L Leg weakness (chronic), L LE DVT s/p Spinal Surgery ~ 2 yrs ago. No s/s of dvt at current.   Low platelets ( 99)  - PT.OT consult as needed  - fall precautions.       #Tobacco dependence: Nicotine patch panel.       # GERD, PUD:  PTA Protonix.               PROGRESS ON DISCHARGE DAY   Feels well today  Denies any further withdrawal symptoms  Has been downstairs several times yesterday for smoking   /72 (BP Location: Right arm)  Pulse 106  Temp 98.4  F (36.9  C) (Oral)  Resp 18  Wt 96.9 kg (213 lb 11.2 oz)  SpO2 94%  BMI 29.81 kg/m2  CVS: Normal Heart sounds   RS: Coarse  breath sounds bilaterally   Abdomen: Soft and non tender. Normal bowel sounds.   Neuro: Alert and orientated X 3        Pending Tests at Discharge:     Unresulted Labs Ordered in the Past 30 Days of this Admission     No orders found from 11/15/2017 to 1/15/2018.                 Discharge instructions and Follow up:       Discharge Procedure Orders  Home care nursing referral   Referral Type: Home  Health Therapies & Aides     Home Care PT Referral for Hospital Discharge   Referral Type: Home Health Therapies & Aides     Reason for your hospital stay   Order Comments: Alcohol intoxication/ Detox     Adult [facility] Follow-up and recommended labs and tests   Order Comments: Please follow up with your PCP, Dr Padron as organized.  Please follow up with the information for resources for treatment for alcohol addiction  Appointments on [place] and/or [facility] (with [facility] or [facility] provider or service). Call [phone] if you haven't heard regarding these appointments within 7 days of discharge.     Activity   Order Comments: Your activity upon discharge: activity as tolerated   Order Specific Question Answer Comments   Is discharge order? Yes      Full Code     Diet   Order Comments: Follow this diet upon discharge: Orders Placed This Encounter     Advance Diet as Tolerated: Regular Diet Adult   Order Specific Question Answer Comments   Is discharge order? Yes                Discharge Disposition:   Discharged to Home       Plan of care discussed with PCP, Dr Carlos Padron     Time spent : 35  mts total with patient and coordination of care       Dr PAT Duran MD  Hospitalist ( Internal medicine)  Pager: [phone]

## 2018-01-19 NOTE — DISCHARGE INSTRUCTIONS
Please call [facility] (Phone: [phone])  to schedule  of home overnight oximetry study.

## 2018-01-19 NOTE — PLAN OF CARE
Problem: Patient Care Overview  Goal: Plan of Care/Patient Progress Review  Outcome: Improving  A/O x4. VSS. HR tachy,106. Pt up indep in room with cane, uses w/c for mobility in [place]. States LLE baseline n/t from previous back surgery. MSSA 6. Pt voiding without difficulty. Tolerating reg diet. Dsg changed to burn on 3rd finger R hand, no drainage noted, wound scabbing. Sepsis protocol triggered, lactic 0.9, Dr. Duran notified via text. Pt hopes to discharge today, declines treatment. Continue POC.     1430  Seen by MD prior to discharge after lactic was back. Pt was discharged. PIV removed. Discharge instructions given and explained. Pt states he has no questions. Belonging envelop with suboxone strip in it was returned to pt. Pt discharged to home. [facility] to follow.

## 2018-01-19 NOTE — PLAN OF CARE
Problem: Patient Care Overview  Goal: Plan of Care/Patient Progress Review  Outcome: No Change  Shift report from [id number]      VS:   VSS, afebrile, A&O, on 2 LPM, denies SOB or CP   Output:   Voiding via urinal, no BM this shift   Activity:   Up ad hugo in the room, went down stairs on evenings, repositions self,   Skin: Intact, some scars and scabs on hands, and left finger has gauze dressing for burn, some bruising   Pain:   Generalized aching and pain in left leg   Neuro/CMS:   Baseline numbness in LLE, good motor strength but generalized weakness present, anxious and restless at times, cooperative   Dressing(s):   Gauze dressing around left middle finger   Diet:   Tolerating regular diet, no swallowing issues noted, denies nausea   LDA:      Equipment:   WC for going outside, seizure pads on bed   Plan:   Continue to monitor and assist.   Additional Info:   Pt has been going outside to smoke on evenings and has nicotine patch in place- evening nurse informed pt of danger of doing so, Currently on COW and MSSA scoring, valium given per protocol,

## 2018-01-19 NOTE — PROGRESS NOTES
Care Coordinator- Discharge Planning     Admission Date/Time:  1/14/2018  Attending MD:  Josh Norris MD     Data  Date of initial CC assessment:  1/18  Chart reviewed, discussed with interdisciplinary team.   Patient was admitted for:   1. Encounter for wound care    2. COPD exacerbation (H)    3. Acute on chronic respiratory failure with hypoxia (H)    4. Acute alcoholic intoxication in alcoholism without complication (H)    5. Cigarette smoker    6. Acute and chronic respiratory failure with hypoxia (H)    7. Alcohol dependence with uncomplicated intoxication (H)         Assessment  Full assessment completed in previous note    Coordination of Care and Referrals: Referral remains in place to [facility] for resumption of home RN, PT and HHA visits. Patient medically stable for discharge today. He has follow up in place with Dr. Padron at [facility].  Provided update to Jose at [facility] and final d/c orders and H&P faxed.  Patient will follow up with [facility] to have his completed home oximetry study picked up.        Plan  Anticipated Discharge Date:  today  Anticipated Discharge Plan:  Home with resumption of home care and close follow up with PCP    CTS Handoff completed:  YES    Nereida Carbone RN  Care Coordinator  Pager [phone]

## 2018-03-17 ENCOUNTER — HOSPITAL ENCOUNTER (INPATIENT)
Facility: CLINIC | Age: 63
LOS: 23 days | Discharge: PSYCHIATRIC OR SUBSTANCE USE FACILITY | DRG: 896 | End: 2018-04-09
Attending: EMERGENCY MEDICINE | Admitting: HOSPITALIST
Payer: COMMERCIAL

## 2018-03-17 ENCOUNTER — APPOINTMENT (OUTPATIENT)
Dept: GENERAL RADIOLOGY | Facility: CLINIC | Age: 63
DRG: 896 | End: 2018-03-17
Attending: EMERGENCY MEDICINE
Payer: COMMERCIAL

## 2018-03-17 DIAGNOSIS — M54.5 CHRONIC LOW BACK PAIN, UNSPECIFIED BACK PAIN LATERALITY, WITH SCIATICA PRESENCE UNSPECIFIED: Primary | ICD-10-CM

## 2018-03-17 DIAGNOSIS — F10.220 ACUTE ALCOHOLIC INTOXICATION IN ALCOHOLISM WITHOUT COMPLICATION (H): ICD-10-CM

## 2018-03-17 DIAGNOSIS — J18.9 PNEUMONIA OF RIGHT LOWER LOBE DUE TO INFECTIOUS ORGANISM: ICD-10-CM

## 2018-03-17 DIAGNOSIS — G89.29 CHRONIC LOW BACK PAIN, UNSPECIFIED BACK PAIN LATERALITY, WITH SCIATICA PRESENCE UNSPECIFIED: Primary | ICD-10-CM

## 2018-03-17 PROBLEM — F10.10 ALCOHOL ABUSE: Status: ACTIVE | Noted: 2018-03-17

## 2018-03-17 LAB
ALBUMIN SERPL-MCNC: 3.3 G/DL (ref 3.4–5)
ALBUMIN UR-MCNC: 30 MG/DL
ALCOHOL BREATH TEST: 0.34 (ref 0–0.01)
ALP SERPL-CCNC: 88 U/L (ref 40–150)
ALT SERPL W P-5'-P-CCNC: 22 U/L (ref 0–70)
AMPHETAMINES UR QL SCN: NEGATIVE
ANION GAP SERPL CALCULATED.3IONS-SCNC: 16 MMOL/L (ref 3–14)
APPEARANCE UR: CLEAR
AST SERPL W P-5'-P-CCNC: 20 U/L (ref 0–45)
BACTERIA #/AREA URNS HPF: ABNORMAL /HPF
BARBITURATES UR QL: NEGATIVE
BASE DEFICIT BLDV-SCNC: 3.4 MMOL/L
BASOPHILS # BLD AUTO: 0 10E9/L (ref 0–0.2)
BASOPHILS NFR BLD AUTO: 0 %
BENZODIAZ UR QL: NEGATIVE
BILIRUB SERPL-MCNC: 0.7 MG/DL (ref 0.2–1.3)
BILIRUB UR QL STRIP: NEGATIVE
BUN SERPL-MCNC: 33 MG/DL (ref 7–30)
CALCIUM SERPL-MCNC: 8 MG/DL (ref 8.5–10.1)
CANNABINOIDS UR QL SCN: NEGATIVE
CHLORIDE SERPL-SCNC: 102 MMOL/L (ref 94–109)
CO2 BLDCOV-SCNC: 23 MMOL/L (ref 21–28)
CO2 SERPL-SCNC: 22 MMOL/L (ref 20–32)
COCAINE UR QL: NEGATIVE
COLOR UR AUTO: YELLOW
CREAT SERPL-MCNC: 0.78 MG/DL (ref 0.66–1.25)
DIFFERENTIAL METHOD BLD: ABNORMAL
EOSINOPHIL # BLD AUTO: 0 10E9/L (ref 0–0.7)
EOSINOPHIL NFR BLD AUTO: 0 %
ERYTHROCYTE [DISTWIDTH] IN BLOOD BY AUTOMATED COUNT: 14.7 % (ref 10–15)
ETHANOL UR QL SCN: POSITIVE
GFR SERPL CREATININE-BSD FRML MDRD: >90 ML/MIN/1.7M2
GLUCOSE BLDC GLUCOMTR-MCNC: 206 MG/DL (ref 70–99)
GLUCOSE SERPL-MCNC: 178 MG/DL (ref 70–99)
GLUCOSE UR STRIP-MCNC: NEGATIVE MG/DL
HCO3 BLDV-SCNC: 23 MMOL/L (ref 21–28)
HCT VFR BLD AUTO: 45.9 % (ref 40–53)
HGB BLD-MCNC: 15.8 G/DL (ref 13.3–17.7)
HGB UR QL STRIP: ABNORMAL
HYALINE CASTS #/AREA URNS LPF: 39 /LPF (ref 0–2)
IMM GRANULOCYTES # BLD: 0.1 10E9/L (ref 0–0.4)
IMM GRANULOCYTES NFR BLD: 0.4 %
KETONES UR STRIP-MCNC: 40 MG/DL
L PNEUMO1 AG UR QL IA: NORMAL
LACTATE BLD-SCNC: 4.3 MMOL/L (ref 0.7–2.1)
LACTATE BLD-SCNC: 4.6 MMOL/L (ref 0.7–2)
LACTATE BLD-SCNC: 5.3 MMOL/L (ref 0.7–2)
LEUKOCYTE ESTERASE UR QL STRIP: NEGATIVE
LYMPHOCYTES # BLD AUTO: 2.6 10E9/L (ref 0.8–5.3)
LYMPHOCYTES NFR BLD AUTO: 10.5 %
MCH RBC QN AUTO: 30.7 PG (ref 26.5–33)
MCHC RBC AUTO-ENTMCNC: 34.4 G/DL (ref 31.5–36.5)
MCV RBC AUTO: 89 FL (ref 78–100)
MONOCYTES # BLD AUTO: 1.5 10E9/L (ref 0–1.3)
MONOCYTES NFR BLD AUTO: 6 %
MUCOUS THREADS #/AREA URNS LPF: PRESENT /LPF
NEUTROPHILS # BLD AUTO: 20.9 10E9/L (ref 1.6–8.3)
NEUTROPHILS NFR BLD AUTO: 83.1 %
NITRATE UR QL: NEGATIVE
NRBC # BLD AUTO: 0 10*3/UL
NRBC BLD AUTO-RTO: 0 /100
NT-PROBNP SERPL-MCNC: 157 PG/ML (ref 0–900)
O2/TOTAL GAS SETTING VFR VENT: ABNORMAL %
OPIATES UR QL SCN: NEGATIVE
PCO2 BLDV: 45 MM HG (ref 40–50)
PCO2 BLDV: 46 MM HG (ref 40–50)
PH BLDV: 7.31 PH (ref 7.32–7.43)
PH BLDV: 7.32 PH (ref 7.32–7.43)
PH UR STRIP: 6 PH (ref 5–7)
PLATELET # BLD AUTO: 178 10E9/L (ref 150–450)
PO2 BLDV: 57 MM HG (ref 25–47)
PO2 BLDV: 66 MM HG (ref 25–47)
POTASSIUM SERPL-SCNC: 3.6 MMOL/L (ref 3.4–5.3)
PROCALCITONIN SERPL-MCNC: 0.19 NG/ML
PROT SERPL-MCNC: 7.6 G/DL (ref 6.8–8.8)
RBC # BLD AUTO: 5.14 10E12/L (ref 4.4–5.9)
RBC #/AREA URNS AUTO: 2 /HPF (ref 0–2)
SAO2 % BLDV FROM PO2: 86 %
SODIUM SERPL-SCNC: 140 MMOL/L (ref 133–144)
SOURCE: ABNORMAL
SP GR UR STRIP: 1.02 (ref 1–1.03)
SPECIMEN SOURCE: NORMAL
TROPONIN I SERPL-MCNC: <0.015 UG/L (ref 0–0.04)
UROBILINOGEN UR STRIP-MCNC: NORMAL MG/DL (ref 0–2)
WBC # BLD AUTO: 25.1 10E9/L (ref 4–11)
WBC #/AREA URNS AUTO: 2 /HPF (ref 0–5)

## 2018-03-17 PROCEDURE — 80320 DRUG SCREEN QUANTALCOHOLS: CPT | Performed by: EMERGENCY MEDICINE

## 2018-03-17 PROCEDURE — 96367 TX/PROPH/DG ADDL SEQ IV INF: CPT | Performed by: EMERGENCY MEDICINE

## 2018-03-17 PROCEDURE — 99291 CRITICAL CARE FIRST HOUR: CPT | Mod: 25 | Performed by: EMERGENCY MEDICINE

## 2018-03-17 PROCEDURE — 87800 DETECT AGNT MULT DNA DIREC: CPT | Performed by: EMERGENCY MEDICINE

## 2018-03-17 PROCEDURE — 84484 ASSAY OF TROPONIN QUANT: CPT | Performed by: EMERGENCY MEDICINE

## 2018-03-17 PROCEDURE — 40000275 ZZH STATISTIC RCP TIME EA 10 MIN

## 2018-03-17 PROCEDURE — 80307 DRUG TEST PRSMV CHEM ANLYZR: CPT | Performed by: EMERGENCY MEDICINE

## 2018-03-17 PROCEDURE — 81001 URINALYSIS AUTO W/SCOPE: CPT | Performed by: EMERGENCY MEDICINE

## 2018-03-17 PROCEDURE — HZ2ZZZZ DETOXIFICATION SERVICES FOR SUBSTANCE ABUSE TREATMENT: ICD-10-PCS | Performed by: HOSPITALIST

## 2018-03-17 PROCEDURE — 80053 COMPREHEN METABOLIC PANEL: CPT | Performed by: EMERGENCY MEDICINE

## 2018-03-17 PROCEDURE — 83605 ASSAY OF LACTIC ACID: CPT | Performed by: HOSPITALIST

## 2018-03-17 PROCEDURE — 36415 COLL VENOUS BLD VENIPUNCTURE: CPT | Performed by: HOSPITALIST

## 2018-03-17 PROCEDURE — 96365 THER/PROPH/DIAG IV INF INIT: CPT | Performed by: EMERGENCY MEDICINE

## 2018-03-17 PROCEDURE — 93010 ELECTROCARDIOGRAM REPORT: CPT | Mod: Z6 | Performed by: EMERGENCY MEDICINE

## 2018-03-17 PROCEDURE — 82803 BLOOD GASES ANY COMBINATION: CPT | Performed by: EMERGENCY MEDICINE

## 2018-03-17 PROCEDURE — 87040 BLOOD CULTURE FOR BACTERIA: CPT | Performed by: HOSPITALIST

## 2018-03-17 PROCEDURE — 94640 AIRWAY INHALATION TREATMENT: CPT

## 2018-03-17 PROCEDURE — 82803 BLOOD GASES ANY COMBINATION: CPT

## 2018-03-17 PROCEDURE — 00000146 ZZHCL STATISTIC GLUCOSE BY METER IP

## 2018-03-17 PROCEDURE — 87181 SC STD AGAR DILUTION PER AGT: CPT | Performed by: EMERGENCY MEDICINE

## 2018-03-17 PROCEDURE — 84145 PROCALCITONIN (PCT): CPT | Performed by: EMERGENCY MEDICINE

## 2018-03-17 PROCEDURE — 83605 ASSAY OF LACTIC ACID: CPT | Performed by: EMERGENCY MEDICINE

## 2018-03-17 PROCEDURE — 99285 EMERGENCY DEPT VISIT HI MDM: CPT | Mod: 25 | Performed by: EMERGENCY MEDICINE

## 2018-03-17 PROCEDURE — 25000132 ZZH RX MED GY IP 250 OP 250 PS 637: Performed by: HOSPITALIST

## 2018-03-17 PROCEDURE — 25000128 H RX IP 250 OP 636: Performed by: HOSPITALIST

## 2018-03-17 PROCEDURE — 87086 URINE CULTURE/COLONY COUNT: CPT | Performed by: EMERGENCY MEDICINE

## 2018-03-17 PROCEDURE — 25000125 ZZHC RX 250: Performed by: HOSPITALIST

## 2018-03-17 PROCEDURE — 96361 HYDRATE IV INFUSION ADD-ON: CPT | Performed by: EMERGENCY MEDICINE

## 2018-03-17 PROCEDURE — 25000132 ZZH RX MED GY IP 250 OP 250 PS 637

## 2018-03-17 PROCEDURE — 12000001 ZZH R&B MED SURG/OB UMMC

## 2018-03-17 PROCEDURE — 96366 THER/PROPH/DIAG IV INF ADDON: CPT | Performed by: EMERGENCY MEDICINE

## 2018-03-17 PROCEDURE — 25000128 H RX IP 250 OP 636: Performed by: EMERGENCY MEDICINE

## 2018-03-17 PROCEDURE — 93005 ELECTROCARDIOGRAM TRACING: CPT | Performed by: EMERGENCY MEDICINE

## 2018-03-17 PROCEDURE — 85025 COMPLETE CBC W/AUTO DIFF WBC: CPT | Performed by: EMERGENCY MEDICINE

## 2018-03-17 PROCEDURE — 25800025 ZZH RX 258: Performed by: HOSPITALIST

## 2018-03-17 PROCEDURE — 87040 BLOOD CULTURE FOR BACTERIA: CPT | Performed by: EMERGENCY MEDICINE

## 2018-03-17 PROCEDURE — 83605 ASSAY OF LACTIC ACID: CPT

## 2018-03-17 PROCEDURE — 83880 ASSAY OF NATRIURETIC PEPTIDE: CPT | Performed by: EMERGENCY MEDICINE

## 2018-03-17 PROCEDURE — 87899 AGENT NOS ASSAY W/OPTIC: CPT | Performed by: EMERGENCY MEDICINE

## 2018-03-17 PROCEDURE — 87076 CULTURE ANAEROBE IDENT EACH: CPT | Performed by: EMERGENCY MEDICINE

## 2018-03-17 PROCEDURE — 71046 X-RAY EXAM CHEST 2 VIEWS: CPT

## 2018-03-17 RX ORDER — VENLAFAXINE HYDROCHLORIDE 75 MG/1
75 TABLET, EXTENDED RELEASE ORAL DAILY
Status: DISCONTINUED | OUTPATIENT
Start: 2018-03-17 | End: 2018-04-09 | Stop reason: HOSPADM

## 2018-03-17 RX ORDER — IPRATROPIUM BROMIDE AND ALBUTEROL SULFATE 2.5; .5 MG/3ML; MG/3ML
3 SOLUTION RESPIRATORY (INHALATION) EVERY 4 HOURS
Status: DISCONTINUED | OUTPATIENT
Start: 2018-03-17 | End: 2018-03-18

## 2018-03-17 RX ORDER — POLYETHYLENE GLYCOL 3350 17 G/17G
17 POWDER, FOR SOLUTION ORAL DAILY PRN
Status: DISCONTINUED | OUTPATIENT
Start: 2018-03-17 | End: 2018-04-09 | Stop reason: HOSPADM

## 2018-03-17 RX ORDER — PIPERACILLIN SODIUM, TAZOBACTAM SODIUM 3; .375 G/15ML; G/15ML
3.38 INJECTION, POWDER, LYOPHILIZED, FOR SOLUTION INTRAVENOUS EVERY 6 HOURS
Status: DISCONTINUED | OUTPATIENT
Start: 2018-03-17 | End: 2018-03-18

## 2018-03-17 RX ORDER — NALOXONE HYDROCHLORIDE 0.4 MG/ML
.1-.4 INJECTION, SOLUTION INTRAMUSCULAR; INTRAVENOUS; SUBCUTANEOUS
Status: DISCONTINUED | OUTPATIENT
Start: 2018-03-17 | End: 2018-04-09 | Stop reason: HOSPADM

## 2018-03-17 RX ORDER — BUPRENORPHINE HYDROCHLORIDE AND NALOXONE HYDROCHLORIDE DIHYDRATE 8; 2 MG/1; MG/1
1 TABLET SUBLINGUAL 2 TIMES DAILY
Status: DISCONTINUED | OUTPATIENT
Start: 2018-03-17 | End: 2018-04-09 | Stop reason: HOSPADM

## 2018-03-17 RX ORDER — BUPRENORPHINE HYDROCHLORIDE AND NALOXONE HYDROCHLORIDE DIHYDRATE 8; 2 MG/1; MG/1
TABLET SUBLINGUAL
COMMUNITY
End: 2018-11-06

## 2018-03-17 RX ORDER — CHLORDIAZEPOXIDE HYDROCHLORIDE 25 MG/1
25 CAPSULE, GELATIN COATED ORAL 3 TIMES DAILY
Status: DISCONTINUED | OUTPATIENT
Start: 2018-03-17 | End: 2018-03-18

## 2018-03-17 RX ORDER — PIPERACILLIN SODIUM, TAZOBACTAM SODIUM 3; .375 G/15ML; G/15ML
3.38 INJECTION, POWDER, LYOPHILIZED, FOR SOLUTION INTRAVENOUS ONCE
Status: COMPLETED | OUTPATIENT
Start: 2018-03-17 | End: 2018-03-17

## 2018-03-17 RX ORDER — ASPIRIN 81 MG/1
TABLET, CHEWABLE ORAL
Status: COMPLETED
Start: 2018-03-17 | End: 2018-03-17

## 2018-03-17 RX ORDER — NICOTINE 21 MG/24HR
1 PATCH, TRANSDERMAL 24 HOURS TRANSDERMAL DAILY
Status: DISCONTINUED | OUTPATIENT
Start: 2018-03-17 | End: 2018-03-18

## 2018-03-17 RX ORDER — ASPIRIN 81 MG/1
81 TABLET ORAL DAILY
Status: DISCONTINUED | OUTPATIENT
Start: 2018-03-18 | End: 2018-04-09 | Stop reason: HOSPADM

## 2018-03-17 RX ORDER — METHOCARBAMOL 750 MG/1
750 TABLET, FILM COATED ORAL
Status: DISCONTINUED | OUTPATIENT
Start: 2018-03-17 | End: 2018-04-09 | Stop reason: HOSPADM

## 2018-03-17 RX ORDER — ALBUTEROL SULFATE 90 UG/1
2 AEROSOL, METERED RESPIRATORY (INHALATION) EVERY 6 HOURS PRN
Status: DISCONTINUED | OUTPATIENT
Start: 2018-03-17 | End: 2018-03-18

## 2018-03-17 RX ORDER — GABAPENTIN 300 MG/1
600 CAPSULE ORAL 4 TIMES DAILY
Status: DISCONTINUED | OUTPATIENT
Start: 2018-03-17 | End: 2018-04-09 | Stop reason: HOSPADM

## 2018-03-17 RX ORDER — METOPROLOL SUCCINATE 50 MG/1
50 TABLET, EXTENDED RELEASE ORAL DAILY
Status: DISCONTINUED | OUTPATIENT
Start: 2018-03-18 | End: 2018-03-18

## 2018-03-17 RX ORDER — DEXTROSE MONOHYDRATE, SODIUM CHLORIDE, AND POTASSIUM CHLORIDE 50; 1.49; 9 G/1000ML; G/1000ML; G/1000ML
INJECTION, SOLUTION INTRAVENOUS CONTINUOUS
Status: DISPENSED | OUTPATIENT
Start: 2018-03-17 | End: 2018-03-18

## 2018-03-17 RX ORDER — LANOLIN ALCOHOL/MO/W.PET/CERES
1000 CREAM (GRAM) TOPICAL DAILY
Status: DISCONTINUED | OUTPATIENT
Start: 2018-03-17 | End: 2018-04-09 | Stop reason: HOSPADM

## 2018-03-17 RX ORDER — METOPROLOL SUCCINATE 50 MG/1
50 TABLET, EXTENDED RELEASE ORAL DAILY
Status: ON HOLD | COMMUNITY
End: 2018-04-09

## 2018-03-17 RX ORDER — DOXYCYCLINE 100 MG/10ML
100 INJECTION, POWDER, LYOPHILIZED, FOR SOLUTION INTRAVENOUS EVERY 12 HOURS
Status: DISCONTINUED | OUTPATIENT
Start: 2018-03-17 | End: 2018-03-18

## 2018-03-17 RX ORDER — GABAPENTIN 300 MG/1
600 CAPSULE ORAL 4 TIMES DAILY
Status: ON HOLD | COMMUNITY
End: 2018-04-09

## 2018-03-17 RX ORDER — LORAZEPAM 1 MG/1
1-4 TABLET ORAL EVERY 30 MIN PRN
Status: DISCONTINUED | OUTPATIENT
Start: 2018-03-17 | End: 2018-03-19

## 2018-03-17 RX ORDER — BISMUTH SUBSALICYLATE 262 MG/1
524 TABLET, CHEWABLE ORAL 2 TIMES DAILY PRN
Status: DISCONTINUED | OUTPATIENT
Start: 2018-03-17 | End: 2018-04-09 | Stop reason: HOSPADM

## 2018-03-17 RX ORDER — POLYETHYLENE GLYCOL 3350 17 G/17G
17 POWDER, FOR SOLUTION ORAL DAILY PRN
COMMUNITY
End: 2018-05-06

## 2018-03-17 RX ADMIN — LORAZEPAM 2 MG: 1 TABLET ORAL at 23:51

## 2018-03-17 RX ADMIN — VANCOMYCIN HYDROCHLORIDE 2000 MG: 10 INJECTION, POWDER, LYOPHILIZED, FOR SOLUTION INTRAVENOUS at 14:56

## 2018-03-17 RX ADMIN — CHLORDIAZEPOXIDE HYDROCHLORIDE 25 MG: 25 CAPSULE ORAL at 20:47

## 2018-03-17 RX ADMIN — IPRATROPIUM BROMIDE AND ALBUTEROL SULFATE 3 ML: .5; 3 SOLUTION RESPIRATORY (INHALATION) at 22:08

## 2018-03-17 RX ADMIN — PIPERACILLIN SODIUM AND TAZOBACTAM SODIUM 3.38 G: 3; .375 INJECTION, POWDER, LYOPHILIZED, FOR SOLUTION INTRAVENOUS at 20:47

## 2018-03-17 RX ADMIN — POTASSIUM CHLORIDE, DEXTROSE MONOHYDRATE AND SODIUM CHLORIDE: 150; 5; 900 INJECTION, SOLUTION INTRAVENOUS at 20:46

## 2018-03-17 RX ADMIN — LORAZEPAM 2 MG: 1 TABLET ORAL at 20:15

## 2018-03-17 RX ADMIN — NICOTINE 1 PATCH: 14 PATCH, EXTENDED RELEASE TRANSDERMAL at 21:51

## 2018-03-17 RX ADMIN — ASPIRIN 81 MG CHEWABLE TABLET 81 MG: 81 TABLET CHEWABLE at 20:48

## 2018-03-17 RX ADMIN — PIPERACILLIN SODIUM AND TAZOBACTAM SODIUM 3.38 G: 3; .375 INJECTION, POWDER, LYOPHILIZED, FOR SOLUTION INTRAVENOUS at 14:23

## 2018-03-17 RX ADMIN — SODIUM CHLORIDE 1000 ML: 9 INJECTION, SOLUTION INTRAVENOUS at 21:50

## 2018-03-17 RX ADMIN — SODIUM CHLORIDE 1000 ML: 9 INJECTION, SOLUTION INTRAVENOUS at 16:02

## 2018-03-17 RX ADMIN — GABAPENTIN 600 MG: 300 CAPSULE ORAL at 20:15

## 2018-03-17 RX ADMIN — DOXYCYCLINE 100 MG: 100 INJECTION, POWDER, LYOPHILIZED, FOR SOLUTION INTRAVENOUS at 21:50

## 2018-03-17 RX ADMIN — VENLAFAXINE HYDROCHLORIDE 75 MG: 75 TABLET, EXTENDED RELEASE ORAL at 20:15

## 2018-03-17 RX ADMIN — CYANOCOBALAMIN TAB 1000 MCG 1000 MCG: 1000 TAB at 20:15

## 2018-03-17 RX ADMIN — SODIUM CHLORIDE 1000 ML: 9 INJECTION, SOLUTION INTRAVENOUS at 13:31

## 2018-03-17 RX ADMIN — BUPRENORPHINE AND NALOXONE 1 TABLET: 8; 2 TABLET SUBLINGUAL at 20:47

## 2018-03-17 ASSESSMENT — ACTIVITIES OF DAILY LIVING (ADL)
FALL_HISTORY_WITHIN_LAST_SIX_MONTHS: YES
RETIRED_COMMUNICATION: 0-->UNDERSTANDS/COMMUNICATES WITHOUT DIFFICULTY
DRESS: 2-->ASSISTIVE PERSON
TRANSFERRING: 2-->ASSISTIVE PERSON
ADLS_ACUITY_SCORE: 21
SWALLOWING: 0-->SWALLOWS FOODS/LIQUIDS WITHOUT DIFFICULTY
RETIRED_EATING: 0-->INDEPENDENT
BATHING: 2-->ASSISTIVE PERSON
TOILETING: 2-->ASSISTIVE PERSON
WHICH_OF_THE_ABOVE_FUNCTIONAL_RISKS_HAD_A_RECENT_ONSET_OR_CHANGE?: AMBULATION;TRANSFERRING;TOILETING;BATHING;DRESSING
AMBULATION: 2-->ASSISTIVE PERSON
COGNITION: 1 - ATTENTION OR MEMORY DEFICITS

## 2018-03-17 ASSESSMENT — ENCOUNTER SYMPTOMS
FEVER: 1
NAUSEA: 0
SHORTNESS OF BREATH: 1

## 2018-03-17 NOTE — IP AVS SNAPSHOT
` `     [facility] [address]: [phone]            Medication Administration Report for Jorge Rosales as of 04/03/18 1109   Legend:    Given Hold Not Given Due Canceled Entry Other Actions    Time Time (Time) Time  Time-Action       Inactive    Active    Linked        Medications 03/28/18 03/29/18 03/30/18 03/31/18 04/01/18 04/02/18 04/03/18    acetaminophen (TYLENOL) tablet 650 mg  Dose: 650 mg  Freq: EVERY 4 HOURS PRN Route: PO  PRN Reasons: mild pain,fever  Start: 03/20/18 2118   Admin Instructions: Maximum acetaminophen dose from all sources = 75 mg/kg/day not to exceed 4 grams/day.     0843 (650 mg)-Given       1421 (650 mg)-Given       1801 (650 mg)-Given        0001 (650 mg)-Given       0806 (650 mg)-Given       1342 (650 mg)-Given       2025 (650 mg)-Given        0851 (650 mg)-Given       1303 (650 mg)-Given       1659 (650 mg)-Given        1132 (650 mg)-Given       1616 (650 mg)-Given        1010 (650 mg)-Given       1922 (650 mg)-Given        0908 (650 mg)-Given       1235 (650 mg)-Given       2056 (650 mg)-Given        0102 (650 mg)-Given       0847 (650 mg)-Given           aspirin EC EC tablet 81 mg  Dose: 81 mg  Freq: DAILY Route: PO  Start: 03/18/18 0800    0843 (81 mg)-Given        0806 (81 mg)-Given        0851 (81 mg)-Given        0804 (81 mg)-Given        0754 (81 mg)-Given        0908 (81 mg)-Given        0847 (81 mg)-Given           benzonatate (TESSALON) capsule 100 mg  Dose: 100 mg  Freq: 3 TIMES DAILY PRN Route: PO  PRN Reason: cough  Start: 03/18/18 0945              bismuth subsalicylate (PEPTO BISMOL) chewable tablet 524 mg  Dose: 524 mg  Freq: 2 TIMES DAILY PRN Route: PO  PRN Reason: diarrhea  Start: 03/17/18 1938              buprenorphine-naloxone (SUBOXONE) 8-2 MG sublingual tablet 1 tablet  Dose: 1 tablet  Freq: 2 TIMES DAILY Route: SL  Start: 03/17/18 2000    0844 (1 tablet)-Given       2010 (1 tablet)-Given        0806 (1 tablet)-Given       2025 (1 tablet)-Given        0851 (1  tablet)-Given       1958 (1 tablet)-Given        0805 (1 tablet)-Given       1951 (1 tablet)-Given        0754 (1 tablet)-Given       2116 (1 tablet)-Given        0908 (1 tablet)-Given       2057 (1 tablet)-Given        0847 (1 tablet)-Given       [ ] 2000           cyanocobalamin (vitamin  B-12) tablet 1,000 mcg  Dose: 1,000 mcg  Freq: DAILY Route: PO  Start: 03/17/18 1945    0843 (1,000 mcg)-Given        0806 (1,000 mcg)-Given        0851 (1,000 mcg)-Given        0804 (1,000 mcg)-Given        0754 (1,000 mcg)-Given        0908 (1,000 mcg)-Given        0847 (1,000 mcg)-Given           diazepam (VALIUM) tablet 5-20 mg  Dose: 5-20 mg  Freq: EVERY 30 MIN PRN Route: PO  PRN Reason: other  PRN Comment: Dose according to patient's MSSA Score (see admin instructions)  Start: 03/19/18 0457   Admin Instructions: Dosing:  * For Score   greater than or equal to 20.....give 10 to 20 mg......repeat assessment/vitals in 30 min.  * For Score                                         15-19..... give 5 to 10 mg..............repeat assessment/vitals in 1 hr  * For Score                                           8-14..... give 5 to 10 mg................repeat assessment/vitals in 2-4 hrs  * For Score                                 less than 8...........do not give..............repeat assessment/vitals in 4 hrs.  * For Score                                 less than 8 x 2 ..........do not give..............repeat assessment/vitals in 8 hrs.   NOTIFY provider and HOLD diazepam IF: *SBP less than 100 mmHg, RR less than12; *Shallow respirations or signs of upper airway obstruction; *Patient not easily arousable               enoxaparin (LOVENOX) injection 40 mg  Dose: 40 mg  Freq: EVERY 24 HOURS Route: SC  Start: 03/18/18 2330    2227 (40 mg)-Given        2222 (40 mg)-Given        2208 (40 mg)-Given        2129 (40 mg)-Given        2116 (40 mg)-Given        2057 (40 mg)-Given       (2134)-Not Given [C]        [ ] 2200           folic acid  (FOLVITE) tablet 1 mg  Dose: 1 mg  Freq: DAILY Route: PO  Start: 03/19/18 0800    0843 (1 mg)-Given        0807 (1 mg)-Given        0851 (1 mg)-Given        0805 (1 mg)-Given        0754 (1 mg)-Given        0908 (1 mg)-Given        0847 (1 mg)-Given           gabapentin (NEURONTIN) capsule 600 mg  Dose: 600 mg  Freq: 4 TIMES DAILY Route: PO  Start: 03/17/18 2000    0843 (600 mg)-Given       1421 (600 mg)-Given       1801 (600 mg)-Given       2010 (600 mg)-Given        0806 (600 mg)-Given       1342 (600 mg)-Given       1600 (600 mg)-Given       2025 (600 mg)-Given        0851 (600 mg)-Given       1303 (600 mg)-Given       1659 (600 mg)-Given       1958 (600 mg)-Given        0804 (600 mg)-Given       1132 (600 mg)-Given       1615 (600 mg)-Given       1950 (600 mg)-Given        0754 (600 mg)-Given       1102 (600 mg)-Given       1655 (600 mg)-Given       2116 (600 mg)-Given        0908 (600 mg)-Given       1235 (600 mg)-Given       1552 (600 mg)-Given       2057 (600 mg)-Given        0846 (600 mg)-Given       [ ] 1200       [ ] 1600       [ ] 2000           hydrOXYzine (ATARAX) tablet 25 mg  Dose: 25 mg  Freq: EVERY 6 HOURS PRN Route: PO  PRN Reason: anxiety  Start: 03/22/18 0413    0843 (25 mg)-Given       1801 (25 mg)-Given        0000 (25 mg)-Given       0806 (25 mg)-Given       2222 (25 mg)-Given        0851 (25 mg)-Given       1659 (25 mg)-Given       2305 (25 mg)-Given        2129 (25 mg)-Given        2129 (25 mg)-Given        2056 (25 mg)-Given            levalbuterol (XOPENEX HFA) Inhaler 2 puff  Dose: 2 puff  Freq: EVERY 6 HOURS PRN Route: IN  PRN Reason: wheezing  Start: 03/18/18 0941   Order specific questions:  Levalbuterol orders will be substituted to albuterol unless intolerance is documented here Tachycardia                levalbuterol (XOPENEX) neb solution 0.63 mg  Dose: 0.63 mg  Freq: 4 TIMES DAILY Route: NEBULIZATION  Start: 03/18/18 0945   Order specific questions:  Levalbuterol orders will be  substituted to albuterol unless intolerance is documented here Tachycardia      0817 (0.63 mg)-Given       1200 (0.63 mg)-Given       1607 (0.63 mg)-Given       2001 (0.63 mg)-Given        0834 (0.63 mg)-Given       (1200)-Not Given       1630 (0.63 mg)-Given       2202 (0.63 mg)-Given        0813 (0.63 mg)-Given       1235 (0.63 mg)-Given       1629 (0.63 mg)-Given       2001 (0.63 mg)-Given        0755 (0.63 mg)-Given       1231 (0.63 mg)-Given       1623 (0.63 mg)-Given       1956 (0.63 mg)-Given        0752 (0.63 mg)-Given       1213 (0.63 mg)-Given       1629 (0.63 mg)-Given       2011 (0.63 mg)-Given        0800 (0.63 mg)-Given       1210 (0.63 mg)-Given       1601 (0.63 mg)-Given       2010 (0.63 mg)-Given        0806 (0.63 mg)-Given       [ ] 1200       [ ] 1600       [ ] 2000           melatonin tablet 1-3 mg  Dose: 1-3 mg  Freq: AT BEDTIME PRN Route: PO  PRN Reason: sleep  Start: 03/18/18 0939   Admin Instructions: Do not give unless at least 6 hours of uninterrupted sleep is expected.     2233 (1 mg)-Given        2222 (2 mg)-Given        2300 (2 mg)-Given        2129 (2 mg)-Given        2129 (2 mg)-Given        2055 (3 mg)-Given            menthol (ICY HOT) 5 % patch 1 patch  Dose: 1 patch  Freq: EVERY 8 HOURS PRN Route: Top  PRN Reason: muscle soreness  Start: 03/28/18 1646   Admin Instructions: Apply to shoulder      0807 (1 patch)-Given       1605 (1 patch)-Given [C]        0851 (1 patch)-Given       2300 (1 patch)-Given        1241 (1 patch)-Given       1242 (1 patch)-Given        0846 (1 patch)-Given       1922 (1 patch)-Given        1552 (1 patch)-Given        0846 (1 patch)-Given           menthol (ICY HOT) Patch in Place  Freq: EVERY 8 HOURS Route: TD  Start: 03/28/18 1700   Admin Instructions: Chart every shift, confirming that patch is still in place on patient (no barcode scan needed). See patch order for dose information.     2227 ( )-Given        0001 ( )-Patch in Place       0808 ( )-Patch  in Place       1700 ( )-Patch in Place        0236 ( )-Patch in Place       0852 ( )-Patch in Place       1824 ( )-Patch in Place        0100 ( )-Patch in Place       1243 ( )-Given       1618 ( )-Patch in Place        0218 ( )-Patch in Place       0847 ( )-Given       1656 ( )-Negative        0100 ( )-Patch in Place       1559 ( )-Negative       1830 ( )-Patch in Place        0424 ( )-Patch in Place       0847 ( )-Patch in Place       [ ] 1700           menthol (ICY HOT) patch REMOVAL  Freq: EVERY 8 HOURS PRN Route: TD  PRN Comment: Muscle soreness  Start: 03/29/18 0000   Admin Instructions: Remove patch when new patch is applied or patch is discontinued.           0422 ( )-Negative           methocarbamol (ROBAXIN) tablet 750 mg  Dose: 750 mg  Freq: AT BEDTIME PRN Route: PO  PRN Reason: muscle spasms  Start: 03/17/18 1939              metoprolol (LOPRESSOR) injection 5 mg  Dose: 5 mg  Freq: EVERY 6 HOURS PRN Route: IV  PRN Comment: HR>120  Start: 03/18/18 0943   Admin Instructions: Hold for sbp<95  For ordered doses up to 15 mg, give IV Push undiluted over 5-10 minutes.               multivitamin, therapeutic (THERA-VIT) tablet 1 tablet  Dose: 1 tablet  Freq: DAILY Route: PO  Start: 03/19/18 0800    0843 (1 tablet)-Given        0806 (1 tablet)-Given        0851 (1 tablet)-Given        0805 (1 tablet)-Given        0755 (1 tablet)-Given        0908 (1 tablet)-Given        0847 (1 tablet)-Given           naloxone (NARCAN) injection 0.1-0.4 mg  Dose: 0.1-0.4 mg  Freq: EVERY 2 MIN PRN Route: IV  PRN Reason: opioid reversal  Start: 03/17/18 1942   Admin Instructions: For respiratory rate LESS than or EQUAL to 8.  Partial reversal dose:  0.1 mg titrated q 2 minutes for Analgesia Side Effects Monitoring Sedation Level of 3 (frequently drowsy, arousable, drifts to sleep during conversation).Full reversal dose:  0.4 mg bolus for Analgesia Side Effects Monitoring Sedation Level of 4 (somnolent, minimal or no response to  stimulation).  For ordered doses up to 2mg give IVP. Give each 0.4mg over 15 seconds in emergency situations. For non-emergent situations further dilute in 9mL of NS to facilitate titration of response.               nicotine (NICODERM CQ) 21 MG/24HR 24 hr patch 1 patch  Dose: 1 patch  Freq: DAILY Route: TD  Start: 03/18/18 1745    0843 (1 patch)-Given        0807 (1 patch)-Given        0851 (1 patch)-Given        0805 (1 patch)-Given        0753 (1 patch)-Given        0911 (1 patch)-Given        0846 (1 patch)-Given           nicotine Patch in Place  Freq: EVERY 8 HOURS Route: TD  Start: 03/18/18 1745   Admin Instructions: Chart every shift, confirming that patch is still in place on patient (no barcode scan needed). See patch order for dose information.     0101 ( )-Patch in Place       1156 ( )-Patch in Place       1801 ( )-Patch in Place        0745 ( )-Negative       1139 ( )-Patch in Place       1745 ( )-Patch in Place        0237 ( )-Patch in Place       0852 ( )-Patch in Place       1957 ( )-Patch in Place        0105 ( )-Patch in Place       1241 ( )-Patch in Place       1932 ( )-Patch in Place        0218 ( )-Patch in Place       1047 ( )-Patch in Place       1656 ( )-Patch in Place        0145 ( )-Patch in Place       1234 ( )-Patch in Place       1906 ( )-Patch in Place        0422 ( )-Patch in Place       0847 ( )-Patch in Place       [ ] 1745           nicotine patch REMOVAL  Freq: DAILY Route: TD  Start: 03/19/18 0800   Admin Instructions: Remove patch when new patch is applied or patch is discontinued.     0844 ( )-Patch Removed        0808 ( )-Patch Removed        0852 ( )-Patch Removed        0805 ( )-Patch Removed        0755 ( )-Patch Removed        0908 ( )-Patch Removed        0847 ( )-Patch Removed           nicotine polacrilex (NICORETTE) gum 2 mg  Dose: 2 mg  Freq: EVERY 1 HOUR PRN Route: BU  PRN Reason: smoking cessation  Start: 03/18/18 1732   Admin Instructions: Gum should be chewed  slowly until it tingles, then placed between cheek and gum:  when tingle gone, repeat process until tingle gone (about 30 minutes).               polyethylene glycol (MIRALAX/GLYCOLAX) Packet 17 g  Dose: 17 g  Freq: DAILY PRN Route: PO  PRN Reason: constipation  Start: 03/17/18 1939   Admin Instructions: 1 Packet = 17 grams. Mixed prescribed dose in 8 ounces of water. Follow with 8 oz. of water.               thiamine tablet 100 mg  Dose: 100 mg  Freq: DAILY Route: PO  Start: 03/19/18 0800    0843 (100 mg)-Given        0809 (100 mg)-Given        0851 (100 mg)-Given        0805 (100 mg)-Given        0754 (100 mg)-Given        0908 (100 mg)-Given        0847 (100 mg)-Given           venlafaxine (EFFEXOR-ER) 24 hr tablet 75 mg  Dose: 75 mg  Freq: DAILY Route: PO  Indications Comment: Depression and Anxiety  Start: 03/17/18 1945   Admin Instructions: DO NOT CRUSH.     0843 (75 mg)-Given        0806 (75 mg)-Given        0851 (75 mg)-Given        0805 (75 mg)-Given        0754 (75 mg)-Given        0908 (75 mg)-Given        0847 (75 mg)-Given        Medications 03/28/18 03/29/18 03/30/18 03/31/18 04/01/18 04/02/18 04/03/18

## 2018-03-17 NOTE — ED PROVIDER NOTES
"  History   No chief complaint on file.    HPI  Jorge Rosales is a 63 year old male who is s/p shoulder and back surgery and presents to the ED today for alcohol intoxication. Patient states that he has a \"horrible feeling\" and is experiencing pain in everywhere; specifically his shoulder and back. Patient denies any recent falls. Patient stated he has been drinking a quart of frances daily and his last drink was yesterday. Patient experienced shortness of breath yesterday, and currently feels his his heart racing and feverish (no temp taken). Patient reports it is normal for him to have breathing problems on and off and that he is a smoker.         I have reviewed the Medications, Allergies, Past Medical and Surgical History, and Social History in the Epic system.    Review of Systems   Constitutional: Positive for fever.   Respiratory: Positive for shortness of breath.    Cardiovascular: Negative for chest pain.   Gastrointestinal: Negative for nausea.   Psychiatric/Behavioral: Negative for suicidal ideas.   All other systems reviewed and are negative.      Physical Exam          Physical Exam   Constitutional: He is oriented to person, place, and time. He appears well-developed and well-nourished.   Really intoxicated.  Able to answer complete sentences.   HENT:   Head: Normocephalic and atraumatic.   Neck: Normal range of motion. Neck supple.   Cardiovascular: Normal rate, regular rhythm and normal heart sounds.    Pulmonary/Chest: Effort normal. No respiratory distress. He has no wheezes. He has no rales.   sats 88% on RA; increased to low 90's on RA   Abdominal: Soft. He exhibits no distension. There is no tenderness. There is no rebound.   Musculoskeletal: He exhibits no edema or tenderness.   Neurological: He is alert and oriented to person, place, and time.   Skin: Skin is warm.   Psychiatric: He has a normal mood and affect. His behavior is normal. Thought content normal.     Results for orders placed " or performed during the hospital encounter of 03/17/18 (from the past 24 hour(s))   Glucose by meter   Result Value Ref Range    Glucose 206 (H) 70 - 99 mg/dL   Alcohol breath test POCT   Result Value Ref Range    Alcohol Breath Test 0.345 (A) 0.00 - 0.01   XR Chest 2 Views    Narrative    CHEST TWO VIEWS 3/17/2018 1:15 PM     COMPARISON: Frontal chest x-ray 1/14/2018    HISTORY: Shortness of breath.      Impression    IMPRESSION: There is linear airspace opacity in the posterior aspect  of the right lower lobe that may represent pneumonia. The lungs are  otherwise clear. There is no pleural effusion or pneumothorax. Heart  size is normal with no evidence for congestive failure.   CBC with platelets differential   Result Value Ref Range    WBC 25.1 (H) 4.0 - 11.0 10e9/L    RBC Count 5.14 4.4 - 5.9 10e12/L    Hemoglobin 15.8 13.3 - 17.7 g/dL    Hematocrit 45.9 40.0 - 53.0 %    MCV 89 78 - 100 fl    MCH 30.7 26.5 - 33.0 pg    MCHC 34.4 31.5 - 36.5 g/dL    RDW 14.7 10.0 - 15.0 %    Platelet Count 178 150 - 450 10e9/L    Diff Method Automated Method     % Neutrophils 83.1 %    % Lymphocytes 10.5 %    % Monocytes 6.0 %    % Eosinophils 0.0 %    % Basophils 0.0 %    % Immature Granulocytes 0.4 %    Nucleated RBCs 0 0 /100    Absolute Neutrophil 20.9 (H) 1.6 - 8.3 10e9/L    Absolute Lymphocytes 2.6 0.8 - 5.3 10e9/L    Absolute Monocytes 1.5 (H) 0.0 - 1.3 10e9/L    Absolute Eosinophils 0.0 0.0 - 0.7 10e9/L    Absolute Basophils 0.0 0.0 - 0.2 10e9/L    Abs Immature Granulocytes 0.1 0 - 0.4 10e9/L    Absolute Nucleated RBC 0.0    Comprehensive metabolic panel   Result Value Ref Range    Sodium 140 133 - 144 mmol/L    Potassium 3.6 3.4 - 5.3 mmol/L    Chloride 102 94 - 109 mmol/L    Carbon Dioxide 22 20 - 32 mmol/L    Anion Gap 16 (H) 3 - 14 mmol/L    Glucose 178 (H) 70 - 99 mg/dL    Urea Nitrogen 33 (H) 7 - 30 mg/dL    Creatinine 0.78 0.66 - 1.25 mg/dL    GFR Estimate >90 >60 mL/min/1.7m2    GFR Estimate If Black >90 >60  mL/min/1.7m2    Calcium 8.0 (L) 8.5 - 10.1 mg/dL    Bilirubin Total 0.7 0.2 - 1.3 mg/dL    Albumin 3.3 (L) 3.4 - 5.0 g/dL    Protein Total 7.6 6.8 - 8.8 g/dL    Alkaline Phosphatase 88 40 - 150 U/L    ALT 22 0 - 70 U/L    AST 20 0 - 45 U/L   Nt probnp inpatient (BNP)   Result Value Ref Range    N-Terminal Pro BNP Inpatient 157 0 - 900 pg/mL   Blood gas venous   Result Value Ref Range    Ph Venous 7.32 7.32 - 7.43 pH    PCO2 Venous 45 40 - 50 mm Hg    PO2 Venous 66 (H) 25 - 47 mm Hg    Bicarbonate Venous 23 21 - 28 mmol/L    Base Deficit Venous 3.4 mmol/L    FIO2 4L    Troponin I   Result Value Ref Range    Troponin I ES <0.015 0.000 - 0.045 ug/L   Lactic acid whole blood   Result Value Ref Range    Lactic Acid 5.3 (HH) 0.7 - 2.0 mmol/L       ED Course     ED Course     Procedures             EKG Interpretation:      Interpreted by Maria R Cortés  Time reviewed: 1152  Symptoms at time of EKG: none   Rhythm: normal sinus   Rate: normal  Axis: normal  Ectopy: none  Conduction: normal  ST Segments/ T Waves: No ST-T wave changes  Q Waves: none  Comparison to prior: Unchanged    Clinical Impression: normal EKG          Critical Care time:  was 60 minutes for this patient excluding procedures.     Lactate is greater than 1.9 due to dehydration and alcohol abuse, at this time there is no sign of severe sepsis or septic shock.      Medications   0.9% sodium chloride BOLUS (1,000 mLs Intravenous New Bag 3/17/18 1602)   vancomycin (VANCOCIN) 2,000 mg in sodium chloride 0.9 % 500 mL intermittent infusion (2,000 mg Intravenous New Bag 3/17/18 1456)   0.9% sodium chloride BOLUS (0 mLs Intravenous Stopped 3/17/18 1602)   piperacillin-tazobactam (ZOSYN) 3.375 g vial to attach to  mL bag (0 g Intravenous Stopped 3/17/18 1456)     Results for orders placed or performed during the hospital encounter of 03/17/18   XR Chest 2 Views    Narrative    CHEST TWO VIEWS 3/17/2018 1:15 PM     COMPARISON: Frontal chest x-ray  1/14/2018    HISTORY: Shortness of breath.      Impression    IMPRESSION: There is linear airspace opacity in the posterior aspect  of the right lower lobe that may represent pneumonia. The lungs are  otherwise clear. There is no pleural effusion or pneumothorax. Heart  size is normal with no evidence for congestive failure.    HOLLY LEE MD   Glucose by meter   Result Value Ref Range    Glucose 206 (H) 70 - 99 mg/dL   CBC with platelets differential   Result Value Ref Range    WBC 25.1 (H) 4.0 - 11.0 10e9/L    RBC Count 5.14 4.4 - 5.9 10e12/L    Hemoglobin 15.8 13.3 - 17.7 g/dL    Hematocrit 45.9 40.0 - 53.0 %    MCV 89 78 - 100 fl    MCH 30.7 26.5 - 33.0 pg    MCHC 34.4 31.5 - 36.5 g/dL    RDW 14.7 10.0 - 15.0 %    Platelet Count 178 150 - 450 10e9/L    Diff Method Automated Method     % Neutrophils 83.1 %    % Lymphocytes 10.5 %    % Monocytes 6.0 %    % Eosinophils 0.0 %    % Basophils 0.0 %    % Immature Granulocytes 0.4 %    Nucleated RBCs 0 0 /100    Absolute Neutrophil 20.9 (H) 1.6 - 8.3 10e9/L    Absolute Lymphocytes 2.6 0.8 - 5.3 10e9/L    Absolute Monocytes 1.5 (H) 0.0 - 1.3 10e9/L    Absolute Eosinophils 0.0 0.0 - 0.7 10e9/L    Absolute Basophils 0.0 0.0 - 0.2 10e9/L    Abs Immature Granulocytes 0.1 0 - 0.4 10e9/L    Absolute Nucleated RBC 0.0    Comprehensive metabolic panel   Result Value Ref Range    Sodium 140 133 - 144 mmol/L    Potassium 3.6 3.4 - 5.3 mmol/L    Chloride 102 94 - 109 mmol/L    Carbon Dioxide 22 20 - 32 mmol/L    Anion Gap 16 (H) 3 - 14 mmol/L    Glucose 178 (H) 70 - 99 mg/dL    Urea Nitrogen 33 (H) 7 - 30 mg/dL    Creatinine 0.78 0.66 - 1.25 mg/dL    GFR Estimate >90 >60 mL/min/1.7m2    GFR Estimate If Black >90 >60 mL/min/1.7m2    Calcium 8.0 (L) 8.5 - 10.1 mg/dL    Bilirubin Total 0.7 0.2 - 1.3 mg/dL    Albumin 3.3 (L) 3.4 - 5.0 g/dL    Protein Total 7.6 6.8 - 8.8 g/dL    Alkaline Phosphatase 88 40 - 150 U/L    ALT 22 0 - 70 U/L    AST 20 0 - 45 U/L   Nt probnp inpatient (BNP)    Result Value Ref Range    N-Terminal Pro BNP Inpatient 157 0 - 900 pg/mL   Procalcitonin   Result Value Ref Range    Procalcitonin 0.19 ng/ml   Blood gas venous   Result Value Ref Range    Ph Venous 7.32 7.32 - 7.43 pH    PCO2 Venous 45 40 - 50 mm Hg    PO2 Venous 66 (H) 25 - 47 mm Hg    Bicarbonate Venous 23 21 - 28 mmol/L    Base Deficit Venous 3.4 mmol/L    FIO2 4L    Troponin I   Result Value Ref Range    Troponin I ES <0.015 0.000 - 0.045 ug/L   Lactic acid whole blood   Result Value Ref Range    Lactic Acid 5.3 (HH) 0.7 - 2.0 mmol/L   EKG 12 lead   Result Value Ref Range    Interpretation ECG Click View Image link to view waveform and result    Alcohol breath test POCT   Result Value Ref Range    Alcohol Breath Test 0.345 (A) 0.00 - 0.01   ISTAT gases lactate kaylee POCT   Result Value Ref Range    Ph Venous 7.31 (L) 7.32 - 7.43 pH    PCO2 Venous 46 40 - 50 mm Hg    PO2 Venous 57 (H) 25 - 47 mm Hg    Bicarbonate Venous 23 21 - 28 mmol/L    O2 Sat Venous 86 %    Lactic Acid 4.3 (HH) 0.7 - 2.1 mmol/L   Blood culture   Result Value Ref Range    Specimen Description Blood Left Hand     Special Requests Aerobic and anaerobic bottles received     Culture Micro PENDING          Labs Ordered and Resulted from Time of ED Arrival Up to the Time of Departure from the ED - No data to display         Assessments & Plan (with Medical Decision Making)   Patient is a 63-year-old male who is a known alcoholic who presented to the ER due to acute alcohol intoxication and generalized pain.  Patient says she has been coughing and having body aches.  Patient was found here to be hypoxic with sats in the high 80s and low 90s.  Per review of patient's medical chart he was admitted for similar reason in January and at that time had similar oxygen saturations.  Per medical chart it is that the patient was supposed to have oxygen at home but is never been appropriately delivered to him.  Patient has a history of mild COPD.  Recent  echocardiogram showed an EF of 60-65%.  Patient here was given some IV fluids and had a chest x-ray.  Chest x-ray showed a right lower lobe pneumonia.  Labs showed an elevated white blood cell count.  Liver function tests were normal.  Initial lactic acid was elevated to 5.3.  After less than 1 L of fluid patient's lactic acid has improved to 4.3.  I do not feel the patient is in septic shock at this time.  I feel like the lactic acidosis is likely a combination of dehydration and alcohol abuse.  Patient's blood pressure is currently 143/85 and his heart rate 95.  He satting in the low 90s.  He does not appear in any distress.  Patient's initial alcohol level was above 0.3.  I discussed the case with the hospitalist at [facility] who accepted the patient for admission.  Patient was started on broad-spectrum antibiotics.    I have reviewed the nursing notes.    I have reviewed the findings, diagnosis, plan and need for follow up with the patient.    New Prescriptions    No medications on file       Final diagnoses:   Pneumonia of right lower lobe due to infectious organism (H)   Acute alcoholic intoxication in alcoholism without complication (H)     IClinton, am serving as a trained medical scribe to document services personally performed by Maria R Cortés MD, based on the provider's statements to me.      Maria R COSBY MD, was physically present and have reviewed and verified the accuracy of this note documented by Clinton Rodriguez.     3/17/2018   [facility], EMERGENCY DEPARTMENT         Maria R Cortés MD  03/17/18 [id number]

## 2018-03-17 NOTE — ED NOTES
Pt is informed about isolation status, rationale, and precautions that they and staff need to follow. Cart with isolation supplies placed outside patient's room.

## 2018-03-17 NOTE — ED NOTES
[facility], [place]   ED Nurse to Floor Handoff     Jorge Rosales is a 63 year old male who speaks English and lives alone,  in a home  They arrived in the ED by car from home    ED Chief Complaint: Alcohol Intoxication (pt drank 1/2 pint of frances)    ED Dx;   Final diagnoses:   Pneumonia of right lower lobe due to infectious organism (H)   Acute alcoholic intoxication in alcoholism without complication (H)         Needed?: No    Allergies: No Known Allergies.  Past Medical Hx:   Past Medical History:   Diagnosis Date     Chronic back pain     Methadone program     COPD (chronic obstructive pulmonary disease) (H)      CTS (carpal tunnel syndrome)      Low testosterone     managed by endocrinology     Obesity      Panic attacks      PTSD (post-traumatic stress disorder) 7/8/2011     Rotator cuff injury      Substance abuse     alcohol abuse      Baseline Mental status: WDL  Current Mental Status changes: at basesline    Infection: Yes  Sepsis suspected: No  Isolation type: Droplet     Activity level - Baseline/Home:  Stand with Assist  Activity Level - Current:   Stand with Assist    Bariatric equipment needed?: No    In the ED these meds were given:   Medications   0.9% sodium chloride BOLUS (1,000 mLs Intravenous New Bag 3/17/18 1602)   0.9% sodium chloride BOLUS (0 mLs Intravenous Stopped 3/17/18 1602)   piperacillin-tazobactam (ZOSYN) 3.375 g vial to attach to  mL bag (0 g Intravenous Stopped 3/17/18 1456)   vancomycin (VANCOCIN) 2,000 mg in sodium chloride 0.9 % 500 mL intermittent infusion (2,000 mg Intravenous New Bag 3/17/18 1456)       Drips running?  Yes    Home pump or pre-existing LDA's present? No    Labs results:   Labs Ordered and Resulted from Time of ED Arrival Up to the Time of Departure from the ED   GLUCOSE BY METER - Abnormal; Notable for the following:        Result Value    Glucose 206 (*)     All other components within normal limits   CBC  "WITH PLATELETS DIFFERENTIAL - Abnormal; Notable for the following:     WBC 25.1 (*)     Absolute Neutrophil 20.9 (*)     Absolute Monocytes 1.5 (*)     All other components within normal limits   COMPREHENSIVE METABOLIC PANEL - Abnormal; Notable for the following:     Anion Gap 16 (*)     Glucose 178 (*)     Urea Nitrogen 33 (*)     Calcium 8.0 (*)     Albumin 3.3 (*)     All other components within normal limits   BLOOD GAS VENOUS - Abnormal; Notable for the following:     PO2 Venous 66 (*)     All other components within normal limits   LACTIC ACID WHOLE BLOOD - Abnormal; Notable for the following:     Lactic Acid 5.3 (*)     All other components within normal limits   ALCOHOL BREATH TEST POCT - Abnormal; Notable for the following:     Alcohol Breath Test 0.345 (*)     All other components within normal limits   ISTAT  GASES LACTATE JORI POCT - Abnormal; Notable for the following:     Ph Venous 7.31 (*)     PO2 Venous 57 (*)     Lactic Acid 4.3 (*)     All other components within normal limits   NT PROBNP INPATIENT   PROCALCITONIN   TROPONIN I   DRUG ABUSE SCREEN 6 CHEM DEP URINE ([facility])   LEGIONELLA PNEUMONIA ANTIGEN URINE   ROUTINE UA WITH MICROSCOPIC   CARDIAC CONTINUOUS MONITORING   ISTAT CG4 GASES LACTATE JORI NURSING POCT   BLOOD CULTURE   BLOOD CULTURE   URINE CULTURE AEROBIC BACTERIAL       Imaging Studies:   Recent Results (from the past 24 hour(s))   XR Chest 2 Views    Narrative    CHEST TWO VIEWS 3/17/2018 1:15 PM     COMPARISON: Frontal chest x-ray 1/14/2018    HISTORY: Shortness of breath.      Impression    IMPRESSION: There is linear airspace opacity in the posterior aspect  of the right lower lobe that may represent pneumonia. The lungs are  otherwise clear. There is no pleural effusion or pneumothorax. Heart  size is normal with no evidence for congestive failure.    HOLLY LEE MD       Recent vital signs:   /70  Pulse 100  Temp 98.7  F (37.1  C) (Oral)  Resp 14  Ht 1.803 m (5' 10.98\") "  Wt 95.3 kg (210 lb)  SpO2 (!) 88%  BMI 29.3 kg/m2    Cardiac Rhythm: Normal Sinus  Pt needs tele? No  Skin/wound Issues: None    Code Status: Full Code    Pain control: poor    Nausea control: pt had none    Abnormal labs/tests/findings requiring intervention: see lab results    Family present during ED course? No   Family Comments/Social Situation comments: n/a    Tasks needing completion: None    Reuben Pineda RN  [facility]-- [phone] [place] [facility]  [phone] [facility]

## 2018-03-17 NOTE — IP AVS SNAPSHOT
[facility] [address]    [address]    [place] [address]    Phone:  [phone]                                       After Visit Summary   3/17/2018    Jorge Rosales    MRN: [id number]           After Visit Summary Signature Page     I have received my discharge instructions, and my questions have been answered. I have discussed any challenges I see with this plan with the nurse or doctor.    ..........................................................................................................................................  Patient/Patient Representative Signature      ..........................................................................................................................................  Patient Representative Print Name and Relationship to Patient    ..................................................               ................................................  Date                                            Time    ..........................................................................................................................................  Reviewed by Signature/Title    ...................................................              ..............................................  Date                                                            Time

## 2018-03-17 NOTE — IP AVS SNAPSHOT
MRN:[id number]                      After Visit Summary   3/17/2018    Jorge Rosales    MRN: [id number]           Thank you!     Thank you for choosing [facility] for your care. Our goal is always to provide you with excellent care. Hearing back from our patients is one way we can continue to improve our services. Please take a few minutes to complete the written survey that you may receive in the mail after you visit with us. Thank you!        Patient Information     Date Of Birth          1955        About your hospital stay     You were admitted on:  March 17, 2018 You last received care in the:  [facility]    You were discharged on:  April 9, 2018        Reason for your hospital stay       Alcohol addiction                  Who to Call     For medical emergencies, please call 911.  For non-urgent questions about your medical care, please call your primary care provider or clinic, [phone]          Attending Provider     Provider Specialty    Maria R Cortés MD Emergency Medicine    [facility] Charlotte[facility] marshall MD Internal Medicine       Primary Care Provider Office Phone # Fax #    Carlos Padron [phone]      After Care Instructions     Activity       Your activity upon discharge: activity as tolerated            Diet       Follow this diet upon discharge: Orders Placed This Encounter      Regular Diet Adult                  Follow-up Appointments     Adult [facility] Follow-up and recommended labs and tests       Follow up with primary care provider, Carlos Padron, within 7 days for hospital follow- up.  No follow up labs or test are needed.      Appointments on [place] and/or [facility] (with [facility] or [facility] provider or service). Call [phone] if you haven't heard regarding these appointments within 7 days of discharge.                  Your next 10 appointments already scheduled     Apr 09, 2018 12:00 PM CDT   Treatment with LP/DOP ADMISSIONS   [facility] ([facility])    [address] [address]   [phone]            2018 12:00 PM CDT   (Arrive by 11:45 AM)   New Patient Visit with Myke Fitch MD   [facility] ([facility])    [address]  [address]th Floor  [facility] [address] [phone]              Pending Results     No orders found from 3/15/2018 to 3/18/2018.            Statement of Approval     Ordered          18 1035  I have reviewed and agree with all the recommendations and orders detailed in this document.  EFFECTIVE NOW     Comments:  Discharge to [facility] plus   Approved and electronically signed by:  Dejan Swann MD             Admission Information     Date & Time Provider Department Dept. Phone    3/17/2018 Wilmer Tripathi MD [facility] [phone]      Your Vitals Were     Blood Pressure Pulse Temperature Respirations Height Weight    112/80 (BP Location: Left arm) 69 97.6  F (36.4  C) (Oral) 18 1.803 m (5' 10.98\") 93.4 kg (206 lb)    Pulse Oximetry BMI (Body Mass Index)                97% 28.74 kg/m2          MyChart Information     [employer] lets you send messages to your doctor, view your test results, renew your prescriptions, schedule appointments and more. To sign up, go to [email or web address] . Click on \"Log in\" on the left side of the screen, which will take you to the Welcome page. Then click on \"Sign up Now\" on the right side of the page.     You will be asked to enter the access code listed below, as well as some personal information. Please follow the directions to create your username and password.     Your access code is: [email or web address]  Expires: 6/15/2018 12:18 PM     Your access code will  in 90 days. If you need help or a new code, please call your [facility] clinic or [phone].        Care EveryWhere ID     This is your Care EveryWhere ID. This could be used " by other organizations to access your [facility] medical records  [id number]        Equal Access to Services     GEOVANI VILLA : Tracy Llanos, anitha oliver, marii arroyo, tacos munoz. So [facility] [phone].    ATENCIÓN: Si habla español, tiene a nunez disposición servicios gratuitos de asistencia lingüística. Llame al [phone].    We comply with applicable federal civil rights laws and [place] laws. We do not discriminate on the basis of race, color, national origin, age, disability, sex, sexual orientation, or gender identity.               Review of your medicines      CONTINUE these medicines which have NOT CHANGED        Dose / Directions    albuterol 108 (90 BASE) MCG/ACT Inhaler   Commonly known as:  PROAIR HFA/PROVENTIL HFA/VENTOLIN HFA   Used for:  Moderate persistent reactive airway disease with acute exacerbation        Dose:  2 puff   Inhale 2 puffs into the lungs every 6 hours as needed for shortness of breath / dyspnea or wheezing   Quantity:  1 Inhaler   Refills:  1       aspirin 81 MG tablet   Used for:  CARDIOVASCULAR SCREENING; LDL GOAL LESS THAN 160        Dose:  81 mg   Take 1 tablet (81 mg) by mouth daily   Quantity:  30 tablet   Refills:  0       buprenorphine-naloxone 8-2 MG Subl sublingual tablet   Commonly known as:  SUBOXONE        Dissolve 1.5 tablets sublingually in the morning and 1 tablet sublingually in the evening   Refills:  0       cyanocobalamin 1000 MCG tablet   Commonly known as:  vitamin  B-12   Used for:  Alcohol dependence with uncomplicated withdrawal (H)        Dose:  1000 mcg   Take 1 tablet (1,000 mcg) by mouth daily   Quantity:  100 tablet   Refills:  0       fish oil-omega-3 fatty acids 1000 MG capsule        Dose:  1 g   Take 1 g by mouth daily   Quantity:  180 capsule   Refills:  12       gabapentin 300 MG capsule   Commonly known as:  NEURONTIN   Used for:  Chronic low back pain, unspecified back pain  laterality, with sciatica presence unspecified        Dose:  600 mg   Take 2 capsules (600 mg) by mouth 4 times daily   Quantity:  90 capsule   Refills:  0       multivitamin per tablet        Dose:  1 tablet   Take 1 tablet by mouth daily.   Quantity:  100 tablet   Refills:  12       polyethylene glycol Packet   Commonly known as:  MIRALAX/GLYCOLAX        Dose:  17 g   Take 17 g by mouth daily as needed for constipation   Refills:  0       venlafaxine 75 MG Tb24 24 hr tablet   Commonly known as:  EFFEXOR-ER   Indication:  Depression and Anxiety   Used for:  Recurrent major depressive disorder, remission status unspecified (H)        Dose:  75 mg   Take 1 tablet (75 mg) by mouth daily   Quantity:  30 each   Refills:  1         STOP taking     bismuth subsalicylate 262 MG chewable tablet   Commonly known as:  PEPTO BISMOL           methocarbamol 750 MG tablet   Commonly known as:  ROBAXIN           metoprolol succinate 50 MG 24 hr tablet   Commonly known as:  TOPROL-XL                Where to get your medicines      These medications were sent to [facility] [place], [place] - [address]  [address] [address]     Phone:  [phone]     gabapentin 300 MG capsule                Protect others around you: Learn how to safely use, store and throw away your medicines at www.disposemymeds.org.             Medication List: This is a list of all your medications and when to take them. Check marks below indicate your daily home schedule. Keep this list as a reference.      Medications           Morning Afternoon Evening Bedtime As Needed    albuterol 108 (90 BASE) MCG/ACT Inhaler   Commonly known as:  PROAIR HFA/PROVENTIL HFA/VENTOLIN HFA   Inhale 2 puffs into the lungs every 6 hours as needed for shortness of breath / dyspnea or wheezing                                aspirin 81 MG tablet   Take 1 tablet (81 mg) by mouth daily                                 buprenorphine-naloxone 8-2 MG Subl sublingual tablet   Commonly known as:  SUBOXONE   Dissolve 1.5 tablets sublingually in the morning and 1 tablet sublingually in the evening   Last time this was given:  1 tablet on 4/9/2018  8:12 AM                                cyanocobalamin 1000 MCG tablet   Commonly known as:  vitamin  B-12   Take 1 tablet (1,000 mcg) by mouth daily   Last time this was given:  1,000 mcg on 4/9/2018  8:14 AM                                fish oil-omega-3 fatty acids 1000 MG capsule   Take 1 g by mouth daily                                gabapentin 300 MG capsule   Commonly known as:  NEURONTIN   Take 2 capsules (600 mg) by mouth 4 times daily   Last time this was given:  600 mg on 4/9/2018  8:13 AM                                multivitamin per tablet   Take 1 tablet by mouth daily.   Last time this was given:  1 tablet on 4/9/2018  8:14 AM                                polyethylene glycol Packet   Commonly known as:  MIRALAX/GLYCOLAX   Take 17 g by mouth daily as needed for constipation                                venlafaxine 75 MG Tb24 24 hr tablet   Commonly known as:  EFFEXOR-ER   Take 1 tablet (75 mg) by mouth daily   Last time this was given:  75 mg on 4/9/2018  8:15 AM

## 2018-03-17 NOTE — IP AVS SNAPSHOT
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 distributions.  5/5 , EPL, FPL, intrinsics, wrist flexion/extension, bicep, tricep, deltoid.  Active motion is FE to 170 degrees without pain, abduction to 110 degrees without pain, ER at side to 60 degrees without pain, ER in abduction to 90 degrees without pain, IR to T11 without pain.[BK1.2]  Left Upper Extremity:[BK1.1] skin intact. Well healed arthroscopic portal scars.  No shoulder asymmetry compared with contralateral. No muscle atrophy.  No pain on palpation over SC joint, AC joint, posterior glenohumeral space.  Mild tenderness to palpation of bicipital groove.  SILT, but very mildly decreased compared to contralateral in axillary, median, ulnar, musculocutaneous nerve distributions.  5/5 , EPL, FPL, intrinsics, wrist flexion/extension. 4/5 bicep, tricep, deltoid with pain.  Active motion is FE to 100 degrees with pain, abduction to 90 degrees with pain, ER at side to 60 degrees without pain, ER in abduction to 45 degrees with pain, IR to back pocket with  pain.  Passive motion is FE to 110 degrees with pain, abduction to 100 degrees with pain, ER at side to 60 degrees without pain, ER in abduction to 45 degrees with pain, IR to back pocket with pain.    Rotator Cuff:  Lift off: 4/5 with pain (5/5 contralateral without pain)  Belly press: 4/5 with pain (5/5 contralateral without pain)  ER at side: 5/5  ER in abduction: 4/5 with pain (5/5 contralateral without pain)  Fely's: 4/5 with pain (5/5 contralateral without pain)    Biceps:  Speeds: weakness, but no pain[BK1.2]    LABS:[BK1.1]  Hemoglobin   Date Value Ref Range Status   03/25/2018 13.0 (L) 13.3 - 17.7 g/dL Final   03/22/2018 14.5 13.3 - 17.7 g/dL Final     WBC   Date Value Ref Range Status   03/25/2018 9.3 4.0 - 11.0 10e9/L Final     Platelet Count   Date Value Ref Range Status   03/29/2018 283 150 - 450 10e9/L Final     No results found for: INR  Creatinine   Date Value Ref Range Status   03/25/2018 0.72 0.66 - 1.25 mg/dL Final     Glucose   Date Value Ref Range Status   03/25/2018 97 70 - 99 mg/dL Final[BK1.3]       IMAGING:[BK1.1]  AP/Grashey/Scap Y of left shoulder reveal no fractures, dislocations, or other bony abnormalities. Humeral head appears reduced within glenoid.[BK1.2]    IMPRESSION:   Jorge Rosales is a 63 year old[BK1.1] right[BK1.2]-hand dominant male[BK1.1] with PMH significant for EtOH use disorder, tobacco use disorder, opioid use disorder on suboxone, depression, PTSD, left RCR 6/2017 with Dr. Britt at [facility][BK1.2] who[BK1.1] has been admitted since 3/17/2018 for acute on chronic respiratory failure, alcohol withdrawal.  He has 2 month history of left shoulder pain after a fall with clinical exam and history concerning for possible tear of rotator cuff.[BK1.2]    RECOMMENDATIONS:[BK1.1]   -Medicine Primary  -No plans for operative intervention or further workup during this hospitalization  -WBAT, ROMAT LUE -- let pain be his guide  -PT/OT may be of value for formal  strengthening/exercise guide for patient  -Sling for comfort -- patient reports he has one he has been intermittently wearing  -Will need MRI in outpatient setting with whoever patient decides to follow up with.  He is deciding between his initial surgeon, Dr. Britt at [facility], the [facility], and [facility].  -Follow up with orthopedic provider on discharge for further evaluation and management   -Orthopaedic surgery will follow peripherally. Please page with questions/concerns.[BK1.2]    Assessment and Plan discussed with Dr. Colon, Orthopaedic Surgery Staff.     Favio Padron MD 3/30/2018 10:43 AM  Orthopaedic Surgery Resident, PGY-1  Pager: [phone]    For questions about this patient, please contact me at my pager.[BK1.1]       Revision History        User Key Date/Time User Provider Type Action    > BK1.2 3/30/2018  1:38 PM Favio Padron MD Resident Sign     BK1.3 3/30/2018 10:43 AM Favio Padron MD Resident      BK1.1 3/30/2018 10:42 AM Favio Padron MD Resident             Consults signed by Asuncion Varma MD at 3/23/2018 10:48 AM      Author:  Asuncion Varma MD Service:  Psychiatry Author Type:  Physician    Filed:  3/23/2018 10:48 AM Date of Service:  3/22/2018  1:01 PM Creation Time:  3/22/2018  1:25 PM    Status:  Signed :  Asuncion Varma MD (Physician)         Consult Date:  03/22/2018      REASON FOR CONSULTATION:  Persistent hallucinations.?  commitment.      REQUESTING PHYSICIAN:  Dr. Foster.      IDENTIFYING INFORMATION:  The patient is a 63-year-old  male who was initially seen by me on 3/19/2018 and a followup consult has been requested for commitment and hallucinations.      The patient is much better.  When I first saw him on 3/19/2018, since then his delirium has improved.  He is oriented to person, place and situation.  Alcohol is his drug of choice.  He says this was a relapse for 8 days.  Collateral information from his friend says that he may be  drinking for long.  He talks about the legal consequences, about needing to wear a bracelet but minimizes his drinking.  The patient has tolerance to alcohol, withdrawal, history of DTs, progressive use with loss of control, tried to quit unsuccessfully.  He was hospitalized in November, December and January and now in March.  Each time, he would say that he would follow up with treatment and has not done it.  He sees Suboxone maintenance doctor, Dr. Biggs, but he declined for me to talk with that doctor.  At this time, patient denies any suicidal ideation, plan or intent.  He denied any auditory or visual hallucinations.  He smokes half a pack a day.      PAST PSYCHIATRIC HISTORY:  Never psychiatrically hospitalized.  He was in [facility] chemical dependency [facility]      FAMILY HISTORY:  Denied any family psychiatric or chemical dependency history.      SOCIAL HISTORY:  Good childhood, no abuse.  Noncombat.  Apparently, was a * honorable discharge.        No current facility-administered medications on file prior to encounter.   Current Outpatient Prescriptions on File Prior to Encounter:  methocarbamol (ROBAXIN) 750 MG tablet Take 750 mg by mouth nightly as needed for muscle spasms   aspirin 81 MG tablet Take 1 tablet (81 mg) by mouth daily   cyanocobalamin (VITAMIN  B-12) 1000 MCG tablet Take 1 tablet (1,000 mcg) by mouth daily   venlafaxine (EFFEXOR-ER) 75 MG TB24 24 hr tablet Take 1 tablet (75 mg) by mouth daily   albuterol (PROAIR HFA/PROVENTIL HFA/VENTOLIN HFA) 108 (90 BASE) MCG/ACT Inhaler Inhale 2 puffs into the lungs every 6 hours as needed for shortness of breath / dyspnea or wheezing   bismuth subsalicylate (PEPTO BISMOL) 262 MG chewable tablet Take 524 mg by mouth 2 times daily as needed for diarrhea   fish oil-omega-3 fatty acids 1000 MG capsule Take 1 g by mouth daily    Multiple Vitamin (MULTIVITAMIN) per tablet Take 1 tablet by mouth daily.     VITAL SIGNS:  The  patient's vitals are as below:  Temperature of 98.3, pulse of 75, heart rate of 97, respiratory rate of 16, blood pressure of 111/83.      Please review the physical examination done on this patient by Dr. Foster on 3/21/2018.      MENTAL STATUS EXAMINATION:  The patient is a 63-year-old  male who is lying in bed.  He has adequate grooming, adequate hygiene, maintains good eye contact.  Superficially cooperative.  Mood is anxious.  Affect is congruent.  Speech is spontaneous, normal in rate, less logical in thinking, no loose association.  Judgment is limited.  Alert and oriented x3.  Recent and remote memory, language, fund of knowledge are all adequate.      DIAGNOSES:   Axis I:  Major depressive disorder, recurrent, severe without psychotic features.   Alcohol use disorder, severe.   Posttraumatic stress disorder.      The patient, when he gets depressed, has lack of energy, lack of motivation, hopeless, helpless.      RECOMMENDATIONS:   1.  Detox off alcohol using MSSA protocol on Valium.  He can have hallucinations that persist after MSSA is completed.  These improve with time .  The patient can be continued on Effexor for his depression.  The patient has numerous admissions.  He has falls.  He is minimizing his drinking.  He has complicated withdrawal.  Inpatient level was recommended, and the patient declined.  Patient was seen with the , and civil commitment was pursued.  The patient was informed.  The patient was placed on a 72-hour hold.         ASUNCION VARMA MD             D: 2018   T: 2018   MT: BABS      Name:     MARYBETH HUERTAS   MRN:      [id number]        Account:       [id number]   :      1955           Consult Date:  2018      Document: [id number][SV1.1]         Revision History        User Key Date/Time User Provider Type Action    > SV1.1 3/23/2018 10:48 AM Asuncion Varma MD Physician Sign     [N/A] 3/22/2018  1:25 PM Kojo  MD Asuncion Physician Edit            Consults signed by Asuncion Varma MD at 3/23/2018 10:46 AM      Author:  Asuncion Varma MD Service:  Psychiatry Author Type:  Physician    Filed:  3/23/2018 10:46 AM Date of Service:  3/19/2018 10:45 AM Creation Time:  3/19/2018 11:30 AM    Status:  Signed :  Asuncion Varma MD (Physician)         Consult Date:  03/19/2018      REASON FOR CONSULTATION:  Alcohol withdrawal, recurrent admission for anxiety and depression.      REQUESTING PHYSICIAN:  Neftali Mcknight MD       IDENTIFYING INFORMATION:  The patient is a 63-year-old [place] who is known to me from previous admissions.      HISTORY OF PRESENT ILLNESS:  The patient came to the Emergency Room on 03/17/2018.  He had complained of pain but he was hospitalized for withdrawal.      The patient, when I talked with him, is very confused.  He is on one-to-one.  He has IV fluids running through him.  He thought this was [employer] and was a hotel.  He mumbles and is confused.  He is talking about equipment that he needs to sell.  He thought this was 03/1972.  When I showed him a couple of keys he thought these were keys to his apartment.  He was pulling cards, he was confused.  He  Thought he had a has a gunshot wound.  He was up all night last night.  He was very confused and made statements to the nurse that he wants to take a gun and shoot.  He was extremely confused.  Previously he was on MSSA and Ativan.  He was just switched to Valium in the morning.      Records indicate the patient's drug of choice is alcohol.  He has tolerance, withdrawal, progressive use with loss of control, use despite having negative consequences, impacting his health.  The patient's platelets are 90,000.  Liver enzymes are normal.  The patient was hospitalized in November, December, January and now in March.  He says he is going to do outpatient treatment, is supposed to be in inpatient treatment, but never follows through and  relapses.  He is on Suboxone maintenance.      The records indicate the patient has a history of panic attacks, shortness of breath, feeling tired and feels like he is going to die, tightness in his chest.  At this time patient is hallucinating.      PAST PSYCHIATRIC HISTORY:  [facility]      VITAL SIGNS:   Temperature of 98.2, pulse of 87, blood pressure 147/93.      PAST MEDICAL HISTORY:  Patient has COPD and hepatitis C.        Please review the detailed physical examination and review of systems done on this patient by Dr. Wilmer Tripathi on 03/17/2018.      FAMILY HISTORY:  The patient's family denied any family psychiatric or chemical dependency history.      SOCIAL HISTORY:  Good childhood, no abuse.  He was in noncombat.  He was a medic, E5 rank with honorable discharge.      MENTAL STATUS EXAMINATION:  The patient is a 63-year-old  male lying in bed.  He has oxygen and IV fluids.  Gait:  Not tested.  Mood:  He is confused.  He is pulling cards.  He is delirious.  He is not alert to place, person and situation.  He thought this was [employer] and this is a hotel room.  When I showed him to identify his keys he thought these were his keys.  Mood is tired.  Affect is congruent.  Speech is less spontaneous, reduced in volume, less logical thinking.  Insight and judgment are limited.  Denied any suicidal or homicidal ideation, plan or intent.      DIAGNOSES:   Axis 1:     Alcohol use disorder, complicated alcohol withdrawal.  Rule out delirium tremens.     Major depressive disorder, recurrent, without psychotic features.     PTSD.    Opiate use disorder.      RECOMMENDATIONS:   1.  Continue patient on one-to-one.  Patient was switched to [facility] protocol on Valium.  Continue that.  The patient will be continued on Suboxone.  This is what the patient is taking.  He can be continued on his medications once doses are known, which is Effexor 75 mg.      The patient is in  complicated alcohol withdrawal and is not able to have coherent conversations, but once the patient improves we should definitely recommend patient do inpatient treatment.  The patient should be doing inpatient treatment.  These recommendations were given to Dr. Mcknight.      Thank you for this interesting consult.            ASUNCION VARMA MD             D: 2018   T: 2018   MT: MARISA      Name:     MARYBETH HUERTAS   MRN:      [id number]        Account:       [id number]   :      1955           Consult Date:  2018      Document: [id number][SV1.1]         Revision History        User Key Date/Time User Provider Type Action    > SV1.1 3/23/2018 10:46 AM Asuncion Varma MD Physician Sign     [N/A] 3/20/2018 12:01 PM Asuncion Varma MD Physician Edit            Consults by Asuncion Varma MD at 3/22/2018 12:55 PM     Author:  Asuncion Varma MD Service:  Mental Health Author Type:  Physician    Filed:  3/22/2018 12:55 PM Date of Service:  3/22/2018 12:55 PM Creation Time:  3/22/2018 12:55 PM    Status:  Signed :  Asuncion Varma MD (Physician)     Consult Orders:    1. Psychiatry IP Consult: persistent hallucinations. ? commitment; Consultant may enter orders: Yes; Patient to be seen: Routine; Call back #: [phone] [[id number]] ordered by Tae Foster MD at 18 0824                Pt seen for 30 min f/u psych consult[SV1.1]     Revision History        User Key Date/Time User Provider Type Action    > SV1.1 3/22/2018 12:55 PM Asuncion Varma MD Physician Sign            Consults by Brisa Martinez MSW at 3/20/2018  4:36 PM     Author:  Brisa Martinez MSW Service:  Social Work Author Type:      Filed:  3/20/2018  4:46 PM Date of Service:  3/20/2018  4:36 PM Creation Time:  3/20/2018  4:35 PM    Status:  Signed :  Kyro Braulio, Brisa A, MSW ()     Consult Orders:    1. Social Work IP Consult  "[[id number]] ordered by Neftali Mcknight MD at 18 4805                Social Work: Assessment with Discharge Plan    Patient Name:  Jorge Rosales  :  1955  Age:  63 year old  MRN:  [id number]  Risk/Complexity Score:  Filed Complexity Screen Score: 9  Completed assessment with:  Pt, 10A IDT    Presenting Information   Reason for Referral:  Substance abuse concerns  Date of Intake:  2018  Referral Source:  Physician  Decision Maker:  pt  Alternate Decision Maker:  Not identified    Living Situation:  Apartment  Previous Functional Status:  Independent  Patient and family understanding of hospitalization:  Pt states he was not feeling well, \"I did not know I had pnuemonia\"  Cultural/Language/Spiritual Considerations:  , , significant mental health and chemical health issues  Adjustment to Illness:  Accepting. Pt is pre contemplative about CD treatment. Identifies many barriers to getting into CD treatment.-death of ex-spouse, need to care take a daughter in a group home in [place], [place], legal concerns/issues    Physical Health  Reason for Admission:[MK1.1]    1. Pneumonia of right lower lobe due to infectious organism (H)    2. Acute alcoholic intoxication in alcoholism without complication (H)[MK1.2]      Services Needed/Recommended:  Other:  CD treatment    Mental Health/Chemical Dependency  Diagnosis:  Major Depressive Disorder, Bereavement, Alcohol Use disorder, Opiate Use Disorder-on subaxone  Support/Services in Place:  Pt has connected with case management services through [facility]  Services Needed/Recommended:  CD treatment    Support System  Significant relationship at present time:  Not evident. Pt has had visitors during previous hospitalizations  Family of origin is available for support:  Not identified  Other support available:  Pt states he has a sponsor and about \"5 people behind me, supporting me.\"  Gaps in support system:  Further sober supports  Patient is " "caregiver to:  None     Provider Information   Primary Care Physician:  Carlos Padron   [phone]   Clinic:  [facility] [address] [facility] [address]      :      Financial   Income Source:  Pension, social security  Financial Concerns:   None noted  Insurance:    Payor/Plan Subscriber Name Rel Member # Group #   UCARE - [employer]INE [employer]RAY*  [id number]      [address]       Discharge Plan   Patient and family discharge goal:  DC home   Provided education on discharge plan:  YES  Patient agreeable to discharge plan:  YES  A list of Medicare Certified Facilities was provided to the patient and/or family to encourage patient choice. Patient's choices for facility are:  NA  Will NH provide Skilled rehabilitation or complex medical:  NA  General information regarding anticipated insurance coverage and possible out of pocket cost was discussed. Patient and patient's family are aware patient may incur the cost of transportation to the facility, pending insurance payment: Not at this time  Barriers to discharge:  Medical stability    Discharge Recommendations   Anticipated Disposition:  Home, no needs identified  Transportation Needs:  Other:  to be determined.   Name of Transportation Company and Phone:  To be determined    Additional comments   Writer re-introduced role/reason for visit to pt. He is known to writer from previous hospitalizations. He stated \"I knew I was going to see you and we would be having this conversation. I never really wanted to see you again-unless it was on the street or something.\"   Pt states he is still thinking about getting into CD treatment, but not until after his legal stuff is straightened out, in about 6 months. His ex-wife has passed away, and now he is trying to figure out how to manage care for his disabled daughter who lives in [place], [place].    He states he fell in a store, and now his bad leg hurts. The idea of having further surgery is not " appealing, and he describes having anxiety about this. He thinks this plays into his most recent drinking binge, informs writer he has abstained from alcohol up until about 8 days ago, when a series of bad things started happening.     He identifies having more support to assist with managing opiate withdrawal, his depression and that he really likes the providers at the [facility] clinic.  Overall, he shares that he felt he was getting his life back, had more resources and support for sobriety than he had during previous hospitalizations.     He is aware psychiatry will visit him on Wednesday and that SW remains available per request/referral. He does not identify needing additional resources at this time.[MK1.1]      Revision History        User Key Date/Time User Provider Type Action    > MK1.2 3/20/2018  4:46 PM Brisa Martinez MSW  Sign     MK1.1 3/20/2018  4:35 PM Brisa Martinez MSW              Consults by Asuncion Varma MD at 3/19/2018 10:39 AM     Author:  Asuncion Varma MD Service:  Mental Health Author Type:  Physician    Filed:  3/19/2018 10:40 AM Date of Service:  3/19/2018 10:39 AM Creation Time:  3/19/2018 10:39 AM    Status:  Signed :  Asuncion Varma MD (Physician)     Consult Orders:    1. Psychiatry IP Consult: severe alcohol withdrawl. recurrent admission. depression, anxiety.; Consultant may enter orders: Yes; Patient to be seen: Routine; Call back #: [id number] [[id number]] ordered by Neftali Mcknight MD at 03/19/18 0705                Pt seen for 60 min initial psych consult[SV1.1]      Revision History        User Key Date/Time User Provider Type Action    > SV1.1 3/19/2018 10:40 AM Asuncion Varma MD Physician Sign                     Progress Notes - Physician (Notes from 03/31/18 through 04/03/18)      Progress Notes by Dejan Swann MD at 4/3/2018  8:19 AM     Author:  Dejan Swann MD Service:  Hospitalist Author Type:   "Physician    Filed:  4/3/2018  8:23 AM Date of Service:  4/3/2018  8:19 AM Creation Time:  4/3/2018  8:19 AM    Status:  Signed :  Dejan Swann MD (Physician)         Patient seen and examined with RN     S- no new concerns    Waiting for court hold bed for alcohol treatment    No fever or chills     4 point ROS done and neg unless mentioned     O-[RV1.1]   BP 92/58 (BP Location: Right arm)  Pulse 60  Temp 98.4  F (36.9  C) (Oral)  Resp 16  Ht 1.803 m (5' 10.98\")  Wt 93.4 kg (206 lb)  SpO2 96%  BMI 28.74 kg/m2[RV1.2]   Gen- pleasant   CVS- I+II+ no m/r/g  RS- CTABS  Abdo- soft, no tenderness . No g/r/r   Ext- no edema    MSk - as per ortho     LABS:   BMP[RV1.1]  No lab results found in last 7 days.[RV1.2]  CBC[RV1.1]    Recent Labs  Lab 04/01/18  0608 03/29/18  0602    283[RV1.2]     INR[RV1.1]No lab results found in last 7 days.[RV1.2]  LFTs[RV1.1]  No lab results found in last 7 days.[RV1.2]   A/P:  Jorge Rosales is a 63 year old male with past medical history significant for alcohol dependency., recurrent admissions for alcohol related cause last 01/14--01/19/18 , smoker, COPD, chronic pain syndrome, opioid use disorder on suboxone maintenance, depression, PTSD, L leg weakness s/p Lumbar spine surgery admitted for alcohol intoxication,  withdrawal and acute on chronic hypoxic respi failure 2/2 PNA, mild copd exacerbation.       # Alcohol dependency, intoxication and withdrawal:   -MSSA with Diazepam- completed  - mvi, thiamine, folic acid daily  - fall, seizure precautions.   - Psychiatry consult appreciated 3/19: 1.  Continue patient on one-to-one.  Patient was switched to MSSA protocol on Valium.  Continue that.  The patient will be continued on Suboxone.  This is what the patient is taking.  He can be continued on his medications once doses are known, which is Effexor 75 mg.   - D/W psych 3/22 for  Commitment and for hallucinations . Agree as he  failed many times, has " thrombocytopenia, and recurrent AFib with RVR alcohol related     County upheld the commitment on 3/27     Waiting for court hold bed now      # Acute on chronic hypoxic respi failure , h/o COPD: suspect R lower lobe PNA. Mild copd exacerbation. - resolved   Started empirically on iv vanc, zosyn, doxy for hcap.  03/18: Given pt stable hemodynamics. Lactic acidosis: resolved quickly suspect mostly related to alcohol use, dehydration. Hypoxia: improving, afebrile, low procal: dc iv Vanco and Zosyn, doxy. Completed 7 days levofloxacin 750 mg daily- 3/24.  - Ct bronchodilator nebs (03/18: switched to levalbuterol given afib w rvr), oxygen to maintain sat >89. IS.   - Outpatient Pulmonary FU. PFT  - COPD RT consult.   - Encourage tobacco cessation.       # BC + for diptheroids from 17th afternoon (F/U cultres from 17th night-NGTD)  * Appreciate ID review. D/C Levaquin.  repeat culture Monday- no growth 3 days     # Afib w rvr: at current NSR, HR controlled (Recurrent episode due to drinking- previous admission in 01/18 as well)- better after detox.    - ct metop to 100 XR daily.       #Hx of chronic pain syndrome. Hx of Opioid abuse   On Suboxone.   - continue PTA Suboxone for now.   - PTA gabapentin. robaxin.      # Depression, PTSD w/ hx of panic attacks:   - Continue PTA venlafaxine.  - on Diazepam per MSSA  - FU Psychiatry recs      # Tobacco abuse: nicotine patch panel.       # FEN: adat to regular. Ivf. Fu lytes.   PPx:   Lovenox SQ for dvt ppx  Full code.[RV1.1]     Disposition Plan[RV1.2]   Expected discharge in unclear days to commitment bed once court paperwork received and SW finds a place.     Entered:[RV1.1] Dejan Swann 04/03/2018[RV1.2],[RV1.1] 8:20 AM[RV1.2]       Dejan Swann MD (Pager- [phone] )   Internal Medicine/ Hospitalist[RV1.1]     Revision History        User Key Date/Time User Provider Type Action    > RV1.2 4/3/2018  8:23 AM Dejan Swann MD Physician Sign     RV1.1 4/3/2018  8:19 AM Hue  "MD Dejan Physician             Progress Notes by Tae Foster MD at 4/2/2018 12:04 PM     Author:  Tae Foster MD Service:  Hospitalist Author Type:  Physician    Filed:  4/2/2018 12:05 PM Date of Service:  4/2/2018 12:04 PM Creation Time:  4/2/2018 12:04 PM    Status:  Signed :  Tae Foster MD (Physician)         Patient seen and examined with RN     S- no new concerns    4 point ROS done and neg unless mentioned     O-   /87 (BP Location: Left arm)  Pulse 69  Temp 98.6  F (37  C) (Oral)  Resp 16  Ht 1.803 m (5' 10.98\")  Wt 93.4 kg (206 lb)  SpO2 91%  BMI 28.74 kg/m2   Gen- pleasant   CVS- I+II+ no m/r/g  RS- CTABS  Abdo- soft, no tenderness . No g/r/r   Ext- no edema    MSk - as per ortho     LABS:   BMP  No lab results found in last 7 days.  CBC    Recent Labs  Lab 04/01/18  0608 03/29/18  0602    283     INRNo lab results found in last 7 days.  LFTs  No lab results found in last 7 days.   A/P:  Jorge Rosales is a 63 year old male with past medical history significant for alcohol dependency., recurrent admissions for alcohol related cause last 01/14--01/19/18 , smoker, COPD, chronic pain syndrome, opioid use disorder on suboxone maintenance, depression, PTSD, L leg weakness s/p Lumbar spine surgery admitted for alcohol intoxication,  withdrawal and acute on chronic hypoxic respi failure 2/2 PNA, mild copd exacerbation.  4/2- no change      3/30: # Shoulder pain- X- ray , ortho review appreciated     # Alcohol dependency, intoxication and withdrawal:   -MSSA with Diazepam- completed  - mvi, thiamine, folic acid daily  - fall, seizure precautions.   - Psychiatry consult appreciated 3/19: 1.  Continue patient on one-to-one.  Patient was switched to MSSA protocol on Valium.  Continue that.  The patient will be continued on Suboxone.  This is what the patient is taking.  He can be continued on his medications once doses are known, which is Effexor 75 mg.   - D/W psych 3/22 for "  Commitment and for hallucinations . Agree as he  failed many times, has thrombocytopenia, and recurrent AFib with RVR alcohol related     County upheld the commitment on 3/27 -final decision will be on Thursday 3/28/18- a/w paperwork     # Acute on chronic hypoxic respi failure , h/o COPD: suspect R lower lobe PNA. Mild copd exacerbation. - resolved   Started empirically on iv vanc, zosyn, doxy for hcap.  03/18: Given pt stable hemodynamics. Lactic acidosis: resolved quickly suspect mostly related to alcohol use, dehydration. Hypoxia: improving, afebrile, low procal: dc iv Vanco and Zosyn, doxy. Completed 7 days levofloxacin 750 mg daily- 3/24.  - Ct bronchodilator nebs (03/18: switched to levalbuterol given afib w rvr), oxygen to maintain sat >89. IS.   - Outpatient Pulmonary FU. PFT  - COPD RT consult.   - Encourage tobacco cessation.       # BC + for diptheroids from 17th afternoon (F/U cultres from 17th night-NGTD)  * Appreciate ID review. D/C Levaquin.  repeat culture Monday- no growth 3 days     # Afib w rvr: at current NSR, HR controlled (Recurrent episode due to drinking- previous admission in 01/18 as well)- better after detox.    - ct metop to 100 XR daily.       #Hx of chronic pain syndrome. Hx of Opioid abuse   On Suboxone.   - continue PTA Suboxone for now.   - PTA gabapentin. robaxin.      # Depression, PTSD w/ hx of panic attacks:   - Continue PTA venlafaxine.  - on Diazepam per MSSA  - FU Psychiatry recs      # Tobacco abuse: nicotine patch panel.       # FEN: adat to regular. Ivf. Fu lytes.   PPx:   Lovenox SQ for dvt ppx  Full code.     Disposition Plan   Expected discharge in unclear days to commitment bed once court paperwork received and SW finds a place.     Entered: Tae Foster MD 04/02/2018, 12:04 PM       Tae Foster MD (Pager- [phone])   Internal Medicine/ Hospitalist[SG1.1]     Revision History        User Key Date/Time User Provider Type Action    > SG1.1 4/2/2018 12:05 PM Cristian  "MD Tae Physician Sign            Progress Notes by Tae Foster MD at 4/1/2018 11:22 AM     Author:  Tae Foster MD Service:  Hospitalist Author Type:  Physician    Filed:  4/1/2018 11:23 AM Date of Service:  4/1/2018 11:22 AM Creation Time:  4/1/2018 11:22 AM    Status:  Signed :  Tae Foster MD (Physician)         Patient seen and examined with RN     S- no new concerns    4 point ROS done and neg unless mentioned     O-[SG1.1]   /73 (BP Location: Left arm)  Pulse 69  Temp 98.4  F (36.9  C) (Oral)  Resp 18  Ht 1.803 m (5' 10.98\")  Wt 93.4 kg (206 lb)  SpO2 93%  BMI 28.74 kg/m2[SG1.2]   Gen- pleasant   CVS- I+II+ no m/r/g  RS- CTABS  Abdo- soft, no tenderness . No g/r/r   Ext- no edema    MSk - as per ortho     LABS:   BMP[SG1.1]  No lab results found in last 7 days.[SG1.2]  CBC[SG1.1]    Recent Labs  Lab 04/01/18  0608 03/29/18  0602 03/26/18  0559    283 178[SG1.2]     INR[SG1.1]No lab results found in last 7 days.[SG1.2]  LFTs[SG1.1]  No lab results found in last 7 days.[SG1.2]   A/P:  Jorge Rosales is a 63 year old male with past medical history significant for alcohol dependency., recurrent admissions for alcohol related cause last 01/14--01/19/18 , smoker, COPD, chronic pain syndrome, opioid use disorder on suboxone maintenance, depression, PTSD, L leg weakness s/p Lumbar spine surgery admitted for alcohol intoxication,  withdrawal and acute on chronic hypoxic respi failure 2/2 PNA, mild copd exacerbation.  4/1- no change    3/31- no change. A/w commitment bed    3/30: # Shoulder pain- X- ray , ortho review appreciated     # Alcohol dependency, intoxication and withdrawal:   -MSSA with Diazepam- completed  - mvi, thiamine, folic acid daily  - fall, seizure precautions.   - Psychiatry consult appreciated 3/19: 1.  Continue patient on one-to-one.  Patient was switched to MSSA protocol on Valium.  Continue that.  The patient will be continued on Suboxone.  This is " what the patient is taking.  He can be continued on his medications once doses are known, which is Effexor 75 mg.   - D/W psych 3/22 for  Commitment and for hallucinations . Agree as he  failed many times, has thrombocytopenia, and recurrent AFib with RVR alcohol related     County upheld the commitment on 3/27 -final decision will be on Thursday 3/28/18- a/w paperwork     # Acute on chronic hypoxic respi failure , h/o COPD: suspect R lower lobe PNA. Mild copd exacerbation. - resolved   Started empirically on iv vanc, zosyn, doxy for hcap.  03/18: Given pt stable hemodynamics. Lactic acidosis: resolved quickly suspect mostly related to alcohol use, dehydration. Hypoxia: improving, afebrile, low procal: dc iv Vanco and Zosyn, doxy. Completed 7 days levofloxacin 750 mg daily- 3/24.  - Ct bronchodilator nebs (03/18: switched to levalbuterol given afib w rvr), oxygen to maintain sat >89. IS.   - Outpatient Pulmonary FU. PFT  - COPD RT consult.   - Encourage tobacco cessation.       # BC + for diptheroids from 17th afternoon (F/U cultres from 17th night-NGTD)  * Appreciate ID review. D/C Levaquin.  repeat culture Monday- no growth 3 days     # Afib w rvr: at current NSR, HR controlled (Recurrent episode due to drinking- previous admission in 01/18 as well)- better after detox.    - ct metop to 100 XR daily.       #Hx of chronic pain syndrome. Hx of Opioid abuse   On Suboxone.   - continue PTA Suboxone for now.   - PTA gabapentin. robaxin.      # Depression, PTSD w/ hx of panic attacks:   - Continue PTA venlafaxine.  - on Diazepam per MSSA  - FU Psychiatry recs      # Tobacco abuse: nicotine patch panel.       # FEN: adat to regular. Ivf. Fu lytes.   PPx:   Lovenox SQ for dvt ppx  Full code.[SG1.1]     Disposition Plan[SG1.2]   Expected discharge in unclear days to commitment bed once court paperwork received and SW finds a place.     Entered:[SG1.1] Tae Foster MD 04/01/2018[SG1.2],[SG1.1] 11:23 AM[SG1.2]    "    Tae Foster MD (Pager- [phone])   Internal Medicine/ Hospitalist[SG1.1]     Revision History        User Key Date/Time User Provider Type Action    > SG1.2 4/1/2018 11:23 AM Tae Foster MD Physician Sign     SG1.1 4/1/2018 11:22 AM Tae Foster MD Physician             Progress Notes by Tae Foster MD at 3/31/2018 11:20 AM     Author:  Tae Foster MD Service:  Hospitalist Author Type:  Physician    Filed:  3/31/2018 11:22 AM Date of Service:  3/31/2018 11:20 AM Creation Time:  3/31/2018 11:20 AM    Status:  Addendum :  Tae Foster MD (Physician)         Patient seen and examined with RN     S- no new concerns    4 point ROS done and neg unless mentioned     O-   BP 98/62 (BP Location: Left arm)  Pulse 77  Temp 97.7  F (36.5  C) (Oral)  Resp 18  Ht 1.803 m (5' 10.98\")  Wt 93.4 kg (206 lb)  SpO2 93%  BMI 28.74 kg/m2   Gen- pleasant   CVS- I+II+ no m/r/g  RS- CTABS  Abdo- soft, no tenderness . No g/r/r   Ext- no edema    MSk - as per ortho     LABS:   BMP    Recent Labs  Lab 03/25/18  0518      POTASSIUM 4.2   CHLORIDE 107   RUSTY 8.5   CO2 31   BUN 18   CR 0.72   GLC 97     CBC    Recent Labs  Lab 03/29/18  0602 03/26/18  0559 03/25/18  0518   WBC  --   --  9.3   RBC  --   --  4.37*   HGB  --   --  13.0*   HCT  --   --  40.4   MCV  --   --  92   MCH  --   --  29.7   MCHC  --   --  32.2   RDW  --   --  14.8    178 162     INRNo lab results found in last 7 days.  LFTs    Recent Labs  Lab 03/25/18  0518   ALKPHOS 108   AST 19   ALT 38   BILITOTAL 0.3   PROTTOTAL 6.7*   ALBUMIN 2.8*      A/P:  Jorge Rosales is a 63 year old male with past medical history significant for alcohol dependency., recurrent admissions for alcohol related cause last 01/14--01/19/18 , smoker, COPD, chronic pain syndrome, opioid use disorder on suboxone maintenance, depression, PTSD, L leg weakness s/p Lumbar spine surgery admitted for alcohol intoxication,  withdrawal and acute on chronic hypoxic " respi failure 2/2 PNA, mild copd exacerbation.      3/31- no change. A/w commitment bed    3/30: # Shoulder pain- X- ray , ortho review appreciated     # Alcohol dependency, intoxication and withdrawal:   -MSSA with Diazepam- completed  - mvi, thiamine, folic acid daily  - fall, seizure precautions.   - Psychiatry consult appreciated 3/19: 1.  Continue patient on one-to-one.  Patient was switched to MSSA protocol on Valium.  Continue that.  The patient will be continued on Suboxone.  This is what the patient is taking.  He can be continued on his medications once doses are known, which is Effexor 75 mg.   - D/W psych 3/22 for  Commitment and for hallucinations . Agree as he  failed many times, has thrombocytopenia, and recurrent AFib with RVR alcohol related     County upheld the commitment on 3/27 -final decision will be on Thursday 3/28/18- a/w paperwork     # Acute on chronic hypoxic respi failure , h/o COPD: suspect R lower lobe PNA. Mild copd exacerbation. - resolved   Started empirically on iv vanc, zosyn, doxy for hcap.  03/18: Given pt stable hemodynamics. Lactic acidosis: resolved quickly suspect mostly related to alcohol use, dehydration. Hypoxia: improving, afebrile, low procal: dc iv Vanco and Zosyn, doxy. Completed 7 days levofloxacin 750 mg daily- 3/24.  - Ct bronchodilator nebs (03/18: switched to levalbuterol given afib w rvr), oxygen to maintain sat >89. IS.   - Outpatient Pulmonary FU. PFT  - COPD RT consult.   - Encourage tobacco cessation.       # BC + for diptheroids from 17th afternoon (F/U cultres from 17th night-NGTD)  * Appreciate ID review. D/C Levaquin.  repeat culture Monday- no growth 3 days     # Afib w rvr: at current NSR, HR controlled (Recurrent episode due to drinking- previous admission in 01/18 as well)- better after detox.    - ct metop to 100 XR daily.       #Hx of chronic pain syndrome. Hx of Opioid abuse   On Suboxone.   - continue PTA Suboxone for now.   - PTA gabapentin.  robaxin.      # Depression, PTSD w/ hx of panic attacks:   - Continue PTA venlafaxine.  - on Diazepam per MSSA  - FU Psychiatry recs      # Tobacco abuse: nicotine patch panel.       # FEN: adat to regular. Ivf. Fu lytes.   PPx:   Lovenox SQ for dvt ppx  Full code.     Disposition Plan   Expected discharge in unclear days to commitment bed once court paperwork received and SW finds a place.     Entered: Tae Foster MD 03/31/2018, 11:20 AM       Tae Foster MD (Pager- [phone])   Internal Medicine/ Hospitalist[SG1.1]     Revision History        User Key Date/Time User Provider Type Action    > [N/A] 3/31/2018 11:22 AM Tae Foster MD Physician Addend     SG1.1 3/31/2018 11:21 AM Tae Foster MD Physician Sign                  Procedure Notes     No notes of this type exist for this encounter.      Progress Notes - Therapies (Notes from 03/31/18 through 04/03/18)     No notes of this type exist for this encounter.

## 2018-03-17 NOTE — PHARMACY-ADMISSION MEDICATION HISTORY
"Admission Medication History status for the 3/17/2018 admission is complete.  See EPIC admission navigator for Prior to Admission medications.    Medication history sources:  Patient, Juanita Monaco ([phone])     Medication history source reliability: Good. Patient knew all medication names and how he was taking them. Pharmacy was called to verify medications (Suboxone, gabapentin,metoprolol, and venlafaxine).    Medication adherence:  Good. Patient reports he is \"pretty good\" about taking his medications daily.    Changes made to PTA medication list (reason)  Added:   -Miralax - per patient/Care Everywhere.  Deleted:   -lidocaine cream - no longer taking per patient.  -mupirocin - no longer taking per patient.  Changed:   -fish oil-omega-3 fatty acids 1000mg capsules: 2 capsules by mouth daily --> fish oil-omega-3 fatty acids 1000mg capsules: take 1g by mouth daily per patient.  -Suboxone 8-2mg film: place 1 film under the tongue 2 times daily --> Suboxone 8-2mg tabs: Dissolve 1.5 tablets sublingually in the morning and 1 tablet sublingually in the evening per patient/[facility].  -gabapentin 300mg capsules: take 1 capsule by mouth 3 times daily --> gabapentin 300mg capsules: take 2 capsules by mouth 4 times daily per patient/[facility].  -metoprolol tartrate 100mg tablets: take 100mg by mouth daily --> metoprolol succinate 50mg tablets: take 50mg by mouth daily per [facility].    Additional medication history information (including reliability of information, actions taken by pharmacist):   -Patient reports he ran out of methocarbamol within the last month and was interested in restarting it.  -Patient reports he prefers Suboxone films but per pharmacy, patient currently has tablets.  -Patient reports [facility] with [facility] or [facility]. [facility] last filled venlafaxine 75mg daily on 1/19/18 for 30 day supply. Patient reports he is still taking it so he may " [facility] or have some compliance concerns.    Time spent in this activity: 30 minutes    Medication history completed by: Terrie Penaloza, [facility] Pharmacy Intern    Prior to Admission medications    Medication Sig Last Dose Taking? Auth Provider   polyethylene glycol (MIRALAX/GLYCOLAX) Packet Take 17 g by mouth daily as needed for constipation Past Week at Unknown time Yes Unknown, Entered By History   metoprolol succinate (TOPROL-XL) 50 MG 24 hr tablet Take 50 mg by mouth daily 3/17/2018 at AM Yes Unknown, Entered By History   buprenorphine-naloxone (SUBOXONE) 8-2 MG SUBL sublingual tablet Dissolve 1.5 tablets sublingually in the morning and 1 tablet sublingually in the evening 3/16/2018 at AM Yes Unknown, Entered By History   gabapentin (NEURONTIN) 300 MG capsule Take 600 mg by mouth 4 times daily 3/16/2018 at PM Yes Unknown, Entered By History   methocarbamol (ROBAXIN) 750 MG tablet Take 750 mg by mouth nightly as needed for muscle spasms Past Month at Unknown time Yes Unknown, Entered By History   aspirin 81 MG tablet Take 1 tablet (81 mg) by mouth daily 3/16/2018 at AM Yes Lorelei Reddy PA-C   cyanocobalamin (VITAMIN  B-12) 1000 MCG tablet Take 1 tablet (1,000 mcg) by mouth daily 3/16/2018 at AM Yes Lorelei Reddy PA-C   venlafaxine (EFFEXOR-ER) 75 MG TB24 24 hr tablet Take 1 tablet (75 mg) by mouth daily 3/16/2018 at AM Yes Lorelei Reddy PA-C   albuterol (PROAIR HFA/PROVENTIL HFA/VENTOLIN HFA) 108 (90 BASE) MCG/ACT Inhaler Inhale 2 puffs into the lungs every 6 hours as needed for shortness of breath / dyspnea or wheezing 3/17/2018 at AM Yes Lorelei Reddy PA-C   bismuth subsalicylate (PEPTO BISMOL) 262 MG chewable tablet Take 524 mg by mouth 2 times daily as needed for diarrhea Past Week at Unknown time Yes Unknown, Entered By History   fish oil-omega-3 fatty acids 1000 MG capsule Take 1 g by mouth daily  Past Month at Unknown time Yes Alvin Yo  MD Victoriano   Multiple Vitamin (MULTIVITAMIN) per tablet Take 1 tablet by mouth daily. 3/16/2018 at AM Yes Alvin Yo MD

## 2018-03-18 LAB
ANION GAP SERPL CALCULATED.3IONS-SCNC: 6 MMOL/L (ref 3–14)
ANION GAP SERPL CALCULATED.3IONS-SCNC: 7 MMOL/L (ref 3–14)
BACTERIA SPEC CULT: NO GROWTH
BUN SERPL-MCNC: 18 MG/DL (ref 7–30)
BUN SERPL-MCNC: 22 MG/DL (ref 7–30)
CA-I SERPL ISE-MCNC: 3.9 MG/DL (ref 4.4–5.2)
CALCIUM SERPL-MCNC: 7.4 MG/DL (ref 8.5–10.1)
CALCIUM SERPL-MCNC: 7.5 MG/DL (ref 8.5–10.1)
CHLORIDE SERPL-SCNC: 103 MMOL/L (ref 94–109)
CHLORIDE SERPL-SCNC: 106 MMOL/L (ref 94–109)
CO2 SERPL-SCNC: 28 MMOL/L (ref 20–32)
CO2 SERPL-SCNC: 30 MMOL/L (ref 20–32)
CREAT SERPL-MCNC: 0.54 MG/DL (ref 0.66–1.25)
CREAT SERPL-MCNC: 0.58 MG/DL (ref 0.66–1.25)
CRP SERPL-MCNC: 155 MG/L (ref 0–8)
ERYTHROCYTE [DISTWIDTH] IN BLOOD BY AUTOMATED COUNT: 14.6 % (ref 10–15)
ERYTHROCYTE [DISTWIDTH] IN BLOOD BY AUTOMATED COUNT: 14.7 % (ref 10–15)
GFR SERPL CREATININE-BSD FRML MDRD: >90 ML/MIN/1.7M2
GFR SERPL CREATININE-BSD FRML MDRD: >90 ML/MIN/1.7M2
GLUCOSE SERPL-MCNC: 172 MG/DL (ref 70–99)
GLUCOSE SERPL-MCNC: 204 MG/DL (ref 70–99)
HCT VFR BLD AUTO: 36 % (ref 40–53)
HCT VFR BLD AUTO: 36.9 % (ref 40–53)
HGB BLD-MCNC: 11.9 G/DL (ref 13.3–17.7)
HGB BLD-MCNC: 12.3 G/DL (ref 13.3–17.7)
INTERPRETATION ECG - MUSE: NORMAL
LACTATE BLD-SCNC: 1.5 MMOL/L (ref 0.7–2)
Lab: NORMAL
MAGNESIUM SERPL-MCNC: 1.9 MG/DL (ref 1.6–2.3)
MCH RBC QN AUTO: 29.5 PG (ref 26.5–33)
MCH RBC QN AUTO: 29.6 PG (ref 26.5–33)
MCHC RBC AUTO-ENTMCNC: 33.1 G/DL (ref 31.5–36.5)
MCHC RBC AUTO-ENTMCNC: 33.3 G/DL (ref 31.5–36.5)
MCV RBC AUTO: 89 FL (ref 78–100)
MCV RBC AUTO: 89 FL (ref 78–100)
PLATELET # BLD AUTO: 102 10E9/L (ref 150–450)
PLATELET # BLD AUTO: 109 10E9/L (ref 150–450)
POTASSIUM SERPL-SCNC: 3.8 MMOL/L (ref 3.4–5.3)
POTASSIUM SERPL-SCNC: 3.9 MMOL/L (ref 3.4–5.3)
PROCALCITONIN SERPL-MCNC: 0.14 NG/ML
RBC # BLD AUTO: 4.03 10E12/L (ref 4.4–5.9)
RBC # BLD AUTO: 4.15 10E12/L (ref 4.4–5.9)
SODIUM SERPL-SCNC: 138 MMOL/L (ref 133–144)
SODIUM SERPL-SCNC: 142 MMOL/L (ref 133–144)
SPECIMEN SOURCE: NORMAL
WBC # BLD AUTO: 13.2 10E9/L (ref 4–11)
WBC # BLD AUTO: 14.1 10E9/L (ref 4–11)

## 2018-03-18 PROCEDURE — 36415 COLL VENOUS BLD VENIPUNCTURE: CPT | Performed by: INTERNAL MEDICINE

## 2018-03-18 PROCEDURE — 83735 ASSAY OF MAGNESIUM: CPT | Performed by: HOSPITALIST

## 2018-03-18 PROCEDURE — 94640 AIRWAY INHALATION TREATMENT: CPT

## 2018-03-18 PROCEDURE — 85027 COMPLETE CBC AUTOMATED: CPT | Performed by: INTERNAL MEDICINE

## 2018-03-18 PROCEDURE — 25000132 ZZH RX MED GY IP 250 OP 250 PS 637: Performed by: INTERNAL MEDICINE

## 2018-03-18 PROCEDURE — 12000001 ZZH R&B MED SURG/OB UMMC

## 2018-03-18 PROCEDURE — 93005 ELECTROCARDIOGRAM TRACING: CPT

## 2018-03-18 PROCEDURE — 36415 COLL VENOUS BLD VENIPUNCTURE: CPT | Performed by: HOSPITALIST

## 2018-03-18 PROCEDURE — 86140 C-REACTIVE PROTEIN: CPT | Performed by: INTERNAL MEDICINE

## 2018-03-18 PROCEDURE — 85027 COMPLETE CBC AUTOMATED: CPT | Performed by: HOSPITALIST

## 2018-03-18 PROCEDURE — 25000132 ZZH RX MED GY IP 250 OP 250 PS 637: Performed by: STUDENT IN AN ORGANIZED HEALTH CARE EDUCATION/TRAINING PROGRAM

## 2018-03-18 PROCEDURE — 87633 RESP VIRUS 12-25 TARGETS: CPT | Performed by: INTERNAL MEDICINE

## 2018-03-18 PROCEDURE — 40000275 ZZH STATISTIC RCP TIME EA 10 MIN

## 2018-03-18 PROCEDURE — 93010 ELECTROCARDIOGRAM REPORT: CPT | Performed by: INTERNAL MEDICINE

## 2018-03-18 PROCEDURE — 83605 ASSAY OF LACTIC ACID: CPT | Performed by: HOSPITALIST

## 2018-03-18 PROCEDURE — 80048 BASIC METABOLIC PNL TOTAL CA: CPT | Performed by: HOSPITALIST

## 2018-03-18 PROCEDURE — 25000132 ZZH RX MED GY IP 250 OP 250 PS 637: Performed by: HOSPITALIST

## 2018-03-18 PROCEDURE — 25000125 ZZHC RX 250: Performed by: HOSPITALIST

## 2018-03-18 PROCEDURE — 25800025 ZZH RX 258: Performed by: INTERNAL MEDICINE

## 2018-03-18 PROCEDURE — 84145 PROCALCITONIN (PCT): CPT | Performed by: HOSPITALIST

## 2018-03-18 PROCEDURE — 87641 MR-STAPH DNA AMP PROBE: CPT | Performed by: INTERNAL MEDICINE

## 2018-03-18 PROCEDURE — 94640 AIRWAY INHALATION TREATMENT: CPT | Mod: 76

## 2018-03-18 PROCEDURE — 99223 1ST HOSP IP/OBS HIGH 75: CPT | Mod: AI | Performed by: INTERNAL MEDICINE

## 2018-03-18 PROCEDURE — 25000128 H RX IP 250 OP 636: Performed by: HOSPITALIST

## 2018-03-18 PROCEDURE — 87640 STAPH A DNA AMP PROBE: CPT | Performed by: INTERNAL MEDICINE

## 2018-03-18 PROCEDURE — 25000125 ZZHC RX 250: Performed by: INTERNAL MEDICINE

## 2018-03-18 PROCEDURE — 82330 ASSAY OF CALCIUM: CPT | Performed by: HOSPITALIST

## 2018-03-18 RX ORDER — DILTIAZEM HYDROCHLORIDE 5 MG/ML
10 INJECTION INTRAVENOUS ONCE
Status: COMPLETED | OUTPATIENT
Start: 2018-03-18 | End: 2018-03-18

## 2018-03-18 RX ORDER — LEVALBUTEROL INHALATION SOLUTION 0.63 MG/3ML
0.63 SOLUTION RESPIRATORY (INHALATION) 4 TIMES DAILY
Status: DISCONTINUED | OUTPATIENT
Start: 2018-03-18 | End: 2018-04-04

## 2018-03-18 RX ORDER — CALCIUM GLUCONATE 94 MG/ML
1 INJECTION, SOLUTION INTRAVENOUS ONCE
Status: DISCONTINUED | OUTPATIENT
Start: 2018-03-18 | End: 2018-03-18

## 2018-03-18 RX ORDER — METOPROLOL TARTRATE 1 MG/ML
5 INJECTION, SOLUTION INTRAVENOUS ONCE
Status: COMPLETED | OUTPATIENT
Start: 2018-03-18 | End: 2018-03-18

## 2018-03-18 RX ORDER — LEVOFLOXACIN 750 MG/1
750 TABLET, FILM COATED ORAL DAILY
Status: DISCONTINUED | OUTPATIENT
Start: 2018-03-18 | End: 2018-03-25

## 2018-03-18 RX ORDER — METOPROLOL SUCCINATE 50 MG/1
50 TABLET, EXTENDED RELEASE ORAL ONCE
Status: COMPLETED | OUTPATIENT
Start: 2018-03-18 | End: 2018-03-18

## 2018-03-18 RX ORDER — MULTIVITAMIN,THERAPEUTIC
1 TABLET ORAL DAILY
Status: DISCONTINUED | OUTPATIENT
Start: 2018-03-19 | End: 2018-04-09 | Stop reason: HOSPADM

## 2018-03-18 RX ORDER — NICOTINE 21 MG/24HR
1 PATCH, TRANSDERMAL 24 HOURS TRANSDERMAL DAILY
Status: DISCONTINUED | OUTPATIENT
Start: 2018-03-18 | End: 2018-04-09 | Stop reason: HOSPADM

## 2018-03-18 RX ORDER — DEXTROSE MONOHYDRATE, SODIUM CHLORIDE, AND POTASSIUM CHLORIDE 50; 1.49; 9 G/1000ML; G/1000ML; G/1000ML
INJECTION, SOLUTION INTRAVENOUS CONTINUOUS
Status: DISCONTINUED | OUTPATIENT
Start: 2018-03-18 | End: 2018-03-22

## 2018-03-18 RX ORDER — METOPROLOL TARTRATE 25 MG/1
25 TABLET, FILM COATED ORAL ONCE
Status: COMPLETED | OUTPATIENT
Start: 2018-03-18 | End: 2018-03-18

## 2018-03-18 RX ORDER — BENZONATATE 100 MG/1
100 CAPSULE ORAL 3 TIMES DAILY PRN
Status: DISCONTINUED | OUTPATIENT
Start: 2018-03-18 | End: 2018-04-09 | Stop reason: HOSPADM

## 2018-03-18 RX ORDER — METOPROLOL SUCCINATE 100 MG/1
100 TABLET, EXTENDED RELEASE ORAL DAILY
Status: DISCONTINUED | OUTPATIENT
Start: 2018-03-19 | End: 2018-03-22

## 2018-03-18 RX ORDER — LANOLIN ALCOHOL/MO/W.PET/CERES
100 CREAM (GRAM) TOPICAL DAILY
Status: DISCONTINUED | OUTPATIENT
Start: 2018-03-19 | End: 2018-04-09 | Stop reason: HOSPADM

## 2018-03-18 RX ORDER — LORAZEPAM 2 MG/ML
2 INJECTION INTRAMUSCULAR ONCE
Status: COMPLETED | OUTPATIENT
Start: 2018-03-18 | End: 2018-03-18

## 2018-03-18 RX ORDER — FOLIC ACID 1 MG/1
1 TABLET ORAL DAILY
Status: DISCONTINUED | OUTPATIENT
Start: 2018-03-19 | End: 2018-04-09 | Stop reason: HOSPADM

## 2018-03-18 RX ORDER — GUAIFENESIN 600 MG/1
600 TABLET, EXTENDED RELEASE ORAL 2 TIMES DAILY
Status: DISCONTINUED | OUTPATIENT
Start: 2018-03-18 | End: 2018-03-25

## 2018-03-18 RX ORDER — METOPROLOL TARTRATE 1 MG/ML
5 INJECTION, SOLUTION INTRAVENOUS EVERY 6 HOURS PRN
Status: DISCONTINUED | OUTPATIENT
Start: 2018-03-18 | End: 2018-04-09 | Stop reason: HOSPADM

## 2018-03-18 RX ORDER — LEVALBUTEROL TARTRATE 45 UG/1
2 AEROSOL, METERED ORAL EVERY 6 HOURS PRN
Status: DISCONTINUED | OUTPATIENT
Start: 2018-03-18 | End: 2018-04-09 | Stop reason: HOSPADM

## 2018-03-18 RX ADMIN — GABAPENTIN 600 MG: 300 CAPSULE ORAL at 16:17

## 2018-03-18 RX ADMIN — LORAZEPAM 2 MG: 2 INJECTION INTRAMUSCULAR; INTRAVENOUS at 01:59

## 2018-03-18 RX ADMIN — ASPIRIN 81 MG: 81 TABLET, COATED ORAL at 08:05

## 2018-03-18 RX ADMIN — IPRATROPIUM BROMIDE AND ALBUTEROL SULFATE 3 ML: .5; 3 SOLUTION RESPIRATORY (INHALATION) at 08:48

## 2018-03-18 RX ADMIN — VANCOMYCIN HYDROCHLORIDE 1500 MG: 10 INJECTION, POWDER, LYOPHILIZED, FOR SOLUTION INTRAVENOUS at 04:21

## 2018-03-18 RX ADMIN — LEVALBUTEROL HYDROCHLORIDE 0.63 MG: 0.63 SOLUTION RESPIRATORY (INHALATION) at 20:49

## 2018-03-18 RX ADMIN — NICOTINE 1 PATCH: 14 PATCH, EXTENDED RELEASE TRANSDERMAL at 08:04

## 2018-03-18 RX ADMIN — POTASSIUM CHLORIDE, DEXTROSE MONOHYDRATE AND SODIUM CHLORIDE: 150; 5; 900 INJECTION, SOLUTION INTRAVENOUS at 10:43

## 2018-03-18 RX ADMIN — GABAPENTIN 600 MG: 300 CAPSULE ORAL at 08:05

## 2018-03-18 RX ADMIN — METOPROLOL TARTRATE 5 MG: 5 INJECTION, SOLUTION INTRAVENOUS at 02:09

## 2018-03-18 RX ADMIN — GABAPENTIN 600 MG: 300 CAPSULE ORAL at 11:05

## 2018-03-18 RX ADMIN — CYANOCOBALAMIN TAB 1000 MCG 1000 MCG: 1000 TAB at 08:05

## 2018-03-18 RX ADMIN — GUAIFENESIN 600 MG: 600 TABLET, EXTENDED RELEASE ORAL at 10:19

## 2018-03-18 RX ADMIN — LORAZEPAM 2 MG: 1 TABLET ORAL at 06:07

## 2018-03-18 RX ADMIN — LEVALBUTEROL HYDROCHLORIDE 0.63 MG: 0.63 SOLUTION RESPIRATORY (INHALATION) at 16:26

## 2018-03-18 RX ADMIN — GUAIFENESIN 600 MG: 600 TABLET, EXTENDED RELEASE ORAL at 19:51

## 2018-03-18 RX ADMIN — METOPROLOL TARTRATE 25 MG: 25 TABLET, FILM COATED ORAL at 02:21

## 2018-03-18 RX ADMIN — DILTIAZEM HYDROCHLORIDE: 5 INJECTION INTRAVENOUS at 06:07

## 2018-03-18 RX ADMIN — LEVALBUTEROL HYDROCHLORIDE 0.63 MG: 0.63 SOLUTION RESPIRATORY (INHALATION) at 12:30

## 2018-03-18 RX ADMIN — LEVOFLOXACIN 750 MG: 750 TABLET, FILM COATED ORAL at 10:18

## 2018-03-18 RX ADMIN — NICOTINE 1 PATCH: 21 PATCH, EXTENDED RELEASE TRANSDERMAL at 17:41

## 2018-03-18 RX ADMIN — LORAZEPAM 2 MG: 1 TABLET ORAL at 01:53

## 2018-03-18 RX ADMIN — GABAPENTIN 600 MG: 300 CAPSULE ORAL at 19:51

## 2018-03-18 RX ADMIN — BUPRENORPHINE AND NALOXONE 1 TABLET: 8; 2 TABLET SUBLINGUAL at 19:51

## 2018-03-18 RX ADMIN — PIPERACILLIN SODIUM AND TAZOBACTAM SODIUM 3.38 G: 3; .375 INJECTION, POWDER, LYOPHILIZED, FOR SOLUTION INTRAVENOUS at 08:46

## 2018-03-18 RX ADMIN — IPRATROPIUM BROMIDE AND ALBUTEROL SULFATE 3 ML: .5; 3 SOLUTION RESPIRATORY (INHALATION) at 01:12

## 2018-03-18 RX ADMIN — LORAZEPAM 2 MG: 1 TABLET ORAL at 21:48

## 2018-03-18 RX ADMIN — LORAZEPAM 2 MG: 1 TABLET ORAL at 08:03

## 2018-03-18 RX ADMIN — NICOTINE POLACRILEX 2 MG: 2 GUM, CHEWING ORAL at 17:41

## 2018-03-18 RX ADMIN — LORAZEPAM 2 MG: 1 TABLET ORAL at 11:42

## 2018-03-18 RX ADMIN — LORAZEPAM 2 MG: 1 TABLET ORAL at 14:08

## 2018-03-18 RX ADMIN — BUPRENORPHINE AND NALOXONE 1 TABLET: 8; 2 TABLET SUBLINGUAL at 08:05

## 2018-03-18 RX ADMIN — PIPERACILLIN SODIUM AND TAZOBACTAM SODIUM 3.38 G: 3; .375 INJECTION, POWDER, LYOPHILIZED, FOR SOLUTION INTRAVENOUS at 02:25

## 2018-03-18 RX ADMIN — POTASSIUM CHLORIDE, DEXTROSE MONOHYDRATE AND SODIUM CHLORIDE: 150; 5; 900 INJECTION, SOLUTION INTRAVENOUS at 20:25

## 2018-03-18 RX ADMIN — VENLAFAXINE HYDROCHLORIDE 75 MG: 75 TABLET, EXTENDED RELEASE ORAL at 08:05

## 2018-03-18 RX ADMIN — METOPROLOL TARTRATE 5 MG: 1 INJECTION, SOLUTION INTRAVENOUS at 02:57

## 2018-03-18 RX ADMIN — CHLORDIAZEPOXIDE HYDROCHLORIDE 25 MG: 25 CAPSULE ORAL at 08:05

## 2018-03-18 RX ADMIN — LORAZEPAM 2 MG: 1 TABLET ORAL at 19:51

## 2018-03-18 RX ADMIN — LORAZEPAM 2 MG: 1 TABLET ORAL at 17:41

## 2018-03-18 RX ADMIN — CALCIUM GLUCONATE 1 G: 98 INJECTION, SOLUTION INTRAVENOUS at 04:37

## 2018-03-18 RX ADMIN — METOPROLOL SUCCINATE 50 MG: 50 TABLET, EXTENDED RELEASE ORAL at 10:18

## 2018-03-18 RX ADMIN — METOPROLOL SUCCINATE 50 MG: 50 TABLET, EXTENDED RELEASE ORAL at 08:05

## 2018-03-18 ASSESSMENT — ACTIVITIES OF DAILY LIVING (ADL)
ADLS_ACUITY_SCORE: 26
ADLS_ACUITY_SCORE: 27
ADLS_ACUITY_SCORE: 26
ADLS_ACUITY_SCORE: 26

## 2018-03-18 NOTE — H&P
"[facility] History and Physical    Jorge Rosales MRN# [id number]   Age: 63 year old YOB: 1955     Date of Admission:  3/17/2018    Home clinic:  Primary care provider: Carlos Padron          Assessment and Plan:   Assessment:  63 year old man with depression, PSA, alcohol abuse, PTSD, current smoker with COPD comes c/w tremulousness, feeling \"bad\", hurting all over due to withdrawal. He is asking for detox. Additionally has been having cough, non productive, more intense and frequent than usual for the last few days.  In ED hemodynamically stable but with elevated lactic acid, 5 and significant leucocytosis 25.1 with left shift. Last hospitalization in January 2018 here.    Alcohol withdrawal  Healthcare associated pneumonia  COPD exacerbation  Elevated lactic acid  Opioid dependence on suboxone       Plan:  MSSA protocol, check electrolytes and replace as needed. Librium standing order 25 mg PO q8h and taper as clinical course dicatates  HCAP- follow cultures, broad spectrum antibiotics in meantime, zosyn, vancomycin, doxy ( for atypical coverage)   COPD exacerbation- nebulizer, prednisone  Continue rest of his home medications  Repeat lactic acid after 2 L bolus, I suspect this is related more to his alcoholism then to severe infection                Chief Complaint:   Shakiness, feeling pain all over     History is obtained from the patient     This is a pleasant 63 year old man with multiple psychiatric illnesses most notable for PTSD, depression, he has h/o PSA on maintanance suboxone and is abusing alcohol as well. He came due to feeling pain all over, not associated with fevers or fall. He has chronic right shoulder pain that has not changed in quality or intensity. He also developed cough few days ago, no hemoptysis, mostly dry. It is associated with SOB. Denies headache, photophobia, neck stiggness, no nausea, vomiting or diarrhea. Denies abdominal pain.   In ED he was found to have stable " vital signs, elevated lactic acid to 5, wbc count of 25 with left shift. CXR suggestive of RLL pneumonia.    He was admitted for further management of alcohol withdrawal, HCAP and COPD exacrbation               Past Medical History:     Past Medical History:   Diagnosis Date     Chronic back pain     Methadone program     COPD (chronic obstructive pulmonary disease) (H)      CTS (carpal tunnel syndrome)      Low testosterone     managed by endocrinology     Obesity      Panic attacks      PTSD (post-traumatic stress disorder) 7/8/2011     Rotator cuff injury      Substance abuse     alcohol abuse             Past Surgical History:      Past Surgical History:   Procedure Laterality Date     BACK SURGERY       SHOULDER SURGERY               Social History:     Social History   Substance Use Topics     Smoking status: Current Every Day Smoker     Packs/day: 0.50     Years: 30.00     Types: Cigarettes     Smokeless tobacco: Never Used     Alcohol use Yes             Family History:     Family History   Problem Relation Age of Onset     DIABETES Father      HEART DISEASE No family hx of              Immunizations:     Immunization History   Administered Date(s) Administered     Influenza Vaccine IM 3yrs+ 4 Valent IIV4 11/09/2017     Pneumococcal 23 valent 11/08/2017     TD (ADULT, 7+) 01/01/2011             Allergies:   No Known Allergies          Medications:     Prescriptions Prior to Admission   Medication Sig Dispense Refill Last Dose     polyethylene glycol (MIRALAX/GLYCOLAX) Packet Take 17 g by mouth daily as needed for constipation   Past Week at Unknown time     metoprolol succinate (TOPROL-XL) 50 MG 24 hr tablet Take 50 mg by mouth daily   3/17/2018 at AM     buprenorphine-naloxone (SUBOXONE) 8-2 MG SUBL sublingual tablet Dissolve 1.5 tablets sublingually in the morning and 1 tablet sublingually in the evening   3/16/2018 at AM     gabapentin (NEURONTIN) 300 MG capsule Take 600 mg by mouth 4 times daily    3/16/2018 at PM     methocarbamol (ROBAXIN) 750 MG tablet Take 750 mg by mouth nightly as needed for muscle spasms   Past Month at Unknown time     aspirin 81 MG tablet Take 1 tablet (81 mg) by mouth daily 30 tablet 0 3/16/2018 at AM     cyanocobalamin (VITAMIN  B-12) 1000 MCG tablet Take 1 tablet (1,000 mcg) by mouth daily 100 tablet 0 3/16/2018 at AM     venlafaxine (EFFEXOR-ER) 75 MG TB24 24 hr tablet Take 1 tablet (75 mg) by mouth daily 30 each 1 3/16/2018 at AM     albuterol (PROAIR HFA/PROVENTIL HFA/VENTOLIN HFA) 108 (90 BASE) MCG/ACT Inhaler Inhale 2 puffs into the lungs every 6 hours as needed for shortness of breath / dyspnea or wheezing 1 Inhaler 1 3/17/2018 at AM     bismuth subsalicylate (PEPTO BISMOL) 262 MG chewable tablet Take 524 mg by mouth 2 times daily as needed for diarrhea   Past Week at Unknown time     fish oil-omega-3 fatty acids 1000 MG capsule Take 1 g by mouth daily  180 capsule 12 Past Month at Unknown time     Multiple Vitamin (MULTIVITAMIN) per tablet Take 1 tablet by mouth daily. 100 tablet 12 3/16/2018 at AM             Review of Systems:   A comprehensive review of systems was performed and found to be negative except as described in this note           Physical Exam:   Vitals were reviewed  In no distress, visible tremor in both arms  Neck veins are flat  Heart sounds are regular, no murmurs  Lungs are with mild bilateral wheezing, no crackles  Abdomen is soft, non tender  Non focal neurological exam, alert and oriented, apart from tremor no other abnormalities           Data:   BMP  Recent Labs  Lab 03/17/18  1330      POTASSIUM 3.6   CHLORIDE 102   RUSTY 8.0*   CO2 22   BUN 33*   CR 0.78   *     CBC  Recent Labs  Lab 03/17/18  1330   WBC 25.1*   RBC 5.14   HGB 15.8   HCT 45.9   MCV 89   MCH 30.7   MCHC 34.4   RDW 14.7        INRNo lab results found in last 7 days.  LFTs  Recent Labs  Lab 03/17/18  1330   ALKPHOS 88   AST 20   ALT 22   BILITOTAL 0.7   PROTTOTAL  7.6   ALBUMIN 3.3*      PANCNo lab results found in last 7 days.       Attestation:  Total time: 70 minutes , more than 50% of time on chart review, coordination of care counseling and documentation

## 2018-03-18 NOTE — PLAN OF CARE
Problem: Alcohol Withdrawal Acute, Risk/Actual (Adult)  Goal: Signs and Symptoms of Listed Potential Problems Will be Absent, Minimized or Managed (Alcohol Withdrawal Acute, Risk/Actual)  Signs and symptoms of listed potential problems will be absent, minimized or managed by discharge/transition of care (reference Alcohol Withdrawal Acute, Risk/Actual (Adult) CPG).   Outcome: Declining  Pt is alert and oriented X4. MSSA scores 12 and 19 - received total of 4mg ativan. Infrequent congested cough. Neb treatments scheduled. Bowel sounds normoactive- passing flatus- unsure if date of last BM. Voiding spontaneously using urinal in bed.  Around 1:15am pt heart rate and rhythm became irregular. HR was in the 170-200 range. Administered IV ativan and  IV metoprolol. Pt continues to be in afib- status changed to intermediate care - Ethel RN  to take over care of pt.

## 2018-03-18 NOTE — PROVIDER NOTIFICATION
Notified moonlighter that pt HR elevated in the 170-200 range. 12 lead EKG, IV ativan, IV metoprolol, and oral metoprolol ordered.

## 2018-03-18 NOTE — PLAN OF CARE
Problem: Patient Care Overview  Goal: Plan of Care/Patient Progress Review  Outcome: No Change  Note for the Pt from 0245 till 0730. Pt made IMC at 0230 because of his rapid A fib. Pt remained in a fib the rest of the night. BP stable. No SOB or chest pain. Pt says he has a history of fast heart rate.  Pt got 2 doses of IV Lopressor and 1 dose of oral. Then he was treated with IV Calcium Gluconate and after that gave the IV Cardizem as ordered. Pt is oriented. The last half of the night, pt was seeing two men up in the ceiling writing with red lights. Pt says he knows he has hallucinations, but this is not a hallucination, it is real.  Turned ALL the lights on in the room and he was still seeing men in the ceiling. Has numbness and tingling in both lower legs, which is baseline. Good appetite. Ativan given. Awake all night. Sits up on the edge of the bed to void but always has trouble with the urinal and gets urine all over the floor and bed. Able to make needs known. Call light with in reach. A: Tolerating a fib. P: Monitor closely.  Supportive cares. Medicate for pain/ withdrawl as needed.

## 2018-03-18 NOTE — PLAN OF CARE
Problem: Patient Care Overview  Goal: Plan of Care/Patient Progress Review  Outcome: No Change  Pt admitted to unit @1830. A&Ox4, afebrile, tachy HR- moonlighter paged for Tele order. O2 >92% on 2L NC. Lactic level elevated 4.3-4.6. Moonlighter aware- 2L bolus infusing will recheck. MSSA 12. Pt requesting Nicotine patch order per Pt request. 2A w/ cane when transferring from cart to bed. Uses bedside urinal. Droplet ISO. Baseline LLE/L shoulder numbness & tingling. Regular diet, tolerated fair. Pt is able to make needs known and will continue POC.

## 2018-03-18 NOTE — PHARMACY-VANCOMYCIN DOSING SERVICE
Pharmacy Vancomycin Initial Note  Date of Service 2018  Patient's  1955  63 year old, male    Indication: [facility] Pneumonia    Current estimated CrCl = Estimated Creatinine Clearance: 112.7 mL/min (based on Cr of 0.78).    Creatinine for last 3 days  3/17/2018:  1:30 PM Creatinine 0.78 mg/dL    Recent Vancomycin Level(s) for last 3 days  No results found for requested labs within last 72 hours.      Vancomycin IV Administrations (past 72 hours)                   vancomycin (VANCOCIN) 2,000 mg in sodium chloride 0.9 % 500 mL intermittent infusion (mg) 2,000 mg New Bag 18 1456                Nephrotoxins and other renal medications (Future)    Start     Dose/Rate Route Frequency Ordered Stop    18 0300  vancomycin (VANCOCIN) 1,500 mg in sodium chloride 0.9 % 250 mL intermittent infusion      1,500 mg  over 90 Minutes Intravenous EVERY 12 HOURS 18  piperacillin-tazobactam (ZOSYN) 3.375 g vial to attach to  mL bag      3.375 g  over 30 Minutes Intravenous EVERY 6 HOURS 18 194            Contrast Orders - past 72 hours     None                Plan:  1.  Start vancomycin  1500 mg IV q12h.   2.  Goal Trough Level: 15-20 mg/L   3.  Pharmacy will check trough levels as appropriate in 1-3 Days.    4. Serum creatinine levels will be ordered daily for the first week of therapy and at least twice weekly for subsequent weeks.    5. [place] method utilized to dose vancomycin therapy: Method 1    Eugenie Reaves

## 2018-03-18 NOTE — PLAN OF CARE
Problem: Patient Care Overview  Goal: Individualization & Mutuality  Outcome: No Change  IMC.  D: Pt  A/O x3, confused and impulsive . VSS( pt HR- converted) . Lungs- decreased but CL, gets scheduled nebs., no c/o chest pain/ SOB. sats= 93 % RA. . Bowel+, passing gas. PP- +. +2  Edema- feet/legs. CMS- intact.( baseline numbness/ tengeling  Feet/legs . Pt has generalized weakness.  Pt taking PO food/ fluids well( pt needs asst with feeding.. Voiding Good amounts on BSC/ and has has 2 incon't  this shift.. Pain/CAPA - denies.  Pt MSSA= 13/ 14  Ativan given. BED ALARM ON.  IVF's running at 100. IV antiBx - DC's and changed to PO.  A: con't to monitor. Call light in reach. Pt able to make needs known. BED ALARM ON.

## 2018-03-19 LAB
ALBUMIN SERPL-MCNC: 2.9 G/DL (ref 3.4–5)
ALP SERPL-CCNC: 69 U/L (ref 40–150)
ALT SERPL W P-5'-P-CCNC: 20 U/L (ref 0–70)
ANION GAP SERPL CALCULATED.3IONS-SCNC: 7 MMOL/L (ref 3–14)
AST SERPL W P-5'-P-CCNC: 27 U/L (ref 0–45)
BASOPHILS # BLD AUTO: 0 10E9/L (ref 0–0.2)
BASOPHILS NFR BLD AUTO: 0.1 %
BILIRUB SERPL-MCNC: 1.1 MG/DL (ref 0.2–1.3)
BUN SERPL-MCNC: 10 MG/DL (ref 7–30)
CALCIUM SERPL-MCNC: 8 MG/DL (ref 8.5–10.1)
CHLORIDE SERPL-SCNC: 106 MMOL/L (ref 94–109)
CO2 SERPL-SCNC: 26 MMOL/L (ref 20–32)
CREAT SERPL-MCNC: 0.42 MG/DL (ref 0.66–1.25)
DIFFERENTIAL METHOD BLD: ABNORMAL
EOSINOPHIL # BLD AUTO: 0 10E9/L (ref 0–0.7)
EOSINOPHIL NFR BLD AUTO: 0.4 %
ERYTHROCYTE [DISTWIDTH] IN BLOOD BY AUTOMATED COUNT: 14.5 % (ref 10–15)
FLUAV H1 2009 PAND RNA SPEC QL NAA+PROBE: NEGATIVE
FLUAV H1 RNA SPEC QL NAA+PROBE: NEGATIVE
FLUAV H3 RNA SPEC QL NAA+PROBE: NEGATIVE
FLUAV RNA SPEC QL NAA+PROBE: NEGATIVE
FLUBV RNA SPEC QL NAA+PROBE: NEGATIVE
GFR SERPL CREATININE-BSD FRML MDRD: >90 ML/MIN/1.7M2
GLUCOSE SERPL-MCNC: 122 MG/DL (ref 70–99)
HADV DNA SPEC QL NAA+PROBE: NEGATIVE
HADV DNA SPEC QL NAA+PROBE: NEGATIVE
HCT VFR BLD AUTO: 38.1 % (ref 40–53)
HGB BLD-MCNC: 12.5 G/DL (ref 13.3–17.7)
HMPV RNA SPEC QL NAA+PROBE: NEGATIVE
HPIV1 RNA SPEC QL NAA+PROBE: NEGATIVE
HPIV2 RNA SPEC QL NAA+PROBE: NEGATIVE
HPIV3 RNA SPEC QL NAA+PROBE: NEGATIVE
IMM GRANULOCYTES # BLD: 0 10E9/L (ref 0–0.4)
IMM GRANULOCYTES NFR BLD: 0.2 %
INTERPRETATION ECG - MUSE: NORMAL
LACTATE BLD-SCNC: 1.1 MMOL/L (ref 0.4–1.9)
LYMPHOCYTES # BLD AUTO: 2.2 10E9/L (ref 0.8–5.3)
LYMPHOCYTES NFR BLD AUTO: 23.5 %
MAGNESIUM SERPL-MCNC: 1.9 MG/DL (ref 1.6–2.3)
MCH RBC QN AUTO: 29.4 PG (ref 26.5–33)
MCHC RBC AUTO-ENTMCNC: 32.8 G/DL (ref 31.5–36.5)
MCV RBC AUTO: 90 FL (ref 78–100)
MICROBIOLOGIST REVIEW: NORMAL
MONOCYTES # BLD AUTO: 0.6 10E9/L (ref 0–1.3)
MONOCYTES NFR BLD AUTO: 5.9 %
MRSA DNA SPEC QL NAA+PROBE: NEGATIVE
NEUTROPHILS # BLD AUTO: 6.6 10E9/L (ref 1.6–8.3)
NEUTROPHILS NFR BLD AUTO: 69.9 %
NRBC # BLD AUTO: 0 10*3/UL
NRBC BLD AUTO-RTO: 0 /100
PLATELET # BLD AUTO: 90 10E9/L (ref 150–450)
POTASSIUM SERPL-SCNC: 3.6 MMOL/L (ref 3.4–5.3)
PROT SERPL-MCNC: 6.5 G/DL (ref 6.8–8.8)
RBC # BLD AUTO: 4.25 10E12/L (ref 4.4–5.9)
RHINOVIRUS RNA SPEC QL NAA+PROBE: NEGATIVE
RSV RNA SPEC QL NAA+PROBE: NEGATIVE
RSV RNA SPEC QL NAA+PROBE: NEGATIVE
SODIUM SERPL-SCNC: 139 MMOL/L (ref 133–144)
SPECIMEN SOURCE: NORMAL
SPECIMEN SOURCE: NORMAL
WBC # BLD AUTO: 9.5 10E9/L (ref 4–11)

## 2018-03-19 PROCEDURE — 12000001 ZZH R&B MED SURG/OB UMMC

## 2018-03-19 PROCEDURE — 25000132 ZZH RX MED GY IP 250 OP 250 PS 637: Performed by: INTERNAL MEDICINE

## 2018-03-19 PROCEDURE — 99233 SBSQ HOSP IP/OBS HIGH 50: CPT | Performed by: INTERNAL MEDICINE

## 2018-03-19 PROCEDURE — 83735 ASSAY OF MAGNESIUM: CPT | Performed by: INTERNAL MEDICINE

## 2018-03-19 PROCEDURE — 25000128 H RX IP 250 OP 636: Performed by: INTERNAL MEDICINE

## 2018-03-19 PROCEDURE — 94640 AIRWAY INHALATION TREATMENT: CPT | Mod: 76

## 2018-03-19 PROCEDURE — 25800025 ZZH RX 258: Performed by: INTERNAL MEDICINE

## 2018-03-19 PROCEDURE — 25000132 ZZH RX MED GY IP 250 OP 250 PS 637: Performed by: HOSPITALIST

## 2018-03-19 PROCEDURE — 83605 ASSAY OF LACTIC ACID: CPT | Performed by: HOSPITALIST

## 2018-03-19 PROCEDURE — 36415 COLL VENOUS BLD VENIPUNCTURE: CPT | Performed by: HOSPITALIST

## 2018-03-19 PROCEDURE — 94640 AIRWAY INHALATION TREATMENT: CPT

## 2018-03-19 PROCEDURE — 99221 1ST HOSP IP/OBS SF/LOW 40: CPT | Performed by: PSYCHIATRY & NEUROLOGY

## 2018-03-19 PROCEDURE — 80053 COMPREHEN METABOLIC PANEL: CPT | Performed by: INTERNAL MEDICINE

## 2018-03-19 PROCEDURE — 85025 COMPLETE CBC W/AUTO DIFF WBC: CPT | Performed by: INTERNAL MEDICINE

## 2018-03-19 PROCEDURE — 25000125 ZZHC RX 250: Performed by: INTERNAL MEDICINE

## 2018-03-19 PROCEDURE — 25000132 ZZH RX MED GY IP 250 OP 250 PS 637: Performed by: STUDENT IN AN ORGANIZED HEALTH CARE EDUCATION/TRAINING PROGRAM

## 2018-03-19 PROCEDURE — 40000275 ZZH STATISTIC RCP TIME EA 10 MIN

## 2018-03-19 RX ORDER — DIAZEPAM 5 MG
5-20 TABLET ORAL EVERY 30 MIN PRN
Status: DISCONTINUED | OUTPATIENT
Start: 2018-03-19 | End: 2018-04-09 | Stop reason: HOSPADM

## 2018-03-19 RX ADMIN — BUPRENORPHINE AND NALOXONE 1 TABLET: 8; 2 TABLET SUBLINGUAL at 20:55

## 2018-03-19 RX ADMIN — DIAZEPAM 5 MG: 5 TABLET ORAL at 14:03

## 2018-03-19 RX ADMIN — CYANOCOBALAMIN TAB 1000 MCG 1000 MCG: 1000 TAB at 09:59

## 2018-03-19 RX ADMIN — DIAZEPAM 10 MG: 5 TABLET ORAL at 05:41

## 2018-03-19 RX ADMIN — LEVALBUTEROL HYDROCHLORIDE 0.63 MG: 0.63 SOLUTION RESPIRATORY (INHALATION) at 17:06

## 2018-03-19 RX ADMIN — BUPRENORPHINE AND NALOXONE 1 TABLET: 8; 2 TABLET SUBLINGUAL at 10:01

## 2018-03-19 RX ADMIN — DIAZEPAM 5 MG: 5 TABLET ORAL at 21:43

## 2018-03-19 RX ADMIN — LORAZEPAM 3 MG: 1 TABLET ORAL at 03:04

## 2018-03-19 RX ADMIN — FOLIC ACID 1 MG: 1 TABLET ORAL at 09:59

## 2018-03-19 RX ADMIN — ASPIRIN 81 MG: 81 TABLET, COATED ORAL at 09:59

## 2018-03-19 RX ADMIN — LORAZEPAM 4 MG: 1 TABLET ORAL at 04:56

## 2018-03-19 RX ADMIN — GUAIFENESIN 600 MG: 600 TABLET, EXTENDED RELEASE ORAL at 20:55

## 2018-03-19 RX ADMIN — GABAPENTIN 600 MG: 300 CAPSULE ORAL at 11:55

## 2018-03-19 RX ADMIN — DIAZEPAM 5 MG: 5 TABLET ORAL at 12:15

## 2018-03-19 RX ADMIN — GABAPENTIN 600 MG: 300 CAPSULE ORAL at 17:25

## 2018-03-19 RX ADMIN — ENOXAPARIN SODIUM 40 MG: 40 INJECTION SUBCUTANEOUS at 02:58

## 2018-03-19 RX ADMIN — DIAZEPAM 10 MG: 5 TABLET ORAL at 06:27

## 2018-03-19 RX ADMIN — GABAPENTIN 600 MG: 300 CAPSULE ORAL at 20:55

## 2018-03-19 RX ADMIN — DIAZEPAM 5 MG: 5 TABLET ORAL at 17:25

## 2018-03-19 RX ADMIN — LEVALBUTEROL HYDROCHLORIDE 0.63 MG: 0.63 SOLUTION RESPIRATORY (INHALATION) at 12:48

## 2018-03-19 RX ADMIN — GUAIFENESIN 600 MG: 600 TABLET, EXTENDED RELEASE ORAL at 09:59

## 2018-03-19 RX ADMIN — DIAZEPAM 10 MG: 5 TABLET ORAL at 11:05

## 2018-03-19 RX ADMIN — METOPROLOL SUCCINATE 100 MG: 100 TABLET, EXTENDED RELEASE ORAL at 09:58

## 2018-03-19 RX ADMIN — LORAZEPAM 3 MG: 1 TABLET ORAL at 04:04

## 2018-03-19 RX ADMIN — VENLAFAXINE HYDROCHLORIDE 75 MG: 75 TABLET, EXTENDED RELEASE ORAL at 09:59

## 2018-03-19 RX ADMIN — LEVOFLOXACIN 750 MG: 750 TABLET, FILM COATED ORAL at 09:59

## 2018-03-19 RX ADMIN — Medication 100 MG: at 09:59

## 2018-03-19 RX ADMIN — NICOTINE 1 PATCH: 21 PATCH, EXTENDED RELEASE TRANSDERMAL at 10:01

## 2018-03-19 RX ADMIN — POTASSIUM CHLORIDE, DEXTROSE MONOHYDRATE AND SODIUM CHLORIDE: 150; 5; 900 INJECTION, SOLUTION INTRAVENOUS at 17:25

## 2018-03-19 RX ADMIN — LEVALBUTEROL HYDROCHLORIDE 0.63 MG: 0.63 SOLUTION RESPIRATORY (INHALATION) at 20:28

## 2018-03-19 RX ADMIN — THERA TABS 1 TABLET: TAB at 09:59

## 2018-03-19 RX ADMIN — LORAZEPAM 2 MG: 1 TABLET ORAL at 00:42

## 2018-03-19 RX ADMIN — GABAPENTIN 600 MG: 300 CAPSULE ORAL at 09:59

## 2018-03-19 RX ADMIN — POTASSIUM CHLORIDE, DEXTROSE MONOHYDRATE AND SODIUM CHLORIDE: 150; 5; 900 INJECTION, SOLUTION INTRAVENOUS at 06:30

## 2018-03-19 RX ADMIN — ENOXAPARIN SODIUM 40 MG: 40 INJECTION SUBCUTANEOUS at 21:43

## 2018-03-19 RX ADMIN — LEVALBUTEROL HYDROCHLORIDE 0.63 MG: 0.63 SOLUTION RESPIRATORY (INHALATION) at 09:51

## 2018-03-19 ASSESSMENT — ACTIVITIES OF DAILY LIVING (ADL)
ADLS_ACUITY_SCORE: 28
ADLS_ACUITY_SCORE: 29
ADLS_ACUITY_SCORE: 27
ADLS_ACUITY_SCORE: 28
ADLS_ACUITY_SCORE: 29
ADLS_ACUITY_SCORE: 28

## 2018-03-19 NOTE — PROVIDER NOTIFICATION
Moonlighter notified: pt is getting increasingly more agitated, confused and having hallucinations, MSSA score has been in the 20s and currently on ativan protocol. Writer inquired if pt could be switched to valium instead. MD ordered valium protocol for now.

## 2018-03-19 NOTE — CONSULTS
Consult Date:  03/19/2018      REASON FOR CONSULTATION:  Alcohol withdrawal, recurrent admission for anxiety and depression.      REQUESTING PHYSICIAN:  Neftali Mcknight MD       IDENTIFYING INFORMATION:  The patient is a 63-year-old  who is known to me from previous admissions.      HISTORY OF PRESENT ILLNESS:  The patient came to the Emergency Room on 03/17/2018.  He had complained of pain but he was hospitalized for withdrawal.      The patient, when I talked with him, is very confused.  He is on one-to-one.  He has IV fluids running through him.  He thought this was [employer] and was a hotel.  He mumbles and is confused.  He is talking about equipment that he needs to sell.  He thought this was 03/1972.  When I showed him a couple of keys he thought these were keys to his apartment.  He was pulling cards, he was confused.  He  Thought he had a has a gunshot wound.  He was up all night last night.  He was very confused and made statements to the nurse that he wants to take a gun and shoot.  He was extremely confused.  Previously he was on MSSA and Ativan.  He was just switched to Valium in the morning.      Records indicate the patient's drug of choice is alcohol.  He has tolerance, withdrawal, progressive use with loss of control, use despite having negative consequences, impacting his health.  The patient's platelets are 90,000.  Liver enzymes are normal.  The patient was hospitalized in November, December, January and now in March.  He says he is going to do outpatient treatment, is supposed to be in inpatient treatment, but never follows through and relapses.  He is on Suboxone maintenance.      The records indicate the patient has a history of panic attacks, shortness of breath, feeling tired and feels like he is going to die, tightness in his chest.  At this time patient is hallucinating.      PAST PSYCHIATRIC HISTORY:  [facility]      VITAL SIGNS:   Temperature of  98.2, pulse of 87, blood pressure 147/93.      PAST MEDICAL HISTORY:  Patient has COPD and hepatitis C.        Please review the detailed physical examination and review of systems done on this patient by Dr. Wilmer Tripathi on 03/17/2018.      FAMILY HISTORY:  The patient's family denied any family psychiatric or chemical dependency history.      SOCIAL HISTORY:  Good childhood, no abuse.  He was in noncombat.  He was a medic, E5 rank with honorable discharge.      MENTAL STATUS EXAMINATION:  The patient is a 63-year-old  male lying in bed.  He has oxygen and IV fluids.  Gait:  Not tested.  Mood:  He is confused.  He is pulling cards.  He is delirious.  He is not alert to place, person and situation.  He thought this was [place] and this is a hotel room.  When I showed him to identify his keys he thought these were his keys.  Mood is tired.  Affect is congruent.  Speech is less spontaneous, reduced in volume, less logical thinking.  Insight and judgment are limited.  Denied any suicidal or homicidal ideation, plan or intent.      DIAGNOSES:   Axis 1:     Alcohol use disorder, complicated alcohol withdrawal.  Rule out delirium tremens.     Major depressive disorder, recurrent, without psychotic features.     PTSD.    Opiate use disorder.      RECOMMENDATIONS:   1.  Continue patient on one-to-one.  Patient was switched to [facility] protocol on Valium.  Continue that.  The patient will be continued on Suboxone.  This is what the patient is taking.  He can be continued on his medications once doses are known, which is Effexor 75 mg.      The patient is in complicated alcohol withdrawal and is not able to have coherent conversations, but once the patient improves we should definitely recommend patient do inpatient treatment.  The patient should be doing inpatient treatment.  These recommendations were given to Dr. Mcknight.      Thank you for this interesting consult.            FARHEEN MATHIS MD             D:  2018   T: 2018   MT: MARISA      Name:     MARYBETH HUERTAS   MRN:      [id number]        Account:       [id number]   :      1955           Consult Date:  2018      Document: [id number]

## 2018-03-19 NOTE — PLAN OF CARE
Problem: Patient Care Overview  Goal: Plan of Care/Patient Progress Review  Outcome: Improving  Pt A&Ox2. Pt verbalizes he is in [place], [place], or at [facility]. Pt unable to state correct date and year, will say its 1987 or 2020. Pt is easily redirected. Arousal to voice or repeated stimuli. VSS. BP has been 145/90s. Lung sounds coarse with expiratory wheezing. PO valium given x4 at 5mg. Tremors are mild. LUE/LLE baseline numbness and tingling. D5 0.9 with K infusing at 100mL/hr. Tolerating regular diet. Voiding with urinal A-1. Sitter at bedside. Will cont to monitor.

## 2018-03-19 NOTE — PLAN OF CARE
Problem: Patient Care Overview  Goal: Plan of Care/Patient Progress Review  Outcome: No Change  Note for the night shift.  D: Pt awake and picking at things in the air. Pt unable to stay on one topic for a whole sentence. Confused to location and date. Pt is very busy all night,constantly up and down and wanting to leave.  Giving him Ativan often. MSSA score keeps going up Up as high as 31 tonight. By 0400 having no results from all the ativan he was given. House Dr notified and he was switched to Valium. At 0420 pt triggered the Sepsis protocol. It showed the protocol was triggered for his vitals of T- 98.1, HR 82, RR 21, /75 and yesterday's WBC of 13.2. Labs done. Lactic acid came back at 1.1. Pt up and down frequently. Vital good tonight. Pt keeps wanting to leave. So confused and still hallucinating. Sitter at his bedside and bed alarm on too.SIde rails padded.  Has a congested cough. Gets better after coughing.  A: Unclear what it was in his vitals that could possibly trigger the Sepsis protocol.But it was done.  Ativan not working. But when another nurse had time to check his old charts it looked like the Valium was better for him. Did finally fall asleep after 2 doses of Valium given. P: Monitor closely.  Supportive cares. Medicate for withdrawl as needed. Keep up with the Valium and encourage coughing.

## 2018-03-19 NOTE — PROGRESS NOTES
[facility], [place]   Hospitalist Daily Progress Note                                                 Date of Admission:3/17/2018  ___________________________________  INTERVAL HISTORY (24 Hrs)/SUBJECTIVE:   Last 24 hr events, care team notes reviewed.     Patient very tremulous, anxious. Sleepy but arousable and interactive.   Hallucinations+  No fever or chills  No cp or sob.     ROS: 4 point ROS neg other than the symptoms noted above in the interval history.    OBJECTIVE :   VITALS:    Temp:  [96.3  F (35.7  C)-98.4  F (36.9  C)] 98.4  F (36.9  C)  Pulse:  [77-98] 80  Heart Rate:  [82-94] 91  Resp:  [14-22] 20  BP: (127-147)/(70-92) 142/92  SpO2:  [93 %-96 %] 94 %  Temp (24hrs), Av.6  F (36.4  C), Min:96.3  F (35.7  C), Max:98.4  F (36.9  C)    Wt Readings from Last 5 Encounters:   18 95.3 kg (210 lb)   18 96.9 kg (213 lb 11.2 oz)   12/15/17 94 kg (207 lb 4.8 oz)   17 95.8 kg (211 lb 3.2 oz)   17 89.8 kg (198 lb)        Intake/Output Summary (Last 24 hours) at 18 0716  Last data filed at 18 0642   Gross per 24 hour   Intake             1985 ml   Output             2475 ml   Net             -490 ml       PHYSICAL EXAM:  General: sleepy but arousable. Interactive when awake. Anxious. Tremulous.   HEENT: AT/NC,  Moist MM  Respi/Chest: Non labored. Clear BL  CVS/Heart: S1S2 regular, no m/r/g  Gi/Abd: Soft, non tender, non distended, BS +  MSK/Ext: Distal pulses 2+.     Neuro: AO x 3. Tremors+. Moving all extremities. No new focal deficit noted.      Medications:   I have reviewed this patient's current medications.    Data:   All laboratory and imaging data in the past 24 hours reviewed:    LABS:  CMP  Recent Labs  Lab 18  0501 18  0621 18  0217 18  1330    142 138 140   POTASSIUM 3.6 3.9 3.8 3.6   CHLORIDE 106 106 103 102   CO2 26 30 28 22   ANIONGAP 7 6 7 16*   * 172* 204* 178*   BUN 10 18 22 33*   CR 0.42*  0.54* 0.58* 0.78   GFRESTIMATED >90 >90 >90 >90   GFRESTBLACK >90 >90 >90 >90   RUSTY 8.0* 7.4* 7.5* 8.0*   MAG 1.9  --  1.9  --    PROTTOTAL 6.5*  --   --  7.6   ALBUMIN 2.9*  --   --  3.3*   BILITOTAL 1.1  --   --  0.7   ALKPHOS 69  --   --  88   AST 27  --   --  20   ALT 20  --   --  22     CBC  Recent Labs  Lab 03/19/18  0501 03/18/18  1215 03/18/18  0621 03/17/18  1330   WBC 9.5 13.2* 14.1* 25.1*   RBC 4.25* 4.03* 4.15* 5.14   HGB 12.5* 11.9* 12.3* 15.8   HCT 38.1* 36.0* 36.9* 45.9   MCV 90 89 89 89   MCH 29.4 29.5 29.6 30.7   MCHC 32.8 33.1 33.3 34.4   RDW 14.5 14.7 14.6 14.7   PLT 90* 102* 109* 178     INRNo lab results found in last 7 days.  Unresulted Labs Ordered in the Past 30 Days of this Admission     Date and Time Order Name Status Description    3/18/2018 1134 Respiratory Virus Panel by PCR In process     3/17/2018 1945 Blood culture Preliminary     3/17/2018 1945 Blood culture Preliminary     3/17/2018 1533 Blood culture Preliminary     3/17/2018 1242 Blood culture Preliminary           No results found for this or any previous visit (from the past 24 hour(s)).      ASSESSMENT & PLAN :    Jorge Rosales is a 63 year old male with past medical history significant for alcohol dependency., recurrent admissions for alcohol related cause last 01/14--01/19/18 , smoker, COPD, chronic pain syndrome, opioid use disorder on suboxone maintenance, depression, PTSD, L leg weakness s/p Lumbar spine surgery admitted for alcohol intoxication,  withdrawal and acute on chronic hypoxic respi failure 2/2 PNA, mild copd exacerbation.       # Alcohol dependency, intoxication and withdrawal:   W/ high MSSA scores.   Last drink: day of admission.   -MSSA with Diazepam  - mvi, thiamine, folic acid daily  - fall, seizure precautions.   - Sw consult  - Psychiatry consult (given recurrent admissions for alcohol related issues, h/o depression,anxiety disorder)  - sitter given confusion ? DTs    # Acute on chronic hypoxic respi  failure , h/o COPD: suspect R lower lobe PNA. Mild copd exacerbation.   Current smoker  Started empirically on iv vanc, zosyn, doxy for hcap.  03/18: Given pt stable hemodynamics. Lactic acidosis: resolved quickly suspect mostly related to alcohol use, dehydration. Hypoxia: improving, afebrile, low procal: dc iv Vanco and Zosyn, doxy. Keep on levofloxacin 750 mg daily. Plan total 7 days course.     - Ct bronchodilator nebs (03/18: switched to levalbuterol given afib w rvr), oxygen to maintain sat >89. IS.   - consider steroid if e/o worsening sob. Wheezing for copd exacerbation.   - Pulse Ox contd  - Check respi virus panel pcr.- neg. Dc droplet.   - mrsa pcr screen: neg.   - Encourage IS  - Outpatient Pulmonary FU. PFT  - COPD RT consult.   - Encourage tobacco cessation.      # Afib w rvr: at current NSR, HR controlled. 3/19/2018    - ct metop to 100 XR daily.   - iv metop prn for hr>120  -  tele  - monitor vitals: stable at current.   - Echo last admission 01/15 : EF: 60-65%.        #Hx of chronic pain syndrome. Hx of Opioid abuse   On Suboxone.     - continue PTA Suboxone for now.   - PTA gabapentin. robaxin.      # Depression, PTSD w/ hx of panic attacks:     - Continue PTA venlafaxine.  - on Diazepam per MSSA  - FU Psychiatry recs    # Tobacco abuse: nicotine patch panel.     # FEN: adat to regular. Ivf. Fu lytes.   PPx:   Lovenox SQ for dvt ppx  Full code.     Dispo: Disposition Plan   Expected discharge tbd.   pending psychiatry, sw input. medical stabilizatoin .     Entered: Neftali Mcknight 03/19/2018, 7:16 AM         Plan discussed with patient, bedside RN and ROBBIN, CC during Care Team Rounds.      Neftali Mcknight MD   Hospitalist (Internal Medicine)  Pager: [phone].

## 2018-03-20 LAB — PLATELET # BLD AUTO: 96 10E9/L (ref 150–450)

## 2018-03-20 PROCEDURE — 12000008 ZZH R&B INTERMEDIATE UMMC

## 2018-03-20 PROCEDURE — 40000275 ZZH STATISTIC RCP TIME EA 10 MIN

## 2018-03-20 PROCEDURE — 25000128 H RX IP 250 OP 636: Performed by: INTERNAL MEDICINE

## 2018-03-20 PROCEDURE — 85049 AUTOMATED PLATELET COUNT: CPT | Performed by: HOSPITALIST

## 2018-03-20 PROCEDURE — 25000132 ZZH RX MED GY IP 250 OP 250 PS 637: Performed by: INTERNAL MEDICINE

## 2018-03-20 PROCEDURE — 99207 ZZC CDG-MDM COMPONENT: MEETS LOW - DOWN CODED: CPT | Performed by: INTERNAL MEDICINE

## 2018-03-20 PROCEDURE — 36415 COLL VENOUS BLD VENIPUNCTURE: CPT | Performed by: HOSPITALIST

## 2018-03-20 PROCEDURE — 25800025 ZZH RX 258: Performed by: INTERNAL MEDICINE

## 2018-03-20 PROCEDURE — 94640 AIRWAY INHALATION TREATMENT: CPT

## 2018-03-20 PROCEDURE — 25000132 ZZH RX MED GY IP 250 OP 250 PS 637: Performed by: HOSPITALIST

## 2018-03-20 PROCEDURE — 99232 SBSQ HOSP IP/OBS MODERATE 35: CPT | Performed by: INTERNAL MEDICINE

## 2018-03-20 PROCEDURE — 94640 AIRWAY INHALATION TREATMENT: CPT | Mod: 76

## 2018-03-20 PROCEDURE — 25000132 ZZH RX MED GY IP 250 OP 250 PS 637: Performed by: STUDENT IN AN ORGANIZED HEALTH CARE EDUCATION/TRAINING PROGRAM

## 2018-03-20 PROCEDURE — 25000125 ZZHC RX 250: Performed by: INTERNAL MEDICINE

## 2018-03-20 RX ORDER — ACETAMINOPHEN 325 MG/1
650 TABLET ORAL EVERY 4 HOURS PRN
Status: DISCONTINUED | OUTPATIENT
Start: 2018-03-20 | End: 2018-04-09 | Stop reason: HOSPADM

## 2018-03-20 RX ADMIN — FOLIC ACID 1 MG: 1 TABLET ORAL at 08:31

## 2018-03-20 RX ADMIN — Medication 100 MG: at 08:31

## 2018-03-20 RX ADMIN — THERA TABS 1 TABLET: TAB at 08:31

## 2018-03-20 RX ADMIN — VENLAFAXINE HYDROCHLORIDE 75 MG: 75 TABLET, EXTENDED RELEASE ORAL at 08:31

## 2018-03-20 RX ADMIN — LEVALBUTEROL HYDROCHLORIDE 0.63 MG: 0.63 SOLUTION RESPIRATORY (INHALATION) at 20:47

## 2018-03-20 RX ADMIN — GUAIFENESIN 600 MG: 600 TABLET, EXTENDED RELEASE ORAL at 08:31

## 2018-03-20 RX ADMIN — DIAZEPAM 5 MG: 5 TABLET ORAL at 01:59

## 2018-03-20 RX ADMIN — LEVALBUTEROL HYDROCHLORIDE 0.63 MG: 0.63 SOLUTION RESPIRATORY (INHALATION) at 07:42

## 2018-03-20 RX ADMIN — LEVALBUTEROL HYDROCHLORIDE 0.63 MG: 0.63 SOLUTION RESPIRATORY (INHALATION) at 15:56

## 2018-03-20 RX ADMIN — POTASSIUM CHLORIDE, DEXTROSE MONOHYDRATE AND SODIUM CHLORIDE: 150; 5; 900 INJECTION, SOLUTION INTRAVENOUS at 03:03

## 2018-03-20 RX ADMIN — ENOXAPARIN SODIUM 40 MG: 40 INJECTION SUBCUTANEOUS at 21:34

## 2018-03-20 RX ADMIN — NICOTINE 1 PATCH: 21 PATCH, EXTENDED RELEASE TRANSDERMAL at 08:32

## 2018-03-20 RX ADMIN — DIAZEPAM 5 MG: 5 TABLET ORAL at 12:01

## 2018-03-20 RX ADMIN — METOPROLOL SUCCINATE 100 MG: 100 TABLET, EXTENDED RELEASE ORAL at 08:31

## 2018-03-20 RX ADMIN — GABAPENTIN 600 MG: 300 CAPSULE ORAL at 08:31

## 2018-03-20 RX ADMIN — POTASSIUM CHLORIDE, DEXTROSE MONOHYDRATE AND SODIUM CHLORIDE: 150; 5; 900 INJECTION, SOLUTION INTRAVENOUS at 12:47

## 2018-03-20 RX ADMIN — BUPRENORPHINE AND NALOXONE 1 TABLET: 8; 2 TABLET SUBLINGUAL at 08:30

## 2018-03-20 RX ADMIN — GABAPENTIN 600 MG: 300 CAPSULE ORAL at 20:31

## 2018-03-20 RX ADMIN — LEVOFLOXACIN 750 MG: 750 TABLET, FILM COATED ORAL at 08:31

## 2018-03-20 RX ADMIN — DIAZEPAM 5 MG: 5 TABLET ORAL at 16:54

## 2018-03-20 RX ADMIN — DIAZEPAM 5 MG: 5 TABLET ORAL at 20:31

## 2018-03-20 RX ADMIN — BUPRENORPHINE AND NALOXONE 1 TABLET: 8; 2 TABLET SUBLINGUAL at 20:31

## 2018-03-20 RX ADMIN — CYANOCOBALAMIN TAB 1000 MCG 1000 MCG: 1000 TAB at 08:31

## 2018-03-20 RX ADMIN — GABAPENTIN 600 MG: 300 CAPSULE ORAL at 16:54

## 2018-03-20 RX ADMIN — DIAZEPAM 5 MG: 5 TABLET ORAL at 08:54

## 2018-03-20 RX ADMIN — ASPIRIN 81 MG: 81 TABLET, COATED ORAL at 08:32

## 2018-03-20 RX ADMIN — ACETAMINOPHEN 650 MG: 325 TABLET ORAL at 21:34

## 2018-03-20 RX ADMIN — GUAIFENESIN 600 MG: 600 TABLET, EXTENDED RELEASE ORAL at 20:31

## 2018-03-20 RX ADMIN — GABAPENTIN 600 MG: 300 CAPSULE ORAL at 12:48

## 2018-03-20 RX ADMIN — LEVALBUTEROL HYDROCHLORIDE 0.63 MG: 0.63 SOLUTION RESPIRATORY (INHALATION) at 11:56

## 2018-03-20 RX ADMIN — DIAZEPAM 5 MG: 5 TABLET ORAL at 06:39

## 2018-03-20 ASSESSMENT — ACTIVITIES OF DAILY LIVING (ADL)
ADLS_ACUITY_SCORE: 28
ADLS_ACUITY_SCORE: 28
ADLS_ACUITY_SCORE: 17
ADLS_ACUITY_SCORE: 28
ADLS_ACUITY_SCORE: 17.5
ADLS_ACUITY_SCORE: 32

## 2018-03-20 ASSESSMENT — PAIN DESCRIPTION - DESCRIPTORS: DESCRIPTORS: ACHING

## 2018-03-20 NOTE — CONSULTS
"Social Work: Assessment with Discharge Plan    Patient Name:  Jorge Rosales  :  1955  Age:  63 year old  MRN:  [id number]  Risk/Complexity Score:  Filed Complexity Screen Score: 9  Completed assessment with:  Pt, 10A IDT    Presenting Information   Reason for Referral:  Substance abuse concerns  Date of Intake:  2018  Referral Source:  Physician  Decision Maker:  pt  Alternate Decision Maker:  Not identified    Living Situation:  Apartment  Previous Functional Status:  Independent  Patient and family understanding of hospitalization:  Pt states he was not feeling well, \"I did not know I had pnuemonia\"  Cultural/Language/Spiritual Considerations:  , , significant mental health and chemical health issues  Adjustment to Illness:  Accepting. Pt is pre contemplative about CD treatment. Identifies many barriers to getting into CD treatment.-death of ex-spouse, need to care take a daughter in a group home in [place], [place], legal concerns/issues    Physical Health  Reason for Admission:    1. Pneumonia of right lower lobe due to infectious organism (H)    2. Acute alcoholic intoxication in alcoholism without complication (H)      Services Needed/Recommended:  Other:  CD treatment    Mental Health/Chemical Dependency  Diagnosis:  Major Depressive Disorder, Bereavement, Alcohol Use disorder, Opiate Use Disorder-on subaxone  Support/Services in Place:  Pt has connected with case management services through [facility]  Services Needed/Recommended:  CD treatment    Support System  Significant relationship at present time:  Not evident. Pt has had visitors during previous hospitalizations  Family of origin is available for support:  Not identified  Other support available:  Pt states he has a sponsor and about \"5 people behind me, supporting me.\"  Gaps in support system:  Further sober supports  Patient is caregiver to:  None     Provider Information   Primary Care Physician:  Carlos Padron   " "[phone]   Clinic:  [facility] [address] [facility] [place] [facility] [address]      :      Financial   Income Source:  Pension, social security  Financial Concerns:   None noted  Insurance:    Payor/Plan Subscriber Name Rel Member # Group #   [employer] - [employer]*  [id number]      [address]       Discharge Plan   Patient and family discharge goal:  DC home   Provided education on discharge plan:  YES  Patient agreeable to discharge plan:  YES  A list of Medicare Certified Facilities was provided to the patient and/or family to encourage patient choice. Patient's choices for facility are:  NA  Will NH provide Skilled rehabilitation or complex medical:  NA  General information regarding anticipated insurance coverage and possible out of pocket cost was discussed. Patient and patient's family are aware patient may incur the cost of transportation to the facility, pending insurance payment: Not at this time  Barriers to discharge:  Medical stability    Discharge Recommendations   Anticipated Disposition:  Home, no needs identified  Transportation Needs:  Other:  to be determined.   Name of Transportation Company and Phone:  To be determined    Additional comments   Writer re-introduced role/reason for visit to pt. He is known to writer from previous hospitalizations. He stated \"I knew I was going to see you and we would be having this conversation. I never really wanted to see you again-unless it was on the street or something.\"   Pt states he is still thinking about getting into CD treatment, but not until after his legal stuff is straightened out, in about 6 months. His ex-wife has passed away, and now he is trying to figure out how to manage care for his disabled daughter who lives in [place], [place].    He states he fell in a store, and now his bad leg hurts. The idea of having further surgery is not appealing, and he describes having anxiety about this. He thinks this plays into his " most recent drinking binge, informs writer he has abstained from alcohol up until about 8 days ago, when a series of bad things started happening.     He identifies having more support to assist with managing opiate withdrawal, his depression and that he really likes the providers at the [facility] clinic.  Overall, he shares that he felt he was getting his life back, had more resources and support for sobriety than he had during previous hospitalizations.     He is aware psychiatry will visit him on Wednesday and that SW remains available per request/referral. He does not identify needing additional resources at this time.

## 2018-03-20 NOTE — PLAN OF CARE
"Problem: Patient Care Overview  Goal: Plan of Care/Patient Progress Review  Outcome: No Change  VSS and afebrile.   HR 70-80's, Tele -sinus rhythm,  SpO2 92-97% in room air.  MSSA 11, 10 --- valium 5mg given x2,   fully oriented , less tremulous, no impulsive behavior, and  no hallucination. 1:1 sitter at bedside for safety.  Lungs crackles, coarse and wheezes,  On scheduled nebs,   Lung sound improved with nebs tx. Infrequently coughing.  Encouraged  IS use every 1-2hrs  Denies any pain, CP or SOB.   Good appetite and ate 100% of regular meal,  No nausea/ vomiting or reflux.  Encouraged OOB and ambulation, but pt declined saying \" not today, I am too tired and weak\".   On Lovenox for DVT ppx.  IVF infusing at 100ml/hr.  Voiding in urinal w/o difficulty.    Abdomen slightly distended and semi-firm, + flatus, pt can not recall last BM.  Prune juice given and  Then pt had medium formed BMx1  CMS/ neuros at baseline : Numbness and tingling in LUE & LLE and Left foot drop and LUE weakness.  Psych rec to get inpatient tx, however pt wants to get outpatient tx.  Awaits SW visit and CD eval.  Will continue to monitor          "

## 2018-03-20 NOTE — PLAN OF CARE
Problem: Patient Care Overview  Goal: Plan of Care/Patient Progress Review  Outcome: Improving  Pt A&O but still reports visual hallucinations. Calm and cooperative. 5mg valium given x2. MSSA 12 and 13. A-1 with cane to bathroom. Loose stool x1. BPs 150/95. IV running at 100mL/hr.Tolerating regular diet. Sitter at bedside. Able to make needs known. Will cont to monitor.

## 2018-03-20 NOTE — PLAN OF CARE
Problem: Patient Care Overview  Goal: Plan of Care/Patient Progress Review  Pt resting in bed. MSSA 11 and 8, valium given. Pt more conversant this am, less confused. Voiding in urinal. VSS Tele-NSR. Neuro's baseline. 1:1 sitter present at the bedside. Calm and cooperative. Cont to assess.

## 2018-03-20 NOTE — PROGRESS NOTES
"Patient seen and examined with RN     S- says is feeling better but still has visual hallucinaitons. No pain/ fever      4 point ROS done and neg unless mentioned     O-   BP (!) 152/92 (BP Location: Left arm)  Pulse 77  Temp 98.4  F (36.9  C) (Oral)  Resp 17  Ht 1.803 m (5' 10.98\")  Wt 95.3 kg (210 lb)  SpO2 93%  BMI 29.3 kg/m2   Gen- pleasant, anxious  HEENT- NAD, SASHA, MMM  Neck- supple  CVS- I+II+ no m/r/g  RS- CTAB  Abdo- soft, no tenderness . No g/r/r   Ext- no edema    CNS- no gross focal signs. Oriented to person, place and time    Tremors +    LABS:   BMP  Recent Labs  Lab 03/19/18  0501 03/18/18  0621 03/18/18  0217 03/17/18  1330    142 138 140   POTASSIUM 3.6 3.9 3.8 3.6   CHLORIDE 106 106 103 102   RUSTY 8.0* 7.4* 7.5* 8.0*   CO2 26 30 28 22   BUN 10 18 22 33*   CR 0.42* 0.54* 0.58* 0.78   * 172* 204* 178*     CBC  Recent Labs  Lab 03/20/18  0636 03/19/18  0501 03/18/18  1215 03/18/18  0621 03/17/18  1330   WBC  --  9.5 13.2* 14.1* 25.1*   RBC  --  4.25* 4.03* 4.15* 5.14   HGB  --  12.5* 11.9* 12.3* 15.8   HCT  --  38.1* 36.0* 36.9* 45.9   MCV  --  90 89 89 89   MCH  --  29.4 29.5 29.6 30.7   MCHC  --  32.8 33.1 33.3 34.4   RDW  --  14.5 14.7 14.6 14.7   PLT 96* 90* 102* 109* 178     INRNo lab results found in last 7 days.  LFTs  Recent Labs  Lab 03/19/18  0501 03/17/18  1330   ALKPHOS 69 88   AST 27 20   ALT 20 22   BILITOTAL 1.1 0.7   PROTTOTAL 6.5* 7.6   ALBUMIN 2.9* 3.3*     A/P:  Jorge Rosales is a 63 year old male with past medical history significant for alcohol dependency., recurrent admissions for alcohol related cause last 01/14--01/19/18 , smoker, COPD, chronic pain syndrome, opioid use disorder on suboxone maintenance, depression, PTSD, L leg weakness s/p Lumbar spine surgery admitted for alcohol intoxication,  withdrawal and acute on chronic hypoxic respi failure 2/2 PNA, mild copd exacerbation.       # Alcohol dependency, intoxication and withdrawal:   -MSSA with " Diazepam  - mvi, thiamine, folic acid daily  - fall, seizure precautions.   - Sw consult  - Psychiatry consult appreciated 3/19: 1.  Continue patient on one-to-one.  Patient was switched to [facility] protocol on Valium.  Continue that.  The patient will be continued on Suboxone.  This is what the patient is taking.  He can be continued on his medications once doses are known, which is Effexor 75 mg.   - re-consult psych 3/21 ? commitment     # Acute on chronic hypoxic respi failure , h/o COPD: suspect R lower lobe PNA. Mild copd exacerbation.   Started empirically on iv vanc, zosyn, doxy for hcap.  03/18: Given pt stable hemodynamics. Lactic acidosis: resolved quickly suspect mostly related to alcohol use, dehydration. Hypoxia: improving, afebrile, low procal: dc iv Vanco and Zosyn, doxy. Keep on levofloxacin 750 mg daily. Plan total 7 days course.  - Ct bronchodilator nebs (03/18: switched to levalbuterol given afib w rvr), oxygen to maintain sat >89. IS.   - Outpatient Pulmonary FU. PFT  - COPD RT consult.   - Encourage tobacco cessation.       # Afib w rvr: at current NSR, HR controlled. 3/19/2018   - ct metop to 100 XR daily.   - iv metop prn for hr>120  -  tele  - monitor vitals: stable at current.   - Echo last admission 01/15 : EF: 60-65%.     #Hx of chronic pain syndrome. Hx of Opioid abuse   On Suboxone.   - continue PTA Suboxone for now.   - PTA gabapentin. robaxin.      # Depression, PTSD w/ hx of panic attacks:   - Continue PTA venlafaxine.  - on Diazepam per [facility]  - FU Psychiatry recs     # Tobacco abuse: nicotine patch panel.      # FEN: adat to regular. Ivf. Fu lytes.   PPx:   Lovenox SQ for dvt ppx  Full code.    Disposition Plan   Expected discharge in unclear days to home vs inpatient treatment once better from detox and Psych decision for commitment or not.     Entered: Tae Foster MD 03/20/2018, 11:00 AM       Tae Foster MD (Pager- [phone])   Internal Medicine/ Hospitalist

## 2018-03-21 LAB
ANION GAP SERPL CALCULATED.3IONS-SCNC: 7 MMOL/L (ref 3–14)
BUN SERPL-MCNC: 15 MG/DL (ref 7–30)
CALCIUM SERPL-MCNC: 8.8 MG/DL (ref 8.5–10.1)
CHLORIDE SERPL-SCNC: 102 MMOL/L (ref 94–109)
CO2 SERPL-SCNC: 27 MMOL/L (ref 20–32)
CREAT SERPL-MCNC: 0.61 MG/DL (ref 0.66–1.25)
ERYTHROCYTE [DISTWIDTH] IN BLOOD BY AUTOMATED COUNT: 14.3 % (ref 10–15)
GFR SERPL CREATININE-BSD FRML MDRD: >90 ML/MIN/1.7M2
GLUCOSE SERPL-MCNC: 110 MG/DL (ref 70–99)
HCT VFR BLD AUTO: 40.4 % (ref 40–53)
HGB BLD-MCNC: 13.8 G/DL (ref 13.3–17.7)
MAGNESIUM SERPL-MCNC: 1.9 MG/DL (ref 1.6–2.3)
MCH RBC QN AUTO: 30.4 PG (ref 26.5–33)
MCHC RBC AUTO-ENTMCNC: 34.2 G/DL (ref 31.5–36.5)
MCV RBC AUTO: 89 FL (ref 78–100)
PHOSPHATE SERPL-MCNC: 3.9 MG/DL (ref 2.5–4.5)
PLATELET # BLD AUTO: 101 10E9/L (ref 150–450)
POTASSIUM SERPL-SCNC: 4.2 MMOL/L (ref 3.4–5.3)
PROCALCITONIN SERPL-MCNC: <0.05 NG/ML
RBC # BLD AUTO: 4.54 10E12/L (ref 4.4–5.9)
SODIUM SERPL-SCNC: 136 MMOL/L (ref 133–144)
WBC # BLD AUTO: 8.5 10E9/L (ref 4–11)

## 2018-03-21 PROCEDURE — 40000275 ZZH STATISTIC RCP TIME EA 10 MIN

## 2018-03-21 PROCEDURE — 25000125 ZZHC RX 250: Performed by: INTERNAL MEDICINE

## 2018-03-21 PROCEDURE — 36415 COLL VENOUS BLD VENIPUNCTURE: CPT | Performed by: INTERNAL MEDICINE

## 2018-03-21 PROCEDURE — 25000132 ZZH RX MED GY IP 250 OP 250 PS 637: Performed by: HOSPITALIST

## 2018-03-21 PROCEDURE — 84145 PROCALCITONIN (PCT): CPT | Performed by: INTERNAL MEDICINE

## 2018-03-21 PROCEDURE — 94640 AIRWAY INHALATION TREATMENT: CPT

## 2018-03-21 PROCEDURE — 94640 AIRWAY INHALATION TREATMENT: CPT | Mod: 76

## 2018-03-21 PROCEDURE — 85027 COMPLETE CBC AUTOMATED: CPT | Performed by: INTERNAL MEDICINE

## 2018-03-21 PROCEDURE — 83735 ASSAY OF MAGNESIUM: CPT | Performed by: INTERNAL MEDICINE

## 2018-03-21 PROCEDURE — 87040 BLOOD CULTURE FOR BACTERIA: CPT | Performed by: INTERNAL MEDICINE

## 2018-03-21 PROCEDURE — 80048 BASIC METABOLIC PNL TOTAL CA: CPT | Performed by: INTERNAL MEDICINE

## 2018-03-21 PROCEDURE — 25000128 H RX IP 250 OP 636: Performed by: INTERNAL MEDICINE

## 2018-03-21 PROCEDURE — 12000008 ZZH R&B INTERMEDIATE UMMC

## 2018-03-21 PROCEDURE — 84100 ASSAY OF PHOSPHORUS: CPT | Performed by: INTERNAL MEDICINE

## 2018-03-21 PROCEDURE — 99233 SBSQ HOSP IP/OBS HIGH 50: CPT | Performed by: INTERNAL MEDICINE

## 2018-03-21 PROCEDURE — 25000132 ZZH RX MED GY IP 250 OP 250 PS 637: Performed by: STUDENT IN AN ORGANIZED HEALTH CARE EDUCATION/TRAINING PROGRAM

## 2018-03-21 PROCEDURE — 25000132 ZZH RX MED GY IP 250 OP 250 PS 637: Performed by: INTERNAL MEDICINE

## 2018-03-21 RX ADMIN — CYANOCOBALAMIN TAB 1000 MCG 1000 MCG: 1000 TAB at 08:45

## 2018-03-21 RX ADMIN — LEVALBUTEROL HYDROCHLORIDE 0.63 MG: 0.63 SOLUTION RESPIRATORY (INHALATION) at 07:38

## 2018-03-21 RX ADMIN — LEVALBUTEROL HYDROCHLORIDE 0.63 MG: 0.63 SOLUTION RESPIRATORY (INHALATION) at 12:16

## 2018-03-21 RX ADMIN — BUPRENORPHINE AND NALOXONE 1 TABLET: 8; 2 TABLET SUBLINGUAL at 20:48

## 2018-03-21 RX ADMIN — DIAZEPAM 5 MG: 5 TABLET ORAL at 05:08

## 2018-03-21 RX ADMIN — GABAPENTIN 600 MG: 300 CAPSULE ORAL at 12:59

## 2018-03-21 RX ADMIN — VENLAFAXINE HYDROCHLORIDE 75 MG: 75 TABLET, EXTENDED RELEASE ORAL at 08:45

## 2018-03-21 RX ADMIN — METOPROLOL SUCCINATE 100 MG: 100 TABLET, EXTENDED RELEASE ORAL at 08:45

## 2018-03-21 RX ADMIN — ENOXAPARIN SODIUM 40 MG: 40 INJECTION SUBCUTANEOUS at 20:48

## 2018-03-21 RX ADMIN — LEVALBUTEROL HYDROCHLORIDE 0.63 MG: 0.63 SOLUTION RESPIRATORY (INHALATION) at 16:11

## 2018-03-21 RX ADMIN — DIAZEPAM 5 MG: 5 TABLET ORAL at 01:08

## 2018-03-21 RX ADMIN — GABAPENTIN 600 MG: 300 CAPSULE ORAL at 16:44

## 2018-03-21 RX ADMIN — THERA TABS 1 TABLET: TAB at 08:45

## 2018-03-21 RX ADMIN — BUPRENORPHINE AND NALOXONE 1 TABLET: 8; 2 TABLET SUBLINGUAL at 09:03

## 2018-03-21 RX ADMIN — LEVOFLOXACIN 750 MG: 750 TABLET, FILM COATED ORAL at 08:45

## 2018-03-21 RX ADMIN — GABAPENTIN 600 MG: 300 CAPSULE ORAL at 20:48

## 2018-03-21 RX ADMIN — Medication 100 MG: at 08:45

## 2018-03-21 RX ADMIN — FOLIC ACID 1 MG: 1 TABLET ORAL at 08:45

## 2018-03-21 RX ADMIN — DIAZEPAM 10 MG: 5 TABLET ORAL at 13:08

## 2018-03-21 RX ADMIN — GUAIFENESIN 600 MG: 600 TABLET, EXTENDED RELEASE ORAL at 08:45

## 2018-03-21 RX ADMIN — NICOTINE 1 PATCH: 21 PATCH, EXTENDED RELEASE TRANSDERMAL at 08:45

## 2018-03-21 RX ADMIN — GUAIFENESIN 600 MG: 600 TABLET, EXTENDED RELEASE ORAL at 20:48

## 2018-03-21 RX ADMIN — ASPIRIN 81 MG: 81 TABLET, COATED ORAL at 08:45

## 2018-03-21 RX ADMIN — GABAPENTIN 600 MG: 300 CAPSULE ORAL at 08:45

## 2018-03-21 RX ADMIN — LEVALBUTEROL HYDROCHLORIDE 0.63 MG: 0.63 SOLUTION RESPIRATORY (INHALATION) at 19:51

## 2018-03-21 ASSESSMENT — ACTIVITIES OF DAILY LIVING (ADL)
ADLS_ACUITY_SCORE: 29
ADLS_ACUITY_SCORE: 27
ADLS_ACUITY_SCORE: 27
ADLS_ACUITY_SCORE: 28

## 2018-03-21 NOTE — PLAN OF CARE
Problem: Patient Care Overview  Goal: Plan of Care/Patient Progress Review  Outcome: Improving  VSS and afebrile.   HR 70-80's, Tele -sinus rhythm,  SpO2 92-97% in room air.  MSSA 9, 11 --- valium 5mg given x2,   fully oriented , less tremulous, no impulsive behavior,  Pt  reports visual  hallucination. 1:1 sitter at bedside for safety.  Lung sound clear with scheduled nebs,     Encouraged  IS use every 1-2hrs  Denies any pain, CP or SOB.   Good appetite and ate 75% of regular meal,  No nausea/ vomiting or reflux. IV saline locked  Took a shower, up with walker and used bathroom. Has unsteady gait with mild tremors   Voiding in urinal w/o difficulty.  had good BM today morning and small BM this evening  CMS/ neuros at baseline : Numbness and tingling in LUE & LLE and Left foot drop and LUE weakness.  Will continue to monitor

## 2018-03-21 NOTE — PLAN OF CARE
Problem: Alcohol Withdrawal Acute, Risk/Actual (Adult)  Goal: Signs and Symptoms of Listed Potential Problems Will be Absent, Minimized or Managed (Alcohol Withdrawal Acute, Risk/Actual)  Signs and symptoms of listed potential problems will be absent, minimized or managed by discharge/transition of care (reference Alcohol Withdrawal Acute, Risk/Actual (Adult) CPG).   Outcome: Improving  [id number]  VS: VSS ex elevated BP this AM.   O2: >90% on RA   Output: Voiding adequately   Last BM: 3/20   Activity: 1A with FWW. Tremors.   Skin: Visible skin intact   Pain: Denies   CMS: Baseline numbness/weakness LLE   Diet: Regular. Ate 25-50% of lunch. Does not have an appetite.   LDA: PIV SL   Equipment: FWW, seizure pads, VPM   Plan: TBD. VPM can probably be DC'd this afternoon and bed alarm placed. Pt has been calling appropriately.   Additional Info: MSSA 20 at 1300. 10mg valuim administered. Improving.  Psych consult re-ordered.

## 2018-03-21 NOTE — PROGRESS NOTES
Consult received. Pt admitted on 3/17 with hx drug and alcohol abuse and withdrawal symptoms.  Had high white count. Had multiple blood cultures aand received vancomycin and zosyn empirically.  On day three switched to levaquin.  Only very first blood culture was prior to antibiotics. That culture only has grown diphteroid organism.    Will await additional information from micro. Full consult to follow.

## 2018-03-21 NOTE — PLAN OF CARE
Problem: Patient Care Overview  Goal: Plan of Care/Patient Progress Review  Pt resting in bed. Reports feeling better than previously. Mild visual hallucinations and some nightmares. VSS, tele NSR. Lungs sounds clear. Cough improving less congested. MSSA 9, 5 mg valium given. Voiding in urinal in adequate amounts. 1:1 sitter for safety. Cont to assess.

## 2018-03-21 NOTE — PLAN OF CARE
"Problem: Patient Care Overview  Goal: Plan of Care/Patient Progress Review  Outcome: Improving  A&Ox4. VSS. With exception of BP /104 scheduled metorpolol given. BP recheck 145/96. Denies pain, CP, SOB, N/V/D. LS CTA. BS+ voiding frequent amounts. Pt has mild tremors felt by examiner. Pt reports visual hallucinations intermittently. MSSA score 6 no interventions taken. Bedside attendant d/c VPM in place. Pt calls appropriately. Bed alarm in place for safety. Pt. Had a \"friend\" at bedside who wished to transport pt down to main lobby per pt request to withdrawal money from the [facility] to pay a bill. Pt transported via wheelchair. Continue with POC.       "

## 2018-03-21 NOTE — PROGRESS NOTES
"Social Work Services Progress Note    Hospital Day: 5    Collaborated with:  Pt, 10A IDT, pt's friend Diana Reeves ([phone]) with pt's verbal consent    Data:  DC plan, Collateral information from pt's friend Daina.  Intervention:  Writer obtained verbal consent from pt to speak w/his friend Diana. Per Diana, pt \" Sneaks\" his drinking and tries to hide it from friends. She expresses concern over his drinking and repeated hospitalization, stating \"this just can't go on. He may need to be forced into treatment, or attending AA meetings.\" She acknowledges that he has sober social supports, and people willing to help get him to meetings. She expresses worry that pt will DC from hospital and relapse.     She is not surprised that he has been refusing to consider CD treatment, despite being informed that medical team has presented options of going voluntarily vs. Pursuing Civil Commitment. She believes that Randy has been drinking to excess for past 2 weeks, but is not certain how much he has been drinking.     Assessment:  per Diana, pt minimizes and hides amount/frequency of drinking. She believes pt needs to be mandated into a level of treatment, and believes outpt would be enough. She fears pt would not follow through or comply with out being forced into treatment    Plan:    Anticipated Disposition:  to be determined    Barriers to d/c plan:  Medical stability    Follow Up:  SW cont' to follow    "

## 2018-03-21 NOTE — PLAN OF CARE
"Problem: Patient Care Overview  Goal: Plan of Care/Patient Progress Review  Outcome: Improving  VSS. Pleasant affect and conversing appropriately. States he \"sees things\" when he closes eyes or drifts to sleep. Pt went off unit for a brief time via wheelchair with a friend. He did notify writer ahead of time. LS coarse bilateral upper lobes. Baseline numbness LLE and R toe. MSSA 8, no valium given. Continue POC.      "

## 2018-03-21 NOTE — PROGRESS NOTES
"Patient seen and examined with RN     S-continues to feel better, but still have visual hallucinations. Trying to move.     4 point ROS done and neg unless mentioned     O-   BP (!) 146/92 (BP Location: Right arm)  Pulse 81  Temp 97.8  F (36.6  C) (Oral)  Resp 20  Ht 1.803 m (5' 10.98\")  Wt 95.3 kg (210 lb)  SpO2 93%  BMI 29.3 kg/m2   Gen- pleasant, anxious  HEENT- NAD, SASHA, MMM  Neck- supple  CVS- I+II+ no m/r/g  RS- CTAB  Abdo- soft, no tenderness . No g/r/r   Ext- no edema    CNS- no gross focal signs. Oriented to person, place and time    Tremors +    LABS:   BMP    Recent Labs  Lab 03/19/18  0501 03/18/18  0621 03/18/18  0217 03/17/18  1330    142 138 140   POTASSIUM 3.6 3.9 3.8 3.6   CHLORIDE 106 106 103 102   RUSTY 8.0* 7.4* 7.5* 8.0*   CO2 26 30 28 22   BUN 10 18 22 33*   CR 0.42* 0.54* 0.58* 0.78   * 172* 204* 178*     CBC    Recent Labs  Lab 03/20/18  0636 03/19/18  0501 03/18/18  1215 03/18/18  0621 03/17/18  1330   WBC  --  9.5 13.2* 14.1* 25.1*   RBC  --  4.25* 4.03* 4.15* 5.14   HGB  --  12.5* 11.9* 12.3* 15.8   HCT  --  38.1* 36.0* 36.9* 45.9   MCV  --  90 89 89 89   MCH  --  29.4 29.5 29.6 30.7   MCHC  --  32.8 33.1 33.3 34.4   RDW  --  14.5 14.7 14.6 14.7   PLT 96* 90* 102* 109* 178     INRNo lab results found in last 7 days.  LFTs    Recent Labs  Lab 03/19/18  0501 03/17/18  1330   ALKPHOS 69 88   AST 27 20   ALT 20 22   BILITOTAL 1.1 0.7   PROTTOTAL 6.5* 7.6   ALBUMIN 2.9* 3.3*     A/P:  Jorge Rosales is a 63 year old male with past medical history significant for alcohol dependency., recurrent admissions for alcohol related cause last 01/14--01/19/18 , smoker, COPD, chronic pain syndrome, opioid use disorder on suboxone maintenance, depression, PTSD, L leg weakness s/p Lumbar spine surgery admitted for alcohol intoxication,  withdrawal and acute on chronic hypoxic respi failure 2/2 PNA, mild copd exacerbation.       # Alcohol dependency, intoxication " and withdrawal:   -MSSA with Diazepam  - mvi, thiamine, folic acid daily  - fall, seizure precautions.   -  consult  - Psychiatry consult appreciated 3/19: 1.  Continue patient on one-to-one.  Patient was switched to [facility] protocol on Valium.  Continue that.  The patient will be continued on Suboxone.  This is what the patient is taking.  He can be continued on his medications once doses are known, which is Effexor 75 mg.   - re-consult psych 3/21 ? Commitment and for hallucinations      # Acute on chronic hypoxic respi failure , h/o COPD: suspect R lower lobe PNA. Mild copd exacerbation.   Started empirically on iv vanc, zosyn, doxy for hcap.  03/18: Given pt stable hemodynamics. Lactic acidosis: resolved quickly suspect mostly related to alcohol use, dehydration. Hypoxia: improving, afebrile, low procal: dc iv Vanco and Zosyn, doxy. Keep on levofloxacin 750 mg daily. Plan total 7 days course.  - Ct bronchodilator nebs (03/18: switched to levalbuterol given afib w rvr), oxygen to maintain sat >89. IS.   - Outpatient Pulmonary FU. PFT  - COPD RT consult.   - Encourage tobacco cessation.       # BC + for diptheroids from 17th afternoon (F/U cultres from 17th night-NGTD)- repeat today   * D/W ID 3/21 for consult     # Afib w rvr: at current NSR, HR controlled (Recurrent episode due to drinking- previous admission in 01/18 as well)- better after detox.  Can be life threatening.   - ct metop to 100 XR daily.   - iv metop prn for hr>120  -  tele  - Echo last admission 01/15 : EF: 60-65%.     #Hx of chronic pain syndrome. Hx of Opioid abuse   On Suboxone.   - continue PTA Suboxone for now.   - PTA gabapentin. robaxin.      # Depression, PTSD w/ hx of panic attacks:   - Continue PTA venlafaxine.  - on Diazepam per MSSA  - FU Psychiatry recs     # Tobacco abuse: nicotine patch panel.      # FEN: adat to regular. Ivf. Fu lytes.   PPx:   Lovenox SQ for dvt ppx  Full code.    Disposition Plan   Expected discharge in unclear  days to home vs inpatient treatment once better from detox and Psych decision for commitment or not.     Entered: Tae Foster MD 03/21/2018, 8:26 AM       Tae Foster MD (Pager- [phone])   Internal Medicine/ Hospitalist

## 2018-03-22 LAB
ANION GAP SERPL CALCULATED.3IONS-SCNC: 5 MMOL/L (ref 3–14)
BUN SERPL-MCNC: 18 MG/DL (ref 7–30)
CALCIUM SERPL-MCNC: 8.5 MG/DL (ref 8.5–10.1)
CHLORIDE SERPL-SCNC: 106 MMOL/L (ref 94–109)
CO2 SERPL-SCNC: 29 MMOL/L (ref 20–32)
CREAT SERPL-MCNC: 0.73 MG/DL (ref 0.66–1.25)
ERYTHROCYTE [DISTWIDTH] IN BLOOD BY AUTOMATED COUNT: 14.6 % (ref 10–15)
GFR SERPL CREATININE-BSD FRML MDRD: >90 ML/MIN/1.7M2
GLUCOSE SERPL-MCNC: 111 MG/DL (ref 70–99)
HCT VFR BLD AUTO: 44.1 % (ref 40–53)
HGB BLD-MCNC: 14.5 G/DL (ref 13.3–17.7)
MAGNESIUM SERPL-MCNC: 1.9 MG/DL (ref 1.6–2.3)
MCH RBC QN AUTO: 29.6 PG (ref 26.5–33)
MCHC RBC AUTO-ENTMCNC: 32.9 G/DL (ref 31.5–36.5)
MCV RBC AUTO: 90 FL (ref 78–100)
PHOSPHATE SERPL-MCNC: 4.1 MG/DL (ref 2.5–4.5)
PLATELET # BLD AUTO: 117 10E9/L (ref 150–450)
POTASSIUM SERPL-SCNC: 4.2 MMOL/L (ref 3.4–5.3)
RBC # BLD AUTO: 4.9 10E12/L (ref 4.4–5.9)
SODIUM SERPL-SCNC: 140 MMOL/L (ref 133–144)
WBC # BLD AUTO: 11.1 10E9/L (ref 4–11)

## 2018-03-22 PROCEDURE — 25000132 ZZH RX MED GY IP 250 OP 250 PS 637: Performed by: INTERNAL MEDICINE

## 2018-03-22 PROCEDURE — 99232 SBSQ HOSP IP/OBS MODERATE 35: CPT | Performed by: PSYCHIATRY & NEUROLOGY

## 2018-03-22 PROCEDURE — 25000128 H RX IP 250 OP 636: Performed by: INTERNAL MEDICINE

## 2018-03-22 PROCEDURE — 85027 COMPLETE CBC AUTOMATED: CPT | Performed by: INTERNAL MEDICINE

## 2018-03-22 PROCEDURE — 12000008 ZZH R&B INTERMEDIATE UMMC

## 2018-03-22 PROCEDURE — 94640 AIRWAY INHALATION TREATMENT: CPT | Mod: 76

## 2018-03-22 PROCEDURE — 84100 ASSAY OF PHOSPHORUS: CPT | Performed by: INTERNAL MEDICINE

## 2018-03-22 PROCEDURE — 99233 SBSQ HOSP IP/OBS HIGH 50: CPT | Performed by: INTERNAL MEDICINE

## 2018-03-22 PROCEDURE — 25000132 ZZH RX MED GY IP 250 OP 250 PS 637: Performed by: HOSPITALIST

## 2018-03-22 PROCEDURE — 25000125 ZZHC RX 250: Performed by: INTERNAL MEDICINE

## 2018-03-22 PROCEDURE — 36415 COLL VENOUS BLD VENIPUNCTURE: CPT | Performed by: INTERNAL MEDICINE

## 2018-03-22 PROCEDURE — 80048 BASIC METABOLIC PNL TOTAL CA: CPT | Performed by: INTERNAL MEDICINE

## 2018-03-22 PROCEDURE — 86140 C-REACTIVE PROTEIN: CPT | Performed by: INTERNAL MEDICINE

## 2018-03-22 PROCEDURE — 83735 ASSAY OF MAGNESIUM: CPT | Performed by: INTERNAL MEDICINE

## 2018-03-22 PROCEDURE — 40000275 ZZH STATISTIC RCP TIME EA 10 MIN

## 2018-03-22 PROCEDURE — 25000132 ZZH RX MED GY IP 250 OP 250 PS 637: Performed by: STUDENT IN AN ORGANIZED HEALTH CARE EDUCATION/TRAINING PROGRAM

## 2018-03-22 PROCEDURE — 94640 AIRWAY INHALATION TREATMENT: CPT

## 2018-03-22 RX ORDER — HYDROXYZINE HYDROCHLORIDE 25 MG/1
25 TABLET, FILM COATED ORAL EVERY 6 HOURS PRN
Status: DISCONTINUED | OUTPATIENT
Start: 2018-03-22 | End: 2018-04-09 | Stop reason: HOSPADM

## 2018-03-22 RX ADMIN — LEVALBUTEROL HYDROCHLORIDE 0.63 MG: 0.63 SOLUTION RESPIRATORY (INHALATION) at 07:55

## 2018-03-22 RX ADMIN — LEVALBUTEROL HYDROCHLORIDE 0.63 MG: 0.63 SOLUTION RESPIRATORY (INHALATION) at 16:25

## 2018-03-22 RX ADMIN — ASPIRIN 81 MG: 81 TABLET, COATED ORAL at 08:15

## 2018-03-22 RX ADMIN — ENOXAPARIN SODIUM 40 MG: 40 INJECTION SUBCUTANEOUS at 21:26

## 2018-03-22 RX ADMIN — FOLIC ACID 1 MG: 1 TABLET ORAL at 08:15

## 2018-03-22 RX ADMIN — CYANOCOBALAMIN TAB 1000 MCG 1000 MCG: 1000 TAB at 08:16

## 2018-03-22 RX ADMIN — GUAIFENESIN 600 MG: 600 TABLET, EXTENDED RELEASE ORAL at 20:28

## 2018-03-22 RX ADMIN — ACETAMINOPHEN 650 MG: 325 TABLET ORAL at 04:23

## 2018-03-22 RX ADMIN — SODIUM CHLORIDE, POTASSIUM CHLORIDE, SODIUM LACTATE AND CALCIUM CHLORIDE 1000 ML: 600; 310; 30; 20 INJECTION, SOLUTION INTRAVENOUS at 18:54

## 2018-03-22 RX ADMIN — GABAPENTIN 600 MG: 300 CAPSULE ORAL at 11:48

## 2018-03-22 RX ADMIN — LEVALBUTEROL HYDROCHLORIDE 0.63 MG: 0.63 SOLUTION RESPIRATORY (INHALATION) at 20:02

## 2018-03-22 RX ADMIN — VENLAFAXINE HYDROCHLORIDE 75 MG: 75 TABLET, EXTENDED RELEASE ORAL at 08:15

## 2018-03-22 RX ADMIN — BUPRENORPHINE AND NALOXONE 1 TABLET: 8; 2 TABLET SUBLINGUAL at 20:28

## 2018-03-22 RX ADMIN — GABAPENTIN 600 MG: 300 CAPSULE ORAL at 16:10

## 2018-03-22 RX ADMIN — METOPROLOL SUCCINATE 100 MG: 100 TABLET, EXTENDED RELEASE ORAL at 08:15

## 2018-03-22 RX ADMIN — DIAZEPAM 5 MG: 5 TABLET ORAL at 16:09

## 2018-03-22 RX ADMIN — BUPRENORPHINE AND NALOXONE 1 TABLET: 8; 2 TABLET SUBLINGUAL at 08:15

## 2018-03-22 RX ADMIN — DIAZEPAM 5 MG: 5 TABLET ORAL at 12:27

## 2018-03-22 RX ADMIN — GABAPENTIN 600 MG: 300 CAPSULE ORAL at 08:15

## 2018-03-22 RX ADMIN — ACETAMINOPHEN 650 MG: 325 TABLET ORAL at 11:48

## 2018-03-22 RX ADMIN — THERA TABS 1 TABLET: TAB at 08:16

## 2018-03-22 RX ADMIN — HYDROXYZINE HYDROCHLORIDE 25 MG: 25 TABLET, FILM COATED ORAL at 04:24

## 2018-03-22 RX ADMIN — LEVOFLOXACIN 750 MG: 750 TABLET, FILM COATED ORAL at 08:16

## 2018-03-22 RX ADMIN — Medication 100 MG: at 08:15

## 2018-03-22 RX ADMIN — GABAPENTIN 600 MG: 300 CAPSULE ORAL at 20:28

## 2018-03-22 RX ADMIN — LEVALBUTEROL HYDROCHLORIDE 0.63 MG: 0.63 SOLUTION RESPIRATORY (INHALATION) at 12:15

## 2018-03-22 RX ADMIN — GUAIFENESIN 600 MG: 600 TABLET, EXTENDED RELEASE ORAL at 08:15

## 2018-03-22 ASSESSMENT — ACTIVITIES OF DAILY LIVING (ADL)
ADLS_ACUITY_SCORE: 27

## 2018-03-22 NOTE — CONSULTS
Consult Date:  03/22/2018      REASON FOR CONSULTATION:  Persistent hallucinations.?  commitment.      REQUESTING PHYSICIAN:  Dr. Foster.      IDENTIFYING INFORMATION:  The patient is a 63-year-old  male who was initially seen by me on 3/19/2018 and a followup consult has been requested for commitment and hallucinations.      The patient is much better.  When I first saw him on 3/19/2018, since then his delirium has improved.  He is oriented to person, place and situation.  Alcohol is his drug of choice.  He says this was a relapse for 8 days.  Collateral information from his friend says that he may be drinking for long.  He talks about the legal consequences, about needing to wear a bracelet but minimizes his drinking.  The patient has tolerance to alcohol, withdrawal, history of DTs, progressive use with loss of control, tried to quit unsuccessfully.  He was hospitalized in November, December and January and now in March.  Each time, he would say that he would follow up with treatment and has not done it.  He sees Suboxone maintenance doctor, Dr. Biggs, but he declined for me to talk with that doctor.  At this time, patient denies any suicidal ideation, plan or intent.  He denied any auditory or visual hallucinations.  He smokes half a pack a day.      PAST PSYCHIATRIC HISTORY:  Never psychiatrically hospitalized.  He was in 3 chemical dependency [facility], [facility]      FAMILY HISTORY:  Denied any family psychiatric or chemical dependency history.      SOCIAL HISTORY:  Good childhood, no abuse.  Noncombat.  Apparently, was a * honorable discharge.        No current facility-administered medications on file prior to encounter.   Current Outpatient Prescriptions on File Prior to Encounter:  methocarbamol (ROBAXIN) 750 MG tablet Take 750 mg by mouth nightly as needed for muscle spasms   aspirin 81 MG tablet Take 1 tablet (81 mg) by mouth daily   cyanocobalamin (VITAMIN  B-12)  1000 MCG tablet Take 1 tablet (1,000 mcg) by mouth daily   venlafaxine (EFFEXOR-ER) 75 MG TB24 24 hr tablet Take 1 tablet (75 mg) by mouth daily   albuterol (PROAIR HFA/PROVENTIL HFA/VENTOLIN HFA) 108 (90 BASE) MCG/ACT Inhaler Inhale 2 puffs into the lungs every 6 hours as needed for shortness of breath / dyspnea or wheezing   bismuth subsalicylate (PEPTO BISMOL) 262 MG chewable tablet Take 524 mg by mouth 2 times daily as needed for diarrhea   fish oil-omega-3 fatty acids 1000 MG capsule Take 1 g by mouth daily    Multiple Vitamin (MULTIVITAMIN) per tablet Take 1 tablet by mouth daily.     VITAL SIGNS:  The patient's vitals are as below:  Temperature of 98.3, pulse of 75, heart rate of 97, respiratory rate of 16, blood pressure of 111/83.      Please review the physical examination done on this patient by Dr. Foster on 3/21/2018.      MENTAL STATUS EXAMINATION:  The patient is a 63-year-old  male who is lying in bed.  He has adequate grooming, adequate hygiene, maintains good eye contact.  Superficially cooperative.  Mood is anxious.  Affect is congruent.  Speech is spontaneous, normal in rate, less logical in thinking, no loose association.  Judgment is limited.  Alert and oriented x3.  Recent and remote memory, language, fund of knowledge are all adequate.      DIAGNOSES:   Axis I:  Major depressive disorder, recurrent, severe without psychotic features.   Alcohol use disorder, severe.   Posttraumatic stress disorder.      The patient, when he gets depressed, has lack of energy, lack of motivation, hopeless, helpless.      RECOMMENDATIONS:   1.  Detox off alcohol using MSSA protocol on Valium.  He can have hallucinations that persist after MSSA is completed.  These improve with time .  The patient can be continued on Effexor for his depression.  The patient has numerous admissions.  He has falls.  He is minimizing his drinking.  He has complicated withdrawal.  Inpatient level was recommended, and the  patient declined.  Patient was seen with the , and civil commitment was pursued.  The patient was informed.  The patient was placed on a 72-hour hold.         FARHEEN MATHIS MD             D: 2018   T: 2018   MT: BABS      Name:     MARYBETH HUERTAS   MRN:      [id number]        Account:       [id number]   :      1955           Consult Date:  2018      Document: [id number]

## 2018-03-22 NOTE — PLAN OF CARE
Problem: Patient Care Overview  Goal: Plan of Care/Patient Progress Review  A&Ox4. VSS on room air. MSSA score of 7, patient has some visible tremors and reports visual hallucinations when eyes are closed. No PRN valium needed. VPM on and patient has been calling appropriately and waiting for staff to enter room. Patient has been resting comfortably in bed. Ate 50% of dinner. Is voiding in urinal with adequate output. He is able to make his needs known, continue with plan of care.

## 2018-03-22 NOTE — PLAN OF CARE
Problem: Patient Care Overview  Goal: Plan of Care/Patient Progress Review  Outcome: No Change  Pt is A&O. VSS and WNL aside from mild tachycardia. No chest pain or SOB. Lungs CTA. BS present and active x 4. Tolerating diet with no n/v. MSSA= 9 @ 1200. 5 mg valium given per orders. Due to be re-scored @ 1600. Per psych and SW, it is recommended pt dc to inpatient chem dep treatment. Pt was resistive so 72 hr hold was initiated. Pt expressed frustration but was calm and cooperative. 1:1 sitter at bedside. PIV saline locked. Up with SBA. Has call light in reach and is able to make needs known. Continue with plan of care.

## 2018-03-22 NOTE — PROGRESS NOTES
"Patient seen and examined with RN     S-continues to feel better, but still have occasional visual hallucinations. No BZD use last 24 hrs      4 point ROS done and neg unless mentioned     O-   /83 (BP Location: Left arm)  Pulse 75  Temp 98.3  F (36.8  C) (Oral)  Resp 16  Ht 1.803 m (5' 10.98\")  Wt 95.3 kg (210 lb)  SpO2 94%  BMI 29.3 kg/m2   Gen- pleasant, anxious  HEENT- NAD, SASHA, MMM  Neck- supple  CVS- I+II+ no m/r/g  RS- CTAB  Abdo- soft, no tenderness . No g/r/r   Ext- no edema    CNS- no gross focal signs. Oriented to person, place and time    Tremors +    LABS:   BMP    Recent Labs  Lab 03/22/18  0711 03/21/18  0902 03/19/18  0501 03/18/18  0621    136 139 142   POTASSIUM 4.2 4.2 3.6 3.9   CHLORIDE 106 102 106 106   RUSTY 8.5 8.8 8.0* 7.4*   CO2 29 27 26 30   BUN 18 15 10 18   CR 0.73 0.61* 0.42* 0.54*   * 110* 122* 172*     CBC    Recent Labs  Lab 03/22/18  0711 03/21/18  0902 03/20/18  0636 03/19/18  0501 03/18/18  1215   WBC 11.1* 8.5  --  9.5 13.2*   RBC 4.90 4.54  --  4.25* 4.03*   HGB 14.5 13.8  --  12.5* 11.9*   HCT 44.1 40.4  --  38.1* 36.0*   MCV 90 89  --  90 89   MCH 29.6 30.4  --  29.4 29.5   MCHC 32.9 34.2  --  32.8 33.1   RDW 14.6 14.3  --  14.5 14.7   * 101* 96* 90* 102*     INRNo lab results found in last 7 days.  LFTs    Recent Labs  Lab 03/19/18  0501 03/17/18  1330   ALKPHOS 69 88   AST 27 20   ALT 20 22   BILITOTAL 1.1 0.7   PROTTOTAL 6.5* 7.6   ALBUMIN 2.9* 3.3*     A/P:  Jorge Rosales is a 63 year old male with past medical history significant for alcohol dependency., recurrent admissions for alcohol related cause last 01/14--01/19/18 , smoker, COPD, chronic pain syndrome, opioid use disorder on suboxone maintenance, depression, PTSD, L leg weakness s/p Lumbar spine surgery admitted for alcohol intoxication,  withdrawal and acute on chronic hypoxic respi failure 2/2 PNA, mild copd exacerbation.       # Alcohol dependency, intoxication " and withdrawal:   -MSSA with Diazepam  - mvi, thiamine, folic acid daily  - fall, seizure precautions.   -  consult  - Psychiatry consult appreciated 3/19: 1.  Continue patient on one-to-one.  Patient was switched to [facility] protocol on Valium.  Continue that.  The patient will be continued on Suboxone.  This is what the patient is taking.  He can be continued on his medications once doses are known, which is Effexor 75 mg.   - D/W psych 3/22 for  Commitment and for hallucinations . Agree failed many times, has thrombocytopenia, and recurrent AFib with RVR alcohol related      # Acute on chronic hypoxic respi failure , h/o COPD: suspect R lower lobe PNA. Mild copd exacerbation.   Started empirically on iv vanc, zosyn, doxy for hcap.  03/18: Given pt stable hemodynamics. Lactic acidosis: resolved quickly suspect mostly related to alcohol use, dehydration. Hypoxia: improving, afebrile, low procal: dc iv Vanco and Zosyn, doxy. Keep on levofloxacin 750 mg daily. Plan total 7 days course.  - Ct bronchodilator nebs (03/18: switched to levalbuterol given afib w rvr), oxygen to maintain sat >89. IS.   - Outpatient Pulmonary FU. PFT  - COPD RT consult.   - Encourage tobacco cessation.       # BC + for diptheroids from 17th afternoon (F/U cultres from 17th night-NGTD)  * D/W ID 3/21 for consult . Appreciate review. A/W Follow up     # Afib w rvr: at current NSR, HR controlled (Recurrent episode due to drinking- previous admission in 01/18 as well)- better after detox.  Can be life threatening.   - ct metop to 100 XR daily.   - iv metop prn for hr>120  -  tele  - Echo last admission 01/15 : EF: 60-65%.     #Hx of chronic pain syndrome. Hx of Opioid abuse   On Suboxone.   - continue PTA Suboxone for now.   - PTA gabapentin. robaxin.      # Depression, PTSD w/ hx of panic attacks:   - Continue PTA venlafaxine.  - on Diazepam per MSS  - FU Psychiatry recs     # Tobacco abuse: nicotine patch panel.      # FEN: adat to regular.  Ivf. Fu lytes.   PPx:   Lovenox SQ for dvt ppx  Full code.    Disposition Plan   Expected discharge in unclear days to home vs inpatient treatment once better from detox and Psych decision for commitment or not.     Entered: Tae Foster MD 03/22/2018, 2:16 PM       Tae Foster MD (Pager- [phone])   Internal Medicine/ Hospitalist

## 2018-03-22 NOTE — PROGRESS NOTES
"Social Work Services Progress Note    Hospital Day: 6    Collaborated with:  Pt, Dr Varma, 10A IDT, pt's friend Diana, Dr Foster    Data:  Commitment    Intervention:  Pt con't to verbalize non-agreement with entering Residential CD treatment, citing \"losing face with my PO\" and having plan to be mandated with breathalyzer and ankle bracelet.   Dr Varma spoke w/Dr Foster and writer about impact pt's drinking is having on his medical status. Dr Foster expresses concern for pt's A Fib w/RVR and his thrombocytopenia which is impacted by pt's continued drinking and places pt at risk of death.    Dr Varma and writer met w/pt and friend Diana. Diana is supportive of pt getting into CD treatment. Pt is not agreeable to CD treatment and perseverated on idea of having to fulfill legal obligations with breathalyzer and ankle bracelet, then expresses inability to have residential CD treatment due to need to see to his daughter or other issues. He attempted to negotiate with writer and Dr Varma by [facility] (has been pursued in Nov, Dec and January without success due to pt's con't drinking and lack of compliance with medications).     Dr Varma informed pt that medical team would initiate petitio for Commitment and writer provided education on process for pt. Diana was present and witnessed conversation. She offered support for pt. He expressed frustration at not being able to DC from hospital and lack of agreement with need for CD treatment.     Dr Varma placed pt on 72 hour hold at 12:30 today. Writer presented pt with 72 hours paperwork and statement of patient rights under 72 hour hold @ 1300; asked if he had questions. Pt stated \" I don't have any rights\".     Writer called Pre Petition Screening at [facility] [phone] and made referral. Petition Paperwork was completed (with Exhibit A) and faxed at [phone] to [phone].     Assessment:  pt is not willing to consider CD " Treatment despite his repeated relapses with medical issues that are potentially life threatening    Plan:    Anticipated Disposition:  to be determined    Barriers to d/c plan:  Petition for Civil commitment    Follow Up:  ROBBIN con't to follow

## 2018-03-23 ENCOUNTER — APPOINTMENT (OUTPATIENT)
Dept: PHYSICAL THERAPY | Facility: CLINIC | Age: 63
DRG: 896 | End: 2018-03-23
Attending: INTERNAL MEDICINE
Payer: COMMERCIAL

## 2018-03-23 ENCOUNTER — APPOINTMENT (OUTPATIENT)
Dept: OCCUPATIONAL THERAPY | Facility: CLINIC | Age: 63
DRG: 896 | End: 2018-03-23
Attending: INTERNAL MEDICINE
Payer: COMMERCIAL

## 2018-03-23 ENCOUNTER — TELEPHONE (OUTPATIENT)
Dept: BEHAVIORAL HEALTH | Facility: CLINIC | Age: 63
End: 2018-03-23

## 2018-03-23 LAB
BACTERIA SPEC CULT: NO GROWTH
CRP SERPL-MCNC: 41.8 MG/L (ref 0–8)
Lab: NORMAL
PLATELET # BLD AUTO: 132 10E9/L (ref 150–450)
SPECIMEN SOURCE: NORMAL

## 2018-03-23 PROCEDURE — 97530 THERAPEUTIC ACTIVITIES: CPT | Mod: GP

## 2018-03-23 PROCEDURE — 94640 AIRWAY INHALATION TREATMENT: CPT

## 2018-03-23 PROCEDURE — 25000132 ZZH RX MED GY IP 250 OP 250 PS 637: Performed by: HOSPITALIST

## 2018-03-23 PROCEDURE — 40000193 ZZH STATISTIC PT WARD VISIT

## 2018-03-23 PROCEDURE — 94640 AIRWAY INHALATION TREATMENT: CPT | Mod: 76

## 2018-03-23 PROCEDURE — 12000008 ZZH R&B INTERMEDIATE UMMC

## 2018-03-23 PROCEDURE — 40000133 ZZH STATISTIC OT WARD VISIT

## 2018-03-23 PROCEDURE — 25000132 ZZH RX MED GY IP 250 OP 250 PS 637: Performed by: INTERNAL MEDICINE

## 2018-03-23 PROCEDURE — 97165 OT EVAL LOW COMPLEX 30 MIN: CPT | Mod: GO

## 2018-03-23 PROCEDURE — 99232 SBSQ HOSP IP/OBS MODERATE 35: CPT | Performed by: INTERNAL MEDICINE

## 2018-03-23 PROCEDURE — 85049 AUTOMATED PLATELET COUNT: CPT | Performed by: HOSPITALIST

## 2018-03-23 PROCEDURE — 40000275 ZZH STATISTIC RCP TIME EA 10 MIN

## 2018-03-23 PROCEDURE — 97530 THERAPEUTIC ACTIVITIES: CPT | Mod: GO

## 2018-03-23 PROCEDURE — 97116 GAIT TRAINING THERAPY: CPT | Mod: GP

## 2018-03-23 PROCEDURE — 25000125 ZZHC RX 250: Performed by: INTERNAL MEDICINE

## 2018-03-23 PROCEDURE — 97535 SELF CARE MNGMENT TRAINING: CPT | Mod: GO

## 2018-03-23 PROCEDURE — 97162 PT EVAL MOD COMPLEX 30 MIN: CPT | Mod: GP

## 2018-03-23 PROCEDURE — 25000132 ZZH RX MED GY IP 250 OP 250 PS 637: Performed by: STUDENT IN AN ORGANIZED HEALTH CARE EDUCATION/TRAINING PROGRAM

## 2018-03-23 PROCEDURE — 36415 COLL VENOUS BLD VENIPUNCTURE: CPT | Performed by: HOSPITALIST

## 2018-03-23 PROCEDURE — 25000128 H RX IP 250 OP 636: Performed by: INTERNAL MEDICINE

## 2018-03-23 PROCEDURE — 99207 ZZC CDG-MDM COMPONENT: MEETS LOW - DOWN CODED: CPT | Performed by: INTERNAL MEDICINE

## 2018-03-23 RX ADMIN — ENOXAPARIN SODIUM 40 MG: 40 INJECTION SUBCUTANEOUS at 22:23

## 2018-03-23 RX ADMIN — CYANOCOBALAMIN TAB 1000 MCG 1000 MCG: 1000 TAB at 08:22

## 2018-03-23 RX ADMIN — LEVALBUTEROL HYDROCHLORIDE 0.63 MG: 0.63 SOLUTION RESPIRATORY (INHALATION) at 16:35

## 2018-03-23 RX ADMIN — ACETAMINOPHEN 650 MG: 325 TABLET ORAL at 22:23

## 2018-03-23 RX ADMIN — GABAPENTIN 600 MG: 300 CAPSULE ORAL at 16:03

## 2018-03-23 RX ADMIN — GABAPENTIN 600 MG: 300 CAPSULE ORAL at 08:22

## 2018-03-23 RX ADMIN — BUPRENORPHINE AND NALOXONE 1 TABLET: 8; 2 TABLET SUBLINGUAL at 20:01

## 2018-03-23 RX ADMIN — VENLAFAXINE HYDROCHLORIDE 75 MG: 75 TABLET, EXTENDED RELEASE ORAL at 08:22

## 2018-03-23 RX ADMIN — LEVALBUTEROL HYDROCHLORIDE 0.63 MG: 0.63 SOLUTION RESPIRATORY (INHALATION) at 08:50

## 2018-03-23 RX ADMIN — ASPIRIN 81 MG: 81 TABLET, COATED ORAL at 08:23

## 2018-03-23 RX ADMIN — BUPRENORPHINE AND NALOXONE 1 TABLET: 8; 2 TABLET SUBLINGUAL at 08:30

## 2018-03-23 RX ADMIN — GABAPENTIN 600 MG: 300 CAPSULE ORAL at 20:01

## 2018-03-23 RX ADMIN — NICOTINE 1 PATCH: 21 PATCH, EXTENDED RELEASE TRANSDERMAL at 08:22

## 2018-03-23 RX ADMIN — THERA TABS 1 TABLET: TAB at 08:22

## 2018-03-23 RX ADMIN — GUAIFENESIN 600 MG: 600 TABLET, EXTENDED RELEASE ORAL at 08:22

## 2018-03-23 RX ADMIN — Medication 100 MG: at 08:22

## 2018-03-23 RX ADMIN — LEVOFLOXACIN 750 MG: 750 TABLET, FILM COATED ORAL at 08:22

## 2018-03-23 RX ADMIN — LEVALBUTEROL HYDROCHLORIDE 0.63 MG: 0.63 SOLUTION RESPIRATORY (INHALATION) at 12:04

## 2018-03-23 RX ADMIN — FOLIC ACID 1 MG: 1 TABLET ORAL at 08:23

## 2018-03-23 RX ADMIN — HYDROXYZINE HYDROCHLORIDE 25 MG: 25 TABLET, FILM COATED ORAL at 20:01

## 2018-03-23 RX ADMIN — ACETAMINOPHEN 650 MG: 325 TABLET ORAL at 16:03

## 2018-03-23 RX ADMIN — LEVALBUTEROL HYDROCHLORIDE 0.63 MG: 0.63 SOLUTION RESPIRATORY (INHALATION) at 21:12

## 2018-03-23 RX ADMIN — GUAIFENESIN 600 MG: 600 TABLET, EXTENDED RELEASE ORAL at 20:01

## 2018-03-23 ASSESSMENT — ACTIVITIES OF DAILY LIVING (ADL)
ADLS_ACUITY_SCORE: 27
ADLS_ACUITY_SCORE: 21
ADLS_ACUITY_SCORE: 27
ADLS_ACUITY_SCORE: 27

## 2018-03-23 NOTE — PROVIDER NOTIFICATION
Patient had episode low blood pressure, BP75/54. Patient reported he feels weak. Denies headache or lightheaded. Moonlighter notified of patient conditions. Dr. Mcnally ordered bolus of fluid and also discontinued metoprolol. Nursing will continue to monitor patient and update providers as needed.

## 2018-03-23 NOTE — PLAN OF CARE
Problem: Patient Care Overview  Goal: Plan of Care/Patient Progress Review  Discharge Planner PT   Patient plan for discharge: not addressed  Current status: eval complete. Pt demonstrates IND and safe bed mobility. SBA for sit<>stand transfers. Pt amb 300 ft with SEC and CGA, demonstrating 2 LOBs posteriorly but able to self correct. Pt ascended/descended 3 stairs using HR and SEC as well as CGA from PT.  Barriers to return to prior living situation: medical status, unsteady gait  Recommendations for discharge: Per chart review pt may DC to inpatient CD, otherwise pt will be safe to DC home from physical stand point  Rationale for recommendations: see above       Entered by: Maira Carroll 03/23/2018 11:20 AM

## 2018-03-23 NOTE — PLAN OF CARE
Problem: Patient Care Overview  Goal: Plan of Care/Patient Progress Review  Discharge Planner OT   Patient plan for discharge: did not state  Current status: Patient SBA for bed mobility. At baseline patient has L LE weakness due to nerve damage (has had spine surgery), wears an AFO and shoes when walking. Patient donned AFO/shoes independently sitting EOB. Sit>stand transfers with SBA. Patient ambulated in hallway using FWW with CGA, some unsteadiness noted. Mild tremors. Alert and oriented, having appropriate conversation, no visual hallucinations. Stood at sink to complete oral cares with SBA.   Barriers to return to prior living situation: lives alone, history of relapses with alcohol  Recommendations for discharge: per chart review, pursuing commitment for CD treatment  Rationale for recommendations: see above       Entered by: NORI BARBOSA 03/23/2018 8:17 AM

## 2018-03-23 NOTE — CONSULTS
Consult Date:  03/23/2018      REQUESTING PHYSICIAN:  Dr. Tae Foster.      REASON FOR CONSULTATION:  Single positive blood cultures in the setting of alcohol withdrawal.      HISTORY OF PRESENT ILLNESS:  This is a 63-year-old gentleman who is a  .  He has history of PTSD, depression.  He says that he was working but then had injury and subsequent to that over the past few years has been doing badly and everything has gone downhill.  He has had several previous visits for acute alcohol intoxication with high alcohol levels and sometimes with benzodiazepines as well.      The patient on this occasion was admitted on March 17 and he complained of generally feeling bad, complained of some achiness, withdrawal symptoms, and asking for detoxification.  He did mention cough.  He had elevated lactic acid, elevated white blood cell count, possibly some infiltrate in the right lower lobe.  His temperature maximum was 99.3 and his procalcitonin was relatively low.  Multiple blood cultures were done, he had 4 blood cultures on March 17 and 2 additional on March 21.  Only the very first blood culture was done prior to antibiotics and the patient was given Zosyn and vancomycin for 2 days and then he had a remarkably encarnacion normalization of his white count and switched out to Levaquin to finish out the course of treatment for pneumonia.      After 4 days, only the very first blood culture became positive for what is described as a diphtheroid organisms.  That organism has not been yet identified but will likely be Propionibacterium acnes.      PAST MEDICAL HISTORY:  Remarkable for back pain, COPD, history of panic attacks, post-traumatic stress, rotator cuff, substance abuse and he has been on Suboxone in the past.        SURGICAL HISTORY:  History of shoulder surgery.      SOCIAL HISTORY:  Currently smoking, has smoked for 30 years.  He uses significant alcohol.      FAMILY HISTORY:  Positive for father with  diabetes.      MEDICATIONS PRIOR TO ADMISSION:  Included Suboxone, gabapentin, presently on Levaquin.  Please see the admission history for full listing of medications.      ALLERGIES:  The patient has no known allergies.      REVIEW OF SYSTEMS:  Remarkable for hallucinations.  They were quite vivid on admission included seeing people in the room, seeing faces, seeing numbers and letters, and dots.  These have gradually settled down and patient mentions dots at the present time.  Remainder of complete review negative other than above.        PHYSICAL EXAMINATION:   GENERAL:  The patient is a soft spoken and quite articulate.   VITAL SIGNS:  Temperature 97.9 degrees, heart rate 69, blood pressure 105/62, respiratory rate 16 and O2 saturation 86%.   GENERAL:  The patient is calm and in no distress.   HEENT:  Extraocular motions are full.  There is no nystagmus.   NECK:  Supple.  Neck veins are flat.   HEART:  Without murmurs.   CHEST:  Clear anteriorly.   ABDOMEN:  Soft and nontender.   GENITAL AND RECTAL:  Deferred.   EXTREMITIES:  Without edema.   NEUROLOGIC:  No evident cranial nerve abnormalities or focal motor neurologic signs.   SKIN:  No rashes.      CLINICAL IMPRESSION:  This patient was admitted initially with high white count and feeling symptoms that he described as drug withdrawal.  Temperature and a procalcitonin were relatively normal.  He gave no history of weight loss or chronic illness.  He had no heart murmur.  He has a nonspecific change in the right lower lobe that may have been an infiltrate.  The patient responded quite quickly to fluids and supportive care plus antibiotics.  The rapid response suggests perhaps a fluid imbalance and stress more so than a significant pneumonia.      The patient has now completed a 7-day course of antibiotics.  It may be reasonable to consider discontinuation of the antibiotic at the present time.  A followup chest x-ray may be of interest in the event that this  was more atelectasis than infiltrate.      With regard to the single positive blood culture either Corynebacterium or Propionibacterium acnes would be an expected component of normal skin sisi and therefore could easily produce contamination of the blood culture needle giving a false positive blood culture.  There have, however, been cases of Corynebacterium endocarditis or P acnes which is subtle, subacute and may relapse after a short therapy.  This is considered unlikely at present but not impossible. Therefore, this patient will need followup with consideration of followup blood cultures off antibiotics.  I do note that Zosyn and vancomycin are quite reliably active against Propionibacterium acnes and Levaquin can be active as well against the diphtheroid organisms.      Thank you for this consultation.  We will continue to follow this patient and await results from the micro lab and any additional recommendations.         HECTOR SHAH MD             D: 2018   T: 2018   MT: NTS      Name:     MARYBETH HUERTAS   MRN:      [id number]        Account:       [id number]   :      1955           Consult Date:  2018      Document: [id number]

## 2018-03-23 NOTE — PROGRESS NOTES
"Patient seen and examined with RN     S-no new concerns     4 point ROS done and neg unless mentioned     O-   /62 (BP Location: Left arm)  Pulse 62  Temp 97.9  F (36.6  C) (Oral)  Resp 16  Ht 1.803 m (5' 10.98\")  Wt 95.3 kg (210 lb)  SpO2 92%  BMI 29.3 kg/m2   CVS- I+II+ no m/r/g  RS- CTAB  Abdo- soft, no tenderness . No g/r/r   Ext- no edema    CNS- no gross focal signs. Oriented to person, place and time    Tremors + mild    LABS:   BMP    Recent Labs  Lab 03/22/18  0711 03/21/18  0902 03/19/18  0501 03/18/18  0621    136 139 142   POTASSIUM 4.2 4.2 3.6 3.9   CHLORIDE 106 102 106 106   RUSTY 8.5 8.8 8.0* 7.4*   CO2 29 27 26 30   BUN 18 15 10 18   CR 0.73 0.61* 0.42* 0.54*   * 110* 122* 172*     CBC    Recent Labs  Lab 03/23/18  0619 03/22/18  0711 03/21/18  0902 03/20/18  0636 03/19/18  0501 03/18/18  1215   WBC  --  11.1* 8.5  --  9.5 13.2*   RBC  --  4.90 4.54  --  4.25* 4.03*   HGB  --  14.5 13.8  --  12.5* 11.9*   HCT  --  44.1 40.4  --  38.1* 36.0*   MCV  --  90 89  --  90 89   MCH  --  29.6 30.4  --  29.4 29.5   MCHC  --  32.9 34.2  --  32.8 33.1   RDW  --  14.6 14.3  --  14.5 14.7   * 117* 101* 96* 90* 102*     INRNo lab results found in last 7 days.  LFTs    Recent Labs  Lab 03/19/18  0501 03/17/18  1330   ALKPHOS 69 88   AST 27 20   ALT 20 22   BILITOTAL 1.1 0.7   PROTTOTAL 6.5* 7.6   ALBUMIN 2.9* 3.3*     A/P:  Jorge Rosales is a 63 year old male with past medical history significant for alcohol dependency., recurrent admissions for alcohol related cause last 01/14--01/19/18 , smoker, COPD, chronic pain syndrome, opioid use disorder on suboxone maintenance, depression, PTSD, L leg weakness s/p Lumbar spine surgery admitted for alcohol intoxication,  withdrawal and acute on chronic hypoxic respi failure 2/2 PNA, mild copd exacerbation.       # Alcohol dependency, intoxication and withdrawal:   -MSSA with Diazepam  - mvi, thiamine, folic acid daily  - fall, seizure " "precautions.   - Psychiatry consult appreciated 3/19: 1.  Continue patient on one-to-one.  Patient was switched to [facility] protocol on Valium.  Continue that.  The patient will be continued on Suboxone.  This is what the patient is taking.  He can be continued on his medications once doses are known, which is Effexor 75 mg.   - D/W psych 3/22 for  Commitment and for hallucinations . Agree failed many times, has thrombocytopenia, and recurrent AFib with RVR alcohol related      # Acute on chronic hypoxic respi failure , h/o COPD: suspect R lower lobe PNA. Mild copd exacerbation.   Started empirically on iv vanc, zosyn, doxy for hcap.  03/18: Given pt stable hemodynamics. Lactic acidosis: resolved quickly suspect mostly related to alcohol use, dehydration. Hypoxia: improving, afebrile, low procal: dc iv Vanco and Zosyn, doxy. Keep on levofloxacin 750 mg daily. Plan total 7 days course.  - Ct bronchodilator nebs (03/18: switched to levalbuterol given afib w rvr), oxygen to maintain sat >89. IS.   - Outpatient Pulmonary FU. PFT  - COPD RT consult.   - Encourage tobacco cessation.       # BC + for diptheroids from 17th afternoon (F/U cultres from 17th night-NGTD)  * Appreciate ID \"Only very first blood culture was prior to antibiotics. That culture only has grown diphteroid organism.    Will await additional information from micro. Full consult to follow. \"      # Afib w rvr: at current NSR, HR controlled (Recurrent episode due to drinking- previous admission in 01/18 as well)- better after detox.    - ct metop to 100 XR daily.   - iv metop prn for hr>120  - Echo last admission 01/15 : EF: 60-65%.     #Hx of chronic pain syndrome. Hx of Opioid abuse   On Suboxone.   - continue PTA Suboxone for now.   - PTA gabapentin. robaxin.      # Depression, PTSD w/ hx of panic attacks:   - Continue PTA venlafaxine.  - on Diazepam per MSSA  - FU Psychiatry recs     # Tobacco abuse: nicotine patch panel.      # FEN: adat to regular. Ivf. " Leno rangel.   PPx:   Lovenox SQ for dvt ppx  Full code.    Disposition Plan   Expected discharge in unclear days to home vs inpatient treatment once better from detox and place for commitment/ inpatient treatment     Entered: Tae Foster MD 03/23/2018, 9:47 AM       Tae Foster MD (Pager- [phone])   Internal Medicine/ Hospitalist

## 2018-03-23 NOTE — TELEPHONE ENCOUNTER
A voice message was left for this patient's  on 10A, Brisa Ramsey, on 3/23/2018 at 9:50 am.  The purpose of the call was to discuss the LP screening for this patient as it appears he is once again refusing residential CD treatment.  The patient had terminated past attempts to complete substance abuse assessments with him (on 1/18/2018 with Arnoldo Richardson) and it appears a committment process had been initiated by the physicians on 10A with this patient due to him having multiple IP hospitalizations over the past several months secondary to having significant medical problems related to his relapses with alcohol.  The patient would be unable to come to the LP program on a full commitment, but if he were medically stable and he was placed on a stay of commitment he would likely be eligible for the LP program.  This counselor will await a return call from Brisa.

## 2018-03-23 NOTE — PLAN OF CARE
"Problem: Alcohol Withdrawal Acute, Risk/Actual (Adult)  Goal: Signs and Symptoms of Listed Potential Problems Will be Absent, Minimized or Managed (Alcohol Withdrawal Acute, Risk/Actual)  Signs and symptoms of listed potential problems will be absent, minimized or managed by discharge/transition of care (reference Alcohol Withdrawal Acute, Risk/Actual (Adult) CPG).   Outcome: Improving  Patient is alert and oriented x4, able to communicate needs. Denies pain/discomfort. Blood pressure improved after bolus of IV fluids, nursing continue encouraging PO fluids intake. Continue on MSSA protocol, scored 8 & 5 this shift, last dose of valium at 1600, next due reassessment due at 12 midnight.  Denies SI/HI, patient reported visual hallucinations. Patient currently on 72hrs hold. 1:1 Attendant  at bedside. Continue to monitor per care plan. /73 (BP Location: Left arm)  Pulse 76  Temp 98.1  F (36.7  C) (Oral)  Resp 18  Ht 1.803 m (5' 10.98\")  Wt 95.3 kg (210 lb)  SpO2 93%  BMI 29.3 kg/m2        "

## 2018-03-23 NOTE — PLAN OF CARE
Problem: Patient Care Overview  Goal: Plan of Care/Patient Progress Review  Outcome: No Change        VS:   VSS, patient wean off 02 and has been keeping sat's > 90%   Output:   LBM 3/21  Voiding well without difficulty   Activity:   Independent   Skin: WNL   Pain:   Has L shoulder Pain- prn tylenol with some relief   Neuro/CMS:   LLE foot drop, baseline numbness   Dressing(s):   none   Diet:   Regular- good appetite   LDA:   PIV R arm   Equipment:   AFO L foot   Plan:   CD treatment, pending Civil commitment, currently on 72 hr hold   Additional Info:   MSSA -3

## 2018-03-23 NOTE — PLAN OF CARE
"Problem: Patient Care Overview  Goal: Plan of Care/Patient Progress Review  Outcome: Therapy, progress toward functional goals is gradual  Start of shift pt said he felt \"better\". No tremor, no nausea, no pain. Pt said he still sees \"dots\". Tolerated bites of breakfast. Walked in andersen with PT.      "

## 2018-03-23 NOTE — PROGRESS NOTES
03/23/18 1043   Quick Adds   Type of Visit Initial PT Evaluation   Living Environment   Lives With alone   Living Arrangements apartment   Home Accessibility no concerns   Number of Stairs to Enter Home 0   Number of Stairs Within Home 0   Stair Railings at Home none   Living Environment Comment Pt lives on second floor of apartment complex. Pt reports has elevator but trys to do stairs as mush as possible   Self-Care   Dominant Hand right   Usual Activity Tolerance good   Current Activity Tolerance fair   Regular Exercise no   Equipment Currently Used at Home cane, straight;shower chair;wheelchair, power   Activity/Exercise/Self-Care Comment Pt has PCA 2/week who occassionally assists in bathing. pts uses SEC for all mobility   Functional Level Prior   Ambulation 1-->assistive equipment   Transferring 0-->independent   Toileting 0-->independent   Bathing 3-->assistive equipment and person   Dressing 1-->assistive equipment   Eating 0-->independent   Communication 0-->understands/communicates without difficulty   Swallowing 0-->swallows foods/liquids without difficulty   Cognition 1 - attention or memory deficits   Fall history within last six months yes   Number of times patient has fallen within last six months 1   Which of the above functional risks had a recent onset or change? ambulation;transferring   Prior Functional Level Comment IND with mobility. gets assist from PCA for laundry, cleaning and other household chores   General Information   Onset of Illness/Injury or Date of Surgery - Date 03/17/18   Referring Physician Tae Foster MD   Pertinent History of Current Problem (include personal factors and/or comorbidities that impact the POC) 63 year old male with past medical history significant for alcohol dependency., recurrent admissions for alcohol related cause last 01/14--01/19/18 , smoker, COPD, chronic pain syndrome, opioid use disorder on suboxone maintenance, depression, PTSD, L leg weakness s/p  Lumbar spine surgery admitted for alcohol intoxication,  withdrawal and acute on chronic hypoxic respi failure 2/2 PNA, mild copd exacerbation.   Precautions/Limitations fall precautions   Weight-Bearing Status - LUE full weight-bearing   Weight-Bearing Status - RUE full weight-bearing   Weight-Bearing Status - LLE full weight-bearing   Weight-Bearing Status - RLE full weight-bearing   General Info Comments sitter present   Cognitive Status Examination   Orientation orientation to person, place and time   Level of Consciousness alert   Follows Commands and Answers Questions 100% of the time   Personal Safety and Judgment intact   Memory intact   Pain Assessment   Patient Currently in Pain No   Integumentary/Edema   Integumentary/Edema no deficits were identifed   Posture    Posture Protracted shoulders;Forward head position   Range of Motion (ROM)   ROM Comment WFL   Strength   Strength Comments not formally tested- AFO for L ankle d/t chronic L foot drop. WFL throughout   Bed Mobility   Bed Mobility Comments IND   Transfer Skills   Transfer Comments Pt transfers supine<>sit and sit<>stand with SBA   Gait   Gait Comments Pt amb 300 ft with CGA and SEC-2 minor LOBs posteriorly- able to self correct   Balance   Balance Comments Pt demonstrates impaired dynamic standing balance with gait- needs UE support and CGA   Sensory Examination   Sensory Perception no deficits were identified   General Therapy Interventions   Planned Therapy Interventions balance training;gait training;neuromuscular re-education;progressive activity/exercise;home program guidelines;risk factor education;transfer training;strengthening   Clinical Impression   Criteria for Skilled Therapeutic Intervention yes, treatment indicated   PT Diagnosis Impaired functional mobility   Influenced by the following impairments impaired balance, fatigue, deconditioning   Functional limitations due to impairments transfers, gait, stairs   Clinical Presentation  "Evolving/Changing   Clinical Presentation Rationale Per pts PMHx and current medical/ functional status   Clinical Decision Making (Complexity) Moderate complexity   Therapy Frequency` daily   Predicted Duration of Therapy Intervention (days/wks) 1 week   Anticipated Discharge Disposition Other (see comments)  (Chemical dependecy Inpatient)   Risk & Benefits of therapy have been explained Yes   Patient, Family & other staff in agreement with plan of care Yes   [facility] TM \"6 Clicks\"   2016, Trustees of [facility], under license to [employer].  All rights reserved.   6 Clicks Short Forms Basic Mobility Inpatient Short Form   [facility] AM-PAC  \"6 Clicks\" V.2 Basic Mobility Inpatient Short Form   1. Turning from your back to your side while in a flat bed without using bedrails? 4 - None   2. Moving from lying on your back to sitting on the side of a flat bed without using bedrails? 4 - None   3. Moving to and from a bed to a chair (including a wheelchair)? 3 - A Little   4. Standing up from a chair using your arms (e.g., wheelchair, or bedside chair)? 3 - A Little   5. To walk in hospital room? 3 - A Little   6. Climbing 3-5 steps with a railing? 3 - A Little   Basic Mobility Raw Score (Score out of 24.Lower scores equate to lower levels of function) 20   Total Evaluation Time   Total Evaluation Time (Minutes) 10     "

## 2018-03-23 NOTE — PLAN OF CARE
Problem: Patient Care Overview  Goal: Plan of Care/Patient Progress Review  Outcome: No Change  02 satts desatting to 88% room air while asleep.Placed on oxygen at 1lpm via NC,satting well above 90's.Denies SOB.Up SBA1 to BR.Voiding spontaneously.Passing gas.MSSA scored 2.PIV line saline locked.On 72 hour hold.Sitter in room.Continue to monitor.

## 2018-03-23 NOTE — PROGRESS NOTES
03/23/18 0800   Quick Adds   Type of Visit Initial Occupational Therapy Evaluation   Living Environment   Lives With alone   Living Arrangements apartment   Home Accessibility no concerns   Number of Stairs Within Home (has elevator)   Living Environment Comment Patient lives alone in apartment. Has elevator but tries to do stairs for activity. Has walk in shower with shower chair and grab bars. Standard toilet.    Self-Care   Dominant Hand right   Usual Activity Tolerance good   Current Activity Tolerance fair   Regular Exercise no   Equipment Currently Used at Home cane, straight;shower chair;wheelchair, power   Functional Level Prior   Ambulation 1-->assistive equipment   Transferring 0-->independent   Toileting 0-->independent   Bathing 3-->assistive equipment and person   Dressing 1-->assistive equipment   Eating 0-->independent   Communication 0-->understands/communicates without difficulty   Swallowing 0-->swallows foods/liquids without difficulty   Cognition 1 - attention or memory deficits   Fall history within last six months yes   Number of times patient has fallen within last six months 1   Prior Functional Level Comment Patient typically uses cane for mobility due to L LE weakness. Wears an AFO. Does have an electric scooter for community if needed. Patient IND with most ADLs, has home health aide 2x/week who occasionally assists with bathing, mostly laundry and household tasks. Uses AE for LE dressing as needed.    General Information   Onset of Illness/Injury or Date of Surgery - Date 03/17/18   Referring Physician Dr. Foster   Patient/Family Goals Statement Did not state   Additional Occupational Profile Info/Pertinent History of Current Problem Jorge Rosales is a 63 year old male with past medical history significant for alcohol dependency., recurrent admissions for alcohol related cause last 01/14--01/19/18 , smoker, COPD, chronic pain syndrome, opioid use disorder on suboxone maintenance,  depression, PTSD, L leg weakness s/p Lumbar spine surgery admitted for alcohol intoxication,  withdrawal and acute on chronic hypoxic respi failure 2/2 PNA, mild copd exacerbation.   Precautions/Limitations fall precautions;other (see comments)  (1:1 sitter)   General Observations Patient alert, agreeable to OT.    Cognitive Status Examination   Orientation orientation to person, place and time   Level of Consciousness alert   Able to Follow Commands WNL/WFL   Cognitive Comment will continue to monitor and assess.    Visual Perception   Visual Perception Wears glasses   Sensory Examination   Sensory Comments Has baseline NT in L LE, some numbness in R big toe.    Pain Assessment   Patient Currently in Pain Yes, see Vital Sign flowsheet   Range of Motion (ROM)   ROM Comment B UE's WFL   Strength   Strength Comments UE strength WFL   Muscle Tone Assessment   Muscle Tone Quick Adds No deficits were identified   Coordination   Upper Extremity Coordination No deficits were identified   Mobility   Bed Mobility Comments SBA   Transfer Skill: Bed to Chair/Chair to Bed   Level of [place]: Bed to Chair contact guard   Physical Assist/Nonphysical Assist: Bed to Chair set-up required   Weight-Bearing Restrictions full weight-bearing   Assistive Device - Transfer Skill Bed to Chair Chair to Bed Rehab Eval rolling walker   Transfer Skill: Sit to Stand   Level of [place]: Sit/Stand contact guard   Physical Assist/Nonphysical Assist: Sit/Stand set-up required   Transfer Skill: Sit to Stand full weight-bearing   Assistive Device for Transfer: Sit/Stand rolling walker   Upper Body Dressing   Level of [place]: Dress Upper Body independent   Lower Body Dressing   Level of [place]: Dress Lower Body independent   Grooming   Level of [place]: Grooming stand-by assist  (standing)   Physical Assist/Nonphysical Assist: Grooming set-up required   Activities of Daily Living Analysis   Impairments Contributing to  "Impaired Activities of Daily Living balance impaired;strength decreased;pain   General Therapy Interventions   Planned Therapy Interventions ADL retraining;transfer training   Clinical Impression   Criteria for Skilled Therapeutic Interventions Met yes, treatment indicated   OT Diagnosis Decreased functional mobility and ADLs   Influenced by the following impairments impaired balance   Assessment of Occupational Performance 1-3 Performance Deficits   Identified Performance Deficits bathing, toileting, transfers   Clinical Decision Making (Complexity) Low complexity   Therapy Frequency daily   Predicted Duration of Therapy Intervention (days/wks) 3 days   Anticipated Discharge Disposition Other (see comments)  (per chart, pursuing committment for CD)   Risks and Benefits of Treatment have been explained. Yes   Patient, Family & other staff in agreement with plan of care Yes   [facility] [employer] TM \"6 Clicks\"   2016, Trustees of [facility], under license to [employer].  All rights reserved.   6 Clicks Short Forms Daily Activity Inpatient Short Form   [facility] [employer]  \"6 Clicks\" Daily Activity Inpatient Short Form   1. Putting on and taking off regular lower body clothing? 4 - None   2. Bathing (including washing, rinsing, drying)? 3 - A Little   3. Toileting, which includes using toilet, bedpan or urinal? 3 - A Little   4. Putting on and taking off regular upper body clothing? 4 - None   5. Taking care of personal grooming such as brushing teeth? 4 - None   6. Eating meals? 4 - None   Daily Activity Raw Score (Score out of 24.Lower scores equate to lower levels of function) 22   Total Evaluation Time   Total Evaluation Time (Minutes) 8     "

## 2018-03-24 ENCOUNTER — APPOINTMENT (OUTPATIENT)
Dept: OCCUPATIONAL THERAPY | Facility: CLINIC | Age: 63
DRG: 896 | End: 2018-03-24
Payer: COMMERCIAL

## 2018-03-24 ENCOUNTER — APPOINTMENT (OUTPATIENT)
Dept: PHYSICAL THERAPY | Facility: CLINIC | Age: 63
DRG: 896 | End: 2018-03-24
Payer: COMMERCIAL

## 2018-03-24 PROCEDURE — 25000132 ZZH RX MED GY IP 250 OP 250 PS 637: Performed by: HOSPITALIST

## 2018-03-24 PROCEDURE — 40000193 ZZH STATISTIC PT WARD VISIT: Performed by: PHYSICAL THERAPIST

## 2018-03-24 PROCEDURE — 97535 SELF CARE MNGMENT TRAINING: CPT | Mod: GO

## 2018-03-24 PROCEDURE — 99232 SBSQ HOSP IP/OBS MODERATE 35: CPT | Performed by: INTERNAL MEDICINE

## 2018-03-24 PROCEDURE — 94640 AIRWAY INHALATION TREATMENT: CPT

## 2018-03-24 PROCEDURE — 25000132 ZZH RX MED GY IP 250 OP 250 PS 637: Performed by: STUDENT IN AN ORGANIZED HEALTH CARE EDUCATION/TRAINING PROGRAM

## 2018-03-24 PROCEDURE — 12000008 ZZH R&B INTERMEDIATE UMMC

## 2018-03-24 PROCEDURE — 97116 GAIT TRAINING THERAPY: CPT | Mod: GP | Performed by: PHYSICAL THERAPIST

## 2018-03-24 PROCEDURE — 40000275 ZZH STATISTIC RCP TIME EA 10 MIN

## 2018-03-24 PROCEDURE — 25000132 ZZH RX MED GY IP 250 OP 250 PS 637: Performed by: INTERNAL MEDICINE

## 2018-03-24 PROCEDURE — 25000125 ZZHC RX 250: Performed by: INTERNAL MEDICINE

## 2018-03-24 PROCEDURE — 25000128 H RX IP 250 OP 636: Performed by: INTERNAL MEDICINE

## 2018-03-24 PROCEDURE — 40000133 ZZH STATISTIC OT WARD VISIT

## 2018-03-24 PROCEDURE — 94640 AIRWAY INHALATION TREATMENT: CPT | Mod: 76

## 2018-03-24 RX ADMIN — FOLIC ACID 1 MG: 1 TABLET ORAL at 09:10

## 2018-03-24 RX ADMIN — NICOTINE 1 PATCH: 21 PATCH, EXTENDED RELEASE TRANSDERMAL at 09:10

## 2018-03-24 RX ADMIN — BUPRENORPHINE AND NALOXONE 1 TABLET: 8; 2 TABLET SUBLINGUAL at 19:51

## 2018-03-24 RX ADMIN — LEVALBUTEROL HYDROCHLORIDE 0.63 MG: 0.63 SOLUTION RESPIRATORY (INHALATION) at 16:43

## 2018-03-24 RX ADMIN — Medication 100 MG: at 09:10

## 2018-03-24 RX ADMIN — BUPRENORPHINE AND NALOXONE 1 TABLET: 8; 2 TABLET SUBLINGUAL at 09:10

## 2018-03-24 RX ADMIN — LEVALBUTEROL HYDROCHLORIDE 0.63 MG: 0.63 SOLUTION RESPIRATORY (INHALATION) at 08:20

## 2018-03-24 RX ADMIN — ASPIRIN 81 MG: 81 TABLET, COATED ORAL at 09:10

## 2018-03-24 RX ADMIN — GABAPENTIN 600 MG: 300 CAPSULE ORAL at 14:53

## 2018-03-24 RX ADMIN — CYANOCOBALAMIN TAB 1000 MCG 1000 MCG: 1000 TAB at 09:10

## 2018-03-24 RX ADMIN — ACETAMINOPHEN 650 MG: 325 TABLET ORAL at 09:10

## 2018-03-24 RX ADMIN — VENLAFAXINE HYDROCHLORIDE 75 MG: 75 TABLET, EXTENDED RELEASE ORAL at 09:10

## 2018-03-24 RX ADMIN — ENOXAPARIN SODIUM 40 MG: 40 INJECTION SUBCUTANEOUS at 21:29

## 2018-03-24 RX ADMIN — GABAPENTIN 600 MG: 300 CAPSULE ORAL at 19:50

## 2018-03-24 RX ADMIN — LEVALBUTEROL HYDROCHLORIDE 0.63 MG: 0.63 SOLUTION RESPIRATORY (INHALATION) at 20:21

## 2018-03-24 RX ADMIN — ACETAMINOPHEN 650 MG: 325 TABLET ORAL at 21:29

## 2018-03-24 RX ADMIN — GABAPENTIN 600 MG: 300 CAPSULE ORAL at 09:10

## 2018-03-24 RX ADMIN — GUAIFENESIN 600 MG: 600 TABLET, EXTENDED RELEASE ORAL at 19:51

## 2018-03-24 RX ADMIN — LEVALBUTEROL HYDROCHLORIDE 0.63 MG: 0.63 SOLUTION RESPIRATORY (INHALATION) at 12:55

## 2018-03-24 RX ADMIN — LEVOFLOXACIN 750 MG: 750 TABLET, FILM COATED ORAL at 09:10

## 2018-03-24 RX ADMIN — THERA TABS 1 TABLET: TAB at 09:10

## 2018-03-24 RX ADMIN — ACETAMINOPHEN 650 MG: 325 TABLET ORAL at 14:53

## 2018-03-24 RX ADMIN — GUAIFENESIN 600 MG: 600 TABLET, EXTENDED RELEASE ORAL at 09:10

## 2018-03-24 ASSESSMENT — ACTIVITIES OF DAILY LIVING (ADL)
ADLS_ACUITY_SCORE: 21

## 2018-03-24 NOTE — PROGRESS NOTES
"Patient seen and examined with RN     S-no new concerns , continues to feel better. Hallucinations nearly resolved     4 point ROS done and neg unless mentioned     O-   /76 (BP Location: Left arm)  Pulse 75  Temp 97.9  F (36.6  C) (Oral)  Resp 16  Ht 1.803 m (5' 10.98\")  Wt 95.3 kg (210 lb)  SpO2 93%  BMI 29.3 kg/m2   CVS- I+II+ no m/r/g  RS- CTAB  Abdo- soft, no tenderness . No g/r/r   Ext- no edema    CNS- no gross focal signs. Oriented to person, place and time      LABS:   BMP    Recent Labs  Lab 03/22/18  0711 03/21/18  0902 03/19/18  0501 03/18/18  0621    136 139 142   POTASSIUM 4.2 4.2 3.6 3.9   CHLORIDE 106 102 106 106   RUSTY 8.5 8.8 8.0* 7.4*   CO2 29 27 26 30   BUN 18 15 10 18   CR 0.73 0.61* 0.42* 0.54*   * 110* 122* 172*     CBC    Recent Labs  Lab 03/23/18  0619 03/22/18  0711 03/21/18  0902 03/20/18  0636 03/19/18  0501 03/18/18  1215   WBC  --  11.1* 8.5  --  9.5 13.2*   RBC  --  4.90 4.54  --  4.25* 4.03*   HGB  --  14.5 13.8  --  12.5* 11.9*   HCT  --  44.1 40.4  --  38.1* 36.0*   MCV  --  90 89  --  90 89   MCH  --  29.6 30.4  --  29.4 29.5   MCHC  --  32.9 34.2  --  32.8 33.1   RDW  --  14.6 14.3  --  14.5 14.7   * 117* 101* 96* 90* 102*     INRNo lab results found in last 7 days.  LFTs    Recent Labs  Lab 03/19/18  0501 03/17/18  1330   ALKPHOS 69 88   AST 27 20   ALT 20 22   BILITOTAL 1.1 0.7   PROTTOTAL 6.5* 7.6   ALBUMIN 2.9* 3.3*     A/P:  Jorge Rosales is a 63 year old male with past medical history significant for alcohol dependency., recurrent admissions for alcohol related cause last 01/14--01/19/18 , smoker, COPD, chronic pain syndrome, opioid use disorder on suboxone maintenance, depression, PTSD, L leg weakness s/p Lumbar spine surgery admitted for alcohol intoxication,  withdrawal and acute on chronic hypoxic respi failure 2/2 PNA, mild copd exacerbation.       # Alcohol dependency, intoxication and withdrawal:   -MSSA with Diazepam- completed  - " mvi, thiamine, folic acid daily  - fall, seizure precautions.   - Psychiatry consult appreciated 3/19: 1.  Continue patient on one-to-one.  Patient was switched to [facility] protocol on Valium.  Continue that.  The patient will be continued on Suboxone.  This is what the patient is taking.  He can be continued on his medications once doses are known, which is Effexor 75 mg.   - D/W psych 3/22 for  Commitment and for hallucinations . Agree failed many times, has thrombocytopenia, and recurrent AFib with RVR alcohol related    A/W [facility] decision    # Acute on chronic hypoxic respi failure , h/o COPD: suspect R lower lobe PNA. Mild copd exacerbation.   Started empirically on iv vanc, zosyn, doxy for hcap.  03/18: Given pt stable hemodynamics. Lactic acidosis: resolved quickly suspect mostly related to alcohol use, dehydration. Hypoxia: improving, afebrile, low procal: dc iv Vanco and Zosyn, doxy. Completed 7 days levofloxacin 750 mg daily- 3/24.  - Ct bronchodilator nebs (03/18: switched to levalbuterol given afib w rvr), oxygen to maintain sat >89. IS.   - Outpatient Pulmonary FU. PFT  - COPD RT consult.   - Encourage tobacco cessation.       # BC + for diptheroids from 17th afternoon (F/U cultres from 17th night-NGTD)  * Appreciate ID review. D/C Levaquin. Will repeat culture monday    # Afib w rvr: at current NSR, HR controlled (Recurrent episode due to drinking- previous admission in 01/18 as well)- better after detox.    - ct metop to 100 XR daily.   - iv metop prn for hr>120  - Echo last admission 01/15 : EF: 60-65%.     #Hx of chronic pain syndrome. Hx of Opioid abuse   On Suboxone.   - continue PTA Suboxone for now.   - PTA gabapentin. robaxin.      # Depression, PTSD w/ hx of panic attacks:   - Continue PTA venlafaxine.  - on Diazepam per [facility]  - FU Psychiatry recs     # Tobacco abuse: nicotine patch panel.      # FEN: adat to regular. Ivf. Fu lytes.   PPx:   Lovenox SQ for dvt ppx  Full code.    Disposition Plan    Expected discharge in unclear days to home vs inpatient treatment once better from detox and place for commitment/ inpatient treatment     Entered: Tae Foster MD 03/24/2018, 8:42 AM       Tae Foster MD (Pager- [phone])   Internal Medicine/ Hospitalist

## 2018-03-24 NOTE — PLAN OF CARE
Problem: Patient Care Overview  Goal: Plan of Care/Patient Progress Review  Discharge Planner PT   Patient plan for discharge: home or inpatient treatment  Current status: PT 10A: Pt reported he had just walked, meal just arrived at scheduled visit time. Pt able to participate in shortened visit with encouragement. Pt SBA-JOHNATHAN ambulating 100+ ft, navigated 15 stairs with SEC, JOHNATHAN. Plan for formal balance assessment tomorrow. Pt feels near but slightly below functional baseline per discussion.    Barriers to return to prior living situation: no major barriers, defer to team per care needs  Recommendations for discharge: home w/assist vs defer to team if needing IP psych, chart indicates potential needs  Rationale for recommendations:        Entered by: Zuleyka Sotelo 03/24/2018 1:18 PM

## 2018-03-24 NOTE — PLAN OF CARE
Problem: Patient Care Overview  Goal: Plan of Care/Patient Progress Review  Outcome: Improving  Patient alert and oriented x 4. Up and ambulating SBA with a walker. Patient has baseline L foot drop/Numbness to LE, has AFO at bedside. MSSA 3. On 72 hour hold, civil commitment in process. Has chronic pain on his L shoulder. Prn Tylenol given. Patient tolerating regular diet, voiding well without difficulty. Able to make needs known to staff. Will continue with POC.

## 2018-03-24 NOTE — PLAN OF CARE
Problem: Patient Care Overview  Goal: Plan of Care/Patient Progress Review  Discharge Planner OT   Patient plan for discharge: Did not verbalize  Current status: Patient IND with bed mobility. Ambulated around room using SEC with mod I. Completed item retrieval/gathering activity with SBA, no LOB. Patient completed SBT (Short Blessed Test) with error score of 0 which indicates normal cognition. Patient alert and oriented and cogniton appears intact/baseline. Patient with no concerns regarding ADLs.    Barriers to return to prior living situation: lives alone, history of substance abuse  Recommendations for discharge: defer to medical team; per chart pursuing commitment for CD treatment.   Rationale for recommendations: patient has met inpatient OT goals, will discharge from OT.        Entered by: NORI BARBOSA 03/24/2018 3:36 PM     Occupational Therapy Discharge Summary    Reason for therapy discharge:    All goals and outcomes met, no further needs identified.    Progress towards therapy goal(s). See goals on Care Plan in [facility] electronic health record for goal details.  Goals met    Therapy recommendation(s):    No further therapy is recommended.

## 2018-03-24 NOTE — PLAN OF CARE
Problem: Alcohol Withdrawal Acute, Risk/Actual (Adult)  Goal: Signs and Symptoms of Listed Potential Problems Will be Absent, Minimized or Managed (Alcohol Withdrawal Acute, Risk/Actual)  Signs and symptoms of listed potential problems will be absent, minimized or managed by discharge/transition of care (reference Alcohol Withdrawal Acute, Risk/Actual (Adult) CPG).   Outcome: No Change  Offered/received atarax,tylenol prior to bed.Has been mostly in bed and have been sleeping comfortably after midnight.PIV line saline locked.Voiding spontaneously.Passing gas.MSSA scored 4-4.On 72 hour hold,sitter in room.

## 2018-03-25 ENCOUNTER — APPOINTMENT (OUTPATIENT)
Dept: PHYSICAL THERAPY | Facility: CLINIC | Age: 63
DRG: 896 | End: 2018-03-25
Payer: COMMERCIAL

## 2018-03-25 LAB
ALBUMIN SERPL-MCNC: 2.8 G/DL (ref 3.4–5)
ALP SERPL-CCNC: 108 U/L (ref 40–150)
ALT SERPL W P-5'-P-CCNC: 38 U/L (ref 0–70)
ANION GAP SERPL CALCULATED.3IONS-SCNC: 2 MMOL/L (ref 3–14)
AST SERPL W P-5'-P-CCNC: 19 U/L (ref 0–45)
BACTERIA SPEC CULT: ABNORMAL
BILIRUB SERPL-MCNC: 0.3 MG/DL (ref 0.2–1.3)
BUN SERPL-MCNC: 18 MG/DL (ref 7–30)
CALCIUM SERPL-MCNC: 8.5 MG/DL (ref 8.5–10.1)
CHLORIDE SERPL-SCNC: 107 MMOL/L (ref 94–109)
CO2 SERPL-SCNC: 31 MMOL/L (ref 20–32)
CREAT SERPL-MCNC: 0.72 MG/DL (ref 0.66–1.25)
ERYTHROCYTE [DISTWIDTH] IN BLOOD BY AUTOMATED COUNT: 14.8 % (ref 10–15)
GFR SERPL CREATININE-BSD FRML MDRD: >90 ML/MIN/1.7M2
GLUCOSE SERPL-MCNC: 97 MG/DL (ref 70–99)
HCT VFR BLD AUTO: 40.4 % (ref 40–53)
HGB BLD-MCNC: 13 G/DL (ref 13.3–17.7)
Lab: ABNORMAL
MAGNESIUM SERPL-MCNC: 2 MG/DL (ref 1.6–2.3)
MCH RBC QN AUTO: 29.7 PG (ref 26.5–33)
MCHC RBC AUTO-ENTMCNC: 32.2 G/DL (ref 31.5–36.5)
MCV RBC AUTO: 92 FL (ref 78–100)
PHOSPHATE SERPL-MCNC: 3.6 MG/DL (ref 2.5–4.5)
PLATELET # BLD AUTO: 162 10E9/L (ref 150–450)
POTASSIUM SERPL-SCNC: 4.2 MMOL/L (ref 3.4–5.3)
PROT SERPL-MCNC: 6.7 G/DL (ref 6.8–8.8)
RBC # BLD AUTO: 4.37 10E12/L (ref 4.4–5.9)
SODIUM SERPL-SCNC: 140 MMOL/L (ref 133–144)
SPECIMEN SOURCE: ABNORMAL
WBC # BLD AUTO: 9.3 10E9/L (ref 4–11)

## 2018-03-25 PROCEDURE — 25000132 ZZH RX MED GY IP 250 OP 250 PS 637: Performed by: INTERNAL MEDICINE

## 2018-03-25 PROCEDURE — 40000275 ZZH STATISTIC RCP TIME EA 10 MIN

## 2018-03-25 PROCEDURE — 94640 AIRWAY INHALATION TREATMENT: CPT | Mod: 76

## 2018-03-25 PROCEDURE — 12000008 ZZH R&B INTERMEDIATE UMMC

## 2018-03-25 PROCEDURE — 99232 SBSQ HOSP IP/OBS MODERATE 35: CPT | Performed by: INTERNAL MEDICINE

## 2018-03-25 PROCEDURE — 85027 COMPLETE CBC AUTOMATED: CPT | Performed by: INTERNAL MEDICINE

## 2018-03-25 PROCEDURE — 80053 COMPREHEN METABOLIC PANEL: CPT | Performed by: INTERNAL MEDICINE

## 2018-03-25 PROCEDURE — 83735 ASSAY OF MAGNESIUM: CPT | Performed by: INTERNAL MEDICINE

## 2018-03-25 PROCEDURE — 94640 AIRWAY INHALATION TREATMENT: CPT

## 2018-03-25 PROCEDURE — 97530 THERAPEUTIC ACTIVITIES: CPT | Mod: GP | Performed by: PHYSICAL THERAPIST

## 2018-03-25 PROCEDURE — 97116 GAIT TRAINING THERAPY: CPT | Mod: GP | Performed by: PHYSICAL THERAPIST

## 2018-03-25 PROCEDURE — 25000132 ZZH RX MED GY IP 250 OP 250 PS 637: Performed by: STUDENT IN AN ORGANIZED HEALTH CARE EDUCATION/TRAINING PROGRAM

## 2018-03-25 PROCEDURE — 25000125 ZZHC RX 250: Performed by: INTERNAL MEDICINE

## 2018-03-25 PROCEDURE — 36415 COLL VENOUS BLD VENIPUNCTURE: CPT | Performed by: INTERNAL MEDICINE

## 2018-03-25 PROCEDURE — 25000132 ZZH RX MED GY IP 250 OP 250 PS 637: Performed by: HOSPITALIST

## 2018-03-25 PROCEDURE — 25000128 H RX IP 250 OP 636: Performed by: INTERNAL MEDICINE

## 2018-03-25 PROCEDURE — 40000193 ZZH STATISTIC PT WARD VISIT: Performed by: PHYSICAL THERAPIST

## 2018-03-25 PROCEDURE — 84100 ASSAY OF PHOSPHORUS: CPT | Performed by: INTERNAL MEDICINE

## 2018-03-25 RX ADMIN — ACETAMINOPHEN 650 MG: 325 TABLET ORAL at 20:27

## 2018-03-25 RX ADMIN — GABAPENTIN 600 MG: 300 CAPSULE ORAL at 16:07

## 2018-03-25 RX ADMIN — BUPRENORPHINE AND NALOXONE 1 TABLET: 8; 2 TABLET SUBLINGUAL at 20:27

## 2018-03-25 RX ADMIN — FOLIC ACID 1 MG: 1 TABLET ORAL at 08:41

## 2018-03-25 RX ADMIN — LEVALBUTEROL HYDROCHLORIDE 0.63 MG: 0.63 SOLUTION RESPIRATORY (INHALATION) at 20:09

## 2018-03-25 RX ADMIN — VENLAFAXINE HYDROCHLORIDE 75 MG: 75 TABLET, EXTENDED RELEASE ORAL at 08:41

## 2018-03-25 RX ADMIN — ACETAMINOPHEN 650 MG: 325 TABLET ORAL at 08:41

## 2018-03-25 RX ADMIN — NICOTINE 1 PATCH: 21 PATCH, EXTENDED RELEASE TRANSDERMAL at 08:41

## 2018-03-25 RX ADMIN — GABAPENTIN 600 MG: 300 CAPSULE ORAL at 20:27

## 2018-03-25 RX ADMIN — HYDROXYZINE HYDROCHLORIDE 25 MG: 25 TABLET, FILM COATED ORAL at 08:41

## 2018-03-25 RX ADMIN — THERA TABS 1 TABLET: TAB at 08:41

## 2018-03-25 RX ADMIN — BUPRENORPHINE AND NALOXONE 1 TABLET: 8; 2 TABLET SUBLINGUAL at 08:41

## 2018-03-25 RX ADMIN — Medication 100 MG: at 08:41

## 2018-03-25 RX ADMIN — GABAPENTIN 600 MG: 300 CAPSULE ORAL at 08:41

## 2018-03-25 RX ADMIN — ENOXAPARIN SODIUM 40 MG: 40 INJECTION SUBCUTANEOUS at 22:06

## 2018-03-25 RX ADMIN — HYDROXYZINE HYDROCHLORIDE 25 MG: 25 TABLET, FILM COATED ORAL at 22:06

## 2018-03-25 RX ADMIN — Medication 1 MG: at 22:06

## 2018-03-25 RX ADMIN — LEVALBUTEROL HYDROCHLORIDE 0.63 MG: 0.63 SOLUTION RESPIRATORY (INHALATION) at 08:26

## 2018-03-25 RX ADMIN — LEVALBUTEROL HYDROCHLORIDE 0.63 MG: 0.63 SOLUTION RESPIRATORY (INHALATION) at 12:43

## 2018-03-25 RX ADMIN — ASPIRIN 81 MG: 81 TABLET, COATED ORAL at 08:41

## 2018-03-25 RX ADMIN — LEVALBUTEROL HYDROCHLORIDE 0.63 MG: 0.63 SOLUTION RESPIRATORY (INHALATION) at 16:51

## 2018-03-25 RX ADMIN — CYANOCOBALAMIN TAB 1000 MCG 1000 MCG: 1000 TAB at 08:41

## 2018-03-25 RX ADMIN — ACETAMINOPHEN 650 MG: 325 TABLET ORAL at 16:07

## 2018-03-25 RX ADMIN — GABAPENTIN 600 MG: 300 CAPSULE ORAL at 12:36

## 2018-03-25 RX ADMIN — ACETAMINOPHEN 650 MG: 325 TABLET ORAL at 12:36

## 2018-03-25 ASSESSMENT — ACTIVITIES OF DAILY LIVING (ADL)
ADLS_ACUITY_SCORE: 21
ADLS_ACUITY_SCORE: 21
ADLS_ACUITY_SCORE: 20
ADLS_ACUITY_SCORE: 21
ADLS_ACUITY_SCORE: 21
ADLS_ACUITY_SCORE: 20

## 2018-03-25 NOTE — PROGRESS NOTES
"Patient seen and examined with RN     S-no new concerns     4 point ROS done and neg unless mentioned     O-   /71 (BP Location: Right arm)  Pulse 79  Temp 97.8  F (36.6  C) (Oral)  Resp 16  Ht 1.803 m (5' 10.98\")  Wt 95.3 kg (210 lb)  SpO2 93%  BMI 29.3 kg/m2   CVS- I+II+ no m/r/g  RS- CTAB  Abdo- soft, no tenderness . No g/r/r   Ext- no edema    CNS- no gross focal signs. Oriented to person, place and time      LABS:   BMP    Recent Labs  Lab 03/25/18  0518 03/22/18  0711 03/21/18  0902 03/19/18  0501    140 136 139   POTASSIUM 4.2 4.2 4.2 3.6   CHLORIDE 107 106 102 106   RUSTY 8.5 8.5 8.8 8.0*   CO2 31 29 27 26   BUN 18 18 15 10   CR 0.72 0.73 0.61* 0.42*   GLC 97 111* 110* 122*     CBC    Recent Labs  Lab 03/25/18  0518 03/23/18  0619 03/22/18  0711 03/21/18  0902  03/19/18  0501   WBC 9.3  --  11.1* 8.5  --  9.5   RBC 4.37*  --  4.90 4.54  --  4.25*   HGB 13.0*  --  14.5 13.8  --  12.5*   HCT 40.4  --  44.1 40.4  --  38.1*   MCV 92  --  90 89  --  90   MCH 29.7  --  29.6 30.4  --  29.4   MCHC 32.2  --  32.9 34.2  --  32.8   RDW 14.8  --  14.6 14.3  --  14.5    132* 117* 101*  < > 90*   < > = values in this interval not displayed.  INRNo lab results found in last 7 days.  LFTs    Recent Labs  Lab 03/25/18 0518 03/19/18  0501   ALKPHOS 108 69   AST 19 27   ALT 38 20   BILITOTAL 0.3 1.1   PROTTOTAL 6.7* 6.5*   ALBUMIN 2.8* 2.9*     A/P:  Jorge Rosales is a 63 year old male with past medical history significant for alcohol dependency., recurrent admissions for alcohol related cause last 01/14--01/19/18 , smoker, COPD, chronic pain syndrome, opioid use disorder on suboxone maintenance, depression, PTSD, L leg weakness s/p Lumbar spine surgery admitted for alcohol intoxication,  withdrawal and acute on chronic hypoxic respi failure 2/2 PNA, mild copd exacerbation.       # Alcohol dependency, intoxication and withdrawal:   -MSSA with Diazepam- completed  - mvi, thiamine, folic acid " daily  - fall, seizure precautions.   - Psychiatry consult appreciated 3/19: 1.  Continue patient on one-to-one.  Patient was switched to [facility] protocol on Valium.  Continue that.  The patient will be continued on Suboxone.  This is what the patient is taking.  He can be continued on his medications once doses are known, which is Effexor 75 mg.   - D/W psych 3/22 for  Commitment and for hallucinations . Agree failed many times, has thrombocytopenia, and recurrent AFib with RVR alcohol related    A/W [facility] decision    # Acute on chronic hypoxic respi failure , h/o COPD: suspect R lower lobe PNA. Mild copd exacerbation.   Started empirically on iv vanc, zosyn, doxy for hcap.  03/18: Given pt stable hemodynamics. Lactic acidosis: resolved quickly suspect mostly related to alcohol use, dehydration. Hypoxia: improving, afebrile, low procal: dc iv Vanco and Zosyn, doxy. Completed 7 days levofloxacin 750 mg daily- 3/24.  - Ct bronchodilator nebs (03/18: switched to levalbuterol given afib w rvr), oxygen to maintain sat >89. IS.   - Outpatient Pulmonary FU. PFT  - COPD RT consult.   - Encourage tobacco cessation.       # BC + for diptheroids from 17th afternoon (F/U cultres from 17th night-NGTD)  * Appreciate ID review. D/C Levaquin. Will repeat culture monday    # Afib w rvr: at current NSR, HR controlled (Recurrent episode due to drinking- previous admission in 01/18 as well)- better after detox.    - ct metop to 100 XR daily.   - iv metop prn for hr>120  - Echo last admission 01/15 : EF: 60-65%.     #Hx of chronic pain syndrome. Hx of Opioid abuse   On Suboxone.   - continue PTA Suboxone for now.   - PTA gabapentin. robaxin.      # Depression, PTSD w/ hx of panic attacks:   - Continue PTA venlafaxine.  - on Diazepam per [facility]  - FU Psychiatry recs     # Tobacco abuse: nicotine patch panel.      # FEN: adat to regular. Ivf. Fu lytes.   PPx:   Lovenox SQ for dvt ppx  Full code.    Disposition Plan   Expected discharge in  unclear days to home vs inpatient treatment once better from detox and place for commitment/ inpatient treatment     Entered: Tae Foster MD 03/25/2018, 9:17 AM       Tae Foster MD (Pager- [phone])   Internal Medicine/ Hospitalist

## 2018-03-25 NOTE — PLAN OF CARE
Problem: Patient Care Overview  Goal: Plan of Care/Patient Progress Review  Discharge Planner PT   Patient plan for discharge: Pt did state discharge plan.   Current status: Pt demonstrated supine to/from sitting independently.  Pt is able to polina sock, AFO, and shoes independently when sitting at EOB.  Pt demonstrated sit to/from standing from EOB with SEC and mod I.  Pt ambulated 600' with SEC and mod I.  Pt did not demonstrate any LOB with gait challenges (head turns horizontal and vertical).  Pt is able to stand and reach outside of his base of support without any UE support and no LOB.  Pt is at baseline for mobility and does not have any further PT needs at this time.  Pt is always at risk of falls due to LE weakness/foot drop and numbness.    Barriers to return to prior living situation: No barriers to discharge  Recommendations for discharge: Per medical team   Rationale for recommendations: Pt is at baseline.        Entered by: Randa Pathak 03/25/2018 3:44 PM       Physical Therapy Discharge Summary    Reason for therapy discharge:    No further expectations of functional progress.    Progress towards therapy goal(s). See goals on Care Plan in [facility] electronic health record for goal details.  Goals met    Therapy recommendation(s):    No further therapy is recommended.

## 2018-03-25 NOTE — PLAN OF CARE
Problem: Patient Care Overview  Goal: Plan of Care/Patient Progress Review  Outcome: Improving  VSS, A&O in all spheres.  Patient reports baseline numbness in left food with footdrop.  Patient was not seen out of room this evening but is able to ambulate with walker to bathroom.  Patient continues on video patient monitoring and is in the process of civil commitment for chemical dependency.  MSSA score was 2.  Patient complains of chronic left shoulder pain.  Tylenol was given once.  Patient voiding well, tolerating regular diet and is able to make his needs known.  Will continue to monitor and follow plan of care.

## 2018-03-25 NOTE — PLAN OF CARE
Problem: Patient Care Overview  Goal: Plan of Care/Patient Progress Review  Outcome: No Change  Sleeping mostly through the night.Nicotine patch on R arm,intact.VSS.LS clear,satts on the 90-92 range at room air.Denies issues with BM/bladder.VPM in room  as pt initially resistive with 72 hour hold and a risk for elopement.Pt has been in room all through the night.No PIV line.

## 2018-03-25 NOTE — PLAN OF CARE
Problem: Patient Care Overview  Goal: Plan of Care/Patient Progress Review  Outcome: Improving  VSS, A&O in all spheres.  Patient complains of left shoulder pain which was well controlled with Tylenol every 4 hours.  Lungs sounds were clear.  Patient completed incentive spirometry with encouragement.  Patient walked in the halls once this evening.  Patient reports baseline numbness in left lower extremity and left upper extremity (secondary to a surgery, he reports).  Patient able to make needs known.  Continues on video patient monitoring.  Civil commitment in process.  Will continue to monitor.

## 2018-03-25 NOTE — PLAN OF CARE
Problem: Patient Care Overview  Goal: Plan of Care/Patient Progress Review  Outcome: Improving  Patient alert and oriented x 4. Up and ambulating SBA with a walker. Patient has baseline L foot drop/Numbness to LE, has AFO at bedside. MSSA 3. On 72 hour hold, civil commitment in process. Has chronic pain on his L shoulder. Prn Tylenol & warm packs given. Patient tolerating regular diet, voiding well without difficulty. Able to make needs known to staff. Will continue with POC.

## 2018-03-26 LAB — PLATELET # BLD AUTO: 178 10E9/L (ref 150–450)

## 2018-03-26 PROCEDURE — 94640 AIRWAY INHALATION TREATMENT: CPT | Mod: 76

## 2018-03-26 PROCEDURE — 36415 COLL VENOUS BLD VENIPUNCTURE: CPT | Performed by: HOSPITALIST

## 2018-03-26 PROCEDURE — 25000132 ZZH RX MED GY IP 250 OP 250 PS 637: Performed by: INTERNAL MEDICINE

## 2018-03-26 PROCEDURE — 99232 SBSQ HOSP IP/OBS MODERATE 35: CPT | Performed by: INTERNAL MEDICINE

## 2018-03-26 PROCEDURE — 99207 ZZC CDG-MDM COMPONENT: MEETS MODERATE - UP CODED: CPT | Performed by: INTERNAL MEDICINE

## 2018-03-26 PROCEDURE — 94640 AIRWAY INHALATION TREATMENT: CPT

## 2018-03-26 PROCEDURE — 87040 BLOOD CULTURE FOR BACTERIA: CPT | Performed by: INTERNAL MEDICINE

## 2018-03-26 PROCEDURE — 25000128 H RX IP 250 OP 636: Performed by: INTERNAL MEDICINE

## 2018-03-26 PROCEDURE — 25000132 ZZH RX MED GY IP 250 OP 250 PS 637: Performed by: HOSPITALIST

## 2018-03-26 PROCEDURE — 85049 AUTOMATED PLATELET COUNT: CPT | Performed by: HOSPITALIST

## 2018-03-26 PROCEDURE — 25000132 ZZH RX MED GY IP 250 OP 250 PS 637: Performed by: STUDENT IN AN ORGANIZED HEALTH CARE EDUCATION/TRAINING PROGRAM

## 2018-03-26 PROCEDURE — 25000125 ZZHC RX 250: Performed by: INTERNAL MEDICINE

## 2018-03-26 PROCEDURE — 40000275 ZZH STATISTIC RCP TIME EA 10 MIN

## 2018-03-26 PROCEDURE — 12000008 ZZH R&B INTERMEDIATE UMMC

## 2018-03-26 RX ADMIN — ASPIRIN 81 MG: 81 TABLET, COATED ORAL at 08:12

## 2018-03-26 RX ADMIN — CYANOCOBALAMIN TAB 1000 MCG 1000 MCG: 1000 TAB at 08:13

## 2018-03-26 RX ADMIN — NICOTINE 1 PATCH: 21 PATCH, EXTENDED RELEASE TRANSDERMAL at 08:13

## 2018-03-26 RX ADMIN — FOLIC ACID 1 MG: 1 TABLET ORAL at 08:12

## 2018-03-26 RX ADMIN — BUPRENORPHINE AND NALOXONE 1 TABLET: 8; 2 TABLET SUBLINGUAL at 08:12

## 2018-03-26 RX ADMIN — GABAPENTIN 600 MG: 300 CAPSULE ORAL at 08:13

## 2018-03-26 RX ADMIN — Medication 100 MG: at 08:12

## 2018-03-26 RX ADMIN — ENOXAPARIN SODIUM 40 MG: 40 INJECTION SUBCUTANEOUS at 22:03

## 2018-03-26 RX ADMIN — LEVALBUTEROL HYDROCHLORIDE 0.63 MG: 0.63 SOLUTION RESPIRATORY (INHALATION) at 07:41

## 2018-03-26 RX ADMIN — LEVALBUTEROL HYDROCHLORIDE 0.63 MG: 0.63 SOLUTION RESPIRATORY (INHALATION) at 20:19

## 2018-03-26 RX ADMIN — GABAPENTIN 600 MG: 300 CAPSULE ORAL at 16:49

## 2018-03-26 RX ADMIN — LEVALBUTEROL HYDROCHLORIDE 0.63 MG: 0.63 SOLUTION RESPIRATORY (INHALATION) at 12:14

## 2018-03-26 RX ADMIN — THERA TABS 1 TABLET: TAB at 08:13

## 2018-03-26 RX ADMIN — ACETAMINOPHEN 650 MG: 325 TABLET ORAL at 22:03

## 2018-03-26 RX ADMIN — ACETAMINOPHEN 650 MG: 325 TABLET ORAL at 09:39

## 2018-03-26 RX ADMIN — Medication 1 MG: at 22:03

## 2018-03-26 RX ADMIN — GABAPENTIN 600 MG: 300 CAPSULE ORAL at 20:01

## 2018-03-26 RX ADMIN — ACETAMINOPHEN 650 MG: 325 TABLET ORAL at 16:49

## 2018-03-26 RX ADMIN — GABAPENTIN 600 MG: 300 CAPSULE ORAL at 11:14

## 2018-03-26 RX ADMIN — HYDROXYZINE HYDROCHLORIDE 25 MG: 25 TABLET, FILM COATED ORAL at 11:15

## 2018-03-26 RX ADMIN — VENLAFAXINE HYDROCHLORIDE 75 MG: 75 TABLET, EXTENDED RELEASE ORAL at 08:12

## 2018-03-26 RX ADMIN — LEVALBUTEROL HYDROCHLORIDE 0.63 MG: 0.63 SOLUTION RESPIRATORY (INHALATION) at 16:25

## 2018-03-26 RX ADMIN — BUPRENORPHINE AND NALOXONE 1 TABLET: 8; 2 TABLET SUBLINGUAL at 20:01

## 2018-03-26 ASSESSMENT — ACTIVITIES OF DAILY LIVING (ADL)
ADLS_ACUITY_SCORE: 20

## 2018-03-26 NOTE — PROGRESS NOTES
"SPIRITUAL HEALTH SERVICES  SPIRITUAL ASSESSMENT Progress Note  [facility] ([facility]) 10A     REFERRAL SOURCE: Initial visit/length of stay    Provided an introduction of [facility] services to Randy.  Randy said he \"didn't do a good job of dealing with the grief of his wife and ex wife dying\"    Randy also talked about his daughter who is a quadriplegic and a son who has down syndrome.     He used the phrase \"a lot on my plate\" several times during our visit.  We ended our visit because he had a friend come to visit him.    PLAN: [facility] remains available upon request    Christal Reinoso   Intern  Pager [phone]    "

## 2018-03-26 NOTE — PLAN OF CARE
Problem: Chronic Obstructive Pulmonary Disease (Adult)  Goal: Signs and Symptoms of Listed Potential Problems Will be Absent, Minimized or Managed (Chronic Obstructive Pulmonary Disease)  Signs and symptoms of listed potential problems will be absent, minimized or managed by discharge/transition of care (reference Chronic Obstructive Pulmonary Disease (Adult) CPG).   Outcome: No Change        VS:   VSS.    Output:   Pt uses bathroom independently.   Activity:   Pt is a stand by assist and uses a cane.   Skin: Pt has intact skin.   Pain:   Pt has shoulder pain on L side    Neuro/CMS:   Pt is A&Ox4. Pt has long term numbness to both LE, worse on L side. PT has significant pain to L shoulder.   Dressing(s):   None   Diet:   Regular   LDA:   N/A   Equipment:   Cane and video monitor   Plan:   Clear medically and send to CD Tx.    Additional Info:   Pt's 72 hour hold ends on 3- at 1230 and he states he will leave once his 72 hr is over. Pt states he would still go to AA after he leaves and he has a sponsor.

## 2018-03-26 NOTE — PROGRESS NOTES
"Pt with alcohol withdrawal.  Had one blood culture with P acnes (first culture and only culture before antibiotics).  No cardiac murmur.   Pt received combination antibiotics and then finishe out course of levaquin for possible pneumonia.  Now off antibiotics.   ROS: No itching, No diarrhea, No nausea.  EXAM: /72 (BP Location: Left arm)  Pulse 79  Temp 98.8  F (37.1  C) (Oral)  Resp 17  Ht 1.803 m (5' 10.98\")  Wt 95.3 kg (210 lb)  SpO2 91%  BMI 29.3 kg/m2  No cardiac murmur.   Mental status significantly cleared. No cardiac murmur.    Lab:   CRP Inflammation   Date/Time Value Ref Range Status   03/22/2018 07:11 AM 41.8 (H) 0.0 - 8.0 mg/L Final   03/18/2018 12:15 .0 (H) 0.0 - 8.0 mg/L Final     IMP: Pt appears to be making good progress.   Plan: Recommend follow up in ID clinic one to two weeks post hospital for eval to prove no evidence endocarditis.    [facility]   Also wishes to resume AA meetings.    "

## 2018-03-26 NOTE — PROGRESS NOTES
"Patient seen and examined with RN     S-no new concerns     4 point ROS done and neg unless mentioned     O-   /70 (BP Location: Left arm)  Pulse 86  Temp 98.4  F (36.9  C) (Oral)  Resp 16  Ht 1.803 m (5' 10.98\")  Wt 95.3 kg (210 lb)  SpO2 90%  BMI 29.3 kg/m2   CVS- I+II+ no m/r/g  RS- CTAB  Abdo- soft, no tenderness . No g/r/r   Ext- no edema    CNS- no gross focal signs. Oriented to person, place and time      LABS:   BMP    Recent Labs  Lab 03/25/18  0518 03/22/18  0711 03/21/18  0902    140 136   POTASSIUM 4.2 4.2 4.2   CHLORIDE 107 106 102   RUSTY 8.5 8.5 8.8   CO2 31 29 27   BUN 18 18 15   CR 0.72 0.73 0.61*   GLC 97 111* 110*     CBC    Recent Labs  Lab 03/26/18  0559 03/25/18  0518 03/23/18  0619 03/22/18  0711 03/21/18  0902   WBC  --  9.3  --  11.1* 8.5   RBC  --  4.37*  --  4.90 4.54   HGB  --  13.0*  --  14.5 13.8   HCT  --  40.4  --  44.1 40.4   MCV  --  92  --  90 89   MCH  --  29.7  --  29.6 30.4   MCHC  --  32.2  --  32.9 34.2   RDW  --  14.8  --  14.6 14.3    162 132* 117* 101*     INRNo lab results found in last 7 days.  LFTs    Recent Labs  Lab 03/25/18  0518   ALKPHOS 108   AST 19   ALT 38   BILITOTAL 0.3   PROTTOTAL 6.7*   ALBUMIN 2.8*     A/P:  Jorge Rosales is a 63 year old male with past medical history significant for alcohol dependency., recurrent admissions for alcohol related cause last 01/14--01/19/18 , smoker, COPD, chronic pain syndrome, opioid use disorder on suboxone maintenance, depression, PTSD, L leg weakness s/p Lumbar spine surgery admitted for alcohol intoxication,  withdrawal and acute on chronic hypoxic respi failure 2/2 PNA, mild copd exacerbation.       # Alcohol dependency, intoxication and withdrawal:   -MSSA with Diazepam- completed  - mvi, thiamine, folic acid daily  - fall, seizure precautions.   - Psychiatry consult appreciated 3/19: 1.  Continue patient on one-to-one.  Patient was switched to MSSA protocol on Valium.  Continue that.  The " patient will be continued on Suboxone.  This is what the patient is taking.  He can be continued on his medications once doses are known, which is Effexor 75 mg.   - D/W psych 3/22 for  Commitment and for hallucinations . Agree failed many times, has thrombocytopenia, and recurrent AFib with RVR alcohol related    [place] County decision    # Acute on chronic hypoxic respi failure , h/o COPD: suspect R lower lobe PNA. Mild copd exacerbation.   Started empirically on iv vanc, zosyn, doxy for hcap.  03/18: Given pt stable hemodynamics. Lactic acidosis: resolved quickly suspect mostly related to alcohol use, dehydration. Hypoxia: improving, afebrile, low procal: dc iv Vanco and Zosyn, doxy. Completed 7 days levofloxacin 750 mg daily- 3/24.  - Ct bronchodilator nebs (03/18: switched to levalbuterol given afib w rvr), oxygen to maintain sat >89. IS.   - Outpatient Pulmonary FU. PFT  - COPD RT consult.   - Encourage tobacco cessation.       # BC + for diptheroids from 17th afternoon (F/U cultres from 17th night-NGTD)  * Appreciate ID review. D/C Levaquin.  repeat culture monday    # Afib w rvr: at current NSR, HR controlled (Recurrent episode due to drinking- previous admission in 01/18 as well)- better after detox.    - ct metop to 100 XR daily.   - iv metop prn for hr>120  - Echo last admission 01/15 : EF: 60-65%.     #Hx of chronic pain syndrome. Hx of Opioid abuse   On Suboxone.   - continue PTA Suboxone for now.   - PTA gabapentin. robaxin.      # Depression, PTSD w/ hx of panic attacks:   - Continue PTA venlafaxine.  - [facility] D[facility]  - FU Psychiatry recs     # Tobacco abuse: nicotine patch panel.      # FEN: adat to regular. Ivf. Fu lytes.   PPx:   Lovenox SQ for dvt ppx  Full code.    Disposition Plan   Expected discharge in unclear days to home vs inpatient treatment once better from detox and place for commitment/ inpatient treatment     Entered: Tae Foster MD 03/26/2018, 3:12 PM       Tae Foster MD  (Pager- [phone])   Internal Medicine/ Hospitalist

## 2018-03-26 NOTE — PLAN OF CARE
Problem: Patient Care Overview  Goal: Plan of Care/Patient Progress Review  Outcome: No Change        VS:   Stable.LS clear,placed on oxygen 1L/NC to keep satts >88%.Was desatting earlier to lowest at 85% room air.   Output:   Voiding well.Passing gas,had BM yesterday.   Activity:   Steady.Up ad hugo.   Skin: Intact.   Pain:   L shoulder discomfort,tylenol given.   Neuro/CMS:   Baseline numbness to BLE.   Dressing(s):   none   Diet:   regular   LDA:   none   Equipment:   Pulse oximetry,VPM   Plan:   On 72 hour hold as initially resistive on pursuing for civil commitment.Off sitter,currently on VPM.Pt has been cooperative,no attempts leaving unit.   Additional Info:   Nicotine patch L arm.Sleeping well after melatonin and atarax taken.

## 2018-03-26 NOTE — PLAN OF CARE
Problem: Patient Care Overview  Goal: Individualization & Mutuality  Outcome: Improving  Patient A&O x4, lungs sound clear, Bowel sound active and passing gas, Denied CP, lightheadedness, dizziness, and SOB, numbness in lower extremities as a baseline for patient, drinking well and voiding spontaneously without difficulties, pain tolerable in shoulder and taking tylenol for pain, VPM on, MSSA score was 4,  incentive spirometer encouraged, heels elevated off bed, demonstrates the ability to use call light appropriately, will continue to monitor patient.

## 2018-03-27 LAB
BACTERIA SPEC CULT: NO GROWTH
BACTERIA SPEC CULT: NO GROWTH
Lab: NORMAL
Lab: NORMAL
SPECIMEN SOURCE: NORMAL
SPECIMEN SOURCE: NORMAL

## 2018-03-27 PROCEDURE — 94640 AIRWAY INHALATION TREATMENT: CPT

## 2018-03-27 PROCEDURE — 12000008 ZZH R&B INTERMEDIATE UMMC

## 2018-03-27 PROCEDURE — 25000132 ZZH RX MED GY IP 250 OP 250 PS 637: Performed by: INTERNAL MEDICINE

## 2018-03-27 PROCEDURE — 25000132 ZZH RX MED GY IP 250 OP 250 PS 637: Performed by: STUDENT IN AN ORGANIZED HEALTH CARE EDUCATION/TRAINING PROGRAM

## 2018-03-27 PROCEDURE — 94640 AIRWAY INHALATION TREATMENT: CPT | Mod: 76

## 2018-03-27 PROCEDURE — 99232 SBSQ HOSP IP/OBS MODERATE 35: CPT | Performed by: INTERNAL MEDICINE

## 2018-03-27 PROCEDURE — 25000132 ZZH RX MED GY IP 250 OP 250 PS 637: Performed by: HOSPITALIST

## 2018-03-27 PROCEDURE — 25000125 ZZHC RX 250: Performed by: INTERNAL MEDICINE

## 2018-03-27 PROCEDURE — 40000275 ZZH STATISTIC RCP TIME EA 10 MIN

## 2018-03-27 PROCEDURE — 25000128 H RX IP 250 OP 636: Performed by: INTERNAL MEDICINE

## 2018-03-27 RX ADMIN — ACETAMINOPHEN 650 MG: 325 TABLET ORAL at 20:25

## 2018-03-27 RX ADMIN — GABAPENTIN 600 MG: 300 CAPSULE ORAL at 16:17

## 2018-03-27 RX ADMIN — HYDROXYZINE HYDROCHLORIDE 25 MG: 25 TABLET, FILM COATED ORAL at 22:27

## 2018-03-27 RX ADMIN — THERA TABS 1 TABLET: TAB at 08:19

## 2018-03-27 RX ADMIN — Medication 100 MG: at 08:19

## 2018-03-27 RX ADMIN — GABAPENTIN 600 MG: 300 CAPSULE ORAL at 20:25

## 2018-03-27 RX ADMIN — GABAPENTIN 600 MG: 300 CAPSULE ORAL at 12:04

## 2018-03-27 RX ADMIN — ACETAMINOPHEN 650 MG: 325 TABLET ORAL at 10:51

## 2018-03-27 RX ADMIN — LEVALBUTEROL HYDROCHLORIDE 0.63 MG: 0.63 SOLUTION RESPIRATORY (INHALATION) at 19:53

## 2018-03-27 RX ADMIN — ACETAMINOPHEN 650 MG: 325 TABLET ORAL at 16:17

## 2018-03-27 RX ADMIN — GABAPENTIN 600 MG: 300 CAPSULE ORAL at 08:19

## 2018-03-27 RX ADMIN — LEVALBUTEROL HYDROCHLORIDE 0.63 MG: 0.63 SOLUTION RESPIRATORY (INHALATION) at 07:59

## 2018-03-27 RX ADMIN — BUPRENORPHINE AND NALOXONE 1 TABLET: 8; 2 TABLET SUBLINGUAL at 08:24

## 2018-03-27 RX ADMIN — LEVALBUTEROL HYDROCHLORIDE 0.63 MG: 0.63 SOLUTION RESPIRATORY (INHALATION) at 12:23

## 2018-03-27 RX ADMIN — VENLAFAXINE HYDROCHLORIDE 75 MG: 75 TABLET, EXTENDED RELEASE ORAL at 08:16

## 2018-03-27 RX ADMIN — ASPIRIN 81 MG: 81 TABLET, COATED ORAL at 08:17

## 2018-03-27 RX ADMIN — ENOXAPARIN SODIUM 40 MG: 40 INJECTION SUBCUTANEOUS at 22:26

## 2018-03-27 RX ADMIN — FOLIC ACID 1 MG: 1 TABLET ORAL at 08:17

## 2018-03-27 RX ADMIN — NICOTINE 1 PATCH: 21 PATCH, EXTENDED RELEASE TRANSDERMAL at 08:16

## 2018-03-27 RX ADMIN — Medication 1 MG: at 22:26

## 2018-03-27 RX ADMIN — CYANOCOBALAMIN TAB 1000 MCG 1000 MCG: 1000 TAB at 08:16

## 2018-03-27 RX ADMIN — BUPRENORPHINE AND NALOXONE 1 TABLET: 8; 2 TABLET SUBLINGUAL at 20:25

## 2018-03-27 RX ADMIN — LEVALBUTEROL HYDROCHLORIDE 0.63 MG: 0.63 SOLUTION RESPIRATORY (INHALATION) at 16:11

## 2018-03-27 ASSESSMENT — ACTIVITIES OF DAILY LIVING (ADL)
ADLS_ACUITY_SCORE: 20

## 2018-03-27 NOTE — PLAN OF CARE
Problem: Patient Care Overview  Goal: Plan of Care/Patient Progress Review  Outcome: Therapy, progress toward functional goals as expected  Start of shift 15:30 pt meeting with [facility]  concerning commitment. 18:30 pt eating, calm, having pleasant conversation with his roommate. Doing OK.

## 2018-03-27 NOTE — PROGRESS NOTES
"CLINICAL NUTRITION SERVICES - ASSESSMENT NOTE     Nutrition Prescription    RECOMMENDATIONS FOR MDs/PROVIDERS TO ORDER:  None today    Malnutrition Status:    Patient does not meet two of the above criteria necessary for diagnosing malnutrition    Recommendations already ordered by Registered Dietitian (RD):  None today    Future/Additional Recommendations:  Anticipate gradual wt loss per pt preference to <200 lbs.      REASON FOR ASSESSMENT  Jorge Rosales is a/an 63 year old male assessed by the dietitian for [facility]    NUTRITION HISTORY  - Pt reports eating 1 meal/day PTA d/t challenges of cooking/eating alone. Pt reports he usually only drinks coffee in the morning and will likely have a frozen meal later in the day. He will sometimes cook a meal from scratch of fish or salmon but notes its expensive. He will also occasionally cook a meal and share with his neighbor and vise versa.      CURRENT NUTRITION ORDERS  Diet: Regular  Intake/Tolerance: pt reports eating % of meals during this admission. He will eat less when the servings are larger, other times he will eat his entire meal. On average, pt is ordering 2540 kcal and 114 g protein daily per [employer]. This is likely meeting 100% minimum energy and protein needs.    LABS  Labs reviewed    MEDICATIONS  Medications reviewed  - vitamin B12  - folic acid  - theravit-m  - thiamine    ANTHROPOMETRICS  Height: 180.3 cm (5' 10.984\")  Most Recent Weight: 93.4 kg (206 lb)    IBW: 78.2 kg (119% IBW)  BMI: Overweight BMI 25-29.9  Weight History: wt appears to fluctuate ~205 lbs over the last ~4.5 months. Pt declined any wt changes and has a desire to lose wt. He would like to weigh <200 lbs with his goal wt being 180 lbs.   Wt Readings from Last 6 Encounters:   03/27/18 93.4 kg (206 lb)   01/16/18 96.9 kg (213 lb 11.2 oz)   12/15/17 94 kg (207 lb 4.8 oz)   12/04/17 95.8 kg (211 lb 3.2 oz)   11/13/17 89.8 kg (198 lb)   09/27/11 99.9 kg (220 lb 3.2 oz)     Dosing " Weight: 93 kg - most recent wt    ASSESSED NUTRITION NEEDS  Estimated Energy Needs: 7823-2562 kcals/day (25 - 30 kcals/kg)  Justification: Maintenance  Estimated Protein Needs:  grams protein/day (1 - 1.2 grams of pro/kg)  Justification: Maintenance  Estimated Fluid Needs: 1 mL/kcal   Justification: Per provider pending fluid status    PHYSICAL FINDINGS  See malnutrition section below.     MALNUTRITION  % Intake: No decreased intake noted  % Weight Loss: Weight loss does not meet criteria  Subcutaneous Fat Loss: None observed  Muscle Loss: None observed  Fluid Accumulation/Edema: None noted  Malnutrition Diagnosis: Patient does not meet two of the above criteria necessary for diagnosing malnutrition    NUTRITION DIAGNOSIS  Predicted protein-energy intake related to variable appetite as evidenced by pt reliant on PO intakes to meet 100% of nutritional needs with potential for variation        INTERVENTIONS  Implementation  Nutrition Education: encouraged adequate PO intakes with prolonged hospitalization. Pt reports he rather enjoys the food and liked talking with his roommate who D/C'ed today. He also is hoping to lose some weight. Encouraged adequate PO and fluid intakes.     Goals  Patient to consume % of nutritionally adequate meal trays TID, or the equivalent with supplements/snacks.     Monitoring/Evaluation  Progress toward goals will be monitored and evaluated per protocol.      Eugenie Contreras RD, LD  Unit Pager: [phone]

## 2018-03-27 NOTE — PROGRESS NOTES
Social Work Services Progress Note    Hospital Day: 11  Date of Initial Social Work Evaluation:  3.20.18  Collaborated with:  Pt, Migel Ruvalcaba  (Janine Aguirre [phone])    Data: SW involved for commitment process.     Intervention:  Pt met with Migel SIEGEL today.  This writer stopped by to check in. Pt has court scheduled for Thursday 3/29.     Assessment:  Pt interested in inpatient CD treatment. Migel SIEGEL to assist with placement pending court Thursday.     Plan:    Anticipated Disposition:  Inpatient CD treatment     Barriers to d/c plan:  Unknown at this time    Follow Up:  Dispo plan will proceed pending court on 3/29 and recommendations from the county.      LIGIA Lucia, BALDEMAR Cartwright   3/27/2018

## 2018-03-27 NOTE — PLAN OF CARE
Problem: Patient Care Overview  Goal: Plan of Care/Patient Progress Review  A&Ox4. VSS on room air. VPM on and patient is calling appropriately. Resting in bed comfortably and had a visitor this evening. MSSA score of 3. Reporting pain in right shoulder, patient reports that it is related to a fall that occurred at the grocery store. PRN tylenol given with a dose of melatonin at bedtime. Ambulating SBA with a cane. 72 hour hold ends 3/27 at 1230PM, patient is expressing the desire to leave once his hold is up. Able to make needs known. Continue with plan of care.

## 2018-03-27 NOTE — PLAN OF CARE
Problem: Alcohol Withdrawal Acute, Risk/Actual (Adult)  Goal: Signs and Symptoms of Listed Potential Problems Will be Absent, Minimized or Managed (Alcohol Withdrawal Acute, Risk/Actual)  Signs and symptoms of listed potential problems will be absent, minimized or managed by discharge/transition of care (reference Alcohol Withdrawal Acute, Risk/Actual (Adult) CPG).   Outcome: Improving  Pt is alert and oriented X4. Vitals are stable and within normal limits. Lung sounds clear to ascultation. Bowel sounds normoactive - passing flatus. Baseline numbness and tingling in bilateral lower extremities. Appears to be resting comfortably overnight. Voiding spontaneously without difficulty. VPM in use - on 72 hr hold (expires 3/27 12:30pm) Able to make needs known. Will continue with plan of care.

## 2018-03-27 NOTE — PROGRESS NOTES
"Patient seen and examined with RN     S-no new concerns       Want to go home today       Understands that he need to wait for court decision     4 point ROS done and neg unless mentioned     O-   /68 (BP Location: Left arm)  Pulse 91  Temp 97.2  F (36.2  C) (Axillary)  Resp 16  Ht 1.803 m (5' 10.98\")  Wt 95.3 kg (210 lb)  SpO2 91%  BMI 29.3 kg/m2   CVS- I+II+ no m/r/g  RS- CTAB  Abdo- soft, no tenderness . No g/r/r   Ext- no edema    CNS- no gross focal signs. Oriented to person, place and time      LABS:   BMP    Recent Labs  Lab 03/25/18  0518 03/22/18  0711 03/21/18  0902    140 136   POTASSIUM 4.2 4.2 4.2   CHLORIDE 107 106 102   RUSTY 8.5 8.5 8.8   CO2 31 29 27   BUN 18 18 15   CR 0.72 0.73 0.61*   GLC 97 111* 110*     CBC    Recent Labs  Lab 03/26/18  0559 03/25/18  0518 03/23/18  0619 03/22/18  0711 03/21/18  0902   WBC  --  9.3  --  11.1* 8.5   RBC  --  4.37*  --  4.90 4.54   HGB  --  13.0*  --  14.5 13.8   HCT  --  40.4  --  44.1 40.4   MCV  --  92  --  90 89   MCH  --  29.7  --  29.6 30.4   MCHC  --  32.2  --  32.9 34.2   RDW  --  14.8  --  14.6 14.3    162 132* 117* 101*     INRNo lab results found in last 7 days.  LFTs    Recent Labs  Lab 03/25/18  0518   ALKPHOS 108   AST 19   ALT 38   BILITOTAL 0.3   PROTTOTAL 6.7*   ALBUMIN 2.8*     A/P:  Jorge Rosales is a 63 year old male with past medical history significant for alcohol dependency., recurrent admissions for alcohol related cause last 01/14--01/19/18 , smoker, COPD, chronic pain syndrome, opioid use disorder on suboxone maintenance, depression, PTSD, L leg weakness s/p Lumbar spine surgery admitted for alcohol intoxication,  withdrawal and acute on chronic hypoxic respi failure 2/2 PNA, mild copd exacerbation.       # Alcohol dependency, intoxication and withdrawal:   -MSSA with Diazepam- completed  - mvi, thiamine, folic acid daily  - fall, seizure precautions.   - Psychiatry consult appreciated 3/19: 1.  Continue " patient on one-to-one.  Patient was switched to [facility] protocol on Valium.  Continue that.  The patient will be continued on Suboxone.  This is what the patient is taking.  He can be continued on his medications once doses are known, which is Effexor 75 mg.   - D/W psych 3/22 for  Commitment and for hallucinations . Agree failed many times, has thrombocytopenia, and recurrent AFib with RVR alcohol related     County upheld the commitment on 3/27     # Acute on chronic hypoxic respi failure , h/o COPD: suspect R lower lobe PNA. Mild copd exacerbation.   Started empirically on iv vanc, zosyn, doxy for hcap.  03/18: Given pt stable hemodynamics. Lactic acidosis: resolved quickly suspect mostly related to alcohol use, dehydration. Hypoxia: improving, afebrile, low procal: dc iv Vanco and Zosyn, doxy. Completed 7 days levofloxacin 750 mg daily- 3/24.  - Ct bronchodilator nebs (03/18: switched to levalbuterol given afib w rvr), oxygen to maintain sat >89. IS.   - Outpatient Pulmonary FU. PFT  - COPD RT consult.   - Encourage tobacco cessation.       # BC + for diptheroids from 17th afternoon (F/U cultres from 17th night-NGTD)  * Appreciate ID review. D/C Levaquin.  repeat culture Monday- negative so far     # Afib w rvr: at current NSR, HR controlled (Recurrent episode due to drinking- previous admission in 01/18 as well)- better after detox.    - ct metop to 100 XR daily.   - iv metop prn for hr>120  - Echo last admission 01/15 : EF: 60-65%.     #Hx of chronic pain syndrome. Hx of Opioid abuse   On Suboxone.   - continue PTA Suboxone for now.   - PTA gabapentin. robaxin.      # Depression, PTSD w/ hx of panic attacks:   - Continue PTA venlafaxine.  - on Diazepam per [facility]  - FU Psychiatry recs     # Tobacco abuse: nicotine patch panel.      # FEN: adat to regular. Ivf. Fu lytes.   PPx:   Lovenox SQ for dvt ppx  Full code.    Disposition Plan    Expected discharge in unclear days to inpatient treatment bed when available     Entered: Dejan Swann 03/27/2018, 11:33 AM       Dejan Swann MD [id number]   Internal Medicine/ Hospitalist

## 2018-03-27 NOTE — PLAN OF CARE
Problem: Patient Care Overview  Goal: Plan of Care/Patient Progress Review  Outcome: Improving  Patient is A&O x 3.VS'S, Denies SOB or CP. MSSA was 4, C/o shoulder pain, Tylenol 650 mg given with relief. Tolerating intakes. On VPM , on court hOLD. Patient states he showered and shaved independently. Indep in room with a cane, Will continue to monitor.

## 2018-03-28 PROCEDURE — 94640 AIRWAY INHALATION TREATMENT: CPT | Mod: 76

## 2018-03-28 PROCEDURE — 25000132 ZZH RX MED GY IP 250 OP 250 PS 637: Performed by: INTERNAL MEDICINE

## 2018-03-28 PROCEDURE — 25000132 ZZH RX MED GY IP 250 OP 250 PS 637: Performed by: STUDENT IN AN ORGANIZED HEALTH CARE EDUCATION/TRAINING PROGRAM

## 2018-03-28 PROCEDURE — 25000132 ZZH RX MED GY IP 250 OP 250 PS 637: Performed by: HOSPITALIST

## 2018-03-28 PROCEDURE — 40000275 ZZH STATISTIC RCP TIME EA 10 MIN

## 2018-03-28 PROCEDURE — 12000008 ZZH R&B INTERMEDIATE UMMC

## 2018-03-28 PROCEDURE — 94640 AIRWAY INHALATION TREATMENT: CPT

## 2018-03-28 PROCEDURE — 99232 SBSQ HOSP IP/OBS MODERATE 35: CPT | Performed by: INTERNAL MEDICINE

## 2018-03-28 PROCEDURE — 25000128 H RX IP 250 OP 636: Performed by: INTERNAL MEDICINE

## 2018-03-28 PROCEDURE — 25000125 ZZHC RX 250: Performed by: INTERNAL MEDICINE

## 2018-03-28 RX ADMIN — LEVALBUTEROL HYDROCHLORIDE 0.63 MG: 0.63 SOLUTION RESPIRATORY (INHALATION) at 20:01

## 2018-03-28 RX ADMIN — LEVALBUTEROL HYDROCHLORIDE 0.63 MG: 0.63 SOLUTION RESPIRATORY (INHALATION) at 12:00

## 2018-03-28 RX ADMIN — ACETAMINOPHEN 650 MG: 325 TABLET ORAL at 14:21

## 2018-03-28 RX ADMIN — ENOXAPARIN SODIUM 40 MG: 40 INJECTION SUBCUTANEOUS at 22:27

## 2018-03-28 RX ADMIN — BUPRENORPHINE AND NALOXONE 1 TABLET: 8; 2 TABLET SUBLINGUAL at 20:10

## 2018-03-28 RX ADMIN — THERA TABS 1 TABLET: TAB at 08:43

## 2018-03-28 RX ADMIN — NICOTINE 1 PATCH: 21 PATCH, EXTENDED RELEASE TRANSDERMAL at 08:43

## 2018-03-28 RX ADMIN — CYANOCOBALAMIN TAB 1000 MCG 1000 MCG: 1000 TAB at 08:43

## 2018-03-28 RX ADMIN — LEVALBUTEROL HYDROCHLORIDE 0.63 MG: 0.63 SOLUTION RESPIRATORY (INHALATION) at 08:17

## 2018-03-28 RX ADMIN — VENLAFAXINE HYDROCHLORIDE 75 MG: 75 TABLET, EXTENDED RELEASE ORAL at 08:43

## 2018-03-28 RX ADMIN — FOLIC ACID 1 MG: 1 TABLET ORAL at 08:43

## 2018-03-28 RX ADMIN — Medication 100 MG: at 08:43

## 2018-03-28 RX ADMIN — ACETAMINOPHEN 650 MG: 325 TABLET ORAL at 08:43

## 2018-03-28 RX ADMIN — GABAPENTIN 600 MG: 300 CAPSULE ORAL at 08:43

## 2018-03-28 RX ADMIN — Medication 1 MG: at 22:33

## 2018-03-28 RX ADMIN — BUPRENORPHINE AND NALOXONE 1 TABLET: 8; 2 TABLET SUBLINGUAL at 08:44

## 2018-03-28 RX ADMIN — GABAPENTIN 600 MG: 300 CAPSULE ORAL at 20:10

## 2018-03-28 RX ADMIN — ACETAMINOPHEN 650 MG: 325 TABLET ORAL at 18:01

## 2018-03-28 RX ADMIN — HYDROXYZINE HYDROCHLORIDE 25 MG: 25 TABLET, FILM COATED ORAL at 18:01

## 2018-03-28 RX ADMIN — ASPIRIN 81 MG: 81 TABLET, COATED ORAL at 08:43

## 2018-03-28 RX ADMIN — LEVALBUTEROL HYDROCHLORIDE 0.63 MG: 0.63 SOLUTION RESPIRATORY (INHALATION) at 16:07

## 2018-03-28 RX ADMIN — GABAPENTIN 600 MG: 300 CAPSULE ORAL at 14:21

## 2018-03-28 RX ADMIN — GABAPENTIN 600 MG: 300 CAPSULE ORAL at 18:01

## 2018-03-28 RX ADMIN — HYDROXYZINE HYDROCHLORIDE 25 MG: 25 TABLET, FILM COATED ORAL at 08:43

## 2018-03-28 ASSESSMENT — ACTIVITIES OF DAILY LIVING (ADL)
ADLS_ACUITY_SCORE: 20

## 2018-03-28 NOTE — PLAN OF CARE
Problem: Patient Care Overview  Goal: Plan of Care/Patient Progress Review  Outcome: No Change        VS:   VSS   Output:   LBM yesterday  Voiding well without difficulty   Activity:   Up and ambulating Independently   Skin: Intact   Pain:   L shoulder pain - prn tylenol and warm packs provided   Neuro/CMS:   Has baseline numbness on his LLE , L foot drop- AFO    Dressing(s):   na   Diet:   Regular   LDA:      Equipment:   AFO, cane    Plan:   D/C to [facility]? Or court hold bed   Additional Info:

## 2018-03-28 NOTE — PROGRESS NOTES
"Patient seen and examined with RN     S-no new concerns      [facility] , waiting for court decision     4 point ROS done and neg unless mentioned     O-   /81 (BP Location: Left arm)  Pulse 81  Temp 98.2  F (36.8  C) (Oral)  Resp 16  Ht 1.803 m (5' 10.98\")  Wt 93.4 kg (206 lb)  SpO2 97%  BMI 28.74 kg/m2   CVS- I+II+ no m/r/g  RS- CTAB  Abdo- soft, no tenderness . No g/r/r   Ext- no edema    CNS- no gross focal signs. Oriented to person, place and time      LABS:   BMP    Recent Labs  Lab 03/25/18  0518 03/22/18  0711    140   POTASSIUM 4.2 4.2   CHLORIDE 107 106   RUSTY 8.5 8.5   CO2 31 29   BUN 18 18   CR 0.72 0.73   GLC 97 111*     CBC    Recent Labs  Lab 03/26/18  0559 03/25/18  0518 03/23/18  0619 03/22/18  0711   WBC  --  9.3  --  11.1*   RBC  --  4.37*  --  4.90   HGB  --  13.0*  --  14.5   HCT  --  40.4  --  44.1   MCV  --  92  --  90   MCH  --  29.7  --  29.6   MCHC  --  32.2  --  32.9   RDW  --  14.8  --  14.6    162 132* 117*     INRNo lab results found in last 7 days.  LFTs    Recent Labs  Lab 03/25/18  0518   ALKPHOS 108   AST 19   ALT 38   BILITOTAL 0.3   PROTTOTAL 6.7*   ALBUMIN 2.8*     A/P:  Jorge Rosales is a 63 year old male with past medical history significant for alcohol dependency., recurrent admissions for alcohol related cause last 01/14--01/19/18 , smoker, COPD, chronic pain syndrome, opioid use disorder on suboxone maintenance, depression, PTSD, L leg weakness s/p Lumbar spine surgery admitted for alcohol intoxication,  withdrawal and acute on chronic hypoxic respi failure 2/2 PNA, mild copd exacerbation.       # Alcohol dependency, intoxication and withdrawal:   -MSSA with Diazepam- completed  - mvi, thiamine, folic acid daily  - fall, seizure precautions.   - Psychiatry consult appreciated 3/19: 1.  Continue patient on one-to-one.  Patient was switched to MSSA protocol on Valium.  Continue that.  The patient will be continued on " Suboxone.  This is what the patient is taking.  He can be continued on his medications once doses are known, which is Effexor 75 mg.   - D/W psych 3/22 for  Commitment and for hallucinations . Agree as he  failed many times, has thrombocytopenia, and recurrent AFib with RVR alcohol related     County upheld the commitment on 3/27 -final decision will be on Thursday 3/28/18.    # Acute on chronic hypoxic respi failure , h/o COPD: suspect R lower lobe PNA. Mild copd exacerbation. - resolved   Started empirically on iv vanc, zosyn, doxy for hcap.  03/18: Given pt stable hemodynamics. Lactic acidosis: resolved quickly suspect mostly related to alcohol use, dehydration. Hypoxia: improving, afebrile, low procal: dc iv Vanco and Zosyn, doxy. Completed 7 days levofloxacin 750 mg daily- 3/24.  - Ct bronchodilator nebs (03/18: switched to levalbuterol given afib w rvr), oxygen to maintain sat >89. IS.   - Outpatient Pulmonary FU. PFT  - COPD RT consult.   - Encourage tobacco cessation.       # BC + for diptheroids from 17th afternoon (F/U cultres from 17th night-NGTD)  * Appreciate ID review. D/C Levaquin.  repeat culture Monday- negative so far     # Afib w rvr: at current NSR, HR controlled (Recurrent episode due to drinking- previous admission in 01/18 as well)- better after detox.    - ct metop to 100 XR daily.   - iv metop prn for hr>120  - Echo last admission 01/15 : EF: 60-65%.     #Hx of chronic pain syndrome. Hx of Opioid abuse   On Suboxone.   - continue PTA Suboxone for now.   - PTA gabapentin. robaxin.      # Depression, PTSD w/ hx of panic attacks:   - Continue PTA venlafaxine.  - on Diazepam per MSSA  - FU Psychiatry recs     # Tobacco abuse: nicotine patch panel.      # FEN: adat to regular. Ivf. Fu lytes.   PPx:   Lovenox SQ for dvt ppx  Full code.    Disposition Plan    Expected discharge in unclear days to inpatient treatment bed when available    Entered: Dejan Swann 03/28/2018, 10:33 AM       Dejan Swann   MD [id number]   Internal Medicine/ Hospitalist

## 2018-03-28 NOTE — PLAN OF CARE
Problem: Patient Care Overview  Goal: Plan of Care/Patient Progress Review  Outcome: No Change        VS:   Stable   Output:   Denies problems with both BM and bladder status.   Activity:   Ambulated to hallway with cane.SBA1   Skin: Intact    Pain:   L shoulder discomfort, tylenol given.   Neuro/CMS:   Baseline numbness to BLE.   Dressing(s):   none   Diet:   regular   LDA:   none   Equipment:   [facility],-72 hour hold expires today 12:30pm.   Plan:   Awaiting court decision   Additional Info:

## 2018-03-29 LAB — PLATELET # BLD AUTO: 283 10E9/L (ref 150–450)

## 2018-03-29 PROCEDURE — 25000132 ZZH RX MED GY IP 250 OP 250 PS 637: Performed by: STUDENT IN AN ORGANIZED HEALTH CARE EDUCATION/TRAINING PROGRAM

## 2018-03-29 PROCEDURE — 40000275 ZZH STATISTIC RCP TIME EA 10 MIN

## 2018-03-29 PROCEDURE — 36415 COLL VENOUS BLD VENIPUNCTURE: CPT | Performed by: HOSPITALIST

## 2018-03-29 PROCEDURE — 25000132 ZZH RX MED GY IP 250 OP 250 PS 637: Performed by: INTERNAL MEDICINE

## 2018-03-29 PROCEDURE — 25000132 ZZH RX MED GY IP 250 OP 250 PS 637: Performed by: HOSPITALIST

## 2018-03-29 PROCEDURE — 85049 AUTOMATED PLATELET COUNT: CPT | Performed by: HOSPITALIST

## 2018-03-29 PROCEDURE — 94640 AIRWAY INHALATION TREATMENT: CPT

## 2018-03-29 PROCEDURE — 25000125 ZZHC RX 250: Performed by: INTERNAL MEDICINE

## 2018-03-29 PROCEDURE — 99232 SBSQ HOSP IP/OBS MODERATE 35: CPT | Performed by: INTERNAL MEDICINE

## 2018-03-29 PROCEDURE — 12000008 ZZH R&B INTERMEDIATE UMMC

## 2018-03-29 PROCEDURE — 25000128 H RX IP 250 OP 636: Performed by: INTERNAL MEDICINE

## 2018-03-29 PROCEDURE — 94640 AIRWAY INHALATION TREATMENT: CPT | Mod: 76

## 2018-03-29 RX ADMIN — VENLAFAXINE HYDROCHLORIDE 75 MG: 75 TABLET, EXTENDED RELEASE ORAL at 08:06

## 2018-03-29 RX ADMIN — THERA TABS 1 TABLET: TAB at 08:06

## 2018-03-29 RX ADMIN — BUPRENORPHINE AND NALOXONE 1 TABLET: 8; 2 TABLET SUBLINGUAL at 20:25

## 2018-03-29 RX ADMIN — GABAPENTIN 600 MG: 300 CAPSULE ORAL at 13:42

## 2018-03-29 RX ADMIN — MENTHOL 1 PATCH: 205.5 PATCH TOPICAL at 08:07

## 2018-03-29 RX ADMIN — BUPRENORPHINE AND NALOXONE 1 TABLET: 8; 2 TABLET SUBLINGUAL at 08:06

## 2018-03-29 RX ADMIN — GABAPENTIN 600 MG: 300 CAPSULE ORAL at 08:06

## 2018-03-29 RX ADMIN — ACETAMINOPHEN 650 MG: 325 TABLET ORAL at 08:06

## 2018-03-29 RX ADMIN — HYDROXYZINE HYDROCHLORIDE 25 MG: 25 TABLET, FILM COATED ORAL at 08:06

## 2018-03-29 RX ADMIN — FOLIC ACID 1 MG: 1 TABLET ORAL at 08:07

## 2018-03-29 RX ADMIN — CYANOCOBALAMIN TAB 1000 MCG 1000 MCG: 1000 TAB at 08:06

## 2018-03-29 RX ADMIN — ACETAMINOPHEN 650 MG: 325 TABLET ORAL at 00:01

## 2018-03-29 RX ADMIN — HYDROXYZINE HYDROCHLORIDE 25 MG: 25 TABLET, FILM COATED ORAL at 00:00

## 2018-03-29 RX ADMIN — LEVALBUTEROL HYDROCHLORIDE 0.63 MG: 0.63 SOLUTION RESPIRATORY (INHALATION) at 16:30

## 2018-03-29 RX ADMIN — GABAPENTIN 600 MG: 300 CAPSULE ORAL at 20:25

## 2018-03-29 RX ADMIN — Medication 100 MG: at 08:09

## 2018-03-29 RX ADMIN — ACETAMINOPHEN 650 MG: 325 TABLET ORAL at 13:42

## 2018-03-29 RX ADMIN — ASPIRIN 81 MG: 81 TABLET, COATED ORAL at 08:06

## 2018-03-29 RX ADMIN — MENTHOL 1 PATCH: 205.5 PATCH TOPICAL at 16:05

## 2018-03-29 RX ADMIN — ACETAMINOPHEN 650 MG: 325 TABLET ORAL at 20:25

## 2018-03-29 RX ADMIN — NICOTINE 1 PATCH: 21 PATCH, EXTENDED RELEASE TRANSDERMAL at 08:07

## 2018-03-29 RX ADMIN — LEVALBUTEROL HYDROCHLORIDE 0.63 MG: 0.63 SOLUTION RESPIRATORY (INHALATION) at 22:02

## 2018-03-29 RX ADMIN — Medication 2 MG: at 22:22

## 2018-03-29 RX ADMIN — GABAPENTIN 600 MG: 300 CAPSULE ORAL at 16:00

## 2018-03-29 RX ADMIN — HYDROXYZINE HYDROCHLORIDE 25 MG: 25 TABLET, FILM COATED ORAL at 22:22

## 2018-03-29 RX ADMIN — LEVALBUTEROL HYDROCHLORIDE 0.63 MG: 0.63 SOLUTION RESPIRATORY (INHALATION) at 08:34

## 2018-03-29 RX ADMIN — ENOXAPARIN SODIUM 40 MG: 40 INJECTION SUBCUTANEOUS at 22:22

## 2018-03-29 ASSESSMENT — ACTIVITIES OF DAILY LIVING (ADL)
ADLS_ACUITY_SCORE: 20

## 2018-03-29 ASSESSMENT — PAIN DESCRIPTION - DESCRIPTORS: DESCRIPTORS: ACHING;DISCOMFORT

## 2018-03-29 NOTE — PLAN OF CARE
Problem: Patient Care Overview  Goal: Plan of Care/Patient Progress Review  Outcome: No Change  Ambulated to hallway with LLE AFO and cane.Nicotine patch R arm.Menthol patch and tylenol for L shoulder discomfort.VSS.LS clear,satts at 89-93% room air.Interested on inpatient CD treatment.Aware of court hearing today.Female friend came in for visit later part daniella shift,brought in patients 2 baggages(patient's clothes).Sleeping mostly through the night.

## 2018-03-29 NOTE — PROGRESS NOTES
"Patient seen and examined with RN     S-no new concerns   Feels little anxious     4 point ROS done and neg unless mentioned     O-   /77 (BP Location: Left arm)  Pulse 74  Temp 98.3  F (36.8  C) (Oral)  Resp 18  Ht 1.803 m (5' 10.98\")  Wt 93.4 kg (206 lb)  SpO2 93%  BMI 28.74 kg/m2   CVS- I+II+ no m/r/g  RS- CTAB  Abdo- soft, no tenderness . No g/r/r   Ext- no edema    CNS- no gross focal signs. Oriented to person, place and time      LABS:   BMP    Recent Labs  Lab 03/25/18  0518      POTASSIUM 4.2   CHLORIDE 107   RUSTY 8.5   CO2 31   BUN 18   CR 0.72   GLC 97     CBC    Recent Labs  Lab 03/29/18  0602 03/26/18  0559 03/25/18  0518 03/23/18  0619   WBC  --   --  9.3  --    RBC  --   --  4.37*  --    HGB  --   --  13.0*  --    HCT  --   --  40.4  --    MCV  --   --  92  --    MCH  --   --  29.7  --    MCHC  --   --  32.2  --    RDW  --   --  14.8  --     178 162 132*     INRNo lab results found in last 7 days.  LFTs    Recent Labs  Lab 03/25/18  0518   ALKPHOS 108   AST 19   ALT 38   BILITOTAL 0.3   PROTTOTAL 6.7*   ALBUMIN 2.8*     A/P:  Jorge Rosales is a 63 year old male with past medical history significant for alcohol dependency., recurrent admissions for alcohol related cause last 01/14--01/19/18 , smoker, COPD, chronic pain syndrome, opioid use disorder on suboxone maintenance, depression, PTSD, L leg weakness s/p Lumbar spine surgery admitted for alcohol intoxication,  withdrawal and acute on chronic hypoxic respi failure 2/2 PNA, mild copd exacerbation.       # Alcohol dependency, intoxication and withdrawal:   -MSSA with Diazepam- completed  - mvi, thiamine, folic acid daily  - fall, seizure precautions.   - Psychiatry consult appreciated 3/19: 1.  Continue patient on one-to-one.  Patient was switched to MSSA protocol on Valium.  Continue that.  The patient will be continued on Suboxone.  This is what the patient is taking.  He can be continued on his medications once doses " are known, which is Effexor 75 mg.   - D/W psych 3/22 for  Commitment and for hallucinations . Agree as he  failed many times, has thrombocytopenia, and recurrent AFib with RVR alcohol related     County upheld the commitment on 3/27 -final decision will be on Thursday 3/28/18- going to court at 9.30 AM    # Acute on chronic hypoxic respi failure , h/o COPD: suspect R lower lobe PNA. Mild copd exacerbation. - resolved   Started empirically on iv vanc, zosyn, doxy for hcap.  03/18: Given pt stable hemodynamics. Lactic acidosis: resolved quickly suspect mostly related to alcohol use, dehydration. Hypoxia: improving, afebrile, low procal: dc iv Vanco and Zosyn, doxy. Completed 7 days levofloxacin 750 mg daily- 3/24.  - Ct bronchodilator nebs (03/18: switched to levalbuterol given afib w rvr), oxygen to maintain sat >89. IS.   - Outpatient Pulmonary FU. PFT  - COPD RT consult.   - Encourage tobacco cessation.       # BC + for diptheroids from 17th afternoon (F/U cultres from 17th night-NGTD)  * Appreciate ID review. D/C Levaquin.  repeat culture Monday- negative so far     # Afib w rvr: at current NSR, HR controlled (Recurrent episode due to drinking- previous admission in 01/18 as well)- better after detox.    - ct metop to 100 XR daily.   - iv metop prn for hr>120  - Echo last admission 01/15 : EF: 60-65%.     #Hx of chronic pain syndrome. Hx of Opioid abuse   On Suboxone.   - continue PTA Suboxone for now.   - PTA gabapentin. robaxin.      # Depression, PTSD w/ hx of panic attacks:   - Continue PTA venlafaxine.  - on Diazepam per MSSA  - FU Psychiatry recs     # Tobacco abuse: nicotine patch panel.      # FEN: adat to regular. Ivf. Fu lytes.   PPx:   Lovenox SQ for dvt ppx  Full code.    Disposition Plan    Expected discharge in unclear days to inpatient treatment bed when available    Entered: Dejan Swann 03/29/2018, 10:51 AM       Dejan Swann MD [id number]   Internal Medicine/ Hospitalist

## 2018-03-29 NOTE — PLAN OF CARE
Problem: Patient Care Overview  Goal: Plan of Care/Patient Progress Review  Outcome: Improving  Patient left unit for court today at 0930, patient back at around 1330. Patient on a stay of commitment?, patient claims he is voluntary and is upset with staff because he would like to leave the unit and have VPM discontinued. SW was in to see patient to explain court hold/stay of commitment? Patient still frustrated with his current situation. He continues to c/o L shoulder pain, prn tylenol, icy hot patch given. Patient up independently. Able to make needs known to staff. Will continue with POC.

## 2018-03-30 ENCOUNTER — APPOINTMENT (OUTPATIENT)
Dept: GENERAL RADIOLOGY | Facility: CLINIC | Age: 63
DRG: 896 | End: 2018-03-30
Attending: INTERNAL MEDICINE
Payer: COMMERCIAL

## 2018-03-30 PROCEDURE — 25000128 H RX IP 250 OP 636: Performed by: INTERNAL MEDICINE

## 2018-03-30 PROCEDURE — 25000125 ZZHC RX 250: Performed by: INTERNAL MEDICINE

## 2018-03-30 PROCEDURE — 25000132 ZZH RX MED GY IP 250 OP 250 PS 637: Performed by: INTERNAL MEDICINE

## 2018-03-30 PROCEDURE — 99207 ZZC CDG-MDM COMPONENT: MEETS LOW - DOWN CODED: CPT | Performed by: INTERNAL MEDICINE

## 2018-03-30 PROCEDURE — 94640 AIRWAY INHALATION TREATMENT: CPT

## 2018-03-30 PROCEDURE — 73030 X-RAY EXAM OF SHOULDER: CPT | Mod: LT

## 2018-03-30 PROCEDURE — 40000275 ZZH STATISTIC RCP TIME EA 10 MIN

## 2018-03-30 PROCEDURE — 99232 SBSQ HOSP IP/OBS MODERATE 35: CPT | Performed by: INTERNAL MEDICINE

## 2018-03-30 PROCEDURE — 12000008 ZZH R&B INTERMEDIATE UMMC

## 2018-03-30 PROCEDURE — 25000132 ZZH RX MED GY IP 250 OP 250 PS 637: Performed by: STUDENT IN AN ORGANIZED HEALTH CARE EDUCATION/TRAINING PROGRAM

## 2018-03-30 PROCEDURE — 94640 AIRWAY INHALATION TREATMENT: CPT | Mod: 76

## 2018-03-30 PROCEDURE — 25000132 ZZH RX MED GY IP 250 OP 250 PS 637: Performed by: HOSPITALIST

## 2018-03-30 RX ADMIN — ASPIRIN 81 MG: 81 TABLET, COATED ORAL at 08:51

## 2018-03-30 RX ADMIN — LEVALBUTEROL HYDROCHLORIDE 0.63 MG: 0.63 SOLUTION RESPIRATORY (INHALATION) at 12:35

## 2018-03-30 RX ADMIN — ACETAMINOPHEN 650 MG: 325 TABLET ORAL at 13:03

## 2018-03-30 RX ADMIN — BUPRENORPHINE AND NALOXONE 1 TABLET: 8; 2 TABLET SUBLINGUAL at 19:58

## 2018-03-30 RX ADMIN — HYDROXYZINE HYDROCHLORIDE 25 MG: 25 TABLET, FILM COATED ORAL at 16:59

## 2018-03-30 RX ADMIN — Medication 2 MG: at 23:00

## 2018-03-30 RX ADMIN — MENTHOL 1 PATCH: 205.5 PATCH TOPICAL at 23:00

## 2018-03-30 RX ADMIN — GABAPENTIN 600 MG: 300 CAPSULE ORAL at 13:03

## 2018-03-30 RX ADMIN — THERA TABS 1 TABLET: TAB at 08:51

## 2018-03-30 RX ADMIN — BUPRENORPHINE AND NALOXONE 1 TABLET: 8; 2 TABLET SUBLINGUAL at 08:51

## 2018-03-30 RX ADMIN — GABAPENTIN 600 MG: 300 CAPSULE ORAL at 16:59

## 2018-03-30 RX ADMIN — LEVALBUTEROL HYDROCHLORIDE 0.63 MG: 0.63 SOLUTION RESPIRATORY (INHALATION) at 20:01

## 2018-03-30 RX ADMIN — GABAPENTIN 600 MG: 300 CAPSULE ORAL at 08:51

## 2018-03-30 RX ADMIN — Medication 100 MG: at 08:51

## 2018-03-30 RX ADMIN — NICOTINE 1 PATCH: 21 PATCH, EXTENDED RELEASE TRANSDERMAL at 08:51

## 2018-03-30 RX ADMIN — CYANOCOBALAMIN TAB 1000 MCG 1000 MCG: 1000 TAB at 08:51

## 2018-03-30 RX ADMIN — GABAPENTIN 600 MG: 300 CAPSULE ORAL at 19:58

## 2018-03-30 RX ADMIN — HYDROXYZINE HYDROCHLORIDE 25 MG: 25 TABLET, FILM COATED ORAL at 23:05

## 2018-03-30 RX ADMIN — VENLAFAXINE HYDROCHLORIDE 75 MG: 75 TABLET, EXTENDED RELEASE ORAL at 08:51

## 2018-03-30 RX ADMIN — ACETAMINOPHEN 650 MG: 325 TABLET ORAL at 16:59

## 2018-03-30 RX ADMIN — MENTHOL 1 PATCH: 205.5 PATCH TOPICAL at 08:51

## 2018-03-30 RX ADMIN — FOLIC ACID 1 MG: 1 TABLET ORAL at 08:51

## 2018-03-30 RX ADMIN — HYDROXYZINE HYDROCHLORIDE 25 MG: 25 TABLET, FILM COATED ORAL at 08:51

## 2018-03-30 RX ADMIN — LEVALBUTEROL HYDROCHLORIDE 0.63 MG: 0.63 SOLUTION RESPIRATORY (INHALATION) at 08:13

## 2018-03-30 RX ADMIN — ENOXAPARIN SODIUM 40 MG: 40 INJECTION SUBCUTANEOUS at 22:08

## 2018-03-30 RX ADMIN — LEVALBUTEROL HYDROCHLORIDE 0.63 MG: 0.63 SOLUTION RESPIRATORY (INHALATION) at 16:29

## 2018-03-30 RX ADMIN — ACETAMINOPHEN 650 MG: 325 TABLET ORAL at 08:51

## 2018-03-30 ASSESSMENT — ACTIVITIES OF DAILY LIVING (ADL)
ADLS_ACUITY_SCORE: 20

## 2018-03-30 NOTE — PROGRESS NOTES
Social Work Services Progress Note    Hospital Day: 14      Data:  ROBBIN in contact with Migel Ruvalcaba , Janine Aguirre,  regarding stay of commitment and discharge planning.     Intervention:  ROBBIN provided pt with CORNELIO's (sent by Gabriela SIEGEL) to sign to allow for [facility] acute hospital to do the Rule 25 assessment.  ROBBIN also contacted [facility] in [place], [place] to see about pt transferring to their facility while awaiting inpatient CD treatment.  Admissions nurse was unavailable and  requested SW call back on Monday.     Assessment:  Pt signed ROIs and denied concerns.     Plan:    Anticipated Disposition: TBD    Barriers to d/c plan:  placement    Follow Up:  ROBBIN will assist as needed.    LIGIA Lucia, BALDEMAR Cartwright   3/30/2018

## 2018-03-30 NOTE — PLAN OF CARE
Problem: Patient Care Overview  Goal: Plan of Care/Patient Progress Review  Outcome: Improving  Per SW patient is on a stay of commitment, he is allowed to leave unit. VMP d/c'd & patient has been off the unit x 1 this shift. He continues to c/o L shoulder pain, x-ray done and seen by ortho MD. Patient to have MRI as outpatient and follow up with ortho MD from [facility]. Patient waiting for placement. Will continue with POC.
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
## 2018-03-30 NOTE — PROGRESS NOTES
"Patient seen and examined with RN     S- c/o persistent pain in left shoulder. Had rotator cuff repair with [facility] but injured it 1 month ago. Denies any tingling/ numbness     4 point ROS done and neg unless mentioned     O-   /73 (BP Location: Left arm)  Pulse 74  Temp 98.2  F (36.8  C) (Oral)  Resp 18  Ht 1.803 m (5' 10.98\")  Wt 93.4 kg (206 lb)  SpO2 91%  BMI 28.74 kg/m2   Gen- pleasant   CVS- I+II+ no m/r/g  RS- CTABS  Abdo- soft, no tenderness . No g/r/r   Ext- no edema    MSk - left shoulder: no specific bony tenderness. Restricted abduction to 90degree    LABS:   BMP  Recent Labs  Lab 03/25/18  0518      POTASSIUM 4.2   CHLORIDE 107   RUSTY 8.5   CO2 31   BUN 18   CR 0.72   GLC 97     CBC  Recent Labs  Lab 03/29/18  0602 03/26/18  0559 03/25/18  0518   WBC  --   --  9.3   RBC  --   --  4.37*   HGB  --   --  13.0*   HCT  --   --  40.4   MCV  --   --  92   MCH  --   --  29.7   MCHC  --   --  32.2   RDW  --   --  14.8    178 162     INRNo lab results found in last 7 days.  LFTs  Recent Labs  Lab 03/25/18  0518   ALKPHOS 108   AST 19   ALT 38   BILITOTAL 0.3   PROTTOTAL 6.7*   ALBUMIN 2.8*      A/P:  Jorge Rosales is a 63 year old male with past medical history significant for alcohol dependency., recurrent admissions for alcohol related cause last 01/14--01/19/18 , smoker, COPD, chronic pain syndrome, opioid use disorder on suboxone maintenance, depression, PTSD, L leg weakness s/p Lumbar spine surgery admitted for alcohol intoxication,  withdrawal and acute on chronic hypoxic respi failure 2/2 PNA, mild copd exacerbation.      3/30: # Shoulder pain- X- ray , ortho review    # Alcohol dependency, intoxication and withdrawal:   -MSSA with Diazepam- completed  - mvi, thiamine, folic acid daily  - fall, seizure precautions.   - Psychiatry consult appreciated 3/19: 1.  Continue patient on one-to-one.  Patient was switched to MSSA protocol on Valium.  Continue that.  The patient " will be continued on Suboxone.  This is what the patient is taking.  He can be continued on his medications once doses are known, which is Effexor 75 mg.   - D/W psych 3/22 for  Commitment and for hallucinations . Agree as he  failed many times, has thrombocytopenia, and recurrent AFib with RVR alcohol related     County upheld the commitment on 3/27 -final decision will be on Thursday 3/28/18- a/w paperwork     # Acute on chronic hypoxic respi failure , h/o COPD: suspect R lower lobe PNA. Mild copd exacerbation. - resolved   Started empirically on iv vanc, zosyn, doxy for hcap.  03/18: Given pt stable hemodynamics. Lactic acidosis: resolved quickly suspect mostly related to alcohol use, dehydration. Hypoxia: improving, afebrile, low procal: dc iv Vanco and Zosyn, doxy. Completed 7 days levofloxacin 750 mg daily- 3/24.  - Ct bronchodilator nebs (03/18: switched to levalbuterol given afib w rvr), oxygen to maintain sat >89. IS.   - Outpatient Pulmonary FU. PFT  - COPD RT consult.   - Encourage tobacco cessation.       # BC + for diptheroids from 17th afternoon (F/U cultres from 17th night-NGTD)  * Appreciate ID review. D/C Nehemiahaquin.  repeat culture Monday- no growth 3 days     # Afib w rvr: at current NSR, HR controlled (Recurrent episode due to drinking- previous admission in 01/18 as well)- better after detox.    - ct metop to 100 XR daily.       #Hx of chronic pain syndrome. Hx of Opioid abuse   On Suboxone.   - continue PTA Suboxone for now.   - PTA gabapentin. robaxin.      # Depression, PTSD w/ hx of panic attacks:   - Continue PTA venlafaxine.  - on Diazepam per MSSA  - FU Psychiatry recs      # Tobacco abuse: nicotine patch panel.       # FEN: adat to regular. Ivf. Fu lytes.   PPx:   Lovenox SQ for dvt ppx  Full code.     Disposition Plan   Expected discharge in unclear days to commitment bed once court paperwork received and SW finds a place.     Entered: Tae Foster MD 03/30/2018, 10:56 AM       Tae  Cristian MONTE, [facility] (Pager- [phone])   Internal Medicine/ Hospitalist

## 2018-03-30 NOTE — PLAN OF CARE
Problem: Alcohol Withdrawal Acute, Risk/Actual (Adult)  Goal: Signs and Symptoms of Listed Potential Problems Will be Absent, Minimized or Managed (Alcohol Withdrawal Acute, Risk/Actual)  Signs and symptoms of listed potential problems will be absent, minimized or managed by discharge/transition of care (reference Alcohol Withdrawal Acute, Risk/Actual (Adult) CPG).   Outcome: No Change    Pt is alert and oriented X4. Vitals stable. Complains of shoulder pain that is partially relieved with icy hot patches and tylenol. Voiding spontaneously without difficulty. Last BM 3/29. Ambulates independently with cane in room. Baseline numbness and tingling in lower extremities - CMS/neuros otherwise intact. VPM in place. Able to make needs known. Will continue with plan of care.

## 2018-03-30 NOTE — PLAN OF CARE
Problem: Patient Care Overview  Goal: Plan of Care/Patient Progress Review  Outcome: Improving  Pt up ad hugo in room. Pt c/o left shoulder pain. CMS intact. Radial pulse +. Pt medicated prior with tylenol. ICY Hot patch to shoulder. Warm packs to shoulder. Pt cooperative in his cares. VPM in room. Waiting for placement. Call light within reach. Pt able to make his needs known.

## 2018-03-31 PROCEDURE — 25000128 H RX IP 250 OP 636: Performed by: INTERNAL MEDICINE

## 2018-03-31 PROCEDURE — 40000275 ZZH STATISTIC RCP TIME EA 10 MIN

## 2018-03-31 PROCEDURE — 25000132 ZZH RX MED GY IP 250 OP 250 PS 637: Performed by: INTERNAL MEDICINE

## 2018-03-31 PROCEDURE — 25000125 ZZHC RX 250: Performed by: INTERNAL MEDICINE

## 2018-03-31 PROCEDURE — 25000132 ZZH RX MED GY IP 250 OP 250 PS 637: Performed by: STUDENT IN AN ORGANIZED HEALTH CARE EDUCATION/TRAINING PROGRAM

## 2018-03-31 PROCEDURE — 94640 AIRWAY INHALATION TREATMENT: CPT

## 2018-03-31 PROCEDURE — 94640 AIRWAY INHALATION TREATMENT: CPT | Mod: 76

## 2018-03-31 PROCEDURE — 99232 SBSQ HOSP IP/OBS MODERATE 35: CPT | Performed by: INTERNAL MEDICINE

## 2018-03-31 PROCEDURE — 12000008 ZZH R&B INTERMEDIATE UMMC

## 2018-03-31 PROCEDURE — 25000132 ZZH RX MED GY IP 250 OP 250 PS 637: Performed by: HOSPITALIST

## 2018-03-31 RX ADMIN — GABAPENTIN 600 MG: 300 CAPSULE ORAL at 11:32

## 2018-03-31 RX ADMIN — LEVALBUTEROL HYDROCHLORIDE 0.63 MG: 0.63 SOLUTION RESPIRATORY (INHALATION) at 07:55

## 2018-03-31 RX ADMIN — ASPIRIN 81 MG: 81 TABLET, COATED ORAL at 08:04

## 2018-03-31 RX ADMIN — LEVALBUTEROL HYDROCHLORIDE 0.63 MG: 0.63 SOLUTION RESPIRATORY (INHALATION) at 12:31

## 2018-03-31 RX ADMIN — Medication 2 MG: at 21:29

## 2018-03-31 RX ADMIN — Medication 100 MG: at 08:05

## 2018-03-31 RX ADMIN — THERA TABS 1 TABLET: TAB at 08:05

## 2018-03-31 RX ADMIN — ACETAMINOPHEN 650 MG: 325 TABLET ORAL at 11:32

## 2018-03-31 RX ADMIN — CYANOCOBALAMIN TAB 1000 MCG 1000 MCG: 1000 TAB at 08:04

## 2018-03-31 RX ADMIN — VENLAFAXINE HYDROCHLORIDE 75 MG: 75 TABLET, EXTENDED RELEASE ORAL at 08:05

## 2018-03-31 RX ADMIN — MENTHOL 1 PATCH: 205.5 PATCH TOPICAL at 12:42

## 2018-03-31 RX ADMIN — BUPRENORPHINE AND NALOXONE 1 TABLET: 8; 2 TABLET SUBLINGUAL at 08:05

## 2018-03-31 RX ADMIN — FOLIC ACID 1 MG: 1 TABLET ORAL at 08:05

## 2018-03-31 RX ADMIN — LEVALBUTEROL HYDROCHLORIDE 0.63 MG: 0.63 SOLUTION RESPIRATORY (INHALATION) at 19:56

## 2018-03-31 RX ADMIN — NICOTINE 1 PATCH: 21 PATCH, EXTENDED RELEASE TRANSDERMAL at 08:05

## 2018-03-31 RX ADMIN — GABAPENTIN 600 MG: 300 CAPSULE ORAL at 08:04

## 2018-03-31 RX ADMIN — MENTHOL 1 PATCH: 205.5 PATCH TOPICAL at 12:41

## 2018-03-31 RX ADMIN — GABAPENTIN 600 MG: 300 CAPSULE ORAL at 16:15

## 2018-03-31 RX ADMIN — BUPRENORPHINE AND NALOXONE 1 TABLET: 8; 2 TABLET SUBLINGUAL at 19:51

## 2018-03-31 RX ADMIN — ACETAMINOPHEN 650 MG: 325 TABLET ORAL at 16:16

## 2018-03-31 RX ADMIN — ENOXAPARIN SODIUM 40 MG: 40 INJECTION SUBCUTANEOUS at 21:29

## 2018-03-31 RX ADMIN — LEVALBUTEROL HYDROCHLORIDE 0.63 MG: 0.63 SOLUTION RESPIRATORY (INHALATION) at 16:23

## 2018-03-31 RX ADMIN — HYDROXYZINE HYDROCHLORIDE 25 MG: 25 TABLET, FILM COATED ORAL at 21:29

## 2018-03-31 RX ADMIN — GABAPENTIN 600 MG: 300 CAPSULE ORAL at 19:50

## 2018-03-31 ASSESSMENT — ACTIVITIES OF DAILY LIVING (ADL)
ADLS_ACUITY_SCORE: 20

## 2018-03-31 NOTE — PLAN OF CARE
Problem: Patient Care Overview  Goal: Plan of Care/Patient Progress Review  Outcome: No Change  D: Pt  A/O x4. VSS . Lungs- CL, no c/o chest pain/ SOB. sats= 93 % RA. . Bowel+, passing gas. PP- +. +2 edema- feet/legs. CMS- intact( Baseline N/T and Mallory foot drop). Pt taking PO REG  food/ fluids well. Voiding Good amounts.Pt up walking  With sousa INDEPENDENTLY. Off unit for short periods of time. Pain/CAPA - denies. ON  MSSA  Required.  A: con't to monitor. Call light in reach. Pt able to make needs known.

## 2018-03-31 NOTE — PLAN OF CARE
Problem: Patient Care Overview  Goal: Plan of Care/Patient Progress Review  Outcome: No Change  No acute changes. Given tylenol x1. Tolerating regular diet. Pt able to make needs known.

## 2018-03-31 NOTE — PROGRESS NOTES
"Patient seen and examined with RN     S- no new concerns    4 point ROS done and neg unless mentioned     O-   BP 98/62 (BP Location: Left arm)  Pulse 77  Temp 97.7  F (36.5  C) (Oral)  Resp 18  Ht 1.803 m (5' 10.98\")  Wt 93.4 kg (206 lb)  SpO2 93%  BMI 28.74 kg/m2   Gen- pleasant   CVS- I+II+ no m/r/g  RS- CTABS  Abdo- soft, no tenderness . No g/r/r   Ext- no edema    MSk - as per ortho     LABS:   BMP    Recent Labs  Lab 03/25/18  0518      POTASSIUM 4.2   CHLORIDE 107   RUSTY 8.5   CO2 31   BUN 18   CR 0.72   GLC 97     CBC    Recent Labs  Lab 03/29/18  0602 03/26/18  0559 03/25/18  0518   WBC  --   --  9.3   RBC  --   --  4.37*   HGB  --   --  13.0*   HCT  --   --  40.4   MCV  --   --  92   MCH  --   --  29.7   MCHC  --   --  32.2   RDW  --   --  14.8    178 162     INRNo lab results found in last 7 days.  LFTs    Recent Labs  Lab 03/25/18  0518   ALKPHOS 108   AST 19   ALT 38   BILITOTAL 0.3   PROTTOTAL 6.7*   ALBUMIN 2.8*      A/P:  Jorge Rosales is a 63 year old male with past medical history significant for alcohol dependency., recurrent admissions for alcohol related cause last 01/14--01/19/18 , smoker, COPD, chronic pain syndrome, opioid use disorder on suboxone maintenance, depression, PTSD, L leg weakness s/p Lumbar spine surgery admitted for alcohol intoxication,  withdrawal and acute on chronic hypoxic respi failure 2/2 PNA, mild copd exacerbation.      3/31- no change. A/w commitment bed    3/30: # Shoulder pain- X- ray , ortho review appreciated     # Alcohol dependency, intoxication and withdrawal:   -MSSA with Diazepam- completed  - mvi, thiamine, folic acid daily  - fall, seizure precautions.   - Psychiatry consult appreciated 3/19: 1.  Continue patient on one-to-one.  Patient was switched to MSSA protocol on Valium.  Continue that.  The patient will be continued on Suboxone.  This is what the patient is taking.  He can be continued on his medications once doses are known, " which is Effexor 75 mg.   - D/W psych 3/22 for  Commitment and for hallucinations . Agree as he  failed many times, has thrombocytopenia, and recurrent AFib with RVR alcohol related     County upheld the commitment on 3/27 -final decision will be on Thursday 3/28/18- a/w paperwork     # Acute on chronic hypoxic respi failure , h/o COPD: suspect R lower lobe PNA. Mild copd exacerbation. - resolved   Started empirically on iv vanc, zosyn, doxy for hcap.  03/18: Given pt stable hemodynamics. Lactic acidosis: resolved quickly suspect mostly related to alcohol use, dehydration. Hypoxia: improving, afebrile, low procal: dc iv Vanco and Zosyn, doxy. Completed 7 days levofloxacin 750 mg daily- 3/24.  - Ct bronchodilator nebs (03/18: switched to levalbuterol given afib w rvr), oxygen to maintain sat >89. IS.   - Outpatient Pulmonary FU. PFT  - COPD RT consult.   - Encourage tobacco cessation.       # BC + for diptheroids from 17th afternoon (F/U cultres from 17th night-NGTD)  * Appreciate ID review. D/C Levaquin.  repeat culture Monday- no growth 3 days     # Afib w rvr: at current NSR, HR controlled (Recurrent episode due to drinking- previous admission in 01/18 as well)- better after detox.    - ct metop to 100 XR daily.       #Hx of chronic pain syndrome. Hx of Opioid abuse   On Suboxone.   - continue PTA Suboxone for now.   - PTA gabapentin. robaxin.      # Depression, PTSD w/ hx of panic attacks:   - Continue PTA venlafaxine.  - on Diazepam per MSSA  - FU Psychiatry recs      # Tobacco abuse: nicotine patch panel.       # FEN: adat to regular. Ivf. Fu lytes.   PPx:   Lovenox SQ for dvt ppx  Full code.     Disposition Plan   Expected discharge in unclear days to commitment bed once court paperwork received and SW finds a place.     Entered: Tae Foster MD 03/31/2018, 11:20 AM       Tae Foster MD (Pager- [phone])   Internal Medicine/ Hospitalist

## 2018-03-31 NOTE — PLAN OF CARE
Problem: Patient Care Overview  Goal: Plan of Care/Patient Progress Review  Outcome: No Change    Pt alert and oriented. VSS. CMS/neuros at baseline: bilateral numbness and tingling in LEs. Passing flatus. Continues to endorse pain in left shoulder, managed by icy hot patches. Ambulates independently in room using a walker. Pt pursuing commitment status. Continue to monitor.

## 2018-04-01 LAB
BACTERIA SPEC CULT: NO GROWTH
Lab: NORMAL
PLATELET # BLD AUTO: 336 10E9/L (ref 150–450)
SPECIMEN SOURCE: NORMAL

## 2018-04-01 PROCEDURE — 36415 COLL VENOUS BLD VENIPUNCTURE: CPT | Performed by: HOSPITALIST

## 2018-04-01 PROCEDURE — 40000275 ZZH STATISTIC RCP TIME EA 10 MIN

## 2018-04-01 PROCEDURE — 25000132 ZZH RX MED GY IP 250 OP 250 PS 637: Performed by: HOSPITALIST

## 2018-04-01 PROCEDURE — 94640 AIRWAY INHALATION TREATMENT: CPT | Mod: 76

## 2018-04-01 PROCEDURE — 25000132 ZZH RX MED GY IP 250 OP 250 PS 637: Performed by: INTERNAL MEDICINE

## 2018-04-01 PROCEDURE — 25000128 H RX IP 250 OP 636: Performed by: INTERNAL MEDICINE

## 2018-04-01 PROCEDURE — 94640 AIRWAY INHALATION TREATMENT: CPT

## 2018-04-01 PROCEDURE — 12000008 ZZH R&B INTERMEDIATE UMMC

## 2018-04-01 PROCEDURE — 25000125 ZZHC RX 250: Performed by: INTERNAL MEDICINE

## 2018-04-01 PROCEDURE — 85049 AUTOMATED PLATELET COUNT: CPT | Performed by: HOSPITALIST

## 2018-04-01 PROCEDURE — 99231 SBSQ HOSP IP/OBS SF/LOW 25: CPT | Performed by: INTERNAL MEDICINE

## 2018-04-01 PROCEDURE — 25000132 ZZH RX MED GY IP 250 OP 250 PS 637: Performed by: STUDENT IN AN ORGANIZED HEALTH CARE EDUCATION/TRAINING PROGRAM

## 2018-04-01 RX ADMIN — BUPRENORPHINE AND NALOXONE 1 TABLET: 8; 2 TABLET SUBLINGUAL at 21:16

## 2018-04-01 RX ADMIN — ACETAMINOPHEN 650 MG: 325 TABLET ORAL at 19:22

## 2018-04-01 RX ADMIN — CYANOCOBALAMIN TAB 1000 MCG 1000 MCG: 1000 TAB at 07:54

## 2018-04-01 RX ADMIN — ASPIRIN 81 MG: 81 TABLET, COATED ORAL at 07:54

## 2018-04-01 RX ADMIN — BUPRENORPHINE AND NALOXONE 1 TABLET: 8; 2 TABLET SUBLINGUAL at 07:54

## 2018-04-01 RX ADMIN — LEVALBUTEROL HYDROCHLORIDE 0.63 MG: 0.63 SOLUTION RESPIRATORY (INHALATION) at 16:29

## 2018-04-01 RX ADMIN — NICOTINE 1 PATCH: 21 PATCH, EXTENDED RELEASE TRANSDERMAL at 07:53

## 2018-04-01 RX ADMIN — LEVALBUTEROL HYDROCHLORIDE 0.63 MG: 0.63 SOLUTION RESPIRATORY (INHALATION) at 07:52

## 2018-04-01 RX ADMIN — ACETAMINOPHEN 650 MG: 325 TABLET ORAL at 10:10

## 2018-04-01 RX ADMIN — GABAPENTIN 600 MG: 300 CAPSULE ORAL at 21:16

## 2018-04-01 RX ADMIN — GABAPENTIN 600 MG: 300 CAPSULE ORAL at 11:02

## 2018-04-01 RX ADMIN — HYDROXYZINE HYDROCHLORIDE 25 MG: 25 TABLET, FILM COATED ORAL at 21:29

## 2018-04-01 RX ADMIN — LEVALBUTEROL HYDROCHLORIDE 0.63 MG: 0.63 SOLUTION RESPIRATORY (INHALATION) at 20:11

## 2018-04-01 RX ADMIN — MENTHOL 1 PATCH: 205.5 PATCH TOPICAL at 19:22

## 2018-04-01 RX ADMIN — Medication 2 MG: at 21:29

## 2018-04-01 RX ADMIN — MENTHOL 1 PATCH: 205.5 PATCH TOPICAL at 08:46

## 2018-04-01 RX ADMIN — GABAPENTIN 600 MG: 300 CAPSULE ORAL at 16:55

## 2018-04-01 RX ADMIN — THERA TABS 1 TABLET: TAB at 07:55

## 2018-04-01 RX ADMIN — GABAPENTIN 600 MG: 300 CAPSULE ORAL at 07:54

## 2018-04-01 RX ADMIN — ENOXAPARIN SODIUM 40 MG: 40 INJECTION SUBCUTANEOUS at 21:16

## 2018-04-01 RX ADMIN — Medication 100 MG: at 07:54

## 2018-04-01 RX ADMIN — VENLAFAXINE HYDROCHLORIDE 75 MG: 75 TABLET, EXTENDED RELEASE ORAL at 07:54

## 2018-04-01 RX ADMIN — LEVALBUTEROL HYDROCHLORIDE 0.63 MG: 0.63 SOLUTION RESPIRATORY (INHALATION) at 12:13

## 2018-04-01 RX ADMIN — FOLIC ACID 1 MG: 1 TABLET ORAL at 07:54

## 2018-04-01 ASSESSMENT — ACTIVITIES OF DAILY LIVING (ADL)
ADLS_ACUITY_SCORE: 20

## 2018-04-01 NOTE — PROGRESS NOTES
"Patient seen and examined with RN     S- no new concerns    4 point ROS done and neg unless mentioned     O-   /73 (BP Location: Left arm)  Pulse 69  Temp 98.4  F (36.9  C) (Oral)  Resp 18  Ht 1.803 m (5' 10.98\")  Wt 93.4 kg (206 lb)  SpO2 93%  BMI 28.74 kg/m2   Gen- pleasant   CVS- I+II+ no m/r/g  RS- CTABS  Abdo- soft, no tenderness . No g/r/r   Ext- no edema    MSk - as per ortho     LABS:   BMP  No lab results found in last 7 days.  CBC    Recent Labs  Lab 04/01/18  0608 03/29/18  0602 03/26/18  0559    283 178     INRNo lab results found in last 7 days.  LFTs  No lab results found in last 7 days.   A/P:  Jorge Rosales is a 63 year old male with past medical history significant for alcohol dependency., recurrent admissions for alcohol related cause last 01/14--01/19/18 , smoker, COPD, chronic pain syndrome, opioid use disorder on suboxone maintenance, depression, PTSD, L leg weakness s/p Lumbar spine surgery admitted for alcohol intoxication,  withdrawal and acute on chronic hypoxic respi failure 2/2 PNA, mild copd exacerbation.  4/1- no change    3/31- no change. A/w commitment bed    3/30: # Shoulder pain- X- ray , ortho review appreciated     # Alcohol dependency, intoxication and withdrawal:   -MSSA with Diazepam- completed  - mvi, thiamine, folic acid daily  - fall, seizure precautions.   - Psychiatry consult appreciated 3/19: 1.  Continue patient on one-to-one.  Patient was switched to MSSA protocol on Valium.  Continue that.  The patient will be continued on Suboxone.  This is what the patient is taking.  He can be continued on his medications once doses are known, which is Effexor 75 mg.   - D/W psych 3/22 for  Commitment and for hallucinations . Agree as he  failed many times, has thrombocytopenia, and recurrent AFib with RVR alcohol related     County upheld the commitment on 3/27 -final decision will be on Thursday 3/28/18- a/w paperwork     # Acute on chronic hypoxic respi " failure , h/o COPD: suspect R lower lobe PNA. Mild copd exacerbation. - resolved   Started empirically on iv vanc, zosyn, doxy for hcap.  03/18: Given pt stable hemodynamics. Lactic acidosis: resolved quickly suspect mostly related to alcohol use, dehydration. Hypoxia: improving, afebrile, low procal: dc iv Vanco and Zosyn, doxy. Completed 7 days levofloxacin 750 mg daily- 3/24.  - Ct bronchodilator nebs (03/18: switched to levalbuterol given afib w rvr), oxygen to maintain sat >89. IS.   - Outpatient Pulmonary FU. PFT  - COPD RT consult.   - Encourage tobacco cessation.       # BC + for diptheroids from 17th afternoon (F/U cultres from 17th night-NGTD)  * Appreciate ID review. D/C Levaquin.  repeat culture Monday- no growth 3 days     # Afib w rvr: at current NSR, HR controlled (Recurrent episode due to drinking- previous admission in 01/18 as well)- better after detox.    - ct metop to 100 XR daily.       #Hx of chronic pain syndrome. Hx of Opioid abuse   On Suboxone.   - continue PTA Suboxone for now.   - PTA gabapentin. robaxin.      # Depression, PTSD w/ hx of panic attacks:   - Continue PTA venlafaxine.  - on Diazepam per MSSA  - FU Psychiatry recs      # Tobacco abuse: nicotine patch panel.       # FEN: adat to regular. Ivf. Fu lytes.   PPx:   Lovenox SQ for dvt ppx  Full code.     Disposition Plan   Expected discharge in unclear days to commitment bed once court paperwork received and SW finds a place.     Entered: Tae Foster MD 04/01/2018, 11:23 AM       Tae Foster MD (Pager- [phone])   Internal Medicine/ Hospitalist

## 2018-04-01 NOTE — PLAN OF CARE
Problem: Patient Care Overview  Goal: Plan of Care/Patient Progress Review  Outcome: No Change    VSS. Alert and oriented. LS clear. BS audible and present in all quadrants. CMS/neuros at baseline; bilateral numbness and tingling in LEs. Administered 2mg of melatonin and 50mg of PRN atarax for sleep/anxiety.   Ambulates independently to bathroom. No acute events overnight. MSSA score = 1. Pt pursing commitment. Able to make needs known. Continue POC.

## 2018-04-01 NOTE — PLAN OF CARE
Patient is Alert and oriented X4. VSS. Lung sounds clear. Bowel sounds active. Denies chest pain and shortness of breath. Pain in left shoulder and tylenol given, Arm sling on. Pt is independent and ambulated in his room and hallway. Pt is able to use call light to make needs known and call light remained within reach for the duration of the shift. Continue POC.

## 2018-04-01 NOTE — PLAN OF CARE
Problem: Patient Care Overview  Goal: Plan of Care/Patient Progress Review  Outcome: No Change  D: Pt  A/O x4. VSS . Lungs- CL, no c/o chest pain/ SOB. sats= 93 % RA. . Bowel+, passing gas. PP- +. +2 edema- feet/legs. CMS- intact( Baseline N/T and Mallory foot drop). Pt taking PO REG  food/ fluids well. Voiding Good amounts.Pt up walking  With sousa INDEPENDENTLY. Off unit for short periods of time. Pain/CAPA - denies. NO  MSSA  Required.  A: con't to monitor. Call light in reach. Pt able to make needs known. Pt awaiting  Commitment.

## 2018-04-02 PROCEDURE — 12000008 ZZH R&B INTERMEDIATE UMMC

## 2018-04-02 PROCEDURE — 25000132 ZZH RX MED GY IP 250 OP 250 PS 637: Performed by: INTERNAL MEDICINE

## 2018-04-02 PROCEDURE — 94640 AIRWAY INHALATION TREATMENT: CPT | Mod: 76

## 2018-04-02 PROCEDURE — 25000132 ZZH RX MED GY IP 250 OP 250 PS 637: Performed by: STUDENT IN AN ORGANIZED HEALTH CARE EDUCATION/TRAINING PROGRAM

## 2018-04-02 PROCEDURE — 25000132 ZZH RX MED GY IP 250 OP 250 PS 637: Performed by: HOSPITALIST

## 2018-04-02 PROCEDURE — 94640 AIRWAY INHALATION TREATMENT: CPT

## 2018-04-02 PROCEDURE — 40000275 ZZH STATISTIC RCP TIME EA 10 MIN

## 2018-04-02 PROCEDURE — 99231 SBSQ HOSP IP/OBS SF/LOW 25: CPT | Performed by: INTERNAL MEDICINE

## 2018-04-02 PROCEDURE — 25000128 H RX IP 250 OP 636: Performed by: INTERNAL MEDICINE

## 2018-04-02 PROCEDURE — 25000125 ZZHC RX 250: Performed by: INTERNAL MEDICINE

## 2018-04-02 RX ADMIN — Medication 3 MG: at 20:55

## 2018-04-02 RX ADMIN — GABAPENTIN 600 MG: 300 CAPSULE ORAL at 15:52

## 2018-04-02 RX ADMIN — FOLIC ACID 1 MG: 1 TABLET ORAL at 09:08

## 2018-04-02 RX ADMIN — ACETAMINOPHEN 650 MG: 325 TABLET ORAL at 09:08

## 2018-04-02 RX ADMIN — LEVALBUTEROL HYDROCHLORIDE 0.63 MG: 0.63 SOLUTION RESPIRATORY (INHALATION) at 12:10

## 2018-04-02 RX ADMIN — CYANOCOBALAMIN TAB 1000 MCG 1000 MCG: 1000 TAB at 09:08

## 2018-04-02 RX ADMIN — THERA TABS 1 TABLET: TAB at 09:08

## 2018-04-02 RX ADMIN — HYDROXYZINE HYDROCHLORIDE 25 MG: 25 TABLET, FILM COATED ORAL at 20:56

## 2018-04-02 RX ADMIN — LEVALBUTEROL HYDROCHLORIDE 0.63 MG: 0.63 SOLUTION RESPIRATORY (INHALATION) at 16:01

## 2018-04-02 RX ADMIN — MENTHOL 1 PATCH: 205.5 PATCH TOPICAL at 15:52

## 2018-04-02 RX ADMIN — ACETAMINOPHEN 650 MG: 325 TABLET ORAL at 12:35

## 2018-04-02 RX ADMIN — VENLAFAXINE HYDROCHLORIDE 75 MG: 75 TABLET, EXTENDED RELEASE ORAL at 09:08

## 2018-04-02 RX ADMIN — ENOXAPARIN SODIUM 40 MG: 40 INJECTION SUBCUTANEOUS at 20:57

## 2018-04-02 RX ADMIN — LEVALBUTEROL HYDROCHLORIDE 0.63 MG: 0.63 SOLUTION RESPIRATORY (INHALATION) at 08:00

## 2018-04-02 RX ADMIN — LEVALBUTEROL HYDROCHLORIDE 0.63 MG: 0.63 SOLUTION RESPIRATORY (INHALATION) at 20:10

## 2018-04-02 RX ADMIN — GABAPENTIN 600 MG: 300 CAPSULE ORAL at 20:57

## 2018-04-02 RX ADMIN — BUPRENORPHINE AND NALOXONE 1 TABLET: 8; 2 TABLET SUBLINGUAL at 09:08

## 2018-04-02 RX ADMIN — Medication 100 MG: at 09:08

## 2018-04-02 RX ADMIN — ASPIRIN 81 MG: 81 TABLET, COATED ORAL at 09:08

## 2018-04-02 RX ADMIN — GABAPENTIN 600 MG: 300 CAPSULE ORAL at 12:35

## 2018-04-02 RX ADMIN — NICOTINE 1 PATCH: 21 PATCH, EXTENDED RELEASE TRANSDERMAL at 09:11

## 2018-04-02 RX ADMIN — ACETAMINOPHEN 650 MG: 325 TABLET ORAL at 20:56

## 2018-04-02 RX ADMIN — GABAPENTIN 600 MG: 300 CAPSULE ORAL at 09:08

## 2018-04-02 RX ADMIN — BUPRENORPHINE AND NALOXONE 1 TABLET: 8; 2 TABLET SUBLINGUAL at 20:57

## 2018-04-02 ASSESSMENT — ACTIVITIES OF DAILY LIVING (ADL)
ADLS_ACUITY_SCORE: 21
ADLS_ACUITY_SCORE: 20

## 2018-04-02 NOTE — PLAN OF CARE
Problem: Patient Care Overview  Goal: Plan of Care/Patient Progress Review  Outcome: Improving  VSS, lung sounds clear.  Left shoulder pain controlled with PRN Tylenol.  Patient wears left arm sling when out of bed.  Lung sounds are clear.  Patient able to ambulate independently.  Patient reports baseline numbness/tingling in limbs on left side.  Patient can make needs known, will continue to monitor.

## 2018-04-02 NOTE — PROGRESS NOTES
"Patient seen and examined with RN     S- no new concerns    4 point ROS done and neg unless mentioned     O-   /87 (BP Location: Left arm)  Pulse 69  Temp 98.6  F (37  C) (Oral)  Resp 16  Ht 1.803 m (5' 10.98\")  Wt 93.4 kg (206 lb)  SpO2 91%  BMI 28.74 kg/m2   Gen- pleasant   CVS- I+II+ no m/r/g  RS- CTABS  Abdo- soft, no tenderness . No g/r/r   Ext- no edema    MSk - as per ortho     LABS:   BMP  No lab results found in last 7 days.  CBC    Recent Labs  Lab 04/01/18  0608 03/29/18  0602    283     INRNo lab results found in last 7 days.  LFTs  No lab results found in last 7 days.   A/P:  Jorge Rosales is a 63 year old male with past medical history significant for alcohol dependency., recurrent admissions for alcohol related cause last 01/14--01/19/18 , smoker, COPD, chronic pain syndrome, opioid use disorder on suboxone maintenance, depression, PTSD, L leg weakness s/p Lumbar spine surgery admitted for alcohol intoxication,  withdrawal and acute on chronic hypoxic respi failure 2/2 PNA, mild copd exacerbation.  4/2- no change      3/30: # Shoulder pain- X- ray , ortho review appreciated     # Alcohol dependency, intoxication and withdrawal:   -MSSA with Diazepam- completed  - mvi, thiamine, folic acid daily  - fall, seizure precautions.   - Psychiatry consult appreciated 3/19: 1.  Continue patient on one-to-one.  Patient was switched to MSSA protocol on Valium.  Continue that.  The patient will be continued on Suboxone.  This is what the patient is taking.  He can be continued on his medications once doses are known, which is Effexor 75 mg.   - D/W psych 3/22 for  Commitment and for hallucinations . Agree as he  failed many times, has thrombocytopenia, and recurrent AFib with RVR alcohol related     County upheld the commitment on 3/27 -final decision will be on Thursday 3/28/18- a/w paperwork     # Acute on chronic hypoxic respi failure , h/o COPD: suspect R lower lobe PNA. Mild copd " exacerbation. - resolved   Started empirically on iv vanc, zosyn, doxy for hcap.  03/18: Given pt stable hemodynamics. Lactic acidosis: resolved quickly suspect mostly related to alcohol use, dehydration. Hypoxia: improving, afebrile, low procal: dc iv Vanco and Zosyn, doxy. Completed 7 days levofloxacin 750 mg daily- 3/24.  - Ct bronchodilator nebs (03/18: switched to levalbuterol given afib w rvr), oxygen to maintain sat >89. IS.   - Outpatient Pulmonary FU. PFT  - COPD RT consult.   - Encourage tobacco cessation.       # BC + for diptheroids from 17th afternoon (F/U cultres from 17th night-NGTD)  * Appreciate ID review. D/C Levaquin.  repeat culture Monday- no growth 3 days     # Afib w rvr: at current NSR, HR controlled (Recurrent episode due to drinking- previous admission in 01/18 as well)- better after detox.    - ct metop to 100 XR daily.       #Hx of chronic pain syndrome. Hx of Opioid abuse   On Suboxone.   - continue PTA Suboxone for now.   - PTA gabapentin. robaxin.      # Depression, PTSD w/ hx of panic attacks:   - Continue PTA venlafaxine.  - on Diazepam per MSSA  - FU Psychiatry recs      # Tobacco abuse: nicotine patch panel.       # FEN: adat to regular. Ivf. Fu lytes.   PPx:   Lovenox SQ for dvt ppx  Full code.     Disposition Plan   Expected discharge in unclear days to commitment bed once court paperwork received and SW finds a place.     Entered: Tae Foster MD 04/02/2018, 12:04 PM       Tae Foster MD (Pager- [phone])   Internal Medicine/ Hospitalist

## 2018-04-02 NOTE — PLAN OF CARE
Problem: Patient Care Overview  Goal: Plan of Care/Patient Progress Review  Outcome: Improving    VSS. Afebrile. No acute events overnight. Pain in left shoulder managed with menthol icy hot patch. Nicotine patch on left arm. Ambulates independently to bathroom and in hallways. Left arm in sling. Pt allowed to leave unit but not hospital grounds. Able to make needs known. Continue POC.

## 2018-04-03 PROCEDURE — 25000132 ZZH RX MED GY IP 250 OP 250 PS 637: Performed by: INTERNAL MEDICINE

## 2018-04-03 PROCEDURE — 25000132 ZZH RX MED GY IP 250 OP 250 PS 637: Performed by: STUDENT IN AN ORGANIZED HEALTH CARE EDUCATION/TRAINING PROGRAM

## 2018-04-03 PROCEDURE — 12000008 ZZH R&B INTERMEDIATE UMMC

## 2018-04-03 PROCEDURE — 25000125 ZZHC RX 250: Performed by: INTERNAL MEDICINE

## 2018-04-03 PROCEDURE — 99232 SBSQ HOSP IP/OBS MODERATE 35: CPT | Performed by: INTERNAL MEDICINE

## 2018-04-03 PROCEDURE — 40000275 ZZH STATISTIC RCP TIME EA 10 MIN

## 2018-04-03 PROCEDURE — 94640 AIRWAY INHALATION TREATMENT: CPT | Mod: 76

## 2018-04-03 PROCEDURE — 25000132 ZZH RX MED GY IP 250 OP 250 PS 637: Performed by: HOSPITALIST

## 2018-04-03 PROCEDURE — 25000128 H RX IP 250 OP 636: Performed by: INTERNAL MEDICINE

## 2018-04-03 PROCEDURE — 94640 AIRWAY INHALATION TREATMENT: CPT

## 2018-04-03 RX ADMIN — ACETAMINOPHEN 650 MG: 325 TABLET ORAL at 01:02

## 2018-04-03 RX ADMIN — LEVALBUTEROL HYDROCHLORIDE 0.63 MG: 0.63 SOLUTION RESPIRATORY (INHALATION) at 08:06

## 2018-04-03 RX ADMIN — ENOXAPARIN SODIUM 40 MG: 40 INJECTION SUBCUTANEOUS at 20:43

## 2018-04-03 RX ADMIN — MENTHOL 1 PATCH: 205.5 PATCH TOPICAL at 08:46

## 2018-04-03 RX ADMIN — ACETAMINOPHEN 650 MG: 325 TABLET ORAL at 08:47

## 2018-04-03 RX ADMIN — GABAPENTIN 600 MG: 300 CAPSULE ORAL at 20:43

## 2018-04-03 RX ADMIN — THERA TABS 1 TABLET: TAB at 08:47

## 2018-04-03 RX ADMIN — ASPIRIN 81 MG: 81 TABLET, COATED ORAL at 08:47

## 2018-04-03 RX ADMIN — Medication 100 MG: at 08:47

## 2018-04-03 RX ADMIN — Medication 3 MG: at 20:43

## 2018-04-03 RX ADMIN — HYDROXYZINE HYDROCHLORIDE 25 MG: 25 TABLET, FILM COATED ORAL at 20:43

## 2018-04-03 RX ADMIN — ACETAMINOPHEN 650 MG: 325 TABLET ORAL at 13:49

## 2018-04-03 RX ADMIN — NICOTINE 1 PATCH: 21 PATCH, EXTENDED RELEASE TRANSDERMAL at 08:46

## 2018-04-03 RX ADMIN — GABAPENTIN 600 MG: 300 CAPSULE ORAL at 13:49

## 2018-04-03 RX ADMIN — LEVALBUTEROL HYDROCHLORIDE 0.63 MG: 0.63 SOLUTION RESPIRATORY (INHALATION) at 12:19

## 2018-04-03 RX ADMIN — CYANOCOBALAMIN TAB 1000 MCG 1000 MCG: 1000 TAB at 08:47

## 2018-04-03 RX ADMIN — GABAPENTIN 600 MG: 300 CAPSULE ORAL at 08:46

## 2018-04-03 RX ADMIN — FOLIC ACID 1 MG: 1 TABLET ORAL at 08:47

## 2018-04-03 RX ADMIN — LEVALBUTEROL HYDROCHLORIDE 0.63 MG: 0.63 SOLUTION RESPIRATORY (INHALATION) at 20:19

## 2018-04-03 RX ADMIN — ACETAMINOPHEN 650 MG: 325 TABLET ORAL at 20:43

## 2018-04-03 RX ADMIN — GABAPENTIN 600 MG: 300 CAPSULE ORAL at 17:07

## 2018-04-03 RX ADMIN — BUPRENORPHINE AND NALOXONE 1 TABLET: 8; 2 TABLET SUBLINGUAL at 20:43

## 2018-04-03 RX ADMIN — LEVALBUTEROL HYDROCHLORIDE 0.63 MG: 0.63 SOLUTION RESPIRATORY (INHALATION) at 16:23

## 2018-04-03 RX ADMIN — BUPRENORPHINE AND NALOXONE 1 TABLET: 8; 2 TABLET SUBLINGUAL at 08:47

## 2018-04-03 RX ADMIN — VENLAFAXINE HYDROCHLORIDE 75 MG: 75 TABLET, EXTENDED RELEASE ORAL at 08:47

## 2018-04-03 ASSESSMENT — ACTIVITIES OF DAILY LIVING (ADL)
ADLS_ACUITY_SCORE: 21

## 2018-04-03 NOTE — PLAN OF CARE
"Problem: Patient Care Overview  Goal: Plan of Care/Patient Progress Review  Outcome: Improving        VS:   VSS.    /75 (BP Location: Left arm)  Pulse 69  Temp 98.2  F (36.8  C) (Oral)  Resp 18  Ht 1.803 m (5' 10.98\")  Wt 93.4 kg (206 lb)  SpO2 94%  BMI 28.74 kg/m2  LS clear. Denies CP/SOB.  O2 sats mid 90's on RA.   Pt reports neb treatments have been helping and cough and chest discomfort have resolved.     Output:   Voiding +.  LBM 4/2/2018.     Activity:   Independent   Skin: +   Pain:   Pain in L shoulder managed with PRN tylenol admin x1 and hot packs   Neuro/CMS:   Pt has baseline n/t in L shoulder, L leg (from the thigh down) and his R big toe   Dressing(s):      Diet:   Tolerating reg diet no n/v   LDA:   No IV access   Equipment:   Sling for left arm and Brace for left foot   Plan:   Pt will d/c to CD treatment when a space is available   Additional Info:   Pt is on stay of commitment until d/c to CD           "

## 2018-04-03 NOTE — PROGRESS NOTES
"Patient seen and examined with RN     S- no new concerns    Waiting for court hold bed for alcohol treatment    No fever or chills     4 point ROS done and neg unless mentioned     O-   BP 92/58 (BP Location: Right arm)  Pulse 60  Temp 98.4  F (36.9  C) (Oral)  Resp 16  Ht 1.803 m (5' 10.98\")  Wt 93.4 kg (206 lb)  SpO2 96%  BMI 28.74 kg/m2   Gen- pleasant   CVS- I+II+ no m/r/g  RS- CTABS  Abdo- soft, no tenderness . No g/r/r   Ext- no edema    MSk - as per ortho     LABS:   BMP  No lab results found in last 7 days.  CBC    Recent Labs  Lab 04/01/18  0608 03/29/18  0602    283     INRNo lab results found in last 7 days.  LFTs  No lab results found in last 7 days.   A/P:  Jorge Rosales is a 63 year old male with past medical history significant for alcohol dependency., recurrent admissions for alcohol related cause last 01/14--01/19/18 , smoker, COPD, chronic pain syndrome, opioid use disorder on suboxone maintenance, depression, PTSD, L leg weakness s/p Lumbar spine surgery admitted for alcohol intoxication,  withdrawal and acute on chronic hypoxic respi failure 2/2 PNA, mild copd exacerbation.       # Alcohol dependency, intoxication and withdrawal:   -MSSA with Diazepam- completed  - mvi, thiamine, folic acid daily  - fall, seizure precautions.   - Psychiatry consult appreciated 3/19: 1.  Continue patient on one-to-one.  Patient was switched to MSSA protocol on Valium.  Continue that.  The patient will be continued on Suboxone.  This is what the patient is taking.  He can be continued on his medications once doses are known, which is Effexor 75 mg.   - D/W psych 3/22 for  Commitment and for hallucinations . Agree as he  failed many times, has thrombocytopenia, and recurrent AFib with RVR alcohol related     County upheld the commitment on 3/27     Waiting for court hold bed now      # Acute on chronic hypoxic respi failure , h/o COPD: suspect R lower lobe PNA. Mild copd exacerbation. - resolved "   Started empirically on iv vanc, zosyn, doxy for hcap.  03/18: Given pt stable hemodynamics. Lactic acidosis: resolved quickly suspect mostly related to alcohol use, dehydration. Hypoxia: improving, afebrile, low procal: dc iv Vanco and Zosyn, doxy. Completed 7 days levofloxacin 750 mg daily- 3/24.  - Ct bronchodilator nebs (03/18: switched to levalbuterol given afib w rvr), oxygen to maintain sat >89. IS.   - Outpatient Pulmonary FU. PFT  - COPD RT consult.   - Encourage tobacco cessation.       # BC + for diptheroids from 17th afternoon (F/U cultres from 17th night-NGTD)  * Appreciate ID review. D/C Levaquin.  repeat culture Monday- no growth 3 days     # Afib w rvr: at current NSR, HR controlled (Recurrent episode due to drinking- previous admission in 01/18 as well)- better after detox.    - ct metop to 100 XR daily.       #Hx of chronic pain syndrome. Hx of Opioid abuse   On Suboxone.   - continue PTA Suboxone for now.   - PTA gabapentin. robaxin.      # Depression, PTSD w/ hx of panic attacks:   - Continue PTA venlafaxine.  - on Diazepam per MSSA  - FU Psychiatry recs      # Tobacco abuse: nicotine patch panel.       # FEN: adat to regular. Ivf. Fu lytes.   PPx:   Lovenox SQ for dvt ppx  Full code.     Disposition Plan   Expected discharge in unclear days to commitment bed once court paperwork received and SW finds a place.     Entered: Dejan Swann 04/03/2018, 8:20 AM       Dejan Swann MD (Pager- [phone] )   Internal Medicine/ Hospitalist

## 2018-04-03 NOTE — PLAN OF CARE
Problem: Patient Care Overview  Goal: Plan of Care/Patient Progress Review  Outcome: Improving  Patient alert and oriented x 4. Up and ambulating independently with cane. Patient continues to complain of L shoulder pain, Icy hot patch  Placed and prn tylenol given. Patient on a stay of commitment & had his rule 25 done today. Patient waiting for placement. Will continue with POC.

## 2018-04-03 NOTE — PLAN OF CARE
Problem: Patient Care Overview  Goal: Plan of Care/Patient Progress Review  Pt resting in bed. Reports pain in shoulder. Tylenol given. VSS. Independent in room. Able to make needs known. Cont to assess.

## 2018-04-03 NOTE — PROGRESS NOTES
Infectious Diseases Chart Note:   4/3/2018     Patient has been doing well for >1 week off antibiotics. ID will sign off. Please feel free to call with any additional questions.     Florencia Salas MD  Infectious Diseases  [phone]

## 2018-04-04 PROCEDURE — 12000008 ZZH R&B INTERMEDIATE UMMC

## 2018-04-04 PROCEDURE — 25000125 ZZHC RX 250: Performed by: INTERNAL MEDICINE

## 2018-04-04 PROCEDURE — 99231 SBSQ HOSP IP/OBS SF/LOW 25: CPT | Performed by: INTERNAL MEDICINE

## 2018-04-04 PROCEDURE — 40000275 ZZH STATISTIC RCP TIME EA 10 MIN

## 2018-04-04 PROCEDURE — 25000132 ZZH RX MED GY IP 250 OP 250 PS 637: Performed by: HOSPITALIST

## 2018-04-04 PROCEDURE — 94640 AIRWAY INHALATION TREATMENT: CPT

## 2018-04-04 PROCEDURE — 25000128 H RX IP 250 OP 636: Performed by: INTERNAL MEDICINE

## 2018-04-04 PROCEDURE — 25000132 ZZH RX MED GY IP 250 OP 250 PS 637: Performed by: INTERNAL MEDICINE

## 2018-04-04 PROCEDURE — 94640 AIRWAY INHALATION TREATMENT: CPT | Mod: 76

## 2018-04-04 PROCEDURE — 25000132 ZZH RX MED GY IP 250 OP 250 PS 637: Performed by: STUDENT IN AN ORGANIZED HEALTH CARE EDUCATION/TRAINING PROGRAM

## 2018-04-04 RX ORDER — LEVALBUTEROL INHALATION SOLUTION 0.63 MG/3ML
0.63 SOLUTION RESPIRATORY (INHALATION) EVERY 4 HOURS PRN
Status: DISCONTINUED | OUTPATIENT
Start: 2018-04-04 | End: 2018-04-09 | Stop reason: HOSPADM

## 2018-04-04 RX ADMIN — LEVALBUTEROL HYDROCHLORIDE 0.63 MG: 0.63 SOLUTION RESPIRATORY (INHALATION) at 08:17

## 2018-04-04 RX ADMIN — THERA TABS 1 TABLET: TAB at 09:57

## 2018-04-04 RX ADMIN — BUPRENORPHINE AND NALOXONE 1 TABLET: 8; 2 TABLET SUBLINGUAL at 09:57

## 2018-04-04 RX ADMIN — Medication 100 MG: at 09:57

## 2018-04-04 RX ADMIN — VENLAFAXINE HYDROCHLORIDE 75 MG: 75 TABLET, EXTENDED RELEASE ORAL at 09:58

## 2018-04-04 RX ADMIN — LEVALBUTEROL HYDROCHLORIDE 0.63 MG: 0.63 SOLUTION RESPIRATORY (INHALATION) at 12:28

## 2018-04-04 RX ADMIN — ACETAMINOPHEN 650 MG: 325 TABLET ORAL at 14:09

## 2018-04-04 RX ADMIN — CYANOCOBALAMIN TAB 1000 MCG 1000 MCG: 1000 TAB at 09:57

## 2018-04-04 RX ADMIN — MENTHOL 1 PATCH: 205.5 PATCH TOPICAL at 09:56

## 2018-04-04 RX ADMIN — GABAPENTIN 600 MG: 300 CAPSULE ORAL at 09:56

## 2018-04-04 RX ADMIN — HYDROXYZINE HYDROCHLORIDE 25 MG: 25 TABLET, FILM COATED ORAL at 17:50

## 2018-04-04 RX ADMIN — GABAPENTIN 600 MG: 300 CAPSULE ORAL at 20:53

## 2018-04-04 RX ADMIN — ENOXAPARIN SODIUM 40 MG: 40 INJECTION SUBCUTANEOUS at 20:58

## 2018-04-04 RX ADMIN — GABAPENTIN 600 MG: 300 CAPSULE ORAL at 17:50

## 2018-04-04 RX ADMIN — LEVALBUTEROL HYDROCHLORIDE 0.63 MG: 0.63 SOLUTION RESPIRATORY (INHALATION) at 16:41

## 2018-04-04 RX ADMIN — ACETAMINOPHEN 650 MG: 325 TABLET ORAL at 10:05

## 2018-04-04 RX ADMIN — ACETAMINOPHEN 650 MG: 325 TABLET ORAL at 17:50

## 2018-04-04 RX ADMIN — NICOTINE 1 PATCH: 21 PATCH, EXTENDED RELEASE TRANSDERMAL at 09:56

## 2018-04-04 RX ADMIN — GABAPENTIN 600 MG: 300 CAPSULE ORAL at 14:09

## 2018-04-04 RX ADMIN — HYDROXYZINE HYDROCHLORIDE 25 MG: 25 TABLET, FILM COATED ORAL at 10:05

## 2018-04-04 RX ADMIN — FOLIC ACID 1 MG: 1 TABLET ORAL at 09:57

## 2018-04-04 RX ADMIN — ACETAMINOPHEN 650 MG: 325 TABLET ORAL at 05:55

## 2018-04-04 RX ADMIN — ASPIRIN 81 MG: 81 TABLET, COATED ORAL at 09:57

## 2018-04-04 RX ADMIN — BUPRENORPHINE AND NALOXONE 1 TABLET: 8; 2 TABLET SUBLINGUAL at 20:53

## 2018-04-04 ASSESSMENT — ACTIVITIES OF DAILY LIVING (ADL)
ADLS_ACUITY_SCORE: 21

## 2018-04-04 NOTE — PLAN OF CARE
Problem: Patient Care Overview  Goal: Plan of Care/Patient Progress Review  AOx4. Pt took Suboxone, hydroxyzine and tylenol for comfortably manageable pain. Pt has numbness and tingling on left leg and left arm as a baseline. Has back and shoulder pain from surgery two years ago. Use a sling for the shoulder pain. Pt denied nausea, vomiting, and SOB. Regular diet. Pt in bed with call light within reach.

## 2018-04-04 NOTE — PROGRESS NOTES
"Patient seen and examined with RN     S- no new concerns    Waiting for court hold bed for alcohol treatment     [facility]     4 point ROS done and neg unless mentioned     O-   BP (!) 114/103 (BP Location: Left arm)  Pulse 67  Temp 97.7  F (36.5  C) (Oral)  Resp 18  Ht 1.803 m (5' 10.98\")  Wt 93.4 kg (206 lb)  SpO2 94%  BMI 28.74 kg/m2   Gen- pleasant   CVS- I+II+ no m/r/g  RS- CTABS  Abdo- soft, no tenderness . No g/r/r   Ext- no edema    MSk - as per ortho     LABS:   BMP  No lab results found in last 7 days.  CBC    Recent Labs  Lab 04/01/18  0608 03/29/18  0602    283     INRNo lab results found in last 7 days.  LFTs  No lab results found in last 7 days.   A/P:  Jorge Rosales is a 63 year old male with past medical history significant for alcohol dependency., recurrent admissions for alcohol related cause last 01/14--01/19/18 , smoker, COPD, chronic pain syndrome, opioid use disorder on suboxone maintenance, depression, PTSD, L leg weakness s/p Lumbar spine surgery admitted for alcohol intoxication,  withdrawal and acute on chronic hypoxic respi failure 2/2 PNA, mild copd exacerbation.       # Alcohol dependency, intoxication and withdrawal:   -MSSA with Diazepam- completed  - mvi, thiamine, folic acid daily  - Psychiatry consult appreciated 3/19: 1.  Continue patient on one-to-one.  Patient was switched to MSSA protocol on Valium.  Continue that.  The patient will be continued on Suboxone.     - D/W psych 3/22 for  Commitment and for hallucinations . Agree as he  failed many times, has thrombocytopenia, and recurrent AFib with RVR alcohol related     County upheld the commitment on 3/27     Waiting for court hold bed now      # Acute on chronic hypoxic respi failure , h/o COPD: suspect R lower lobe PNA. Mild copd exacerbation. - resolved   Started empirically on iv vanc, zosyn, doxy for hcap.  03/18: Given pt stable hemodynamics. Lactic acidosis: resolved quickly suspect " mostly related to alcohol use, dehydration. Hypoxia: improving, afebrile, low procal: dc iv Vanco and Zosyn, doxy. Completed 7 days levofloxacin 750 mg daily- 3/24.  - Ct bronchodilator nebs (03/18: switched to levalbuterol given afib w rvr), oxygen to maintain sat >89. IS.   - Outpatient Pulmonary FU. PFT  - COPD RT consult.   - Encourage tobacco cessation.       # BC + for diptheroids from 17th afternoon (F/U cultres from 17th night-NGTD)  * Appreciate ID review. D/Cd Levaquin.  repeat culture Monday- no growth     # Afib w rvr: at current NSR, HR controlled (Recurrent episode due to drinking- previous admission in 01/18 as well)- better after detox.    - ct metop to 100 XR daily.       #Hx of chronic pain syndrome. Hx of Opioid abuse   On Suboxone.   - continue PTA Suboxone for now.   - PTA gabapentin. robaxin.      # Depression, PTSD w/ hx of panic attacks:   - Continue PTA venlafaxine.  - on Diazepam per MSSA  - FU Psychiatry recs      # Tobacco abuse: nicotine patch panel.       # FEN: adat to regular. Ivf. Fu lytes.   PPx:   Lovenox SQ for dvt ppx  Full code.     Disposition Plan   Expected discharge in unclear days to commitment bed once court paperwork received and SW finds a place.     Entered: Dejan Swann 04/04/2018, 10:06 AM       Dejan Swann MD (Pager- [phone] )   Internal Medicine/ Hospitalist

## 2018-04-04 NOTE — PLAN OF CARE
Problem: Patient Care Overview  Goal: Plan of Care/Patient Progress Review  Outcome: Improving  Patient alert and oriented x 4. BP elevated 114/103- recheck later 122/76, MD aware, no new orders for now.  Up and ambulating independently with cane. Patient continues to complain of L shoulder pain, Icy hot patch placed and prn tylenol given. Patient waiting for placement. Will continue with POC.

## 2018-04-04 NOTE — PLAN OF CARE
Problem: Patient Care Overview  Goal: Plan of Care/Patient Progress Review  A/O x 4. Will offer pain medication when pt up this AM. Rested in bed during shift. VSS. No acute issues noted awaiting placement in CD treatment. Cont to assesss.

## 2018-04-05 PROCEDURE — 25000132 ZZH RX MED GY IP 250 OP 250 PS 637: Performed by: INTERNAL MEDICINE

## 2018-04-05 PROCEDURE — 25000132 ZZH RX MED GY IP 250 OP 250 PS 637: Performed by: HOSPITALIST

## 2018-04-05 PROCEDURE — 25000132 ZZH RX MED GY IP 250 OP 250 PS 637: Performed by: STUDENT IN AN ORGANIZED HEALTH CARE EDUCATION/TRAINING PROGRAM

## 2018-04-05 PROCEDURE — 99231 SBSQ HOSP IP/OBS SF/LOW 25: CPT | Performed by: INTERNAL MEDICINE

## 2018-04-05 PROCEDURE — 40000007 ZZH STATISTIC ADULT CD FACE TO FACE-NO CHRG

## 2018-04-05 PROCEDURE — 12000008 ZZH R&B INTERMEDIATE UMMC

## 2018-04-05 RX ADMIN — CYANOCOBALAMIN TAB 1000 MCG 1000 MCG: 1000 TAB at 08:34

## 2018-04-05 RX ADMIN — GABAPENTIN 600 MG: 300 CAPSULE ORAL at 14:15

## 2018-04-05 RX ADMIN — BUPRENORPHINE AND NALOXONE 1 TABLET: 8; 2 TABLET SUBLINGUAL at 21:22

## 2018-04-05 RX ADMIN — ACETAMINOPHEN 650 MG: 325 TABLET ORAL at 21:24

## 2018-04-05 RX ADMIN — BUPRENORPHINE AND NALOXONE 1 TABLET: 8; 2 TABLET SUBLINGUAL at 08:34

## 2018-04-05 RX ADMIN — HYDROXYZINE HYDROCHLORIDE 25 MG: 25 TABLET, FILM COATED ORAL at 17:23

## 2018-04-05 RX ADMIN — ASPIRIN 81 MG: 81 TABLET, COATED ORAL at 08:34

## 2018-04-05 RX ADMIN — GABAPENTIN 600 MG: 300 CAPSULE ORAL at 21:22

## 2018-04-05 RX ADMIN — ACETAMINOPHEN 650 MG: 325 TABLET ORAL at 08:34

## 2018-04-05 RX ADMIN — ACETAMINOPHEN 650 MG: 325 TABLET ORAL at 14:15

## 2018-04-05 RX ADMIN — Medication 100 MG: at 08:34

## 2018-04-05 RX ADMIN — MENTHOL 1 PATCH: 205.5 PATCH TOPICAL at 01:29

## 2018-04-05 RX ADMIN — GABAPENTIN 600 MG: 300 CAPSULE ORAL at 17:23

## 2018-04-05 RX ADMIN — ACETAMINOPHEN 650 MG: 325 TABLET ORAL at 17:23

## 2018-04-05 RX ADMIN — FOLIC ACID 1 MG: 1 TABLET ORAL at 08:34

## 2018-04-05 RX ADMIN — GABAPENTIN 600 MG: 300 CAPSULE ORAL at 08:34

## 2018-04-05 RX ADMIN — VENLAFAXINE HYDROCHLORIDE 75 MG: 75 TABLET, EXTENDED RELEASE ORAL at 08:34

## 2018-04-05 RX ADMIN — THERA TABS 1 TABLET: TAB at 08:34

## 2018-04-05 RX ADMIN — HYDROXYZINE HYDROCHLORIDE 25 MG: 25 TABLET, FILM COATED ORAL at 08:34

## 2018-04-05 RX ADMIN — NICOTINE 1 PATCH: 21 PATCH, EXTENDED RELEASE TRANSDERMAL at 08:34

## 2018-04-05 RX ADMIN — MENTHOL 1 PATCH: 205.5 PATCH TOPICAL at 08:34

## 2018-04-05 ASSESSMENT — ACTIVITIES OF DAILY LIVING (ADL)
ADLS_ACUITY_SCORE: 21

## 2018-04-05 NOTE — PROGRESS NOTES
CLINICAL NUTRITION SERVICES - REASSESSMENT NOTE     Nutrition Prescription    RECOMMENDATIONS FOR MDs/PROVIDERS TO ORDER:  None today    Malnutrition Status:    Patient does not meet two of the above criteria necessary for diagnosing malnutrition    Recommendations already ordered by Registered Dietitian (RD):  Weight check    Future/Additional Recommendations:  Anticipate gradual wt loss per pt preference to <200 lbs     EVALUATION OF THE PROGRESS TOWARD GOALS   Diet: Regular    Intake: eating 100% of meals per flowsheet. On average, pt is ordering 1645 kcal and 76 g protein daily per [employer]. This is likely meeting 88% minimum energy and 82% protein needs. Note pt expresses desire to lose wt and slight calorie restriction will help pt achieve goal.      NEW FINDINGS   - Waiting for CD treatment.   - Denies N/V and SOB per nursing notes.     MALNUTRITION  % Intake: Decreased intake does not meet criteria  % Weight Loss: Weight loss does not meet criteria  Subcutaneous Fat Loss: Unable to assess, previously none observed  Muscle Loss: Unable to assess, previously none observe  Fluid Accumulation/Edema: None noted  Malnutrition Diagnosis: Patient does not meet two of the above criteria necessary for diagnosing malnutrition    Previous Goals   Patient to consume % of nutritionally adequate meal trays TID, or the equivalent with supplements/snacks.  Evaluation: Met    Previous Nutrition Diagnosis  Predicted protein-energy intake related to variable appetite as evidenced by pt reliant on PO intakes to meet 100% of nutritional needs with potential for variation      Evaluation: No change    CURRENT NUTRITION DIAGNOSIS  Predicted protein-energy intake related to variable appetite as evidenced by pt reliant on PO intakes to meet 100% of nutritional needs with potential for variation     INTERVENTIONS  Implementation  Pt on phone during attempt. Chart reviewed.    Goals  Patient to consume % of nutritionally  adequate meal trays TID, or the equivalent with supplements/snacks.    Monitoring/Evaluation  Progress toward goals will be monitored and evaluated per protocol.      Eugenie Contreras RD, LD  Unit Pager: [phone]
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
## 2018-04-05 NOTE — PROGRESS NOTES
"Patient seen and examined with RN     S- no new concerns       Waiting for placement         Anxiety in good control     4 point ROS done and neg unless mentioned     O-   /73  Pulse 63  Temp 97.6  F (36.4  C) (Oral)  Resp 16  Ht 1.803 m (5' 10.98\")  Wt 93.4 kg (206 lb)  SpO2 96%  BMI 28.74 kg/m2   Gen- pleasant   CVS- I+II+ no m/r/g  RS- CTABS  Abdo- soft, no tenderness . No g/r/r   Ext- no edema    MSk - as per ortho     LABS:   BMP  No lab results found in last 7 days.  CBC    Recent Labs  Lab 04/01/18  0608        INRNo lab results found in last 7 days.  LFTs  No lab results found in last 7 days.   A/P:  Jorge Rosales is a 63 year old male with past medical history significant for alcohol dependency., recurrent admissions for alcohol related cause last 01/14--01/19/18 , smoker, COPD, chronic pain syndrome, opioid use disorder on suboxone maintenance, depression, PTSD, L leg weakness s/p Lumbar spine surgery admitted for alcohol intoxication,  withdrawal and acute on chronic hypoxic respi failure 2/2 PNA, mild copd exacerbation.       # Alcohol dependency, intoxication and withdrawal:   -MSSA with Diazepam- completed  - mvi, thiamine, folic acid daily  - Psychiatry consult appreciated 3/19: 1.  Continue patient on one-to-one.  Patient was switched to MSSA protocol on Valium.  Continue that.  The patient will be continued on Suboxone.     - D/W psych 3/22 for  Commitment and for hallucinations . Agree as he  failed many times, has thrombocytopenia, and recurrent AFib with RVR alcohol related     County upheld the commitment on 3/27     Waiting for court hold bed now      # Acute on chronic hypoxic respi failure , h/o COPD: suspect R lower lobe PNA. Mild copd exacerbation. - resolved   Started empirically on iv vanc, zosyn, doxy for hcap.  03/18: Given pt stable hemodynamics. Lactic acidosis: resolved quickly suspect mostly related to alcohol use, dehydration. Hypoxia: improving, afebrile, " low procal: dc iv Vanco and Zosyn, doxy. Completed 7 days levofloxacin 750 mg daily- 3/24.  - Ct bronchodilator nebs (03/18: switched to levalbuterol given afib w rvr), oxygen to maintain sat >89. IS.   - Outpatient Pulmonary FU. PFT  - Encourage tobacco cessation.         # Afib w rvr: at current NSR, HR controlled (Recurrent episode due to drinking- previous admission in 01/18 as well)- better after detox.    - ct metop to 100 XR daily.       #Hx of chronic pain syndrome. Hx of Opioid abuse   On Suboxone.   Plan per pain team       # Depression, PTSD w/ hx of panic attacks:   - Continue PTA venlafaxine.  - on Diazepam per MSSA  - FU Psychiatry recs      # Tobacco abuse: nicotine patch panel.       # FEN: adat to regular. Ivf. Fu lytes.   PPx:   Lovenox SQ for dvt ppx  Full code.     Disposition Plan   Expected discharge in unclear days to commitment bed once court paperwork received and SW finds a place.     Entered: Dejan Swann 04/05/2018, 8:29 AM       Dejan Swann MD (Pager- [phone] )   Internal Medicine/ Hospitalist
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
## 2018-04-05 NOTE — TELEPHONE ENCOUNTER
Date: 4/5/2018    To: TOO RN    Please call this patient's RN staff on 10A at [phone] to complete a medical screening to clarify his medications and medical condition.      Past Medical History:   Diagnosis Date     Chronic back pain     Methadone program     COPD (chronic obstructive pulmonary disease) (H)      CTS (carpal tunnel syndrome)      Low testosterone     managed by endocrinology     Obesity      Panic attacks      PTSD (post-traumatic stress disorder) 7/8/2011     Rotator cuff injury      Substance abuse     alcohol abuse     No current facility-administered medications for this visit.      No current outpatient prescriptions on file.     Facility-Administered Medications Ordered in Other Visits   Medication     levalbuterol (XOPENEX) neb solution 0.63 mg     menthol (ICY HOT) 5 % patch 1 patch     menthol (ICY HOT) patch REMOVAL     menthol (ICY HOT) Patch in Place     hydrOXYzine (ATARAX) tablet 25 mg     acetaminophen (TYLENOL) tablet 650 mg     diazepam (VALIUM) tablet 5-20 mg     melatonin tablet 1-3 mg     levalbuterol (XOPENEX HFA) Inhaler 2 puff     metoprolol (LOPRESSOR) injection 5 mg     benzonatate (TESSALON) capsule 100 mg     nicotine Patch in Place     nicotine patch REMOVAL     nicotine (NICODERM CQ) 21 MG/24HR 24 hr patch 1 patch     nicotine polacrilex (NICORETTE) gum 2 mg     enoxaparin (LOVENOX) injection 40 mg     multivitamin, therapeutic (THERA-VIT) tablet 1 tablet     thiamine tablet 100 mg     folic acid (FOLVITE) tablet 1 mg     bismuth subsalicylate (PEPTO BISMOL) chewable tablet 524 mg     buprenorphine-naloxone (SUBOXONE) 8-2 MG sublingual tablet 1 tablet     cyanocobalamin (vitamin  B-12) tablet 1,000 mcg     gabapentin (NEURONTIN) capsule 600 mg     methocarbamol (ROBAXIN) tablet 750 mg     polyethylene glycol (MIRALAX/GLYCOLAX) Packet 17 g     venlafaxine (EFFEXOR-ER) 24 hr tablet 75 mg     naloxone (NARCAN) injection 0.1-0.4 mg     aspirin EC EC tablet 81 mg      Thanks,  MOSHE Goodwin/LADC

## 2018-04-05 NOTE — PLAN OF CARE
Problem: Patient Care Overview  Goal: Plan of Care/Patient Progress Review  Outcome: Improving  Patient A&Ox4. Patient slept comfortably throughout the night. Complained of left shoulder pain and requested a PRN Icy-Hot patch with partial relief. Patient up independently. Waiting for placement.

## 2018-04-05 NOTE — TELEPHONE ENCOUNTER
Date: 4/5/2018    Jorge Rosales was seen by Leonard Luna on 4/5/2018 and he was deemed eligible for LP.    Medical: The patient was approved by the LP RN on 4/5/2018.  Client meets medical criteria for admission to [facility], pending MD resolves Lovenox injections, as pt cannot inject Lovenox while at LP.  Also, with the understanding that if pt does need nebulizer means for Xopenex, pt will need to supply this.    Insurance: [facility], so the [facility] referral forms will need to be sent in upon admission.    This patient will just need a VAA/ISP & LP Update upon admission to the LP program.    IV: This patient is not an IV drug user.    Business office: The patient has [employer]: PMAP and would NOT need to consult with anyone in the business office about the out of pocket costs of the LP program.    List: This patient CAN be placed on the [facility] LP Waiting List at this time.      Group: Men's Group or Mixed Group    The best current contact telephone number for the patient is on 10A: [phone]    Thanks,  Leonard Luna  [phone]

## 2018-04-05 NOTE — TELEPHONE ENCOUNTER
Please verify this patient's [facility] insurance coverage is active for this month (4/2018) ASAP for a potential LP admission.

## 2018-04-05 NOTE — PROGRESS NOTES
CD Evaluation Counselor:  Met with patient and we discussed his chemical use history and potential plans of action.  It was recommended that he abstain from all mood altering chemicals except as prescribed, follow all of the recommendations of his medical and mental health providers, and continue with medical stabilization while on 10 A.  When stable, it was recommended that he enter the [facility] program at [facility] for primary chemical dependency treatment and follow all of the recommendations of his chemical dependency providers including entering an extended care program as needed.  The patient will be placed on the [facility] LP waiting list after the LP RN has medically approved the patient.    MOSHE Goodwin/BONILLA  Tel: [phone]

## 2018-04-05 NOTE — TELEPHONE ENCOUNTER
Received clinical and CORNELIO from Nya Briceño [facility] in [place], [phone], from [place].  Faxed to [facility] staff for review.

## 2018-04-05 NOTE — PLAN OF CARE
Problem: Alcohol Withdrawal Acute, Risk/Actual (Adult)  Goal: Signs and Symptoms of Listed Potential Problems Will be Absent, Minimized or Managed (Alcohol Withdrawal Acute, Risk/Actual)  Signs and symptoms of listed potential problems will be absent, minimized or managed by discharge/transition of care (reference Alcohol Withdrawal Acute, Risk/Actual (Adult) CPG).   Outcome: Improving  Patient alert and oriented x 4. Up and ambulating independently with cane. Patient continues to complain of L shoulder pain, Icy hot patch placed and prn tylenol given. Patient waiting for placement. Will continue with POC.

## 2018-04-06 PROCEDURE — 25000132 ZZH RX MED GY IP 250 OP 250 PS 637: Performed by: HOSPITALIST

## 2018-04-06 PROCEDURE — 12000008 ZZH R&B INTERMEDIATE UMMC

## 2018-04-06 PROCEDURE — 25000132 ZZH RX MED GY IP 250 OP 250 PS 637: Performed by: INTERNAL MEDICINE

## 2018-04-06 PROCEDURE — 99231 SBSQ HOSP IP/OBS SF/LOW 25: CPT | Performed by: INTERNAL MEDICINE

## 2018-04-06 PROCEDURE — 25000132 ZZH RX MED GY IP 250 OP 250 PS 637: Performed by: STUDENT IN AN ORGANIZED HEALTH CARE EDUCATION/TRAINING PROGRAM

## 2018-04-06 RX ADMIN — MENTHOL 1 PATCH: 205.5 PATCH TOPICAL at 08:30

## 2018-04-06 RX ADMIN — FOLIC ACID 1 MG: 1 TABLET ORAL at 08:26

## 2018-04-06 RX ADMIN — Medication 100 MG: at 08:26

## 2018-04-06 RX ADMIN — CYANOCOBALAMIN TAB 1000 MCG 1000 MCG: 1000 TAB at 08:26

## 2018-04-06 RX ADMIN — GABAPENTIN 600 MG: 300 CAPSULE ORAL at 18:32

## 2018-04-06 RX ADMIN — NICOTINE 1 PATCH: 21 PATCH, EXTENDED RELEASE TRANSDERMAL at 08:25

## 2018-04-06 RX ADMIN — ACETAMINOPHEN 650 MG: 325 TABLET ORAL at 15:52

## 2018-04-06 RX ADMIN — BUPRENORPHINE AND NALOXONE 1 TABLET: 8; 2 TABLET SUBLINGUAL at 08:26

## 2018-04-06 RX ADMIN — Medication 3 MG: at 23:04

## 2018-04-06 RX ADMIN — ACETAMINOPHEN 650 MG: 325 TABLET ORAL at 10:55

## 2018-04-06 RX ADMIN — GABAPENTIN 600 MG: 300 CAPSULE ORAL at 08:26

## 2018-04-06 RX ADMIN — HYDROXYZINE HYDROCHLORIDE 25 MG: 25 TABLET, FILM COATED ORAL at 23:04

## 2018-04-06 RX ADMIN — THERA TABS 1 TABLET: TAB at 08:26

## 2018-04-06 RX ADMIN — BUPRENORPHINE AND NALOXONE 1 TABLET: 8; 2 TABLET SUBLINGUAL at 20:57

## 2018-04-06 RX ADMIN — GABAPENTIN 600 MG: 300 CAPSULE ORAL at 23:04

## 2018-04-06 RX ADMIN — GABAPENTIN 600 MG: 300 CAPSULE ORAL at 14:35

## 2018-04-06 RX ADMIN — VENLAFAXINE HYDROCHLORIDE 75 MG: 75 TABLET, EXTENDED RELEASE ORAL at 08:26

## 2018-04-06 RX ADMIN — ASPIRIN 81 MG: 81 TABLET, COATED ORAL at 08:26

## 2018-04-06 ASSESSMENT — ACTIVITIES OF DAILY LIVING (ADL)
ADLS_ACUITY_SCORE: 21

## 2018-04-06 ASSESSMENT — PAIN DESCRIPTION - DESCRIPTORS: DESCRIPTORS: ACHING

## 2018-04-06 NOTE — TELEPHONE ENCOUNTER
"SBAR  Name:   Jorge Rosales YOB: 1955 Age:  63 year old Gender:  male   Insurance:   [facility], so the [facility] referral forms will need to be sent in upon admission.   Precipitating Event:   Treatment due to own awareness of need for help and Treatment due to pressure from the legal system (he is on a stay-of-commitment)   DOC:   Alcohol  Additional abused substances:   Opiates, Benzodiazepines  and Nicotine   Medical:   Past Medical History:   Diagnosis Date     Chronic back pain     Methadone program     COPD (chronic obstructive pulmonary disease) (H)      CTS (carpal tunnel syndrome)      Low testosterone     managed by endocrinology     Obesity      Panic attacks      PTSD (post-traumatic stress disorder) 7/8/2011     Rotator cuff injury      Substance abuse     alcohol abuse      Mental Health:   MDD, Panic D/O NOS, PTSD, History of trauma and/or abuse issues and Significant grief and loss issues   Previous Detox admissions & CD treatments:  5 prior IP detoxification admission(s).  3 prior CD treatment(s).   Psychosocial:   and   3 adult child(sally)  Stable housing and no concerns  Minimal support network, Tendency to isolate from others, Relationship conflict with family members or friends due to substance abuse, Current legal issues, Current court probation or parole, Unemployed/Disabled and Financial problems   Suicide Risk Status:    Emergent? No  Urgent / Non-Emergent?  No  Present / Non- Urgent? Yes, Document in Epic / SBAR to counselor, Collaborate with patient / client to develop \"Patient Safety Plan\", Referral to PCP or psychiatrist, Address in Treatment Plan, Continuous monitoring, assessment and intervention and Address in Discharge / Transition Plan  No Current Risk? See above     Additional Info as needed: NA     "

## 2018-04-06 NOTE — TELEPHONE ENCOUNTER
[facility] bed available, patient will transfer on Monday 04/09/18 from [facility] to [facility] at 12:00 pm, but will arrive to [facility] at 11:30 am. Patient was reminded to have 30 day supply of meds. Will go to [facility] group A.

## 2018-04-06 NOTE — PLAN OF CARE
Problem: Patient Care Overview  Goal: Plan of Care/Patient Progress Review  Outcome: No Change  Pt makes needs known.Resting comfortably in bed.No PIV line.Plan d/c LP once bed is available.

## 2018-04-06 NOTE — PROGRESS NOTES
Social Work Services Progress Note    Hospital Day: 21    Collaborated with:  Kayleen Moss,  [phone]    Data:  DC plan    Intervention:  [facility] has bed for pt on Monday, 4/9/18. They would need 30 day supply of Subaxone. Dr Carlos Padron would be willing to prescribe this and has requested pt be scheduled for follow up appointment with him. Writer left voicemail message w/Kayleen requesting pt be scheduled for follow up with Dr. Padron    Assessment:  pt agreeable to DC to [facility]    Plan:    Anticipated Disposition:  Facility:  [facility]    Barriers to d/c plan:  Anticipate DC 4/9/18    Follow Up:  SW con't to follow      Addendum:  Writer spoke w/ Dr Padron. Pt is scheduled for follow up appointment on 4/20/18. He will send prescription to [facility] and send enough for pt to last until Dr Padron sees pt on 4/20/18.

## 2018-04-06 NOTE — PROGRESS NOTES
Report given to LP RN and patient has been medically approved for LP. Per RN patient will be on the priority list since he is an inpatient.

## 2018-04-06 NOTE — PROGRESS NOTES
"Patient seen and examined with RN     S- no new concerns       Waiting for placement        4 point ROS done and neg unless mentioned     O-   BP 94/55 (BP Location: Right arm)  Pulse 63  Temp 98.3  F (36.8  C) (Oral)  Resp 16  Ht 1.803 m (5' 10.98\")  Wt 93.4 kg (206 lb)  SpO2 92%  BMI 28.74 kg/m2   Gen- pleasant   CVS- I+II+ no m/r/g  RS- CTABS  Abdo- soft, no tenderness . No g/r/r   Ext- no edema    MSk - as per ortho     LABS:   BMP  No lab results found in last 7 days.  CBC    Recent Labs  Lab 04/01/18  0608        INRNo lab results found in last 7 days.  LFTs  No lab results found in last 7 days.   A/P:  Jorge Rosales is a 63 year old male with past medical history significant for alcohol dependency., recurrent admissions for alcohol related cause last 01/14--01/19/18 , smoker, COPD, chronic pain syndrome, opioid use disorder on suboxone maintenance, depression, PTSD, L leg weakness s/p Lumbar spine surgery admitted for alcohol intoxication,  withdrawal and acute on chronic hypoxic respi failure 2/2 PNA, mild copd exacerbation.       # Alcohol dependency, intoxication and withdrawal:   -MSSA with Diazepam- completed  - mvi, thiamine, folic acid daily  - Psychiatry consult appreciated 3/19: 1.  Continue patient on one-to-one.  Patient was switched to MSSA protocol on Valium.  Continue that.  The patient will be continued on Suboxone.     - D/W psych 3/22 for  Commitment and for hallucinations . Agree as he  failed many times, has thrombocytopenia, and recurrent AFib with RVR alcohol related     County upheld the commitment on 3/27     Waiting for court hold bed now- accepted for [facility] - will be going there on Monday     # Acute on chronic hypoxic respi failure , h/o COPD: suspect R lower lobe PNA. Mild copd exacerbation. - resolved   Started empirically on iv vanc, zosyn, doxy for hcap.  03/18: Given pt stable hemodynamics. Lactic acidosis: resolved quickly suspect mostly related to " alcohol use, dehydration. Hypoxia: improving, afebrile, low procal: dc iv Vanco and Zosyn, doxy. Completed 7 days levofloxacin 750 mg daily- 3/24.  - Ct bronchodilator nebs (03/18: switched to levalbuterol given afib w rvr), oxygen to maintain sat >89. IS.   - Outpatient Pulmonary FU. PFT  - Encourage tobacco cessation.       # Afib w rvr: at current NSR, HR controlled (Recurrent episode due to drinking- previous admission in 01/18 as well)- better after detox.    - ct metop to 100 XR daily.       #Hx of chronic pain syndrome. Hx of Opioid abuse   On Suboxone.   Plan per pain team       # Depression, PTSD w/ hx of panic attacks:   - Continue PTA venlafaxine.  - on Diazepam per MSSA  - FU Psychiatry recs      # Tobacco abuse: nicotine patch panel.       # FEN: adat to regular. Ivf. Fu lytes.   PPx:   Lovenox SQ for dvt ppx  Full code.     Disposition Plan   Expected discharge in unclear days to commitment bed once court paperwork received and SW finds a place.     Entered: Dejan Swann 04/06/2018, 1:52 PM       Dejan Swann MD (Pager- [phone] )   Internal Medicine/ Hospitalist

## 2018-04-06 NOTE — CONSULTS
CD Evaluation Counselor:  Met with patient and we discussed his chemical use history and potential plans of action.  It was recommended that he abstain from all mood altering chemicals except as prescribed, follow all of the recommendations of his medical and mental health providers, and continue with medical stabilization while on 10 A.  When stable, it was recommended that he enter the [facility] program at [facility] for primary chemical dependency treatment and follow all of the recommendations of his chemical dependency providers including entering an extended care program as needed.  The patient will be placed on the [facility] LP waiting list after the LP RN has medically approved the patient.     MOSHE Goodwin/BONILLA  Tel: [phone]

## 2018-04-07 PROCEDURE — 99231 SBSQ HOSP IP/OBS SF/LOW 25: CPT | Performed by: INTERNAL MEDICINE

## 2018-04-07 PROCEDURE — 25000132 ZZH RX MED GY IP 250 OP 250 PS 637: Performed by: STUDENT IN AN ORGANIZED HEALTH CARE EDUCATION/TRAINING PROGRAM

## 2018-04-07 PROCEDURE — 25000132 ZZH RX MED GY IP 250 OP 250 PS 637: Performed by: HOSPITALIST

## 2018-04-07 PROCEDURE — 12000008 ZZH R&B INTERMEDIATE UMMC

## 2018-04-07 PROCEDURE — 25000132 ZZH RX MED GY IP 250 OP 250 PS 637: Performed by: INTERNAL MEDICINE

## 2018-04-07 RX ADMIN — GABAPENTIN 600 MG: 300 CAPSULE ORAL at 20:13

## 2018-04-07 RX ADMIN — ASPIRIN 81 MG: 81 TABLET, COATED ORAL at 09:03

## 2018-04-07 RX ADMIN — NICOTINE 1 PATCH: 21 PATCH, EXTENDED RELEASE TRANSDERMAL at 09:03

## 2018-04-07 RX ADMIN — THERA TABS 1 TABLET: TAB at 09:03

## 2018-04-07 RX ADMIN — BUPRENORPHINE AND NALOXONE 1 TABLET: 8; 2 TABLET SUBLINGUAL at 09:02

## 2018-04-07 RX ADMIN — GABAPENTIN 600 MG: 300 CAPSULE ORAL at 13:47

## 2018-04-07 RX ADMIN — GABAPENTIN 600 MG: 300 CAPSULE ORAL at 09:03

## 2018-04-07 RX ADMIN — GABAPENTIN 600 MG: 300 CAPSULE ORAL at 17:03

## 2018-04-07 RX ADMIN — Medication 100 MG: at 09:03

## 2018-04-07 RX ADMIN — ACETAMINOPHEN 650 MG: 325 TABLET ORAL at 17:06

## 2018-04-07 RX ADMIN — ACETAMINOPHEN 650 MG: 325 TABLET ORAL at 06:35

## 2018-04-07 RX ADMIN — ACETAMINOPHEN 650 MG: 325 TABLET ORAL at 10:37

## 2018-04-07 RX ADMIN — BUPRENORPHINE AND NALOXONE 1 TABLET: 8; 2 TABLET SUBLINGUAL at 20:13

## 2018-04-07 RX ADMIN — FOLIC ACID 1 MG: 1 TABLET ORAL at 09:03

## 2018-04-07 RX ADMIN — VENLAFAXINE HYDROCHLORIDE 75 MG: 75 TABLET, EXTENDED RELEASE ORAL at 09:03

## 2018-04-07 RX ADMIN — HYDROXYZINE HYDROCHLORIDE 25 MG: 25 TABLET, FILM COATED ORAL at 21:48

## 2018-04-07 RX ADMIN — CYANOCOBALAMIN TAB 1000 MCG 1000 MCG: 1000 TAB at 09:03

## 2018-04-07 RX ADMIN — Medication 3 MG: at 21:48

## 2018-04-07 RX ADMIN — ACETAMINOPHEN 650 MG: 325 TABLET ORAL at 21:48

## 2018-04-07 ASSESSMENT — ACTIVITIES OF DAILY LIVING (ADL)
ADLS_ACUITY_SCORE: 21

## 2018-04-07 ASSESSMENT — PAIN DESCRIPTION - DESCRIPTORS
DESCRIPTORS: ACHING;DISCOMFORT
DESCRIPTORS: ACHING;DISCOMFORT

## 2018-04-07 NOTE — PROGRESS NOTES
"Patient seen and examined with RN     S-no changes today   Pain well controlled   Will go to [facility] on Monday     4 point ROS done and neg unless mentioned     O-   /74 (BP Location: Left arm)  Pulse 75  Temp 97.6  F (36.4  C) (Oral)  Resp 16  Ht 1.803 m (5' 10.98\")  Wt 93.4 kg (206 lb)  SpO2 92%  BMI 28.74 kg/m2   Gen- pleasant   CVS- I+II+ no m/r/g  RS- CTABS  Abdo- soft, no tenderness . No g/r/r   Ext- no edema    MSk - as per ortho     LABS:   BMP  No lab results found in last 7 days.  CBC    Recent Labs  Lab 04/01/18  0608        INRNo lab results found in last 7 days.  LFTs  No lab results found in last 7 days.   A/P:  Jorge Rosales is a 63 year old male with past medical history significant for alcohol dependency., recurrent admissions for alcohol related cause last 01/14--01/19/18 , smoker, COPD, chronic pain syndrome, opioid use disorder on suboxone maintenance, depression, PTSD, L leg weakness s/p Lumbar spine surgery admitted for alcohol intoxication,  withdrawal and acute on chronic hypoxic respi failure 2/2 PNA, mild copd exacerbation.       # Alcohol dependency, intoxication and withdrawal:   -MSSA with Diazepam- completed  - mvi, thiamine, folic acid daily  - Psychiatry consult appreciated 3/19: 1.  Continue patient on one-to-one.  Patient was switched to MSSA protocol on Valium.  Continue that.  The patient will be continued on Suboxone.     - D/W psych 3/22 for  Commitment and for hallucinations . Agree as he  failed many times, has thrombocytopenia, and recurrent AFib with RVR alcohol related     County upheld the commitment on 3/27     Waiting for court hold bed now- accepted for [facility] - will be going there on Monday     # Acute on chronic hypoxic respi failure , h/o COPD: suspect R lower lobe PNA. Mild copd exacerbation. - resolved   Started empirically on iv vanc, zosyn, doxy for hcap.  03/18: Given pt stable hemodynamics. Lactic acidosis: resolved quickly " suspect mostly related to alcohol use, dehydration. Hypoxia: improving, afebrile, low procal: dc iv Vanco and Zosyn, doxy. Completed 7 days levofloxacin 750 mg daily- 3/24.  - Ct bronchodilator nebs (03/18: switched to levalbuterol given afib w rvr), oxygen to maintain sat >89. IS.   - Outpatient Pulmonary FU. PFT  - Encourage tobacco cessation.       # Afib w rvr: in NSR     HR controlled (Recurrent episode due to drinking- previous admission in 01/18 as well)- better after detox.    - ct metop to 100 XR daily.       #Hx of chronic pain syndrome. Hx of Opioid abuse   On Suboxone.   Plan per pain team       # Depression, PTSD w/ hx of panic attacks:   - Continue PTA venlafaxine.  - on Diazepam per MSSA  - FU Psychiatry recs      # Tobacco abuse: nicotine patch panel.       # FEN: adat to regular. Ivf. Fu lytes.   PPx:   Lovenox SQ for dvt ppx  Full code.     Disposition Plan   Expected discharge in unclear days to commitment bed once court paperwork received and SW finds a place.     Entered: Dejan Swann 04/07/2018, 9:12 AM       Dejan Swann MD (Pager- [phone] )   Internal Medicine/ Hospitalist

## 2018-04-07 NOTE — PLAN OF CARE
Problem: Patient Care Overview  Goal: Plan of Care/Patient Progress Review  Outcome: Improving  Pt c/o left shoulder pain that is non radiating. Pain described as achy pain  Using Icy patches/ occasional warm packs/tylenol. Radial pulse+. Pt does c/o of numbness and tingling in last two fingers of left hand. Up ad hugo. Appetite good. Plan to go to [facility] on Monday. Call light within reach/pt able to make his needs known.

## 2018-04-07 NOTE — PLAN OF CARE
Problem: Patient Care Overview  Goal: Plan of Care/Patient Progress Review  Outcome: No Change  Up independently.  Tylenol given x1 for shoulder discomfort. Home medications were brought in by friend sent to security, Plan [facility]ging [facility] on Monday.

## 2018-04-07 NOTE — PLAN OF CARE
Problem: Patient Care Overview  Goal: Plan of Care/Patient Progress Review  A/O x 4. Tylenol given for pain control in  Left shoulder. Up  Independently to bathroom. No acute issues overnight.

## 2018-04-08 PROCEDURE — 25000132 ZZH RX MED GY IP 250 OP 250 PS 637: Performed by: INTERNAL MEDICINE

## 2018-04-08 PROCEDURE — 25000132 ZZH RX MED GY IP 250 OP 250 PS 637: Performed by: HOSPITALIST

## 2018-04-08 PROCEDURE — 12000008 ZZH R&B INTERMEDIATE UMMC

## 2018-04-08 PROCEDURE — 25000132 ZZH RX MED GY IP 250 OP 250 PS 637: Performed by: STUDENT IN AN ORGANIZED HEALTH CARE EDUCATION/TRAINING PROGRAM

## 2018-04-08 PROCEDURE — 99231 SBSQ HOSP IP/OBS SF/LOW 25: CPT | Performed by: INTERNAL MEDICINE

## 2018-04-08 RX ADMIN — Medication 3 MG: at 20:36

## 2018-04-08 RX ADMIN — GABAPENTIN 600 MG: 300 CAPSULE ORAL at 16:27

## 2018-04-08 RX ADMIN — Medication 100 MG: at 08:11

## 2018-04-08 RX ADMIN — GABAPENTIN 600 MG: 300 CAPSULE ORAL at 20:36

## 2018-04-08 RX ADMIN — BUPRENORPHINE AND NALOXONE 1 TABLET: 8; 2 TABLET SUBLINGUAL at 08:11

## 2018-04-08 RX ADMIN — NICOTINE 1 PATCH: 21 PATCH, EXTENDED RELEASE TRANSDERMAL at 08:13

## 2018-04-08 RX ADMIN — HYDROXYZINE HYDROCHLORIDE 25 MG: 25 TABLET, FILM COATED ORAL at 20:36

## 2018-04-08 RX ADMIN — VENLAFAXINE HYDROCHLORIDE 75 MG: 75 TABLET, EXTENDED RELEASE ORAL at 08:11

## 2018-04-08 RX ADMIN — CYANOCOBALAMIN TAB 1000 MCG 1000 MCG: 1000 TAB at 08:11

## 2018-04-08 RX ADMIN — FOLIC ACID 1 MG: 1 TABLET ORAL at 08:11

## 2018-04-08 RX ADMIN — GABAPENTIN 600 MG: 300 CAPSULE ORAL at 08:11

## 2018-04-08 RX ADMIN — THERA TABS 1 TABLET: TAB at 08:11

## 2018-04-08 RX ADMIN — ASPIRIN 81 MG: 81 TABLET, COATED ORAL at 08:11

## 2018-04-08 RX ADMIN — GABAPENTIN 600 MG: 300 CAPSULE ORAL at 11:16

## 2018-04-08 RX ADMIN — BUPRENORPHINE AND NALOXONE 1 TABLET: 8; 2 TABLET SUBLINGUAL at 20:36

## 2018-04-08 RX ADMIN — ACETAMINOPHEN 650 MG: 325 TABLET ORAL at 04:46

## 2018-04-08 ASSESSMENT — ACTIVITIES OF DAILY LIVING (ADL)
ADLS_ACUITY_SCORE: 21

## 2018-04-08 NOTE — PLAN OF CARE
"Problem: Patient Care Overview  Goal: Plan of Care/Patient Progress Review  Outcome: Therapy, progress toward functional goals as expected  Pt doing well. Up independent. A bit nervous about going to lodging +, \" there seemed to be a lot of young people\". Pt is positive about going & getting help.       "

## 2018-04-08 NOTE — PROGRESS NOTES
"Patient seen and examined with RN     S-awaiting placement to [facility]ging [facility]       4 point ROS done and neg unless mentioned     O-   /83 (BP Location: Left arm)  Pulse 74  Temp 97.4  F (36.3  C) (Oral)  Resp 16  Ht 1.803 m (5' 10.98\")  Wt 93.4 kg (206 lb)  SpO2 100%  BMI 28.74 kg/m2   Gen- pleasant   CVS- I+II+ no m/r/g  RS- CTABS  Abdo- soft, no tenderness . No g/r/r   Ext- no edema    MSk - as per ortho     Assessment and plan:   Jorge Rosales is a 63 year old male with past medical history significant for alcohol dependency., recurrent admissions for alcohol related cause last 01/14--01/19/18 , smoker, COPD, chronic pain syndrome, opioid use disorder on suboxone maintenance, depression, PTSD, L leg weakness s/p Lumbar spine surgery admitted for alcohol intoxication,  withdrawal and acute on chronic hypoxic respi failure 2/2 PNA, mild copd exacerbation.       # Alcohol dependency, intoxication and withdrawal:   -MSSA with Diazepam- completed  - mvi, thiamine, folic acid daily  - Psychiatry consult appreciated 3/19: 1.  Continue patient on one-to-one.  Patient was switched to MSSA protocol on Valium.  Continue that.  The patient will be continued on Suboxone.     - D/W psych 3/22 for  Commitment and for hallucinations . Agree as he  failed many times, has thrombocytopenia, and recurrent AFib with RVR alcohol related     County upheld the commitment on 3/27     Waiting for court hold bed now- accepted for [facility] - will be going there on Monday     # Acute on chronic hypoxic respi failure , h/o COPD: suspect R lower lobe PNA. Mild copd exacerbation. - resolved   Started empirically on iv vanc, zosyn, doxy for hcap.  03/18: Given pt stable hemodynamics. Lactic acidosis: resolved quickly suspect mostly related to alcohol use, dehydration. Hypoxia: improving, afebrile, low procal: dc iv Vanco and Zosyn, doxy. Completed 7 days levofloxacin 750 mg daily- 3/24.  - Ct bronchodilator nebs (03/18: " switched to levalbuterol given afib w rvr), oxygen to maintain sat >89. IS.   - Outpatient Pulmonary FU. PFT  - Encourage tobacco cessation.    on 21 mg nicotine patch- will change to 14 mg in AM      # Afib w rvr: in NSR     HR controlled (Recurrent episode due to drinking- previous admission in 01/18 as well)- better after detox.    - ct metop to 100 XR daily.       #Hx of chronic pain syndrome. Hx of Opioid abuse   On Suboxone.   Plan per pain team       # Depression, PTSD w/ hx of panic attacks:   - Continue PTA venlafaxine.  - on Diazepam per MSSA  - FU Psychiatry recs      # Tobacco abuse: nicotine patch panel.       # FEN: adat to regular. Ivf. Fu lytes.   PPx:   Lovenox SQ for dvt ppx  Full code.     Disposition Plan   Expected discharge in unclear days to commitment bed once court paperwork received and SW finds a place.     Entered: Dejan Swann 04/08/2018, 1:59 PM       Dejan Swann MD (Pager- [phone] )   Internal Medicine/ Hospitalist

## 2018-04-08 NOTE — PLAN OF CARE
Problem: Patient Care Overview  Goal: Plan of Care/Patient Progress Review  Outcome: Improving    VS:   Stable no abnormalities noted. No reports of SOB or chest pain. LS clear equal bilaterally    Output:   Voids spontaneously w/out difficulty. Last BM 4/8/18, passing gas   Activity:   Independent w/ cane   Skin: Intact w/ the exception of bruises    Pain:   Minimal, manageable w/ tylenol  650 q4h and warm packs. Icy Hot is out of stock until Monday    Neuro/CMS:   Has baseline numbness and tingling L side. Otherwise, no numbness and tingling noted anywhere else.   Dressing(s):   None    Diet:   Regular, tolerating well. No N&V noted.    Equipment:   Cane    IV's/Drains:   None    Plan:   [facility] will [facility]. Pt can make needs known, continue to monitor    Additional Info:

## 2018-04-08 NOTE — PLAN OF CARE
Problem: Alcohol Withdrawal Acute, Risk/Actual (Adult)  Goal: Signs and Symptoms of Listed Potential Problems Will be Absent, Minimized or Managed (Alcohol Withdrawal Acute, Risk/Actual)  Signs and symptoms of listed potential problems will be absent, minimized or managed by discharge/transition of care (reference Alcohol Withdrawal Acute, Risk/Actual (Adult) CPG).   Outcome: No Change  Patient is AxO. VSS with soft BP, asymptomatic. Patient denies any chest pain, SOB, new numbness or tingling. Pain in the left shoulder managed withTylenol, icy hot patches, and warm packs. Patient up independently. Denies any difficulty using the bathroom. Tolerating regular diet. Melatonin and atarax given at bedtime. Patient was able to use call light to make needs known. Continue POC.

## 2018-04-09 ENCOUNTER — HOSPITAL ENCOUNTER (OUTPATIENT)
Dept: BEHAVIORAL HEALTH | Facility: CLINIC | Age: 63
End: 2018-04-09
Attending: FAMILY MEDICINE
Payer: COMMERCIAL

## 2018-04-09 VITALS
DIASTOLIC BLOOD PRESSURE: 80 MMHG | SYSTOLIC BLOOD PRESSURE: 112 MMHG | HEART RATE: 69 BPM | OXYGEN SATURATION: 97 % | HEIGHT: 71 IN | TEMPERATURE: 97.6 F | RESPIRATION RATE: 18 BRPM | BODY MASS INDEX: 28.84 KG/M2 | WEIGHT: 206 LBS

## 2018-04-09 VITALS — SYSTOLIC BLOOD PRESSURE: 121 MMHG | TEMPERATURE: 97.5 F | HEART RATE: 74 BPM | DIASTOLIC BLOOD PRESSURE: 80 MMHG

## 2018-04-09 DIAGNOSIS — F17.200 NICOTINE DEPENDENCE: Primary | ICD-10-CM

## 2018-04-09 PROCEDURE — 25000132 ZZH RX MED GY IP 250 OP 250 PS 637: Performed by: STUDENT IN AN ORGANIZED HEALTH CARE EDUCATION/TRAINING PROGRAM

## 2018-04-09 PROCEDURE — 25000132 ZZH RX MED GY IP 250 OP 250 PS 637: Performed by: INTERNAL MEDICINE

## 2018-04-09 PROCEDURE — 10020000 ZZH LODGING PLUS FACILITY CHARGE ADULT

## 2018-04-09 PROCEDURE — 25000132 ZZH RX MED GY IP 250 OP 250 PS 637: Performed by: HOSPITALIST

## 2018-04-09 RX ORDER — AMOXICILLIN 250 MG
2 CAPSULE ORAL DAILY PRN
COMMUNITY
End: 2018-05-06

## 2018-04-09 RX ORDER — MAGNESIUM HYDROXIDE/ALUMINUM HYDROXICE/SIMETHICONE 120; 1200; 1200 MG/30ML; MG/30ML; MG/30ML
30 SUSPENSION ORAL EVERY 6 HOURS PRN
COMMUNITY
End: 2018-05-06

## 2018-04-09 RX ORDER — LORATADINE 10 MG/1
10 TABLET ORAL DAILY PRN
COMMUNITY
End: 2018-04-15

## 2018-04-09 RX ORDER — POLYETHYLENE GLYCOL 3350 17 G
2 POWDER IN PACKET (EA) ORAL
Qty: 144 LOZENGE | Refills: 1 | Status: SHIPPED | OUTPATIENT
Start: 2018-04-09 | End: 2018-11-06

## 2018-04-09 RX ORDER — GABAPENTIN 300 MG/1
600 CAPSULE ORAL 4 TIMES DAILY
Qty: 90 CAPSULE | Refills: 0 | Status: SHIPPED | OUTPATIENT
Start: 2018-04-09

## 2018-04-09 RX ADMIN — Medication 100 MG: at 08:13

## 2018-04-09 RX ADMIN — BUPRENORPHINE AND NALOXONE 1 TABLET: 8; 2 TABLET SUBLINGUAL at 08:12

## 2018-04-09 RX ADMIN — ASPIRIN 81 MG: 81 TABLET, COATED ORAL at 08:14

## 2018-04-09 RX ADMIN — ACETAMINOPHEN 650 MG: 325 TABLET ORAL at 02:09

## 2018-04-09 RX ADMIN — FOLIC ACID 1 MG: 1 TABLET ORAL at 08:14

## 2018-04-09 RX ADMIN — NICOTINE 1 PATCH: 21 PATCH, EXTENDED RELEASE TRANSDERMAL at 08:13

## 2018-04-09 RX ADMIN — GABAPENTIN 600 MG: 300 CAPSULE ORAL at 08:13

## 2018-04-09 RX ADMIN — MENTHOL 1 PATCH: 205.5 PATCH TOPICAL at 11:19

## 2018-04-09 RX ADMIN — CYANOCOBALAMIN TAB 1000 MCG 1000 MCG: 1000 TAB at 08:14

## 2018-04-09 RX ADMIN — VENLAFAXINE HYDROCHLORIDE 75 MG: 75 TABLET, EXTENDED RELEASE ORAL at 08:15

## 2018-04-09 RX ADMIN — ACETAMINOPHEN 650 MG: 325 TABLET ORAL at 10:15

## 2018-04-09 RX ADMIN — THERA TABS 1 TABLET: TAB at 08:14

## 2018-04-09 ASSESSMENT — ACTIVITIES OF DAILY LIVING (ADL)
ADLS_ACUITY_SCORE: 21

## 2018-04-09 ASSESSMENT — ANXIETY QUESTIONNAIRES
4. TROUBLE RELAXING: SEVERAL DAYS
2. NOT BEING ABLE TO STOP OR CONTROL WORRYING: SEVERAL DAYS
6. BECOMING EASILY ANNOYED OR IRRITABLE: NOT AT ALL
5. BEING SO RESTLESS THAT IT IS HARD TO SIT STILL: NOT AT ALL
7. FEELING AFRAID AS IF SOMETHING AWFUL MIGHT HAPPEN: NOT AT ALL
3. WORRYING TOO MUCH ABOUT DIFFERENT THINGS: MORE THAN HALF THE DAYS
GAD7 TOTAL SCORE: 5
1. FEELING NERVOUS, ANXIOUS, OR ON EDGE: SEVERAL DAYS

## 2018-04-09 ASSESSMENT — PAIN DESCRIPTION - DESCRIPTORS: DESCRIPTORS: ACHING
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
## 2018-04-09 NOTE — PROGRESS NOTES
Initial Services Plan        Service Initiation Date: 4/9/2018    Immediate health and/or safety concerns: No    Identify health and safety concern(s) below and include plan to address:    None Identified    Client issues to be addressed in the first treatment sessions:     Fear of adjusting to roommate or different environment  Other: He would like to focus on developing better sober coping skills and long-term sober maintenance skills.     Treatment suggestions for client during the time between intake (admit date) and completion of the individual treatment plan:     Look for a sober support network, i.e. 12 step, [facility], [facility], etc  Tour the treatment center or outpatient clinic  Introduce yourself to your treatment group. Spend time getting to know your peers  Review your patient or client handbook  Begin working on your treatment goal list      Completed by: BONILLA Reyes  Date completed: 4/9/2018 at 12:08 PM

## 2018-04-09 NOTE — PROGRESS NOTES
[facility] Assessment      Vital signs:   /80  Pulse 74  Temp 97.5  F (36.4  C)    Direct admission - was just discharged from [facility] today  *Patient is on a stay of commitment.    Counselor: Gurvinder Hays  Drug of Choice: Alcohol & history of opiate abuse (on suboxone maintenance)  Last use: 3/17/2018  Home clinic/MD: [facility]  Psychiatrist/therapist: Dr. Ez Basilio at the [facility]  Suboxone Maintenance: Dr. Irby at [facility] - Next appt is 4/20/2018 @     Medical history/current conditions: pneumonia (treated), recent re-injury to shoulder after a fall, COPD (stabilized), chronic back pain, Hx rotator cuff injury with surgery to repair in June 2017, carpal tunnel syndrome, atrial fibrillation with RVR, hepatitis C    H&P Screen:  H&P within the last 90 days: Yes.  Date: 3/17/2018 Location: [facility][1]      Mental Health diagnosis: depression, PTSD with history of panic attacks  Medication compliant?: Yes  Recent sucidal thoughts? No    When? N/A  Current thought of self-harm? None   Plan? N/A    Pain assessment:   Pt. Experiencing pain at this time?  Yes.  Rating on 0-10 scale: (1-10 scale): 5.  Location: back (chronic), shoulder (recent re-injury)  Chronic and Recent  Result of: Back and shoulder injuries.     Nursing Assessment Summary:  Per Dr. Dejan Swann, follow up with Pulmonologist as outpatient for recent COPD exacerbation & continued support for tobacco cessation. Per Dr. Miguel Aguilar, patient will need follow up blood cultures while off antibiotics regarding one single positive blood culture while hospitalized.   Appointment pending for sports medicine for recent fall resulting in re-injury to Left shoulder, 4/16/2018 @ 11:45AM at [address] [place] [address] [place].     On-going nursing intervention required?   No    Acute care visit recommended: Not immediately - follow recommendations of inpatient providers and sports medicine provider regarding shoulder  recommendations.

## 2018-04-09 NOTE — TELEPHONE ENCOUNTER
All documents faxed to [facility] for notification/authorization on 4/9/18 by MOSHE Goodwin/BONILLA.  The confirmation of the 4 pages being successfully transmitted was received on 4/9/18.
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
## 2018-04-09 NOTE — PLAN OF CARE
Problem: Patient Care Overview  Goal: Plan of Care/Patient Progress Review  Outcome: Adequate for Discharge Date Met: 04/09/18  Pt up ad hugo. C/o left shoulder pain. Pain described as an achy discomfort. CMS intact. Radial pulse +. Hot packs to shoulder. Icy hot patch on left shoulder. Pt transferring to [facility] at 1130. Discharge instruction given to patient along with his home medication from security. Home  Scripts B12 and buprenorphine and naloxone Given to patient. 3 prescriptions. (18 pills  x2 and 11 pills x1.) The remaining medications will be filled at [facility] discharge pharmacy and sent to [facility]. Charge nurse updated [facility] staff of refilling of scripts. Pt discharged with all his belongings via w/c and  to [facility].

## 2018-04-09 NOTE — PLAN OF CARE
Problem: Alcohol Withdrawal Acute, Risk/Actual (Adult)  Goal: Signs and Symptoms of Listed Potential Problems Will be Absent, Minimized or Managed (Alcohol Withdrawal Acute, Risk/Actual)  Signs and symptoms of listed potential problems will be absent, minimized or managed by discharge/transition of care (reference Alcohol Withdrawal Acute, Risk/Actual (Adult) CPG).   Outcome: No Change  Patient is AxO. VSS with soft BP, asymptomatic. Patient denies any chest pain, SOB, new numbness or tingling. Pain in the left shoulder managed withTylenol, icy hot patches, and warm packs. Patient up independently. Denies any difficulty using the bathroom. Tolerating regular diet. Patient was able to use call light to make needs known. Continue POC.

## 2018-04-10 ENCOUNTER — HOSPITAL ENCOUNTER (OUTPATIENT)
Dept: BEHAVIORAL HEALTH | Facility: CLINIC | Age: 63
End: 2018-04-10
Attending: FAMILY MEDICINE
Payer: COMMERCIAL

## 2018-04-10 PROBLEM — F19.20 CHEMICAL DEPENDENCY (H): Status: ACTIVE | Noted: 2018-04-10

## 2018-04-10 PROCEDURE — H2035 A/D TX PROGRAM, PER HOUR: HCPCS | Mod: HQ

## 2018-04-10 PROCEDURE — 10020000 ZZH LODGING PLUS FACILITY CHARGE ADULT

## 2018-04-10 ASSESSMENT — PATIENT HEALTH QUESTIONNAIRE - PHQ9: SUM OF ALL RESPONSES TO PHQ QUESTIONS 1-9: 5

## 2018-04-10 ASSESSMENT — ANXIETY QUESTIONNAIRES: GAD7 TOTAL SCORE: 5

## 2018-04-11 ENCOUNTER — HOSPITAL ENCOUNTER (OUTPATIENT)
Dept: BEHAVIORAL HEALTH | Facility: CLINIC | Age: 63
End: 2018-04-11
Attending: FAMILY MEDICINE
Payer: COMMERCIAL

## 2018-04-11 PROCEDURE — H2035 A/D TX PROGRAM, PER HOUR: HCPCS | Mod: HQ

## 2018-04-11 PROCEDURE — 10020000 ZZH LODGING PLUS FACILITY CHARGE ADULT
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
## 2018-04-12 ENCOUNTER — HOSPITAL ENCOUNTER (OUTPATIENT)
Dept: BEHAVIORAL HEALTH | Facility: CLINIC | Age: 63
End: 2018-04-12
Attending: FAMILY MEDICINE
Payer: COMMERCIAL

## 2018-04-12 PROCEDURE — 10020000 ZZH LODGING PLUS FACILITY CHARGE ADULT

## 2018-04-12 PROCEDURE — H2035 A/D TX PROGRAM, PER HOUR: HCPCS | Mod: HQ

## 2018-04-12 NOTE — PROGRESS NOTES
Patient Safety Plan Template    Name:   Jorge Rosales YOB: 1955 Age:  63 year old MR Number:  [id number]   Step 1: Warning signs (Thoughts, images, mood, situation, behavior) that a crisis may be developin. Feelings of low mood (situational)     2.  NA     3. NA     Step 2: Internal coping strategies - Things I can do to take my mind off of my problems without contacting another person (relaxation technique, physical activity):     1. Talk to established psychologist.     2. Talk to friends.     3. Use scooter to get outside.     Step 3: People and social settings that provide distraction:     1. Name: Tuan Andersen   Phone: Patient reports number is on cell phone.   2. Name: Maxi Scott   Phone: Patient reports number is on cell phone.   3. Place: Sober support meeting.   4. Place:      The one thing that is most important to me and worth living for is: Myself.     Step 4: People whom I can ask for help:     1. Name: Tuan Andersen   Phone: Patient reports number is on cell phone.     2. Name: Maxi Scott   Phone: Patient reports number is on his cell phone.     3. Name: Meetings   Phone: NA     Step 5: Professionals or agencies I can contact during a crisis:     1. Clinician Name: [facility]   Phone: NA   Clinician Pager or Emergency Contact #: NA     2. Clinician Name: [facility]   Phone: YAAKOV     Clinician Pager or Emergency Contact #: NA     3. Local Urgent Care Services: [facility]    Urgent Care Services Address:     Urgent Care Services Phone: NA     4. Suicide Prevention Lifeline Phone: 1-311-698-YQQZ ([phone])     Step 6: Making the environment safe:     1. Being around supportive people.     2. Talking with friends and not isolating.     Safety Plan Template 2008 Rosmery Flores and Carlitos Olson is reprinted with the express permission of the authors.  No portion of the Safety Plan Template may be reproduced without the express, written permission.  You  can contact the authors at [email or web address] or [email or web address].       Patient denies any suicide and/or self harm ideation at this time.

## 2018-04-12 NOTE — PROGRESS NOTES
Name: Jorge Rosales  Date: 4/12/2018  Medical Record: [id number]    Envelope Number: [id number]    List of Contents (List each item separately in new row):   (1) LG Black cellphone with cracked screen    Admission:  I am responsible for any personal items that are not sent to the safe or pharmacy.  [facility] is not responsible for loss, theft or damage of any property in my possession.      Patient Signature:  ___________________________________________       Date/Time:__________________________    Staff Signature: __________________________________       Date/Time:__________________________    2nd Staff person, if patient is unable/unwilling to sign:      __________________________________________________________       Date/Time: __________________________      Discharge:  [facility] has returned all of my personal belongings:    Patient Signature: ________________________________________     Date/Time: ____________________________________    Staff Signature: ______________________________________     Date/Time:_____________________________________

## 2018-04-13 ENCOUNTER — HOSPITAL ENCOUNTER (OUTPATIENT)
Dept: BEHAVIORAL HEALTH | Facility: CLINIC | Age: 63
End: 2018-04-13
Attending: FAMILY MEDICINE
Payer: COMMERCIAL

## 2018-04-13 PROCEDURE — 10020000 ZZH LODGING PLUS FACILITY CHARGE ADULT

## 2018-04-13 PROCEDURE — H2035 A/D TX PROGRAM, PER HOUR: HCPCS | Mod: HQ

## 2018-04-13 NOTE — PROGRESS NOTES
Acknowledgement of Current Treatment Plan - Initial Treatment Plan     INITIAL TREATMENT PLAN:     1. I have participated in creating my treatment plan with my counselor on 4/13/18.  I agree with the plan as it is written in the electronic health record.    Name Signature/Date   Jorge Rosales    Name of Counselor Signature/Date   BONILLA Queen        2. I have completed and reviewed my Safety Plan with my counselor and signed this on 4/13/2018. I have been given the hard copy of this plan.    Patient signature/date:      _____________________________________________________________________________    3. Last Use Date: __________    Patient signature/date:     _____________________________________________________________________________                                                    Acknowledgement of Current Treatment Plan - Additional Entries       ADDITIONAL GOALS AND INTERVENTIONS:    Signatures/dates required for any additional Problems, Goals, and/or Interventions added to treatment plan:  Change/Addition in Dimension ____ on date:_________  Insert here:         I have participated in creating this change and/or addition to my treatment plan copied above.   I have been given a copy of this signature page with change/addition to my treatment plan and I am in agreement with how it is written in the electronic record.       Patient signature:       ________________________________________________________________________      Counselor/Therapist signature:    ________________________________________________________________              Change in date of last use:       ________________________________________________________________        Patient signature/date (required for change in last use date):     ________________________________________________________________

## 2018-04-14 ENCOUNTER — HOSPITAL ENCOUNTER (OUTPATIENT)
Dept: BEHAVIORAL HEALTH | Facility: CLINIC | Age: 63
End: 2018-04-14
Attending: FAMILY MEDICINE
Payer: COMMERCIAL

## 2018-04-14 PROCEDURE — 10020000 ZZH LODGING PLUS FACILITY CHARGE ADULT

## 2018-04-14 PROCEDURE — H2035 A/D TX PROGRAM, PER HOUR: HCPCS | Mod: HQ

## 2018-04-15 ENCOUNTER — HOSPITAL ENCOUNTER (EMERGENCY)
Facility: CLINIC | Age: 63
Discharge: HOME | End: 2018-04-15
Attending: EMERGENCY MEDICINE | Admitting: EMERGENCY MEDICINE
Payer: COMMERCIAL

## 2018-04-15 ENCOUNTER — HOSPITAL ENCOUNTER (OUTPATIENT)
Dept: BEHAVIORAL HEALTH | Facility: CLINIC | Age: 63
End: 2018-04-15
Attending: FAMILY MEDICINE
Payer: COMMERCIAL

## 2018-04-15 VITALS
OXYGEN SATURATION: 99 % | DIASTOLIC BLOOD PRESSURE: 83 MMHG | SYSTOLIC BLOOD PRESSURE: 146 MMHG | RESPIRATION RATE: 16 BRPM | TEMPERATURE: 98.4 F | HEART RATE: 86 BPM

## 2018-04-15 DIAGNOSIS — R25.1 TREMULOUSNESS: ICD-10-CM

## 2018-04-15 DIAGNOSIS — F17.200 NICOTINE DEPENDENCE: Primary | ICD-10-CM

## 2018-04-15 DIAGNOSIS — R00.2 PALPITATIONS: ICD-10-CM

## 2018-04-15 LAB
AMPHETAMINES UR QL SCN: NEGATIVE
ANION GAP SERPL CALCULATED.3IONS-SCNC: 8 MMOL/L (ref 3–14)
BARBITURATES UR QL: NEGATIVE
BASOPHILS # BLD AUTO: 0 10E9/L (ref 0–0.2)
BASOPHILS NFR BLD AUTO: 0.2 %
BENZODIAZ UR QL: POSITIVE
BUN SERPL-MCNC: 20 MG/DL (ref 7–30)
CALCIUM SERPL-MCNC: 9.2 MG/DL (ref 8.5–10.1)
CANNABINOIDS UR QL SCN: NEGATIVE
CHLORIDE SERPL-SCNC: 108 MMOL/L (ref 94–109)
CO2 SERPL-SCNC: 27 MMOL/L (ref 20–32)
COCAINE UR QL: NEGATIVE
CREAT SERPL-MCNC: 0.69 MG/DL (ref 0.66–1.25)
DIFFERENTIAL METHOD BLD: NORMAL
EOSINOPHIL # BLD AUTO: 0.1 10E9/L (ref 0–0.7)
EOSINOPHIL NFR BLD AUTO: 0.5 %
ERYTHROCYTE [DISTWIDTH] IN BLOOD BY AUTOMATED COUNT: 14.2 % (ref 10–15)
ETHANOL SERPL-MCNC: <0.01 G/DL
ETHANOL UR QL SCN: NEGATIVE
GFR SERPL CREATININE-BSD FRML MDRD: >90 ML/MIN/1.7M2
GLUCOSE SERPL-MCNC: 101 MG/DL (ref 70–99)
HCT VFR BLD AUTO: 42.2 % (ref 40–53)
HGB BLD-MCNC: 13.8 G/DL (ref 13.3–17.7)
IMM GRANULOCYTES # BLD: 0 10E9/L (ref 0–0.4)
IMM GRANULOCYTES NFR BLD: 0.1 %
LYMPHOCYTES # BLD AUTO: 2.4 10E9/L (ref 0.8–5.3)
LYMPHOCYTES NFR BLD AUTO: 26.1 %
MCH RBC QN AUTO: 29.3 PG (ref 26.5–33)
MCHC RBC AUTO-ENTMCNC: 32.7 G/DL (ref 31.5–36.5)
MCV RBC AUTO: 90 FL (ref 78–100)
MONOCYTES # BLD AUTO: 0.8 10E9/L (ref 0–1.3)
MONOCYTES NFR BLD AUTO: 8.3 %
NEUTROPHILS # BLD AUTO: 5.9 10E9/L (ref 1.6–8.3)
NEUTROPHILS NFR BLD AUTO: 64.8 %
NRBC # BLD AUTO: 0 10*3/UL
NRBC BLD AUTO-RTO: 0 /100
OPIATES UR QL SCN: NEGATIVE
PLATELET # BLD AUTO: 206 10E9/L (ref 150–450)
POTASSIUM SERPL-SCNC: 4.1 MMOL/L (ref 3.4–5.3)
RBC # BLD AUTO: 4.71 10E12/L (ref 4.4–5.9)
SODIUM SERPL-SCNC: 143 MMOL/L (ref 133–144)
TROPONIN I SERPL-MCNC: <0.015 UG/L (ref 0–0.04)
TSH SERPL DL<=0.005 MIU/L-ACNC: 1.36 MU/L (ref 0.4–4)
WBC # BLD AUTO: 9.1 10E9/L (ref 4–11)

## 2018-04-15 PROCEDURE — 10020000 ZZH LODGING PLUS FACILITY CHARGE ADULT

## 2018-04-15 PROCEDURE — 80320 DRUG SCREEN QUANTALCOHOLS: CPT | Performed by: EMERGENCY MEDICINE

## 2018-04-15 PROCEDURE — 84484 ASSAY OF TROPONIN QUANT: CPT | Performed by: EMERGENCY MEDICINE

## 2018-04-15 PROCEDURE — 84443 ASSAY THYROID STIM HORMONE: CPT | Performed by: EMERGENCY MEDICINE

## 2018-04-15 PROCEDURE — 99284 EMERGENCY DEPT VISIT MOD MDM: CPT | Performed by: EMERGENCY MEDICINE

## 2018-04-15 PROCEDURE — 80307 DRUG TEST PRSMV CHEM ANLYZR: CPT | Performed by: EMERGENCY MEDICINE

## 2018-04-15 PROCEDURE — 99283 EMERGENCY DEPT VISIT LOW MDM: CPT | Mod: 25 | Performed by: EMERGENCY MEDICINE

## 2018-04-15 PROCEDURE — H2035 A/D TX PROGRAM, PER HOUR: HCPCS | Mod: HQ

## 2018-04-15 PROCEDURE — 85025 COMPLETE CBC W/AUTO DIFF WBC: CPT | Performed by: EMERGENCY MEDICINE

## 2018-04-15 PROCEDURE — 80048 BASIC METABOLIC PNL TOTAL CA: CPT | Performed by: EMERGENCY MEDICINE

## 2018-04-15 PROCEDURE — 93005 ELECTROCARDIOGRAM TRACING: CPT | Performed by: EMERGENCY MEDICINE

## 2018-04-15 PROCEDURE — 93010 ELECTROCARDIOGRAM REPORT: CPT | Mod: Z6 | Performed by: EMERGENCY MEDICINE

## 2018-04-15 RX ORDER — NICOTINE 21 MG/24HR
1 PATCH, TRANSDERMAL 24 HOURS TRANSDERMAL EVERY 24 HOURS
Qty: 28 PATCH | Refills: 1 | Status: SHIPPED | OUTPATIENT
Start: 2018-04-15 | End: 2018-11-06

## 2018-04-15 ASSESSMENT — ENCOUNTER SYMPTOMS
RHINORRHEA: 0
DIARRHEA: 0
SPEECH DIFFICULTY: 0
FEVER: 0
SHORTNESS OF BREATH: 0
VOMITING: 0
SINUS PAIN: 0
WEAKNESS: 0
SORE THROAT: 0
TREMORS: 1
PALPITATIONS: 1
NAUSEA: 0
COUGH: 0
DIAPHORESIS: 1
ABDOMINAL PAIN: 0
NUMBNESS: 0
CHILLS: 0
APPETITE CHANGE: 1
NERVOUS/ANXIOUS: 1

## 2018-04-15 NOTE — PROGRESS NOTES
Name: Jorge Rosales  Date: 4/15/2018  Medical Record: [id number]    Envelope Number: [id number]    List of Contents (List each item separately in new row):   1 box clear Nicotine transdermal patch    Admission:  I am responsible for any personal items that are not sent to the safe or pharmacy.  [facility] is not responsible for loss, theft or damage of any property in my possession.      Patient Signature:  ___________________________________________       Date/Time:__________________________    Staff Signature: __________________________________       Date/Time:__________________________    2nd Staff person, if patient is unable/unwilling to sign:      __________________________________________________________       Date/Time: __________________________      Discharge:  [facility] has returned all of my personal belongings:    Patient Signature: ________________________________________     Date/Time: ____________________________________    Staff Signature: ______________________________________     Date/Time:_____________________________________

## 2018-04-15 NOTE — ED AVS SNAPSHOT
[facility], [place], Emergency Department    [address]    [facility] [address]    Phone:  [phone]    Fax:  [phone]                                       Jorge Rosales   MRN: [id number]    Department:  [facility], Emergency Department   Date of Visit:  4/15/2018           After Visit Summary Signature Page     I have received my discharge instructions, and my questions have been answered. I have discussed any challenges I see with this plan with the nurse or doctor.    ..........................................................................................................................................  Patient/Patient Representative Signature      ..........................................................................................................................................  Patient Representative Print Name and Relationship to Patient    ..................................................               ................................................  Date                                            Time    ..........................................................................................................................................  Reviewed by Signature/Title    ...................................................              ..............................................  Date                                                            Time

## 2018-04-15 NOTE — PROGRESS NOTES
Name: Jorge Rosales  Date: 4/15/2018  Medical Record: [id number]    Envelope Number: [id number]    List of Contents (List each item separately in new row):   1 box clear nicotine transdermal patch    Admission:  I am responsible for any personal items that are not sent to the safe or pharmacy.  [facility] is not responsible for loss, theft or damage of any property in my possession.      Patient Signature:  ___________________________________________       Date/Time:__________________________    Staff Signature: __________________________________       Date/Time:__________________________    2nd Staff person, if patient is unable/unwilling to sign:      __________________________________________________________       Date/Time: __________________________      Discharge:  [facility] has returned all of my personal belongings:    Patient Signature: ________________________________________     Date/Time: ____________________________________    Staff Signature: ______________________________________     Date/Time:_____________________________________

## 2018-04-15 NOTE — PROGRESS NOTES
Name: Jorge Rosales  Date: 4/15/2018  Medical Record: [id number]    Envelope Number: [id number]    List of Contents (List each item separately in new row):   (1) box Clear Nicotine Transdermal Patch    Admission:  I am responsible for any personal items that are not sent to the safe or pharmacy.  [facility] is not responsible for loss, theft or damage of any property in my possession.      Patient Signature:  ___________________________________________       Date/Time:__________________________    Staff Signature: __________________________________       Date/Time:__________________________    2nd Staff person, if patient is unable/unwilling to sign:      __________________________________________________________       Date/Time: __________________________      Discharge:  [facility] has returned all of my personal belongings:    Patient Signature: ________________________________________     Date/Time: ____________________________________    Staff Signature: ______________________________________     Date/Time:_____________________________________

## 2018-04-15 NOTE — DISCHARGE INSTRUCTIONS
Thank you for your patience today.  Please follow-up with your regular doctor in the next 2-3 days for further evaluation and follow-up care.  Please call to schedule an appointment.  Please continue your own medications.  Please return to the ER if you develop any worsening of your current symptoms.  It was a pleasure taking care of you today.  We hope you feel better soon.

## 2018-04-15 NOTE — PROGRESS NOTES
Name: Jorge Rosales  Date: 4/15/2018  Medical Record: [id number]    Envelope Number: [id number]    List of Contents (List each item separately in new row):   (1) box Clear Nicotine Transdermal Patch    Admission:  I am responsible for any personal items that are not sent to the safe or pharmacy.  [facility] is not responsible for loss, theft or damage of any property in my possession.      Patient Signature:  ___________________________________________       Date/Time:__________________________    Staff Signature: __________________________________       Date/Time:__________________________    2nd Staff person, if patient is unable/unwilling to sign:      __________________________________________________________       Date/Time: __________________________      Discharge:  [facility] has returned all of my personal belongings:    Patient Signature: ________________________________________     Date/Time: ____________________________________    Staff Signature: ______________________________________     Date/Time:_____________________________________

## 2018-04-15 NOTE — ED AVS SNAPSHOT
[facility], Emergency Department    [address]    [facility] [address]    Phone:  [phone]    Fax:  [phone]                                       Jorge Rosales   MRN: [id number]    Department:  [facility], Emergency Department   Date of Visit:  4/15/2018           Patient Information     Date Of Birth          1955        Your diagnoses for this visit were:     Palpitations     Tremulousness        You were seen by Carley Baugh MD.        Discharge Instructions       Thank you for your patience today.  Please follow-up with your regular doctor in the next 2-3 days for further evaluation and follow-up care.  Please call to schedule an appointment.  Please continue your own medications.  Please return to the ER if you develop any worsening of your current symptoms.  It was a pleasure taking care of you today.  We hope you feel better soon.      Your next 10 appointments already scheduled     Apr 16, 2018  7:15 AM CDT   Treatment with ADULT LODGING PLUS A   [facility] ([facility])    [address] [place] [id number] [phone]            Apr 16, 2018 12:00 PM CDT   (Arrive by 11:45 AM)   New Patient Visit with Myke Fitch MD   [facility] ([facility])    [address] [place] [address]   [phone]            Apr 17, 2018  7:15 AM CDT   Treatment with ADULT LODGING PLUS A   [facility] ([facility])    [address] [place] [id number] [phone]            Apr 18, 2018  7:15 AM CDT   Treatment with ADULT LODGING PLUS A   [facility] ([facility])    [address] [place] [address] [phone]            Apr 19, 2018  7:15 AM CDT   Treatment with ADULT  LODGING PLUS A   [facility] ([facility])    [facility] [address] [address]   [phone]            Apr 20, 2018  7:15 AM CDT   Treatment with ADULT LODGING PLUS A   [facility] ([facility])    [address] [place] [address]   [phone]            Apr 21, 2018  7:15 AM CDT   Treatment with ADULT LODGING PLUS A   [facility] ([facility])    [address] [place] [address]   [phone]            Apr 22, 2018  7:15 AM CDT   Treatment with ADULT LODGING PLUS A   [facility] ([facility])    [address] [place] [address]   [phone]            Apr 23, 2018  7:15 AM CDT   Treatment with ADULT LODGING PLUS A   [facility] ([facility])    [address] [place] [address]   [phone]            Apr 24, 2018  7:15 AM CDT   Treatment with ADULT LODGING PLUS A   [facility] ([facility])    [address] [place] [address]   [phone]              24 Hour Appointment Hotline       To make an appointment at any [facility] clinic, call [phone] ([phone]). If you don't have a family doctor or clinic, we will help you find one. [facility] clinics are conveniently located to serve the needs of you and your family.             Review of your medicines      START taking        Dose / Directions Last dose taken    * nicotine 21 MG/24HR 24 hr patch   Commonly known as:  NICODERM CQ   Dose:  1 patch   Quantity:  28 patch        Place 1 patch onto the skin every 24 hours   Refills:  1        * nicotine 14 MG/24HR 24 hr patch   Commonly known  as:  NICODERM CQ   Dose:  1 patch   Quantity:  28 patch        Place 1 patch onto the skin every 24 hours   Refills:  1        * nicotine 7 MG/24HR 24 hr patch   Commonly known as:  NICODERM CQ   Dose:  1 patch   Quantity:  28 patch        Place 1 patch onto the skin every 24 hours   Refills:  1        * Notice:  This list has 3 medication(s) that are the same as other medications prescribed for you. Read the directions carefully, and ask your doctor or other care provider to review them with you.      Our records show that you are taking the medicines listed below. If these are incorrect, please call your family doctor or clinic.        Dose / Directions Last dose taken    albuterol 108 (90 Base) MCG/ACT Inhaler   Commonly known as:  PROAIR HFA/PROVENTIL HFA/VENTOLIN HFA   Dose:  2 puff   Quantity:  1 Inhaler        Inhale 2 puffs into the lungs every 6 hours as needed for shortness of breath / dyspnea or wheezing   Refills:  1        alum & mag hydroxide-simethicone 200-200-20 MG/5ML Susp suspension   Commonly known as:  MYLANTA/MAALOX   Dose:  30 mL        Take 30 mLs by mouth every 6 hours as needed for indigestion   Refills:  0        aspirin 81 MG tablet   Dose:  81 mg   Quantity:  30 tablet        Take 1 tablet (81 mg) by mouth daily   Refills:  0        buprenorphine-naloxone 8-2 MG Subl sublingual tablet   Commonly known as:  SUBOXONE        Dissolve 1.5 tablets sublingually in the morning and 1 tablet sublingually in the evening   Refills:  0        cyanocobalamin 1000 MCG tablet   Commonly known as:  vitamin  B-12   Dose:  1000 mcg   Quantity:  100 tablet        Take 1 tablet (1,000 mcg) by mouth daily   Refills:  0        fish oil-omega-3 fatty acids 1000 MG capsule   Dose:  1 g   Quantity:  180 capsule        Take 1 g by mouth daily   Refills:  12        gabapentin 300 MG capsule   Commonly known as:  NEURONTIN   Dose:  600 mg   Quantity:  90 capsule        Take 2 capsules (600 mg) by mouth 4 times  daily   Refills:  0        guaiFENesin 20 mg/mL Soln solution   Commonly known as:  ROBITUSSIN   Dose:  10 mL        Take 10 mLs by mouth every 4 hours as needed for cough   Refills:  0        IBUPROFEN PO   Dose:  400 mg        Take 400 mg by mouth every 6 hours as needed for moderate pain   Refills:  0        MELATONIN PO   Dose:  3 mg        Take 3 mg by mouth nightly as needed   Refills:  0        multivitamin per tablet   Dose:  1 tablet   Quantity:  100 tablet        Take 1 tablet by mouth daily.   Refills:  12        nicotine polacrilex 2 MG lozenge   Commonly known as:  COMMIT   Dose:  2 mg   Quantity:  144 lozenge        Place 1 lozenge (2 mg) inside cheek every hour as needed for smoking cessation   Refills:  1        phenol-menthol 14.5 MG lozenge   Dose:  1 lozenge        Place 1 lozenge inside cheek every 2 hours as needed for moderate pain   Refills:  0        polyethylene glycol Packet   Commonly known as:  MIRALAX/GLYCOLAX   Dose:  17 g        Take 17 g by mouth daily as needed for constipation   Refills:  0        senna-docusate 8.6-50 MG per tablet   Commonly known as:  SENOKOT-S;PERICOLACE   Dose:  2 tablet        Take 2 tablets by mouth daily as needed for constipation   Refills:  0        TYLENOL PO   Dose:  650 mg        Take 650 mg by mouth every 4 hours as needed for mild pain or fever   Refills:  0        venlafaxine 75 MG Tb24 24 hr tablet   Commonly known as:  EFFEXOR-ER   Dose:  75 mg   Indication:  Depression and Anxiety   Quantity:  30 each        Take 1 tablet (75 mg) by mouth daily   Refills:  1                Procedures and tests performed during your visit     Alcohol ethyl    Basic metabolic panel    CBC with platelets differential    Cardiac Continuous Monitoring    Drug abuse screen 6 urine (chem dep)    EKG 12-lead, tracing only    TSH    Troponin I      Orders Needing Specimen Collection     None      Pending Results     No orders found from 4/13/2018 to 4/16/2018.           "  Pending Culture Results     No orders found from 2018 to 2018.            Pending Results Instructions     If you had any lab results that were not finalized at the time of your Discharge, you can call the ED Lab Result RN at [phone]. You will be contacted by this team for any positive Lab results or changes in treatment. The nurses are available 7 days a week from 10A to 6:30P.  You can leave a message 24 hours per day and they will return your call.        Thank you for choosing [facility]       Thank you for choosing [facility] for your care. Our goal is always to provide you with excellent care. Hearing back from our patients is one way we can continue to improve our services. Please take a few minutes to complete the written survey that you may receive in the mail after you visit with us. Thank you!        [facility] Information     [employer] lets you send messages to your doctor, view your test results, renew your prescriptions, schedule appointments and more. To sign up, go to [email or web address] . Click on \"Log in\" on the left side of the screen, which will take you to the Welcome page. Then click on \"Sign up Now\" on the right side of the page.     You will be asked to enter the access code listed below, as well as some personal information. Please follow the directions to create your username and password.     Your access code is: [email or web address]  Expires: 6/15/2018 12:18 PM     Your access code will  in 90 days. If you need help or a new code, please call your [facility] clinic or [phone].        Care EveryWhere ID     This is your Care EveryWhere ID. This could be used by other organizations to access your [facility] medical records  [id number]        Equal Access to Services     GEOVANI VILLA : Tracy Llanos, anitha oliver, [employer]. [facility] [phone].    ATENCIÓN: Si habla español, tiene a nunez disposición " servicios gratuitos de asistencia lingüística. Bradley rosas [phone].    We comply with applicable federal civil rights laws and [place] laws. We do not discriminate on the basis of race, color, national origin, age, disability, sex, sexual orientation, or gender identity.            After Visit Summary       This is your record. Keep this with you and show to your community pharmacist(s) and doctor(s) at your next visit.

## 2018-04-15 NOTE — ED PROVIDER NOTES
History     Chief Complaint   Patient presents with     Tremors     pt in [facility], 30 days sober from etoh and benzos, in last day has developed tremor, irregular heartbeat, pt has hx of AFIB.     Irregular Heart Beat     HPI  Jorge Rosales is a 63 year old male with history of alcohol related atrial fibrillation w/ RVR(not currently anticoagulated), alcohol dependency, COPD, chronic pain syndrome, opioid use disorder now on Suboxone maintenance, left leg weakness/PE lumbar spine surgery who presents from [facility] for the evaluation of bilateral lower extremity tremors as well as an irregular heartbeat.  The patient recently was admitted to [facility] on 4/9/18, 6 days ago, after undergoing treatment for a pneumonia originally diagnosed on 3/17/18 through the ED here. He reports that treatment had been going well up until yesterday when he developed the aforementioned symptoms described as palpitations and a sense of a tachycardic irregular heartbeat that is reminiscent of when he was in A fib with RVR. He reports that they were brought on suddenly, but as he rested, his symptoms did dissipate somewhat. The patient has been considerably anxious about his symptoms since then, and he has now developed bilateral lower extremity tremors as well which is unusual as he typically only has left-sided LE tremors secondary to his previous spine surgery. Otherwise, the patient has felt diaphoretic, but he denies any chest pain, SOB, fevers, chills, recent cough or congestion, abdominal pain, nausea, vomiting, or diarrhea. The patient reports an adequate fluid intake recently, but he has had a poor appetite. No history of CAD. The patient reports being taken off Lovenox within the recent past, but otherwise no medication changes. He hasn't been getting a nicotine patch while admitted to [facility], but he has been able to smoke outside. No SI or HI. No numbness or weakness. The patient has been unusually  sedentary lately.             PAST MEDICAL HISTORY:   Past Medical History:   Diagnosis Date     Chronic back pain     Methadone program     COPD (chronic obstructive pulmonary disease) (H)      CTS (carpal tunnel syndrome)      Low testosterone     managed by endocrinology     Obesity      Panic attacks      PTSD (post-traumatic stress disorder) 7/8/2011     Rotator cuff injury      Substance abuse     alcohol abuse       PAST SURGICAL HISTORY:   Past Surgical History:   Procedure Laterality Date     BACK SURGERY       SHOULDER SURGERY         FAMILY HISTORY:   Family History   Problem Relation Age of Onset     DIABETES Father      HEART DISEASE No family hx of        SOCIAL HISTORY:   Social History   Substance Use Topics     Smoking status: Current Every Day Smoker     Packs/day: 0.50     Years: 30.00     Types: Cigarettes     Smokeless tobacco: Never Used      Comment: interested in quitting, reducing use at minimum - lozenges ordered     Alcohol use Yes       Patient's Medications   New Prescriptions    NICOTINE (NICODERM CQ) 14 MG/24HR 24 HR PATCH    Place 1 patch onto the skin every 24 hours    NICOTINE (NICODERM CQ) 21 MG/24HR 24 HR PATCH    Place 1 patch onto the skin every 24 hours    NICOTINE (NICODERM CQ) 7 MG/24HR 24 HR PATCH    Place 1 patch onto the skin every 24 hours   Previous Medications    ACETAMINOPHEN (TYLENOL PO)    Take 650 mg by mouth every 4 hours as needed for mild pain or fever    ALBUTEROL (PROAIR HFA/PROVENTIL HFA/VENTOLIN HFA) 108 (90 BASE) MCG/ACT INHALER    Inhale 2 puffs into the lungs every 6 hours as needed for shortness of breath / dyspnea or wheezing    ALUM & MAG HYDROXIDE-SIMETHICONE (MYLANTA/MAALOX) 200-200-20 MG/5ML SUSP SUSPENSION    Take 30 mLs by mouth every 6 hours as needed for indigestion    ASPIRIN 81 MG TABLET    Take 1 tablet (81 mg) by mouth daily    BUPRENORPHINE-NALOXONE (SUBOXONE) 8-2 MG SUBL SUBLINGUAL TABLET    Dissolve 1.5 tablets sublingually in the morning  and 1 tablet sublingually in the evening    CYANOCOBALAMIN (VITAMIN  B-12) 1000 MCG TABLET    Take 1 tablet (1,000 mcg) by mouth daily    FISH OIL-OMEGA-3 FATTY ACIDS 1000 MG CAPSULE    Take 1 g by mouth daily     GABAPENTIN (NEURONTIN) 300 MG CAPSULE    Take 2 capsules (600 mg) by mouth 4 times daily    GUAIFENESIN (ROBITUSSIN) 20 MG/ML SOLN SOLUTION    Take 10 mLs by mouth every 4 hours as needed for cough    IBUPROFEN PO    Take 400 mg by mouth every 6 hours as needed for moderate pain    MELATONIN PO    Take 3 mg by mouth nightly as needed    MULTIPLE VITAMIN (MULTIVITAMIN) PER TABLET    Take 1 tablet by mouth daily.    NICOTINE POLACRILEX (COMMIT) 2 MG LOZENGE    Place 1 lozenge (2 mg) inside cheek every hour as needed for smoking cessation    PHENOL-MENTHOL (CEPASTAT) 14.5 MG LOZENGE    Place 1 lozenge inside cheek every 2 hours as needed for moderate pain    POLYETHYLENE GLYCOL (MIRALAX/GLYCOLAX) PACKET    Take 17 g by mouth daily as needed for constipation    SENNA-DOCUSATE (SENOKOT-S;PERICOLACE) 8.6-50 MG PER TABLET    Take 2 tablets by mouth daily as needed for constipation    VENLAFAXINE (EFFEXOR-ER) 75 MG TB24 24 HR TABLET    Take 1 tablet (75 mg) by mouth daily   Modified Medications    No medications on file   Discontinued Medications    LORATADINE (CLARITIN) 10 MG TABLET    Take 10 mg by mouth daily as needed for allergies        No Known Allergies      I have reviewed the Medications, Allergies, Past Medical and Surgical History, and Social History in the Epic system.    Review of Systems   Constitutional: Positive for appetite change and diaphoresis. Negative for chills and fever.   HENT: Negative for congestion, rhinorrhea, sinus pain and sore throat.    Respiratory: Negative for cough and shortness of breath.    Cardiovascular: Positive for palpitations. Negative for chest pain and leg swelling.   Gastrointestinal: Negative for abdominal pain, diarrhea, nausea and vomiting.   Neurological:  Positive for tremors. Negative for syncope, speech difficulty, weakness and numbness.   Psychiatric/Behavioral: The patient is nervous/anxious.    All other systems reviewed and are negative.      Physical Exam   BP: 122/90  Pulse: 95  Temp: 98  F (36.7  C)  Resp: 18  SpO2: 95 %      Physical Exam   Constitutional: He is oriented to person, place, and time. He appears well-developed. He appears distressed.   Mild distress 2/2 to anxiety   HENT:   Head: Normocephalic and atraumatic.   Right Ear: External ear normal.   Left Ear: External ear normal.   Mouth/Throat: Oropharynx is clear and moist.   Eyes: Conjunctivae and EOM are normal. Pupils are equal, round, and reactive to light.   Neck: Normal range of motion. Neck supple.   Cardiovascular: Normal rate, regular rhythm and normal heart sounds.    Pulmonary/Chest: Effort normal and breath sounds normal. No respiratory distress. He has no wheezes. He has no rales.   Abdominal: Soft. He exhibits no distension. There is no tenderness. There is no rebound and no guarding.   Musculoskeletal: Normal range of motion. He exhibits no tenderness or deformity.   Neurological: He is alert and oriented to person, place, and time. No cranial nerve deficit. Coordination normal.   Tremulousness in lower extremities   Skin: Skin is warm and dry. No rash noted.   Psychiatric: He has a normal mood and affect. His behavior is normal.   Anxious       ED Course     ED Course     Procedures             EKG Interpretation:      Interpreted by Carley Baugh  Time reviewed: 1320  Symptoms at time of EKG: Palpitations, tremulous  Rhythm: normal sinus   Rate: normal  Axis: normal  Ectopy: none  Conduction: normal  ST Segments/ T Waves: No ST-T wave changes  Q Waves: none  Comparison to prior: improved from prior EKG 3/17/2018  With improvement of QTc and resolution of T wave changes    Clinical Impression: normal EKG          Critical Care time:  none             Labs Ordered and Resulted  from Time of ED Arrival Up to the Time of Departure from the ED   BASIC METABOLIC PANEL - Abnormal; Notable for the following:        Result Value    Glucose 101 (*)     All other components within normal limits   DRUG ABUSE SCREEN 6 CHEM DEP URINE ([facility]) - Abnormal; Notable for the following:     Benzodiazepine Qual Urine Positive (*)     All other components within normal limits   CBC WITH PLATELETS DIFFERENTIAL   TROPONIN I   ALCOHOL ETHYL   TSH   CARDIAC CONTINUOUS MONITORING        .  Results for orders placed or performed during the hospital encounter of 04/15/18   CBC with platelets differential   Result Value Ref Range    WBC 9.1 4.0 - 11.0 10e9/L    RBC Count 4.71 4.4 - 5.9 10e12/L    Hemoglobin 13.8 13.3 - 17.7 g/dL    Hematocrit 42.2 40.0 - 53.0 %    MCV 90 78 - 100 fl    MCH 29.3 26.5 - 33.0 pg    MCHC 32.7 31.5 - 36.5 g/dL    RDW 14.2 10.0 - 15.0 %    Platelet Count 206 150 - 450 10e9/L    Diff Method Automated Method     % Neutrophils 64.8 %    % Lymphocytes 26.1 %    % Monocytes 8.3 %    % Eosinophils 0.5 %    % Basophils 0.2 %    % Immature Granulocytes 0.1 %    Nucleated RBCs 0 0 /100    Absolute Neutrophil 5.9 1.6 - 8.3 10e9/L    Absolute Lymphocytes 2.4 0.8 - 5.3 10e9/L    Absolute Monocytes 0.8 0.0 - 1.3 10e9/L    Absolute Eosinophils 0.1 0.0 - 0.7 10e9/L    Absolute Basophils 0.0 0.0 - 0.2 10e9/L    Abs Immature Granulocytes 0.0 0 - 0.4 10e9/L    Absolute Nucleated RBC 0.0    Basic metabolic panel   Result Value Ref Range    Sodium 143 133 - 144 mmol/L    Potassium 4.1 3.4 - 5.3 mmol/L    Chloride 108 94 - 109 mmol/L    Carbon Dioxide 27 20 - 32 mmol/L    Anion Gap 8 3 - 14 mmol/L    Glucose 101 (H) 70 - 99 mg/dL    Urea Nitrogen 20 7 - 30 mg/dL    Creatinine 0.69 0.66 - 1.25 mg/dL    GFR Estimate >90 >60 mL/min/1.7m2    GFR Estimate If Black >90 >60 mL/min/1.7m2    Calcium 9.2 8.5 - 10.1 mg/dL   Troponin I   Result Value Ref Range    Troponin I ES <0.015 0.000 - 0.045 ug/L   Alcohol ethyl    Result Value Ref Range    Ethanol g/dL <0.01 <0.01 g/dL   Drug abuse screen 6 urine (chem dep)   Result Value Ref Range    Amphetamine Qual Urine Negative NEG^Negative    Barbiturates Qual Urine Negative NEG^Negative    Benzodiazepine Qual Urine Positive (A) NEG^Negative    Cannabinoids Qual Urine Negative NEG^Negative    Cocaine Qual Urine Negative NEG^Negative    Ethanol Qual Urine Negative NEG^Negative    Opiates Qualitative Urine Negative NEG^Negative   TSH   Result Value Ref Range    TSH 1.36 0.40 - 4.00 mU/L         Assessments & Plan (with Medical Decision Making)   Jorge Rosales is a 63 year old male with history of alcohol related atrial fibrillation w/ RVR(not currently anticoagulated), alcohol dependency, COPD, chronic pain syndrome, opioid use disorder now on Suboxone maintenance, left leg weakness/PE lumbar spine surgery who presents from [facility] for the evaluation of bilateral lower extremity tremors as well as an irregular heartbeat. Patient complains of feeling anxious, palpitations, and his heart beating irregular and tremulousness since yesterday.  Patient reports history of paroxysmal atrial fibrillation and he is concerned regarding his heart rate. Patient has a long-standing history of alcohol dependence and upon review of his records while in the hospital for this he did have a one-time episode of paroxysmal A. fib that resolved after 1 dose of medications and was thought to be related due to his alcohol withdrawal.  Patient is not on any anticoagulants or antiarrhythmics at this time.  Patient denies any recent substance abuse or alcohol abuse.  Upon arrival to the emergency department patient is anxious, mild distress secondary to anxiety, mild tremulousness in his lower extremities that appears to be intermittent depending on situation. Cranial Nerves II through XII intact with no focal motor, sensory, speech, deficit, left lower extremity decrease strength and ROM at baseline.  Cardiovascular regular rate and rhythm, no murmur, no irregular heartbeat.. Likely anxiety, panic attack however due to patient's history will get labs, EKG, and monitor patient to rule out any infectious, metabolic, cardiac abnormality.  Reviewed EKG which demonstrates normal sinus rhythm with ventricular rate of 86 bpm with no acute ischemic change.  Normal EKG.  Reviewed complex the last of which are unremarkable with no acute metabolic, electrolyte, infectious, cardiac etiology.  On reexamination patient is feeling much better with complete resolution of symptoms.  Patient denies any palpitations, reports his heartbeats feels normal, and patient is no longer anxious or tremulous.  At this time plan for [facility].  Return precautions discussed.  Patient understands and agrees with the plan. .The patient is discharged home with instructions to return if their symptoms persist or worsen.  Plan for close follow-up with their primary physician.  I discussed workup, results, treatment, and plan with the patient.  Patient understands and agrees with the plan.       I have reviewed the nursing notes.    I have reviewed the findings, diagnosis, plan and need for follow up with the patient.      Final diagnoses:   Palpitations   Tremulousness   IFloyd, am serving as a trained medical scribe to document services personally performed by Carley Baugh MD, based on the provider's statements to me.      Carley COSBY MD, was physically present and have reviewed and verified the accuracy of this note documented by Floyd Estrada.         4/15/2018   [facility], [place], EMERGENCY DEPARTMENT     Carley Baugh MD  04/15/18 2762

## 2018-04-16 ENCOUNTER — HOSPITAL ENCOUNTER (OUTPATIENT)
Dept: BEHAVIORAL HEALTH | Facility: CLINIC | Age: 63
End: 2018-04-16
Attending: FAMILY MEDICINE
Payer: COMMERCIAL

## 2018-04-16 LAB — INTERPRETATION ECG - MUSE: NORMAL

## 2018-04-16 PROCEDURE — H2035 A/D TX PROGRAM, PER HOUR: HCPCS | Mod: HQ

## 2018-04-16 PROCEDURE — 10020000 ZZH LODGING PLUS FACILITY CHARGE ADULT

## 2018-04-17 ENCOUNTER — OFFICE VISIT (OUTPATIENT)
Dept: BEHAVIORAL HEALTH | Facility: CLINIC | Age: 63
End: 2018-04-17
Payer: COMMERCIAL

## 2018-04-17 ENCOUNTER — HOSPITAL ENCOUNTER (OUTPATIENT)
Dept: BEHAVIORAL HEALTH | Facility: CLINIC | Age: 63
End: 2018-04-17
Attending: FAMILY MEDICINE
Payer: COMMERCIAL

## 2018-04-17 VITALS
SYSTOLIC BLOOD PRESSURE: 140 MMHG | WEIGHT: 216.5 LBS | RESPIRATION RATE: 16 BRPM | OXYGEN SATURATION: 94 % | DIASTOLIC BLOOD PRESSURE: 72 MMHG | HEART RATE: 81 BPM | BODY MASS INDEX: 30.21 KG/M2 | TEMPERATURE: 98.4 F

## 2018-04-17 DIAGNOSIS — G47.01 INSOMNIA DUE TO MEDICAL CONDITION: ICD-10-CM

## 2018-04-17 DIAGNOSIS — F10.230 ALCOHOL DEPENDENCE WITH UNCOMPLICATED WITHDRAWAL (H): ICD-10-CM

## 2018-04-17 DIAGNOSIS — F13.930 BENZODIAZEPINE WITHDRAWAL WITHOUT COMPLICATION (H): Primary | ICD-10-CM

## 2018-04-17 PROCEDURE — H2035 A/D TX PROGRAM, PER HOUR: HCPCS

## 2018-04-17 PROCEDURE — 99214 OFFICE O/P EST MOD 30 MIN: CPT | Performed by: NURSE PRACTITIONER

## 2018-04-17 PROCEDURE — H2035 A/D TX PROGRAM, PER HOUR: HCPCS | Mod: HQ

## 2018-04-17 PROCEDURE — 10020000 ZZH LODGING PLUS FACILITY CHARGE ADULT

## 2018-04-17 RX ORDER — BUPRENORPHINE AND NALOXONE 8; 2 MG/1; MG/1
1 FILM, SOLUBLE BUCCAL; SUBLINGUAL
COMMUNITY
End: 2018-11-06

## 2018-04-17 RX ORDER — METOPROLOL TARTRATE 100 MG
100 TABLET ORAL
COMMUNITY
Start: 2018-01-09 | End: 2018-06-27 | Stop reason: ALTCHOICE

## 2018-04-17 RX ORDER — MIRTAZAPINE 15 MG/1
7.5 TABLET, FILM COATED ORAL AT BEDTIME
Qty: 30 TABLET | Refills: 0 | Status: SHIPPED | OUTPATIENT
Start: 2018-04-17 | End: 2018-06-06

## 2018-04-17 RX ORDER — PANTOPRAZOLE SODIUM 40 MG/1
40 TABLET, DELAYED RELEASE ORAL
COMMUNITY
Start: 2017-12-22 | End: 2018-11-06

## 2018-04-17 ASSESSMENT — PAIN SCALES - GENERAL: PAINLEVEL: EXTREME PAIN (8)

## 2018-04-17 NOTE — MR AVS SNAPSHOT
After Visit Summary   4/17/2018    Jorge Rosales    MRN: [id number]           Patient Information     Date Of Birth          1955        Visit Information        Provider Department      4/17/2018 11:30 AM Katina Quevedo APRN [facility]        Today's Diagnoses     Benzodiazepine withdrawal without complication (H)    -  1    Alcohol dependence with uncomplicated withdrawal (H)        Insomnia due to medical condition           Follow-ups after your visit        Your next 10 appointments already scheduled     Apr 18, 2018  7:15 AM CDT   [facility]   [facility] ([facility])    [address] [place] [address]   [phone]            Apr 19, 2018  7:15 AM CDT   Treatment [facility]   [facility] ([facility])    [address] [place] [address]   [phone]            Apr 20, 2018  7:15 AM CDT   Treatment [facility]   [facility] ([facility])    [address] [place] [address]   [phone]            Apr 21, 2018  7:15 AM CDT   Treatment [facility]   [facility] ([facility])    [address] [place] [address]   [phone]            Apr 22, 2018  7:15 AM CDT   Treatment [facility]   [facility] ([facility])    [address] [place] [address]   [phone]            Apr 23, 2018  7:15 AM CDT   Treatment [facility]   [facility] ([facility])    [address] [place] [address]    [phone]            Apr 23, 2018 12:15 PM CDT   (Arrive by 12:00 PM)   New Patient Visit with Myke Fitch MD   [facility] ([facility])    [address] [facility] [address]   [phone]            Apr 24, 2018  7:15 AM CDT   Treatment with [facility]   [facility] ([facility])    [address] [place] [address]   [phone]            Apr 25, 2018  7:15 AM CDT   Treatment with [facility]   [facility] ([facility])    [address] [place] [address]   [phone]            Apr 26, 2018  7:15 AM CDT   Treatment with [facility]   [facility] ([facility])    [address] [place] [address]   [phone]              Who to contact     If you have questions or need follow up information about today's clinic visit or your schedule please contact [facility] directly at [phone].  Normal or non-critical lab and imaging results will be communicated to you by [employer], letter or phone within 4 business days after the clinic has received the results. If you do not hear from us within 7 days, please contact the clinic through [employer] or phone. If you have a critical or abnormal lab result, we will notify you by phone as soon as possible.  Submit refill requests through [employer] or call your pharmacy and they will forward the refill request to us. Please allow 3 business days for your refill to be completed.          Additional Information About Your Visit        [employer] Information     [employer] lets you send messages to your doctor, view your test results, renew your prescriptions, schedule appointments and more. To sign up, go to  [email or web address] . Click on \"Log in\" on the left side of the screen, which will take you to the Welcome page. Then click on \"Sign up Now\" on the right side of the page.     You will be asked to enter the access code listed below, as well as some personal information. Please follow the directions to create your username and password.     Your access code is: [email or web address]  Expires: 6/15/2018 12:18 PM     Your access code will  in 90 days. If you need help or a new code, please call your [facility] clinic or [phone].        Care EveryWhere ID     This is your Care EveryWhere ID. This could be used by other organizations to access your [facility] medical records  [id number]        Your Vitals Were     Pulse Temperature Respirations Pulse Oximetry BMI (Body Mass Index)       81 98.4  F (36.9  C) (Oral) 16 94% 30.21 kg/m2        Blood Pressure from Last 3 Encounters:   18 140/72   04/15/18 146/83   18 112/80    Weight from Last 3 Encounters:   18 216 lb 8 oz (98.2 kg)   18 206 lb (93.4 kg)   18 213 lb 11.2 oz (96.9 kg)              Today, you had the following     No orders found for display         Today's Medication Changes          These changes are accurate as of 18  1:19 PM.  If you have any questions, ask your nurse or doctor.               Start taking these medicines.        Dose/Directions    mirtazapine 15 MG tablet   Commonly known as:  REMERON   Used for:  Insomnia due to medical condition        Dose:  7.5 mg   Take 0.5 tablets (7.5 mg) by mouth At Bedtime   Quantity:  30 tablet   Refills:  0            Where to get your medicines      These medications were sent to [facility] [place], [place] - [address]  [address] [address]     Phone:  [phone]     mirtazapine 15 MG tablet                Primary Care Provider Office Phone # Fax #    Carlso Padron [phone]       [facility]  " [facility] [address]        Equal Access to Services     GEOVANI VILLA : Hadii aad ku hadrenettadavid Llanos, waaxda luqadaha, qaybta marielamatacos ruth. So [facility] [phone].    ATENCIÓN: Si habla español, tiene a nunez disposición servicios gratuitos de asistencia lingüística. Raheemame al [phone].    We comply with applicable federal civil rights laws and [place] laws. We do not discriminate on the basis of race, color, national origin, age, disability, sex, sexual orientation, or gender identity.            Thank you!     Thank you for choosing [facility]  for your care. Our goal is always to provide you with excellent care. Hearing back from our patients is one way we can continue to improve our services. Please take a few minutes to complete the written survey that you may receive in the mail after your visit with us. Thank you!             Your Updated Medication List - Protect others around you: Learn how to safely use, store and throw away your medicines at www.disposemymeds.org.          This list is accurate as of 4/17/18  1:19 PM.  Always use your most recent med list.                   Brand Name Dispense Instructions for use Diagnosis    albuterol 108 (90 Base) MCG/ACT Inhaler    PROAIR HFA/PROVENTIL HFA/VENTOLIN HFA    1 Inhaler    Inhale 2 puffs into the lungs every 6 hours as needed for shortness of breath / dyspnea or wheezing    Moderate persistent reactive airway disease with acute exacerbation       alum & mag hydroxide-simethicone 200-200-20 MG/5ML Susp suspension    MYLANTA/MAALOX     Take 30 mLs by mouth every 6 hours as needed for indigestion        aspirin 81 MG tablet     30 tablet    Take 1 tablet (81 mg) by mouth daily    CARDIOVASCULAR SCREENING; LDL GOAL LESS THAN 160       * buprenorphine-naloxone 8-2 MG Subl sublingual tablet    SUBOXONE     Dissolve 1.5 tablets sublingually in the morning and 1  tablet sublingually in the evening        * buprenorphine HCl-naloxone HCl 8-2 MG per film    SUBOXONE     Place 1 Film under the tongue        cyanocobalamin 1000 MCG tablet    vitamin  B-12    100 tablet    Take 1 tablet (1,000 mcg) by mouth daily    Alcohol dependence with uncomplicated withdrawal (H)       fish oil-omega-3 fatty acids 1000 MG capsule     180 capsule    Take 1 g by mouth daily        gabapentin 300 MG capsule    NEURONTIN    90 capsule    Take 2 capsules (600 mg) by mouth 4 times daily    Chronic low back pain, unspecified back pain laterality, with sciatica presence unspecified       guaiFENesin 20 mg/mL Soln solution    ROBITUSSIN     Take 10 mLs by mouth every 4 hours as needed for cough        IBUPROFEN PO      Take 400 mg by mouth every 6 hours as needed for moderate pain        MELATONIN PO      Take 3 mg by mouth nightly as needed        metoprolol tartrate 100 MG tablet    LOPRESSOR     Take 100 mg by mouth        mirtazapine 15 MG tablet    REMERON    30 tablet    Take 0.5 tablets (7.5 mg) by mouth At Bedtime    Insomnia due to medical condition       multivitamin per tablet     100 tablet    Take 1 tablet by mouth daily.        * nicotine 21 MG/24HR 24 hr patch    NICODERM CQ    28 patch    Place 1 patch onto the skin every 24 hours    Nicotine dependence       * nicotine 14 MG/24HR 24 hr patch    NICODERM CQ    28 patch    Place 1 patch onto the skin every 24 hours    Nicotine dependence       * nicotine 7 MG/24HR 24 hr patch    NICODERM CQ    28 patch    Place 1 patch onto the skin every 24 hours    Nicotine dependence       nicotine polacrilex 2 MG lozenge    COMMIT    144 lozenge    Place 1 lozenge (2 mg) inside cheek every hour as needed for smoking cessation    Nicotine dependence       pantoprazole 40 MG EC tablet    PROTONIX     Take 40 mg by mouth        phenol-menthol 14.5 MG lozenge      Place 1 lozenge inside cheek every 2 hours as needed for moderate pain         polyethylene glycol Packet    MIRALAX/GLYCOLAX     Take 17 g by mouth daily as needed for constipation        senna-docusate 8.6-50 MG per tablet    SENOKOT-S;PERICOLACE     Take 2 tablets by mouth daily as needed for constipation        TYLENOL PO      Take 650 mg by mouth every 4 hours as needed for mild pain or fever        venlafaxine 75 MG Tb24 24 hr tablet    EFFEXOR-ER    30 each    Take 1 tablet (75 mg) by mouth daily    Recurrent major depressive disorder, remission status unspecified (H)       * Notice:  This list has 5 medication(s) that are the same as other medications prescribed for you. Read the directions carefully, and ask your doctor or other care provider to review them with you.

## 2018-04-17 NOTE — PROGRESS NOTES
CASE MANAGEMENT NOTE    This writer met with patient regarding aftercare options/recommendations of outpatient.  Patient appears ambivalent at this time, reporting physical issues and transportation may impact his attendance.  This writer did provide options such as [facility] cabs and [facility].  [facility].  This writer did explain to patient based on his use history outpatient CD treatment or a [facility] outpatient program would be the recommendation based on patients reported mental health of Anxiety.  This writer will follow up with patient regarding aftercare recommendations.  Patient has a scheduled meeting on Thursday, 4/19/2018 with .    BONILLA Queen

## 2018-04-17 NOTE — PROGRESS NOTES
\  SUBJECTIVE:                                                    Jorge Rosales is a 63 year old male who presents to clinic today for the following health issues:      ED/UC Followup:    Facility:  04/15/2018  Date of visit: 04/15/2018  Reason for visit:     Tremors       pt in [facility], 30 days sober from etoh and benzos, in last day has developed tremor, irregular heartbeat, pt has hx of AFIB.     Irregular Heart Beat       Current Status:  Pt reports he is dragging this morning. Better today as he did sleep a bit last night.        Sleep Issues      Duration:  Sunday  : Pt awake x 48 hours     Description  Frequency of insomnia: Comes and goes      Time to fall asleep: 1-2 hours   Middle of night awakening:  Varies   Early morning awakening: Yes : Varies     Accompanying signs and symptoms:  fatigue    History  Similar episodes in past:  no sleeping fairly well until Sunday.   Previous evaluation/sleep study:  YES- [facility]     Precipitating or alleviating factors:  New stressful situation: YES- [facility]   Caffeine intake after lunchtime: no   OTC decongestants: no   Any new medications: no     Therapies tried and outcome:Melotonin and warm milk          Problems taking medications regularly: No    Medication side effects: none    Diet: regular (no restrictions)    Social History   Substance Use Topics     Smoking status: Current Every Day Smoker     Packs/day: 0.50     Years: 30.00     Types: Cigarettes     Smokeless tobacco: Never Used      Comment: interested in quitting, reducing use at minimum - lozenges ordered     Alcohol use Yes        Problem list and histories reviewed & adjusted, as indicated.  Additional history: as documented    Patient Active Problem List   Diagnosis     Chronic back pain     Panic attacks     Rotator cuff injury     CTS (carpal tunnel syndrome)     Obesity     Advanced directives, counseling/discussion     PTSD (post-traumatic stress disorder)     CARDIOVASCULAR  SCREENING; LDL GOAL LESS THAN 160     Withdrawal from benzodiazepine (H)     Hypoxia     Aspiration pneumonia (H)     Alcohol dependence with withdrawal (H)     Alcohol abuse     Chemical dependency (H)     Past Surgical History:   Procedure Laterality Date     BACK SURGERY       SHOULDER SURGERY         Social History   Substance Use Topics     Smoking status: Current Every Day Smoker     Packs/day: 0.50     Years: 30.00     Types: Cigarettes     Smokeless tobacco: Never Used      Comment: interested in quitting, reducing use at minimum - lozenges ordered     Alcohol use Yes     Family History   Problem Relation Age of Onset     DIABETES Father      HEART DISEASE No family hx of            Current Outpatient Prescriptions   Medication Sig Dispense Refill     nicotine (NICODERM CQ) 21 MG/24HR 24 hr patch Place 1 patch onto the skin every 24 hours 28 patch 1     nicotine (NICODERM CQ) 14 MG/24HR 24 hr patch Place 1 patch onto the skin every 24 hours 28 patch 1     nicotine (NICODERM CQ) 7 MG/24HR 24 hr patch Place 1 patch onto the skin every 24 hours 28 patch 1     gabapentin (NEURONTIN) 300 MG capsule Take 2 capsules (600 mg) by mouth 4 times daily 90 capsule 0     nicotine polacrilex (COMMIT) 2 MG lozenge Place 1 lozenge (2 mg) inside cheek every hour as needed for smoking cessation 144 lozenge 1     Acetaminophen (TYLENOL PO) Take 650 mg by mouth every 4 hours as needed for mild pain or fever       IBUPROFEN PO Take 400 mg by mouth every 6 hours as needed for moderate pain       alum & mag hydroxide-simethicone (MYLANTA/MAALOX) 200-200-20 MG/5ML SUSP suspension Take 30 mLs by mouth every 6 hours as needed for indigestion       guaiFENesin (ROBITUSSIN) 20 mg/mL SOLN solution Take 10 mLs by mouth every 4 hours as needed for cough       phenol-menthol (CEPASTAT) 14.5 MG lozenge Place 1 lozenge inside cheek every 2 hours as needed for moderate pain       senna-docusate (SENOKOT-S;PERICOLACE) 8.6-50 MG per tablet Take  2 tablets by mouth daily as needed for constipation       MELATONIN PO Take 3 mg by mouth nightly as needed       polyethylene glycol (MIRALAX/GLYCOLAX) Packet Take 17 g by mouth daily as needed for constipation       buprenorphine-naloxone (SUBOXONE) 8-2 MG SUBL sublingual tablet Dissolve 1.5 tablets sublingually in the morning and 1 tablet sublingually in the evening       aspirin 81 MG tablet Take 1 tablet (81 mg) by mouth daily 30 tablet 0     cyanocobalamin (VITAMIN  B-12) 1000 MCG tablet Take 1 tablet (1,000 mcg) by mouth daily 100 tablet 0     venlafaxine (EFFEXOR-ER) 75 MG TB24 24 hr tablet Take 1 tablet (75 mg) by mouth daily 30 each 1     albuterol (PROAIR HFA/PROVENTIL HFA/VENTOLIN HFA) 108 (90 BASE) MCG/ACT Inhaler Inhale 2 puffs into the lungs every 6 hours as needed for shortness of breath / dyspnea or wheezing 1 Inhaler 1     fish oil-omega-3 fatty acids 1000 MG capsule Take 1 g by mouth daily  180 capsule 12     Multiple Vitamin (MULTIVITAMIN) per tablet Take 1 tablet by mouth daily. 100 tablet 12     No Known Allergies  Recent Labs   Lab Test  04/15/18   1415  03/25/18   0518   03/19/18   0501   03/17/18   1330   ALT   --   38   --   20   --   22   CR  0.69  0.72   < >  0.42*   < >  0.78   GFRESTIMATED  >90  >90   < >  >90   < >  >90   GFRESTBLACK  >90  >90   < >  >90   < >  >90   POTASSIUM  4.1  4.2   < >  3.6   < >  3.6   TSH  1.36   --    --    --    --    --     < > = values in this interval not displayed.      BP Readings from Last 3 Encounters:   04/15/18 146/83   04/09/18 112/80   01/19/18 112/72    Wt Readings from Last 3 Encounters:   03/27/18 206 lb (93.4 kg)   01/16/18 213 lb 11.2 oz (96.9 kg)   12/15/17 207 lb 4.8 oz (94 kg)        Labs reviewed in EP[facility]  Problem list, Medication list, Allergies, and Medical/Social/Surgical histories reviewed in [facility] and updated as appropriate.     ROS: Constitutional, neuro, ENT, endocrine, pulmonary, cardiac, gastrointestinal, genitourinary,  musculoskeletal, integument and psychiatric systems are negative, except as otherwise noted above in the HPI.       OBJECTIVE:                                                    /72  Pulse 81  Temp 98.4  F (36.9  C) (Oral)  Resp 16  Wt 216 lb 8 oz (98.2 kg)  SpO2 94%  BMI 30.21 kg/m2  Body mass index is 30.21 kg/(m^2).  GENERAL: healthy, alert, well nourished, well hydrated, no distress  RESP: lungs clear to auscultation - no rales, no rhonchi, no wheezes  CV: regular rates and rhythm, normal S1 S2, no S3 or S4 and no murmur  Mental Status Assessment:  Appearance:   Appropriate   Eye Contact:   Good   Psychomotor Behavior: Normal   Attitude:   Cooperative   Orientation:   All  Speech   Rate / Production: Normal    Volume:  Normal   Mood:    Normal  Affect:    Appropriate   Thought Content:  Clear   Thought Form:  Coherent  Logical   Insight:    Good   Attention Span and Concentration:  limited  Recent and Remote Memory:  intact  Fund of Knowledge: appropriate  Muscle Strength and Tone: normal   Suicidal Ideation: reports no thoughts, no intention  Hallucination: No  Paranoid-No  Manic-No  Panic-No  Self harm-No      See [facility] notes     Diagnostic Test Results:  Results for orders placed or performed during the hospital encounter of 04/15/18   CBC with platelets differential   Result Value Ref Range    WBC 9.1 4.0 - 11.0 10e9/L    RBC Count 4.71 4.4 - 5.9 10e12/L    Hemoglobin 13.8 13.3 - 17.7 g/dL    Hematocrit 42.2 40.0 - 53.0 %    MCV 90 78 - 100 fl    MCH 29.3 26.5 - 33.0 pg    MCHC 32.7 31.5 - 36.5 g/dL    RDW 14.2 10.0 - 15.0 %    Platelet Count 206 150 - 450 10e9/L    Diff Method Automated Method     % Neutrophils 64.8 %    % Lymphocytes 26.1 %    % Monocytes 8.3 %    % Eosinophils 0.5 %    % Basophils 0.2 %    % Immature Granulocytes 0.1 %    Nucleated RBCs 0 0 /100    Absolute Neutrophil 5.9 1.6 - 8.3 10e9/L    Absolute Lymphocytes 2.4 0.8 - 5.3 10e9/L    Absolute Monocytes 0.8 0.0 - 1.3 10e9/L     Absolute Eosinophils 0.1 0.0 - 0.7 10e9/L    Absolute Basophils 0.0 0.0 - 0.2 10e9/L    Abs Immature Granulocytes 0.0 0 - 0.4 10e9/L    Absolute Nucleated RBC 0.0    Basic metabolic panel   Result Value Ref Range    Sodium 143 133 - 144 mmol/L    Potassium 4.1 3.4 - 5.3 mmol/L    Chloride 108 94 - 109 mmol/L    Carbon Dioxide 27 20 - 32 mmol/L    Anion Gap 8 3 - 14 mmol/L    Glucose 101 (H) 70 - 99 mg/dL    Urea Nitrogen 20 7 - 30 mg/dL    Creatinine 0.69 0.66 - 1.25 mg/dL    GFR Estimate >90 >60 mL/min/1.7m2    GFR Estimate If Black >90 >60 mL/min/1.7m2    Calcium 9.2 8.5 - 10.1 mg/dL   Troponin I   Result Value Ref Range    Troponin I ES <0.015 0.000 - 0.045 ug/L   Alcohol ethyl   Result Value Ref Range    Ethanol g/dL <0.01 <0.01 g/dL   Drug abuse screen 6 urine (chem dep)   Result Value Ref Range    Amphetamine Qual Urine Negative NEG^Negative    Barbiturates Qual Urine Negative NEG^Negative    Benzodiazepine Qual Urine Positive (A) NEG^Negative    Cannabinoids Qual Urine Negative NEG^Negative    Cocaine Qual Urine Negative NEG^Negative    Ethanol Qual Urine Negative NEG^Negative    Opiates Qualitative Urine Negative NEG^Negative   TSH   Result Value Ref Range    TSH 1.36 0.40 - 4.00 mU/L   EKG 12-lead, tracing only   Result Value Ref Range    Interpretation ECG Click View Image link to view waveform and result         ASSESSMENT/PLAN:                                                    (F13.230) Benzodiazepine withdrawal without complication (H)  (primary encounter diagnosis)  Comment: currently in LP for benzo and alcohol   Plan: continue current regimen     (F10.230) Alcohol dependence with uncomplicated withdrawal (H)  Comment: as above  Plan: continue to maintain sobriety     (G47.01) Insomnia due to medical condition  Comment: likely will improve with longer period of time off alcohol and benzos. Will try 1/2 tab remeron. Pt has had several trials of medications that didn't work--melatonin, trazodone,  vistiril, benzos have been problemsome for him in past  Plan: mirtazapine (REMERON) 15 MG tablet                      MANNIE Calix CNP  [facility] PRIMARY [facility]

## 2018-04-18 ENCOUNTER — HOSPITAL ENCOUNTER (OUTPATIENT)
Dept: BEHAVIORAL HEALTH | Facility: CLINIC | Age: 63
End: 2018-04-18
Attending: FAMILY MEDICINE
Payer: COMMERCIAL

## 2018-04-18 PROCEDURE — H2035 A/D TX PROGRAM, PER HOUR: HCPCS | Mod: HQ

## 2018-04-18 PROCEDURE — 10020000 ZZH LODGING PLUS FACILITY CHARGE ADULT

## 2018-04-18 NOTE — PROGRESS NOTES
Patient:  Jorge Rosales            Adult CD Progress Note and Treatment Plan Review     Attendance  Please refer to OP BEH CD Adult Attendance Record Documentation Flowsheet    Support group attended this week: yes    Reporting sobriety:  yes    Treatment Plan     Treatment Plan Review competed on: 4/18/2018       Client preferred learning style: Hands on  Verbal  Demonstration  Reading    Staff Members contributing BONILLA Queen & BONILLA Castaneda                     Received Supervision: No    Client: contributed to goals and plan.    Client received copy of plan/revised plan: Yes    Client agrees with plan/revised plan: Yes        Changes to Treatment Plan: No    New Goals added since last review No additional goals added at this time.    Goals worked on since last review Patient is currently working to complete his initial assignment 'drug progression'.    Strategies effective: yes    Strategies need these changes: No additional strategies needed at this time.    1) Care Coordination Activities:  Patient is currently working with counseling staff and  to explore continuing care options at this time.  2) Medical, Mental Health and other appointments the client attended: Patient has a scheduled medical appointment on 4/20/2018.  3) Medication issues: No issues to address at this time.  4) Physical and mental health problems: Patient reports he is able to manage biomedical issues at this time.  Patient has established mental health clinicians in the community.  5) Any changes in Vulnerable Adult Status?  No If yes, add to treatment plan and individual abuse prevention plan.  6) Review and evaluation of the individual abuse prevention plan: Current IAPP for this program is adequate for this client          ASAM Risk Rating:    Dimension 1 1 Patient reports a last use date of 3/17/2018.  Patient is prescribed Suboxone mantenance program.  No issues to address at this time.     Dimension 2 1 Patient reports medical ciagnoses at this time.  Patient is able to utilize a cane for assistance with walking.  Patient reports having established medical care paroviders in the community.  Patient denies any biomedical concerns that would interfere with full particiaption in group and treatment processes.  Patient is prescribed medications and is currently medicaton compliant.  Patient has a scheduled appointment with Suboxone prescriber on 4/20/2018.  Patient is covered under [employer] insurance and reports he is able to navigate the health care system independently.  Patient attended weekly nursing lecture on 4/15/2018.     Dimension 3 2 Patient reprots a formal mental health diagnosis at this time.  Patient denies any suicide and/or self harm ideation at this time.  Patient has an established mental health therapist in the community and reports he will continue with appointments upon discharge of [facility] programming.  Patient is working to complete a daily journal including sharing his emotions in daily small group setting.  Patient is working to understand his personal behavioral patterns and identify new behaviors in recovery.  Patient attended  led spirituality group on 4/18/2018.     Dimension 4 2 Patient is currently working to complete his initias assignment 'drug progression' and will present in group processes.  Patient attends all required programming and participates adequately.      Dimension 5 4 Patient is currently working to gain an understanding of the relationship between his unmanaged mental health, emotional health and his continued substance use at this time.  Patient is currently working with  and [facility] counseling staff to establish aftercare plan as a continuum of care as to address patients mental health chemical health and issues related to chronic pain issues.  Patient attended relapse prevention workshop on 4/14/2018.  Patient appears to be at  a high risk for continued substance use at this time.    Dimension 6 3 Patient reports he is able to return to his home upon discharge of [facility] programming.  Patient is working with counseling staff to identify additional sober support meetings in the community.  Patient is currenlty unemployed and lacks a strong sober network at this time.      Guide to Risk Ratings for Suicidality:   IDEATION: Active thoughts of suicide? INTENT: Intent to follow on suicide? PLAN: Plan to follow through on suicide? Level of Risk:   IF Yes Yes Yes Patient = High Emergent   IF Yes Yes No Patient = High Urgent/Non-Emergent   IF Yes No No Patient = Moderate Non-Urgent   IF No No   No Patient = Low Risk   The patient's ADDITIONAL RISK FACTORS and lack of PROTECTIVE FACTORS may increase their overall suicide risk ratings.     Patient's/client's current risk rating:  Low Risk    Family Involvement:   none schedule this week    Data:   offered feedback client did participate      Intervention:   Aftercare planning  Behavior modification  Counselor feedback  Emotional management  Group feedback  Relapse prevention      Assessment:   Stages of Change Model  Preparation/Determination    Appears/Sounds:  Cooperative      Plan:  Monitor emotional/physical health  Continue to work towards treatment plan goals.    EMPERATRIZ Queen

## 2018-04-19 ENCOUNTER — HOSPITAL ENCOUNTER (OUTPATIENT)
Dept: BEHAVIORAL HEALTH | Facility: CLINIC | Age: 63
End: 2018-04-19
Attending: FAMILY MEDICINE
Payer: COMMERCIAL

## 2018-04-19 PROCEDURE — H2035 A/D TX PROGRAM, PER HOUR: HCPCS | Mod: HQ

## 2018-04-19 PROCEDURE — 10020000 ZZH LODGING PLUS FACILITY CHARGE ADULT

## 2018-04-19 PROCEDURE — H2035 A/D TX PROGRAM, PER HOUR: HCPCS

## 2018-04-19 NOTE — PROGRESS NOTES
This writer met with patient and , Janine Aguirre regarding patients progress and after care planning.  This writer will meet with patient on 4/20/2018 to explore [facility] and [facility].  Patient will also follow up with established medical care providers, such as for his leg pain, shoulder pain, dental and eye exam appointments.  This writer will also offer additional information regarding sober support meetings near his home.      BONILLA Queen

## 2018-04-20 ENCOUNTER — HOSPITAL ENCOUNTER (OUTPATIENT)
Dept: BEHAVIORAL HEALTH | Facility: CLINIC | Age: 63
End: 2018-04-20
Attending: FAMILY MEDICINE
Payer: COMMERCIAL

## 2018-04-20 PROCEDURE — H2035 A/D TX PROGRAM, PER HOUR: HCPCS | Mod: HQ

## 2018-04-20 PROCEDURE — 10020000 ZZH LODGING PLUS FACILITY CHARGE ADULT

## 2018-04-20 NOTE — PROGRESS NOTES
Name: Jorge Rosales  Date: 4/20/2018  Medical Record: [id number]    Envelope Number: [id number]    List of Contents (List each item separately in new row):   Cellphone repackaged from [id number]    Admission:  I am responsible for any personal items that are not sent to the safe or pharmacy.  [facility] is not responsible for loss, theft or damage of any property in my possession.      Patient Signature:  ___________________________________________       Date/Time:__________________________    Staff Signature: __________________________________       Date/Time:__________________________    2nd Staff person, if patient is unable/unwilling to sign:      __________________________________________________________       Date/Time: __________________________      Discharge:  [facility] has returned all of my personal belongings:    Patient Signature: ________________________________________     Date/Time: ____________________________________    Staff Signature: ______________________________________     Date/Time:_____________________________________

## 2018-04-21 ENCOUNTER — HOSPITAL ENCOUNTER (OUTPATIENT)
Dept: BEHAVIORAL HEALTH | Facility: CLINIC | Age: 63
End: 2018-04-21
Attending: FAMILY MEDICINE
Payer: COMMERCIAL

## 2018-04-21 PROCEDURE — H2035 A/D TX PROGRAM, PER HOUR: HCPCS | Mod: HQ

## 2018-04-21 PROCEDURE — 10020000 ZZH LODGING PLUS FACILITY CHARGE ADULT

## 2018-04-22 ENCOUNTER — HOSPITAL ENCOUNTER (OUTPATIENT)
Dept: BEHAVIORAL HEALTH | Facility: CLINIC | Age: 63
End: 2018-04-22
Attending: FAMILY MEDICINE
Payer: COMMERCIAL

## 2018-04-22 PROCEDURE — 10020000 ZZH LODGING PLUS FACILITY CHARGE ADULT

## 2018-04-22 PROCEDURE — H2035 A/D TX PROGRAM, PER HOUR: HCPCS | Mod: HQ

## 2018-04-23 ENCOUNTER — HOSPITAL ENCOUNTER (OUTPATIENT)
Dept: BEHAVIORAL HEALTH | Facility: CLINIC | Age: 63
End: 2018-04-23
Attending: FAMILY MEDICINE
Payer: COMMERCIAL

## 2018-04-23 ENCOUNTER — OFFICE VISIT (OUTPATIENT)
Dept: ORTHOPEDICS | Facility: CLINIC | Age: 63
End: 2018-04-23
Payer: COMMERCIAL

## 2018-04-23 VITALS — WEIGHT: 216 LBS | BODY MASS INDEX: 30.24 KG/M2 | RESPIRATION RATE: 16 BRPM | HEIGHT: 71 IN

## 2018-04-23 DIAGNOSIS — M75.122 COMPLETE TEAR OF LEFT ROTATOR CUFF: Primary | ICD-10-CM

## 2018-04-23 PROCEDURE — H2035 A/D TX PROGRAM, PER HOUR: HCPCS | Mod: HQ

## 2018-04-23 PROCEDURE — 10020000 ZZH LODGING PLUS FACILITY CHARGE ADULT

## 2018-04-23 NOTE — MR AVS SNAPSHOT
After Visit Summary   4/23/2018    Jorge Rosales    MRN: [id number]           Patient Information     Date Of Birth          1955        Visit Information        Provider Department      4/23/2018 12:15 PM Myke Fitch MD [facility] Sports Medicine        Today's Diagnoses     Complete tear of left rotator cuff    -  1       Follow-ups after your visit        Your next 10 appointments already scheduled     Apr 24, 2018  7:15 AM CDT   Treatment with ADULT LODGING PLUS A   [facility] ([facility])    [address] [place] [address]   [phone]            Apr 25, 2018  7:15 AM CDT   Treatment with ADULT LODGING PLUS A   [facility] ([facility])    [address] [place] [address]   [phone]            Apr 26, 2018  7:15 AM CDT   Treatment with ADULT LODGING PLUS A   [facility] ([facility])    [address] [place] [address]   [phone]            Apr 27, 2018  7:15 AM CDT   Treatment with ADULT LODGING PLUS A   [facility] ([facility])    [address] [place] [address]   [phone]            Apr 28, 2018  7:15 AM CDT   Treatment with ADULT LODGING PLUS A   [facility] ([facility])    [address] [place] [address]   [phone]            Apr 29, 2018  7:15 AM CDT   Treatment with ADULT LODGING PLUS A   [facility] ([facility])    [address] [place] [address]   [phone]            Apr 30, 2018  7:15 AM CDT   Treatment with ADULT LODGING PLUS A   [facility] ([facility])    [facility] [address] [address]   [phone]            May 01, 2018  7:15 AM CDT   Treatment with ADULT LODGING PLUS A   [facility] ([facility])    [address] [place] [address]   [phone]            May 02, 2018  7:15 AM CDT   Treatment with ADULT LODGING PLUS A   [facility] ([facility])    [address] [place] [address]   [phone]            May 02, 2018 11:00 AM CDT   (Arrive by 10:45 AM)   Return Visit with Myke Fitch MD   [facility] ([facility])    [address] [place] [address]  [address] [facility] [id number] [phone]              Future tests that were ordered for you today     Open Future Orders        Priority Expected Expires Ordered    MR Shoulder Left w/o Contrast Routine  2019            Who to contact     Please call your clinic at [phone] to:    Ask questions about your health    Make or cancel appointments    Discuss your medicines    Learn about your test results    Speak to your doctor            Additional Information About Your Visit        [employer] Information     [employer] is an electronic gateway that provides easy, online access to your medical records. With [employer], you can request a clinic appointment, read your test results, renew a prescription or communicate with your care team.     To sign up for [employer] visit the website at [email or web address]   You will be asked to enter the access code listed below, as well as some personal information. Please follow the directions to create your username and password.     Your access code is: [email or web address]  Expires: 6/15/2018 12:18 PM     Your access code will  in 90 days. If you need help or a new code, please contact your [facility] or call [phone] for assistance.        Care EveryWhere ID     This is your Care EveryWhere ID. This could be used by other organizations to access your [facility] medical records  [id number]        Your Vitals Were     Respirations Height BMI (Body Mass Index)             16 1.803 m (5' 10.98\") 30.14 kg/m2          Blood Pressure from Last 3 Encounters:   04/17/18 140/72   04/15/18 146/83   04/09/18 112/80    Weight from Last 3 Encounters:   04/23/18 98 kg (216 lb)   04/17/18 98.2 kg (216 lb 8 oz)   03/27/18 93.4 kg (206 lb)               Primary Care Provider Office Phone # Fax #    Carlos Padron [phone]       [facility] [address] [address]        Equal Access to Services     GEOVANI VILLA : Hadii aad ku hadasho Soomaali, waaxda luqadaha, qaybta kaalmada adeegyada, waxay toribioin hayaan otto gomez . So [facility] [phone].    ATENCIÓN: Si habla español, tiene a nunez disposición servicios gratuitos de asistencia lingüística. Bradley al [phone].    We comply with applicable federal civil rights laws and [place] laws. We do not discriminate on the basis of race, color, national origin, age, disability, sex, sexual orientation, or gender identity.            Thank you!     Thank you for choosing [facility]  for your care. Our goal is always to provide you with excellent care. Hearing back from our patients is one way we can continue to improve our services. Please take a few minutes to complete the written survey that you may receive in the mail after your visit with us. Thank you!             Your Updated Medication List - Protect others around you: Learn how to safely use, store and throw away your medicines at www.disposemymeds.org.          This list is accurate as of 4/23/18 12:45 PM.  Always use your most recent med list.                   Brand Name Dispense Instructions for use Diagnosis    albuterol 108 (90 " Base) MCG/ACT Inhaler    PROAIR HFA/PROVENTIL HFA/VENTOLIN HFA    1 Inhaler    Inhale 2 puffs into the lungs every 6 hours as needed for shortness of breath / dyspnea or wheezing    Moderate persistent reactive airway disease with acute exacerbation       alum & mag hydroxide-simethicone 200-200-20 MG/5ML Susp suspension    MYLANTA/MAALOX     Take 30 mLs by mouth every 6 hours as needed for indigestion        aspirin 81 MG tablet     30 tablet    Take 1 tablet (81 mg) by mouth daily    CARDIOVASCULAR SCREENING; LDL GOAL LESS THAN 160       * buprenorphine-naloxone 8-2 MG Subl sublingual tablet    SUBOXONE     Dissolve 1.5 tablets sublingually in the morning and 1 tablet sublingually in the evening        * buprenorphine HCl-naloxone HCl 8-2 MG per film    SUBOXONE     Place 1 Film under the tongue        cyanocobalamin 1000 MCG tablet    vitamin  B-12    100 tablet    Take 1 tablet (1,000 mcg) by mouth daily    Alcohol dependence with uncomplicated withdrawal (H)       fish oil-omega-3 fatty acids 1000 MG capsule     180 capsule    Take 1 g by mouth daily        gabapentin 300 MG capsule    NEURONTIN    90 capsule    Take 2 capsules (600 mg) by mouth 4 times daily    Chronic low back pain, unspecified back pain laterality, with sciatica presence unspecified       guaiFENesin 20 mg/mL Soln solution    ROBITUSSIN     Take 10 mLs by mouth every 4 hours as needed for cough        IBUPROFEN PO      Take 400 mg by mouth every 6 hours as needed for moderate pain        MELATONIN PO      Take 3 mg by mouth nightly as needed        metoprolol tartrate 100 MG tablet    LOPRESSOR     Take 100 mg by mouth        mirtazapine 15 MG tablet    REMERON    30 tablet    Take 0.5 tablets (7.5 mg) by mouth At Bedtime    Insomnia due to medical condition       multivitamin per tablet     100 tablet    Take 1 tablet by mouth daily.        * nicotine 21 MG/24HR 24 hr patch    NICODERM CQ    28 patch    Place 1 patch onto the skin every 24  hours    Nicotine dependence       * nicotine 14 MG/24HR 24 hr patch    NICODERM CQ    28 patch    Place 1 patch onto the skin every 24 hours    Nicotine dependence       * nicotine 7 MG/24HR 24 hr patch    NICODERM CQ    28 patch    Place 1 patch onto the skin every 24 hours    Nicotine dependence       nicotine polacrilex 2 MG lozenge    COMMIT    144 lozenge    Place 1 lozenge (2 mg) inside cheek every hour as needed for smoking cessation    Nicotine dependence       pantoprazole 40 MG EC tablet    PROTONIX     Take 40 mg by mouth        phenol-menthol 14.5 MG lozenge      Place 1 lozenge inside cheek every 2 hours as needed for moderate pain        polyethylene glycol Packet    MIRALAX/GLYCOLAX     Take 17 g by mouth daily as needed for constipation        senna-docusate 8.6-50 MG per tablet    SENOKOT-S;PERICOLACE     Take 2 tablets by mouth daily as needed for constipation        TYLENOL PO      Take 650 mg by mouth every 4 hours as needed for mild pain or fever        venlafaxine 75 MG Tb24 24 hr tablet    EFFEXOR-ER    30 each    Take 1 tablet (75 mg) by mouth daily    Recurrent major depressive disorder, remission status unspecified (H)       * Notice:  This list has 5 medication(s) that are the same as other medications prescribed for you. Read the directions carefully, and ask your doctor or other care provider to review them with you.

## 2018-04-23 NOTE — PROGRESS NOTES
Spoke with Dr. Padron's RN at [facility] (direct # [phone]) regarding patient's suboxone dose. Dr. Padron is not in today but his RN will try and contact him and will let  RN know if okay for patient to take 1.5 SL tab in AM and 1 SL tab in PM instead of 1 SL tab BID.

## 2018-04-23 NOTE — PROGRESS NOTES
Patient buprenorphine HCL-Naloxone 8-2mg SL tab order currently 1 tab SL BID but patient took 1.5 tablets this AM following his previous order dosing. Patient states Dr. Padron made an error when the dose was decreased on 4/20/2018 and patient states he is going to call Dr. Padron's office ([facility]) to get the order changed back to 1.5 tab in AM and 1 tab in PM.     Patient agreed to let LP RN know if he needs any assistance getting the orders corrected. Continue to support patient as needed to assist in coordinating care.

## 2018-04-23 NOTE — PROGRESS NOTES
Received a return call from [facility] clinic, Pepper RN with Dr. Padron stating per agus Nichols for patient to take Suboxone 8-2mg SL tab 1.5 tablet under the tongue in the AM and 1 SL tab under the tongue in the evening.     MAR updated to reflect this change.

## 2018-04-23 NOTE — PROGRESS NOTES
D - Patient completed his drug relapse history assignment and presented in group therapy today.    I - Counselor facilitated group therapy and patient's peers provided supportive feedback.  A - Patient appears motivated to work toward successful recovery. He verbalizes plans to implement positive ways to stay sober.  P - Patient to continue to work on goals in treatment plan.
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
## 2018-04-24 ENCOUNTER — HOSPITAL ENCOUNTER (OUTPATIENT)
Dept: BEHAVIORAL HEALTH | Facility: CLINIC | Age: 63
End: 2018-04-24
Attending: FAMILY MEDICINE
Payer: COMMERCIAL

## 2018-04-24 PROCEDURE — H2035 A/D TX PROGRAM, PER HOUR: HCPCS | Mod: HQ

## 2018-04-24 PROCEDURE — 10020000 ZZH LODGING PLUS FACILITY CHARGE ADULT

## 2018-04-24 NOTE — PROGRESS NOTES
[address] and [address] [place] [address]          Dear Willian Paredes,    This is to verify that Jorge Rosales was admitted for treatment of chemical dependency at [place], [place] on 4/9/2018.    This individual is currently participating in:   ______ Inpatient   __x___ [facility]odging [facility] (Residential Non-Medical)   ______ Day Outpatient   ______ Evening Outpatient       The client will participate in the program from three to four weeks, depending on the needs of the client.  Treatment is A.A. based and helps clients to achieve a chemically free lifestyle through lectures, individual, family and group therapy.  he has been assigned to [facility] /  Telephone: [phone] with BONILLA Queen as his primary counselor.  If you have further questions, please contact us.    Respectfully,      BONILLA Mauricio

## 2018-04-25 ENCOUNTER — HOSPITAL ENCOUNTER (OUTPATIENT)
Dept: BEHAVIORAL HEALTH | Facility: CLINIC | Age: 63
End: 2018-04-25
Attending: FAMILY MEDICINE
Payer: COMMERCIAL

## 2018-04-25 PROCEDURE — 10020000 ZZH LODGING PLUS FACILITY CHARGE ADULT

## 2018-04-25 PROCEDURE — H2035 A/D TX PROGRAM, PER HOUR: HCPCS | Mod: HQ

## 2018-04-26 ENCOUNTER — HOSPITAL ENCOUNTER (OUTPATIENT)
Dept: BEHAVIORAL HEALTH | Facility: CLINIC | Age: 63
End: 2018-04-26
Attending: FAMILY MEDICINE
Payer: COMMERCIAL

## 2018-04-26 PROCEDURE — 10020000 ZZH LODGING PLUS FACILITY CHARGE ADULT

## 2018-04-26 PROCEDURE — H2035 A/D TX PROGRAM, PER HOUR: HCPCS | Mod: HQ

## 2018-04-26 NOTE — PROGRESS NOTES
Patient:  Jorge Rosales            Adult CD Progress Note and Treatment Plan Review     Attendance  Please refer to OP BEH CD Adult Attendance Record Documentation Flowsheet    Support group attended this week: yes    Reporting sobriety:  yes    Treatment Plan     Treatment Plan Review competed on: 4/26/2018       Client preferred learning style: Hands on  Verbal  Demonstration  Reading    Staff Members contributing BONILLA Queen & BONILLA Castaneda                     Received Supervision: No    Client: contributed to goals and plan.    Client received copy of plan/revised plan: Yes    Client agrees with plan/revised plan: Yes        Changes to Treatment Plan: No    New Goals added since last review No additional goals added at this time.    Goals worked on since last review Patient completed and presented his initial assignment 'drug progression' in group processes.      Strategies effective: yes    Strategies need these changes: No additional strategies needed at this time.    1) Care Coordination Activities:  Patient is working with [facility] counseling staff and  to develop aftercare plan such as intensive outpatient programming at the [facility].  2) Medical, Mental Health and other appointments the client attended:  No appointments attended this week.  3) Medication issues: Patient was able to work with prescribing physician and [facility] nursing staff to resolve medications changes.  4) Physical and mental health problems: No issues to address at this time.  5) Any changes in Vulnerable Adult Status?  No If yes, add to treatment plan and individual abuse prevention plan.  6) Review and evaluation of the individual abuse prevention plan: Current IAPP for this program is adequate for this client          ASAM Risk Rating:    Dimension 1 1 Patient reports a last use date of 3/17/2018.  Patient is prescribed a Suboxone maintenance program.  No issues to  address at this time.      Dimension 2 1 Patient reports medical diagnoses at this time.  Patient continues to utilize a cane for assistance with walking.  Patient denies any biomedical concerns that would interfere with full participation in group and treatment processes.  Patient is prescribed medicaitons and is currenlty medication compliant.  Patient does report having a primary care provider in the community.  Patient is covered under health insurance and reports he is able to navigate the health care system independently.  Patient attended weekly nursing lecture on 4/22/2018.    Dimension 3 2 Patient reports a formal mental health diagnosis at this time.  Patient denies any suicide and/or self harm ideation at this time.  Patient reports having an established mental health therapist in the community, reporting a scheduled appointment upon discharged of [facility] programming.  Patient is working on homework assignment 'self sabotage' and is working to develop into insight into past behavioral patterns and identify new behavioral and thought patterns in recovery.  Patient continues to complete a daily journal and share in small group processes.  Patient attended weekly  led spirituality group on 4/25/2018.      Dimension 4 2 Patient completed his initial assignment 'drug progression' and presented in group processes.   Patient attends all required programming and participates adequately.      Dimension 5 4 Patient continues to work towards gaining an understanding of the relationship between his unmanaged mental health, emotional health and his continued substance use at this time.  Patient does verbalize physical pain as a trigger for continued use.  Patient does report having established medical care providers in the community and continues to explore and develop strategies to manage pain independently.  Patient is currently working with [facility] counseling staff to establish aftercare plans as  a continuum of care.   Patient appears to be at a high risk for continued substance use at this time.         Dimension 6 3 Patient reports he is able to return to his apartment once he completes [facility] programming.  Patient attended sober support meetings and verbalized his willingness to gain a temporary sponsor through the [facility] network.  Patient is currently unemployed and lacks a strong sober network at this time.        Guide to Risk Ratings for Suicidality:   IDEATION: Active thoughts of suicide? INTENT: Intent to follow on suicide? PLAN: Plan to follow through on suicide? Level of Risk:   IF Yes Yes Yes Patient = High Emergent   IF Yes Yes No Patient = High Urgent/Non-Emergent   IF Yes No No Patient = Moderate Non-Urgent   IF No No   No Patient = Low Risk   The patient's ADDITIONAL RISK FACTORS and lack of PROTECTIVE FACTORS may increase their overall suicide risk ratings.     Patient's/client's current risk rating:  Low Risk    Family Involvement:   none schedule this week    Data:   offered feedback client did actively participate      Intervention:   Aftercare planning  Behavior modification  Counselor feedback  Education  Emotional management  Group feedback  Relapse prevention      Assessment:   Stages of Change Model  Preparation/Determination    Appears/Sounds:  Cooperative  Engaged      Plan:  Focus on recovery environment  Monitor emotional/physical health  Continue to work towards treatment plan goals.    BONILLA Queen

## 2018-04-27 ENCOUNTER — HOSPITAL ENCOUNTER (OUTPATIENT)
Dept: BEHAVIORAL HEALTH | Facility: CLINIC | Age: 63
End: 2018-04-27
Attending: FAMILY MEDICINE
Payer: COMMERCIAL

## 2018-04-27 PROCEDURE — 10020000 ZZH LODGING PLUS FACILITY CHARGE ADULT

## 2018-04-27 PROCEDURE — H2035 A/D TX PROGRAM, PER HOUR: HCPCS | Mod: HQ

## 2018-04-28 ENCOUNTER — HOSPITAL ENCOUNTER (OUTPATIENT)
Dept: BEHAVIORAL HEALTH | Facility: CLINIC | Age: 63
End: 2018-04-28
Attending: FAMILY MEDICINE
Payer: COMMERCIAL

## 2018-04-28 PROCEDURE — H2035 A/D TX PROGRAM, PER HOUR: HCPCS | Mod: HQ

## 2018-04-28 PROCEDURE — 10020000 ZZH LODGING PLUS FACILITY CHARGE ADULT

## 2018-04-29 ENCOUNTER — HOSPITAL ENCOUNTER (OUTPATIENT)
Dept: BEHAVIORAL HEALTH | Facility: CLINIC | Age: 63
End: 2018-04-29
Attending: FAMILY MEDICINE
Payer: COMMERCIAL

## 2018-04-29 PROCEDURE — 10020000 ZZH LODGING PLUS FACILITY CHARGE ADULT

## 2018-04-29 PROCEDURE — H2035 A/D TX PROGRAM, PER HOUR: HCPCS | Mod: HQ

## 2018-04-30 ENCOUNTER — HOSPITAL ENCOUNTER (OUTPATIENT)
Dept: BEHAVIORAL HEALTH | Facility: CLINIC | Age: 63
End: 2018-04-30
Attending: FAMILY MEDICINE
Payer: COMMERCIAL

## 2018-04-30 PROCEDURE — H2035 A/D TX PROGRAM, PER HOUR: HCPCS | Mod: HQ

## 2018-04-30 PROCEDURE — 10020000 ZZH LODGING PLUS FACILITY CHARGE ADULT

## 2018-05-01 ENCOUNTER — HOSPITAL ENCOUNTER (OUTPATIENT)
Dept: BEHAVIORAL HEALTH | Facility: CLINIC | Age: 63
End: 2018-05-01
Attending: FAMILY MEDICINE
Payer: COMMERCIAL

## 2018-05-01 PROCEDURE — H2035 A/D TX PROGRAM, PER HOUR: HCPCS | Mod: HQ

## 2018-05-01 PROCEDURE — 10020000 ZZH LODGING PLUS FACILITY CHARGE ADULT

## 2018-05-02 ENCOUNTER — HOSPITAL ENCOUNTER (OUTPATIENT)
Dept: BEHAVIORAL HEALTH | Facility: CLINIC | Age: 63
End: 2018-05-02
Attending: FAMILY MEDICINE
Payer: COMMERCIAL

## 2018-05-02 PROCEDURE — 10020000 ZZH LODGING PLUS FACILITY CHARGE ADULT

## 2018-05-02 PROCEDURE — H2035 A/D TX PROGRAM, PER HOUR: HCPCS | Mod: HQ

## 2018-05-03 ENCOUNTER — HOSPITAL ENCOUNTER (OUTPATIENT)
Dept: BEHAVIORAL HEALTH | Facility: CLINIC | Age: 63
End: 2018-05-03
Attending: FAMILY MEDICINE
Payer: COMMERCIAL

## 2018-05-03 PROCEDURE — H2035 A/D TX PROGRAM, PER HOUR: HCPCS | Mod: HQ

## 2018-05-03 PROCEDURE — 10020000 ZZH LODGING PLUS FACILITY CHARGE ADULT

## 2018-05-04 ENCOUNTER — HOSPITAL ENCOUNTER (OUTPATIENT)
Dept: BEHAVIORAL HEALTH | Facility: CLINIC | Age: 63
End: 2018-05-04
Attending: FAMILY MEDICINE
Payer: COMMERCIAL

## 2018-05-04 PROCEDURE — H2035 A/D TX PROGRAM, PER HOUR: HCPCS | Mod: HQ

## 2018-05-04 PROCEDURE — 10020000 ZZH LODGING PLUS FACILITY CHARGE ADULT

## 2018-05-04 NOTE — PROGRESS NOTES
Patient:  Jorge Rosales            Adult CD Progress Note and Treatment Plan Review     Attendance  Please refer to OP BEH CD Adult Attendance Record Documentation Flowsheet    Support group attended this week: yes    Reporting sobriety:  yes    Treatment Plan     Treatment Plan Review competed on: 5/4/2018       Client preferred learning style: Hands on  Verbal  Demonstration  Reading    Staff Members contributing BONILLA Queen & BONILLA Castaneda                     Received Supervision: No    Client: contributed to goals and plan.    Client received copy of plan/revised plan: Yes    Client agrees with plan/revised plan: Yes        Changes to Treatment Plan: No    New Goals added since last review No additional goals added at this time.    Goals worked on since last review Patient completed his 'Self Sabotage' assignment.    Strategies effective: yes    Strategies need these changes: No additional strategies needed at this time.    1) Care Coordination Activities:  Counseling staff is working to establish aftercare recommendation of outpatient at the [facility].   2) Medical, Mental Health and other appointments the client attended: Patient attended scheduled medical appointments in the community with established medical care providers.  3) Medication issues: No issues to address at this time.  4) Physical and mental health problems: No issues to address at this time.  5) Any changes in Vulnerable Adult Status?  No If yes, add to treatment plan and individual abuse prevention plan.  6) Review and evaluation of the individual abuse prevention plan: Current IAPP for this program is adequate for this client          ASAM Risk Rating:    Dimension 1 1 Patient reports a last use date of 3/17/2018.  Patient is prescribed a Suboxone maintence program.  No issues to address at this time.    Dimension 2 1 Patient reports medical diagnoses at this time.  Patient continues to utilize a  cane for assistance with walking.  Patient denies any biomedical concerns that would interfere with full participation in group and treatment processes.  Patient is prescribed medications and is currently medication compliant.  Patient does report having a primary care provider in the community.  Patient is covered under health insurance and reports he is able to navigate the health care system independently.     Dimension 3 2 Patient reports a formal mental health diagnosis.  Patient denies any suicide and/or self harm ideation at this time.  Patient continues to understand personal self sabotaging behaviors and continues to identify healthy behaviors in recovery.  Patient reports he will continue with established mental health therapist upon discharge of [facility] programming.  Patient attended [facility] led spirituality group on 5/2/2018.    Dimension 4 1 Patient attends all rquired programming and participates adequately.      Dimension 5 4 Patient continues to gain an understanding the relationship between his unmanaged mental health, emotional his continued substance use at this time.  Patient has identified outpatient CD treatment at the [facility] as a continuun of care.  Patient also reports having open communiation with his established primary care provider regarding continued pain managment and reports he will continue to explore options regarding pain management.  Patient also reports continued mental zack therapy as a strategy to prevent relapse.  Patient appears to be at a high risk for continued substance use at this time.       Dimension 6 3 Patient reports he is able to return to his home upon completion of [facility] programming.  Patient verbalizes havnig sober support meetings near his home and reports he will continue with meetings as continuud support.  Patient is currenlty unemployed and lacks a strong sober network at this time.       Guide to Risk Ratings for Suicidality:    IDEATION: Active thoughts of suicide? INTENT: Intent to follow on suicide? PLAN: Plan to follow through on suicide? Level of Risk:   IF Yes Yes Yes Patient = High Emergent   IF Yes Yes No Patient = High Urgent/Non-Emergent   IF Yes No No Patient = Moderate Non-Urgent   IF No No   No Patient = Low Risk   The patient's ADDITIONAL RISK FACTORS and lack of PROTECTIVE FACTORS may increase their overall suicide risk ratings.     Patient's/client's current risk rating:  Low Risk    Family Involvement:   none schedule this week    Data:   client did actively participate      Intervention:   Aftercare planning  Behavior modification  Counselor feedback  Education  Emotional management  Group feedback  Relapse prevention      Assessment:   Stages of Change Model  Preparation/Determination    Appears/Sounds:  Cooperative      Plan:  Focus on recovery environment  Monitor emotional/physical health  Continue to work towards treatment plan goals.    BONILLA Queen

## 2018-05-04 NOTE — PROGRESS NOTES
Name: Jorge Rosales  Date: 5/4/2018  Medical Record: [id number]    Envelope Number: [id number]    List of Contents (List each item separately in new row):   Cell phone    Admission:  I am responsible for any personal items that are not sent to the safe or pharmacy.  [facility] is not responsible for loss, theft or damage of any property in my possession.      Patient Signature:  ___________________________________________       Date/Time:__________________________    Staff Signature: __________________________________       Date/Time:__________________________    2nd Staff person, if patient is unable/unwilling to sign:      __________________________________________________________       Date/Time: __________________________      Discharge:  [facility] has returned all of my personal belongings:    Patient Signature: ________________________________________     Date/Time: ____________________________________    Staff Signature: ______________________________________     Date/Time:_____________________________________

## 2018-05-04 NOTE — PROGRESS NOTES
Patient brought Rozerem 8mg tabs (total 30#) bottle back to the unit from [facility] pharmacy this morning. Medication not allowed to be taken during stay in [facility] - counted with another staff and will be sent with patient at discharge.

## 2018-05-05 ENCOUNTER — HOSPITAL ENCOUNTER (OUTPATIENT)
Dept: BEHAVIORAL HEALTH | Facility: CLINIC | Age: 63
End: 2018-05-05
Attending: FAMILY MEDICINE
Payer: COMMERCIAL

## 2018-05-05 PROCEDURE — 10020000 ZZH LODGING PLUS FACILITY CHARGE ADULT

## 2018-05-05 PROCEDURE — H2035 A/D TX PROGRAM, PER HOUR: HCPCS | Mod: HQ

## 2018-05-06 ENCOUNTER — HOSPITAL ENCOUNTER (OUTPATIENT)
Dept: BEHAVIORAL HEALTH | Facility: CLINIC | Age: 63
End: 2018-05-06
Attending: FAMILY MEDICINE
Payer: COMMERCIAL

## 2018-05-06 PROCEDURE — H2035 A/D TX PROGRAM, PER HOUR: HCPCS | Mod: HQ

## 2018-05-06 PROCEDURE — 10020000 ZZH LODGING PLUS FACILITY CHARGE ADULT
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
## 2018-05-07 RX ORDER — RAMELTEON 8 MG/1
8 TABLET ORAL AT BEDTIME
COMMUNITY
End: 2018-11-06

## 2018-05-07 NOTE — PROGRESS NOTES
[address]oors  [place] [address]        Jorge Rosales, 1955, was admitted for evaluation/treatment of chemical dependency at [facility].  This person took part in these program(s):    ______ The Inpatient Program   ______ The Outpatient Program   __X___ [facility]   ______ [facility]       Date admitted: 4/9/2018  Date discharged: 5/7/2018    Type of discharge:   __X___ Satisfactory - completed evaluation / treatment   ______ Discharged without completing   ______ Behavioral discharge   ______ Transferred to another chemical dependency program   ______ Transferred to another type of service   ______ Left against medical advice (AMA) / El[facility]       Comments: Mr. Rosales completed 28 days of residential CD treatment programming at [facility].  It is recommended Mr. Rosales attend and complete outpatient CD treatment programming at the [facility] and arrive for walk in screening on 5/11/2018 @ 9:00 AM.      Counselor: BONILLA Queen                       Date: 5/7/2018             Time: 8:37 AM

## 2018-05-07 NOTE — PROGRESS NOTES
"                     [facility] Discharge Summary/Instructions     Patient: Jorge Rosales  MRN: [id number]   : 1955 Age: 63 year old Sex: male   -  Focus of Treatment / Discharge Recommendations    Personal Safety/ Management of Symptoms    * Follow your safety plan.  Report increased symptoms to your care team and /or go to the nearest Emergency Department or call 911.      * Call crisis lines as needed:    [facility] [phone]                [facility] [phone]  [facility] [phone]              [facility] [phone]  [facility] [phone]              [facility] [phone]  [facility] [phone]          National Suicide Prevention [phone]  [facility] [phone]            Suicide Prevention [phone]  [facility] [phone]    * Crisis text line:   Text \"[place]\" to 710448.    Abstinence/Relapse Prevention  * Take all medicines as directed.  Carry a current list of medicines with you.  * Use coping skills: Utilize assertive communication skills and supportive network.  * Do not use illicit (street) drugs, controlled substances (narcotics) or alcohol.    Develop/Improve Independent Living/Socialization Skills: Ensure living environment is conducive to recovery efforts.    Community Resources/Supports and Discharge Planning:      * Attend and complete outpatient CD treatment programming at the [facility].     - Arrive for walk-in screening on 2018 @ 9:00 AM.  * Maintain contact with established psychiatrist/medical providers and follow recommendations.  * Continue to meet with established mental health therapist and follow recommendations.  * Attend a minimum of three AA/NA.Sober support groups in the community weekly.  * Gain a male sponsor and meet with him weekly.  * Remain law abiding and follow all conditions of of stay of commitment.  * Continue to invest in building sober support network.  * Continue to " monitor and understand relapse triggers and  stressors through the use and development of healthy coping skills.      Client Signature:_______________________   Date / Time:___________  Staff Signature:________________________   Date / Time:___________

## 2018-05-07 NOTE — PROGRESS NOTES
5/7/2018 @ 9227  Faxed discharge summary, medication list and H&P to [facility], fax # [phone]) (CORNELIO on file) for patient to resume home healthcare services.

## 2018-05-09 NOTE — DISCHARGE SUMMARY
CHEMICAL DEPENDENCY DISCHARGE SUMMARY    PATIENT NAME:  Jorge Rosales  :  1955    EVALUATION COUNSELOR: BONILLA Ladd - [facility]  TREATMENT COUNSELING TEAM:  BONILLA Queen & BONILLA Castaneda    REFERRAL SOURCE:  [facility] / [facility]     PROGRAM:  [place] [facility]    ADMISSION DATE: 2108  DATE OF LAST SESSION: 2018  DISCHARGE DATE: 2018    DIAGNOSIS:    F10.20 Alcohol Use Disorder  Z72.00 Tobacco Use Disorder    DISCHARGE STATUS: Patient discharged [facility] programming with staff approval/graduated.    LAST USE DATE: As reported by the patient, 3/17/2018.    DAYS OF TREATMENT COMPLETED:  28 days.    PRESENTING INFORMATION:  Patient admitted to [facility] due to continued substance use.  Patient is currently on a stay of commitment through [facility].    SERVICES PROVIDED:  Our services included assessment, treatment planning and education regarding chemical dependency, mental illness, relationships, and relapse prevention.  The patient also participated in group therapy, recovery oriented workshops, spiritual care counseling, recovery skills training and aftercare planning.    ISSUES ADDRESS IN TREATMENT:    DIMENSION 1 - ACUTE INTOXICATION/WITHDRAWAL POTENTIAL  ADMISSION RISK RATIN  DISCHARGE RISK RATIN  Upon admission, patient reported his last use date of alcohol as 3/17/2018.  Patient is prescribed a Suboxone maintenance program.  Patient admitted to [facility] from inpatient hospitalization.  No issues to address while in [facility] programming.      DIMENSION 2 - BIOMEDICAL COMPLICATIONS AND CONDITIONS  ADMISSION RISK RATIN  DISCHARGE RISK RATIN  Upon admission, patient reported medical diagnoses, including issues related to chronic pain.  Patient was able to utilize a cane for assistance with walking.  Patient  "denied any biomedical concerns that interfered with full participation in group and treatment processes.  Patient as prescribed medications and was medication compliant while in [facility] programming.  Patient was able to keep and attend scheduled medical appointments with established medical care providers in the community.  Patient attended weekly nursing lecture while in programming.  Patient was covered under health insurance and reported he was able to navigate the health care system independently.      DIMENSION 3 - EMOTIONAL, BEHAVIORAL, COGNITIVE CONDITIONS AND COMPLICATIONS  ADMISSION RISK RATIN  DISCHARGE RISK RATIN  Upon admission, patient reported a formal mental health diagnosis of Depression, PTSD and panic attacks.  Patient was prescribed psychotropic medications from established medical care provider in the community.  Patient reported having an established mental health therapist at the [facility], reporting he will continue with individual sessions upon discharge from [facility] programming.  Patient participated in a suicide risk screening during his CD assessment and his risk rating was determined to be \"Present / Non-urgent\".  Patient denied any suicide and/or self harm ideation while in [facility] programming.  Patient did develop a safety plan.  Patient continues to gain an understanding of his self sabotaging behaviors and continues to work towards developing healthy behaviors in recovery and utilize them consistently.  Patient also continues to understand his emotions and the defenses he uses and develop healthy thought patterns.  Patient continues to work through issues related to grief.  Patient completed a daily journal and shared in group processes.  Patient attended  led spirituality group weekly..        DIMENSION 4 - READINESS FOR CHANGE  ADMISSION RISK RATIN  DISCHARGE RISK RATIN  Upon admission,  Patient verbalized his willingness to " follow treatment recommendations due to a civil stay of commitment.  Patient also reported he was on probation for his third DWI.  Patient displayed ambivalence initially regarding aftercare recommendations of outpatient CD treatment, however, patient did verbalize he would be compliant and follow recommendations.  Counseling staff and patients  worked with patient and established outpatient aftercare option at the [facility].  Patient attended all required programming and although he did not complete all assignments in his treatment plan, he did continue to work towards treatment plan goals.    DIMENSION 5 - RELAPSE, CONTINUED USE AND CONTINUED PROBLEM POTENTIAL   ADMISSION RISK RATIN  DISCHARGE RISK RATING: 3  Upon admission, patient reported this as his fourth CD treatment experience.  Patient reported an extensive use history, reporting a period of 20 years of sobriety in the past while on a medication maintenance program.  Patient was able to identify internal and external stressors and was able to identify continued use factors such as his unmanaged mental health, lack of emotional regulation skills and issues related to chronic pain.  Patient reported he will continue to work with established mental therapy, in addition to continued efforts to work through unresolved isses related to grief and loss and past traumas.  Patient has identified breathing exercises, utilizing assertive communication skills and the need to build his sober support network.  Patient attended relapse prevention workshops.  It is recommended patient attend and complete outpatient CD treatment programming at the [facility] as a continuum of care.  Patient does verbalize his willingness to comply.  Patient appears to be at a moderately high risk for continues substance use at this time.    DIMENSION 6 - RECOVERY ENVIRONMENT  ADMISSION RISK RATING: 3  DISCHARGE RISK RATING: 3  Upon admission,  patient reported he was able to return to his apartment upon completion of [facility] programming.  Patient verbalized his willingness to attend sober support meetings in the community and has identified sober support meetings within the [facility] near his home.  Patient attended sober support meetings while in [facility] programming and attended workshops on relationships.  Patient lacks consistent structure within his [facility] at this time.  Patient lacks a strong sober network at this time.     STRENGTHS:  Patient also displayed progress, participation and motivation.  Patient appears to be receptive to accepting long term abstinence, medication stabilization and the need for aftercare.    PROGNOSIS:  Prognosis for this patient is favorable.     This prognosis is contingent the patient follows the continuing care recommendations:    1.  Abstain from all mood-altering chemicals unless prescribed by a licensed medical provider.  2.  Take medications as prescribed.  3.  Attend and complete outpatient CD treatment programming at the [facility].   * Arrive for walk in assessment on 5/11/2018 @ 9:00AM.  4.  Keep and attend scheduled appointments with established psychiatrist/primary care provider in the community and follow recommendations.  5.  Keep and attend scheduled appointments with established mental health therapist in the community and follow recommendations.  6.  Attend a minimum of three AA/NA/Sober support groups weekly in the community.  7.  Gain a male sponsor and meet with them weekly.  8.  Remain law abiding and follow all conditions of probation and terms of stay of civil commitment.   9.  Continue to invest in building sober network.  10.  Continue to monitor and understand relapse triggers and stressors through the use and development of healthy coping skills.    LIVING ARRANGEMENTS: Patient returned to his apartment.    BONILLA Queen

## 2018-05-09 NOTE — ADDENDUM NOTE
Encounter addended by: Lis Melara LADC on: 5/9/2018  1:05 PM<BR>     Actions taken: Sign clinical note

## 2018-05-09 NOTE — ADDENDUM NOTE
Encounter addended by: Lis Melraa LADC on: 5/9/2018 11:59 AM<BR>     Actions taken: Pend clinical note

## 2018-05-11 NOTE — DISCHARGE SUMMARY
Discharge Summary    Jorge Rosales MRN# [id number]   YOB: 1955 Age: 63 year old     Date of Admission:  3/17/2018  Date of Discharge:  4/9/2018 12:26 PM  Admitting Physician:  Wilmer Tripathi MD  Discharge Physician:  Dejan Swann MD     Primary Provider: Carlos Padron          Discharge Diagnosis:    Alcohol abuse   alcohol dependence   Copd  A.fib -peoxysmal                Discharge Disposition:   [facility]             Condition on Discharge:   Discharge condition: Stable   Code status on discharge: Full Code           Procedures:   No procedures performed during this admission          Discharge Medications:     Discharge Medication List as of 4/9/2018 10:49 AM      CONTINUE these medications which have CHANGED    Details   gabapentin (NEURONTIN) 300 MG capsule Take 2 capsules (600 mg) by mouth 4 times daily, Disp-90 capsule, R-0, E-Prescribe         CONTINUE these medications which have NOT CHANGED    Details   albuterol (PROAIR HFA/PROVENTIL HFA/VENTOLIN HFA) 108 (90 BASE) MCG/ACT Inhaler Inhale 2 puffs into the lungs every 6 hours as needed for shortness of breath / dyspnea or wheezing, Disp-1 Inhaler, R-1, E-Prescribe      aspirin 81 MG tablet Take 1 tablet (81 mg) by mouth daily, Disp-30 tablet, R-0, E-Prescribe      buprenorphine-naloxone (SUBOXONE) 8-2 MG SUBL sublingual tablet Dissolve 1.5 tablets sublingually in the morning and 1 tablet sublingually in the evening, Historical      cyanocobalamin (VITAMIN  B-12) 1000 MCG tablet Take 1 tablet (1,000 mcg) by mouth daily, Disp-100 tablet, R-0, E-Prescribe      fish oil-omega-3 fatty acids 1000 MG capsule Take 1 g by mouth daily , Disp-180 capsule, R-12, Historical      Multiple Vitamin (MULTIVITAMIN) per tablet Take 1 tablet by mouth daily., Disp-100 tablet, R-12, Historical      venlafaxine (EFFEXOR-ER) 75 MG TB24 24 hr tablet Take 1 tablet (75 mg) by mouth daily, Disp-30 each, R-1, E-Prescribe      polyethylene glycol (MIRALAX/GLYCOLAX)  Packet Take 17 g by mouth daily as needed for constipation, Historical         STOP taking these medications       bismuth subsalicylate (PEPTO BISMOL) 262 MG chewable tablet Comments:   Reason for Stopping:         methocarbamol (ROBAXIN) 750 MG tablet Comments:   Reason for Stopping:         metoprolol succinate (TOPROL-XL) 50 MG 24 hr tablet Comments:   Reason for Stopping:                     Consultations:   Psychiatry   SW  Chemical dependency  Orthopedics              Brief History of Illness:   This is a pleasant 63 year old man with multiple psychiatric illnesses most notable for PTSD, depression, he has h/o PSA on maintanance suboxone and is abusing alcohol as well. He came due to feeling pain all over, not associated with fevers or fall. He has chronic right shoulder pain that has not changed in quality or intensity. He also developed cough few days prior to admission, no hemoptysis, mostly dry. It is associated with SOB. Denies headache, photophobia, neck stiggness, no nausea, vomiting or diarrhea. Denies abdominal pain. He was admitted for further management of alcohol withdrawal, HCAP and COPD exacrbation          Hospital Course:   Jorge Rosales is a 63 year old male with past medical history significant for alcohol dependency., recurrent admissions for alcohol related cause last 01/14--01/19/18 , smoker, COPD, chronic pain syndrome, opioid use disorder on suboxone maintenance, depression, PTSD, L leg weakness s/p Lumbar spine surgery admitted for alcohol intoxication,  withdrawal and acute on chronic hypoxic respi failure 2/2 PNA, mild copd exacerbation.         # Alcohol dependency, intoxication and withdrawal:   Was initiated on MSSA protocol with Valium.  He was also started on a multivitamin thiamine and folic acid which were later discontinued after his completion of withdrawal.  He was initially seen by psychiatrist on 3/19 and was recommended to continue on 1:1 monitoring.  Subsequent  "visit with a psychiatrist on 3/22 recommended for commitment in view of his ongoing hallucinations and failed outpatient treatments for alcohol addiction.  Commitment was filed\" and [facility] court supported the Commitment.  He was referred to [facility] and was accepted there.  He stayed on the medical unit until a bed available and was discharged on 4/9/2018 to [facility] for further ongoing alcohol addiction treatment.  He has a chronic pain issues for which he will be continued on his Suboxone.     # Acute on chronic hypoxic respi failure , h/o COPD: suspect R lower lobe PNA. Mild copd exacerbation. - resolved   Upon admission he was started on empiric IV antibiotics  vanc, zosyn, doxy for HCAP.  03/18: Given pt stable hemodynamics. Lactic acidosis: resolved quickly suspect mostly related to alcohol use, dehydration. Hypoxia: improving, afebrile, low procal: dc iv Vanco and Zosyn, doxy. Completed 7 days levofloxacin 750 mg daily- 3/24.  - Ct bronchodilator nebs (03/18: switched to levalbuterol given afib w rvr), oxygen.   Advised to quit smoking.  He was placed on 21 mg nicotine patch.      # Afib w rvr: in NSR     HR controlled (Recurrent episode due to drinking- previous admission in 01/18 as well)- better after detox.    - ct metop to 100 XR daily.       #Hx of chronic pain syndrome. Hx of Opioid abuse   On Suboxone.   Evaluated by psychiatrist, advised to continue with Suboxone maintenance therapy      # Depression, PTSD w/ hx of panic attacks:   - Continue PTA venlafaxine.        # Tobacco abuse: continue  On nicotine patch panel.             Final Day of Progress before Discharge:       Physical Exam:  Blood pressure 112/80, pulse 69, temperature 97.6  F (36.4  C), temperature source Oral, resp. rate 18, height 1.803 m (5' 10.98\"), weight 93.4 kg (206 lb), SpO2 97 %.  Gen- pleasant   CVS- I+II+ no m/r/g  RS- CTABS  Abdo- soft, no tenderness . No g/r/r   Ext- no edema    MSk -able to ambulate " independently, strength appears intact  CNS: Alert awake appears anxious oriented to the place for some time, no focal deficits           Data:  All laboratory data reviewed             Significant Results:   No results found for this or any previous visit (from the past 48 hour(s)).             Pending Results:   Unresulted Labs Ordered in the Past 30 Days of this Admission     No orders found from 1/16/2018 to 3/18/2018.                  Discharge Instructions and Follow-Up:     Discharge Procedure Orders  Reason for your hospital stay   Order Comments: Alcohol addiction     Adult [facility] Follow-up and recommended labs and tests   Order Comments: Follow up with primary care provider, Carlos Padron, within 7 days for hospital follow- up.  No follow up labs or test are needed.      Appointments on [place] and/or [facility] (with [facility] or [facility] provider or service). Call [phone] if you haven't heard regarding these appointments within 7 days of discharge.     Activity   Order Comments: Your activity upon discharge: activity as tolerated   Order Specific Question Answer Comments   Is discharge order? Yes      Full Code     Diet   Order Comments: Follow this diet upon discharge: Orders Placed This Encounter     Regular Diet Adult   Order Specific Question Answer Comments   Is discharge order? Yes             Attestation:  Dejan Swann.    Time spent on patient: 25 minutes total including face to face and coordinating care time reviewing current illness, any medication changes, and the care plan for today.

## 2018-05-17 ENCOUNTER — MEDICAL CORRESPONDENCE (OUTPATIENT)
Facility: CLINIC | Age: 63
End: 2018-05-17

## 2018-05-17 DIAGNOSIS — R00.2 PALPITATIONS: Primary | ICD-10-CM

## 2018-05-31 ENCOUNTER — ALLIED HEALTH/NURSE VISIT (OUTPATIENT)
Dept: CARDIOLOGY | Facility: CLINIC | Age: 63
End: 2018-05-31
Attending: INTERNAL MEDICINE
Payer: COMMERCIAL

## 2018-05-31 DIAGNOSIS — R00.2 PALPITATIONS: ICD-10-CM

## 2018-05-31 PROCEDURE — 93270 REMOTE 30 DAY ECG REV/REPORT: CPT | Mod: ZF

## 2018-05-31 PROCEDURE — 93228 REMOTE 30 DAY ECG REV/REPORT: CPT | Performed by: INTERNAL MEDICINE

## 2018-05-31 NOTE — MR AVS SNAPSHOT
"              After Visit Summary   2018    Jorge Rosales    MRN: [id number]           Patient Information     Date Of Birth          1955        Visit Information        Provider Department      2018 9:00 AM Tech, [facility] Cvc Monitor, [facility]        Today's Diagnoses     Palpitations           Follow-ups after your visit        Who to contact     If you have questions or need follow up information about today's clinic visit or your schedule please contact [facility] directly at [phone].  Normal or non-critical lab and imaging results will be communicated to you by ThinkHRhart, letter or phone within 4 business days after the clinic has received the results. If you do not hear from us within 7 days, please contact the clinic through ThinkHRhart or phone. If you have a critical or abnormal lab result, we will notify you by phone as soon as possible.  Submit refill requests through FOBO or call your pharmacy and they will forward the refill request to us. Please allow 3 business days for your refill to be completed.          Additional Information About Your Visit        MyChart Information     FOBO lets you send messages to your doctor, view your test results, renew your prescriptions, schedule appointments and more. To sign up, go to [email or web address] . Click on \"Log in\" on the left side of the screen, which will take you to the Welcome page. Then click on \"Sign up Now\" on the right side of the page.     You will be asked to enter the access code listed below, as well as some personal information. Please follow the directions to create your username and password.     Your access code is: [email or web address]  Expires: 2018  8:46 AM     Your access code will  in 90 days. If you need help or a new code, please call your [place] clinic or [phone].        Care EveryWhere ID     This is your Care EveryWhere ID. This could be used by other organizations to access " your [place] medical records  [id number]         Blood Pressure from Last 3 Encounters:   04/17/18 140/72   04/15/18 146/83   04/09/18 112/80    Weight from Last 3 Encounters:   04/23/18 98 kg (216 lb)   04/17/18 98.2 kg (216 lb 8 oz)   03/27/18 93.4 kg (206 lb)              We Performed the Following     Cardiac Event Monitor - Peds/Adult     Cardiac Event Monitor - Peds/Adult        Primary Care Provider Office Phone # Fax #    Carlos Padron [phone]       [facility] [address] [address]        Equal Access to Services     GEOVANI VILLA : Hadii aad ku hadasho Soholaali, waaxda luqadaha, qaybta kaalmada adeegyada, waxay idiin hayhaleighn otto gomez . So [facility] [phone].    ATENCIÓN: Si habla español, tiene a nunez disposición servicios gratuitos de asistencia lingüística. [facility] [phone].    We comply with applicable federal civil rights laws and [place] laws. We do not discriminate on the basis of race, color, national origin, age, disability, sex, sexual orientation, or gender identity.            Thank you!     Thank you for choosing [facility]  for your care. Our goal is always to provide you with excellent care. Hearing back from our patients is one way we can continue to improve our services. Please take a few minutes to complete the written survey that you may receive in the mail after your visit with us. Thank you!             Your Updated Medication List - Protect others around you: Learn how to safely use, store and throw away your medicines at www.disposemymeds.org.          This list is accurate as of 5/31/18  4:02 PM.  Always use your most recent med list.                   Brand Name Dispense Instructions for use Diagnosis    albuterol 108 (90 Base) MCG/ACT Inhaler    PROAIR HFA/PROVENTIL HFA/VENTOLIN HFA    1 Inhaler    Inhale 2 puffs into the lungs every 6 hours as needed for shortness of breath / dyspnea or wheezing    Moderate  persistent reactive airway disease with acute exacerbation       aspirin 81 MG tablet     30 tablet    Take 1 tablet (81 mg) by mouth daily    CARDIOVASCULAR SCREENING; LDL GOAL LESS THAN 160       * buprenorphine-naloxone 8-2 MG Subl sublingual tablet    SUBOXONE     Dissolve 1.5 tablets sublingually in the morning and 1 tablet sublingually in the evening        * buprenorphine HCl-naloxone HCl 8-2 MG per film    SUBOXONE     Place 1 Film under the tongue        cyanocobalamin 1000 MCG tablet    vitamin  B-12    100 tablet    Take 1 tablet (1,000 mcg) by mouth daily    Alcohol dependence with uncomplicated withdrawal (H)       fish oil-omega-3 fatty acids 1000 MG capsule     180 capsule    Take 1 g by mouth daily        gabapentin 300 MG capsule    NEURONTIN    90 capsule    Take 2 capsules (600 mg) by mouth 4 times daily    Chronic low back pain, unspecified back pain laterality, with sciatica presence unspecified       metoprolol tartrate 100 MG tablet    LOPRESSOR     Take 100 mg by mouth        mirtazapine 15 MG tablet    REMERON    30 tablet    Take 0.5 tablets (7.5 mg) by mouth At Bedtime    Insomnia due to medical condition       multivitamin per tablet     100 tablet    Take 1 tablet by mouth daily.        * nicotine 21 MG/24HR 24 hr patch    NICODERM CQ    28 patch    Place 1 patch onto the skin every 24 hours    Nicotine dependence       * nicotine 14 MG/24HR 24 hr patch    NICODERM CQ    28 patch    Place 1 patch onto the skin every 24 hours    Nicotine dependence       * nicotine 7 MG/24HR 24 hr patch    NICODERM CQ    28 patch    Place 1 patch onto the skin every 24 hours    Nicotine dependence       nicotine polacrilex 2 MG lozenge    COMMIT    144 lozenge    Place 1 lozenge (2 mg) inside cheek every hour as needed for smoking cessation    Nicotine dependence       pantoprazole 40 MG EC tablet    PROTONIX     Take 40 mg by mouth        ramelteon 8 MG tablet    ROZEREM     Take 8 mg by mouth At Bedtime         venlafaxine 75 MG Tb24 24 hr tablet    EFFEXOR-ER    30 each    Take 1 tablet (75 mg) by mouth daily    Recurrent major depressive disorder, remission status unspecified (H)       * Notice:  This list has 5 medication(s) that are the same as other medications prescribed for you. Read the directions carefully, and ask your doctor or other care provider to review them with you.

## 2018-05-31 NOTE — NURSING NOTE
Per provider KAYLIN LEE, patient to have 30 day BioTel monitor placed.  Diagnosis: palps  Monitor placed: Yes  Patient Instructed: Yes  Patient verbalized understanding: Yes  Holter # [id number]  Placed by CHEY Mobley

## 2018-06-01 ENCOUNTER — TELEPHONE (OUTPATIENT)
Dept: CARDIOLOGY | Facility: CLINIC | Age: 63
End: 2018-06-01

## 2018-06-01 NOTE — TELEPHONE ENCOUNTER
Type of call: Monitor    Monitor  Company: [employer]  Type of monitor: Cardiac Event Monitor  Rhythm: New on-set of A-Fib with HR of 147 BPM. Starting at approximately 05/31/2018 8:40 pm.  Duration: 1.5 minutes  Symptomatic: No  Was the patient notified?: No  Was the report downloaded or faxed?: Downloaded.    Monitor recording was printed and reviewed by EP Cardiologist Dr Sexton.  Dr Sexton confirmed it was A Fib.  Forwarding to ordering provider.    Estefania Claudio LPN

## 2018-06-06 ENCOUNTER — MYC REFILL (OUTPATIENT)
Dept: FAMILY MEDICINE | Facility: CLINIC | Age: 63
End: 2018-06-06

## 2018-06-06 DIAGNOSIS — G47.01 INSOMNIA DUE TO MEDICAL CONDITION: ICD-10-CM

## 2018-06-06 RX ORDER — OMEGA-3/DHA/EPA/FISH OIL 300-1000MG
1 CAPSULE ORAL DAILY
Qty: 180 CAPSULE | Refills: 12 | Status: CANCELLED | OUTPATIENT
Start: 2018-06-06

## 2018-06-06 NOTE — TELEPHONE ENCOUNTER
[facility]:  Original authorizing provider: Alvin Yo MD, MD    Jorge Rosales would like a refill of the following medications:  fish oil-omega-3 fatty acids 1000 MG capsule [Alvin Yo MD, MD]    Preferred pharmacy: [facility] [id number] [place] [address] AT [address]    Comment:      Medication renewals requested in this message routed to other providers:  mirtazapine (REMERON) 15 MG tablet [Katina Quevedo, APRN CNP]

## 2018-06-06 NOTE — TELEPHONE ENCOUNTER
"Requested Prescriptions   Pending Prescriptions Disp Refills     fish oil-omega-3 fatty acids 1000 MG capsule 180 capsule 12    Last Written Prescription Date:  3/7/11  Last Fill Quantity: 180,  # refills: 12   Last office visit: 9/27/2011 with prescribing provider:     Future Office Visit:     Sig: Take 1 capsule (1 g) by mouth daily    Vitamin Supplements (Adult) Protocol Failed    6/6/2018  3:33 PM       Failed - Recent (12 mo) or future (30 days) visit within the authorizing provider's specialty    Patient had office visit in the last 12 months or has a visit in the next 30 days with authorizing provider or within the authorizing provider's specialty.  See \"Patient Info\" tab in inbasket, or \"Choose Columns\" in Meds & Orders section of the refill encounter.           Passed - High dose Vitamin D not ordered          "

## 2018-06-07 ENCOUNTER — TELEPHONE (OUTPATIENT)
Dept: CARDIOLOGY | Facility: CLINIC | Age: 63
End: 2018-06-07

## 2018-06-07 RX ORDER — MIRTAZAPINE 15 MG/1
7.5 TABLET, FILM COATED ORAL AT BEDTIME
Qty: 30 TABLET | Refills: 0 | Status: SHIPPED | OUTPATIENT
Start: 2018-06-07 | End: 2018-11-06

## 2018-06-07 NOTE — TELEPHONE ENCOUNTER
[facility]  Original authorizing provider: MANNIE Calix CNP would like a refill of the following medications:  mirtazapine (REMERON) 15 MG tablet [MANNIE Calix CNP]    Preferred pharmacy: [facility] [id number] [place] [address] AT [address]    Comment:      Medication renewals requested in this message routed to other providers:  fish oil-omega-3 fatty acids 1000 MG capsule [Alvin Yo MD, MD]

## 2018-06-08 NOTE — TELEPHONE ENCOUNTER
Paged by [employer] for atrial fibrillation with HR up to 180 for 1 min.    I called the patient. He was asymptomatic during the episode. He specifically denies CP, SOB, lightheadedness, syncope, palpitations, fevers, dehydration, recent alcohol use.     He checked his pulse and BP with his home machine. HR is 90 and BP is 107/74.    He had been prescribed metoprolol in the past but has stopped taking it per his PCP. Not on anticoagulation.    I recommended he call his clinic tomorrow to discuss restarting metoprolol. If he develops atrial fibrillation with persistent symptoms, I recommended he present to the ED.    Jimmy Barriga MD  Cardiovascular Medicine Fellow, PGY-6  [phone]

## 2018-06-11 ENCOUNTER — CARE COORDINATION (OUTPATIENT)
Dept: CARDIOLOGY | Facility: CLINIC | Age: 63
End: 2018-06-11

## 2018-06-11 NOTE — PROGRESS NOTES
Noted preliminary report for rapid afib.   Cardiology fellow contacted pt, see documentation.    Noted twice message routed to ordering provider, Traci Reich- but Dr Reich works our of [facility].    All strips and documentation faxed to Dr Reich at [facility] : fax [phone]  Including contact information.

## 2018-06-18 ENCOUNTER — DOCUMENTATION ONLY (OUTPATIENT)
Dept: OTHER | Facility: CLINIC | Age: 63
End: 2018-06-18

## 2018-06-18 ENCOUNTER — TELEPHONE (OUTPATIENT)
Dept: CARDIOLOGY | Facility: CLINIC | Age: 63
End: 2018-06-18

## 2018-06-18 ENCOUNTER — CARE COORDINATION (OUTPATIENT)
Dept: CARDIOLOGY | Facility: CLINIC | Age: 63
End: 2018-06-18

## 2018-06-18 NOTE — PROGRESS NOTES
See urgent notification from today.    Call to [facility]. Left vm asking for contact number for Dr Reich.   Direct number, pager left      Spoke with pt. Stated he has been having more symptoms.   He stated he has not been called by Dr Reich yet.     Discussed abnormal monitor, pt agrees to cardiology appt, prefers am appt. Will work on appt and contact pt      Call to triage line: [facility] clinic: after being on hold > 10 minutes, message was left with information on urgent notifications, patient contacted and we will arrange a cardiology appointment.     [facility]: [facility]/27 at 830 am.   Call to pt, no answer, no voicemail.   My chart message sent to pt notifying appt date and time.

## 2018-06-18 NOTE — TELEPHONE ENCOUNTER
"Recieved a call from [employer]( [facility]) on the clinic vocera to report urgent monitor trigger. Spoke with Horace from [facility] and he reported: \" Auto trigger, of AFIB and AFLUTTER with a run of Vtach of 8 beats with 205 BMP\". See message from 6/11/2018. Routing telephone note to Joelle lee.   "

## 2018-06-18 NOTE — PROGRESS NOTES
Called by [employer] that Pt had atrial fibrillation with rates in the 160s lasting for <70s. Asymptomatic. Given the hour, will defer calling Pt at this time. Verified with [employer] staff to notify me with any further runs of afib with RVR. There may be some confusion regarding beta-blocker therapy per last note by cardiology fellow Dr. Aracely Barriga from overnight note 6/7/2018, recommend f/u with PCP for clarification of rate control strategy.   Augie Ruiz MD  PGY-5 Cardiology  Pager: [phone]  June 18, 2018

## 2018-06-21 ENCOUNTER — CARE COORDINATION (OUTPATIENT)
Dept: CARDIOLOGY | Facility: CLINIC | Age: 63
End: 2018-06-21

## 2018-06-21 NOTE — PROGRESS NOTES
[employer] called today and states that on 06/20/2018 around 10:25 am patient had abnormal results.     Type of call: Monitor    Monitor  Company: [employer]  Type of monitor: Event Monitor  Rhythm: 10 beats of ventricular tachycardia, Rate was 194. They underlying rhythm was A-Fib  Duration: on 06/20/2018 around 10:25 am   Symptomatic: asymptomatic during this episode   Was the patient notified?: yes through [facility] staff  Was the report downloaded or faxed?: Yes       [employer] staff states that they spoke to patient during the episode and he denied symptoms and declined medical attention    Joelle Rivas, RNCC is reviewing abnormal monitor event

## 2018-06-21 NOTE — PROGRESS NOTES
"Reviewed strips from yesterday 6/20/18 with Dr Stark.   Pt having intermittent runs NSVT, atrial fibrillation.     Call to pt: stated he is not taking metoprolol, because \"no one could make up their mind if I should\"  Pt stated most recently they had him taking 50 mg once daily in am.   Pt stated his blood pressure runs in 130's/70's.   Denies lightheadedness, dizziness, chest pain, denies SOB .  Pt stated \"I can feel my heart take off, but then it slows down and I feel fine\".   Confirmed appt next Wednesday with Dr Sexton.  Discussed symptom and pt should not hesitate to call 911 or go to ED if fast rate is sustained, has chest pain, SOB, dizziness or syncope.   Pt agreed. Verbalized understanding.     Will print strips to date and send to Dr Sexton and Mayela Mcgovern for review.   "

## 2018-06-22 ENCOUNTER — PRE VISIT (OUTPATIENT)
Dept: CARDIOLOGY | Facility: CLINIC | Age: 63
End: 2018-06-22

## 2018-06-27 ENCOUNTER — OFFICE VISIT (OUTPATIENT)
Dept: CARDIOLOGY | Facility: CLINIC | Age: 63
End: 2018-06-27
Attending: INTERNAL MEDICINE
Payer: COMMERCIAL

## 2018-06-27 VITALS
SYSTOLIC BLOOD PRESSURE: 114 MMHG | DIASTOLIC BLOOD PRESSURE: 72 MMHG | HEIGHT: 71 IN | OXYGEN SATURATION: 91 % | HEART RATE: 85 BPM | WEIGHT: 215.1 LBS | BODY MASS INDEX: 30.11 KG/M2

## 2018-06-27 DIAGNOSIS — I48.91 ATRIAL FIBRILLATION, UNSPECIFIED TYPE (H): Primary | ICD-10-CM

## 2018-06-27 PROCEDURE — 93005 ELECTROCARDIOGRAM TRACING: CPT | Mod: ZF

## 2018-06-27 PROCEDURE — G0463 HOSPITAL OUTPT CLINIC VISIT: HCPCS | Mod: 25,ZF

## 2018-06-27 PROCEDURE — 99203 OFFICE O/P NEW LOW 30 MIN: CPT | Mod: 25 | Performed by: INTERNAL MEDICINE

## 2018-06-27 PROCEDURE — 93010 ELECTROCARDIOGRAM REPORT: CPT | Mod: ZP | Performed by: INTERNAL MEDICINE

## 2018-06-27 RX ORDER — METOPROLOL SUCCINATE 50 MG/1
50 TABLET, EXTENDED RELEASE ORAL DAILY
Qty: 90 TABLET | Refills: 3 | Status: SHIPPED | OUTPATIENT
Start: 2018-06-27 | End: 2018-11-06

## 2018-06-27 ASSESSMENT — PAIN SCALES - GENERAL: PAINLEVEL: NO PAIN (0)

## 2018-06-27 NOTE — PATIENT INSTRUCTIONS
Stop Lopressor  Toprol XL 50 mg daily  Follow up in 6 months with Dr. madrigal  Wear ZIO patch monitor for 2 weeks before follow up     Rosmery Guajardo, RN  Cardiology Care Coordinator  Please be aware that I work part-time but I will be checking messages several times per week.   For urgent needs, please call the number below.    [phone], press 1 for [facility], press 3 to speak to a nurse    .
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
## 2018-06-27 NOTE — NURSING NOTE
Chief Complaint   Patient presents with     New Patient     Ref. Traci Reich for eval of AF, NSVT/ EKG TODAY     Vitals were taken and medications were reconciled. EKG was performed.    CHEY Crespo  8:43 AM

## 2018-06-27 NOTE — MR AVS SNAPSHOT
After Visit Summary   6/27/2018    Jorge Rosales    MRN: [id number]           Patient Information     Date Of Birth          1955        Visit Information        Provider Department      6/27/2018 8:30 AM Jluien Sexton MD [facility]        Today's Diagnoses     Atrial fibrillation, unspecified type (H)    -  1      Care Instructions    Stop Lopressor  Toprol XL 50 mg daily  Follow up in 6 months with Dr. sexton  Wear ZIO patch monitor for 2 weeks before follow up     Rosmery Guajardo, RN  Cardiology Care Coordinator  Please be aware that I work part-time but I will be checking messages several times per week.   For urgent needs, please call the number below.    [phone], press 1 for [facility], press 3 to speak to a nurse    .            Follow-ups after your visit        Additional Services     Follow-Up with Cardiologist                 Your next 10 appointments already scheduled     Dec 26, 2018 10:00 AM CST   (Arrive by 9:45 AM)   RETURN ARRHYTHMIA with Julien Sexton MD   [facility] ([facility])    [address] [place] [id number] [phone]              Future tests that were ordered for you today     Open Future Orders        Priority Expected Expires Ordered    Follow-Up with Cardiologist Routine 12/5/2018 1/2/2019 6/27/2018            Who to contact     If you have questions or need follow up information about today's clinic visit or your schedule please contact [facility] directly at [phone].  Normal or non-critical lab and imaging results will be communicated to you by MyChart, letter or phone within 4 business days after the clinic has received the results. If you do not hear from us within 7 days, please contact the clinic through MyChart or phone. If you have a critical or abnormal lab result, we will notify you by phone as soon as possible.  Submit refill requests through [employer] or call  "your pharmacy and they will forward the refill request to us. Please allow 3 business days for your refill to be completed.          Additional Information About Your Visit        [employer] Information     [employer] gives you secure access to your electronic health record. If you see a primary care provider, you can also send messages to your care team and make appointments. If you have questions, please call your primary care clinic.  If you do not have a primary care provider, please call [phone] and they will assist you.        Care EveryWhere ID     This is your Care EveryWhere ID. This could be used by other organizations to access your [place] medical records  [id number]        Your Vitals Were     Pulse Height Pulse Oximetry BMI (Body Mass Index)          85 1.803 m (5' 11\") 91% 30 kg/m2         Blood Pressure from Last 3 Encounters:   06/27/18 114/72   04/17/18 140/72   04/15/18 146/83    Weight from Last 3 Encounters:   06/27/18 97.6 kg (215 lb 1.6 oz)   04/23/18 98 kg (216 lb)   04/17/18 98.2 kg (216 lb 8 oz)              We Performed the Following     EKG 12-lead, tracing only (Same Day)          Today's Medication Changes          These changes are accurate as of 6/27/18  9:59 AM.  If you have any questions, ask your nurse or doctor.               Start taking these medicines.        Dose/Directions    metoprolol succinate 50 MG 24 hr tablet   Commonly known as:  TOPROL-XL   Used for:  Atrial fibrillation, unspecified type (H)   Started by:  Julien Sexton MD        Dose:  50 mg   Take 1 tablet (50 mg) by mouth daily   Quantity:  90 tablet   Refills:  3         Stop taking these medicines if you haven't already. Please contact your care team if you have questions.     metoprolol tartrate 100 MG tablet   Commonly known as:  LOPRESSOR   Stopped by:  Julien Sexton MD                Where to get your medicines      These medications were sent to [facility] [address] [place], [place] - [address] AT [facility]  [address] [place], [place] [address], [facility] [address]    Hours:  24-hours Phone:  [phone]     metoprolol succinate 50 MG 24 hr tablet                Primary Care Provider Office Phone # Fax #    Carlos Padron [phone]       [facility] [address] [facility] [address]        Equal Access to Services     GEOVANI VILLA AH: Hadii aad ku hadasho Soomaali, waaxda luqadaha, qaybta kaalmada adeegyada, waxay idiin hayaan adeeg kharash la'lyric ah. So [facility] [phone].    ATENCIÓN: Si habla español, tiene a nunez disposición servicios gratuitos de asistencia lingüística. [facility] [phone].    We comply with applicable federal civil rights laws and [place] laws. We do not discriminate on the basis of race, color, national origin, age, disability, sex, sexual orientation, or gender identity.            Thank you!     Thank you for choosing [facility]  for your care. Our goal is always to provide you with excellent care. Hearing back from our patients is one way we can continue to improve our services. Please take a few minutes to complete the written survey that you may receive in the mail after your visit with us. Thank you!             Your Updated Medication List - Protect others around you: Learn how to safely use, store and throw away your medicines at www.disposemymeds.org.          This list is accurate as of 6/27/18  9:59 AM.  Always use your most recent med list.                   Brand Name Dispense Instructions for use Diagnosis    albuterol 108 (90 Base) MCG/ACT Inhaler    PROAIR HFA/PROVENTIL HFA/VENTOLIN HFA    1 Inhaler    Inhale 2 puffs into the lungs every 6 hours as needed for shortness of breath / dyspnea or wheezing    Moderate persistent reactive airway disease with acute exacerbation       aspirin 81 MG tablet     30 tablet    Take 1 tablet (81 mg) by mouth daily    CARDIOVASCULAR SCREENING; LDL GOAL LESS THAN 160       *  buprenorphine-naloxone 8-2 MG Subl sublingual tablet    SUBOXONE     Dissolve 1.5 tablets sublingually in the morning and 1 tablet sublingually in the evening        * buprenorphine HCl-naloxone HCl 8-2 MG per film    SUBOXONE     Place 1 Film under the tongue        cyanocobalamin 1000 MCG tablet    vitamin  B-12    100 tablet    Take 1 tablet (1,000 mcg) by mouth daily    Alcohol dependence with uncomplicated withdrawal (H)       fish oil-omega-3 fatty acids 1000 MG capsule     180 capsule    Take 1 g by mouth daily        gabapentin 300 MG capsule    NEURONTIN    90 capsule    Take 2 capsules (600 mg) by mouth 4 times daily    Chronic low back pain, unspecified back pain laterality, with sciatica presence unspecified       metoprolol succinate 50 MG 24 hr tablet    TOPROL-XL    90 tablet    Take 1 tablet (50 mg) by mouth daily    Atrial fibrillation, unspecified type (H)       mirtazapine 15 MG tablet    REMERON    30 tablet    Take 0.5 tablets (7.5 mg) by mouth At Bedtime    Insomnia due to medical condition       multivitamin per tablet     100 tablet    Take 1 tablet by mouth daily.        * nicotine 21 MG/24HR 24 hr patch    NICODERM CQ    28 patch    Place 1 patch onto the skin every 24 hours    Nicotine dependence       * nicotine 14 MG/24HR 24 hr patch    NICODERM CQ    28 patch    Place 1 patch onto the skin every 24 hours    Nicotine dependence       * nicotine 7 MG/24HR 24 hr patch    NICODERM CQ    28 patch    Place 1 patch onto the skin every 24 hours    Nicotine dependence       nicotine polacrilex 2 MG lozenge    COMMIT    144 lozenge    Place 1 lozenge (2 mg) inside cheek every hour as needed for smoking cessation    Nicotine dependence       pantoprazole 40 MG EC tablet    PROTONIX     Take 40 mg by mouth        ramelteon 8 MG tablet    ROZEREM     Take 8 mg by mouth At Bedtime        venlafaxine 75 MG Tb24 24 hr tablet    EFFEXOR-ER    30 each    Take 1 tablet (75 mg) by mouth daily    Recurrent  major depressive disorder, remission status unspecified (H)       * Notice:  This list has 5 medication(s) that are the same as other medications prescribed for you. Read the directions carefully, and ask your doctor or other care provider to review them with you.
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
## 2018-06-28 LAB — INTERPRETATION ECG - MUSE: NORMAL

## 2018-10-25 ENCOUNTER — DOCUMENTATION ONLY (OUTPATIENT)
Dept: CARDIOLOGY | Facility: CLINIC | Age: 63
End: 2018-10-25

## 2018-10-25 NOTE — PROGRESS NOTES
Per patient request, on October 25, 2018 an order was entered on the [facility] site requesting a Zio patch to be mailed to the patient. The patient is to wear the zio 14 days starting Nov 19, 2018.    Miracle Dumont  Periop Electrophysiology   [phone]

## 2018-11-06 ENCOUNTER — APPOINTMENT (OUTPATIENT)
Dept: GENERAL RADIOLOGY | Facility: CLINIC | Age: 63
DRG: 191 | End: 2018-11-06
Attending: EMERGENCY MEDICINE
Payer: COMMERCIAL

## 2018-11-06 ENCOUNTER — HOSPITAL ENCOUNTER (INPATIENT)
Facility: CLINIC | Age: 63
LOS: 5 days | Discharge: HOME | DRG: 191 | End: 2018-11-11
Attending: EMERGENCY MEDICINE | Admitting: INTERNAL MEDICINE
Payer: COMMERCIAL

## 2018-11-06 DIAGNOSIS — F17.210 CIGARETTE SMOKER: ICD-10-CM

## 2018-11-06 DIAGNOSIS — J44.89 OBSTRUCTIVE CHRONIC BRONCHITIS WITHOUT EXACERBATION (H): ICD-10-CM

## 2018-11-06 DIAGNOSIS — R09.02 HYPOXIA: ICD-10-CM

## 2018-11-06 DIAGNOSIS — J44.9 CHRONIC OBSTRUCTIVE PULMONARY DISEASE, UNSPECIFIED COPD TYPE (H): ICD-10-CM

## 2018-11-06 DIAGNOSIS — R09.02 HYPOXEMIA: ICD-10-CM

## 2018-11-06 DIAGNOSIS — F10.229 ALCOHOL DEPENDENCE WITH INTOXICATION WITH COMPLICATION (H): ICD-10-CM

## 2018-11-06 PROBLEM — F10.121 ALCOHOL ABUSE WITH INTOXICATION DELIRIUM (H): Status: ACTIVE | Noted: 2018-11-06

## 2018-11-06 LAB
ALBUMIN SERPL-MCNC: 3.1 G/DL (ref 3.4–5)
ALBUMIN SERPL-MCNC: 3.1 G/DL (ref 3.4–5)
ALP SERPL-CCNC: 90 U/L (ref 40–150)
ALP SERPL-CCNC: 94 U/L (ref 40–150)
ALT SERPL W P-5'-P-CCNC: 30 U/L (ref 0–70)
ALT SERPL W P-5'-P-CCNC: ABNORMAL U/L (ref 0–70)
AMPHETAMINES UR QL SCN: NEGATIVE
ANION GAP SERPL CALCULATED.3IONS-SCNC: 2 MMOL/L (ref 3–14)
ANION GAP SERPL CALCULATED.3IONS-SCNC: 9 MMOL/L (ref 3–14)
AST SERPL W P-5'-P-CCNC: 29 U/L (ref 0–45)
AST SERPL W P-5'-P-CCNC: ABNORMAL U/L (ref 0–45)
BARBITURATES UR QL: NEGATIVE
BASOPHILS # BLD AUTO: 0 10E9/L (ref 0–0.2)
BASOPHILS NFR BLD AUTO: 0.4 %
BENZODIAZ UR QL: POSITIVE
BILIRUB SERPL-MCNC: 0.3 MG/DL (ref 0.2–1.3)
BILIRUB SERPL-MCNC: 0.5 MG/DL (ref 0.2–1.3)
BUN SERPL-MCNC: 18 MG/DL (ref 7–30)
BUN SERPL-MCNC: 19 MG/DL (ref 7–30)
CALCIUM SERPL-MCNC: 7.9 MG/DL (ref 8.5–10.1)
CALCIUM SERPL-MCNC: 8 MG/DL (ref 8.5–10.1)
CANNABINOIDS UR QL SCN: NEGATIVE
CHLORIDE SERPL-SCNC: 102 MMOL/L (ref 94–109)
CHLORIDE SERPL-SCNC: 105 MMOL/L (ref 94–109)
CK SERPL-CCNC: 93 U/L (ref 30–300)
CO2 SERPL-SCNC: 28 MMOL/L (ref 20–32)
CO2 SERPL-SCNC: 30 MMOL/L (ref 20–32)
COCAINE UR QL: NEGATIVE
CREAT SERPL-MCNC: 0.59 MG/DL (ref 0.66–1.25)
CREAT SERPL-MCNC: ABNORMAL MG/DL (ref 0.66–1.25)
DIFFERENTIAL METHOD BLD: ABNORMAL
EOSINOPHIL # BLD AUTO: 0.1 10E9/L (ref 0–0.7)
EOSINOPHIL NFR BLD AUTO: 1.2 %
ERYTHROCYTE [DISTWIDTH] IN BLOOD BY AUTOMATED COUNT: 16.7 % (ref 10–15)
ETHANOL SERPL-MCNC: 0.23 G/DL
ETHANOL UR QL SCN: POSITIVE
FLUAV+FLUBV RNA SPEC QL NAA+PROBE: NEGATIVE
FLUAV+FLUBV RNA SPEC QL NAA+PROBE: NEGATIVE
GFR SERPL CREATININE-BSD FRML MDRD: >90 ML/MIN/1.7M2
GFR SERPL CREATININE-BSD FRML MDRD: ABNORMAL ML/MIN/1.7M2
GLUCOSE SERPL-MCNC: 90 MG/DL (ref 70–99)
GLUCOSE SERPL-MCNC: 94 MG/DL (ref 70–99)
HCT VFR BLD AUTO: 45.3 % (ref 40–53)
HGB BLD-MCNC: 14.8 G/DL (ref 13.3–17.7)
IMM GRANULOCYTES # BLD: 0.1 10E9/L (ref 0–0.4)
IMM GRANULOCYTES NFR BLD: 0.6 %
LIPASE SERPL-CCNC: 43 U/L (ref 73–393)
LIPASE SERPL-CCNC: NORMAL U/L (ref 73–393)
LYMPHOCYTES # BLD AUTO: 3.5 10E9/L (ref 0.8–5.3)
LYMPHOCYTES NFR BLD AUTO: 40.7 %
MCH RBC QN AUTO: 29.7 PG (ref 26.5–33)
MCHC RBC AUTO-ENTMCNC: 32.7 G/DL (ref 31.5–36.5)
MCV RBC AUTO: 91 FL (ref 78–100)
MONOCYTES # BLD AUTO: 0.7 10E9/L (ref 0–1.3)
MONOCYTES NFR BLD AUTO: 7.6 %
NEUTROPHILS # BLD AUTO: 4.2 10E9/L (ref 1.6–8.3)
NEUTROPHILS NFR BLD AUTO: 49.5 %
NRBC # BLD AUTO: 0 10*3/UL
NRBC BLD AUTO-RTO: 0 /100
NT-PROBNP SERPL-MCNC: 36 PG/ML (ref 0–900)
NT-PROBNP SERPL-MCNC: NORMAL PG/ML (ref 0–900)
OPIATES UR QL SCN: NEGATIVE
PLATELET # BLD AUTO: 145 10E9/L (ref 150–450)
PLATELET # BLD AUTO: 148 10E9/L (ref 150–450)
POTASSIUM SERPL-SCNC: 3.8 MMOL/L (ref 3.4–5.3)
POTASSIUM SERPL-SCNC: >10 MMOL/L (ref 3.4–5.3)
PROCALCITONIN SERPL-MCNC: NORMAL NG/ML
PROT SERPL-MCNC: 6.8 G/DL (ref 6.8–8.8)
PROT SERPL-MCNC: 7.9 G/DL (ref 6.8–8.8)
RBC # BLD AUTO: 4.99 10E12/L (ref 4.4–5.9)
RSV RNA SPEC NAA+PROBE: NEGATIVE
SODIUM SERPL-SCNC: 132 MMOL/L (ref 133–144)
SODIUM SERPL-SCNC: 144 MMOL/L (ref 133–144)
SPECIMEN SOURCE: NORMAL
TROPONIN I SERPL-MCNC: 0.03 UG/L (ref 0–0.04)
TROPONIN I SERPL-MCNC: 0.07 UG/L (ref 0–0.04)
TROPONIN I SERPL-MCNC: NORMAL UG/L (ref 0–0.04)
WBC # BLD AUTO: 8.5 10E9/L (ref 4–11)

## 2018-11-06 PROCEDURE — 80320 DRUG SCREEN QUANTALCOHOLS: CPT | Performed by: EMERGENCY MEDICINE

## 2018-11-06 PROCEDURE — 82550 ASSAY OF CK (CPK): CPT | Performed by: INTERNAL MEDICINE

## 2018-11-06 PROCEDURE — 83880 ASSAY OF NATRIURETIC PEPTIDE: CPT | Performed by: EMERGENCY MEDICINE

## 2018-11-06 PROCEDURE — HZ2ZZZZ DETOXIFICATION SERVICES FOR SUBSTANCE ABUSE TREATMENT: ICD-10-PCS | Performed by: INTERNAL MEDICINE

## 2018-11-06 PROCEDURE — 25000132 ZZH RX MED GY IP 250 OP 250 PS 637: Performed by: EMERGENCY MEDICINE

## 2018-11-06 PROCEDURE — 25000128 H RX IP 250 OP 636: Performed by: EMERGENCY MEDICINE

## 2018-11-06 PROCEDURE — 84484 ASSAY OF TROPONIN QUANT: CPT | Performed by: EMERGENCY MEDICINE

## 2018-11-06 PROCEDURE — 25000125 ZZHC RX 250: Performed by: EMERGENCY MEDICINE

## 2018-11-06 PROCEDURE — 87040 BLOOD CULTURE FOR BACTERIA: CPT | Performed by: INTERNAL MEDICINE

## 2018-11-06 PROCEDURE — 94640 AIRWAY INHALATION TREATMENT: CPT

## 2018-11-06 PROCEDURE — 96360 HYDRATION IV INFUSION INIT: CPT | Performed by: EMERGENCY MEDICINE

## 2018-11-06 PROCEDURE — 93010 ELECTROCARDIOGRAM REPORT: CPT | Mod: Z6 | Performed by: EMERGENCY MEDICINE

## 2018-11-06 PROCEDURE — 85025 COMPLETE CBC W/AUTO DIFF WBC: CPT | Performed by: EMERGENCY MEDICINE

## 2018-11-06 PROCEDURE — 93005 ELECTROCARDIOGRAM TRACING: CPT | Performed by: EMERGENCY MEDICINE

## 2018-11-06 PROCEDURE — 71046 X-RAY EXAM CHEST 2 VIEWS: CPT

## 2018-11-06 PROCEDURE — 12000008 ZZH R&B INTERMEDIATE UMMC

## 2018-11-06 PROCEDURE — 25000125 ZZHC RX 250: Performed by: INTERNAL MEDICINE

## 2018-11-06 PROCEDURE — 36415 COLL VENOUS BLD VENIPUNCTURE: CPT | Performed by: INTERNAL MEDICINE

## 2018-11-06 PROCEDURE — 40000275 ZZH STATISTIC RCP TIME EA 10 MIN

## 2018-11-06 PROCEDURE — 25000132 ZZH RX MED GY IP 250 OP 250 PS 637: Performed by: INTERNAL MEDICINE

## 2018-11-06 PROCEDURE — 96361 HYDRATE IV INFUSION ADD-ON: CPT | Performed by: EMERGENCY MEDICINE

## 2018-11-06 PROCEDURE — 25000128 H RX IP 250 OP 636: Performed by: INTERNAL MEDICINE

## 2018-11-06 PROCEDURE — 94640 AIRWAY INHALATION TREATMENT: CPT | Mod: 76 | Performed by: EMERGENCY MEDICINE

## 2018-11-06 PROCEDURE — 99285 EMERGENCY DEPT VISIT HI MDM: CPT | Mod: 25 | Performed by: EMERGENCY MEDICINE

## 2018-11-06 PROCEDURE — 83690 ASSAY OF LIPASE: CPT | Performed by: EMERGENCY MEDICINE

## 2018-11-06 PROCEDURE — 87631 RESP VIRUS 3-5 TARGETS: CPT | Performed by: EMERGENCY MEDICINE

## 2018-11-06 PROCEDURE — 80053 COMPREHEN METABOLIC PANEL: CPT | Performed by: EMERGENCY MEDICINE

## 2018-11-06 PROCEDURE — 80307 DRUG TEST PRSMV CHEM ANLYZR: CPT | Performed by: EMERGENCY MEDICINE

## 2018-11-06 PROCEDURE — 84484 ASSAY OF TROPONIN QUANT: CPT | Performed by: INTERNAL MEDICINE

## 2018-11-06 PROCEDURE — 85049 AUTOMATED PLATELET COUNT: CPT | Performed by: INTERNAL MEDICINE

## 2018-11-06 RX ORDER — BUPRENORPHINE HYDROCHLORIDE AND NALOXONE HYDROCHLORIDE DIHYDRATE 8; 2 MG/1; MG/1
1.5 TABLET SUBLINGUAL EVERY MORNING
Status: DISCONTINUED | OUTPATIENT
Start: 2018-11-07 | End: 2018-11-06

## 2018-11-06 RX ORDER — FOLIC ACID 1 MG/1
1 TABLET ORAL DAILY
Status: DISCONTINUED | OUTPATIENT
Start: 2018-11-07 | End: 2018-11-11 | Stop reason: HOSPADM

## 2018-11-06 RX ORDER — DOCUSATE SODIUM 100 MG/1
100 CAPSULE, LIQUID FILLED ORAL 2 TIMES DAILY
Status: DISCONTINUED | OUTPATIENT
Start: 2018-11-06 | End: 2018-11-11 | Stop reason: HOSPADM

## 2018-11-06 RX ORDER — GABAPENTIN 300 MG/1
600 CAPSULE ORAL 4 TIMES DAILY
Status: DISCONTINUED | OUTPATIENT
Start: 2018-11-06 | End: 2018-11-11 | Stop reason: HOSPADM

## 2018-11-06 RX ORDER — HEPARIN SODIUM 5000 [USP'U]/.5ML
5000 INJECTION, SOLUTION INTRAVENOUS; SUBCUTANEOUS EVERY 12 HOURS
Status: DISCONTINUED | OUTPATIENT
Start: 2018-11-06 | End: 2018-11-11 | Stop reason: HOSPADM

## 2018-11-06 RX ORDER — BUPRENORPHINE AND NALOXONE 8; 2 MG/1; MG/1
1 FILM, SOLUBLE BUCCAL; SUBLINGUAL EVERY EVENING
Status: DISCONTINUED | OUTPATIENT
Start: 2018-11-06 | End: 2018-11-11 | Stop reason: HOSPADM

## 2018-11-06 RX ORDER — AZITHROMYCIN 250 MG/1
500 TABLET, FILM COATED ORAL ONCE
Status: COMPLETED | OUTPATIENT
Start: 2018-11-06 | End: 2018-11-06

## 2018-11-06 RX ORDER — ALBUTEROL SULFATE 0.83 MG/ML
3 SOLUTION RESPIRATORY (INHALATION)
Status: DISCONTINUED | OUTPATIENT
Start: 2018-11-06 | End: 2018-11-11 | Stop reason: HOSPADM

## 2018-11-06 RX ORDER — FLECAINIDE ACETATE 100 MG/1
100 TABLET ORAL EVERY 12 HOURS
Status: DISCONTINUED | OUTPATIENT
Start: 2018-11-06 | End: 2018-11-11 | Stop reason: HOSPADM

## 2018-11-06 RX ORDER — QUETIAPINE FUMARATE 25 MG/1
25 TABLET, FILM COATED ORAL DAILY PRN
Status: DISCONTINUED | OUTPATIENT
Start: 2018-11-06 | End: 2018-11-11 | Stop reason: HOSPADM

## 2018-11-06 RX ORDER — MULTIPLE VITAMINS W/ MINERALS TAB 9MG-400MCG
1 TAB ORAL DAILY
Status: DISCONTINUED | OUTPATIENT
Start: 2018-11-07 | End: 2018-11-11 | Stop reason: HOSPADM

## 2018-11-06 RX ORDER — AZITHROMYCIN 250 MG/1
250 TABLET, FILM COATED ORAL DAILY
Status: COMPLETED | OUTPATIENT
Start: 2018-11-07 | End: 2018-11-10

## 2018-11-06 RX ORDER — HYDROCODONE BITARTRATE AND ACETAMINOPHEN 5; 325 MG/1; MG/1
1-2 TABLET ORAL EVERY 4 HOURS PRN
Status: DISCONTINUED | OUTPATIENT
Start: 2018-11-06 | End: 2018-11-06

## 2018-11-06 RX ORDER — ALBUTEROL SULFATE 90 UG/1
2 AEROSOL, METERED RESPIRATORY (INHALATION) EVERY 6 HOURS PRN
Status: DISCONTINUED | OUTPATIENT
Start: 2018-11-06 | End: 2018-11-11 | Stop reason: HOSPADM

## 2018-11-06 RX ORDER — LANOLIN ALCOHOL/MO/W.PET/CERES
100 CREAM (GRAM) TOPICAL DAILY
Status: DISCONTINUED | OUTPATIENT
Start: 2018-11-06 | End: 2018-11-06

## 2018-11-06 RX ORDER — BUPRENORPHINE HYDROCHLORIDE AND NALOXONE HYDROCHLORIDE DIHYDRATE 8; 2 MG/1; MG/1
1.5 TABLET SUBLINGUAL EVERY MORNING
COMMUNITY

## 2018-11-06 RX ORDER — MULTIPLE VITAMINS W/ MINERALS TAB 9MG-400MCG
1 TAB ORAL DAILY
Status: DISCONTINUED | OUTPATIENT
Start: 2018-11-06 | End: 2018-11-06

## 2018-11-06 RX ORDER — DIAZEPAM 5 MG
5-20 TABLET ORAL EVERY 30 MIN PRN
Status: DISCONTINUED | OUTPATIENT
Start: 2018-11-06 | End: 2018-11-06

## 2018-11-06 RX ORDER — IPRATROPIUM BROMIDE AND ALBUTEROL SULFATE 2.5; .5 MG/3ML; MG/3ML
3 SOLUTION RESPIRATORY (INHALATION) EVERY 4 HOURS
Status: DISCONTINUED | OUTPATIENT
Start: 2018-11-06 | End: 2018-11-07

## 2018-11-06 RX ORDER — BUPRENORPHINE AND NALOXONE 4; 1 MG/1; MG/1
3 FILM, SOLUBLE BUCCAL; SUBLINGUAL EVERY MORNING
Status: DISCONTINUED | OUTPATIENT
Start: 2018-11-07 | End: 2018-11-11 | Stop reason: HOSPADM

## 2018-11-06 RX ORDER — POTASSIUM CHLORIDE 7.45 MG/ML
10 INJECTION INTRAVENOUS
Status: DISCONTINUED | OUTPATIENT
Start: 2018-11-06 | End: 2018-11-11 | Stop reason: HOSPADM

## 2018-11-06 RX ORDER — LANOLIN ALCOHOL/MO/W.PET/CERES
100 CREAM (GRAM) TOPICAL DAILY
Status: COMPLETED | OUTPATIENT
Start: 2018-11-06 | End: 2018-11-08

## 2018-11-06 RX ORDER — POTASSIUM CHLORIDE 29.8 MG/ML
20 INJECTION INTRAVENOUS
Status: DISCONTINUED | OUTPATIENT
Start: 2018-11-06 | End: 2018-11-11 | Stop reason: HOSPADM

## 2018-11-06 RX ORDER — FOLIC ACID 1 MG/1
1 TABLET ORAL DAILY
Status: DISCONTINUED | OUTPATIENT
Start: 2018-11-06 | End: 2018-11-06

## 2018-11-06 RX ORDER — POTASSIUM CHLORIDE 1.5 G/1.58G
20-40 POWDER, FOR SOLUTION ORAL
Status: DISCONTINUED | OUTPATIENT
Start: 2018-11-06 | End: 2018-11-11 | Stop reason: HOSPADM

## 2018-11-06 RX ORDER — ASPIRIN 81 MG/1
162 TABLET, CHEWABLE ORAL DAILY
Status: DISCONTINUED | OUTPATIENT
Start: 2018-11-06 | End: 2018-11-11 | Stop reason: HOSPADM

## 2018-11-06 RX ORDER — LANOLIN ALCOHOL/MO/W.PET/CERES
1000 CREAM (GRAM) TOPICAL DAILY
Status: DISCONTINUED | OUTPATIENT
Start: 2018-11-06 | End: 2018-11-11 | Stop reason: HOSPADM

## 2018-11-06 RX ORDER — IBUPROFEN 800 MG/1
800 TABLET, FILM COATED ORAL EVERY 8 HOURS PRN
Status: ON HOLD | COMMUNITY
End: 2018-11-27

## 2018-11-06 RX ORDER — IPRATROPIUM BROMIDE AND ALBUTEROL SULFATE 2.5; .5 MG/3ML; MG/3ML
3 SOLUTION RESPIRATORY (INHALATION) ONCE
Status: COMPLETED | OUTPATIENT
Start: 2018-11-06 | End: 2018-11-06

## 2018-11-06 RX ORDER — FLECAINIDE ACETATE 100 MG/1
100 TABLET ORAL EVERY 12 HOURS
COMMUNITY
End: 2019-01-11

## 2018-11-06 RX ORDER — SODIUM CHLORIDE 9 MG/ML
INJECTION, SOLUTION INTRAVENOUS CONTINUOUS
Status: DISCONTINUED | OUTPATIENT
Start: 2018-11-06 | End: 2018-11-08

## 2018-11-06 RX ORDER — POTASSIUM CHLORIDE 750 MG/1
20-40 TABLET, EXTENDED RELEASE ORAL
Status: DISCONTINUED | OUTPATIENT
Start: 2018-11-06 | End: 2018-11-11 | Stop reason: HOSPADM

## 2018-11-06 RX ORDER — ACETAMINOPHEN 325 MG/1
650 TABLET ORAL EVERY 4 HOURS PRN
Status: DISCONTINUED | OUTPATIENT
Start: 2018-11-06 | End: 2018-11-11 | Stop reason: HOSPADM

## 2018-11-06 RX ORDER — QUETIAPINE FUMARATE 25 MG/1
25-50 TABLET, FILM COATED ORAL
COMMUNITY

## 2018-11-06 RX ORDER — LORAZEPAM 1 MG/1
1-4 TABLET ORAL EVERY 30 MIN PRN
Status: DISCONTINUED | OUTPATIENT
Start: 2018-11-06 | End: 2018-11-09

## 2018-11-06 RX ORDER — NALOXONE HYDROCHLORIDE 0.4 MG/ML
.1-.4 INJECTION, SOLUTION INTRAMUSCULAR; INTRAVENOUS; SUBCUTANEOUS
Status: DISCONTINUED | OUTPATIENT
Start: 2018-11-06 | End: 2018-11-11 | Stop reason: HOSPADM

## 2018-11-06 RX ORDER — POTASSIUM CL/LIDO/0.9 % NACL 10MEQ/0.1L
10 INTRAVENOUS SOLUTION, PIGGYBACK (ML) INTRAVENOUS
Status: DISCONTINUED | OUTPATIENT
Start: 2018-11-06 | End: 2018-11-11 | Stop reason: HOSPADM

## 2018-11-06 RX ORDER — MAGNESIUM SULFATE HEPTAHYDRATE 40 MG/ML
4 INJECTION, SOLUTION INTRAVENOUS EVERY 4 HOURS PRN
Status: DISCONTINUED | OUTPATIENT
Start: 2018-11-06 | End: 2018-11-11 | Stop reason: HOSPADM

## 2018-11-06 RX ADMIN — DIAZEPAM 10 MG: 5 TABLET ORAL at 16:50

## 2018-11-06 RX ADMIN — Medication 5000 UNITS: at 21:15

## 2018-11-06 RX ADMIN — LORAZEPAM 2 MG: 1 TABLET ORAL at 22:20

## 2018-11-06 RX ADMIN — ASPIRIN 81 MG CHEWABLE TABLET 162 MG: 81 TABLET CHEWABLE at 21:14

## 2018-11-06 RX ADMIN — SODIUM CHLORIDE: 900 INJECTION, SOLUTION INTRAVENOUS at 14:24

## 2018-11-06 RX ADMIN — DOCUSATE SODIUM 100 MG: 100 CAPSULE, LIQUID FILLED ORAL at 21:14

## 2018-11-06 RX ADMIN — CYANOCOBALAMIN TAB 1000 MCG 1000 MCG: 1000 TAB at 21:14

## 2018-11-06 RX ADMIN — GABAPENTIN 600 MG: 300 CAPSULE ORAL at 21:14

## 2018-11-06 RX ADMIN — FOLIC ACID 1 MG: 1 TABLET ORAL at 16:50

## 2018-11-06 RX ADMIN — IPRATROPIUM BROMIDE AND ALBUTEROL SULFATE 3 ML: .5; 3 SOLUTION RESPIRATORY (INHALATION) at 13:25

## 2018-11-06 RX ADMIN — IPRATROPIUM BROMIDE AND ALBUTEROL SULFATE 3 ML: .5; 3 SOLUTION RESPIRATORY (INHALATION) at 23:28

## 2018-11-06 RX ADMIN — SODIUM CHLORIDE: 900 INJECTION, SOLUTION INTRAVENOUS at 21:33

## 2018-11-06 RX ADMIN — MULTIPLE VITAMINS W/ MINERALS TAB 1 TABLET: TAB at 16:50

## 2018-11-06 RX ADMIN — THIAMINE HCL TAB 100 MG 100 MG: 100 TAB at 16:50

## 2018-11-06 RX ADMIN — FLECAINIDE ACETATE 100 MG: 100 TABLET ORAL at 21:13

## 2018-11-06 RX ADMIN — AZITHROMYCIN 500 MG: 250 TABLET, FILM COATED ORAL at 21:13

## 2018-11-06 RX ADMIN — FAMOTIDINE 20 MG: 20 INJECTION, SOLUTION INTRAVENOUS at 21:14

## 2018-11-06 RX ADMIN — BUPRENORPHINE HYDROCHLORIDE, NALOXONE HYDROCHLORIDE 1 FILM: 8; 2 FILM, SOLUBLE BUCCAL; SUBLINGUAL at 21:15

## 2018-11-06 ASSESSMENT — ENCOUNTER SYMPTOMS
APNEA: 0
CHILLS: 0
WHEEZING: 0
FEVER: 0
SHORTNESS OF BREATH: 0
STRIDOR: 0
COUGH: 1
CHEST TIGHTNESS: 0

## 2018-11-06 ASSESSMENT — ACTIVITIES OF DAILY LIVING (ADL): ADLS_ACUITY_SCORE: 25

## 2018-11-06 NOTE — PHARMACY-ADMISSION MEDICATION HISTORY
Admission Medication History status for the 11/6/2018 admission is complete.  See EPIC admission navigator for Prior to Admission medications.    Medication history sources:  Patient, [facility] Records, [facility] Search     Medication history source reliability: Moderate - Patient reports all medications are picked up from [facility] and [facility] I was able to verify current meds with [facility] but [facility] at 4:30pm so I was unable to reach them.     Medication adherence:  Poor - patient reports poor adherence to his medications, he has trouble remembering to take them    Changes made to PTA medication list (reason)  Added: flecainide, ibuprofen, quetiapine    Deleted:   Metoprolol Succinate 50 mg: Take one tablet by mouth daily  Mirtazapine 15 mg tablet: Take 0.5 tablet by mouth at bedtime  Nicotine patch (7 mg, 14 mg and 21 mg formulations): Place 1 patch onto the skin every 24 hours  Nicotine Polacrilex 2 mg lozenge: Place 1 lozenge inside cheek every hour as needed for smoking cessation  Pantoprazole 40 mg EC tablet: Take 40 mg by mouth  Ramelteon 8 mg tablet: Take 8 mg by mouth at bedtime  Venlafaxine 75 mg TB24 24hr tablet: Take one tablet by mouth once daily  Suboxone 8-2 film: Place 1 film under the tongue   Patient denies taking the above, and [facility]    Changed: None    Additional medication history information (including reliability of information, actions taken by pharmacist):   - Patient was a poor historian, he knew some of his medications, but not the names or strengths. He switch back and forth as to how often and when he takes each of them. He denies any other Rx/OTC medications  - Gabapentin #240 and Suboxone 8-2 tablet last filled 10/12/2018    Time spent in this activity: 45 min    Medication history completed by: Malou Carrillo    Prior to Admission medications    Medication Sig Last  Dose Taking? Auth Provider   albuterol (PROAIR HFA/PROVENTIL HFA/VENTOLIN HFA) 108 (90 BASE) MCG/ACT Inhaler Inhale 2 puffs into the lungs every 6 hours as needed for shortness of breath / dyspnea or wheezing 11/5/2018 at PM Yes Lorelei Reddy PA-C   aspirin 81 MG tablet Take 1 tablet (81 mg) by mouth daily 11/5/2018 at AM Yes Lorelei Reddy PA-C   buprenorphine-naloxone (SUBOXONE) 8-2 MG SUBL sublingual tablet Place 1.5 tablets under the tongue every morning And 1 tablet every evening Past Week at Unknown time Yes Unknown, Entered By History   cyanocobalamin (VITAMIN  B-12) 1000 MCG tablet Take 1 tablet (1,000 mcg) by mouth daily Past Week at Unknown time Yes Lorelei Reddy PA-C   fish oil-omega-3 fatty acids 1000 MG capsule Take 1 g by mouth daily  Past Week at Unknown time Yes Alvin Yo MD   flecainide (TAMBOCOR) 100 MG tablet Take 100 mg by mouth every 12 hours 11/5/2018 at PM Yes Unknown, Entered By History   gabapentin (NEURONTIN) 300 MG capsule Take 2 capsules (600 mg) by mouth 4 times daily 11/5/2018 at Unknown time Yes Dejan Swann MD   ibuprofen (ADVIL/MOTRIN) 800 MG tablet Take 800 mg by mouth every 8 hours as needed for moderate pain 11/5/2018 at Unknown time Yes Unknown, Entered By History   Multiple Vitamin (MULTIVITAMIN) per tablet Take 1 tablet by mouth daily. Past Month at Unknown time Yes Alvin Yo MD   QUEtiapine (SEROQUEL) 25 MG tablet Take 25 mg by mouth daily as needed (sleep) 11/5/2018 at PM Yes Unknown, Entered By History

## 2018-11-06 NOTE — IP AVS SNAPSHOT
MRN:[id number]                      After Visit Summary   11/6/2018    Jorge Rosales    MRN: [id number]           Thank you!     Thank you for choosing [facility] for your care. Our goal is always to provide you with excellent care. Hearing back from our patients is one way we can continue to improve our services. Please take a few minutes to complete the written survey that you may receive in the mail after you visit with us. Thank you!        Patient Information     Date Of Birth          1955        About your hospital stay     You were admitted on:  November 6, 2018 You last received care in the:  [facility]    You were discharged on:  November 11, 2018        Reason for your hospital stay       Severe alcohol withdrawal  COPD exacerbation                  Who to Call     For medical emergencies, please call 911.  For non-urgent questions about your medical care, please call your primary care provider or clinic, [phone]          Attending Provider     Provider Specialty    Shweta Roger MD Emergency Medicine    Mendez Lima MD Internal Medicine    Florence Lyon MD Internal Medicine       Primary Care Provider Office Phone # Fax #    Carlos Padron [phone]      After Care Instructions     Activity       Your activity upon discharge: activity as tolerated            Diet       Follow this diet upon discharge: Orders Placed This Encounter      Advance Diet as Tolerated: Regular Diet Adult                  Follow-up Appointments     Adult [facility] Follow-up and recommended labs and tests       Please follow up with your PCP in 2-3 days from discharge  Please follow up with outpatient detox plan and group therapy that ypu have discussed with your PCP/ [facility] provider     Appointments on [place] and/or [facility] (with [facility] or [facility] provider or service). Call [phone] if you haven't heard regarding these appointments within 7 days of  discharge.                  Your next 10 appointments already scheduled     Nov 14, 2018  7:00 PM CST   MR LUMBAR SPINE W CONTRAST with UCMR1   [facility] [place] [facility] ([facility] [place])    [address]  [address] [facility] [address] [phone]           How do I prepare for my exam? (Food and drink instructions) **If you will be receiving sedation or general anesthesia, please see special notes below.**  How do I prepare for my exam? (Other instructions) Take your medicines as usual, unless your doctor tells you not to. You may or may not receive intravenous (IV) contrast for this exam pending the discretion of the Radiologist.  You do not need to do anything special to prepare.  **If you will be receiving sedation or general anesthesia, please see special notes below.**  What should I wear: The MRI machine uses a strong magnet. Please wear clothes without metal (snaps, zippers). A sweatsuit works well, or we may give you a hospital gown. Please remove any body piercings and hair extensions before you arrive. You will also remove watches, jewelry, hairpins, wallets, dentures, partial dental plates and hearing aids. You may wear contact lenses, and you may be able to wear your rings. We have a safe place to keep your personal items, but it is safer to leave them at home.  How long does the exam take: Most tests take 30 to 60 minutes.  HOWEVER, IF YOUR DOCTOR PRESCRIBES ANESTHESIA please plan on spending four to five hours in the recovery room.  What should I bring:  Bring a list of your current medicines to your exam (including vitamins, minerals and over-the-counter drugs).  Do I need a :  **If you will be receiving sedation or general anesthesia, please see special notes below.**  What should I do after the exam: No Restrictions, You may resume normal activities.  What is this test: MRI (magnetic resonance imaging) uses a strong magnet and radio waves to look inside  the body. An MRA (magnetic resonance angiogram) does the same thing, but it lets us look at your blood vessels. A computer turns the radio waves into pictures showing cross sections of the body, much like slices of bread. This helps us see any problems more clearly. You may receive fluid (called  contrast ) before or during your scan. The fluid helps us see the pictures better. We give the fluid through an IV (small needle in your arm).  Who should I call with questions:  Please call the Imaging Department at your exam site with any questions. Directions, parking instructions, and other information is available on our website, [email or web address].  How do I prepare if I m having sedation or anesthesia? **IMPORTANT** THE INSTRUCTIONS BELOW ARE ONLY FOR THOSE PATIENTS WHO HAVE BEEN TOLD THEY WILL RECEIVE SEDATION OR GENERAL ANESTHESIA DURING THEIR MRI PROCEDURE:  IF YOU WILL RECEIVE SEDATION (take medicine to help you relax during your exam): You must get the medicine from your doctor before you arrive. Bring the medicine to the exam. Do not take it at home. Arrive one hour early. Bring someone who can take you home after the test. Your medicine will make you sleepy. After the exam, you may not drive, take a bus or take a taxi by yourself. No eating 8 hours before your exam. You may have clear liquids up until 4 hours before your exam. (Clear liquids include water, clear tea, black coffee and fruit juice without pulp.)  IF YOU WILL RECEIVE ANESTHESIA (be asleep for your exam): Arrive 1 1/2 hours early. Bring someone who can take you home after the test. You may not drive, take a bus or take a taxi by yourself. No eating 8 hours before your exam. You may have clear liquids up until 4 hours before your exam. (Clear liquids include water, clear tea, black coffee and fruit juice without pulp.)            Nov 14, 2018  7:45 PM CST   MR THORACIC SPINE W/O & W CONTRAST with [address] [facility] ([facility]  [facility] [place])    [address] [address] [phone]           How do I prepare for my exam? (Food and drink instructions) **If you will be receiving sedation or general anesthesia, please see special notes below.**  How do I prepare for my exam? (Other instructions) Take your medicines as usual, unless your doctor tells you not to. You may or may not receive intravenous (IV) contrast for this exam pending the discretion of the Radiologist.  You do not need to do anything special to prepare.  **If you will be receiving sedation or general anesthesia, please see special notes below.**  What should I wear: The MRI machine uses a strong magnet. Please wear clothes without metal (snaps, zippers). A sweatsuit works well, or we may give you a hospital gown. Please remove any body piercings and hair extensions before you arrive. You will also remove watches, jewelry, hairpins, wallets, dentures, partial dental plates and hearing aids. You may wear contact lenses, and you may be able to wear your rings. We have a safe place to keep your personal items, but it is safer to leave them at home.  How long does the exam take: Most tests take 30 to 60 minutes.  HOWEVER, IF YOUR DOCTOR PRESCRIBES ANESTHESIA please plan on spending four to five hours in the recovery room.  What should I bring:  Bring a list of your current medicines to your exam (including vitamins, minerals and over-the-counter drugs).  Do I need a :  **If you will be receiving sedation or general anesthesia, please see special notes below.**  What should I do after the exam: No Restrictions, You may resume normal activities.  What is this test: MRI (magnetic resonance imaging) uses a strong magnet and radio waves to look inside the body. An MRA (magnetic resonance angiogram) does the same thing, but it lets us look at your blood vessels. A computer turns the radio waves into pictures showing cross sections  of the body, much like slices of bread. This helps us see any problems more clearly. You may receive fluid (called  contrast ) before or during your scan. The fluid helps us see the pictures better. We give the fluid through an IV (small needle in your arm).  Who should I call with questions:  Please call the Imaging Department at your exam site with any questions. Directions, parking instructions, and other information is available on our website, [email or web address].  How do I prepare if I m having sedation or anesthesia? **IMPORTANT** THE INSTRUCTIONS BELOW ARE ONLY FOR THOSE PATIENTS WHO HAVE BEEN TOLD THEY WILL RECEIVE SEDATION OR GENERAL ANESTHESIA DURING THEIR MRI PROCEDURE:  IF YOU WILL RECEIVE SEDATION (take medicine to help you relax during your exam): You must get the medicine from your doctor before you arrive. Bring the medicine to the exam. Do not take it at home. Arrive one hour early. Bring someone who can take you home after the test. Your medicine will make you sleepy. After the exam, you may not drive, take a bus or take a taxi by yourself. No eating 8 hours before your exam. You may have clear liquids up until 4 hours before your exam. (Clear liquids include water, clear tea, black coffee and fruit juice without pulp.)  IF YOU WILL RECEIVE ANESTHESIA (be asleep for your exam): Arrive 1 1/2 hours early. Bring someone who can take you home after the test. You may not drive, take a bus or take a taxi by yourself. No eating 8 hours before your exam. You may have clear liquids up until 4 hours before your exam. (Clear liquids include water, clear tea, black coffee and fruit juice without pulp.)            Dec 19, 2018 11:00 AM CST   (Arrive by 10:45 AM)   RETURN ARRHYTHMIA with Julien Sexton MD   [facility] ([facility] [place])    [address]  [address] [place] [address] [phone]              Pending Results     Date and Time Order Name Status  Description    11/6/2018 1940 Blood culture Preliminary     11/6/2018 1940 Blood culture Preliminary             Statement of Approval     Ordered          11/11/18 0949  I have reviewed and agree with all the recommendations and orders detailed in this document.  EFFECTIVE NOW     Approved and electronically signed by:  Florence Lyon MD             Admission Information     Date & Time Provider Department Dept. Phone    11/6/2018 Florence Lyon MD [facility] [phone]      Your Vitals Were     Blood Pressure Pulse Temperature Respirations Weight Pulse Oximetry    135/84 (BP Location: Left arm) 81 95.1  F (35.1  C) (Oral) 18 97.4 kg (214 lb 12.8 oz) 95%    BMI (Body Mass Index)                   29.96 kg/m2           MyChart Information     [employer] gives you secure access to your electronic health record. If you see a primary care provider, you can also send messages to your care team and make appointments. If you have questions, please call your primary care clinic.  If you do not have a primary care provider, please call [phone] and they will assist you.        Care EveryWhere ID     This is your Care EveryWhere ID. This could be used by other organizations to access your [facility] medical records  [id number]        Equal Access to Services     GEOVANI VILLA : Tracy Llanos, waaxda luqadaha, qaybta kaalmada aderomelda, tacos munoz. So [facility] [phone].    ATENCIÓN: Si habla español, tiene a nunez disposición servicios gratuitos de asistencia lingüística. Llame al [phone].    We comply with applicable federal civil rights laws and [place] laws. We do not discriminate on the basis of race, color, national origin, age, disability, sex, sexual orientation, or gender identity.               Review of your medicines      CONTINUE these medicines which have NOT CHANGED        Dose / Directions    albuterol 108 (90 Base) MCG/ACT  inhaler   Commonly known as:  PROAIR HFA/PROVENTIL HFA/VENTOLIN HFA   Used for:  Moderate persistent reactive airway disease with acute exacerbation        Dose:  2 puff   Inhale 2 puffs into the lungs every 6 hours as needed for shortness of breath / dyspnea or wheezing   Quantity:  1 Inhaler   Refills:  1       aspirin 81 MG tablet   Used for:  CARDIOVASCULAR SCREENING; LDL GOAL LESS THAN 160        Dose:  81 mg   Take 1 tablet (81 mg) by mouth daily   Quantity:  30 tablet   Refills:  0       buprenorphine-naloxone 8-2 MG Subl sublingual tablet   Commonly known as:  SUBOXONE        Dose:  1.5 tablet   Place 1.5 tablets under the tongue every morning And 1 tablet every evening   Refills:  0       cyanocobalamin 1000 MCG tablet   Commonly known as:  vitamin  B-12   Used for:  Alcohol dependence with uncomplicated withdrawal (H)        Dose:  1000 mcg   Take 1 tablet (1,000 mcg) by mouth daily   Quantity:  100 tablet   Refills:  0       fish oil-omega-3 fatty acids 1000 MG capsule        Dose:  1 g   Take 1 g by mouth daily   Quantity:  180 capsule   Refills:  12       flecainide 100 MG tablet   Commonly known as:  TAMBOCOR        Dose:  100 mg   Take 100 mg by mouth every 12 hours   Refills:  0       gabapentin 300 MG capsule   Commonly known as:  NEURONTIN   Used for:  Chronic low back pain, unspecified back pain laterality, with sciatica presence unspecified        Dose:  600 mg   Take 2 capsules (600 mg) by mouth 4 times daily   Quantity:  90 capsule   Refills:  0       ibuprofen 800 MG tablet   Commonly known as:  ADVIL/MOTRIN        Dose:  800 mg   Take 800 mg by mouth every 8 hours as needed for moderate pain   Refills:  0       multivitamin per tablet        Dose:  1 tablet   Take 1 tablet by mouth daily.   Quantity:  100 tablet   Refills:  12       QUEtiapine 25 MG tablet   Commonly known as:  SEROquel        Dose:  25 mg   Take 25 mg by mouth daily as needed (sleep)   Refills:  0                Protect  others around you: Learn how to safely use, store and throw away your medicines at www.disposemymeds.org.             Medication List: This is a list of all your medications and when to take them. Check marks below indicate your daily home schedule. Keep this list as a reference.      Medications           Morning Afternoon Evening Bedtime As Needed    albuterol 108 (90 Base) MCG/ACT inhaler   Commonly known as:  PROAIR HFA/PROVENTIL HFA/VENTOLIN HFA   Inhale 2 puffs into the lungs every 6 hours as needed for shortness of breath / dyspnea or wheezing                                aspirin 81 MG tablet   Take 1 tablet (81 mg) by mouth daily                                buprenorphine-naloxone 8-2 MG Subl sublingual tablet   Commonly known as:  SUBOXONE   Place 1.5 tablets under the tongue every morning And 1 tablet every evening                                cyanocobalamin 1000 MCG tablet   Commonly known as:  vitamin  B-12   Take 1 tablet (1,000 mcg) by mouth daily   Last time this was given:  1,000 mcg on 11/11/2018  8:35 AM                                fish oil-omega-3 fatty acids 1000 MG capsule   Take 1 g by mouth daily                                flecainide 100 MG tablet   Commonly known as:  TAMBOCOR   Take 100 mg by mouth every 12 hours   Last time this was given:  100 mg on 11/11/2018  8:35 AM                                gabapentin 300 MG capsule   Commonly known as:  NEURONTIN   Take 2 capsules (600 mg) by mouth 4 times daily   Last time this was given:  600 mg on 11/11/2018  8:35 AM                                ibuprofen 800 MG tablet   Commonly known as:  ADVIL/MOTRIN   Take 800 mg by mouth every 8 hours as needed for moderate pain                                multivitamin per tablet   Take 1 tablet by mouth daily.                                QUEtiapine 25 MG tablet   Commonly known as:  SEROquel   Take 25 mg by mouth daily as needed (sleep)   Last time this was given:  25 mg on  11/10/2018  8:23 PM

## 2018-11-06 NOTE — IP AVS SNAPSHOT
[facility]    [address]    [place] [address]    Phone:  [phone]                                       After Visit Summary   11/6/2018    Jorge Rosales    MRN: [id number]           After Visit Summary Signature Page     I have received my discharge instructions, and my questions have been answered. I have discussed any challenges I see with this plan with the nurse or doctor.    ..........................................................................................................................................  Patient/Patient Representative Signature      ..........................................................................................................................................  Patient Representative Print Name and Relationship to Patient    ..................................................               ................................................  Date                                   Time    ..........................................................................................................................................  Reviewed by Signature/Title    ...................................................              ..............................................  Date                                               Time          22EPIC Rev 08/18

## 2018-11-06 NOTE — ED NOTES
[facility] [place]   ED Nurse to Floor Handoff     Jorge Rosales is a 63 year old male who speaks English and lives with others,  in a home  They arrived in the ED by car from home    ED Chief Complaint: Alcohol Intoxication (Breathalizer 0.211.  Drinks about 1 quart of frances per day.  Last drink this morning.  States his x wife .  Denies SI.) and Respiratory Distress (Thinks he has pneumonia.  Smokes 1 PPD.  Congested cough.  O2 Sat 89 RA)    ED Dx;   Final diagnoses:   Alcohol dependence with intoxication with complication (H)   Chronic obstructive pulmonary disease, unspecified COPD type (H)   Hypoxia         Needed?: No    Allergies: No Known Allergies.  Past Medical Hx:   Past Medical History:   Diagnosis Date     Chronic back pain     Methadone program     COPD (chronic obstructive pulmonary disease) (H)      CTS (carpal tunnel syndrome)      Low testosterone     managed by endocrinology     Obesity      Panic attacks      PTSD (post-traumatic stress disorder) 2011     Rotator cuff injury      Substance abuse (H)     alcohol abuse      Baseline Mental status: WDL  Current Mental Status changes: other Intoxicated    Infection present or suspected this encounter: no  Sepsis suspected: No  Isolation type: No active isolations     Activity level - Baseline/Home:  Independent  Activity Level - Current:   Stand with Assist    Bariatric equipment needed?: No    In the ED these meds were given:   Medications   sodium chloride 0.9% infusion ( Intravenous Stopped 18)   diazepam (VALIUM) tablet 5-20 mg (10 mg Oral Given 18)   thiamine tablet 100 mg (100 mg Oral Given 18)   folic acid (FOLVITE) tablet 1 mg (1 mg Oral Given 18)   multivitamin, therapeutic with minerals (THERA-VIT-M) tablet 1 tablet (1 tablet Oral Given 18)   ipratropium - albuterol 0.5 mg/2.5 mg/3 mL (DUONEB) neb solution 3 mL (3 mLs Nebulization Given  11/6/18 1325)       Drips running?  No    Home pump  No    Current LDAs  Peripheral IV 11/06/18 Right Hand (Active)   Site Assessment WDL 11/6/2018  2:16 PM   Line Status Infusing 11/6/2018  2:16 PM   Phlebitis Scale 0-->no symptoms 11/6/2018  2:16 PM   Number of days:0       Wound 12/03/17 Left Ischial tuberosity Suspected deep tissue injury Quarter-sized nonblanchable area (Active)   Number of days:338       Labs results:   Labs Ordered and Resulted from Time of ED Arrival Up to the Time of Departure from the ED   CBC WITH PLATELETS DIFFERENTIAL - Abnormal; Notable for the following:        Result Value    RDW 16.7 (*)     Platelet Count 145 (*)     All other components within normal limits   COMPREHENSIVE METABOLIC PANEL - Abnormal; Notable for the following:     Sodium 132 (*)     Potassium >10.0 (*)     Anion Gap 2 (*)     Calcium 7.9 (*)     Albumin 3.1 (*)     All other components within normal limits   ALCOHOL ETHYL - Abnormal; Notable for the following:     Ethanol g/dL 0.23 (*)     All other components within normal limits   LIPASE   PROCALCITONIN   NT PROBNP INPATIENT   TROPONIN I   DRUG ABUSE SCREEN 6 CHEM DEP URINE ([facility])   COMPREHENSIVE METABOLIC PANEL   NT PROBNP INPATIENT   LIPASE   TROPONIN I   MSSA SCORE AND VS   NOTIFY   INFLUENZA A AND B AND RSV PCR       Imaging Studies:   Recent Results (from the past 24 hour(s))   XR Chest 2 Views    Narrative    CHEST TWO VIEWS  11/6/2018 1:56 PM     HISTORY: Cough     COMPARISON: 3/17/2018.      Impression    IMPRESSION: The previously seen focal opacity in the posterior medial  right lower lobe has mostly resolved, at least on the lateral view.  Minimal residual opacity in this region on the PA view may be residual  scarring. The remainder of the lungs are clear bilaterally and the  heart size is normal and unchanged.    PERNELL CONLEY MD       Recent vital signs:   /78  Pulse 92  Temp 97.3  F (36.3  C) (Oral)  Resp 13  Wt 96.6 kg (213 lb)  SpO2  91%  BMI 29.71 kg/m2    Cardiac Rhythm: Normal Sinus  Pt needs tele? No  Skin/wound Issues: None    Code Status: Full Code    Pain control: pt had none    Nausea control: fair    Abnormal labs/tests/findings requiring intervention: Abnormal alcohol breath test, Low Oxygen    Family present during ED course? No   Family Comments/Social Situation comments:     Tasks needing completion: MSSA scoring    Owen Miller RN  ascom--   [phone] [place] [facility]  [phone] [facility]

## 2018-11-06 NOTE — ED PROVIDER NOTES
[facility] EMERGENCY DEPARTMENT ([facility])    18       History     Chief Complaint   Patient presents with     Alcohol Intoxication     Breathalizer 0.211.  Drinks about 1 quart of frances per day.  Last drink this morning.  States his x wife .  Denies SI.     Respiratory Distress     Thinks he has pneumonia.  Smokes 1 PPD.  Congested cough.  O2 Sat 89 RA     The history is provided by the patient and medical records.     Jorge Rosales is a 63 year old male with a medical history significant for alcohol dependency, chronic back pain, COPD, aspiration pneumonia and atrial fibrillation with RVR who presents to the Emergency Department seeking detox from alcohol and for evaluation of a productive cough.  Patient reports that he is on a 30-day monitoring program and is on a maintenance Suboxone program; he has a PCA come by his house and check on him.  The patient lives at home by himself.  The PCA came by the patient's house today and was concerned about his alcohol use and his cough, he was brought in to the Emergency Department by his PCA.  The patient reports that he was sober for 20 years and relapsed at some point in the last few years.  The patient reports that he has been drinking on and off since that time.  Patient is unsure when he began drinking this time, but states that he has had significant stressors in his life.  The stressors include being late for his daughter's birthday, who is a quadriplegic.  He also reports that his daughter's mother passed away in August, the patient and the mother were not together when she passed away.  Patient reports that he drinks approximately 1 quart of alcohol last night.  He did not specify what type of alcohol.  Patient states that he has a history of withdrawals and he is stating that he has a history of withdrawal seizures and withdrawal DTs.  Of note, the patient reports he was on a commitment and this  last month.    Patient here is  also complaining of a productive cough which started approximately 1 month ago.  He has hx of copd and has not been good about taking his inhalers.  He denies chest pain today and recently.  No sob. He denies any fevers or chills.  The patient reports that he does not normally require oxygen at home.  Patient reports that he has been eating.  Patient denies any history of diabetes mellitus.    Per review of patient's chart, patient was seen in the Emergency Department here on 4/15/2018 for bilateral lower extremity tremors as well as an irregular heartbeat.  The patient was in [facility] at that time and was discharged back to [facility].  The patient was discharged from [facility] on 5/6/2018 with plan to go to an outpatient CD treatment program at the [facility].        I have reviewed the Medications, Allergies, Past Medical and Surgical History, and Social History in the [email or web address] system.    Past Medical History:   Diagnosis Date     Chronic back pain     Methadone program     COPD (chronic obstructive pulmonary disease) (H)      CTS (carpal tunnel syndrome)      Low testosterone     managed by endocrinology     Obesity      Panic attacks      PTSD (post-traumatic stress disorder) 7/8/2011     Rotator cuff injury      Substance abuse (H)     alcohol abuse       Past Surgical History:   Procedure Laterality Date     BACK SURGERY       SHOULDER SURGERY         Family History   Problem Relation Age of Onset     Diabetes Father      HEART DISEASE No family hx of        Social History   Substance Use Topics     Smoking status: Current Every Day Smoker     Packs/day: 0.50     Years: 30.00     Types: Cigarettes     Smokeless tobacco: Never Used      Comment: interested in quitting, reducing use at minimum - lozenges ordered     Alcohol use Yes       Current Facility-Administered Medications   Medication     diazepam (VALIUM) tablet 5-20 mg     folic acid (FOLVITE) tablet 1 mg     multivitamin, therapeutic  with minerals (THERA-VIT-M) tablet 1 tablet     sodium chloride 0.9% infusion     thiamine tablet 100 mg     Current Outpatient Prescriptions   Medication     buprenorphine HCl-naloxone HCl (SUBOXONE) 8-2 MG per film     buprenorphine-naloxone (SUBOXONE) 8-2 MG SUBL sublingual tablet     albuterol (PROAIR HFA/PROVENTIL HFA/VENTOLIN HFA) 108 (90 BASE) MCG/ACT Inhaler     aspirin 81 MG tablet     cyanocobalamin (VITAMIN  B-12) 1000 MCG tablet     fish oil-omega-3 fatty acids 1000 MG capsule     gabapentin (NEURONTIN) 300 MG capsule     metoprolol succinate (TOPROL-XL) 50 MG 24 hr tablet     mirtazapine (REMERON) 15 MG tablet     Multiple Vitamin (MULTIVITAMIN) per tablet     nicotine (NICODERM CQ) 14 MG/24HR 24 hr patch     nicotine (NICODERM CQ) 21 MG/24HR 24 hr patch     nicotine (NICODERM CQ) 7 MG/24HR 24 hr patch     nicotine polacrilex (COMMIT) 2 MG lozenge     pantoprazole (PROTONIX) 40 MG EC tablet     ramelteon (ROZEREM) 8 MG tablet     venlafaxine (EFFEXOR-ER) 75 MG TB24 24 hr tablet     Facility-Administered Medications Ordered in Other Encounters   Medication     Self Administer Medications: Behavioral Services      No Known Allergies      Review of Systems   Constitutional: Negative for chills and fever.   Respiratory: Positive for cough (productive). Negative for apnea, chest tightness, shortness of breath, wheezing and stridor.    Cardiovascular: Negative for chest pain.   All other systems reviewed and are negative.      Physical Exam   BP: 139/78  Pulse: 92  Heart Rate: 83  Temp: 97.3  F (36.3  C)  Resp: 12  Weight: 96.6 kg (213 lb)  SpO2: (!) 89 %      Physical Exam   Constitutional: He is oriented to person, place, and time. No distress.   Disheveled appearance. Poor historian. intoxicated   HENT:   Head: Normocephalic and atraumatic.   Left Ear: External ear normal.   Nose: Nose normal.   Eyes: EOM are normal. No scleral icterus.   Neck: Normal range of motion. Neck supple.   Cardiovascular: Normal  rate, regular rhythm, normal heart sounds and intact distal pulses.    Pulmonary/Chest: Effort normal and breath sounds normal.   Abdominal: Soft. Bowel sounds are normal.   Musculoskeletal: Normal range of motion. He exhibits no edema.   Neurological: He is alert and oriented to person, place, and time.   Skin: Skin is warm and dry. He is not diaphoretic.   Psychiatric: His speech is slurred. He is slowed.   Nursing note and vitals reviewed.      ED Course   12:44 PM  The patient was seen and examined by Shweta Roger MD in Room ED08.     ED Course     Procedures             EKG Interpretation:      Interpreted by Shweta Roger  Time reviewed: 1306  Symptoms at time of EKG: cough   Rhythm: normal sinus   Rate: normal  Axis: normal  Ectopy: none  Conduction: normal  ST Segments/ T Waves: No ST-T wave changes  Q Waves: none  Comparison to prior: Unchanged from 6/27/18    Clinical Impression: normal EKG          Results for orders placed or performed during the hospital encounter of 11/06/18 (from the past 48 hour(s))   XR Chest 2 Views    Narrative    CHEST TWO VIEWS  11/6/2018 1:56 PM     HISTORY: Cough     COMPARISON: 3/17/2018.      Impression    IMPRESSION: The previously seen focal opacity in the posterior medial  right lower lobe has mostly resolved, at least on the lateral view.  Minimal residual opacity in this region on the PA view may be residual  scarring. The remainder of the lungs are clear bilaterally and the  heart size is normal and unchanged.    PERNELL CONLEY MD   CBC with platelets differential   Result Value Ref Range    WBC 8.5 4.0 - 11.0 10e9/L    RBC Count 4.99 4.4 - 5.9 10e12/L    Hemoglobin 14.8 13.3 - 17.7 g/dL    Hematocrit 45.3 40.0 - 53.0 %    MCV 91 78 - 100 fl    MCH 29.7 26.5 - 33.0 pg    MCHC 32.7 31.5 - 36.5 g/dL    RDW 16.7 (H) 10.0 - 15.0 %    Platelet Count 145 (L) 150 - 450 10e9/L    Diff Method Automated Method     % Neutrophils 49.5 %    % Lymphocytes 40.7 %    % Monocytes 7.6 %     % Eosinophils 1.2 %    % Basophils 0.4 %    % Immature Granulocytes 0.6 %    Nucleated RBCs 0 0 /100    Absolute Neutrophil 4.2 1.6 - 8.3 10e9/L    Absolute Lymphocytes 3.5 0.8 - 5.3 10e9/L    Absolute Monocytes 0.7 0.0 - 1.3 10e9/L    Absolute Eosinophils 0.1 0.0 - 0.7 10e9/L    Absolute Basophils 0.0 0.0 - 0.2 10e9/L    Abs Immature Granulocytes 0.1 0 - 0.4 10e9/L    Absolute Nucleated RBC 0.0    Comprehensive metabolic panel   Result Value Ref Range    Sodium 132 (L) 133 - 144 mmol/L    Potassium >10.0 (HH) 3.4 - 5.3 mmol/L    Chloride 102 94 - 109 mmol/L    Carbon Dioxide 28 20 - 32 mmol/L    Anion Gap 2 (L) 3 - 14 mmol/L    Glucose 94 70 - 99 mg/dL    Urea Nitrogen 19 7 - 30 mg/dL    Creatinine Unsatisfactory specimen - hemolyzed 0.66 - 1.25 mg/dL    GFR Estimate Not Calculated >60 mL/min/1.7m2    GFR Estimate If Black Not Calculated >60 mL/min/1.7m2    Calcium 7.9 (L) 8.5 - 10.1 mg/dL    Bilirubin Total 0.5 0.2 - 1.3 mg/dL    Albumin 3.1 (L) 3.4 - 5.0 g/dL    Protein Total 7.9 6.8 - 8.8 g/dL    Alkaline Phosphatase 94 40 - 150 U/L    ALT Unsatisfactory specimen - hemolyzed 0 - 70 U/L    AST Unsatisfactory specimen - hemolyzed 0 - 45 U/L   Lipase   Result Value Ref Range    Lipase Unsatisfactory specimen - hemolyzed 73 - 393 U/L   Procalcitonin   Result Value Ref Range    Procalcitonin Canceled, Test credited ng/ml   BNP   Result Value Ref Range    N-Terminal Pro BNP Inpatient Unsatisfactory specimen - hemolyzed 0 - 900 pg/mL   Troponin I   Result Value Ref Range    Troponin I ES Unsatisfactory specimen - hemolyzed 0.000 - 0.045 ug/L   Alcohol ethyl   Result Value Ref Range    Ethanol g/dL 0.23 (H) <0.01 g/dL     Assessments & Plan (with Medical Decision Making)   The patient presents to the ED seeking detox.  He says he was sober for 20 years and then relapsed over the past few years and can't seem to get sober again.  He also has copd and has been coughing over the past year.  His sats are 88% on room air.   Epic review shows he has been found to have low sats chronically and they have discussed home oxygen.  He also admits to not using his copd inhalers as prescribed.  He tends to sometimes use them.  He denies f/c.  CXR shows no acute pneumonia.  He was given a duoneb.  Some labs were hemolyzed or unable to give a result so were rechecked.  Potassium is normal after recheck.  ECG was done and shows no acute changes.  His troponin is slightly elevated.  I did this due to initially cough for 1 month, but with further conversation with the patient I do not feel that his history reflects concerns for CAD.  He denies chest pain today or recently.  He is coughing but not wheezing.  He is hypoxic but this is chronic.  He hasn't been taking his inhalers consistently.  I discussed the case with Dr. Treviño and he is aware of the slightly elevated troponin.  They will follow the troponin.  There seems to indication for acute intervention given his symptoms.  He is stable in the ED and is being admitted for alcohol withdrawal. I also did not given prednisone, given he does not seem to be having an acute exacerbation of his COPD.    I have reviewed the nursing notes.    I have reviewed the findings, diagnosis, plan and need for follow up with the patient.    New Prescriptions    No medications on file       Final diagnoses:   Alcohol dependence with intoxication with complication (H)   Chronic obstructive pulmonary disease, unspecified COPD type (H)   Hypoxia     IShayan, am serving as a trained medical scribe to document services personally performed by Shweta Roger MD, based on the provider's statements to me.   IShweta MD, was physically present and have reviewed and verified the accuracy of this note documented by Shayan Murdock.    11/6/2018   [facility], [place], EMERGENCY DEPARTMENT     Shweta Roger MD  11/06/18 0930

## 2018-11-07 ENCOUNTER — APPOINTMENT (OUTPATIENT)
Dept: CARDIOLOGY | Facility: CLINIC | Age: 63
DRG: 191 | End: 2018-11-07
Attending: INTERNAL MEDICINE
Payer: COMMERCIAL

## 2018-11-07 LAB
ALBUMIN SERPL-MCNC: 3 G/DL (ref 3.4–5)
ALP SERPL-CCNC: 80 U/L (ref 40–150)
ALT SERPL W P-5'-P-CCNC: 26 U/L (ref 0–70)
ANION GAP SERPL CALCULATED.3IONS-SCNC: 6 MMOL/L (ref 3–14)
AST SERPL W P-5'-P-CCNC: 25 U/L (ref 0–45)
BILIRUB SERPL-MCNC: 0.7 MG/DL (ref 0.2–1.3)
BUN SERPL-MCNC: 17 MG/DL (ref 7–30)
CALCIUM SERPL-MCNC: 7.7 MG/DL (ref 8.5–10.1)
CHLORIDE SERPL-SCNC: 103 MMOL/L (ref 94–109)
CK SERPL-CCNC: 71 U/L (ref 30–300)
CK SERPL-CCNC: 72 U/L (ref 30–300)
CO2 SERPL-SCNC: 31 MMOL/L (ref 20–32)
CREAT SERPL-MCNC: 0.58 MG/DL (ref 0.66–1.25)
ERYTHROCYTE [DISTWIDTH] IN BLOOD BY AUTOMATED COUNT: 16.4 % (ref 10–15)
GFR SERPL CREATININE-BSD FRML MDRD: >90 ML/MIN/1.7M2
GLUCOSE SERPL-MCNC: 92 MG/DL (ref 70–99)
HCT VFR BLD AUTO: 45.3 % (ref 40–53)
HGB BLD-MCNC: 14.4 G/DL (ref 13.3–17.7)
INTERPRETATION ECG - MUSE: NORMAL
INTERPRETATION ECG - MUSE: NORMAL
MAGNESIUM SERPL-MCNC: 1.7 MG/DL (ref 1.6–2.3)
MCH RBC QN AUTO: 29 PG (ref 26.5–33)
MCHC RBC AUTO-ENTMCNC: 31.8 G/DL (ref 31.5–36.5)
MCV RBC AUTO: 91 FL (ref 78–100)
PLATELET # BLD AUTO: 133 10E9/L (ref 150–450)
POTASSIUM SERPL-SCNC: 4 MMOL/L (ref 3.4–5.3)
PROCALCITONIN SERPL-MCNC: <0.05 NG/ML
PROT SERPL-MCNC: 6.5 G/DL (ref 6.8–8.8)
RBC # BLD AUTO: 4.97 10E12/L (ref 4.4–5.9)
SODIUM SERPL-SCNC: 140 MMOL/L (ref 133–144)
TROPONIN I SERPL-MCNC: 0.02 UG/L (ref 0–0.04)
TROPONIN I SERPL-MCNC: <0.015 UG/L (ref 0–0.04)
WBC # BLD AUTO: 8.4 10E9/L (ref 4–11)

## 2018-11-07 PROCEDURE — 36415 COLL VENOUS BLD VENIPUNCTURE: CPT | Performed by: INTERNAL MEDICINE

## 2018-11-07 PROCEDURE — 25000132 ZZH RX MED GY IP 250 OP 250 PS 637: Performed by: EMERGENCY MEDICINE

## 2018-11-07 PROCEDURE — 84484 ASSAY OF TROPONIN QUANT: CPT | Performed by: INTERNAL MEDICINE

## 2018-11-07 PROCEDURE — 93306 TTE W/DOPPLER COMPLETE: CPT

## 2018-11-07 PROCEDURE — 25000128 H RX IP 250 OP 636: Performed by: EMERGENCY MEDICINE

## 2018-11-07 PROCEDURE — 83735 ASSAY OF MAGNESIUM: CPT | Performed by: INTERNAL MEDICINE

## 2018-11-07 PROCEDURE — 94640 AIRWAY INHALATION TREATMENT: CPT | Mod: 76

## 2018-11-07 PROCEDURE — 84145 PROCALCITONIN (PCT): CPT | Performed by: INTERNAL MEDICINE

## 2018-11-07 PROCEDURE — 40000275 ZZH STATISTIC RCP TIME EA 10 MIN

## 2018-11-07 PROCEDURE — 99223 1ST HOSP IP/OBS HIGH 75: CPT | Mod: AI | Performed by: INTERNAL MEDICINE

## 2018-11-07 PROCEDURE — 25000128 H RX IP 250 OP 636: Performed by: INTERNAL MEDICINE

## 2018-11-07 PROCEDURE — 94640 AIRWAY INHALATION TREATMENT: CPT

## 2018-11-07 PROCEDURE — 25000125 ZZHC RX 250: Performed by: INTERNAL MEDICINE

## 2018-11-07 PROCEDURE — 25000132 ZZH RX MED GY IP 250 OP 250 PS 637: Performed by: INTERNAL MEDICINE

## 2018-11-07 PROCEDURE — 85027 COMPLETE CBC AUTOMATED: CPT | Performed by: INTERNAL MEDICINE

## 2018-11-07 PROCEDURE — 12000008 ZZH R&B INTERMEDIATE UMMC

## 2018-11-07 PROCEDURE — 25500064 ZZH RX 255 OP 636: Performed by: INTERNAL MEDICINE

## 2018-11-07 PROCEDURE — 80053 COMPREHEN METABOLIC PANEL: CPT | Performed by: INTERNAL MEDICINE

## 2018-11-07 PROCEDURE — 93005 ELECTROCARDIOGRAM TRACING: CPT

## 2018-11-07 PROCEDURE — 82550 ASSAY OF CK (CPK): CPT | Performed by: INTERNAL MEDICINE

## 2018-11-07 PROCEDURE — 93306 TTE W/DOPPLER COMPLETE: CPT | Mod: 26 | Performed by: INTERNAL MEDICINE

## 2018-11-07 PROCEDURE — 93010 ELECTROCARDIOGRAM REPORT: CPT | Performed by: INTERNAL MEDICINE

## 2018-11-07 RX ORDER — POLYETHYLENE GLYCOL 3350 17 G
2 POWDER IN PACKET (EA) ORAL
Status: DISCONTINUED | OUTPATIENT
Start: 2018-11-07 | End: 2018-11-09

## 2018-11-07 RX ORDER — IPRATROPIUM BROMIDE AND ALBUTEROL SULFATE 2.5; .5 MG/3ML; MG/3ML
3 SOLUTION RESPIRATORY (INHALATION) EVERY 4 HOURS PRN
Status: DISCONTINUED | OUTPATIENT
Start: 2018-11-07 | End: 2018-11-11 | Stop reason: HOSPADM

## 2018-11-07 RX ADMIN — FAMOTIDINE 20 MG: 20 INJECTION, SOLUTION INTRAVENOUS at 21:42

## 2018-11-07 RX ADMIN — FOLIC ACID 1 MG: 1 TABLET ORAL at 08:12

## 2018-11-07 RX ADMIN — Medication 5000 UNITS: at 21:42

## 2018-11-07 RX ADMIN — Medication 5000 UNITS: at 08:17

## 2018-11-07 RX ADMIN — LORAZEPAM 1 MG: 1 TABLET ORAL at 14:11

## 2018-11-07 RX ADMIN — THIAMINE HCL TAB 100 MG 100 MG: 100 TAB at 08:16

## 2018-11-07 RX ADMIN — FLECAINIDE ACETATE 100 MG: 100 TABLET ORAL at 20:35

## 2018-11-07 RX ADMIN — GABAPENTIN 600 MG: 300 CAPSULE ORAL at 16:38

## 2018-11-07 RX ADMIN — SODIUM CHLORIDE: 900 INJECTION, SOLUTION INTRAVENOUS at 14:56

## 2018-11-07 RX ADMIN — LORAZEPAM 2 MG: 1 TABLET ORAL at 04:36

## 2018-11-07 RX ADMIN — QUETIAPINE FUMARATE 25 MG: 25 TABLET ORAL at 21:43

## 2018-11-07 RX ADMIN — LORAZEPAM 2 MG: 1 TABLET ORAL at 06:33

## 2018-11-07 RX ADMIN — BUPRENORPHINE HYDROCHLORIDE, NALOXONE HYDROCHLORIDE 3 FILM: 4; 1 FILM, SOLUBLE BUCCAL; SUBLINGUAL at 08:12

## 2018-11-07 RX ADMIN — QUETIAPINE FUMARATE 25 MG: 25 TABLET ORAL at 01:41

## 2018-11-07 RX ADMIN — LORAZEPAM 2 MG: 1 TABLET ORAL at 18:00

## 2018-11-07 RX ADMIN — AZITHROMYCIN 250 MG: 250 TABLET, FILM COATED ORAL at 08:12

## 2018-11-07 RX ADMIN — SODIUM CHLORIDE: 900 INJECTION, SOLUTION INTRAVENOUS at 22:24

## 2018-11-07 RX ADMIN — FAMOTIDINE 20 MG: 20 INJECTION, SOLUTION INTRAVENOUS at 08:11

## 2018-11-07 RX ADMIN — DOCUSATE SODIUM 100 MG: 100 CAPSULE, LIQUID FILLED ORAL at 08:12

## 2018-11-07 RX ADMIN — FLECAINIDE ACETATE 100 MG: 100 TABLET ORAL at 08:12

## 2018-11-07 RX ADMIN — IPRATROPIUM BROMIDE AND ALBUTEROL SULFATE 3 ML: .5; 3 SOLUTION RESPIRATORY (INHALATION) at 09:15

## 2018-11-07 RX ADMIN — CYANOCOBALAMIN TAB 1000 MCG 1000 MCG: 1000 TAB at 08:12

## 2018-11-07 RX ADMIN — SODIUM CHLORIDE: 900 INJECTION, SOLUTION INTRAVENOUS at 06:02

## 2018-11-07 RX ADMIN — NICOTINE POLACRILEX 2 MG: 2 LOZENGE ORAL at 22:13

## 2018-11-07 RX ADMIN — GABAPENTIN 600 MG: 300 CAPSULE ORAL at 20:35

## 2018-11-07 RX ADMIN — IPRATROPIUM BROMIDE AND ALBUTEROL SULFATE 3 ML: .5; 3 SOLUTION RESPIRATORY (INHALATION) at 21:21

## 2018-11-07 RX ADMIN — ASPIRIN 81 MG CHEWABLE TABLET 162 MG: 81 TABLET CHEWABLE at 08:13

## 2018-11-07 RX ADMIN — LORAZEPAM 2 MG: 1 TABLET ORAL at 01:38

## 2018-11-07 RX ADMIN — LORAZEPAM 2 MG: 1 TABLET ORAL at 20:41

## 2018-11-07 RX ADMIN — LORAZEPAM 2 MG: 1 TABLET ORAL at 09:41

## 2018-11-07 RX ADMIN — HUMAN ALBUMIN MICROSPHERES AND PERFLUTREN 4 ML: 10; .22 INJECTION, SOLUTION INTRAVENOUS at 09:00

## 2018-11-07 RX ADMIN — GABAPENTIN 600 MG: 300 CAPSULE ORAL at 08:13

## 2018-11-07 RX ADMIN — GABAPENTIN 600 MG: 300 CAPSULE ORAL at 11:54

## 2018-11-07 RX ADMIN — BUPRENORPHINE HYDROCHLORIDE, NALOXONE HYDROCHLORIDE 1 FILM: 8; 2 FILM, SOLUBLE BUCCAL; SUBLINGUAL at 20:35

## 2018-11-07 RX ADMIN — MULTIPLE VITAMINS W/ MINERALS TAB 1 TABLET: TAB at 08:13

## 2018-11-07 ASSESSMENT — ACTIVITIES OF DAILY LIVING (ADL)
ADLS_ACUITY_SCORE: 17
ADLS_ACUITY_SCORE: 24
ADLS_ACUITY_SCORE: 24
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
## 2018-11-07 NOTE — PLAN OF CARE
Problem: Patient Care Overview  Goal: Plan of Care/Patient Progress Review  Outcome: No Change        VS:   /77 (BP Location: Left arm)  Pulse 157  Temp 96.9  F (36.1  C) (Oral)  Resp 20  Wt 99.3 kg (219 lb)  SpO2 96%  BMI 30.54 kg/m2  VSS except tachy (high 90's). No CP/SOB/lightheadedness/dizziness. LS clear/diminished. O2 sats upper 90's on 2.5 L NC - weaned to 1.5 L.    Output:   Voiding + w/out difficulty. BS+/flatus+. LBM 9/7/2018 and formed per pt report   Activity:   Independent   Skin: +   Pain:   Denies   Neuro/CMS:   Alert and oriented. MSSA 16 (2mg ativan admin) and 10 (1mg ativan admin). Somewhat tremulous and sweaty. Pt reports non-fused visual and audio hallucinations. Per pt it is mostly seeing ants and sparkles. Pt has baseline n/t in R leg from knee to toes, otherwise cms+   Dressing(s):      Diet:   Tolerating reg diet no n/v   LDA:   R PIV infusing IVMF   Equipment:   IV pole   Plan:      Additional Info:

## 2018-11-07 NOTE — PHARMACY
Prescriber Notification Note    The pharmacist has communicated with this patient's provider regarding a concern or therapy recommendation.    Notified Person: Dr. Lima  Date/Time of Notification: 11/6/18 @ 2015  Interaction: phone  Concern/Recommendation:  -Azithromycin + flecainide = major risk of QTC prolongation, no recent EKG/QTC---->provider has EKG ordered for AM  -Suboxone PM dose not ordered--->provider intended to restart it, ok to restart  -Metoprolol was mentioned in his note, but not ordered---->plan to hold for now per provider  -[place] ordered and patient is on Suboxone--->ok to discontinue the Norco per provider    Katerina Treviño, PharmD, BCPS

## 2018-11-07 NOTE — PLAN OF CARE
Problem: Patient Care Overview  Goal: Plan of Care/Patient Progress Review  Outcome: No Change  Pt. admitted from ED at 1940 . Pt. accompanied by transport and arrived with personal belongings. Report was taken from RN in ED.     Pt. is A&Ox4. Vital signs stable with hypertension and tachycardia. 02 sats are 90% on 2.5 L O2. Lung sounds are diminished bilaterally with both anterior and posterior. Bowel sounds are active and equal in all 4 quadrants. CMS and Neuros are intact. However, patient reports baseline numbness in left lower extremity.  Patient has pain in the left hip and declined intervention Pt . Pt. denies nausea, CP, SOB, lightheadedness, and dizziness. Pt is on a regular diet and appetite was fair.     PIV is patent and infusing in R hand. PCDs are in place to BLEs.  Pt. is oriented to the room and call light system and the call light is within reach. Continue to monitor.    Patient remained in A-fib all night witch exception of a period lasting less than one minute in normal sinus rhythm.

## 2018-11-07 NOTE — PROGRESS NOTES
"Respiratory note: Duoneb order changed from Q4 to PRN per protocol. Patient appears eupneic, BS CTAB, sats mid 90s on 2.5L NC, productive cough, denies feeling SOB. Patient states nebs \"do nothing\" for him. Patient encouraged to call for PRN if changes in breathing.    Maira Walters, RRT-NPS  11/7/2018 9:23 AM  "

## 2018-11-07 NOTE — PLAN OF CARE
Problem: Patient Care Overview  Goal: Plan of Care/Patient Progress Review  Outcome: No Change        VS:   BP (!) 138/96  Pulse 157  Temp 96.8  F (36  C) (Oral)  Resp 17  Wt 96.6 kg (213 lb)  SpO2 92%  BMI 29.71 kg/m2   Tele-A-fib intermittently     Output:   Voiding without difficulty into urinal. Passing flatus. No BM overnight. Bowel sounds active and audible in all quadrants.    Activity:   Pt stayed in bed overnight, repositioning independently.    Skin: Occasional diaphoresis.    Pain:   Pain in L hip-denied medication   Neuro/CMS:   MSSA scoring 14 and receiving ativan.   Baseline numbness in left lower extremity  Alert and oriented x4.    Dressing(s):   None   Diet:   Advance diet as tolerated-currently tolerating clear liquids well.    LDA:   R PIV infusing.    Equipment:   IV pole, PCDs   Plan:   Continue to monitor withdrawal. Pt able to make needs known, call light is within reach.    Additional Info:   Pt sleeping on and off throughout night.

## 2018-11-07 NOTE — CONSULTS
Social Work: Assessment with Discharge Plan    Patient Name:  Jorge Rosales  :  1955  Age:  63 year old  MRN:  [id number]  Risk/Complexity Score:  Filed Complexity Screen Score: 9  Completed assessment with:  Pt, chart review, 10A IDT    Presenting Information   Reason for Referral:  Discharge plan  Date of Intake:  2018  Referral Source:  Chart Review  Decision Maker:  pt  Alternate Decision Maker:  Per pt, friend Antolin Scott  Health Care Directive:  Provided education  Living Situation:  Apartment  Previous Functional Status:  Independent  Patient and family understanding of hospitalization:  Seeking detox  Cultural/Language/Spiritual Considerations:  , has significant mental health and hx of substance use disorder  Adjustment to Illness:  Pt verbalizing plan to connect with [place]'s groups and increase attendance at meetings. He has progressively isolated himself and had increased lonliness    Physical Health  Reason for Admission:    1. Alcohol dependence with intoxication with complication (H)    2. Chronic obstructive pulmonary disease, unspecified COPD type (H)    3. Hypoxia    4. Obstructive chronic bronchitis without exacerbation (H)    5. Cigarette smoker    6. Hypoxemia      Services Needed/Recommended:  Other:  home w/outpt and community resources    Mental Health/Chemical Dependency  Diagnosis:  Alcohol use disorder, depression, anxiety  Support/Services in Place:  Therapist, AA groups, Care Coordinator/SW through [facility] clinic  Services Needed/Recommended:  None noted    Support System  Significant relationship at present time:  Pt identifies having friends who are involved  Family of origin is available for support:  Not evident-pt has two disabled adult children   Other support available:  Community support through sponsor, AA meetings, [employer] Alumni Meetings, Care Coordination through [facility] clinic  Gaps in support system:  Pt identifies lack of social support.  But will reach out to a [place]'s men's social group  Patient is caregiver to:  None     Provider Information   Primary Care Physician:  Carlos Padron   [phone]   Clinic:  [facility] [address] [facility] [address]      :  None noted    Financial   Income Source:  Pension, disability  Financial Concerns:  None noted at this time  Insurance:    Payor/Plan Subscriber Name Rel Member # Group #   [employer] - [employer]*  [id number]      [address]       Discharge Plan   Patient and family discharge goal:  Return home  Provided education on discharge plan:  YES  Patient agreeable to discharge plan:  YES  A list of Medicare Certified Facilities was provided to the patient and/or family to encourage patient choice. Patient's choices for facility are:  NA  General information regarding anticipated insurance coverage and possible out of pocket cost was discussed. Patient and patient's family are aware patient may incur the cost of transportation to the facility, pending insurance payment: YES  Barriers to discharge:  Medical stability    Discharge Recommendations   Anticipated Disposition:  Home with services  Transportation Needs:  Other:  to be determined      Additional comments   Pt is known to writer from previous hospitalizations. Pt had completed [facility] in May 2018. He had been attending Alumni groups and found them meaningful and helpful in maintaining his sobriety.     Unfortunately, pt states that members of the Alumni group stopped attending and he started feelling disconnected. [facility]iat[facility], Dr Suarez, told him of a Men's [employer]' group that he will connect with to enhance his social connections and reduce his sense of isolation and loneliness (He believes these contribute to his relapse).    Pt denied need for additional resources or services, but acknowledges SW is available per request/referral.

## 2018-11-08 PROCEDURE — 99232 SBSQ HOSP IP/OBS MODERATE 35: CPT | Performed by: INTERNAL MEDICINE

## 2018-11-08 PROCEDURE — 25000128 H RX IP 250 OP 636: Performed by: INTERNAL MEDICINE

## 2018-11-08 PROCEDURE — 25000132 ZZH RX MED GY IP 250 OP 250 PS 637: Performed by: INTERNAL MEDICINE

## 2018-11-08 PROCEDURE — 12000008 ZZH R&B INTERMEDIATE UMMC

## 2018-11-08 PROCEDURE — 25000132 ZZH RX MED GY IP 250 OP 250 PS 637: Performed by: EMERGENCY MEDICINE

## 2018-11-08 PROCEDURE — 25000128 H RX IP 250 OP 636: Performed by: EMERGENCY MEDICINE

## 2018-11-08 RX ADMIN — Medication 5000 UNITS: at 08:01

## 2018-11-08 RX ADMIN — AZITHROMYCIN 250 MG: 250 TABLET, FILM COATED ORAL at 07:54

## 2018-11-08 RX ADMIN — GABAPENTIN 600 MG: 300 CAPSULE ORAL at 21:28

## 2018-11-08 RX ADMIN — LORAZEPAM 2 MG: 1 TABLET ORAL at 19:15

## 2018-11-08 RX ADMIN — MULTIPLE VITAMINS W/ MINERALS TAB 1 TABLET: TAB at 07:53

## 2018-11-08 RX ADMIN — THIAMINE HCL TAB 100 MG 100 MG: 100 TAB at 07:54

## 2018-11-08 RX ADMIN — GABAPENTIN 600 MG: 300 CAPSULE ORAL at 11:03

## 2018-11-08 RX ADMIN — CYANOCOBALAMIN TAB 1000 MCG 1000 MCG: 1000 TAB at 07:54

## 2018-11-08 RX ADMIN — FLECAINIDE ACETATE 100 MG: 100 TABLET ORAL at 21:28

## 2018-11-08 RX ADMIN — DOCUSATE SODIUM 100 MG: 100 CAPSULE, LIQUID FILLED ORAL at 07:54

## 2018-11-08 RX ADMIN — GABAPENTIN 600 MG: 300 CAPSULE ORAL at 07:54

## 2018-11-08 RX ADMIN — FLECAINIDE ACETATE 100 MG: 100 TABLET ORAL at 07:54

## 2018-11-08 RX ADMIN — FAMOTIDINE 20 MG: 20 INJECTION, SOLUTION INTRAVENOUS at 08:08

## 2018-11-08 RX ADMIN — LORAZEPAM 2 MG: 1 TABLET ORAL at 11:03

## 2018-11-08 RX ADMIN — ASPIRIN 81 MG CHEWABLE TABLET 162 MG: 81 TABLET CHEWABLE at 07:54

## 2018-11-08 RX ADMIN — LORAZEPAM 2 MG: 1 TABLET ORAL at 13:48

## 2018-11-08 RX ADMIN — Medication 5000 UNITS: at 21:33

## 2018-11-08 RX ADMIN — FOLIC ACID 1 MG: 1 TABLET ORAL at 07:55

## 2018-11-08 RX ADMIN — LORAZEPAM 2 MG: 1 TABLET ORAL at 07:53

## 2018-11-08 RX ADMIN — LORAZEPAM 2 MG: 1 TABLET ORAL at 02:17

## 2018-11-08 RX ADMIN — GABAPENTIN 600 MG: 300 CAPSULE ORAL at 18:02

## 2018-11-08 RX ADMIN — BUPRENORPHINE HYDROCHLORIDE, NALOXONE HYDROCHLORIDE 3 FILM: 4; 1 FILM, SOLUBLE BUCCAL; SUBLINGUAL at 07:53

## 2018-11-08 RX ADMIN — LORAZEPAM 2 MG: 1 TABLET ORAL at 18:02

## 2018-11-08 RX ADMIN — BUPRENORPHINE HYDROCHLORIDE, NALOXONE HYDROCHLORIDE 1 FILM: 8; 2 FILM, SOLUBLE BUCCAL; SUBLINGUAL at 21:28

## 2018-11-08 RX ADMIN — DOCUSATE SODIUM 100 MG: 100 CAPSULE, LIQUID FILLED ORAL at 21:28

## 2018-11-08 RX ADMIN — SODIUM CHLORIDE: 900 INJECTION, SOLUTION INTRAVENOUS at 06:58

## 2018-11-08 ASSESSMENT — ACTIVITIES OF DAILY LIVING (ADL)
ADLS_ACUITY_SCORE: 17
ADLS_ACUITY_SCORE: 18
ADLS_ACUITY_SCORE: 17
ADLS_ACUITY_SCORE: 18
ADLS_ACUITY_SCORE: 18
ADLS_ACUITY_SCORE: 17

## 2018-11-08 NOTE — PLAN OF CARE
Problem: Patient Care Overview  Goal: Plan of Care/Patient Progress Review  Outcome: No Change  Note for the night shift.  D: At the start of the shift, pt awake and looking at his phone. Claims to be having visual hallucinations. Says she sees  people in hs room all taling, but he can't hear them. They are cooking and serving food. He says they hand him food and he knows they are not real but he still reaches for the plates. Says most of the faces seem to be familiar. As the night went on getting more frustrated with the hallucinations. Did give him Ativan for MSSA of 14.  Rested better after that. This morning getting more restless. Got his clothes on. Keeps saying he has 6 friends here and they are to meet in the cafeteria. Unable to talk him out of going down stairs. Disconnected the IV while he was gone. Re-connected when he got back up here. Used a cane to get around. Disappointed he could not find his friends. No evidedence that there was ever any friends around here. Oriented. Says the hallucination of all the people got better after the Ativan. Call light with in reach.Able to make needs known. A: not sure how clear his thinking is.  P: Monitor closely.  Supportive cares. Medicate for pain as needed.Encourage activity today.

## 2018-11-08 NOTE — PLAN OF CARE
Problem: Patient Care Overview  Goal: Plan of Care/Patient Progress Review  PT order received and chart reviewed. Patient independent on unit, confirmed by MD. Likely plan to discharge tomorrow. No acute inpatient PT needs identified at this time.

## 2018-11-08 NOTE — PLAN OF CARE
Problem: Patient Care Overview  Goal: Plan of Care/Patient Progress Review    VS:   BP (!) 159/95  Pulse 157  Temp 97.8  F (36.6  C) (Oral)  Resp 16  Wt 100.3 kg (221 lb 1.6 oz)  SpO2 95%  BMI 30.84 kg/m2  Stable no abnormalities noted w/ the exception to elevated BP. No SOB/dizziness/headache or CP noted. LS diminished/coarse, on room air.     Output:   Voids spontaneously w/out difficulty. Last BM 11/7/18, passing gas   Activity:   Independent w/ cane    Skin: Intact   Pain:   Denies   Neuro/CMS:   Intact w/ the exception baseline numbness/tingling LLE   Dressing(s):   None   Diet:   Regular, tolerating well. No N&V reported   Equipment:   Cane   IV's/Drains:   PIV loss    Plan:   Continue to monitor - per Internal medicine note discharge likely tomorrow.   Additional Info:   Pt is having visual & auditory hallucinations.   MSSA: score consistently < 12. Last score 11 @ 1345.    Patient has been educated on potential risks of choosing to leave the unit and the responsibility for patient well-being will belong to the patient. Pt has been informed that admission to hospital is due to need for medical treatment. Education given to the patient on some of the potential risks included but is not limited to:            lack of access to nursing and medical intervention            possible missed appointments with MD, therapies, tests            possible missed medications, antibiotics, management of IV's    Patient Response: Pt shows understanding of risk. Will continue to monitor

## 2018-11-08 NOTE — PROGRESS NOTES
[facility], [place]   Internal Medicine Daily Note          Assessment and Plan:   63 year old man with depression, PSA, alcohol abuse, PTSD, current smoker with COPD comes c/w worsening shortness of breath and cough and also seeking detox.  Individual problems and their management are outlined below    #  chronic hypoxic respi failure , H/O COPD   R lower lobe infiltrate seen on previous films has resolved.   Respi virus panel PCR is negative .Initiate Z-ifeoma  Continue bronchodilator nebs q 4 tammie and q 2 hrs prn with albuterol and atrovent  Titrate oxygen to maintain sat >89.   Consider steroid if e/o worsening sob. Wheezing for copd exacerbation.   Exercise pulse oximetry on discharge to assess for domiciliary O2 needs.         # Alcohol withdrawal:   MSSA with Lorazepam. Needing significant mount in the setting of withdrawal hallucinations  MSSA scores consistently > 12  MVI, thiamine, folic acid daily  SW/ CD consult.       #  Concern for Acute Coronary Syndrome:   noted to have an elevated troponin on ER laboratory studies. No complaint of chest pain but patient is not a reliable historian. ECG 11/6/2018 is normal without any ST or T wave abnormalities  Anti-ischemic therapy with Aspirin 162 mg daily, and metoprolol (dual indication as it also part of the alcohol withdrawal protocol  Cardiac enzymes within normal limits. Echo with no wall motion abnormalities noted   Continue pta flecainide   Analgesia and anxiolysis prn     # depression, anxiety:   PTA meds.         Consulting teams: None   Code status: Full   DVT Prophylaxis: PCD's / Heparin   Gastric prophylaxis:H2 blocker   Diet: Regular adult   Disposition: [facility] discharge home tomorrow ( 11/8)      Patient seen and examined with RN         Interval History:   Progress notes in the last 24 hrs by MDT team has been reviewed.  Jorge has been hallucinating m seeing various people serving food, a TV crew etc  He denies any chest  "pain/ SOB  No fever or chills   His tremors are better today   eating and drinking well            Review of Systems:   A comprehensive review of systems was performed and found to be negative except: Those that are outlined in interval history              Medications:   I have reviewed this patient's current medications which are outlined in the \"current medication\" section of EPIC             Physical Exam:   Vitals were reviewed  Temp: 97.3  F (36.3  C) Temp src: Oral BP: (!) 159/95   Heart Rate: 95 Resp: 16 SpO2: 95 % O2 Device: None (Room air) Oxygen Delivery: 2 LPM  Constitutional:   awake, alert, cooperative, no apparent distress, and appears stated age     Lungs:   No increased work of breathing, good air exchange, clear to auscultation bilaterally, no crackles or wheezing     Cardiovascular:   Normal apical impulse, regular rate and rhythm, normal S1 and S2, no S3 or S4, and no murmur noted     Abdomen:   No scars, normal bowel sounds, soft, non-distended, non-tender, no masses palpated, no hepatosplenomegally     Musculoskeletal:   no lower extremity pitting edema present     Neurologic:   Awake, alert, oriented to name, place and time.  Cranial nerves II-XII are grossly intact.     Tremors much better             Data:     Most recent labs have been evaluated and relevant labs are outlined below :    BMP    Recent Labs  Lab 11/07/18  0558 11/06/18  1555 11/06/18  1415    144 132*   POTASSIUM 4.0 3.8 >10.0*   CHLORIDE 103 105 102   RUSTY 7.7* 8.0* 7.9*   CO2 31 30 28   BUN 17 18 19   CR 0.58* 0.59* Unsatisfactory specimen - hemolyzed   GLC 92 90 94     CBC    Recent Labs  Lab 11/07/18  0558 11/06/18  2155 11/06/18  1415   WBC 8.4  --  8.5   RBC 4.97  --  4.99   HGB 14.4  --  14.8   HCT 45.3  --  45.3   MCV 91  --  91   MCH 29.0  --  29.7   MCHC 31.8  --  32.7   RDW 16.4*  --  16.7*   * 148* 145*     INRNo lab results found in last 7 days.  LFTs    Recent Labs  Lab 11/07/18  0558 11/06/18  1555 " 11/06/18  1415   ALKPHOS 80 90 94   AST 25 29 Unsatisfactory specimen - hemolyzed   ALT 26 30 Unsatisfactory specimen - hemolyzed   BILITOTAL 0.7 0.3 0.5   PROTTOTAL 6.5* 6.8 7.9   ALBUMIN 3.0* 3.1* 3.1*      PANC    Recent Labs  Lab 11/06/18  1555 11/06/18  1415   LIPASE 43* Unsatisfactory specimen - hemolyzed                 Dr PAT Duran MD  Hospitalist ( Internal medicine)  Pager: [phone]

## 2018-11-08 NOTE — PLAN OF CARE
Problem: Patient Care Overview  Goal: Plan of Care/Patient Progress Review  Outcome: No Change  Pt A&Ox4, VSS, LS clear, BS active- pt had 1 loose stool this evening. Up and ambulating in room independently steady on feet. MSSA score 12 and 13, ativan administered. Pt reports hearing things and having visual hallucinations, seeing people in his room. Spot checked pt's O2- 93-94% on RA. PRN Neb administered 1x for SOB. PIV infusing NS@ 125mL/hr. Pt voiding good amounts without difficulty. Nicotine lozenge given, Seroquel administered at bedtime. Pt is able to make needs known, call light within reach, continue with POC.

## 2018-11-09 LAB — PLATELET # BLD AUTO: 143 10E9/L (ref 150–450)

## 2018-11-09 PROCEDURE — 25000128 H RX IP 250 OP 636: Performed by: INTERNAL MEDICINE

## 2018-11-09 PROCEDURE — 25000132 ZZH RX MED GY IP 250 OP 250 PS 637: Performed by: INTERNAL MEDICINE

## 2018-11-09 PROCEDURE — 99221 1ST HOSP IP/OBS SF/LOW 40: CPT | Performed by: PSYCHIATRY & NEUROLOGY

## 2018-11-09 PROCEDURE — 99232 SBSQ HOSP IP/OBS MODERATE 35: CPT | Performed by: INTERNAL MEDICINE

## 2018-11-09 PROCEDURE — 36415 COLL VENOUS BLD VENIPUNCTURE: CPT | Performed by: INTERNAL MEDICINE

## 2018-11-09 PROCEDURE — 85049 AUTOMATED PLATELET COUNT: CPT | Performed by: INTERNAL MEDICINE

## 2018-11-09 PROCEDURE — 12000008 ZZH R&B INTERMEDIATE UMMC

## 2018-11-09 RX ORDER — POLYETHYLENE GLYCOL 3350 17 G
2 POWDER IN PACKET (EA) ORAL
Status: DISCONTINUED | OUTPATIENT
Start: 2018-11-09 | End: 2018-11-11 | Stop reason: HOSPADM

## 2018-11-09 RX ORDER — SODIUM CHLORIDE 9 MG/ML
INJECTION, SOLUTION INTRAVENOUS
Status: DISPENSED
Start: 2018-11-09 | End: 2018-11-10

## 2018-11-09 RX ORDER — NICOTINE 21 MG/24HR
1 PATCH, TRANSDERMAL 24 HOURS TRANSDERMAL DAILY
Status: DISCONTINUED | OUTPATIENT
Start: 2018-11-09 | End: 2018-11-09

## 2018-11-09 RX ORDER — DIAZEPAM 5 MG
5-20 TABLET ORAL EVERY 30 MIN PRN
Status: DISCONTINUED | OUTPATIENT
Start: 2018-11-09 | End: 2018-11-11

## 2018-11-09 RX ORDER — THIAMINE HYDROCHLORIDE 100 MG/ML
100 INJECTION, SOLUTION INTRAMUSCULAR; INTRAVENOUS 2 TIMES DAILY
Status: DISCONTINUED | OUTPATIENT
Start: 2018-11-09 | End: 2018-11-10

## 2018-11-09 RX ADMIN — GABAPENTIN 600 MG: 300 CAPSULE ORAL at 17:15

## 2018-11-09 RX ADMIN — QUETIAPINE FUMARATE 25 MG: 25 TABLET ORAL at 20:27

## 2018-11-09 RX ADMIN — NICOTINE POLACRILEX 2 MG: 2 LOZENGE ORAL at 11:19

## 2018-11-09 RX ADMIN — LORAZEPAM 2 MG: 1 TABLET ORAL at 02:48

## 2018-11-09 RX ADMIN — ASPIRIN 81 MG CHEWABLE TABLET 162 MG: 81 TABLET CHEWABLE at 08:51

## 2018-11-09 RX ADMIN — NICOTINE POLACRILEX 2 MG: 2 LOZENGE ORAL at 12:09

## 2018-11-09 RX ADMIN — THIAMINE HYDROCHLORIDE 100 MG: 100 INJECTION, SOLUTION INTRAMUSCULAR; INTRAVENOUS at 20:27

## 2018-11-09 RX ADMIN — GABAPENTIN 600 MG: 300 CAPSULE ORAL at 20:17

## 2018-11-09 RX ADMIN — DIAZEPAM 10 MG: 5 TABLET ORAL at 13:07

## 2018-11-09 RX ADMIN — CYANOCOBALAMIN TAB 1000 MCG 1000 MCG: 1000 TAB at 08:52

## 2018-11-09 RX ADMIN — AZITHROMYCIN 250 MG: 250 TABLET, FILM COATED ORAL at 08:51

## 2018-11-09 RX ADMIN — DOCUSATE SODIUM 100 MG: 100 CAPSULE, LIQUID FILLED ORAL at 20:17

## 2018-11-09 RX ADMIN — FLECAINIDE ACETATE 100 MG: 100 TABLET ORAL at 20:17

## 2018-11-09 RX ADMIN — Medication 5000 UNITS: at 08:52

## 2018-11-09 RX ADMIN — LORAZEPAM 4 MG: 1 TABLET ORAL at 04:09

## 2018-11-09 RX ADMIN — DIAZEPAM 10 MG: 5 TABLET ORAL at 21:29

## 2018-11-09 RX ADMIN — GABAPENTIN 600 MG: 300 CAPSULE ORAL at 11:19

## 2018-11-09 RX ADMIN — NICOTINE POLACRILEX 2 MG: 2 LOZENGE ORAL at 13:07

## 2018-11-09 RX ADMIN — GABAPENTIN 600 MG: 300 CAPSULE ORAL at 08:51

## 2018-11-09 RX ADMIN — BUPRENORPHINE HYDROCHLORIDE, NALOXONE HYDROCHLORIDE 3 FILM: 4; 1 FILM, SOLUBLE BUCCAL; SUBLINGUAL at 08:51

## 2018-11-09 RX ADMIN — LORAZEPAM 2 MG: 1 TABLET ORAL at 08:50

## 2018-11-09 RX ADMIN — DOCUSATE SODIUM 100 MG: 100 CAPSULE, LIQUID FILLED ORAL at 08:51

## 2018-11-09 RX ADMIN — FLECAINIDE ACETATE 100 MG: 100 TABLET ORAL at 08:51

## 2018-11-09 RX ADMIN — MULTIPLE VITAMINS W/ MINERALS TAB 1 TABLET: TAB at 08:51

## 2018-11-09 RX ADMIN — LORAZEPAM 4 MG: 1 TABLET ORAL at 04:39

## 2018-11-09 RX ADMIN — FAMOTIDINE 20 MG: 20 INJECTION, SOLUTION INTRAVENOUS at 20:27

## 2018-11-09 RX ADMIN — FOLIC ACID 1 MG: 1 TABLET ORAL at 08:51

## 2018-11-09 RX ADMIN — DIAZEPAM 10 MG: 5 TABLET ORAL at 17:15

## 2018-11-09 RX ADMIN — LORAZEPAM 2 MG: 1 TABLET ORAL at 11:19

## 2018-11-09 RX ADMIN — BUPRENORPHINE HYDROCHLORIDE, NALOXONE HYDROCHLORIDE 1 FILM: 8; 2 FILM, SOLUBLE BUCCAL; SUBLINGUAL at 20:17

## 2018-11-09 RX ADMIN — Medication 5000 UNITS: at 20:27

## 2018-11-09 RX ADMIN — DIAZEPAM 10 MG: 5 TABLET ORAL at 12:09

## 2018-11-09 ASSESSMENT — ACTIVITIES OF DAILY LIVING (ADL)
ADLS_ACUITY_SCORE: 17
ADLS_ACUITY_SCORE: 11.5
ADLS_ACUITY_SCORE: 18
ADLS_ACUITY_SCORE: 18
ADLS_ACUITY_SCORE: 11.5
ADLS_ACUITY_SCORE: 11.5

## 2018-11-09 NOTE — PLAN OF CARE
"Problem: Patient Care Overview  Goal: Plan of Care/Patient Progress Review  Outcome: No Change  Note for the night shift.  D:At the start of the shift, pt was off the unit a couple times. Pt all dressed and using his cane. By 0300 pt had not slept at all. Earlier tonight had a couple cartons of ice cream after being outside. Was in to see pt at 0225. At 0230 pt called the desk to let the staff know he was back from \"his trip\". First he said his friends had whisked him off to the lobby and the \"trip\" was amazing. When I went down to his room he said some unknown people( who wanted to be his friends)  had come and taken him [facility] [place] [employer]. When asked how they got there he said all the countries are now in our lobby here in the hospital. He said there are  cages of exotic animals in the lobby, some the size of cars and all are for sale. Pt says he has people in his room with him tonight and tonight he can hear them talking to him. They are handing out papers tonight instead of food like last night. No IV in. Sats good on room air. At 0250 Ativan given per MSSA. At 0400 Pt out in the [place]ersen and all dressed again complaining loudly of \"little tiny Black people pushing a babystroller were in his room and stole his belonging.Went into his room and showed him all his stuff was there. Then he said they were stealing all the toilet paper. MSSA 22. 4 of Ativan given.  At 0430 MSSA 28 and 4 more mg of ATivan given. At 0430 Wanting to leave because there were 'Jamacan\" men in the room on the other side of his window and when the lights are on they cover their faces. Pt scared to stay in his room. Pt said he could go home cause he has guns locked up at home and he could get them and protect himself from the \"Jamacians\".  Pt said the \"Jamacians\" made him sign paperwork and now he is so scared he signed away his house and all his belongings.  SO he sat the rest of the night in the andersen by the nursing station. Pt " "wanted his backpack kept at the nurses station so the \"Skyean\" won't steal his stuff.  Pt wanting to leave. Kept telling him he had to wait for the DrDebbie Today. Pt very insistent that what he is seeing is very very real. Pt has not slept the last 2 nights. Call light with in reach.Able to make needs known. A: Pt not oriented and doubt he is safe to be on his own at home, unless he clears up mentally. P: Monitor closely.  Supportive cares. Medicate with Ativan per MSSA .      "

## 2018-11-09 NOTE — PLAN OF CARE
Problem: Patient Care Overview  Goal: Plan of Care/Patient Progress Review  Outcome: Improving  Patient has been educated on potential risks of choosing to leave the unit and the responsibility for patient well-being will belong to the patient. Pt has been informed that admission to hospital is due to need for medical treatment. Education given to the patient on some of the potential risks included but is not limited to:            lack of access to nursing and medical intervention            possible missed appointments with MD, therapies, tests            possible missed medications, antibiotics, management of IV's    Patient Response: patient agrees that he needs the care and support that the staff offers.

## 2018-11-09 NOTE — PLAN OF CARE
Problem: Patient Care Overview  Goal: Plan of Care/Patient Progress Review    VS:   /83 (BP Location: Right arm)  Pulse 89  Temp 97.2  F (36.2  C) (Oral)  Resp 18  Wt 97.8 kg (215 lb 8 oz)  SpO2 95%  BMI 30.06 kg/m2 Stable no abnormalities noted. No reports of SOB or CP. LS diminished/coarse bilaterally, on room air.    Output:   Voids spontaneously w/out difficulty. Last BM 11/8/18, passing gas.    Activity:   Independent    Skin: Intact   Pain:   Denies    Neuro/CMS:   Has baseline numbness in RLE. Otherwise intact   Dressing(s):   None    Diet:   Regular, tolerating well. No N&V reported    Equipment:   Cane   IV's/Drains:      Plan:   Pt visually hallucinating and hearing things overnight, still continue to have hallucination. Was placed on a 72 hour hold this morning. Pt received information about 72 hour hold and copy was given to pt. Will continue to monitor   Additional Info:   Psychology consult ordered, see Note  Provider discontinued Ativan this afternoon switched to valium  Thiamine ordered IV

## 2018-11-09 NOTE — PLAN OF CARE
Problem: Patient Care Overview  Goal: Plan of Care/Patient Progress Review  Outcome: Improving        VS:   BP (!) 166/103 (BP Location: Right arm)  Pulse 88  Temp 98.8  F (37.1  C) (Oral)  Resp 16  Wt 100.3 kg (221 lb 1.6 oz)  SpO2 95%  BMI 30.84 kg/m2     Output:   Voids spontaneously. Last BM 11/7/18. Passing gas    Lungs Diminishes Lower lobes. crackles noted    Activity:   Independent with cane    Skin: Intact.    Pain:   Denies pain    Neuro/CMS:   A&O x 3, some intermittent confusion and Auditory/Visual hallucinations. MMSA score = 9. Patient reports CMS intact with the exception of the L leg, numbness and tingling hip to foot.     Dressing(s):   No dressing    Diet:   Regular    LDA:   None    Equipment:   IV pole at bedside    Plan:   Discharge 11/8/18   Additional Info:   Patient had increased agitation tonight with some info over his cell phone.

## 2018-11-09 NOTE — CONSULTS
Consult Date:  11/09/2018      REASON FOR CONSULTATION:  Severe alcohol withdrawal, hallucinations, now put on a hold.      REQUESTING PHYSICIAN:  Florence Duran MD.       IDENTIFYING INFORMATION:  The patient is a 63-year-old retired .  He lives in an apartment.  He sees Dr. Reich and Dr. Gallo at the [facility].  He is known from previous admissions.      CHIEF COMPLAINT:  Alcohol.      HISTORY OF PRESENT ILLNESS:  The patient is known to me from previous admissions.  He presented to the emergency room on 11/06.  He apparently was drinking 1 quart of vodka.  He also had smoking and congestion, cough.  Apparently the patient is on a civil commitment that ended on 09/22 and then subsequently he lost his ex-wife and he relapsed back to drinking.  He is vague about when he started to drink.  He states this is only 1 week relapse.  I believe this may be patient minimizing.  Alcohol is his drug of choice.  He has tolerance, withdrawal, progressive use with loss of control, tried to quit unsuccessfully.  He has history of complicated withdrawal, including history of DTs.  He had a similar presentation in March of last year when he had hallucinations.  At this time he is feeling very hallucinate.  He is very confused.  He has problems with attention and concentration.  He is having auditory and visual hallucinations.  He feels like there are people outside his window and he thought they were talking to him and saying that he thought he would be kidnapped.  He is not able to do the months of the year forward or backward.  He missed the month of October and backward also he missed it.  He is not able to do simple calculations.  He is confused, not able to name his medications or medical problems that he has. He has hallucinations.  He thought this was long-term and then was able to correct himself and then he was telling me that this was [place].  He was able to tell me the months, the past presidents  with some difficulty.  He thinks this is 11/8/1919 and then when repeated and I asked him to correct, he was able to correct to 2018.  The patient does not have any suicidal ideation.  He does not have auditory hallucinations.  He smokes 1-1/2 packs, does not use any drugs.      PAST PSYCHIATRIC HISTORY:  He was in a civil commitment, which ended.  He was in 3 chemical dependency treatments.      PAST MEDICAL HISTORY:  Please see detailed physical examination and review of systems done on this patient by Clarence Simms MD on 11/6/2018 and he was seen by Dr. Duran on 11/9/2018.        VITAL SIGNS:  The patient's vitals are as below.   Temperature of 97, pulse of 89, respiratory rate of 18, blood pressure 139/89.      FAMILY HISTORY:  The patient denied any family psychiatric or chemical dependency issues.      SOCIAL HISTORY:  Good childhood, no abuse.  He was in non-combat.  He was a medic, E5 rank with honorable discharge.      MENTAL STATUS EXAMINATION:  A 63-year-old  male lying in bed.  He has adequate grooming, adequate hygiene.  Mood is anxious.  Affect is congruent.  Speech is spontaneous, normal in rate, less logical in thinking, no loose associations.  Insight and judgment are limited.  Alert but not oriented to place or situation.  He has cognitive difficulties as described above, but denies any suicidal or homicidal ideation.  Denied any auditory visualization.      DIAGNOSES:   1.  Alcohol use disorder, severe.   2.  Alcohol withdrawal, complicated.  Rule out delirium tremens.   3.  PTSD.    4.  Opiate use disorder, on Suboxone maintenance.     5.  Major depression, recurrent with psychotic features.      RECOMMENDATIONS:   1.  Continue patient on 1:1, continue 72-hour hold.  The patient switched to Valium.  Continue that.  Continue present medications of the Suboxone and Effexor.  The patient has complicated withdrawal.  He will complete the withdrawal and this is what has happened for the  patient in the past but had complicated withdrawal.  After the withdrawal done is when the patient can be discharged.  Please continue 72-hour hold.  I would also recommend adding time in for the patient.  These recommendations were given to Dr. Duran.      Thank you for this interesting consult.         FARHEEN MATHIS MD             D: 2018   T: 2018   MT: [id number]      Name:     MARYBETH HUERTAS   MRN:      [id number]        Account:       [id number]   :      1955           Consult Date:  2018      Document: [id number]       cc: Florence Duran MD

## 2018-11-09 NOTE — PROGRESS NOTES
[facility], [place]   Internal Medicine Daily Note          Assessment and Plan:   63 year old man with depression, PSA, alcohol abuse, PTSD, current smoker with COPD comes c/w worsening shortness of breath and cough and also seeking detox.  Individual problems and their management are outlined below    #  Chronic hypoxic respi failure , H/O COPD   R lower lobe infiltrate seen on previous films has resolved.   Respi virus panel PCR is negative .Initiate Z-ifeoma  Continue bronchodilator nebs q 4 tammie and q 2 hrs prn with albuterol and atrovent  Titrate oxygen to maintain sat >89.   No oxygen needs currently         # Alcohol withdrawal:   MSSA with Lorazepam. Needing significant mount in the setting of withdrawal hallucinations  MSSA scores  Have increased in the last 24 hrs, supported by visual hallucinations and mild aggression, needing high doses of Oral lorazepam   Patient was put on a 72 hrs hold this morning ( 11/9) due to risk of leaving the hospital. His ride had already arrived   MVI, thiamine, folic acid daily  Discussed his case with his PCP, Dr Padron, with whom he had an outpatient appointment today. We agreed that it was not safe for Jorge to leave the hospital.   Psychiatry consult to assist with management of hallucinations       #  Concern for Acute Coronary Syndrome:   noted to have an elevated troponin on ER laboratory studies. No complaint of chest pain but patient is not a reliable historian. ECG 11/6/2018 is normal without any ST or T wave abnormalities  Anti-ischemic therapy with Aspirin 162 mg daily, and metoprolol (dual indication as it also part of the alcohol withdrawal protocol  Cardiac enzymes within normal limits. Echo with no wall motion abnormalities noted   Continue pta flecainide   Analgesia and anxiolysis prn     # depression, anxiety:   PTA meds.         Consulting teams: None   Code status: Full   DVT Prophylaxis: PCD's / Heparin   Gastric prophylaxis:H2  "blocker   Diet: Regular adult   Disposition: [facility] discharge home tomorrow ( 11/8)      Patient seen and examined with RN         Interval History:   Progress notes in the last 24 hrs by MDT team has been reviewed.  Jorge  Continued to hallucinate all through the night. He did not sleep at all. He stayed outside his room and refused to go aback as he was worried that there are people waiting for him in the room  He denies chest pain/ SOB  Had made preparations to leave the hospital for an appointment. His ride showed up as well.  Due to concerns of safety, and patient being potentially unaware of his situation, he was placed on 72 hrs hold         Review of Systems:   A comprehensive review of systems was performed and found to be negative except: Those that are outlined in interval history              Medications:   I have reviewed this patient's current medications which are outlined in the \"current medication\" section of EPIC             Physical Exam:   Vitals were reviewed  Temp: 95.9  F (35.5  C) Temp src: Oral BP: (!) 166/98 Pulse: 99 Heart Rate: 90 Resp: 18 SpO2: 95 % O2 Device: None (Room air)    Constitutional:   awake, alert, cooperative, no apparent distress, and appears stated age     Lungs:   No increased work of breathing, good air exchange, clear to auscultation bilaterally, no crackles or wheezing     Cardiovascular:   Normal apical impulse, regular rate and rhythm, normal S1 and S2, no S3 or S4, and no murmur noted     Abdomen:   No scars, normal bowel sounds, soft, non-distended, non-tender, no masses palpated, no hepatosplenomegally     Musculoskeletal:   no lower extremity pitting edema present     Neurologic:   Awake, alert, oriented to name, place and time.  Cranial nerves II-XII are grossly intact.     Tremors much better             Data:     Most recent labs have been evaluated and relevant labs are outlined below :    BMP    Recent Labs  Lab 11/07/18  0558 11/06/18  1555 11/06/18  1415 "    144 132*   POTASSIUM 4.0 3.8 >10.0*   CHLORIDE 103 105 102   RUSTY 7.7* 8.0* 7.9*   CO2 31 30 28   BUN 17 18 19   CR 0.58* 0.59* Unsatisfactory specimen - hemolyzed   GLC 92 90 94     CBC    Recent Labs  Lab 11/09/18  0626 11/07/18  0558 11/06/18  2155 11/06/18  1415   WBC  --  8.4  --  8.5   RBC  --  4.97  --  4.99   HGB  --  14.4  --  14.8   HCT  --  45.3  --  45.3   MCV  --  91  --  91   MCH  --  29.0  --  29.7   MCHC  --  31.8  --  32.7   RDW  --  16.4*  --  16.7*   * 133* 148* 145*     INRNo lab results found in last 7 days.  LFTs    Recent Labs  Lab 11/07/18  0558 11/06/18  1555 11/06/18  1415   ALKPHOS 80 90 94   AST 25 29 Unsatisfactory specimen - hemolyzed   ALT 26 30 Unsatisfactory specimen - hemolyzed   BILITOTAL 0.7 0.3 0.5   PROTTOTAL 6.5* 6.8 7.9   ALBUMIN 3.0* 3.1* 3.1*      PANC    Recent Labs  Lab 11/06/18  1555 11/06/18  1415   LIPASE 43* Unsatisfactory specimen - hemolyzed                 Dr PAT Duran MD  Hospitalist ( Internal medicine)  Pager: [phone]

## 2018-11-10 LAB
ALBUMIN SERPL-MCNC: 3.4 G/DL (ref 3.4–5)
ALP SERPL-CCNC: 97 U/L (ref 40–150)
ALT SERPL W P-5'-P-CCNC: 33 U/L (ref 0–70)
ANION GAP SERPL CALCULATED.3IONS-SCNC: 7 MMOL/L (ref 3–14)
AST SERPL W P-5'-P-CCNC: 31 U/L (ref 0–45)
BASOPHILS # BLD AUTO: 0 10E9/L (ref 0–0.2)
BASOPHILS NFR BLD AUTO: 0.1 %
BILIRUB SERPL-MCNC: 1 MG/DL (ref 0.2–1.3)
BUN SERPL-MCNC: 20 MG/DL (ref 7–30)
CALCIUM SERPL-MCNC: 8.7 MG/DL (ref 8.5–10.1)
CHLORIDE SERPL-SCNC: 105 MMOL/L (ref 94–109)
CO2 SERPL-SCNC: 27 MMOL/L (ref 20–32)
CREAT SERPL-MCNC: 0.66 MG/DL (ref 0.66–1.25)
DIFFERENTIAL METHOD BLD: ABNORMAL
EOSINOPHIL # BLD AUTO: 0.2 10E9/L (ref 0–0.7)
EOSINOPHIL NFR BLD AUTO: 1.7 %
ERYTHROCYTE [DISTWIDTH] IN BLOOD BY AUTOMATED COUNT: 16.3 % (ref 10–15)
GFR SERPL CREATININE-BSD FRML MDRD: >90 ML/MIN/1.7M2
GLUCOSE SERPL-MCNC: 106 MG/DL (ref 70–99)
HCT VFR BLD AUTO: 46.8 % (ref 40–53)
HGB BLD-MCNC: 15.4 G/DL (ref 13.3–17.7)
IMM GRANULOCYTES # BLD: 0 10E9/L (ref 0–0.4)
IMM GRANULOCYTES NFR BLD: 0.4 %
LYMPHOCYTES # BLD AUTO: 3.1 10E9/L (ref 0.8–5.3)
LYMPHOCYTES NFR BLD AUTO: 34.1 %
MCH RBC QN AUTO: 30.1 PG (ref 26.5–33)
MCHC RBC AUTO-ENTMCNC: 32.9 G/DL (ref 31.5–36.5)
MCV RBC AUTO: 91 FL (ref 78–100)
MONOCYTES # BLD AUTO: 1.2 10E9/L (ref 0–1.3)
MONOCYTES NFR BLD AUTO: 13.3 %
NEUTROPHILS # BLD AUTO: 4.6 10E9/L (ref 1.6–8.3)
NEUTROPHILS NFR BLD AUTO: 50.4 %
NRBC # BLD AUTO: 0 10*3/UL
NRBC BLD AUTO-RTO: 0 /100
PLATELET # BLD AUTO: 142 10E9/L (ref 150–450)
POTASSIUM SERPL-SCNC: 4.1 MMOL/L (ref 3.4–5.3)
PROT SERPL-MCNC: 7.7 G/DL (ref 6.8–8.8)
RBC # BLD AUTO: 5.12 10E12/L (ref 4.4–5.9)
SODIUM SERPL-SCNC: 139 MMOL/L (ref 133–144)
WBC # BLD AUTO: 9.2 10E9/L (ref 4–11)

## 2018-11-10 PROCEDURE — 36415 COLL VENOUS BLD VENIPUNCTURE: CPT | Performed by: INTERNAL MEDICINE

## 2018-11-10 PROCEDURE — 80053 COMPREHEN METABOLIC PANEL: CPT | Performed by: INTERNAL MEDICINE

## 2018-11-10 PROCEDURE — 25000132 ZZH RX MED GY IP 250 OP 250 PS 637: Performed by: INTERNAL MEDICINE

## 2018-11-10 PROCEDURE — 85025 COMPLETE CBC W/AUTO DIFF WBC: CPT | Performed by: INTERNAL MEDICINE

## 2018-11-10 PROCEDURE — 25000128 H RX IP 250 OP 636: Performed by: INTERNAL MEDICINE

## 2018-11-10 PROCEDURE — 12000008 ZZH R&B INTERMEDIATE UMMC

## 2018-11-10 PROCEDURE — 99232 SBSQ HOSP IP/OBS MODERATE 35: CPT | Performed by: INTERNAL MEDICINE

## 2018-11-10 RX ORDER — LANOLIN ALCOHOL/MO/W.PET/CERES
100 CREAM (GRAM) TOPICAL 2 TIMES DAILY
Status: DISCONTINUED | OUTPATIENT
Start: 2018-11-10 | End: 2018-11-11 | Stop reason: HOSPADM

## 2018-11-10 RX ORDER — FAMOTIDINE 20 MG/1
20 TABLET, FILM COATED ORAL 2 TIMES DAILY
Status: DISCONTINUED | OUTPATIENT
Start: 2018-11-10 | End: 2018-11-11 | Stop reason: HOSPADM

## 2018-11-10 RX ADMIN — BUPRENORPHINE HYDROCHLORIDE, NALOXONE HYDROCHLORIDE 1 FILM: 8; 2 FILM, SOLUBLE BUCCAL; SUBLINGUAL at 20:23

## 2018-11-10 RX ADMIN — QUETIAPINE FUMARATE 25 MG: 25 TABLET ORAL at 20:23

## 2018-11-10 RX ADMIN — FLECAINIDE ACETATE 100 MG: 100 TABLET ORAL at 08:39

## 2018-11-10 RX ADMIN — GABAPENTIN 600 MG: 300 CAPSULE ORAL at 16:06

## 2018-11-10 RX ADMIN — CYANOCOBALAMIN TAB 1000 MCG 1000 MCG: 1000 TAB at 08:39

## 2018-11-10 RX ADMIN — FAMOTIDINE 20 MG: 20 TABLET ORAL at 09:23

## 2018-11-10 RX ADMIN — Medication 100 MG: at 20:23

## 2018-11-10 RX ADMIN — GABAPENTIN 600 MG: 300 CAPSULE ORAL at 20:23

## 2018-11-10 RX ADMIN — GABAPENTIN 600 MG: 300 CAPSULE ORAL at 08:39

## 2018-11-10 RX ADMIN — ASPIRIN 81 MG CHEWABLE TABLET 162 MG: 81 TABLET CHEWABLE at 08:39

## 2018-11-10 RX ADMIN — Medication 100 MG: at 09:23

## 2018-11-10 RX ADMIN — DIAZEPAM 10 MG: 5 TABLET ORAL at 08:40

## 2018-11-10 RX ADMIN — NICOTINE POLACRILEX 2 MG: 2 LOZENGE ORAL at 16:13

## 2018-11-10 RX ADMIN — Medication 5000 UNITS: at 09:22

## 2018-11-10 RX ADMIN — Medication 5000 UNITS: at 20:23

## 2018-11-10 RX ADMIN — DOCUSATE SODIUM 100 MG: 100 CAPSULE, LIQUID FILLED ORAL at 08:39

## 2018-11-10 RX ADMIN — DIAZEPAM 10 MG: 5 TABLET ORAL at 12:44

## 2018-11-10 RX ADMIN — DOCUSATE SODIUM 100 MG: 100 CAPSULE, LIQUID FILLED ORAL at 20:23

## 2018-11-10 RX ADMIN — GABAPENTIN 600 MG: 300 CAPSULE ORAL at 11:06

## 2018-11-10 RX ADMIN — AZITHROMYCIN 250 MG: 250 TABLET, FILM COATED ORAL at 08:39

## 2018-11-10 RX ADMIN — FAMOTIDINE 20 MG: 20 TABLET ORAL at 20:23

## 2018-11-10 RX ADMIN — FLECAINIDE ACETATE 100 MG: 100 TABLET ORAL at 20:23

## 2018-11-10 RX ADMIN — FOLIC ACID 1 MG: 1 TABLET ORAL at 08:39

## 2018-11-10 RX ADMIN — MULTIPLE VITAMINS W/ MINERALS TAB 1 TABLET: TAB at 08:39

## 2018-11-10 RX ADMIN — BUPRENORPHINE HYDROCHLORIDE, NALOXONE HYDROCHLORIDE 3 FILM: 4; 1 FILM, SOLUBLE BUCCAL; SUBLINGUAL at 08:39

## 2018-11-10 RX ADMIN — DIAZEPAM 10 MG: 5 TABLET ORAL at 16:06

## 2018-11-10 ASSESSMENT — ACTIVITIES OF DAILY LIVING (ADL)
ADLS_ACUITY_SCORE: 12
ADLS_ACUITY_SCORE: 11.5
ADLS_ACUITY_SCORE: 11.5
ADLS_ACUITY_SCORE: 12
ADLS_ACUITY_SCORE: 11.5
ADLS_ACUITY_SCORE: 12

## 2018-11-10 NOTE — PROGRESS NOTES
"D: pt was upset that he cant find his AFO   FOOT BRACE. Room searched/ his bags also   And looked in back 72 hold  Locker... NO sign  Of brace. ER contacted - they have no record of it. It IS NOT listed in admission  BELONGINGS area. NOR any ER admit notes as to this item. FRIEND ileana Gao ( [phone]) contacted. NO ANSWER  And voice message left  As to it HE  Had any thoughts.   A: await call back and notified SUPERVISOR about this issue so THEY can talk with pt  ( as he has already said \"I WANT TO TALK WITH SUPERVISOR\".)  "

## 2018-11-10 NOTE — PLAN OF CARE
Problem: Patient Care Overview  Goal: Plan of Care/Patient Progress Review  Outcome: No Change  Note for the night shift.  D:   Pt sound asleep at the start of the shift and throughout the night.  Pt has not slept at all any of the past nights. Sitter at the bedside. Slept till 0650, Woke him to check on him. Aroused/cooperative.  Vitals done. Pleasant and back to sleep. Call light with in reach.Able to make needs known. A:  Doing OK.SLept all night, so not awakened to do MSSA tonight. Just allowed to sleep tonight since he has not slept for the past several night.   P: Monitor closely.  Supportive cares. Medicate with Valium as needed per MSSA score.

## 2018-11-10 NOTE — PLAN OF CARE
Problem: Patient Care Overview  Goal: Plan of Care/Patient Progress Review  Outcome: No Change        VS:   Vss. BP Slightly elevated. LS fine crackles encouraged IS.   Output:   Voiding spontaneously, passing flatus. BM 11/8   Activity:   Independent with cane and sitter   Skin: Intact   Pain:   Denies   Neuro/CMS:   Baseline numbness in left foot, Disoriented to situation and place, at times can tell where he is   Dressing(s):   none   Diet:   Reg   LDA:   PIV left arm SL   Equipment:   Cane   Plan:   1:1 sitter pt on 72hr hold. Cont. To monitor   Additional Info:   MSSA 16 and 12. Total of 20mg Valium given this shift. Pt talking about chicken joints in pasta, Zoo animals growing so big they will take over, a nurse who sticks the big sticks in his body isn't allowed in the room, stating he's going to defecate on the floor, making multiple odd requests and statements, also stating he has seen bugs and mosquitos flying on the wall. Pt agitated at end of shift, now sleeping.

## 2018-11-10 NOTE — PROGRESS NOTES
[facility], [place]   Internal Medicine Daily Note          Assessment and Plan:   63 year old man with depression, PSA, alcohol abuse, PTSD, current smoker with COPD comes c/w worsening shortness of breath and cough and also seeking detox.  Individual problems and their management are outlined below    #  Chronic hypoxic respi failure , H/O COPD   R lower lobe infiltrate seen on previous films has resolved.   Respi virus panel PCR is negative .Initiate Z-ifeoma  Continue bronchodilator nebs q 4 tammie and q 2 hrs prn with albuterol and atrovent  Titrate oxygen to maintain sat >89.   No oxygen needs currently         # Alcohol withdrawal:     MSSA scores persistently> 15   supported by visual hallucinations and mild aggression,  Evaluated by psychiatry and confirmed to be delirium tremens  Changed withdrawal medication from lorazepam to Diazepam with better results, but still episodically confused  50 mg of valium needed in the last 24 hrs   Patient was put on a 72 hrs hold on  ( 11/9) due to risk of leaving the hospital.  MVI, thiamine, folic acid daily    #  Concern for Acute Coronary Syndrome:   noted to have an elevated troponin on ER laboratory studies. No complaint of chest pain but patient is not a reliable historian. ECG 11/6/2018 is normal without any ST or T wave abnormalities  Anti-ischemic therapy with Aspirin 162 mg daily, and metoprolol (dual indication as it also part of the alcohol withdrawal protocol  Cardiac enzymes within normal limits. Echo with no wall motion abnormalities noted   Continue pta flecainide   Analgesia and anxiolysis prn     # depression, anxiety:   PTA meds.         Consulting teams: None   Code status: Full   DVT Prophylaxis: PCD's / Heparin   Gastric prophylaxis:H2 blocker   Diet: Regular adult   Disposition: [facility] discharge home tomorrow ( 11/8)      Patient seen and examined with RN         Interval History:   Progress notes in the last 24 hrs by MDT  "team has been reviewed.  Jorge luevano  a better night after changing medication to valium  Denies chest pain/ SOB  Eating and drinking well   Oer sitter, over nights, he did have intermittent periods of confusion          Review of Systems:   A comprehensive review of systems was performed and found to be negative except: Those that are outlined in interval history              Medications:   I have reviewed this patient's current medications which are outlined in the \"current medication\" section of EPIC             Physical Exam:   Vitals were reviewed  Temp: 96.6  F (35.9  C) Temp src: Oral BP: 129/86 Pulse: 84 Heart Rate: 79 Resp: 18 SpO2: 95 % O2 Device: None (Room air)    Constitutional:   awake, alert, cooperative, no apparent distress, and appears stated age     Lungs:   No increased work of breathing, good air exchange, clear to auscultation bilaterally, no crackles or wheezing     Cardiovascular:   Normal apical impulse, regular rate and rhythm, normal S1 and S2, no S3 or S4, and no murmur noted     Abdomen:   No scars, normal bowel sounds, soft, non-distended, non-tender, no masses palpated, no hepatosplenomegally     Musculoskeletal:   no lower extremity pitting edema present     Neurologic:               Data:     Most recent labs have been evaluated and relevant labs are outlined below :    BMP    Recent Labs  Lab 11/10/18  0830 11/07/18  0558 11/06/18  1555 11/06/18  1415    140 144 132*   POTASSIUM 4.1 4.0 3.8 >10.0*   CHLORIDE 105 103 105 102   RUSTY 8.7 7.7* 8.0* 7.9*   CO2 27 31 30 28   BUN 20 17 18 19   CR 0.66 0.58* 0.59* Unsatisfactory specimen - hemolyzed   * 92 90 94     CBC    Recent Labs  Lab 11/10/18  0830 11/09/18  0626 11/07/18  0558 11/06/18  2155 11/06/18  1415   WBC 9.2  --  8.4  --  8.5   RBC 5.12  --  4.97  --  4.99   HGB 15.4  --  14.4  --  14.8   HCT 46.8  --  45.3  --  45.3   MCV 91  --  91  --  91   MCH 30.1  --  29.0  --  29.7   MCHC 32.9  --  31.8  --  32.7   RDW " 16.3*  --  16.4*  --  16.7*   * 143* 133* 148* 145*     INRNo lab results found in last 7 days.  LFTs    Recent Labs  Lab 11/10/18  0830 11/07/18  0558 11/06/18  1555 11/06/18  1415   ALKPHOS 97 80 90 94   AST 31 25 29 Unsatisfactory specimen - hemolyzed   ALT 33 26 30 Unsatisfactory specimen - hemolyzed   BILITOTAL 1.0 0.7 0.3 0.5   PROTTOTAL 7.7 6.5* 6.8 7.9   ALBUMIN 3.4 3.0* 3.1* 3.1*      PANC    Recent Labs  Lab 11/06/18  1555 11/06/18  1415   LIPASE 43* Unsatisfactory specimen - hemolyzed                 Dr PAT Duran MD  Hospitalist ( Internal medicine)  Pager: [phone]

## 2018-11-10 NOTE — PLAN OF CARE
Problem: Patient Care Overview  Goal: Individualization & Mutuality  Outcome: No Change  D: Pt  Confused and forgetful at times .A/O only to self. VSS . Lungs- CL,slight crackles at base. no c/o chest pain/ SOB. sats=95% RA.  . Bowel+, passing gas. BM -yesterday. PP- +. +1 edema- feet/legs. CMS- intact.( pt has Base line left foor N/T)  Pt taking PO REG  food/ fluids well. Voiding Good amounts in BR. UP INDEPENDENTLY with cane. Pain/CAPA - denies.  MSSA= 12 and 13.  Pt AUDITORY AND VISUAL HALLUCINATION appear to be improving if  KAE/ VALIUM is utilized.. Pt refused- PCD's. 1;1 sitter con't for elopement/ HOLD issue.   A: con't to monitor. Call light in reach. Pt able to make needs known.con't 1;1 sitter.

## 2018-11-10 NOTE — PLAN OF CARE
Problem: Patient Care Overview  Goal: Plan of Care/Patient Progress Review  OT:  Per chart review, patient is independent on unit and PT reported patient does not have needs.  Will complete OT orders.

## 2018-11-10 NOTE — PROGRESS NOTES
Patient has been cooperative  But slightly agitated.  auditory/visual hallucinations minimal and pt appears calm.

## 2018-11-11 ENCOUNTER — PATIENT OUTREACH (OUTPATIENT)
Dept: CARE COORDINATION | Facility: CLINIC | Age: 63
End: 2018-11-11

## 2018-11-11 VITALS
OXYGEN SATURATION: 95 % | RESPIRATION RATE: 18 BRPM | TEMPERATURE: 95.1 F | WEIGHT: 214.8 LBS | SYSTOLIC BLOOD PRESSURE: 135 MMHG | BODY MASS INDEX: 29.96 KG/M2 | DIASTOLIC BLOOD PRESSURE: 84 MMHG | HEART RATE: 81 BPM

## 2018-11-11 PROCEDURE — 25000132 ZZH RX MED GY IP 250 OP 250 PS 637: Performed by: INTERNAL MEDICINE

## 2018-11-11 PROCEDURE — 25000128 H RX IP 250 OP 636: Performed by: INTERNAL MEDICINE

## 2018-11-11 PROCEDURE — 99239 HOSP IP/OBS DSCHRG MGMT >30: CPT | Performed by: INTERNAL MEDICINE

## 2018-11-11 RX ADMIN — BUPRENORPHINE HYDROCHLORIDE, NALOXONE HYDROCHLORIDE 3 FILM: 4; 1 FILM, SOLUBLE BUCCAL; SUBLINGUAL at 08:36

## 2018-11-11 RX ADMIN — Medication 100 MG: at 08:35

## 2018-11-11 RX ADMIN — CYANOCOBALAMIN TAB 1000 MCG 1000 MCG: 1000 TAB at 08:35

## 2018-11-11 RX ADMIN — GABAPENTIN 600 MG: 300 CAPSULE ORAL at 11:21

## 2018-11-11 RX ADMIN — GABAPENTIN 600 MG: 300 CAPSULE ORAL at 08:35

## 2018-11-11 RX ADMIN — ASPIRIN 81 MG CHEWABLE TABLET 162 MG: 81 TABLET CHEWABLE at 08:35

## 2018-11-11 RX ADMIN — FOLIC ACID 1 MG: 1 TABLET ORAL at 08:35

## 2018-11-11 RX ADMIN — Medication 5000 UNITS: at 08:35

## 2018-11-11 RX ADMIN — FLECAINIDE ACETATE 100 MG: 100 TABLET ORAL at 08:35

## 2018-11-11 RX ADMIN — MULTIPLE VITAMINS W/ MINERALS TAB 1 TABLET: TAB at 08:35

## 2018-11-11 RX ADMIN — FAMOTIDINE 20 MG: 20 TABLET ORAL at 08:35

## 2018-11-11 RX ADMIN — DOCUSATE SODIUM 100 MG: 100 CAPSULE, LIQUID FILLED ORAL at 08:35

## 2018-11-11 ASSESSMENT — ACTIVITIES OF DAILY LIVING (ADL)
ADLS_ACUITY_SCORE: 11.5

## 2018-11-11 NOTE — PROGRESS NOTES
D: pt has been appropriate deejay fare this shift NO AUDITORY /VISUAL HALLUCINATIONS> behavior has been COMPETENT and reasonable.  A: Dr notified  . discontinue HOLD and sitter. Possible discontinue to home this noon.

## 2018-11-11 NOTE — DISCHARGE SUMMARY
[facility] Discharge Summary    Jorge Rosales MRN# [id number]   Age: 63 year old YOB: 1955     Date of Admission:  11/6/2018  Date of Discharge::  11/11/2018  Admitting Physician:  Mendez Lima MD  Discharge Physician:         Dr Zakiya Duran MD   Primary Physician: Carlos Padron  Transferring Facility: [facility]            Discharge Diagnosis:   Principle diagnosis:   #  Chronic hypoxic respi failure , H/O COPD   # Alcohol withdrawal:   Secondary diagnoses:  # depression, anxiety:           Procedures:   No procedures performed during this admission         Allergies:    No Known Allergies            Discharge Medications:     Current Discharge Medication List      CONTINUE these medications which have NOT CHANGED    Details   albuterol (PROAIR HFA/PROVENTIL HFA/VENTOLIN HFA) 108 (90 BASE) MCG/ACT Inhaler Inhale 2 puffs into the lungs every 6 hours as needed for shortness of breath / dyspnea or wheezing  Qty: 1 Inhaler, Refills: 1    Associated Diagnoses: Moderate persistent reactive airway disease with acute exacerbation      aspirin 81 MG tablet Take 1 tablet (81 mg) by mouth daily  Qty: 30 tablet, Refills: 0    Associated Diagnoses: CARDIOVASCULAR SCREENING; LDL GOAL LESS THAN 160      buprenorphine-naloxone (SUBOXONE) 8-2 MG SUBL sublingual tablet Place 1.5 tablets under the tongue every morning And 1 tablet every evening      cyanocobalamin (VITAMIN  B-12) 1000 MCG tablet Take 1 tablet (1,000 mcg) by mouth daily  Qty: 100 tablet, Refills: 0    Associated Diagnoses: Alcohol dependence with uncomplicated withdrawal (H)      fish oil-omega-3 fatty acids 1000 MG capsule Take 1 g by mouth daily   Qty: 180 capsule, Refills: 12      flecainide (TAMBOCOR) 100 MG tablet Take 100 mg by mouth every 12 hours      gabapentin (NEURONTIN) 300 MG capsule Take 2 capsules (600 mg) by mouth 4 times daily  Qty: 90 capsule, Refills: 0    Associated  Diagnoses: Chronic low back pain, unspecified back pain laterality, with sciatica presence unspecified      ibuprofen (ADVIL/MOTRIN) 800 MG tablet Take 800 mg by mouth every 8 hours as needed for moderate pain      Multiple Vitamin (MULTIVITAMIN) per tablet Take 1 tablet by mouth daily.  Qty: 100 tablet, Refills: 12      QUEtiapine (SEROQUEL) 25 MG tablet Take 25 mg by mouth daily as needed (sleep)                   Consultations:   Consultation during this admission received from Psychiatry           Brief History of Presenting Illness:   63 year old man with depression, PSA, alcohol abuse, PTSD, current smoker with COPD comes c/w worsening shortness of breath and has the c/o  cough, non productive, more intense and frequent than usual for the last few days, seeking detox in the setting of severe withdrawals in the past.    Please see detailed H&P by Dr tran on 11/6          Hospital Course:   63 year old man with depression, PSA, alcohol abuse, PTSD, current smoker with COPD comes c/w worsening shortness of breath and cough and also seeking detox.  Individual problems and their management are outlined below     #  Chronic hypoxic respi failure , H/O COPD   R lower lobe infiltrate seen on previous films has resolved.   Respi virus panel PCR is negative .Initiate Z-ifeoma and completed course   Continue bronchodilator nebs q 4 tammie and q 2 hrs prn with albuterol and Atrovent ( improved with this regimen and has not need this persistently in the last 1-2 days)   Titrate oxygen to maintain sat >89.   No oxygen needs currently           # Alcohol withdrawal:     MSSA scores  Were persistently> 15   supported by visual hallucinations and mild aggression,  Evaluated by psychiatry and confirmed to be delirium tremens  Changed withdrawal medication from lorazepam to Diazepam with better results,  50 mg of valium needed  At one point in 24 hrs   Patient was put on a 72 hrs hold on  ( 11/9) due to risk of leaving the  hospital.  MVI, thiamine, folic acid daily  On the day of discharge, patient was doing much better with no further hallucinations. MSSA scores down to <5  Patient will follow up with his PCP for outpatient plan for alcohol addiction treatement      #  Concern for Acute Coronary Syndrome:   noted to have an elevated troponin on ER laboratory studies. No complaint of chest pain but patient is not a reliable historian. ECG 11/6/2018 is normal without any ST or T wave abnormalities  Anti-ischemic therapy with Aspirin 162 mg daily, and metoprolol (dual indication as it also part of the alcohol withdrawal protocol  Cardiac enzymes within normal limits. Echo with no wall motion abnormalities noted   Continue pta flecainide   Analgesia and anxiolysis prn      # depression, anxiety:   PTA meds.                   PROGRESS ON DISCHARGE DAY   Patient feels well  No hallucinations over night  MSSA scores consistently < 5 over night  Independent and ambulating well   Has not required Valium overnight     /84 (BP Location: Left arm)  Pulse 81  Temp 95.1  F (35.1  C) (Oral)  Resp 18  Wt 97.4 kg (214 lb 12.8 oz)  SpO2 95%  BMI 29.96 kg/m2    CVS: Normal Heart sounds   RS: Clear breath sounds bilaterally   Abdomen: Soft and non tender. Normal bowel sounds.   Neuro: Alert and orientated X 3                  Pending Tests at Discharge:     Unresulted Labs Ordered in the Past 30 Days of this Admission     Date and Time Order Name Status Description    11/6/2018 1940 Blood culture Preliminary     11/6/2018 1940 Blood culture Preliminary                  Discharge instructions and Follow up:       Discharge Procedure Orders  Reason for your hospital stay   Order Comments: Severe alcohol withdrawal  COPD exacerbation     Adult [facility] Follow-up and recommended labs and tests   Order Comments: Please follow up with your PCP in 2-3 days from discharge  Please follow up with outpatient detox plan and group therapy that ypu have  discussed with your PCP/ [facility] provider     Appointments on [place] and/or [facility] (with [facility] or [facility] provider or service). Call [phone] if you haven't heard regarding these appointments within 7 days of discharge.     Activity   Order Comments: Your activity upon discharge: activity as tolerated   Order Specific Question Answer Comments   Is discharge order? Yes      Full Code   Order Specific Question Answer Comments   Code status determined by: Discussion with patient/legal decision maker      Diet   Order Comments: Follow this diet upon discharge: Orders Placed This Encounter     Advance Diet as Tolerated: Regular Diet Adult   Order Specific Question Answer Comments   Is discharge order? Yes                Discharge Disposition:   Discharged to home           Time spent : 35  mts total with patient and coordination of care       Dr PAT Duran MD  Hospitalist ( Internal medicine)  Pager: [phone]

## 2018-11-11 NOTE — PLAN OF CARE
Problem: Patient Care Overview  Goal: Individualization & Mutuality  Outcome: Adequate for Discharge Date Met: 11/11/18  D: Pt  A/O x3.( mentation  Much more CLEAR and APPROPRIATE today)  VSS . Lungs- CL, no c/o chest pain/ SOB. sats=99  % RA. . Bowel+, passing gas. PP- +. +1 edema- feet/legs. CMS- intact ( pt has Base line left N/T) . Pt taking PO REG  food/ fluids well. Voiding Good amounts in BR. UP walks halls INDEPENDENTLY with cane. Pain/CAPA - denies. MSSA was DCd. 72 hr holds DCd  Sitter -DCd. All discontinue instructions reviewed with pt (.No  NEW discontinue MEDS).   A: con't to monitor. Call light in reach. Pt able to make needs known. Pt DCd to home at this posted time.

## 2018-11-11 NOTE — PLAN OF CARE
Problem: Patient Care Overview  Goal: Plan of Care/Patient Progress Review  Outcome: No Change  Note for the night shift.  D: Pt sleeping well all night. Awake x1 for the sitter and she got his vitals and weight and went back to sleep before this writer knew it. Has slept the rest of the night. Turns side to side and readjusts himself in his sleep. Call light with in reach.Able to make needs known. Sitter at bedside. A:Doing OK. Allowed to sleep. Took his Seroquel at HS to sleep. P: Monitor closely.  Supportive cares. Medicate as needed per MSSA protocol.

## 2018-11-11 NOTE — PLAN OF CARE
Problem: Patient Care Overview  Goal: Plan of Care/Patient Progress Review  Outcome: Improving  VS: Vss. LS clear but diminished encouraged IS.   Output: Voiding spontaneously, passing flatus. BM 11/10   Activity: Independent with cane and sitter   Skin: Intact   Pain: Denies   Neuro/CMS: Baseline numbness in left foot, Disoriented to situation and place, at times can tell where he is   Dressing(s): none   Diet: Reg   LDA: PIV left arm SL   Equipment: Cane   Plan: 1:1 sitter pt on 72hr hold. Cont. To monitor   Additional Info: MSSA 9-10mg Valium  & 5- no valium given. Pt appears to be more oriented less visual hallucinations but mentioned going for a trip in a flying saucer to another planet. Later in shift pt less confused and denies and appears to have no hallucinations of auditory or visual.

## 2018-11-11 NOTE — PROGRESS NOTES
Discontinue 72 hrs hold. Patient appears to be medically detoxed. DT symptoms appear to be resolved .

## 2018-11-12 NOTE — PROGRESS NOTES
[facility]: Post-Discharge Note  SITUATION                                                      Admission:    Admission Date: 11/06/18   Reason for Admission: Chronic hypoxic respi failure , H/O COPD   Discharge:   Discharge Date: 11/11/18  Discharge Diagnosis: Chronic hypoxic respi failure , H/O COPD   Discharge Service: Internal Medicine     BACKGROUND                                                      63 year old man with depression, PSA, alcohol abuse, PTSD, current smoker with COPD comes c/w worsening shortness of breath and has the c/o  cough, non productive, more intense and frequent than usual for the last few days, seeking detox in the setting of severe withdrawals in the past.     ASSESSMENT      Discharge Assessment  Patient reports symptoms are: Improved  Does the patient have all of their medications?: Yes  Does patient know what their new medications are for?: Yes  Does patient have a follow-up appointment scheduled?: No  Does patient have any other questions or concerns?: No    Post-op  Did the patient have surgery or a procedure: No    PLAN                                                      Outpatient Plan:      Please follow up with your PCP in 2-3 days from discharge  Please follow up with outpatient detox plan and group therapy that ypu have discussed with your PCP/ VA provider     Future Appointments  Date Time Provider Department Center   11/14/2018 7:00 PM [address]   11/14/2018 7:45 PM [address]   11/19/2018 7:00 AM Tech, [facility], [facility]   12/19/2018 11:00 AM Julien Sexton MD [facility]           Josephine Francois, [facility]

## 2018-11-19 ENCOUNTER — ALLIED HEALTH/NURSE VISIT (OUTPATIENT)
Dept: CARDIOLOGY | Facility: CLINIC | Age: 63
End: 2018-11-19
Attending: INTERNAL MEDICINE
Payer: COMMERCIAL

## 2018-11-19 DIAGNOSIS — I48.91 ATRIAL FIBRILLATION, UNSPECIFIED TYPE (H): ICD-10-CM

## 2018-11-19 NOTE — MR AVS SNAPSHOT
After Visit Summary   11/19/2018    Jorge Rosales    MRN: [id number]           Patient Information     Date Of Birth          1955        Visit Information        Provider Department      11/19/2018 7:00 AM Tech, [facility], [facility]        Today's Diagnoses     Atrial fibrillation, unspecified type (H)           Follow-ups after your visit        Your next 10 appointments already scheduled     Dec 19, 2018 11:00 AM CST   (Arrive by 10:45 AM)   RETURN ARRHYTHMIA with Julien Sexton MD   [facility] ([facility])    [address] [place] [address]  [address] [place] [id number] [phone]              Who to contact     If you have questions or need follow up information about today's clinic visit or your schedule please contact [facility] directly at [phone].  Normal or non-critical lab and imaging results will be communicated to you by Calleoohart, letter or phone within 4 business days after the clinic has received the results. If you do not hear from us within 7 days, please contact the clinic through Calleoohart or phone. If you have a critical or abnormal lab result, we will notify you by phone as soon as possible.  Submit refill requests through [employer] or call your pharmacy and they will forward the refill request to us. Please allow 3 business days for your refill to be completed.          Additional Information About Your Visit        MyChart Information     [employer] gives you secure access to your electronic health record. If you see a primary care provider, you can also send messages to your care team and make appointments. If you have questions, please call your primary care clinic.  If you do not have a primary care provider, please call [phone] and they will assist you.        Care EveryWhere ID     This is your Care EveryWhere ID. This could be used by other organizations to access your [facility]  records  [id number]         Blood Pressure from Last 3 Encounters:   11/11/18 135/84   06/27/18 114/72   04/17/18 140/72    Weight from Last 3 Encounters:   11/11/18 97.4 kg (214 lb 12.8 oz)   06/27/18 97.6 kg (215 lb 1.6 oz)   04/23/18 98 kg (216 lb)              We Performed the Following     [employer] Holter Monitor - Adult        Primary Care Provider Office Phone # Fax #    Carlos Padron [phone]       [facility] [address] [address]        Equal Access to Services     [facility] : Hadii aad ericka hadasia Soann marie, waaxda luqadaha, qaybta kaalmada adewendyyada, tacos gomez . So [facility] [phone].    ATENCIÓN: Si habla español, tiene a nunez disposición servicios gratuitos de asistencia lingüística. [facility] [phone].    We comply with applicable federal civil rights laws and [place] laws. We do not discriminate on the basis of race, color, national origin, age, disability, sex, sexual orientation, or gender identity.            Thank you!     Thank you for choosing [facility]  for your care. Our goal is always to provide you with excellent care. Hearing back from our patients is one way we can continue to improve our services. Please take a few minutes to complete the written survey that you may receive in the mail after your visit with us. Thank you!             Your Updated Medication List - Protect others around you: Learn how to safely use, store and throw away your medicines at www.disposemymeds.org.          This list is accurate as of 11/19/18  5:53 PM.  Always use your most recent med list.                   Brand Name Dispense Instructions for use Diagnosis    albuterol 108 (90 Base) MCG/ACT inhaler    PROAIR HFA/PROVENTIL HFA/VENTOLIN HFA    1 Inhaler    Inhale 2 puffs into the lungs every 6 hours as needed for shortness of breath / dyspnea or wheezing    Moderate persistent reactive airway disease with acute  exacerbation       aspirin 81 MG tablet     30 tablet    Take 1 tablet (81 mg) by mouth daily    CARDIOVASCULAR SCREENING; LDL GOAL LESS THAN 160       buprenorphine-naloxone 8-2 MG Subl sublingual tablet    SUBOXONE     Place 1.5 tablets under the tongue every morning And 1 tablet every evening        cyanocobalamin 1000 MCG tablet    vitamin  B-12    100 tablet    Take 1 tablet (1,000 mcg) by mouth daily    Alcohol dependence with uncomplicated withdrawal (H)       fish oil-omega-3 fatty acids 1000 MG capsule     180 capsule    Take 1 g by mouth daily        flecainide 100 MG tablet    TAMBOCOR     Take 100 mg by mouth every 12 hours        gabapentin 300 MG capsule    NEURONTIN    90 capsule    Take 2 capsules (600 mg) by mouth 4 times daily    Chronic low back pain, unspecified back pain laterality, with sciatica presence unspecified       ibuprofen 800 MG tablet    ADVIL/MOTRIN     Take 800 mg by mouth every 8 hours as needed for moderate pain        multivitamin per tablet     100 tablet    Take 1 tablet by mouth daily.        QUEtiapine 25 MG tablet    SEROquel     Take 25 mg by mouth daily as needed (sleep)

## 2018-11-24 ENCOUNTER — HOSPITAL ENCOUNTER (INPATIENT)
Facility: CLINIC | Age: 63
LOS: 3 days | Discharge: HOME | DRG: 897 | End: 2018-11-27
Attending: FAMILY MEDICINE | Admitting: PSYCHIATRY & NEUROLOGY
Payer: COMMERCIAL

## 2018-11-24 DIAGNOSIS — F10.220 ACUTE ALCOHOLIC INTOXICATION IN ALCOHOLISM WITHOUT COMPLICATION (H): ICD-10-CM

## 2018-11-24 LAB
ALBUMIN SERPL-MCNC: 3.3 G/DL (ref 3.4–5)
ALCOHOL BREATH TEST: 0.15 (ref 0–0.01)
ALCOHOL BREATH TEST: 0.17 (ref 0–0.01)
ALP SERPL-CCNC: 76 U/L (ref 40–150)
ALT SERPL W P-5'-P-CCNC: 27 U/L (ref 0–70)
AMPHETAMINES UR QL SCN: NEGATIVE
ANION GAP SERPL CALCULATED.3IONS-SCNC: 9 MMOL/L (ref 3–14)
AST SERPL W P-5'-P-CCNC: 22 U/L (ref 0–45)
BARBITURATES UR QL: NEGATIVE
BASOPHILS # BLD AUTO: 0 10E9/L (ref 0–0.2)
BASOPHILS NFR BLD AUTO: 0.4 %
BENZODIAZ UR QL: POSITIVE
BILIRUB SERPL-MCNC: 0.3 MG/DL (ref 0.2–1.3)
BUN SERPL-MCNC: 16 MG/DL (ref 7–30)
CALCIUM SERPL-MCNC: 8.1 MG/DL (ref 8.5–10.1)
CANNABINOIDS UR QL SCN: NEGATIVE
CHLORIDE SERPL-SCNC: 106 MMOL/L (ref 94–109)
CO2 SERPL-SCNC: 29 MMOL/L (ref 20–32)
COCAINE UR QL: NEGATIVE
CREAT SERPL-MCNC: 0.69 MG/DL (ref 0.66–1.25)
DIFFERENTIAL METHOD BLD: ABNORMAL
EOSINOPHIL # BLD AUTO: 0.2 10E9/L (ref 0–0.7)
EOSINOPHIL NFR BLD AUTO: 2.2 %
ERYTHROCYTE [DISTWIDTH] IN BLOOD BY AUTOMATED COUNT: 16.5 % (ref 10–15)
ETHANOL UR QL SCN: POSITIVE
GFR SERPL CREATININE-BSD FRML MDRD: >90 ML/MIN/1.7M2
GLUCOSE SERPL-MCNC: 86 MG/DL (ref 70–99)
HCT VFR BLD AUTO: 44.7 % (ref 40–53)
HGB BLD-MCNC: 14.2 G/DL (ref 13.3–17.7)
IMM GRANULOCYTES # BLD: 0.1 10E9/L (ref 0–0.4)
IMM GRANULOCYTES NFR BLD: 0.6 %
LYMPHOCYTES # BLD AUTO: 3.2 10E9/L (ref 0.8–5.3)
LYMPHOCYTES NFR BLD AUTO: 40 %
MCH RBC QN AUTO: 29.5 PG (ref 26.5–33)
MCHC RBC AUTO-ENTMCNC: 31.8 G/DL (ref 31.5–36.5)
MCV RBC AUTO: 93 FL (ref 78–100)
MONOCYTES # BLD AUTO: 0.5 10E9/L (ref 0–1.3)
MONOCYTES NFR BLD AUTO: 6.2 %
NEUTROPHILS # BLD AUTO: 4.1 10E9/L (ref 1.6–8.3)
NEUTROPHILS NFR BLD AUTO: 50.6 %
NRBC # BLD AUTO: 0 10*3/UL
NRBC BLD AUTO-RTO: 0 /100
OPIATES UR QL SCN: NEGATIVE
PLATELET # BLD AUTO: 188 10E9/L (ref 150–450)
POTASSIUM SERPL-SCNC: 4 MMOL/L (ref 3.4–5.3)
PROT SERPL-MCNC: 6.9 G/DL (ref 6.8–8.8)
RBC # BLD AUTO: 4.81 10E12/L (ref 4.4–5.9)
SODIUM SERPL-SCNC: 144 MMOL/L (ref 133–144)
WBC # BLD AUTO: 8.1 10E9/L (ref 4–11)

## 2018-11-24 PROCEDURE — 99284 EMERGENCY DEPT VISIT MOD MDM: CPT | Mod: Z6 | Performed by: FAMILY MEDICINE

## 2018-11-24 PROCEDURE — 82075 ASSAY OF BREATH ETHANOL: CPT | Performed by: FAMILY MEDICINE

## 2018-11-24 PROCEDURE — 80053 COMPREHEN METABOLIC PANEL: CPT | Performed by: FAMILY MEDICINE

## 2018-11-24 PROCEDURE — 80320 DRUG SCREEN QUANTALCOHOLS: CPT | Performed by: FAMILY MEDICINE

## 2018-11-24 PROCEDURE — 99285 EMERGENCY DEPT VISIT HI MDM: CPT | Performed by: FAMILY MEDICINE

## 2018-11-24 PROCEDURE — 25000132 ZZH RX MED GY IP 250 OP 250 PS 637: Performed by: PSYCHIATRY & NEUROLOGY

## 2018-11-24 PROCEDURE — 85025 COMPLETE CBC W/AUTO DIFF WBC: CPT | Performed by: FAMILY MEDICINE

## 2018-11-24 PROCEDURE — 12800012 ZZH R&B CD MH INTERMEDIATE ADULT

## 2018-11-24 PROCEDURE — 25000132 ZZH RX MED GY IP 250 OP 250 PS 637: Performed by: FAMILY MEDICINE

## 2018-11-24 PROCEDURE — 82075 ASSAY OF BREATH ETHANOL: CPT | Mod: 91 | Performed by: FAMILY MEDICINE

## 2018-11-24 PROCEDURE — HZ2ZZZZ DETOXIFICATION SERVICES FOR SUBSTANCE ABUSE TREATMENT: ICD-10-PCS | Performed by: PSYCHIATRY & NEUROLOGY

## 2018-11-24 PROCEDURE — 80307 DRUG TEST PRSMV CHEM ANLYZR: CPT | Performed by: FAMILY MEDICINE

## 2018-11-24 RX ORDER — BUPRENORPHINE HYDROCHLORIDE AND NALOXONE HYDROCHLORIDE DIHYDRATE 8; 2 MG/1; MG/1
1.5 TABLET SUBLINGUAL EVERY MORNING
Status: DISCONTINUED | OUTPATIENT
Start: 2018-11-25 | End: 2018-11-24 | Stop reason: DRUGHIGH

## 2018-11-24 RX ORDER — HYDROXYZINE HYDROCHLORIDE 25 MG/1
25 TABLET, FILM COATED ORAL EVERY 4 HOURS PRN
Status: DISCONTINUED | OUTPATIENT
Start: 2018-11-24 | End: 2018-11-27 | Stop reason: HOSPADM

## 2018-11-24 RX ORDER — VENLAFAXINE HYDROCHLORIDE 75 MG/1
75 CAPSULE, EXTENDED RELEASE ORAL DAILY
Refills: 3 | COMMUNITY
Start: 2018-10-10

## 2018-11-24 RX ORDER — FOLIC ACID 1 MG/1
1 TABLET ORAL DAILY
Status: DISCONTINUED | OUTPATIENT
Start: 2018-11-25 | End: 2018-11-27 | Stop reason: HOSPADM

## 2018-11-24 RX ORDER — FLECAINIDE ACETATE 100 MG/1
100 TABLET ORAL EVERY 12 HOURS
Status: DISCONTINUED | OUTPATIENT
Start: 2018-11-24 | End: 2018-11-27 | Stop reason: HOSPADM

## 2018-11-24 RX ORDER — DIAZEPAM 10 MG
10 TABLET ORAL ONCE
Status: COMPLETED | OUTPATIENT
Start: 2018-11-24 | End: 2018-11-24

## 2018-11-24 RX ORDER — LANOLIN ALCOHOL/MO/W.PET/CERES
100 CREAM (GRAM) TOPICAL DAILY
Status: COMPLETED | OUTPATIENT
Start: 2018-11-25 | End: 2018-11-27

## 2018-11-24 RX ORDER — DIAZEPAM 5 MG
5-20 TABLET ORAL EVERY 30 MIN PRN
Status: DISCONTINUED | OUTPATIENT
Start: 2018-11-24 | End: 2018-11-27 | Stop reason: HOSPADM

## 2018-11-24 RX ORDER — PANTOPRAZOLE SODIUM 40 MG/1
40 TABLET, DELAYED RELEASE ORAL DAILY
Refills: 0 | COMMUNITY
Start: 2017-12-22 | End: 2018-11-24

## 2018-11-24 RX ORDER — GABAPENTIN 300 MG/1
600 CAPSULE ORAL 4 TIMES DAILY
Status: DISCONTINUED | OUTPATIENT
Start: 2018-11-24 | End: 2018-11-27 | Stop reason: HOSPADM

## 2018-11-24 RX ORDER — BACLOFEN 10 MG/1
10 TABLET ORAL 3 TIMES DAILY
Status: DISCONTINUED | OUTPATIENT
Start: 2018-11-24 | End: 2018-11-27 | Stop reason: HOSPADM

## 2018-11-24 RX ORDER — QUETIAPINE FUMARATE 25 MG/1
25-50 TABLET, FILM COATED ORAL
Status: DISCONTINUED | OUTPATIENT
Start: 2018-11-24 | End: 2018-11-27 | Stop reason: HOSPADM

## 2018-11-24 RX ORDER — IBUPROFEN 800 MG/1
800 TABLET, FILM COATED ORAL EVERY 8 HOURS PRN
Status: DISCONTINUED | OUTPATIENT
Start: 2018-11-24 | End: 2018-11-27 | Stop reason: HOSPADM

## 2018-11-24 RX ORDER — ACETAMINOPHEN 325 MG/1
650 TABLET ORAL EVERY 4 HOURS PRN
Status: DISCONTINUED | OUTPATIENT
Start: 2018-11-24 | End: 2018-11-27 | Stop reason: HOSPADM

## 2018-11-24 RX ORDER — MULTIPLE VITAMINS W/ MINERALS TAB 9MG-400MCG
1 TAB ORAL DAILY
Status: DISCONTINUED | OUTPATIENT
Start: 2018-11-25 | End: 2018-11-27 | Stop reason: HOSPADM

## 2018-11-24 RX ORDER — VENLAFAXINE HYDROCHLORIDE 75 MG/1
75 CAPSULE, EXTENDED RELEASE ORAL DAILY
Status: DISCONTINUED | OUTPATIENT
Start: 2018-11-25 | End: 2018-11-27 | Stop reason: HOSPADM

## 2018-11-24 RX ORDER — LANOLIN ALCOHOL/MO/W.PET/CERES
100 CREAM (GRAM) TOPICAL ONCE
Status: COMPLETED | OUTPATIENT
Start: 2018-11-24 | End: 2018-11-24

## 2018-11-24 RX ORDER — ASPIRIN 81 MG/1
81 TABLET ORAL DAILY
Status: DISCONTINUED | OUTPATIENT
Start: 2018-11-25 | End: 2018-11-27 | Stop reason: HOSPADM

## 2018-11-24 RX ORDER — ATENOLOL 50 MG/1
50 TABLET ORAL DAILY PRN
Status: DISCONTINUED | OUTPATIENT
Start: 2018-11-24 | End: 2018-11-27 | Stop reason: HOSPADM

## 2018-11-24 RX ORDER — BACLOFEN 10 MG/1
10 TABLET ORAL 3 TIMES DAILY
Refills: 11 | COMMUNITY
Start: 2018-07-31

## 2018-11-24 RX ORDER — ALUMINA, MAGNESIA, AND SIMETHICONE 2400; 2400; 240 MG/30ML; MG/30ML; MG/30ML
30 SUSPENSION ORAL EVERY 4 HOURS PRN
Status: DISCONTINUED | OUTPATIENT
Start: 2018-11-24 | End: 2018-11-27 | Stop reason: HOSPADM

## 2018-11-24 RX ORDER — BUPRENORPHINE 8 MG/1
8 TABLET SUBLINGUAL DAILY
Status: DISCONTINUED | OUTPATIENT
Start: 2018-11-25 | End: 2018-11-27 | Stop reason: HOSPADM

## 2018-11-24 RX ORDER — LORAZEPAM 1 MG/1
1 TABLET ORAL ONCE
Status: DISCONTINUED | OUTPATIENT
Start: 2018-11-24 | End: 2018-11-24

## 2018-11-24 RX ORDER — ALBUTEROL SULFATE 90 UG/1
2 AEROSOL, METERED RESPIRATORY (INHALATION) EVERY 6 HOURS PRN
Status: DISCONTINUED | OUTPATIENT
Start: 2018-11-24 | End: 2018-11-27 | Stop reason: HOSPADM

## 2018-11-24 RX ORDER — BISACODYL 10 MG
10 SUPPOSITORY, RECTAL RECTAL DAILY PRN
Status: DISCONTINUED | OUTPATIENT
Start: 2018-11-24 | End: 2018-11-27 | Stop reason: HOSPADM

## 2018-11-24 RX ORDER — BUPRENORPHINE AND NALOXONE 8; 2 MG/1; MG/1
1 FILM, SOLUBLE BUCCAL; SUBLINGUAL DAILY
Status: DISCONTINUED | OUTPATIENT
Start: 2018-11-25 | End: 2018-11-24 | Stop reason: ALTCHOICE

## 2018-11-24 RX ORDER — TRAZODONE HYDROCHLORIDE 50 MG/1
50 TABLET, FILM COATED ORAL
Status: DISCONTINUED | OUTPATIENT
Start: 2018-11-24 | End: 2018-11-26

## 2018-11-24 RX ADMIN — GABAPENTIN 600 MG: 300 CAPSULE ORAL at 20:45

## 2018-11-24 RX ADMIN — DIAZEPAM 10 MG: 10 TABLET ORAL at 17:28

## 2018-11-24 RX ADMIN — NICOTINE POLACRILEX 4 MG: 4 LOZENGE ORAL at 22:10

## 2018-11-24 RX ADMIN — BACLOFEN 10 MG: 10 TABLET ORAL at 20:45

## 2018-11-24 RX ADMIN — DIAZEPAM 10 MG: 5 TABLET ORAL at 20:45

## 2018-11-24 RX ADMIN — BUPRENORPHINE HCL 12 MG: 8 TABLET SUBLINGUAL at 20:45

## 2018-11-24 RX ADMIN — FLECAINIDE ACETATE 100 MG: 100 TABLET ORAL at 22:00

## 2018-11-24 RX ADMIN — QUETIAPINE FUMARATE 50 MG: 25 TABLET ORAL at 22:00

## 2018-11-24 RX ADMIN — Medication 100 MG: at 17:20

## 2018-11-24 ASSESSMENT — ENCOUNTER SYMPTOMS
PALPITATIONS: 0
HEMATOLOGIC/LYMPHATIC NEGATIVE: 1
HALLUCINATIONS: 0
NAUSEA: 0
DYSURIA: 0
FEVER: 0
CHILLS: 0
MYALGIAS: 0
VOMITING: 0
SHORTNESS OF BREATH: 0
COUGH: 0

## 2018-11-24 ASSESSMENT — ACTIVITIES OF DAILY LIVING (ADL): GROOMING: INDEPENDENT
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
## 2018-11-24 NOTE — IP AVS SNAPSHOT
MRN:[id number]                      After Visit Summary   11/24/2018    Jorge Rosales    MRN: [id number]           Thank you!     Thank you for choosing [facility] for your care. Our goal is always to provide you with excellent care.        Patient Information     Date Of Birth          1955        Designated Caregiver       Most Recent Value    Caregiver    Will someone help with your care after discharge? no      About your hospital stay     You were admitted on:  November 24, 2018 You last received care in the:  [facility]    You were discharged on:  November 27, 2018       Who to Call     For medical emergencies, please call 911.  For non-urgent questions about your medical care, please call your primary care provider or clinic, [phone]          Attending Provider     Provider Specialty    Neil Singh MD Emergency Medicine    Kendal, Nick Barron DO Emergency Medicine    Asuncion Varma MD Psychiatry       Primary Care Provider Office Phone # Fax #    Carlos Padron [phone]      Your next 10 appointments already scheduled     Dec 19, 2018 11:00 AM CST   (Arrive by 10:45 AM)   RETURN ARRHYTHMIA with Julien Sexton MD   [facility] ([facility])    [address] [place] [address]  [address] [place] [id number] [phone]              Further instructions from your care team       Behavioral Discharge Planning and Instructions  THANK YOU FOR CHOOSING THE [address]  [phone]    Summary: You were admitted to [facility] on 11/24/18 for detoxification from alcohol.  A medical exam was performed that included lab work. You have met with a  and opted to follow-up with [facility] groups, AA meetings, and sponsorship.  Please take care and make your recovery a priority!    Main Diagnosis: Per Dr. Asuncion Varma MD;  303.90 (F10.20) Alcohol Use Disorder Severe  304.00  (F11.20) Opioid Use Disorder Severe    Recommendation:  12 step support groups, VA groups, therapy, psychiatry.    Disposition: Home    Major Treatments, Procedures and Findings:  You have withdrawn from alcohol.  You have met with a  to develop a treatment plan for discharge.  You have had labs drawn and those results have been reviewed with you. Please take a copy of your lab work with you to your next primary care physician appointment.      Psychiatry Follow-up:   Dr. Padron  [facility]  [address] [place] [address]  Main: [phone]  Friday, December 7th @ 10 AM    Symptoms to Report:  If you experience more anxiety, confusion, sleeplessness, deep sadness or thoughts of suicide, notify your treatment team or notify your primary care physician. IF ANY OF THE SYMPTOMS YOU ARE EXPERIENCING ARE A MEDICAL EMERGENCY CALL 911 IMMEDIATELY.     Lifestyle Adjustment: Adjust your lifestyle to get enough sleep, relaxation, exercise and  good nutrition. Continue to develop healthy coping skills to decrease stress and promote a sober living environment. Do not use alcohol, illegal drugs or addictive medications other than what is currently prescribed. AA, NA, and  Sponsor are excellent resources for support.     General Medication Instructions:   See your medication sheet(s) for instructions.   Take all medicines as directed.  Make no changes unless your doctor suggests them.   Do not use any drugs not prescribed by your provider.    Avoid alcohol.    Resources:  National Java on Mental Illness (www.mn.marybel.org): 375.964.4508 or 905-048-2909.  Alcoholics Anonymous (www.alcoholics-anonymous.org): Check your phone book for your local chapter.  Suicide Awareness Voices of Education (SAVE) (www.save.org): 863-454-PEFQ (2754)  National Suicide Prevention Line (www.mentalhealthmn.org): 080-340-WNDV (5570)  Mental Health Consumer/Survivor Network of [place] (www.mhcsn.net):  "496.817.7730 or 958-907-9601  Mental Health Association of [place] (www.mentalhealth.org): 288.249.1332 or 586-477-3644        Any follow up concerns:  Nursing questions call the Unit -[facility] [phone]  Medical Record call [phone]  Outpatient Behavioral Intake call [phone]  LP+ Wait List/Bed Availability call [phone]    The entire treatment team has appreciated the opportunity to work with you.  We wish you the best in the future and with your lifelong recovery goals. Please bring this discharge folder with you to all follow up appointments.  It contains your lab results, diagnosis, medication list and discharge recommendations.    THANK YOU FOR CHOOSING THE [facility]     Pending Results     No orders found from 11/22/2018 to 11/25/2018.            Statement of Approval     Ordered          11/27/18 1014  I have reviewed and agree with all the recommendations and orders detailed in this document.  EFFECTIVE NOW     Comments:  After cm is complete   Approved and electronically signed by:  Asuncion Varma MD             Admission Information     Date & Time Provider Department Dept. Phone    11/24/2018 Asuncion Varma MD [facility] [phone]      Your Vitals Were     Blood Pressure Pulse Temperature Respirations Height Weight    119/74 55 98.8  F (37.1  C) (Oral) 16 1.803 m (5' 11\") 97.5 kg (215 lb)    Pulse Oximetry BMI (Body Mass Index)                92% 29.99 kg/m2          MyChart Information     [employer] gives you secure access to your electronic health record. If you see a primary care provider, you can also send messages to your care team and make appointments. If you have questions, please call your primary care clinic.  If you do not have a primary care provider, please call [phone] and they will assist you.        Care EveryWhere ID     This is your Care EveryWhere ID. This could be used by other organizations to " access your [facility] medical records  [id number]        Equal Access to Services     GEOVANI VILLA AH: Hadii avelino Llanos, waclaus oliver, qajenaro sierramaanup arroyo, tacos munoz. So [facility] [phone].    ATENCIÓN: Si habla español, tiene a nunez disposición servicios gratuitos de asistencia lingüística. Llame al [phone].    We comply with applicable federal civil rights laws and [place] laws. We do not discriminate on the basis of race, color, national origin, age, disability, sex, sexual orientation, or gender identity.               Review of your medicines      START taking        Dose / Directions    omeprazole 20 MG DR capsule   Commonly known as:  priLOSEC   Used for:  Acute alcoholic intoxication in alcoholism without complication (H)        Dose:  20 mg   Start taking on:  11/28/2018   Take 1 capsule (20 mg) by mouth every morning (before breakfast)   Quantity:  30 capsule   Refills:  0         CONTINUE these medicines which have NOT CHANGED        Dose / Directions    albuterol 108 (90 Base) MCG/ACT inhaler   Commonly known as:  PROAIR HFA/PROVENTIL HFA/VENTOLIN HFA   Used for:  Moderate persistent reactive airway disease with acute exacerbation        Dose:  2 puff   Inhale 2 puffs into the lungs every 6 hours as needed for shortness of breath / dyspnea or wheezing   Quantity:  1 Inhaler   Refills:  1       aspirin 81 MG tablet   Commonly known as:  ASA   Used for:  CARDIOVASCULAR SCREENING; LDL GOAL LESS THAN 160        Dose:  81 mg   Take 1 tablet (81 mg) by mouth daily   Quantity:  30 tablet   Refills:  0       baclofen 10 MG tablet   Commonly known as:  LIORESAL        Dose:  10 mg   Take 10 mg by mouth 3 times daily   Refills:  11       buprenorphine-naloxone 8-2 MG Subl sublingual tablet   Commonly known as:  SUBOXONE        Dose:  1.5 tablet   Place 1.5 tablets under the tongue every morning And 1 tablet every evening   Refills:  0       fish oil-omega-3  fatty acids 1000 MG capsule        Dose:  1 g   Take 1 g by mouth daily   Quantity:  180 capsule   Refills:  12       flecainide 100 MG tablet   Commonly known as:  TAMBOCOR        Dose:  100 mg   Take 100 mg by mouth every 12 hours   Refills:  0       gabapentin 300 MG capsule   Commonly known as:  NEURONTIN   Used for:  Chronic low back pain, unspecified back pain laterality, with sciatica presence unspecified        Dose:  600 mg   Take 2 capsules (600 mg) by mouth 4 times daily   Quantity:  90 capsule   Refills:  0       multivitamin per tablet        Dose:  1 tablet   Take 1 tablet by mouth daily.   Quantity:  100 tablet   Refills:  12       QUEtiapine 25 MG tablet   Commonly known as:  SEROquel        Dose:  25-50 mg   Take 25-50 mg by mouth nightly as needed (sleep)   Refills:  0       venlafaxine 75 MG 24 hr capsule   Commonly known as:  EFFEXOR-XR        Dose:  75 mg   Take 75 mg by mouth daily   Refills:  3       vitamin B-12 1000 MCG tablet   Commonly known as:  CYANOCOBALAMIN   Used for:  Alcohol dependence with uncomplicated withdrawal (H)        Dose:  1000 mcg   Take 1 tablet (1,000 mcg) by mouth daily   Quantity:  100 tablet   Refills:  0         STOP taking     ibuprofen 800 MG tablet   Commonly known as:  ADVIL/MOTRIN                Where to get your medicines      These medications were sent to [facility] [place], [place] - [address]  [address] [address]     Phone:  [phone]     omeprazole 20 MG DR capsule                Protect others around you: Learn how to safely use, store and throw away your medicines at www.disposemymeds.org.             Medication List: This is a list of all your medications and when to take them. Check marks below indicate your daily home schedule. Keep this list as a reference.      Medications           Morning Afternoon Evening Bedtime As Needed    albuterol 108 (90 Base) MCG/ACT inhaler   Commonly known as:  PROAIR  HFA/PROVENTIL HFA/VENTOLIN HFA   Inhale 2 puffs into the lungs every 6 hours as needed for shortness of breath / dyspnea or wheezing                            For shortness of breath       aspirin 81 MG tablet   Commonly known as:  ASA   Take 1 tablet (81 mg) by mouth daily                                   baclofen 10 MG tablet   Commonly known as:  LIORESAL   Take 10 mg by mouth 3 times daily   Last time this was given:  10 mg on 11/27/2018  2:23 PM                                         buprenorphine-naloxone 8-2 MG Subl sublingual tablet   Commonly known as:  SUBOXONE   Place 1.5 tablets under the tongue every morning And 1 tablet every evening                                      fish oil-omega-3 fatty acids 1000 MG capsule   Take 1 g by mouth daily                                   flecainide 100 MG tablet   Commonly known as:  TAMBOCOR   Take 100 mg by mouth every 12 hours   Last time this was given:  100 mg on 11/27/2018  8:13 AM                                   gabapentin 300 MG capsule   Commonly known as:  NEURONTIN   Take 2 capsules (600 mg) by mouth 4 times daily   Last time this was given:  600 mg on 11/27/2018 12:36 PM                                            multivitamin per tablet   Take 1 tablet by mouth daily.                                   omeprazole 20 MG DR capsule   Commonly known as:  priLOSEC   Take 1 capsule (20 mg) by mouth every morning (before breakfast)   Start taking on:  11/28/2018   Last time this was given:  20 mg on 11/27/2018  8:13 AM            Before breakfast                       QUEtiapine 25 MG tablet   Commonly known as:  SEROquel   Take 25-50 mg by mouth nightly as needed (sleep)   Last time this was given:  50 mg on 11/26/2018  9:40 PM                        As needed for sleep           venlafaxine 75 MG 24 hr capsule   Commonly known as:  EFFEXOR-XR   Take 75 mg by mouth daily   Last time this was given:  75 mg on 11/27/2018  8:13 AM                                    vitamin B-12 1000 MCG tablet   Commonly known as:  CYANOCOBALAMIN   Take 1 tablet (1,000 mcg) by mouth daily

## 2018-11-24 NOTE — ED NOTES
Patient present to ED with alcohol intoxication seeking detox and verbalized he las drank 1/2 a quart of rum/frances today; patient verbalized that he has been drinking for 2 weeks straight and does not remember the last time he was sober for a while; patient c/o intermittent headache and verbalized he sees spots and yellow diagonal shapes when his eyes are closed; patient denies hallucination.

## 2018-11-24 NOTE — IP AVS SNAPSHOT
[facility]    [address] [place] [address]    Phone:  [phone]                                       After Visit Summary   11/24/2018    Jorge Rosales    MRN: [id number]           After Visit Summary Signature Page     I have received my discharge instructions, and my questions have been answered. I have discussed any challenges I see with this plan with the nurse or doctor.    ..........................................................................................................................................  Patient/Patient Representative Signature      ..........................................................................................................................................  Patient Representative Print Name and Relationship to Patient    ..................................................               ................................................  Date                                   Time    ..........................................................................................................................................  Reviewed by Signature/Title    ...................................................              ..............................................  Date                                               Time          22EPIC Rev 08/18

## 2018-11-24 NOTE — ED PROVIDER NOTES
History     Chief Complaint   Patient presents with     Alcohol Problem     pt states he drinks daily, having visual hallucinations. States he wants to get into [facility]     HPI  Jorge Rosales is a 63 year old male who has chronic alcoholism. He was admitted to [facility] 11/6 to 11/11/18 for emphysema exacerbation and alcohol withdrawal. Immediately after leaving the hospital, he began to drink, one liter of vodka per day. He is here seeking treatment and wants to go to [facility]. His last drink was immediately before arriving.    He has a hx of DT's with hallucinations  History of narcotic addiction with chronic suboxone use8/2 tablets- 1 1/2 am and 1 pm.  He is also on flecinide, gabapentin, seroquel and albuterol inhaler prm    Denies street drug use  No si or hi  No delusions or hallucinations    I have reviewed the Medications, Allergies, Past Medical and Surgical History, and Social History in the Epic system.    Review of Systems   Constitutional: Negative for chills and fever.   HENT: Negative for congestion.    Respiratory: Negative for cough and shortness of breath.    Cardiovascular: Negative for chest pain and palpitations.   Gastrointestinal: Negative for nausea and vomiting.   Genitourinary: Negative for dysuria.   Musculoskeletal: Negative for myalgias.   Skin: Negative.    Allergic/Immunologic: Negative for immunocompromised state.   Hematological: Negative.    Psychiatric/Behavioral: Negative for hallucinations and suicidal ideas.       Physical Exam   BP: (!) 140/91  Heart Rate: 84  Temp: 97.5  F (36.4  C)  Resp: 16  Weight: 104.1 kg (229 lb 9.6 oz)  SpO2: 92 %      Physical Exam   Constitutional: He is oriented to person, place, and time. No distress.   Looks older than stated age  Ambulatory with cane  Smells of etoh   HENT:   Head: Normocephalic and atraumatic.   Cardiovascular: Normal rate, regular rhythm and intact distal pulses.    Pulmonary/Chest: No respiratory distress. He has no  wheezes.   Neurological: He is alert and oriented to person, place, and time.   Skin: Skin is warm and dry. He is not diaphoretic.   Psychiatric:   No si or hi  No delusions or hallucinations   Nursing note and vitals reviewed.      ED Course     ED Course     Procedures        Acutely intoxicated at 0. 15  COPD stable  Cardiac arrhymia stable    Labs pending     There is a detox bed  The pt seems medically stable  Labs are pending    Labs Ordered and Resulted from Time of ED Arrival Up to the Time of Departure from the ED   DRUG ABUSE SCREEN 6 CHEM DEP URINE ([facility]) - Abnormal; Notable for the following:        Result Value    Benzodiazepine Qual Urine Positive (*)     Ethanol Qual Urine Positive (*)     All other components within normal limits   ALCOHOL BREATH TEST POCT - Abnormal; Notable for the following:     Alcohol Breath Test 0.174 (*)     All other components within normal limits   ALCOHOL BREATH TEST POCT - Abnormal; Notable for the following:     Alcohol Breath Test 0.148 (*)     All other components within normal limits   CBC WITH PLATELETS DIFFERENTIAL   COMPREHENSIVE METABOLIC PANEL     EKG nsr rate of 72. Intervals are normal. No st t changes. This is a normal ekg unchanged from previous     CBC and chemistries are normal    Assessments & Plan (with Medical Decision Making)   The pt is stable for detox admission. He has received 10 mg of valium orally.    I have reviewed the nursing notes.    I have reviewed the findings, diagnosis, plan and need for follow up with the patient.    New Prescriptions    No medications on file       Final diagnoses:   Acute alcoholic intoxication in alcoholism without complication (H)       11/24/2018   [facility], [place], EMERGENCY DEPARTMENT     Neil Singh MD  11/24/18 1658       Neil Singh MD  11/24/18 1659       Neil Singh MD  11/24/18 1714       Neil Singh MD  11/24/18 1750

## 2018-11-25 LAB — INTERPRETATION ECG - MUSE: NORMAL

## 2018-11-25 PROCEDURE — 25000132 ZZH RX MED GY IP 250 OP 250 PS 637: Performed by: PSYCHIATRY & NEUROLOGY

## 2018-11-25 PROCEDURE — 25000132 ZZH RX MED GY IP 250 OP 250 PS 637: Performed by: PHYSICIAN ASSISTANT

## 2018-11-25 PROCEDURE — 12800012 ZZH R&B CD MH INTERMEDIATE ADULT

## 2018-11-25 PROCEDURE — 99207 ZZC CDG-MDM COMPONENT: MEETS MODERATE - UP CODED: CPT | Performed by: PSYCHIATRY & NEUROLOGY

## 2018-11-25 PROCEDURE — 99222 1ST HOSP IP/OBS MODERATE 55: CPT | Mod: AI | Performed by: PSYCHIATRY & NEUROLOGY

## 2018-11-25 RX ADMIN — FOLIC ACID 1 MG: 1 TABLET ORAL at 08:54

## 2018-11-25 RX ADMIN — DIAZEPAM 5 MG: 5 TABLET ORAL at 12:11

## 2018-11-25 RX ADMIN — NICOTINE POLACRILEX 4 MG: 4 LOZENGE ORAL at 13:22

## 2018-11-25 RX ADMIN — DIAZEPAM 10 MG: 5 TABLET ORAL at 08:55

## 2018-11-25 RX ADMIN — MULTIPLE VITAMINS W/ MINERALS TAB 1 TABLET: TAB at 08:55

## 2018-11-25 RX ADMIN — BACLOFEN 10 MG: 10 TABLET ORAL at 13:22

## 2018-11-25 RX ADMIN — GABAPENTIN 600 MG: 300 CAPSULE ORAL at 20:20

## 2018-11-25 RX ADMIN — DIAZEPAM 10 MG: 5 TABLET ORAL at 00:52

## 2018-11-25 RX ADMIN — ASPIRIN 81 MG: 81 TABLET, COATED ORAL at 08:54

## 2018-11-25 RX ADMIN — DIAZEPAM 10 MG: 5 TABLET ORAL at 16:17

## 2018-11-25 RX ADMIN — DIAZEPAM 10 MG: 5 TABLET ORAL at 20:20

## 2018-11-25 RX ADMIN — GABAPENTIN 600 MG: 300 CAPSULE ORAL at 08:54

## 2018-11-25 RX ADMIN — QUETIAPINE FUMARATE 50 MG: 25 TABLET ORAL at 22:02

## 2018-11-25 RX ADMIN — DIAZEPAM 5 MG: 5 TABLET ORAL at 05:03

## 2018-11-25 RX ADMIN — BACLOFEN 10 MG: 10 TABLET ORAL at 08:54

## 2018-11-25 RX ADMIN — IBUPROFEN 800 MG: 800 TABLET ORAL at 16:17

## 2018-11-25 RX ADMIN — OMEPRAZOLE 20 MG: 20 CAPSULE, DELAYED RELEASE ORAL at 08:54

## 2018-11-25 RX ADMIN — GABAPENTIN 600 MG: 300 CAPSULE ORAL at 16:17

## 2018-11-25 RX ADMIN — FLECAINIDE ACETATE 100 MG: 100 TABLET ORAL at 08:55

## 2018-11-25 RX ADMIN — BUPRENORPHINE HCL 12 MG: 8 TABLET SUBLINGUAL at 20:54

## 2018-11-25 RX ADMIN — FLECAINIDE ACETATE 100 MG: 100 TABLET ORAL at 20:19

## 2018-11-25 RX ADMIN — BACLOFEN 10 MG: 10 TABLET ORAL at 20:19

## 2018-11-25 RX ADMIN — NICOTINE POLACRILEX 8 MG: 4 LOZENGE ORAL at 16:38

## 2018-11-25 RX ADMIN — VENLAFAXINE HYDROCHLORIDE 75 MG: 75 CAPSULE, EXTENDED RELEASE ORAL at 08:55

## 2018-11-25 RX ADMIN — BUPRENORPHINE HCL 8 MG: 8 TABLET SUBLINGUAL at 08:54

## 2018-11-25 RX ADMIN — GABAPENTIN 600 MG: 300 CAPSULE ORAL at 12:11

## 2018-11-25 RX ADMIN — Medication 100 MG: at 08:54

## 2018-11-25 ASSESSMENT — ACTIVITIES OF DAILY LIVING (ADL)
DRESS: INDEPENDENT
GROOMING: INDEPENDENT
ORAL_HYGIENE: INDEPENDENT
GROOMING: INDEPENDENT

## 2018-11-25 NOTE — PLAN OF CARE
Problem: Substance Withdrawal  Goal: Substance Withdrawal  Signs and symptoms of listed problems will be absent or manageable.  EDIE Rosales is a 63 year old year old male with a chief complaint of Alcohol Problem (pt states he drinks daily, having visual hallucinations. States he wants to get into [facility])    S = Situation:   Admit  B  = Background:   Pt admitted for alcohol withdrawal.  Pt was here earlier in the month but was on station 10 because there were no beds available.  Patient reports that he relapsed on discharge.  He has been drinking 1/2 quart of rum or frances a day.  He has had back surgery and has chronic back pain and numbness in in L leg all the way from hip to toes, in the R leg the numbness is in his great toe and 4th phalange.  Pt reports that his current relapse has been fueled by depression and the loss of his ex-wife.  He has had 20 yrs of sobriety in his life.  Pt takes suboxone for chronic pain.  A  =  Assessment:   Vital Signs: /82  Temp 97.3  F (36.3  C) (Oral)  Resp 18  Wt 104.1 kg (229 lb 9.6 oz)  SpO2 92%  BMI 32.02 kg/m2  Alert and oriented X 3, no SI, no SIB.  Pt endorses depression.  He reports that he has never been suicidal and he doesn't own weapons.  He lives alone.  Pt is uncertain if he wants to go to treatment.  He is currently anxious, tremulous and diaphoretic. During his last withdrawal he had DTs.  R =   Request or Recommendation:   Alcohol withdrawal monitoring, suboxone maintenance, Dr. Lea to see, case management to see

## 2018-11-25 NOTE — PHARMACY-ADMISSION MEDICATION HISTORY
Admission medication history for the November 24, 2018 admission is complete.     Interview sources:  Patient and some medications verified by [facility] ([phone])    Reliability of source: Patient was a poor historian, knew some medications and some dosing.    Medication compliance: Poor, patient missed doses on many medications this week.    Preferred Outpatient Pharmacy: [facility] [address] [place]     Changes made to PTA medication list (reason)  Added: None  Deleted: pantoprazole 40 mg (per patient)  Changed: baclofen 10 mg; 2 tablets twice daily --> baclofen 10 mg; 1 tablet three times daily (per [facility], patient picked up prescription with this direction on 11/16/18, but patient reports taking differently)  Quetiapine 25 mg; take 1 tablet at bedtime --> quetiapine 25 mg; take 1-2 tablets at bedtime (per patient, stated that doctor allowed for him to take 1 or 2 tablets)    Additional medication history information:   1. Baclofen 10 mg   Patient reports taking 2 tablets twice daily. Pharmacy stated that most recent prescription from 11/16/18 stated 1 tablet 3 times daily.   2. Flecainide 100 mg   Per [facility], patient received prescription on 11/04/18 for starting half tablet twice daily for 1 week then increasing to 1 tablet twice daily. Patient hasn't taken any of the medication in a few days and could not recall what the dosing was. He seems to been taking the medication inconsistently.    Prior to Admission Medication List:  Prior to Admission medications    Medication Sig Last Dose Taking? Auth Provider   albuterol (PROAIR HFA/PROVENTIL HFA/VENTOLIN HFA) 108 (90 BASE) MCG/ACT Inhaler Inhale 2 puffs into the lungs every 6 hours as needed for shortness of breath / dyspnea or wheezing 11/23/2018 at Unknown time Yes Lorelei Reddy PA-C   aspirin 81 MG tablet Take 1 tablet (81 mg) by mouth daily Past Week at am Yes Lorelei Reddy PA-C   baclofen (LIORESAL) 10 MG tablet  Take 10 mg by mouth 3 times daily  11/23/2018 at pm Yes Reported, Patient   buprenorphine-naloxone (SUBOXONE) 8-2 MG SUBL sublingual tablet Place 1.5 tablets under the tongue every morning And 1 tablet every evening 11/24/2018 at am Yes Unknown, Entered By History   cyanocobalamin (VITAMIN  B-12) 1000 MCG tablet Take 1 tablet (1,000 mcg) by mouth daily 11/23/2018 at am Yes Lorelei Reddy PA-C   fish oil-omega-3 fatty acids 1000 MG capsule Take 1 g by mouth daily  Past Week at am Yes Alvin Yo MD   gabapentin (NEURONTIN) 300 MG capsule Take 2 capsules (600 mg) by mouth 4 times daily 11/23/2018 at pm Yes Dejan Swann MD   ibuprofen (ADVIL/MOTRIN) 800 MG tablet Take 800 mg by mouth every 8 hours as needed for moderate pain 11/23/2018 at pm Yes Unknown, Entered By History   Multiple Vitamin (MULTIVITAMIN) per tablet Take 1 tablet by mouth daily. 11/23/2018 at am Yes Alvin Yo MD   QUEtiapine (SEROQUEL) 25 MG tablet Take 25-50 mg by mouth nightly as needed (sleep)  11/23/2018 at pm Yes Unknown, Entered By History   venlafaxine (EFFEXOR-XR) 75 MG 24 hr capsule Take 75 mg by mouth daily 11/23/2018 at am Yes Reported, Patient   flecainide (TAMBOCOR) 100 MG tablet Take 100 mg by mouth every 12 hours 11/20/2018 at unknown  Unknown, Entered By History       Time spent: 65 minutes    Medication history completed by:   Tracey Nina, Pharmacy Intern

## 2018-11-25 NOTE — PROGRESS NOTES
Writer met with Pt to discuss aftercare planning.  He plans to [facility] have a new group there that he will start.  He reports [facility] But not [facility].  Pt signed [facility]NEL[facility] for [facility] Provider Dr. Acosta- [phone], ask for ARS unit.  [facility] [place] [facility], he is unemployed because of an infury.

## 2018-11-25 NOTE — CONSULTS
[facility] Medicine Note  November 25, 2018    Circumstances of recent discharge and re-admittance noted. Please refer to recent medicine H&P in charting dated 11/6/18 and discharge summary dated 11/11/18, which was reviewed by this writer and is up to date. Please call the on-call PA-C for any f/u medical concerns if they arise.   Diagnoses:      Alcohol abuse and withdrawal. Hx of DTs with hallucinations, last episode earlier this month. Defer to psych, primary team.    Chronic hypoxic respiratory failure, hx of COPD, ongoing tobacco abuse. Treated with azithromycin earlier this month for acute exacerbation. O2 sats stable on RA. RR 16. Cont prn albuterol.     Recent concern for ACS. Had elev trop in ED on 11/6, but had no complaints of chest pain. EKG on 11/6 without ST or T changes. ECHO without WMA. Continues on ASA 81mg every day and flecainide. Monitor for chest pain.     Preventative care. Given patients ongoing alcohol use and asa, recommend starting PPI every day for GI ppx.     Kimberlyn Gandhi  Internal Medicine [facility] Hospitalist  [phone]  November 25, 2018

## 2018-11-25 NOTE — DISCHARGE INSTRUCTIONS
Behavioral Discharge Planning and Instructions  THANK YOU FOR CHOOSING THE [address]  [phone]    Summary: You were admitted to [facility] on 11/24/18 for detoxification from alcohol.  A medical exam was performed that included lab work. You have met with a  and opted to follow-up with [facility] groups, AA meetings, and sponsorship.  Please take care and make your recovery a priority!    Main Diagnosis: Per Dr. Asuncion Varma MD;  303.90 (F10.20) Alcohol Use Disorder Severe  304.00 (F11.20) Opioid Use Disorder Severe    Recommendation:  12 step support groups, [facility] groups, therapy, psychiatry.    Disposition: Home    Major Treatments, Procedures and Findings:  You have withdrawn from alcohol.  You have met with a  to develop a treatment plan for discharge.  You have had labs drawn and those results have been reviewed with you. Please take a copy of your lab work with you to your next primary care physician appointment.      Psychiatry Follow-up:   [place] [address]  Main: [phone]  Friday, December 7th @ 10 AM    Symptoms to Report:  If you experience more anxiety, confusion, sleeplessness, deep sadness or thoughts of suicide, notify your treatment team or notify your primary care physician. IF ANY OF THE SYMPTOMS YOU ARE EXPERIENCING ARE A MEDICAL EMERGENCY CALL 911 IMMEDIATELY.     Lifestyle Adjustment: Adjust your lifestyle to get enough sleep, relaxation, exercise and  good nutrition. Continue to develop healthy coping skills to decrease stress and promote a sober living environment. Do not use alcohol, illegal drugs or addictive medications other than what is currently prescribed. AA, NA, and  Sponsor are excellent resources for support.     General Medication Instructions:   See your medication sheet(s) for instructions.   Take all medicines as directed.  Make no changes unless  your doctor suggests them.   Do not use any drugs not prescribed by your provider.    Avoid alcohol.    Resources:  National Barton on Mental Illness (www.mn.marybel.org): 309.100.9695 or 220-309-0719.  Alcoholics Anonymous (www.alcoholics-anonymous.org): Check your phone book for your local chapter.  Suicide Awareness Voices of Education (SAVE) (www.save.org): 347-834-HNDG (1621)  National Suicide Prevention Line (www.mentalhealthmn.org): 790-533-PWYK (3671)  Mental Health Consumer/Survivor Network of [place] (www.mhcsn.net): 303.922.8122 or 923-220-5352  Mental Health Association of [place] (www.mentalhealth.org): 897.550.9502 or 653-231-1479        Any follow up concerns:  Nursing questions call the [facility] [phone]  Medical Record call [phone]  Outpatient Behavioral Intake call [phone]  LP+ Wait List/Bed Availability call [phone]    The entire treatment team has appreciated the opportunity to work with you.  We wish you the best in the future and with your lifelong recovery goals. Please bring this discharge folder with you to all follow up appointments.  It contains your lab results, diagnosis, medication list and discharge recommendations.    THANK YOU FOR CHOOSING THE [facility]

## 2018-11-25 NOTE — PROGRESS NOTES
11/24/18 1959   Patient Belongings   Did you bring any home meds/supplements to the hospital?  No   Patient Belongings other (see comments)   Disposition of Belongings Other (see comment)   Belongings Search Yes   Clothing Search Yes   Second Staff Francis     STORAGE BIN:   backpack, coat, toiletries, belt, cap, tanktop, shoes, brace, cigarette, lighters,  cord, shaver, keys, floss, bungie cord, vaporizer, needle, tape, flipflops    SECURITY:   meds    MED ROOM BIN:   wallet, $127.00 cash, [place] id, [place] id, visa, [facility] jodie, blank check [id number], [employer], [facility], cell phone  A             Admission:  I am responsible for any personal items that are not sent to the safe or pharmacy.  [facility] is not responsible for loss, theft or damage of any property in my possession.    Signature:  _________________________________ Date: _______  Time: _____                                              Staff Signature:  ____________________________ Date: ________  Time: _____      2nd Staff person, if patient is unable/unwilling to sign:    Signature: ________________________________ Date: ________  Time: _____   Discharge:  [facility] has returned all of my personal belongings:    Signature: _________________________________ Date: ________  Time: _____                                          Staff Signature:  ____________________________ Date: ________  Time: _____

## 2018-11-26 PROCEDURE — 25000132 ZZH RX MED GY IP 250 OP 250 PS 637: Performed by: PHYSICIAN ASSISTANT

## 2018-11-26 PROCEDURE — 99231 SBSQ HOSP IP/OBS SF/LOW 25: CPT | Performed by: PSYCHIATRY & NEUROLOGY

## 2018-11-26 PROCEDURE — 12800012 ZZH R&B CD MH INTERMEDIATE ADULT

## 2018-11-26 PROCEDURE — 25000132 ZZH RX MED GY IP 250 OP 250 PS 637: Performed by: PSYCHIATRY & NEUROLOGY

## 2018-11-26 RX ADMIN — DIAZEPAM 5 MG: 5 TABLET ORAL at 05:14

## 2018-11-26 RX ADMIN — VENLAFAXINE HYDROCHLORIDE 75 MG: 75 CAPSULE, EXTENDED RELEASE ORAL at 09:15

## 2018-11-26 RX ADMIN — BACLOFEN 10 MG: 10 TABLET ORAL at 09:15

## 2018-11-26 RX ADMIN — FLECAINIDE ACETATE 100 MG: 100 TABLET ORAL at 21:10

## 2018-11-26 RX ADMIN — NICOTINE POLACRILEX 8 MG: 4 LOZENGE ORAL at 21:15

## 2018-11-26 RX ADMIN — GABAPENTIN 600 MG: 300 CAPSULE ORAL at 16:09

## 2018-11-26 RX ADMIN — Medication 100 MG: at 09:15

## 2018-11-26 RX ADMIN — ASPIRIN 81 MG: 81 TABLET, COATED ORAL at 09:15

## 2018-11-26 RX ADMIN — BACLOFEN 10 MG: 10 TABLET ORAL at 21:10

## 2018-11-26 RX ADMIN — GABAPENTIN 600 MG: 300 CAPSULE ORAL at 12:56

## 2018-11-26 RX ADMIN — OMEPRAZOLE 20 MG: 20 CAPSULE, DELAYED RELEASE ORAL at 09:15

## 2018-11-26 RX ADMIN — QUETIAPINE FUMARATE 50 MG: 25 TABLET ORAL at 21:40

## 2018-11-26 RX ADMIN — GABAPENTIN 600 MG: 300 CAPSULE ORAL at 21:10

## 2018-11-26 RX ADMIN — DIAZEPAM 5 MG: 5 TABLET ORAL at 01:06

## 2018-11-26 RX ADMIN — BUPRENORPHINE HCL 8 MG: 8 TABLET SUBLINGUAL at 09:15

## 2018-11-26 RX ADMIN — GABAPENTIN 600 MG: 300 CAPSULE ORAL at 09:15

## 2018-11-26 RX ADMIN — HYDROXYZINE HYDROCHLORIDE 25 MG: 25 TABLET, FILM COATED ORAL at 12:57

## 2018-11-26 RX ADMIN — FLECAINIDE ACETATE 100 MG: 100 TABLET ORAL at 09:15

## 2018-11-26 RX ADMIN — BUPRENORPHINE HCL 12 MG: 8 TABLET SUBLINGUAL at 21:10

## 2018-11-26 RX ADMIN — MULTIPLE VITAMINS W/ MINERALS TAB 1 TABLET: TAB at 09:15

## 2018-11-26 RX ADMIN — FOLIC ACID 1 MG: 1 TABLET ORAL at 09:15

## 2018-11-26 RX ADMIN — BACLOFEN 10 MG: 10 TABLET ORAL at 13:01

## 2018-11-26 ASSESSMENT — ACTIVITIES OF DAILY LIVING (ADL)
DRESS: INDEPENDENT
ORAL_HYGIENE: INDEPENDENT
GROOMING: INDEPENDENT

## 2018-11-26 NOTE — PROGRESS NOTES
Writer met with pt to discuss aftercare plans. Pt reports he is planning on going to [facility] support groups, regular appointments with therapist and psychiatrist, and AA meetings. Pt reports feeling guilt over not seeing his daughter in quite some time and also has history of isolation which has not helped relationships with family. Pt reports he relapsed after receiving a large sum of money from a lawsuit. Pt reports that sober friends found him drinking and brought him to the ED. Pt declines interest in treatment referral at this time stating he will follow-up with the above. Pt is aware that if needs change he can seek out case management for assistance. Pt was reminded to schedule follow-up appointments with psychiatrist and therapist before discharge.    Update: Pt has , Janine Aguirre: [phone]

## 2018-11-26 NOTE — H&P
[facility] [place]   Detox Admission   Admission date: 11/24/2018            HPI:     Jorge is a 63 year old male with history of alcohol use disorder, opiate use disorder, and depression presents to detox from alcohol withdrawal. Patient was recently hospitalized for emphysema exacerbation and alcohol withdrawal. Soon after discharging he relapsed, drinking 1 L of vodka per day. He admits that his ongoing depression plays a role in his relapse, and plans to follow up with his outpatient psychiatrist. He wants to get CD treatment at [facility].         Past Psychiatric History:   History of depression. Takes Effexor 75 mg Daily. [facility]. Denies current SI, intent or plan, nor any HI/AH/VH.         Substance Use and History:   Long history of alcohol use over the years. Recurrent detox and CD treatments in the past. BAL on admission was 0.148. History of opiate use, on suboxone maintenance. Denies other illicit substance use.         Past Medical History:   PAST MEDICAL HISTORY:   Past Medical History:   Diagnosis Date     Chronic back pain     Methadone program     COPD (chronic obstructive pulmonary disease) (H)      CTS (carpal tunnel syndrome)      Low testosterone     managed by endocrinology     Obesity      Panic attacks      PTSD (post-traumatic stress disorder) 7/8/2011     Rotator cuff injury      Substance abuse (H)     alcohol abuse       PAST SURGICAL HISTORY:   Past Surgical History:   Procedure Laterality Date     BACK SURGERY       SHOULDER SURGERY               Family History:   FAMILY HISTORY:   Family History   Problem Relation Age of Onset     Diabetes Father      HEART DISEASE No family hx of            Social History:   SOCIAL HISTORY:   Social History   Substance Use Topics     Smoking status: Current Every Day Smoker     Packs/day: 0.50     Years: 30.00     Types: Cigarettes     Smokeless tobacco: Never Used      Comment:  interested in quitting, reducing use at minimum - lozenges ordered     Alcohol use Yes            Physical ROS:   The patient endorsed lethargy, sweating. The remainder of 10-point review of systems was negative except as noted in HPI.         PTA Medications:     Prescriptions Prior to Admission   Medication Sig Dispense Refill Last Dose     albuterol (PROAIR HFA/PROVENTIL HFA/VENTOLIN HFA) 108 (90 BASE) MCG/ACT Inhaler Inhale 2 puffs into the lungs every 6 hours as needed for shortness of breath / dyspnea or wheezing 1 Inhaler 1 11/23/2018 at Unknown time     aspirin 81 MG tablet Take 1 tablet (81 mg) by mouth daily 30 tablet 0 Past Week at am     baclofen (LIORESAL) 10 MG tablet Take 10 mg by mouth 3 times daily   11 11/23/2018 at pm     buprenorphine-naloxone (SUBOXONE) 8-2 MG SUBL sublingual tablet Place 1.5 tablets under the tongue every morning And 1 tablet every evening   11/24/2018 at am     cyanocobalamin (VITAMIN  B-12) 1000 MCG tablet Take 1 tablet (1,000 mcg) by mouth daily 100 tablet 0 11/23/2018 at am     fish oil-omega-3 fatty acids 1000 MG capsule Take 1 g by mouth daily  180 capsule 12 Past Week at am     gabapentin (NEURONTIN) 300 MG capsule Take 2 capsules (600 mg) by mouth 4 times daily 90 capsule 0 11/23/2018 at pm     ibuprofen (ADVIL/MOTRIN) 800 MG tablet Take 800 mg by mouth every 8 hours as needed for moderate pain   11/23/2018 at pm     Multiple Vitamin (MULTIVITAMIN) per tablet Take 1 tablet by mouth daily. 100 tablet 12 11/23/2018 at am     QUEtiapine (SEROQUEL) 25 MG tablet Take 25-50 mg by mouth nightly as needed (sleep)    11/23/2018 at pm     venlafaxine (EFFEXOR-XR) 75 MG 24 hr capsule Take 75 mg by mouth daily  3 11/23/2018 at am     flecainide (TAMBOCOR) 100 MG tablet Take 100 mg by mouth every 12 hours   11/20/2018 at unknown          Allergies:   No Known Allergies       Labs:     Recent Results (from the past 48 hour(s))   Alcohol breath test POCT    Collection Time: 11/24/18   3:43 PM   Result Value Ref Range    Alcohol Breath Test 0.174 (A) 0.00 - 0.01   Alcohol breath test POCT    Collection Time: 11/24/18  3:47 PM   Result Value Ref Range    Alcohol Breath Test 0.148 (A) 0.00 - 0.01   Drug abuse screen 6 urine (tox)    Collection Time: 11/24/18  4:20 PM   Result Value Ref Range    Amphetamine Qual Urine Negative NEG^Negative    Barbiturates Qual Urine Negative NEG^Negative    Benzodiazepine Qual Urine Positive (A) NEG^Negative    Cannabinoids Qual Urine Negative NEG^Negative    Cocaine Qual Urine Negative NEG^Negative    Ethanol Qual Urine Positive (A) NEG^Negative    Opiates Qualitative Urine Negative NEG^Negative   EKG 12 lead    Collection Time: 11/24/18  5:05 PM   Result Value Ref Range    Interpretation ECG Click View Image link to view waveform and result    CBC with platelets differential    Collection Time: 11/24/18  5:13 PM   Result Value Ref Range    WBC 8.1 4.0 - 11.0 10e9/L    RBC Count 4.81 4.4 - 5.9 10e12/L    Hemoglobin 14.2 13.3 - 17.7 g/dL    Hematocrit 44.7 40.0 - 53.0 %    MCV 93 78 - 100 fl    MCH 29.5 26.5 - 33.0 pg    MCHC 31.8 31.5 - 36.5 g/dL    RDW 16.5 (H) 10.0 - 15.0 %    Platelet Count 188 150 - 450 10e9/L    Diff Method Automated Method     % Neutrophils 50.6 %    % Lymphocytes 40.0 %    % Monocytes 6.2 %    % Eosinophils 2.2 %    % Basophils 0.4 %    % Immature Granulocytes 0.6 %    Nucleated RBCs 0 0 /100    Absolute Neutrophil 4.1 1.6 - 8.3 10e9/L    Absolute Lymphocytes 3.2 0.8 - 5.3 10e9/L    Absolute Monocytes 0.5 0.0 - 1.3 10e9/L    Absolute Eosinophils 0.2 0.0 - 0.7 10e9/L    Absolute Basophils 0.0 0.0 - 0.2 10e9/L    Abs Immature Granulocytes 0.1 0 - 0.4 10e9/L    Absolute Nucleated RBC 0.0    Comprehensive metabolic panel    Collection Time: 11/24/18  5:13 PM   Result Value Ref Range    Sodium 144 133 - 144 mmol/L    Potassium 4.0 3.4 - 5.3 mmol/L    Chloride 106 94 - 109 mmol/L    Carbon Dioxide 29 20 - 32 mmol/L    Anion Gap 9 3 - 14 mmol/L     "Glucose 86 70 - 99 mg/dL    Urea Nitrogen 16 7 - 30 mg/dL    Creatinine 0.69 0.66 - 1.25 mg/dL    GFR Estimate >90 >60 mL/min/1.7m2    GFR Estimate If Black >90 >60 mL/min/1.7m2    Calcium 8.1 (L) 8.5 - 10.1 mg/dL    Bilirubin Total 0.3 0.2 - 1.3 mg/dL    Albumin 3.3 (L) 3.4 - 5.0 g/dL    Protein Total 6.9 6.8 - 8.8 g/dL    Alkaline Phosphatase 76 40 - 150 U/L    ALT 27 0 - 70 U/L    AST 22 0 - 45 U/L          Physical and Psychiatric Examination:     BP (!) 154/93 (BP Location: Right arm)  Pulse 73  Temp 98.1  F (36.7  C) (Oral)  Resp 16  Ht 1.803 m (5' 11\")  Wt 97.5 kg (215 lb)  SpO2 92%  BMI 29.99 kg/m2  Weight is 215 lbs 0 oz  Body mass index is 29.99 kg/(m^2).    Physical Exam:  I have reviewed the physical exam as documented by ED provider and agree with findings and assessment and have no additional findings to add at this time.    Mental Status Exam:  Appearance: awake, alert  Attitude:  cooperative  Eye Contact:  fair  Mood:  anxious  Affect:  appropriate and in normal range  Speech:  clear, coherent  Language: fluent and intact in English  Psychomotor, Gait, Musculoskeletal:  Mild tremor  Throught Process:  logical, linear and goal oriented  Associations:  no loose associations  Thought Content:  no evidence of suicidal ideation or homicidal ideation, no evidence of psychotic thought, no auditory hallucinations present and no visual hallucinations present  Insight:  fair  Judgement:  Poor-Fair   Oriented to:  time, person, and place  Attention Span and Concentration:  intact  Recent and Remote Memory:  fair  Fund of Knowledge:  appropriate         Admission Diagnoses:   Alcohol Use Disorder   Opiate Use Disorder  Major Depressive Disorder          Assessment & Plan:     Assessment:  In detox for alcohol withdrawal. Vitals are generally stable thusfar. MSSA score remain relatively elevated.     Plan:  1.) Medication Plan:  - MSSA with Valium Protocol  - Subutex 8 mg Daily and 12 mg HS  - Effexor 75 " mg    2.) Psychosocial Plan:  - Patient wants residential CD treatment program.     Legal:  Voluntary     Disposition:  Pending stabilization        Jan Lea MD  [facility] Psychiatry

## 2018-11-26 NOTE — PROGRESS NOTES
[facility], [place], [place]   Psychiatric Progress Note    Interim history   This is a 63 year old male with DIAGNOSES:   1.  Alcohol use disorder, severe.   2.  Alcohol withdrawal, complicated.  With hallucinations   3.  PTSD.    4.  Opiate use disorder, on Suboxone maintenance.     5.  Major depression, recurrent with psychotic features. .Pt seen in rounds. Patient's mood is happy  Energy Level:MODERATE  Sleep:No concerns, sleeps well through night  Appetite:normal motivation interest improving    denied any Suicidal/homicidal ideation/plan intent. ?psychosis  No prior suicde attempts  No access to gun  Pt is in detox  Pt mssa  score are monitered  Tolerating meds and has no side effects.              Medications:     Current Facility-Administered Medications   Medication     acetaminophen (TYLENOL) tablet 650 mg     albuterol (PROAIR HFA/PROVENTIL HFA/VENTOLIN HFA) 108 (90 Base) MCG/ACT inhaler 2 puff     alum & mag hydroxide-simethicone (MYLANTA ES/MAALOX  ES) suspension 30 mL     aspirin EC tablet 81 mg     atenolol (TENORMIN) tablet 50 mg     baclofen (LIORESAL) tablet 10 mg     bisacodyl (DULCOLAX) Suppository 10 mg     buprenorphine (SUBUTEX) sublingual tablet 12 mg     buprenorphine (SUBUTEX) sublingual tablet 8 mg     diazepam (VALIUM) tablet 5-20 mg     flecainide (TAMBOCOR) tablet 100 mg     folic acid (FOLVITE) tablet 1 mg     gabapentin (NEURONTIN) capsule 600 mg     hydrOXYzine (ATARAX) tablet 25 mg     ibuprofen (ADVIL/MOTRIN) tablet 800 mg     magnesium hydroxide (MILK OF MAGNESIA) suspension 30 mL     multivitamin, therapeutic with minerals (THERA-VIT-M) tablet 1 tablet     nicotine polacrilex lozenge 4-8 mg     omeprazole (priLOSEC) CR capsule 20 mg     QUEtiapine (SEROquel) tablet 25-50 mg     thiamine tablet 100 mg     traZODone (DESYREL) tablet 50 mg     venlafaxine (EFFEXOR-XR) 24 hr capsule 75 mg     Facility-Administered Medications Ordered in Other Encounters  "  Medication     Self Administer Medications: Behavioral Services             Allergies:   No Known Allergies         Psychiatric Examination:   Blood pressure 136/73, pulse 67, temperature 97.8  F (36.6  C), temperature source Oral, resp. rate 16, height 1.803 m (5' 11\"), weight 97.5 kg (215 lb), SpO2 92 %.  Weight is 215 lbs 0 oz  Body mass index is 29.99 kg/(m^2).    Appearance:  awake, alert and adequately groomed  Attitude:  cooperative  Eye Contact:  good  Mood:  better  Affect:  appropriate and in normal range and mood congruent  Speech:  clear, coherent rate /rhythm are good  Psychomotor Behavior:  no evidence of tardive dyskinesia, dystonia, or tics and intact station, gait and muscle tone  Throught Process:  logical  Associations:  no loose associations  Thought Content:  no evidence of suicidal ideation or homicidal ideation, no evidence of psychotic thought, no auditory hallucinations present and no visual hallucinations present  Insight:  limited  Judgement:  limited  Oriented to:  time, person, and place  Attention Span and Concentration:  intact  Recent and Remote Memory:  intact  Language fund of knowledge are adequate         Labs:     Lab Results   Component Value Date    NTBNPI 36 11/06/2018     Lab Results   Component Value Date    WBC 8.1 11/24/2018    HGB 14.2 11/24/2018    HCT 44.7 11/24/2018    MCV 93 11/24/2018     11/24/2018     Lab Results   Component Value Date    TSH 1.36 04/15/2018          DIAGNOSES:   1.  Alcohol use disorder, severe.      3.  PTSD.    4.  Opiate use disorder, on Suboxone maintenance.     5.  Major depression, recurrent with psychotic features.     Plan  Will conitinue detox off valium on mssa     Current Facility-Administered Medications on File Prior to Encounter:  Self Administer Medications: Behavioral Services     Current Outpatient Prescriptions on File Prior to Encounter:  albuterol (PROAIR HFA/PROVENTIL HFA/VENTOLIN HFA) 108 (90 BASE) MCG/ACT Inhaler " Inhale 2 puffs into the lungs every 6 hours as needed for shortness of breath / dyspnea or wheezing   aspirin 81 MG tablet Take 1 tablet (81 mg) by mouth daily   buprenorphine-naloxone (SUBOXONE) 8-2 MG SUBL sublingual tablet Place 1.5 tablets under the tongue every morning And 1 tablet every evening   cyanocobalamin (VITAMIN  B-12) 1000 MCG tablet Take 1 tablet (1,000 mcg) by mouth daily   fish oil-omega-3 fatty acids 1000 MG capsule Take 1 g by mouth daily    gabapentin (NEURONTIN) 300 MG capsule Take 2 capsules (600 mg) by mouth 4 times daily   ibuprofen (ADVIL/MOTRIN) 800 MG tablet Take 800 mg by mouth every 8 hours as needed for moderate pain   Multiple Vitamin (MULTIVITAMIN) per tablet Take 1 tablet by mouth daily.   QUEtiapine (SEROQUEL) 25 MG tablet Take 25-50 mg by mouth nightly as needed (sleep)    flecainide (TAMBOCOR) 100 MG tablet Take 100 mg by mouth every 12 hours     Laboratory/Imaging: reviewed with patient   Consults: internal medicine consult reviewed  Patient will be treated in therapeutic milieu with appropriate individual and group therapies as described.  PDMP CHECKED     Medical diagnoses to be addressed this admission:    Plan:  Alcohol abuse and withdrawal. Hx of DTs with hallucinations, last episode earlier this month. Defer to psych, primary team.     Chronic hypoxic respiratory failure, hx of COPD, ongoing tobacco abuse. Treated with azithromycin earlier this month for acute exacerbation. O2 sats stable on RA. RR 16. Cont prn albuterol.      Recent concern for ACS. Had elev trop in ED on 11/6, but had no complaints of chest pain. EKG on 11/6 without ST or T changes. ECHO without WMA. Continues on ASA 81mg every day and flecainide. Monitor for chest pain.      Preventative care. Given patients ongoing alcohol use and asa, recommend starting PPI every day for GI ppx.          Legal Status: voluntary    Safety Assessment:   Checks:  15 min  Precautions: withdrawal precautions  Pt has not  required locked seclusion or restraints in the past 24 hours to maintain safety, please refer to RN documentation for further details.  Discussed with patient many issues of addiction,triggers, relapse, and establishing a solid recovery program.  Able to give informed consent:  YES   Discussed Risks/Benefits/Side Effects/Alternatives: YES    After discussion of the indications, risks, benefits, alternatives and consequences of no treatment, the patient elects to complete detox and do trt.

## 2018-11-26 NOTE — PLAN OF CARE
Problem: Patient Care Overview  Goal: Team Discussion  Team Plan:   BEHAVIORAL TEAM DISCUSSION    Participants: Dr. Asuncion Varma MD; Candice CRYSTAL LPC  Progress: Initial  Continued Stay Criteria/Rationale: Pt was admitted to [facility] station 3[facility] for symptoms of withdrawal. Symptoms are being managed and treated by 3A medical staff.  Medical/Physical: Deferred to medicine.  Precautions:   Behavioral Orders   Procedures     Code 1 - Restrict to Unit     Routine Programming     As clinically indicated     Status 15     Every 15 minutes.     Withdrawal precautions     Plan: Monitor, assess, stabilize. Pt reports he will discharge home and follow-up with AA, psychiatry, and therapy.  Rationale for change in precautions or plan: N/A

## 2018-11-27 VITALS
SYSTOLIC BLOOD PRESSURE: 119 MMHG | HEART RATE: 55 BPM | RESPIRATION RATE: 16 BRPM | BODY MASS INDEX: 30.1 KG/M2 | TEMPERATURE: 98.8 F | WEIGHT: 215 LBS | OXYGEN SATURATION: 92 % | DIASTOLIC BLOOD PRESSURE: 74 MMHG | HEIGHT: 71 IN

## 2018-11-27 PROCEDURE — 25000132 ZZH RX MED GY IP 250 OP 250 PS 637: Performed by: PSYCHIATRY & NEUROLOGY

## 2018-11-27 PROCEDURE — 99239 HOSP IP/OBS DSCHRG MGMT >30: CPT | Performed by: PSYCHIATRY & NEUROLOGY

## 2018-11-27 PROCEDURE — 25000132 ZZH RX MED GY IP 250 OP 250 PS 637: Performed by: PHYSICIAN ASSISTANT

## 2018-11-27 RX ADMIN — GABAPENTIN 600 MG: 300 CAPSULE ORAL at 08:13

## 2018-11-27 RX ADMIN — GABAPENTIN 600 MG: 300 CAPSULE ORAL at 12:36

## 2018-11-27 RX ADMIN — BUPRENORPHINE HCL 8 MG: 8 TABLET SUBLINGUAL at 08:13

## 2018-11-27 RX ADMIN — BACLOFEN 10 MG: 10 TABLET ORAL at 14:23

## 2018-11-27 RX ADMIN — BACLOFEN 10 MG: 10 TABLET ORAL at 08:13

## 2018-11-27 RX ADMIN — VENLAFAXINE HYDROCHLORIDE 75 MG: 75 CAPSULE, EXTENDED RELEASE ORAL at 08:13

## 2018-11-27 RX ADMIN — ASPIRIN 81 MG: 81 TABLET, COATED ORAL at 08:13

## 2018-11-27 RX ADMIN — FOLIC ACID 1 MG: 1 TABLET ORAL at 08:13

## 2018-11-27 RX ADMIN — Medication 100 MG: at 08:13

## 2018-11-27 RX ADMIN — MULTIPLE VITAMINS W/ MINERALS TAB 1 TABLET: TAB at 08:13

## 2018-11-27 RX ADMIN — OMEPRAZOLE 20 MG: 20 CAPSULE, DELAYED RELEASE ORAL at 08:13

## 2018-11-27 RX ADMIN — FLECAINIDE ACETATE 100 MG: 100 TABLET ORAL at 08:13

## 2018-11-27 NOTE — PROGRESS NOTES
Pt verbalized complete understanding of all discharge instructions. Pt discharged to home transported by self

## 2018-11-27 NOTE — PLAN OF CARE
"Problem: Substance Withdrawal  Goal: Substance Withdrawal  Signs and symptoms of listed problems will be absent or manageable.   Outcome: Adequate for Discharge Date Met: 11/27/18    Pt meets all criteria to be removed from MSSA monitoring. Per unit protocol, Pt now \"out of detox\".        "

## 2018-11-27 NOTE — DISCHARGE SUMMARY
Admit Date:     11/24/2018   Discharge Date:     11/27/2018      More than 35 minutes spent on discharge summary, doing the discharge instructions, discharge medications, discharge mental status examination.        EXAMINATION:  Please see detailed admission note by Dr. Lea done on 11/25/2018.      HOSPITAL COURSE:  During hospitalization, the patient was detoxed off alcohol using mssa  protocol and Valium.  He was continued on Suboxone.  During hospitalization patient's energy, motivation, sleep, interests improved . He had an uneventful detoxification.  He was continued on Seroquel to 25-50 mg and Effexor 75 mg for his mood.        During the hospitalization, the patient had lab work done, which was normal.  Comprehensive metabolic panel albumin is 3.3,  RDW 16.3; otherwise CBC, CMP is normal.  [facility]  He has a  by the name of Janine, who is helping him to achieve that.  His energy, motivation, sleep, interest and already completed detox will be discharged.     DISCHARGE DISPOSITION:  The patient going home.      DISCHARGE FOLLOWUP:  With Dr. Padron on 12/07.      DISCHARGE MEDICATIONS:  Albuterol inhaler, aspirin 81 mg, baclofen 10 mg 3 times a day, Suboxone 1.5 mg daily, Cyanocobalamin injections sublingual tablet, gabapentin 600 four times a day, multivitamins, omeprazole 20 mg, Seroquel 25-50 mg as needed, Effexor 75 mg b.i.d.      DISCHARGE medical examination, the patient is alert, oriented x3.  Recent and remote memory, language, and fund of knowledge are all good.  No loose associations.  The patient does not have any active suicidal intent or plan.                DISCHARGE MENTAL STATUS EXAMINATION:  The patient is alert, oriented x3.  Good fund of knowledge.  Good use of language.  Recent and remote memory, language, fund of knowledge are all adequate.  Euthymic mood congruent affect  Speech normal rate/rhythm linear tp no  loose asso,The patient does not have any active suicidal or homicidal ideation.  Does not have any auditory or visual hallucination.  Fair insight/judgment At this time, the patient was stable to be discharged.        Pt was not determined to not be a danger to himself or others. At the current time of discharge, the patient does not meet criteria for involuntary hospitalization. On the day of discharge, the patient reports that they do not have suicidal or homicidal ideation and would never hurt themselves or others. Steps taken to minimize risk include: assessing patient s behavior and thought process daily during hospital stay, discharging patient with adequate plan for follow up for mental and physical health and discussing safety plan of returning to the hospital should the patient ever have thoughts of harming themselves or others. Therefore, based on all available evidence including the factors cited above, the patient does not appear to be at imminent risk for self-harm, and is appropriate for outpatient level of care.     Educated about side effects/risk vs benefits /alternative including non treatment.Pt consented to be on medication.     .Total time spent on discharge summary more than 35 min  More than  20 min  planning, coordination of care, medication reconciliation and performance of physical exam on day of discharge.Care was coordinated with unit RN and unit therapist       Jorge Rosales   Home Medication Instructions KARINA:[id number]    Printed on:11/29/18 9135   Medication Information                      albuterol (PROAIR HFA/PROVENTIL HFA/VENTOLIN HFA) 108 (90 BASE) MCG/ACT Inhaler  Inhale 2 puffs into the lungs every 6 hours as needed for shortness of breath / dyspnea or wheezing             aspirin 81 MG tablet  Take 1 tablet (81 mg) by mouth daily             baclofen (LIORESAL) 10 MG tablet  Take 10 mg by mouth 3 times daily              buprenorphine-naloxone (SUBOXONE) 8-2 MG SUBL  sublingual tablet  Place 1.5 tablets under the tongue every morning And 1 tablet every evening             cyanocobalamin (VITAMIN  B-12) 1000 MCG tablet  Take 1 tablet (1,000 mcg) by mouth daily             fish oil-omega-3 fatty acids 1000 MG capsule  Take 1 g by mouth daily              flecainide (TAMBOCOR) 100 MG tablet  Take 100 mg by mouth every 12 hours             gabapentin (NEURONTIN) 300 MG capsule  Take 2 capsules (600 mg) by mouth 4 times daily             Multiple Vitamin (MULTIVITAMIN) per tablet  Take 1 tablet by mouth daily.             omeprazole (PRILOSEC) 20 MG DR capsule  Take 1 capsule (20 mg) by mouth every morning (before breakfast)             QUEtiapine (SEROQUEL) 25 MG tablet  Take 25-50 mg by mouth nightly as needed (sleep)              venlafaxine (EFFEXOR-XR) 75 MG 24 hr capsule  Take 75 mg by mouth daily             Recommendation:  12 step support groups, [facility] groups, therapy, psychiatry.     Disposition: Home     Major Treatments, Procedures and Findings:  You have withdrawn from alcohol.  You have met with a  to develop a treatment plan for discharge.  You have had labs drawn and those results have been reviewed with you. Please take a copy of your lab work with you to your next primary care physician appointment.        Psychiatry Follow-up:   Dr. Padron  [facility] [address]  Main: [phone]   @ 10 AM    DISCHARGE DIAGNOSIS    Alcohol Use Disorder SEVERE  Opiate Use Disorder ON SUBOXONE  Major Depressive Disorder RECUURENT      D: 2018   T: 2018   MT: BABS      Name:     MARYBETH HUERTAS   MRN:      [id number]        Account:        [id number]   :      1955           Admit Date:     2018                                  Discharge Date: 2018      Document: [id number]

## 2018-11-27 NOTE — PROGRESS NOTES
[facility] Placed return call to [place] and left voicemail message requesting return call.
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
## 2018-12-19 ENCOUNTER — OFFICE VISIT (OUTPATIENT)
Dept: CARDIOLOGY | Facility: CLINIC | Age: 63
End: 2018-12-19
Attending: INTERNAL MEDICINE
Payer: COMMERCIAL

## 2018-12-19 VITALS
BODY MASS INDEX: 31.32 KG/M2 | HEIGHT: 71 IN | SYSTOLIC BLOOD PRESSURE: 123 MMHG | OXYGEN SATURATION: 93 % | WEIGHT: 223.7 LBS | DIASTOLIC BLOOD PRESSURE: 81 MMHG | HEART RATE: 80 BPM

## 2018-12-19 DIAGNOSIS — I48.91 ATRIAL FIBRILLATION, UNSPECIFIED TYPE (H): ICD-10-CM

## 2018-12-19 DIAGNOSIS — Z51.81 ENCOUNTER FOR MONITORING FLECAINIDE THERAPY: Primary | ICD-10-CM

## 2018-12-19 DIAGNOSIS — Z79.899 ENCOUNTER FOR MONITORING FLECAINIDE THERAPY: Primary | ICD-10-CM

## 2018-12-19 PROCEDURE — G0463 HOSPITAL OUTPT CLINIC VISIT: HCPCS | Mod: 25,ZF

## 2018-12-19 PROCEDURE — 93005 ELECTROCARDIOGRAM TRACING: CPT | Mod: ZF

## 2018-12-19 PROCEDURE — 93010 ELECTROCARDIOGRAM REPORT: CPT | Mod: ZP | Performed by: INTERNAL MEDICINE

## 2018-12-19 PROCEDURE — 99214 OFFICE O/P EST MOD 30 MIN: CPT | Mod: 25 | Performed by: INTERNAL MEDICINE

## 2018-12-19 ASSESSMENT — MIFFLIN-ST. JEOR: SCORE: 1826.83

## 2018-12-19 ASSESSMENT — PAIN SCALES - GENERAL: PAINLEVEL: NO PAIN (0)
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
## 2018-12-19 NOTE — PATIENT INSTRUCTIONS
Cardiology Provider you saw in clinic today: Dr. Sexton    Labs/Tests needed:     1. Lexiscan Nuclear Med Stress Test     Follow-up Visit:  1 year with Mayela Mcgovern NP       Prep for adalberto-nuc stress test: Report to the  [facility] Waiting Room at the [facility]. The hospital is located at [address] [place] [address] on [place] the [place].    This test can take up to 3 hours.    NPO 3 hours prior, Water is ok and encouraged  NO alcohol, smoking, caffeine, or decaf products 12 hours prior  TAKE ALL medications as prescribed, hold the following medications if they pertain to you:   If you take Aggrenox or dipyridamole (Persantine, Permole), stop taking it 48 hours before your test.   If you take Viagra, Cialis or Levitra, stop taking it 48 hours before your test.   If you take theophylline or aminophylline, stop taking it 12 hours before your test.   For patients with diabetes:If you take insulin, call your diabetes care team. Ask if you should take a 1/2 dose the morning of your test.   If you take diabetes medicine by mouth, don t take it on the morning of your test. Bring it with you to take after the test. (If you have questions, call your diabetes care team.)      Do not take nitrates on the day of your test. Do not wear your Nitro-Patch    If you have further questions, please utilize [employer] to contact us.   If your question concerns the above instructions, contact:  Lin Chase RN   Electrophysiology Nurse Coordinator.  [phone]    If your question concerns the schedule/appointment times, contact:  [facility]   [phone]

## 2018-12-19 NOTE — NURSING NOTE
Vitals completed successfully and medication reconciled. EKG done. Ember Salazar MA  Chief Complaint   Patient presents with     Follow Up     6 mo follow-up AF.

## 2018-12-20 LAB — INTERPRETATION ECG - MUSE: NORMAL

## 2019-01-09 ENCOUNTER — HOSPITAL ENCOUNTER (OUTPATIENT)
Dept: NUCLEAR MEDICINE | Facility: CLINIC | Age: 64
Setting detail: NUCLEAR MEDICINE
End: 2019-01-09
Attending: INTERNAL MEDICINE
Payer: COMMERCIAL

## 2019-01-09 ENCOUNTER — HOSPITAL ENCOUNTER (OUTPATIENT)
Dept: CARDIOLOGY | Facility: CLINIC | Age: 64
Discharge: HOME | End: 2019-01-09
Attending: INTERNAL MEDICINE | Admitting: INTERNAL MEDICINE
Payer: COMMERCIAL

## 2019-01-09 DIAGNOSIS — Z51.81 ENCOUNTER FOR MONITORING FLECAINIDE THERAPY: ICD-10-CM

## 2019-01-09 DIAGNOSIS — I48.91 ATRIAL FIBRILLATION, UNSPECIFIED TYPE (H): ICD-10-CM

## 2019-01-09 DIAGNOSIS — Z79.899 ENCOUNTER FOR MONITORING FLECAINIDE THERAPY: ICD-10-CM

## 2019-01-09 PROCEDURE — 78452 HT MUSCLE IMAGE SPECT MULT: CPT | Mod: 26 | Performed by: INTERNAL MEDICINE

## 2019-01-09 PROCEDURE — 25000128 H RX IP 250 OP 636: Performed by: INTERNAL MEDICINE

## 2019-01-09 PROCEDURE — 93017 CV STRESS TEST TRACING ONLY: CPT

## 2019-01-09 PROCEDURE — A9502 TC99M TETROFOSMIN: HCPCS | Performed by: INTERNAL MEDICINE

## 2019-01-09 PROCEDURE — 93018 CV STRESS TEST I&R ONLY: CPT | Performed by: INTERNAL MEDICINE

## 2019-01-09 PROCEDURE — 34300033 ZZH RX 343: Performed by: INTERNAL MEDICINE

## 2019-01-09 PROCEDURE — 93016 CV STRESS TEST SUPVJ ONLY: CPT | Performed by: INTERNAL MEDICINE

## 2019-01-09 PROCEDURE — 78452 HT MUSCLE IMAGE SPECT MULT: CPT

## 2019-01-09 RX ORDER — REGADENOSON 0.08 MG/ML
0.4 INJECTION, SOLUTION INTRAVENOUS ONCE
Status: COMPLETED | OUTPATIENT
Start: 2019-01-09 | End: 2019-01-09

## 2019-01-09 RX ORDER — ALBUTEROL SULFATE 90 UG/1
2 AEROSOL, METERED RESPIRATORY (INHALATION) EVERY 5 MIN PRN
Status: DISCONTINUED | OUTPATIENT
Start: 2019-01-09 | End: 2019-01-10 | Stop reason: HOSPADM

## 2019-01-09 RX ORDER — AMINOPHYLLINE 25 MG/ML
50-100 INJECTION, SOLUTION INTRAVENOUS
Status: DISCONTINUED | OUTPATIENT
Start: 2019-01-09 | End: 2019-01-10 | Stop reason: HOSPADM

## 2019-01-09 RX ORDER — ACYCLOVIR 200 MG/1
0-1 CAPSULE ORAL
Status: DISCONTINUED | OUTPATIENT
Start: 2019-01-09 | End: 2019-01-10 | Stop reason: HOSPADM

## 2019-01-09 RX ADMIN — REGADENOSON 0.4 MG: 0.08 INJECTION, SOLUTION INTRAVENOUS at 10:30

## 2019-01-09 RX ADMIN — TETROFOSMIN 9.9 MCI.: 1.38 INJECTION, POWDER, LYOPHILIZED, FOR SOLUTION INTRAVENOUS at 09:37

## 2019-01-09 RX ADMIN — TETROFOSMIN 40.7 MCI.: 1.38 INJECTION, POWDER, LYOPHILIZED, FOR SOLUTION INTRAVENOUS at 10:35

## 2019-01-11 ENCOUNTER — TELEPHONE (OUTPATIENT)
Dept: CARDIOLOGY | Facility: CLINIC | Age: 64
End: 2019-01-11

## 2019-01-11 NOTE — TELEPHONE ENCOUNTER
Health Call Center    Phone Message    May a detailed message be left on voicemail: yes    Reason for Call: Requesting Results   Name/type of test: Lexiscan  Date of test: 01/09/2019  Was test done at a location other than [facility] (Please fill in the location if not [facility])?: [place]    Jorge called in and said he got a text to view results on [employer]. He says he cannot access his mychart and would like a call back with results. Please give him a call. Thanks!    Action Taken: Message routed to:  [facility] ([facility]): Cardiology

## 2019-01-11 NOTE — TELEPHONE ENCOUNTER
"Called and spoke to patient and relayed Lexiscan results. He states he was not on flecainide at the time of the clinic visit on 12/19/18 and continues to not be on it. He felt \"lethargic and terrible\" on the medication and therefore stopped it, and he states his atrial fibrillation is well controlled (he is symptomatic in his AF).     Will update Dr. Sexton that patient is not taking flecainide.   "

## 2019-05-11 ENCOUNTER — HOSPITAL ENCOUNTER (EMERGENCY)
Facility: CLINIC | Age: 64
Discharge: HOME | End: 2019-05-11
Attending: EMERGENCY MEDICINE | Admitting: EMERGENCY MEDICINE
Payer: MEDICARE

## 2019-05-11 VITALS
RESPIRATION RATE: 16 BRPM | HEART RATE: 70 BPM | OXYGEN SATURATION: 96 % | TEMPERATURE: 98.5 F | BODY MASS INDEX: 32.1 KG/M2 | SYSTOLIC BLOOD PRESSURE: 161 MMHG | HEIGHT: 70 IN | DIASTOLIC BLOOD PRESSURE: 82 MMHG

## 2019-05-11 DIAGNOSIS — R53.83 FATIGUE: ICD-10-CM

## 2019-05-11 DIAGNOSIS — M62.81 GENERALIZED MUSCLE WEAKNESS: ICD-10-CM

## 2019-05-11 LAB
ALBUMIN SERPL-MCNC: 3.9 G/DL (ref 3.4–5)
ALBUMIN UR-MCNC: NEGATIVE MG/DL
ALP SERPL-CCNC: 79 U/L (ref 40–150)
ALT SERPL W P-5'-P-CCNC: 20 U/L (ref 0–70)
AMPHETAMINES UR QL SCN: NEGATIVE
ANION GAP SERPL CALCULATED.3IONS-SCNC: 8 MMOL/L (ref 3–14)
APPEARANCE UR: CLEAR
AST SERPL W P-5'-P-CCNC: 11 U/L (ref 0–45)
BARBITURATES UR QL: NEGATIVE
BASOPHILS # BLD AUTO: 0.1 10E9/L (ref 0–0.2)
BASOPHILS NFR BLD AUTO: 0.3 %
BENZODIAZ UR QL: NEGATIVE
BILIRUB SERPL-MCNC: 0.7 MG/DL (ref 0.2–1.3)
BILIRUB UR QL STRIP: NEGATIVE
BUN SERPL-MCNC: 13 MG/DL (ref 7–30)
CALCIUM SERPL-MCNC: 9 MG/DL (ref 8.5–10.1)
CANNABINOIDS UR QL SCN: NEGATIVE
CHLORIDE SERPL-SCNC: 103 MMOL/L (ref 94–109)
CO2 SERPL-SCNC: 26 MMOL/L (ref 20–32)
COCAINE UR QL: NEGATIVE
COLOR UR AUTO: ABNORMAL
CREAT SERPL-MCNC: 0.72 MG/DL (ref 0.66–1.25)
DIFFERENTIAL METHOD BLD: ABNORMAL
EOSINOPHIL # BLD AUTO: 0 10E9/L (ref 0–0.7)
EOSINOPHIL NFR BLD AUTO: 0.1 %
ERYTHROCYTE [DISTWIDTH] IN BLOOD BY AUTOMATED COUNT: 14.3 % (ref 10–15)
ETHANOL SERPL-MCNC: <0.01 G/DL
ETHANOL UR QL SCN: NEGATIVE
GFR SERPL CREATININE-BSD FRML MDRD: >90 ML/MIN/{1.73_M2}
GLUCOSE SERPL-MCNC: 111 MG/DL (ref 70–99)
GLUCOSE UR STRIP-MCNC: NEGATIVE MG/DL
HCT VFR BLD AUTO: 47 % (ref 40–53)
HGB BLD-MCNC: 15.1 G/DL (ref 13.3–17.7)
HGB UR QL STRIP: NEGATIVE
IMM GRANULOCYTES # BLD: 0.1 10E9/L (ref 0–0.4)
IMM GRANULOCYTES NFR BLD: 0.5 %
KETONES UR STRIP-MCNC: NEGATIVE MG/DL
LEUKOCYTE ESTERASE UR QL STRIP: NEGATIVE
LYMPHOCYTES # BLD AUTO: 2.5 10E9/L (ref 0.8–5.3)
LYMPHOCYTES NFR BLD AUTO: 15.8 %
MCH RBC QN AUTO: 28.2 PG (ref 26.5–33)
MCHC RBC AUTO-ENTMCNC: 32.1 G/DL (ref 31.5–36.5)
MCV RBC AUTO: 88 FL (ref 78–100)
MONOCYTES # BLD AUTO: 0.7 10E9/L (ref 0–1.3)
MONOCYTES NFR BLD AUTO: 4.4 %
MUCOUS THREADS #/AREA URNS LPF: PRESENT /LPF
NEUTROPHILS # BLD AUTO: 12.4 10E9/L (ref 1.6–8.3)
NEUTROPHILS NFR BLD AUTO: 78.9 %
NITRATE UR QL: NEGATIVE
NRBC # BLD AUTO: 0 10*3/UL
NRBC BLD AUTO-RTO: 0 /100
NT-PROBNP SERPL-MCNC: 105 PG/ML (ref 0–900)
OPIATES UR QL SCN: NEGATIVE
PH UR STRIP: 5.5 PH (ref 5–7)
PLATELET # BLD AUTO: 219 10E9/L (ref 150–450)
POTASSIUM SERPL-SCNC: 3.9 MMOL/L (ref 3.4–5.3)
PROT SERPL-MCNC: 8.2 G/DL (ref 6.8–8.8)
RBC # BLD AUTO: 5.35 10E12/L (ref 4.4–5.9)
RBC #/AREA URNS AUTO: <1 /HPF (ref 0–2)
SODIUM SERPL-SCNC: 138 MMOL/L (ref 133–144)
SOURCE: ABNORMAL
SP GR UR STRIP: 1.01 (ref 1–1.03)
TSH SERPL DL<=0.005 MIU/L-ACNC: 1.24 MU/L (ref 0.4–4)
UROBILINOGEN UR STRIP-MCNC: NORMAL MG/DL (ref 0–2)
WBC # BLD AUTO: 15.7 10E9/L (ref 4–11)
WBC #/AREA URNS AUTO: 0 /HPF (ref 0–5)

## 2019-05-11 PROCEDURE — 85025 COMPLETE CBC W/AUTO DIFF WBC: CPT | Performed by: EMERGENCY MEDICINE

## 2019-05-11 PROCEDURE — 99283 EMERGENCY DEPT VISIT LOW MDM: CPT | Performed by: EMERGENCY MEDICINE

## 2019-05-11 PROCEDURE — 99284 EMERGENCY DEPT VISIT MOD MDM: CPT | Mod: 25 | Performed by: EMERGENCY MEDICINE

## 2019-05-11 PROCEDURE — 81001 URINALYSIS AUTO W/SCOPE: CPT | Mod: XU | Performed by: STUDENT IN AN ORGANIZED HEALTH CARE EDUCATION/TRAINING PROGRAM

## 2019-05-11 PROCEDURE — 84443 ASSAY THYROID STIM HORMONE: CPT | Performed by: EMERGENCY MEDICINE

## 2019-05-11 PROCEDURE — 80053 COMPREHEN METABOLIC PANEL: CPT | Performed by: EMERGENCY MEDICINE

## 2019-05-11 PROCEDURE — 99283 EMERGENCY DEPT VISIT LOW MDM: CPT | Mod: Z6 | Performed by: EMERGENCY MEDICINE

## 2019-05-11 PROCEDURE — 80307 DRUG TEST PRSMV CHEM ANLYZR: CPT | Performed by: STUDENT IN AN ORGANIZED HEALTH CARE EDUCATION/TRAINING PROGRAM

## 2019-05-11 PROCEDURE — 80320 DRUG SCREEN QUANTALCOHOLS: CPT | Performed by: EMERGENCY MEDICINE

## 2019-05-11 PROCEDURE — 93005 ELECTROCARDIOGRAM TRACING: CPT | Performed by: EMERGENCY MEDICINE

## 2019-05-11 PROCEDURE — 83880 ASSAY OF NATRIURETIC PEPTIDE: CPT | Performed by: EMERGENCY MEDICINE

## 2019-05-11 PROCEDURE — 80320 DRUG SCREEN QUANTALCOHOLS: CPT | Performed by: STUDENT IN AN ORGANIZED HEALTH CARE EDUCATION/TRAINING PROGRAM

## 2019-05-11 ASSESSMENT — ENCOUNTER SYMPTOMS
SEIZURES: 0
TROUBLE SWALLOWING: 0
BACK PAIN: 1
CHILLS: 0
DIZZINESS: 0
FREQUENCY: 0
SHORTNESS OF BREATH: 0
HEMATURIA: 0
WEAKNESS: 1
FLANK PAIN: 0
NUMBNESS: 0
VOICE CHANGE: 1
NAUSEA: 0
TREMORS: 0
HEADACHES: 0
PALPITATIONS: 1
DIARRHEA: 0
DYSPHORIC MOOD: 1
ABDOMINAL DISTENTION: 1
FATIGUE: 1
CONSTIPATION: 0
FEVER: 0
HALLUCINATIONS: 0
ABDOMINAL PAIN: 0
LIGHT-HEADEDNESS: 0
FACIAL ASYMMETRY: 0
DIFFICULTY URINATING: 0
SPEECH DIFFICULTY: 0
VOMITING: 0
BLOOD IN STOOL: 0
ACTIVITY CHANGE: 1
DYSURIA: 0

## 2019-05-11 NOTE — ED TRIAGE NOTES
"Patient BIBA with complaints of \"not feeling right\". When asked to elaborate he is not able to. Denies pain, nausea. States he \"waited too long to come in\". And that his blood pressure has been \"all over the place\".  "

## 2019-05-11 NOTE — ED AVS SNAPSHOT
[facility], [place], Emergency Department  [address] [place] [id number]  Phone:  [phone]                                    Jorge Rosales   MRN: [id number]    Department:  [facility], Emergency Department   Date of Visit:  5/11/2019           After Visit Summary Signature Page    I have received my discharge instructions, and my questions have been answered. I have discussed any challenges I see with this plan with the nurse or doctor.    ..........................................................................................................................................  Patient/Patient Representative Signature      ..........................................................................................................................................  Patient Representative Print Name and Relationship to Patient    ..................................................               ................................................  Date                                   Time    ..........................................................................................................................................  Reviewed by Signature/Title    ...................................................              ..............................................  Date                                               Time          22EPIC Rev 08/18

## 2019-05-11 NOTE — ED PROVIDER NOTES
"    [place] EMERGENCY DEPARTMENT ([facility])  5/11/19   History     Chief Complaint   Patient presents with     Fatigue     HPI  Jorge Rosales is a 64 year old male who has a history of COPD, HTN, atrial fibrillation related to EtOH withdrawal, EtOH use disorder, chronic pain and PTSD who presents today for 1 month of progressive fatigue, abdominal and scrotal swelling, and emotional distress. He was recently treated at the [facility] for pneumonia a few weeks ago. He also notes occasional episodes of palpitations, but this is not related to activity and has no associated chest pain or shortness of breath. He states he has not been eating or drinking very much the last few days, but has been having normal bowel movements and urinating normally. He has not slept very much in the last few days, because he is afraid he will die in his sleep.    He normally ambulates using a cane and a leg brace, but his fatigue has been limiting his physical activity for the last month or so. He states he has his baseline weakness and paresthesias in his L leg due to complications from a back surgery several years ago, but denies new focal numbness or weakness. He denies recent falls, trauma, disorientation or confusion or altered LOC. He states he has not had any alcohol to drink \"for years\", smokes 1-2 ppd, and denies street drug use. He denies using any opioid pain medications or benzodiazepines \"for years\". Of note, in his chart he was last hospitalized in November 2018 by behavioral health for detox from alcohol.  During the interview, the patient is quite distressed and perseverates on [facility], including his prior back surgery and complications from that.  He follows with the [facility] clinic for primary care.  He does not currently follow with anyone for therapy for his mental health care, but had previously been in group therapy for his PTSD.    I have reviewed the " Medications, Allergies, Past Medical and Surgical History, and Social History in the [facility] system.    Past Medical History:   Diagnosis Date     Chronic back pain     Methadone program     COPD (chronic obstructive pulmonary disease) (H)      CTS (carpal tunnel syndrome)      Low testosterone     managed by endocrinology     Obesity      Panic attacks      PTSD (post-traumatic stress disorder) 7/8/2011     Rotator cuff injury      Substance abuse (H)     alcohol abuse       Past Surgical History:   Procedure Laterality Date     BACK SURGERY       SHOULDER SURGERY         Family History   Problem Relation Age of Onset     Diabetes Father      Heart Disease No family hx of        Social History     Tobacco Use     Smoking status: Current Every Day Smoker     Packs/day: 0.50     Years: 30.00     Pack years: 15.00     Types: Cigarettes     Smokeless tobacco: Never Used     Tobacco comment: interested in quitting, reducing use at minimum - lozenges ordered   Substance Use Topics     Alcohol use: Not Currently       No current facility-administered medications for this encounter.      Current Outpatient Medications   Medication     albuterol (PROAIR HFA/PROVENTIL HFA/VENTOLIN HFA) 108 (90 BASE) MCG/ACT Inhaler     baclofen (LIORESAL) 10 MG tablet     buprenorphine-naloxone (SUBOXONE) 8-2 MG SUBL sublingual tablet     cyanocobalamin (VITAMIN  B-12) 1000 MCG tablet     fish oil-omega-3 fatty acids 1000 MG capsule     gabapentin (NEURONTIN) 300 MG capsule     Multiple Vitamin (MULTIVITAMIN) per tablet     QUEtiapine (SEROQUEL) 25 MG tablet     venlafaxine (EFFEXOR-XR) 75 MG 24 hr capsule     aspirin 81 MG tablet     Facility-Administered Medications Ordered in Other Encounters   Medication     Self Administer Medications: Behavioral Services      No Known Allergies     Review of Systems   Constitutional: Positive for activity change and fatigue. Negative for chills and fever.   HENT: Positive for voice change. Negative for  "trouble swallowing.    Eyes: Negative for visual disturbance.   Respiratory: Negative for shortness of breath.    Cardiovascular: Positive for palpitations. Negative for chest pain and leg swelling.   Gastrointestinal: Positive for abdominal distention. Negative for abdominal pain, blood in stool, constipation, diarrhea, nausea and vomiting.   Genitourinary: Positive for scrotal swelling. Negative for decreased urine volume, difficulty urinating, dysuria, flank pain, frequency and hematuria.   Musculoskeletal: Positive for back pain.   Neurological: Positive for weakness. Negative for dizziness, tremors, seizures, syncope, facial asymmetry, speech difficulty, light-headedness, numbness and headaches.   Psychiatric/Behavioral: Positive for dysphoric mood. Negative for hallucinations, self-injury and suicidal ideas.       Physical Exam   BP: 150/90  Heart Rate: 68  Resp: 16  Height: 177.8 cm (5' 10\")  SpO2: 96 %      Physical Exam   Constitutional: He is oriented to person, place, and time. He appears distressed.   Voice hoarse   Neck: Neck supple.   Cardiovascular: Normal rate, regular rhythm, normal heart sounds and intact distal pulses.   No murmur heard.  No pedal edema   Pulmonary/Chest: Effort normal and breath sounds normal. No respiratory distress. He has no wheezes. He has no rales.   Abdominal: Soft. Bowel sounds are normal. There is no tenderness. There is no guarding.   Unable to determine if mild-moderate distension present given body habitus of patient   Musculoskeletal:   Brace on L LE   Lymphadenopathy:     He has no cervical adenopathy.   Neurological: He is alert and oriented to person, place, and time. No cranial nerve deficit. He exhibits normal muscle tone. Coordination normal. GCS eye subscore is 4. GCS verbal subscore is 5. GCS motor subscore is 6.    strength intact bilaterally, UE flexion and extension intact bilaterally when distracted, LLE hip flexion 4/5 with distraction (chronic), RLE " 5/5 hip flexion and plantar and dorsiflexion with distraction   Skin: Skin is warm and dry. Capillary refill takes less than 2 seconds. No rash noted. He is not diaphoretic.   Psychiatric:   Affect tearful       ED Course        Procedures             EKG Interpretation:      Interpreted by Katerina Agrawal  Time reviewed: 16:45  Symptoms at time of EKG: fatigue   Rhythm: normal sinus   Rate: normal  Axis: normal  Ectopy: none  Conduction: normal  ST Segments/ T Waves: No ST-T wave changes  Q Waves: none  Comparison to prior: Unchanged from Dec 2018    Clinical Impression: normal EKG    Critical Care time:  none           Results for orders placed or performed during the hospital encounter of 05/11/19   UA with Microscopic   Result Value Ref Range    Color Urine Light Yellow     Appearance Urine Clear     Glucose Urine Negative NEG^Negative mg/dL    Bilirubin Urine Negative NEG^Negative    Ketones Urine Negative NEG^Negative mg/dL    Specific Gravity Urine 1.008 1.003 - 1.035    Blood Urine Negative NEG^Negative    pH Urine 5.5 5.0 - 7.0 pH    Protein Albumin Urine Negative NEG^Negative mg/dL    Urobilinogen mg/dL Normal 0.0 - 2.0 mg/dL    Nitrite Urine Negative NEG^Negative    Leukocyte Esterase Urine Negative NEG^Negative    Source Clean catch urine     WBC Urine 0 0 - 5 /HPF    RBC Urine <1 0 - 2 /HPF    Mucous Urine Present (A) NEG^Negative /LPF   Drug abuse screen 6 urine (chem dep)   Result Value Ref Range    Amphetamine Qual Urine Negative NEG^Negative    Barbiturates Qual Urine Negative NEG^Negative    Benzodiazepine Qual Urine Negative NEG^Negative    Cannabinoids Qual Urine Negative NEG^Negative    Cocaine Qual Urine Negative NEG^Negative    Ethanol Qual Urine Negative NEG^Negative    Opiates Qualitative Urine Negative NEG^Negative   CBC with platelets differential   Result Value Ref Range    WBC 15.7 (H) 4.0 - 11.0 10e9/L    RBC Count 5.35 4.4 - 5.9 10e12/L    Hemoglobin 15.1 13.3 - 17.7 g/dL     Hematocrit 47.0 40.0 - 53.0 %    MCV 88 78 - 100 fl    MCH 28.2 26.5 - 33.0 pg    MCHC 32.1 31.5 - 36.5 g/dL    RDW 14.3 10.0 - 15.0 %    Platelet Count 219 150 - 450 10e9/L    Diff Method Automated Method     % Neutrophils 78.9 %    % Lymphocytes 15.8 %    % Monocytes 4.4 %    % Eosinophils 0.1 %    % Basophils 0.3 %    % Immature Granulocytes 0.5 %    Nucleated RBCs 0 0 /100    Absolute Neutrophil 12.4 (H) 1.6 - 8.3 10e9/L    Absolute Lymphocytes 2.5 0.8 - 5.3 10e9/L    Absolute Monocytes 0.7 0.0 - 1.3 10e9/L    Absolute Eosinophils 0.0 0.0 - 0.7 10e9/L    Absolute Basophils 0.1 0.0 - 0.2 10e9/L    Abs Immature Granulocytes 0.1 0 - 0.4 10e9/L    Absolute Nucleated RBC 0.0    Comprehensive metabolic panel   Result Value Ref Range    Sodium 138 133 - 144 mmol/L    Potassium 3.9 3.4 - 5.3 mmol/L    Chloride 103 94 - 109 mmol/L    Carbon Dioxide 26 20 - 32 mmol/L    Anion Gap 8 3 - 14 mmol/L    Glucose 111 (H) 70 - 99 mg/dL    Urea Nitrogen 13 7 - 30 mg/dL    Creatinine 0.72 0.66 - 1.25 mg/dL    GFR Estimate >90 >60 mL/min/[1.73_m2]    GFR Estimate If Black >90 >60 mL/min/[1.73_m2]    Calcium 9.0 8.5 - 10.1 mg/dL    Bilirubin Total 0.7 0.2 - 1.3 mg/dL    Albumin 3.9 3.4 - 5.0 g/dL    Protein Total 8.2 6.8 - 8.8 g/dL    Alkaline Phosphatase 79 40 - 150 U/L    ALT 20 0 - 70 U/L    AST 11 0 - 45 U/L   Alcohol ethyl   Result Value Ref Range    Ethanol g/dL <0.01 <0.01 g/dL   Nt probnp inpatient   Result Value Ref Range    N-Terminal Pro BNP Inpatient 105 0 - 900 pg/mL   TSH with free T4 reflex   Result Value Ref Range    TSH 1.24 0.40 - 4.00 mU/L   EKG 12-lead, tracing only   Result Value Ref Range    Interpretation ECG Click View Image link to view waveform and result               Assessments & Plan (with Medical Decision Making)   Jorge is a 64-year-old gentleman COPD, hypertension, prior A. fib related to alcohol withdrawal, alcohol use disorder remission, PTSD and chronic pain who presents today for evaluation of 1  month of progressive fatigue, decreased appetite, abdominal tension and scrotal swelling.  The patient reports he has been abstinent from, opioid medications, and benzodiazepines.  The patient symptoms are vague and not associated with any syncopal or presyncopal symptoms, chest pain or respiratory symptoms.    Exam was negative for any acute neurologic abnormality, with a normal cardiac, pulmonary, and abdominal exam.  No peripheral edema was noted on exam.  Vitals are stable on room air.  Work-up was significant for a normal EKG, normal UA and normal renal function, liver function, and electrolytes. UDS and blood alcohol were negative. CBC was within normal limits except for a mild leukocytosis of 15 without a clear infectious source, likely a stress reaction.    Overall the patient was negative for stroke, cardiac, electrolyte, pulmonary abnormalities, no acute infection, normal renal and liver function, acute intoxication or withdrawal. He was determined to be stable for discharge to home with close follow up with primary care for possible stress echo and further neuro workup. When I explained this to the patient, he became quite upset and stated he expected to [facility]. I explained that we have ruled out acute emergencies, and that based on his workup here he does not meet criteria for inpatient hospitalization.   I have reviewed the nursing notes. He can follow up with his primary care for PT for strengthening and further neurologic testing, as well as for his mental health care.    I have reviewed the findings, diagnosis, plan and need for follow up with the patient.       Medication List      There are no discharge medications for this visit.         Final diagnoses:   Generalized muscle weakness   Fatigue     DO Brittaney Parham's Family [facility] Resident PGY-1    ED Staff Attestation:    I have seen the patient with the resident and I agree with the documentation.  I have  "assessed the patient independently of the resident and the above note reflects our jointly agreed upon assessment and plan.       Briefly this is a 64-year-old with a history chronic back pain, COPD, alcohol abuse, atrial fibrillation who presents to the emergency department for multiple vague complaints.  It is extraordinarily difficult to get a straight answer out of him in terms of what is going on for today.  He states that he \"waited too long\" and \"feels like an explosion in my body\".  Evidently he has had some issues with left leg paresis and paresthesias and swelling [facility].  He spent quite a bit of time in physical therapy relearning to walk.  He believes that he is noticing symptoms on the right leg now as well.  He is also noticing what he believes is swelling of his abdomen which has been going on for months.  He is specifically denying fever, cough, shortness of breath, or chest pain.  He continues to perseverate during the interview about he perceives as [facility] the [facility] and how \"they blew it\" with regard to his back pain.    The patient apparently sees [facility] clinic for primary however he admits he has not been going recently.  On my evaluation he is tearful, tangential.  Very poor insight.  I did explain that we would certainly evaluate him for acute complaints in the ED.  This upset him and he immediately stated that he felt like we were not taking him seriously as we suggested that for ongoing chronic complaints he is going to need to follow-up with his clinic.  The patient asked to see a neurologist.  I do not have any reason to do an urgent neurology consult from the emergency department but I did offer to give him the phone number to the neurology clinic and he is welcome to call and make an appointment if he wishes.  He became upset at that and stated he does not know how to get to the appointment.    Labs are all normal, some leukocytosis without fever " or clinical e/o infection so suspect stress leukocytosis given his level of emotional distress.  I do believe it is entirely possible that many of his physical complaints are related to emotional distress.  I do believe that it is going to be very important that he follow-up with his primary clinic which is [facility] clinic, he may very well benefit from therapy as well.    This part of the medical record was transcribed by Edmundo Franco Medical Scribe.     5/11/2019   [facility], [place], EMERGENCY DEPARTMENT     Morenita Tanner MD  05/12/19 0041

## 2019-05-11 NOTE — ED NOTES
"Bed: HWE  Expected date:   Expected time:   Means of arrival:   Comments:  [facility] [address] 64 M \"not feeling well\", yellow  "

## 2019-05-12 NOTE — DISCHARGE INSTRUCTIONS
You have fatigue and weakness from an uncertain cause. We have ruled out heart attack, stroke, irregular heart rhythm, kidney or liver dysfunction, electrolyte abnormalities, or infection that would cause this. Based on your physical exam, normal vitals, and workup here, we do not see anything that worries us for an acute emergency or that would warrant hospitalization.  Please follow up with your primary care clinic for further heart and neuro workup.    Please make an appointment to follow up with Your Primary Care Provider and Neurology Clinic (phone: [phone]) in 7 days for follow up.

## 2019-05-12 NOTE — PROGRESS NOTES
"Emergency Social Work Services Note    Date of  Intervention: 05/11/19  Last Emergency Department Visit:  11/24-11/27/18 hospitalizations  Care Plan:  none  Collaborated with:  ED RN, ED MD, chart review    Data:  Jorge Rosales is a 64 year old  male who typically receives his care through the [facility] and health system.  He came by ambulance today due to complaints of \"not feeling right\".  He is medically stable for discharge this evening.    Intervention:  ED SW met with Jorge for discussion about transportation home.  He expresses dismay that he is at not being hospitalized, he had expected to be admitted therefore had brought his personal items in a suitcase.  We discussed transportation options to get home including wheelchair van versus taxicab versus friend/family.  He was unable to reach any family or friends and would prefer wheelchair van home.  We discussed that it is private pay and he is agreeable to with this.    Assessment:  d/c transportation    Plan:    Anticipated Disposition:  Home, no needs identified    Barriers to d/c plan:  none    Follow Up:  [facility]     [facility] (Ph: [phone])    SHERMAN Alvarado, [facility]  Social Work Services, Emergency Dept [facility]  Pager: [phone] Mon-Sat 9 am - 9 pm, on-call/after hours pager [phone]    This note was created in part by the use of Dragon voice recognition dictation system. Inadvertent grammatical errors and typographical errors may still exist.      "

## 2019-09-19 NOTE — PROGRESS NOTES
11/10/17 1406   Quick Adds   Type of Visit Initial Occupational Therapy Evaluation   Living Environment   Lives With alone   Living Arrangements apartment   Home Accessibility no concerns   Number of Stairs to Enter Home 0   Number of Stairs Within Home 0   Living Environment Comment Walk-in shower, built in shower, lives in handicap building, built for Veterans   Self-Care   Usual Activity Tolerance good   Current Activity Tolerance fair   Equipment Currently Used at Home cane, straight   Activity/Exercise/Self-Care Comment Patient independent in self-cares/mobility with use of SEC.   Functional Level Prior   Ambulation 0-->independent   Transferring 0-->independent   Toileting 0-->independent   Bathing 0-->independent   Dressing 0-->independent   Eating 0-->independent   Communication 0-->understands/communicates without difficulty   Swallowing 0-->swallows foods/liquids without difficulty   Cognition 0 - no cognition issues reported   Prior Functional Level Comment Patient independent in self-cares/mobility with use of SEC.   General Information   Onset of Illness/Injury or Date of Surgery - Date 11/06/17   Referring Physician Dr. Mcknight   Patient/Family Goals Statement return home to baseline   Additional Occupational Profile Info/Pertinent History of Current Problem 62 yoM admitted to ED with Hx of chronic pain, anxiety, PTSD, smoking (30 pack yr)- presents for EtOH/Benzo withdrawal and hypoxemic respiratory failure.  Had spine surgery 18 months ago and L shoulder surgery 10 weeks ago   Precautions/Limitations oxygen therapy device and L/min   General Observations On 2.5 L O2   Sensory Examination   Sensory Comments Baseline N/T noted in L hand and L leg   Pain Assessment   Patient Currently in Pain Yes, see Vital Sign flowsheet   Range of Motion (ROM)   ROM Comment L shoulder aches with movement   Strength   Strength Comments B UE MMT WFL   Mobility   Bed Mobility Bed mobility skill: Sit to supine;Bed  mobility skill: Supine to sit   Bed Mobility Skill: Sit to Supine   Level of [place]: Sit/Supine stand-by assist   Bed Mobility Skill: Supine to Sit   Level of [place]: Supine/Sit stand-by assist   Transfer Skill: Bed to Chair/Chair to Bed   Level of [place]: Bed to Chair contact guard   Assistive Device - Transfer Skill Bed to Chair Chair to Bed Rehab Eval standard walker   Transfer Skill: Sit to Stand   Level of [place]: Sit/Stand contact guard   Assistive Device for Transfer: Sit/Stand standard walker   Transfer Skill: Toilet Transfer   Level of [place]: Toilet contact guard   Bathing   Level of [place] other (see comments)  (not assessed)   Upper Body Dressing   Level of [place]: Dress Upper Body independent   Lower Body Dressing   Level of [place]: Dress Lower Body stand-by assist   Toileting   Level of [place]: Toilet stand-by assist   Grooming   Level of [place]: Grooming independent   Eating/Self Feeding   Level of [place]: Eating independent   Activities of Daily Living Analysis   Impairments Contributing to Impaired Activities of Daily Living balance impaired;cognition impaired;coordination impaired;pain;strength decreased   General Therapy Interventions   Planned Therapy Interventions ADL retraining;strengthening   Clinical Impression   Criteria for Skilled Therapeutic Interventions Met yes, treatment indicated   OT Diagnosis impaired self-cares/mobility   Influenced by the following impairments weakness   Assessment of Occupational Performance 1-3 Performance Deficits   Identified Performance Deficits dressing, bathing, toileting   Clinical Decision Making (Complexity) Low complexity   Therapy Frequency daily   Predicted Duration of Therapy Intervention (days/wks) 3-4 days   Anticipated Discharge Disposition Transitional Care Facility   Risks and Benefits of Treatment have been explained. Yes   Patient, Family & other staff in agreement with plan of  "care Yes   [facility] AM-PAC TM \"6 Clicks\"   2016, Trustees of [facility], under license to [employer].  All rights reserved.   6 Clicks Short Forms Daily Activity Inpatient Short Form   [facility] AM-[facility]AC  \"6 Clicks\" Daily Activity Inpatient Short Form   1. Putting on and taking off regular lower body clothing? 4 - None   2. Bathing (including washing, rinsing, drying)? 3 - A Little   3. Toileting, which includes using toilet, bedpan or urinal? 3 - A Little   4. Putting on and taking off regular upper body clothing? 4 - None   5. Taking care of personal grooming such as brushing teeth? 4 - None   6. Eating meals? 4 - None   Daily Activity Raw Score (Score out of 24.Lower scores equate to lower levels of function) 22   Total Evaluation Time   Total Evaluation Time (Minutes) 8     " Detail Level: Detailed

## 2019-09-28 ENCOUNTER — HEALTH MAINTENANCE LETTER (OUTPATIENT)
Age: 64
End: 2019-09-28

## 2019-11-25 LAB — INTERPRETATION ECG - MUSE: NORMAL

## 2019-12-23 ENCOUNTER — ANCILLARY PROCEDURE (OUTPATIENT)
Dept: CT IMAGING | Facility: CLINIC | Age: 64
End: 2019-12-23
Attending: INTERNAL MEDICINE
Payer: MEDICARE

## 2019-12-23 DIAGNOSIS — R00.0 TACHYCARDIA, UNSPECIFIED: ICD-10-CM

## 2019-12-23 DIAGNOSIS — F17.210 TOBACCO DEPENDENCE DUE TO CIGARETTES: ICD-10-CM

## 2019-12-23 DIAGNOSIS — J44.9 COPD (CHRONIC OBSTRUCTIVE PULMONARY DISEASE) (H): ICD-10-CM

## 2019-12-23 LAB — RADIOLOGIST FLAGS: NORMAL

## 2020-03-15 ENCOUNTER — HEALTH MAINTENANCE LETTER (OUTPATIENT)
Age: 65
End: 2020-03-15

## 2021-01-10 ENCOUNTER — HEALTH MAINTENANCE LETTER (OUTPATIENT)
Age: 66
End: 2021-01-10

## 2021-05-08 ENCOUNTER — HEALTH MAINTENANCE LETTER (OUTPATIENT)
Age: 66
End: 2021-05-08

## 2021-06-02 ENCOUNTER — RECORDS - HEALTHEAST (OUTPATIENT)
Dept: ADMINISTRATIVE | Facility: CLINIC | Age: 66
End: 2021-06-02

## 2021-10-23 ENCOUNTER — HEALTH MAINTENANCE LETTER (OUTPATIENT)
Age: 66
End: 2021-10-23

## 2021-11-02 NOTE — PLAN OF CARE
Problem: Patient Care Overview  Goal: Plan of Care/Patient Progress Review  Outcome: No Change        VS:   Tachy HR with activity, 80's w/ rest. Tele patent. Denies SOB/CP.    Output:   Voids using bedside urinal. Voided x1 on floor. Passing flatus, no BM this shift.   Activity:   Bed alarm on, VPM trailed- Pt was unable to follow instructions or use call light appropriately. Code Blue was push by Pt, cords being pulled. Moonlighter paged for behavior. 1:1 sitter for safety.   Skin: CDI, bruises/scabs present.   Pain:   Denies any pain.   Neuro/CMS:   Baseline numbness/tingling LLE, L shoulder.   Dressing(s):   No drsgs.   Diet:   Regular diet. Fair appetite.   LDA:   L arm IV infusing fluids 100mL/hr.   Equipment:   Tele-NSR  VPM-dc'd   Plan:   Continue detox- MSSA scoring (11-14 this evening)   Additional Info:   Visual hallucinations are increasing.   MRSA/RSV panel sent to lab.             25 20

## 2022-06-04 ENCOUNTER — HEALTH MAINTENANCE LETTER (OUTPATIENT)
Age: 67
End: 2022-06-04

## 2022-10-09 ENCOUNTER — HEALTH MAINTENANCE LETTER (OUTPATIENT)
Age: 67
End: 2022-10-09

## 2023-06-10 ENCOUNTER — HEALTH MAINTENANCE LETTER (OUTPATIENT)
Age: 68
End: 2023-06-10
